# Patient Record
Sex: MALE | Race: WHITE | NOT HISPANIC OR LATINO | Employment: OTHER | URBAN - METROPOLITAN AREA
[De-identification: names, ages, dates, MRNs, and addresses within clinical notes are randomized per-mention and may not be internally consistent; named-entity substitution may affect disease eponyms.]

---

## 2017-01-01 ENCOUNTER — HOSPITAL ENCOUNTER (EMERGENCY)
Facility: HOSPITAL | Age: 77
Discharge: HOME | End: 2017-01-01
Attending: EMERGENCY MEDICINE
Payer: MEDICARE

## 2017-01-01 VITALS
SYSTOLIC BLOOD PRESSURE: 137 MMHG | WEIGHT: 145 LBS | HEART RATE: 72 BPM | OXYGEN SATURATION: 98 % | TEMPERATURE: 98 F | RESPIRATION RATE: 20 BRPM | DIASTOLIC BLOOD PRESSURE: 63 MMHG | HEIGHT: 66 IN | BODY MASS INDEX: 23.3 KG/M2

## 2017-01-01 DIAGNOSIS — L89.152 SACRAL DECUBITUS ULCER, STAGE II: Primary | ICD-10-CM

## 2017-01-01 LAB
ALBUMIN SERPL BCP-MCNC: 2 G/DL
ALP SERPL-CCNC: 120 U/L
ALT SERPL W/O P-5'-P-CCNC: 44 U/L
ANION GAP SERPL CALC-SCNC: 9 MMOL/L
AST SERPL-CCNC: 33 U/L
BACTERIA #/AREA URNS HPF: ABNORMAL /HPF
BASOPHILS # BLD AUTO: 0.03 K/UL
BASOPHILS NFR BLD: 0.4 %
BILIRUB SERPL-MCNC: 0.2 MG/DL
BILIRUB UR QL STRIP: NEGATIVE
BUN SERPL-MCNC: 10 MG/DL
CALCIUM SERPL-MCNC: 7.8 MG/DL
CHLORIDE SERPL-SCNC: 101 MMOL/L
CLARITY UR: CLEAR
CO2 SERPL-SCNC: 25 MMOL/L
COLOR UR: YELLOW
CREAT SERPL-MCNC: 0.7 MG/DL
DIFFERENTIAL METHOD: ABNORMAL
EOSINOPHIL # BLD AUTO: 0.1 K/UL
EOSINOPHIL NFR BLD: 1 %
ERYTHROCYTE [DISTWIDTH] IN BLOOD BY AUTOMATED COUNT: 17.7 %
EST. GFR  (AFRICAN AMERICAN): >60 ML/MIN/1.73 M^2
EST. GFR  (NON AFRICAN AMERICAN): >60 ML/MIN/1.73 M^2
GLUCOSE SERPL-MCNC: 146 MG/DL
GLUCOSE UR QL STRIP: NEGATIVE
HCT VFR BLD AUTO: 28.8 %
HGB BLD-MCNC: 9 G/DL
HGB UR QL STRIP: ABNORMAL
INR PPP: 1.6
KETONES UR QL STRIP: NEGATIVE
LACTATE SERPL-SCNC: 2.2 MMOL/L
LEUKOCYTE ESTERASE UR QL STRIP: ABNORMAL
LYMPHOCYTES # BLD AUTO: 1.3 K/UL
LYMPHOCYTES NFR BLD: 16.1 %
MCH RBC QN AUTO: 28.1 PG
MCHC RBC AUTO-ENTMCNC: 31.3 %
MCV RBC AUTO: 90 FL
MICROSCOPIC COMMENT: ABNORMAL
MONOCYTES # BLD AUTO: 1 K/UL
MONOCYTES NFR BLD: 12.5 %
NEUTROPHILS # BLD AUTO: 5.5 K/UL
NEUTROPHILS NFR BLD: 69.6 %
NITRITE UR QL STRIP: NEGATIVE
PH UR STRIP: 6 [PH] (ref 5–8)
PLATELET # BLD AUTO: 325 K/UL
PMV BLD AUTO: 8.3 FL
POTASSIUM SERPL-SCNC: 4 MMOL/L
PROT SERPL-MCNC: 6.2 G/DL
PROT UR QL STRIP: NEGATIVE
PROTHROMBIN TIME: 17 SEC
RBC # BLD AUTO: 3.2 M/UL
RBC #/AREA URNS HPF: 2 /HPF (ref 0–4)
SODIUM SERPL-SCNC: 135 MMOL/L
SP GR UR STRIP: <=1.005 (ref 1–1.03)
TROPONIN I SERPL DL<=0.01 NG/ML-MCNC: 0.03 NG/ML
URN SPEC COLLECT METH UR: ABNORMAL
UROBILINOGEN UR STRIP-ACNC: NEGATIVE EU/DL
WBC # BLD AUTO: 7.95 K/UL
WBC #/AREA URNS HPF: 2 /HPF (ref 0–5)
YEAST URNS QL MICRO: ABNORMAL

## 2017-01-01 PROCEDURE — 87077 CULTURE AEROBIC IDENTIFY: CPT

## 2017-01-01 PROCEDURE — 85610 PROTHROMBIN TIME: CPT

## 2017-01-01 PROCEDURE — 87086 URINE CULTURE/COLONY COUNT: CPT

## 2017-01-01 PROCEDURE — 96365 THER/PROPH/DIAG IV INF INIT: CPT

## 2017-01-01 PROCEDURE — 93005 ELECTROCARDIOGRAM TRACING: CPT

## 2017-01-01 PROCEDURE — 80053 COMPREHEN METABOLIC PANEL: CPT

## 2017-01-01 PROCEDURE — 81000 URINALYSIS NONAUTO W/SCOPE: CPT

## 2017-01-01 PROCEDURE — 93010 ELECTROCARDIOGRAM REPORT: CPT | Mod: ,,, | Performed by: INTERNAL MEDICINE

## 2017-01-01 PROCEDURE — 99284 EMERGENCY DEPT VISIT MOD MDM: CPT | Mod: 25

## 2017-01-01 PROCEDURE — 87040 BLOOD CULTURE FOR BACTERIA: CPT | Mod: 59

## 2017-01-01 PROCEDURE — 25000003 PHARM REV CODE 250: Performed by: EMERGENCY MEDICINE

## 2017-01-01 PROCEDURE — 87088 URINE BACTERIA CULTURE: CPT

## 2017-01-01 PROCEDURE — 84484 ASSAY OF TROPONIN QUANT: CPT

## 2017-01-01 PROCEDURE — P9612 CATHETERIZE FOR URINE SPEC: HCPCS

## 2017-01-01 PROCEDURE — 63600175 PHARM REV CODE 636 W HCPCS: Performed by: EMERGENCY MEDICINE

## 2017-01-01 PROCEDURE — 83605 ASSAY OF LACTIC ACID: CPT

## 2017-01-01 PROCEDURE — 96375 TX/PRO/DX INJ NEW DRUG ADDON: CPT

## 2017-01-01 PROCEDURE — 87186 SC STD MICRODIL/AGAR DIL: CPT

## 2017-01-01 PROCEDURE — 96361 HYDRATE IV INFUSION ADD-ON: CPT

## 2017-01-01 PROCEDURE — 85025 COMPLETE CBC W/AUTO DIFF WBC: CPT

## 2017-01-01 RX ORDER — OXYCODONE AND ACETAMINOPHEN 5; 325 MG/1; MG/1
2 TABLET ORAL EVERY 6 HOURS PRN
Qty: 30 TABLET | Refills: 0 | Status: SHIPPED | OUTPATIENT
Start: 2017-01-01 | End: 2017-01-09

## 2017-01-01 RX ORDER — HYDROMORPHONE HYDROCHLORIDE 1 MG/ML
0.5 INJECTION, SOLUTION INTRAMUSCULAR; INTRAVENOUS; SUBCUTANEOUS
Status: COMPLETED | OUTPATIENT
Start: 2017-01-01 | End: 2017-01-01

## 2017-01-01 RX ORDER — OXYCODONE AND ACETAMINOPHEN 5; 325 MG/1; MG/1
2 TABLET ORAL
Status: COMPLETED | OUTPATIENT
Start: 2017-01-01 | End: 2017-01-01

## 2017-01-01 RX ADMIN — PIPERACILLIN AND TAZOBACTAM 4.5 G: 4; .5 INJECTION, POWDER, LYOPHILIZED, FOR SOLUTION INTRAVENOUS; PARENTERAL at 02:01

## 2017-01-01 RX ADMIN — SODIUM CHLORIDE 1974 ML: 0.9 INJECTION, SOLUTION INTRAVENOUS at 02:01

## 2017-01-01 RX ADMIN — OXYCODONE HYDROCHLORIDE AND ACETAMINOPHEN 2 TABLET: 5; 325 TABLET ORAL at 04:01

## 2017-01-01 RX ADMIN — HYDROMORPHONE HYDROCHLORIDE 0.5 MG: 1 INJECTION, SOLUTION INTRAMUSCULAR; INTRAVENOUS; SUBCUTANEOUS at 02:01

## 2017-01-01 NOTE — ED PROVIDER NOTES
Encounter Date: 1/1/2017       History     Chief Complaint   Patient presents with    Skin Ulcer     Patient states he has a bed sore to left side of buttocks and has pain.    Cough     coughing for 3-4 days     Review of patient's allergies indicates:  No Known Allergies  HPI Comments: 76-year-old male with a history of hypertension heart failure chronic kidney disease peripheral vascular disease status post right AKA he has a suprapubic catheter and a chronic sacral decub ulcer comes in with worsening buttock pain that is been chronic for a long time.  He's been going to wound care, however pain has been getting worse.  He denies nausea vomiting or abdominal pain.  He denies changes to his appetite, but has had a cough for the last few days.  He's been having increasing drainage from his back.     Patient is a 76 y.o. male presenting with the following complaint: back pain. The history is provided by the patient.   Back Pain    This is a chronic problem. The current episode started today. The problem occurs constantly. The problem has been unchanged. The pain is associated with no known injury. The pain is present in the gluteal region. The quality of the pain is described as aching. The pain is at a severity of 7/10. The symptoms are aggravated by certain positions. Pertinent negatives include no fever, no numbness, no weight loss, no abdominal swelling, no bowel incontinence and no perianal numbness.     Past Medical History   Diagnosis Date    Alcohol abuse     CHF (congestive heart failure)     CKD (chronic kidney disease) stage 3, GFR 30-59 ml/min     Coronary artery disease     Heart failure, diastolic     High cholesterol     Hypertension     LBP (low back pain)     Prediabetes     PVD (peripheral vascular disease)     Stroke      2006, no deficits     No past medical history pertinent negatives.  Past Surgical History   Procedure Laterality Date    Aortic bifemoral bypass  2006    Left  common endarterectomy  2006    Bilateral carotid stenois  2006    Carotid stents      Joint replacement       knee     Family History   Problem Relation Age of Onset    Heart disease Mother      Social History   Substance Use Topics    Smoking status: Former Smoker     Packs/day: 2.00     Years: 45.00     Quit date: 7/16/2006    Smokeless tobacco: None    Alcohol use No      Comment: NO ALCOHOL FOR 18 MONTHS, PAST ETOH ABUSE     Review of Systems   Constitutional: Positive for chills. Negative for fever and weight loss.   HENT: Negative.    Gastrointestinal: Negative for bowel incontinence.   Musculoskeletal: Positive for back pain.   Neurological: Negative for numbness.   All other systems reviewed and are negative.      Physical Exam   Initial Vitals   BP Pulse Resp Temp SpO2   01/01/17 1246 01/01/17 1246 01/01/17 1246 01/01/17 1246 01/01/17 1246   107/54 83 18 98.2 °F (36.8 °C) 96 %     Physical Exam    Nursing note and vitals reviewed.  Constitutional:   He is chronically ill-appearing alert and oriented in no acute distress   HENT:   Head: Normocephalic and atraumatic.   Eyes: EOM are normal. Pupils are equal, round, and reactive to light.   Neck: Neck supple.   Cardiovascular: Normal rate.   His blood pressure is 107/67   Pulmonary/Chest: Breath sounds normal.   Abdominal: Soft.   He has a suprapubic catheter   Musculoskeletal:   He has a right AKA without any evidence of infection his left leg is normal appearing    His buttock he has a large sacral decubitus ulcer, I do not see that it is down to the bone; it is draining purulent fluid   Neurological: He is alert and oriented to person, place, and time.   Psychiatric: He has a normal mood and affect. Thought content normal.         ED Course   Procedures  Labs Reviewed   CBC W/ AUTO DIFFERENTIAL - Abnormal; Notable for the following:        Result Value    RBC 3.20 (*)     Hemoglobin 9.0 (*)     Hematocrit 28.8 (*)     MCHC 31.3 (*)     RDW 17.7 (*)      MPV 8.3 (*)     Lymph% 16.1 (*)     All other components within normal limits   COMPREHENSIVE METABOLIC PANEL - Abnormal; Notable for the following:     Sodium 135 (*)     Glucose 146 (*)     Calcium 7.8 (*)     Albumin 2.0 (*)     All other components within normal limits   PROTIME-INR - Abnormal; Notable for the following:     Prothrombin Time 17.0 (*)     INR 1.6 (*)     All other components within normal limits   TROPONIN I - Abnormal; Notable for the following:     Troponin I 0.028 (*)     All other components within normal limits   URINALYSIS - Abnormal; Notable for the following:     Specific Gravity, UA <=1.005 (*)     Occult Blood UA 1+ (*)     Leukocytes, UA Trace (*)     All other components within normal limits   URINALYSIS MICROSCOPIC - Abnormal; Notable for the following:     Yeast, UA Moderate (*)     All other components within normal limits   CULTURE, BLOOD    Narrative:     Aerobic and anaerobic   CULTURE, BLOOD    Narrative:     Aerobic and anaerobic   CULTURE, URINE   LACTIC ACID, PLASMA   LACTIC ACID, PLASMA             Medical Decision Making:   History:   Old Medical Records: I decided to obtain old medical records.  Old Records Summarized: records from previous admission(s).       <> Summary of Records: Patient has grown out pseudomonas previously, that has been sensitive to Zosyn, cefepime, and amikacin.    He is also grown out VRE CRE and MRSA  Initial Assessment:   76-year-old male chronically ill-appearing with worsening pain and drainage from his sacral decub  Differential Diagnosis:   Sepsis, osteomyelitis, bacteremia, pneumonia, electrolyte derangement, urinary tract infection  ED Management:  Patient's labs look good.  On further conversation, patient tells me he has not had his dressings changed for the last week because of the holiday.  This explains the increasing drainage and purulence on his bandage.  His labs look unchanged from previous he has a follow-up appointment with  wound therapy.  He hasn't been taking his hydrocodone, because he is nearly out.  Have rewritten a prescription for pain medications and recommended that he follow-up with wound care.  We have redressed his wounds here.  Patient and his wife expressed understanding and are happy with the plan.    His last blood pressure is 144/88  Patient has grown out VRE in the past                   ED Course     Clinical Impression:   The encounter diagnosis was Sacral decubitus ulcer, stage II.          Renate Valdez MD  01/01/17 1600

## 2017-01-01 NOTE — DISCHARGE INSTRUCTIONS
Treating Pressure Ulcers: Cleaning and Dressing  Its important that pressure ulcers be kept clean, moist, and covered. This helps reduce the risk of infection and speeds up the healing process. To promote healing, clean pressure ulcers at each dressing change. Take care to choose the most appropriate type of cleanser and dressing.    Caution  · Do not use heat lamps or drying agents, such as alcohol. They dry out wounds and can kill fragile new tissue.  · Do not use antiseptic agents, such as povidone-iodine and hydrogen peroxide, because they are toxic to new cells.  · Be aware that if dressings become dry, they may fuse with new cells, causing loss of new tissue when dressings are removed. Remove or change dressings before they dry out.  · Silver-based dressings are very effective for killing bacteria, and they are safe for newly developing tissues.   Wound irrigation  An irrigating catheter or syringe and saline may be used to flush the ulcer free of debris. Wound cleansers may also be used to loosen up and clean out debris. The amount of pressure used during irrigation should be enough to clean the wound without damaging it. Follow your facilitys guidelines regarding irrigation.  Adding moisture  Maintaining a clean, moist wound bed is essential for promoting healing. Certain dressings help keep ulcers moist. Be sure to fill spaces loosely with dressings to prevent fluid and bacteria from building up. Hydrogels can also help retain moisture.  Types of dressings  Many kinds of dressings are available. Be sure to follow the s instructions for the specific dressing used. If a wound doesnt respond to one type of dressing, consider changing the treatment plan.  · Moist gauze helps keep the wound moist and absorbs excess fluid. Gauze should be damp--not wet--with saline. Too-wet gauze can weaken surrounding tissue.  · Transparent films are thin and flexible and help protect wounds from water and  bacteria.   · Hydrocolloids absorb exudate, forming a nonadhesive gel. This helps maintain a moist wound environment. Hydrocolloids also protect the wound from water and bacteria.  · Hydrogels are water-based gels and dressing sheets that keep wounds moist. They are also soothing and can help ease pain.  · Alginates are highly absorptive dressings made from seaweed. When combined with wound exudate the dressing may form a gel that helps maintain a moist wound bed.  · Foams absorb exudate and keep the wound moist. They are used to cover or fill wounds.  · Collagens absorb exudate and help maintain a moist wound environment. They may also promote new tissue growth.  · Antimicrobials help prevent and treat infection. These dressings come in many forms.  Negative pressure wound therapy  Negative pressure wound therapy (NPWT)--also called vacuum-assisted closure--removes exudate, helps reduce bacterial growth, and promotes blood flow and granulation formation. First, a foam dressing is placed in the wound and the wound is covered with an occlusive dressing. Then tubing is attached to a pump, which creates subatmospheric pressure in the wound. Keep in mind that patients may require analgesia as dressing changes can be painful.  © 0735-1025 [employer] [address] [place] [address], [place] [address]. All rights reserved. This information is not intended as a substitute for professional medical care. Always follow your healthcare professional's instructions.

## 2017-01-01 NOTE — ED AVS SNAPSHOT
[facility]  [address]  [place] [address]               Asad Perez   2017  1:08 PM   ED    Description:  Male : 1940   Department:  [facility]           Your Care was Coordinated By:     Provider Role From To    Renate Valdez MD Attending Provider 17 1321 --      Reason for Visit     Skin Ulcer     Cough           Diagnoses this Visit        Comments    Sacral decubitus ulcer, stage II    -  Primary       ED Disposition     None           To Do List           Follow-up Information     Follow up with Michael Irizarry MD.    Specialties:  General Surgery, Surgery    Contact information:    [address]  [address]  [place] [address]  [phone]         These Medications        Disp Refills Start End    oxycodone-acetaminophen (PERCOCET) 5-325 mg per tablet 30 tablet 0 2017     Take 2 tablets by mouth every 6 (six) hours as needed for Pain. - Oral    Pharmacy: [facility] [address] [place] [address] Ph #: [phone]         [facility]     [facility] -  Assistance  Registered nurses in the [facility] provide clinical advisement, health education, appointment booking, and other advisory services.  Call for this free service at [phone].             Medications           Message regarding Medications     Verify the changes and/or additions to your medication regime listed below are the same as discussed with your clinician today.  If any of these changes or additions are incorrect, please notify your healthcare provider.        START taking these NEW medications        Refills    oxycodone-acetaminophen (PERCOCET) 5-325 mg per tablet 0    Sig: Take 2 tablets by mouth every 6 (six) hours as needed for Pain.    Class: Print    Route: Oral      These medications were administered today        Dose Freq    sodium chloride 0.9% bolus 1,974  mL 30 mL/kg × 65.8 kg ED 1 Time    Sig: Inject 1,974 mLs into the vein ED 1 Time.    Class: Normal    Route: Intravenous    hydromorphone (PF) injection 0.5 mg 0.5 mg ED 1 Time    Sig: Inject 0.5 mLs (0.5 mg total) into the vein ED 1 Time.    Class: Normal    Route: Intravenous    piperacillin-tazobactam 4.5 g in dextrose 5 % 100 mL IVPB (ready to mix system) 4.5 g ED 1 Time    Sig: Inject 100 mLs (4.5 g total) into the vein ED 1 Time.    Class: Normal    Route: Intravenous    neomycin-bacitracnZn-polymyxnB packet 1 each 1 packet Daily    Starting on: 1/2/2017    Sig: Apply 1 each topically once daily.    Class: Normal    Route: Topical (Top)    oxycodone-acetaminophen 5-325 mg per tablet 2 tablet 2 tablet ED 1 Time    Sig: Take 2 tablets by mouth ED 1 Time.    Class: Normal    Route: Oral           Verify that the below list of medications is an accurate representation of the medications you are currently taking.  If none reported, the list may be blank. If incorrect, please contact your healthcare provider. Carry this list with you in case of emergency.           Current Medications     albuterol-ipratropium 2.5mg-0.5mg/3mL (DUO-NEB) 0.5 mg-3 mg(2.5 mg base)/3 mL nebulizer solution Take 3 mLs by nebulization every 6 (six) hours while awake.    amiodarone (PACERONE) 200 MG Tab Take 1 tablet (200 mg total) by mouth once daily.    amlodipine (NORVASC) 10 MG tablet Take 1 tablet (10 mg total) by mouth once daily.    arginine-glutamine-calcium HMB (GENIE) 7-7-1.5 gram PwPk Take 1 packet by mouth 2 (two) times daily.    ascorbic acid, vitamin C, (VITAMIN C) 500 MG tablet Take 500 mg by mouth 2 (two) times daily.    aspirin (ECOTRIN) 81 MG EC tablet Take 1 tablet (81 mg total) by mouth once daily.    atorvastatin (LIPITOR) 40 MG tablet Take 1 tablet (40 mg total) by mouth once daily.    carvedilol (COREG) 25 MG tablet Take 1 tablet (25 mg total) by mouth 2 (two) times daily.    docusate sodium (COLACE) 250 MG capsule  "Take 250 mg by mouth 2 (two) times daily as needed for Constipation.    furosemide (LASIX) 20 MG tablet Take 2 tablets (40 mg total) by mouth once daily.    hydrocodone-acetaminophen 10-325mg (NORCO)  mg Tab Take 1 tablet by mouth every 6 (six) hours as needed for Pain.    ibuprofen (ADVIL,MOTRIN) 400 MG tablet Take 400 mg by mouth every 6 (six) hours as needed for Other.    lactulose (CHRONULAC) 10 gram/15 mL solution Take 10 g by mouth 2 (two) times daily.    linaclotide 290 mcg Cap Take by mouth once daily.    metoclopramide HCl (REGLAN) 5 MG tablet Take 5 mg by mouth 3 (three) times daily before meals.    multivitamin (THERAGRAN) per tablet Take 1 tablet by mouth once daily.    neomycin-bacitracnZn-polymyxnB packet 1 each Starting on Jan 02, 2017. Apply 1 each topically once daily.    oxycodone-acetaminophen (PERCOCET) 5-325 mg per tablet Take 2 tablets by mouth every 6 (six) hours as needed for Pain.    oxycodone-acetaminophen 5-325 mg per tablet 2 tablet Take 2 tablets by mouth ED 1 Time.    potassium, sodium phosphates (PHOS-NAK) 280-160-250 mg PwPk Take 1 packet by mouth 2 (two) times daily.    ramelteon (ROZEREM) 8 mg tablet Take 8 mg by mouth every evening.    rivaroxaban (XARELTO) 10 mg Tab Take 10 mg by mouth 2 (two) times daily.    rivaroxaban (XARELTO) 10 mg Tab Take 1 tablet (10 mg total) by mouth 2 (two) times daily.    tamsulosin (FLOMAX) 0.4 mg Cp24 Take 0.4 mg by mouth every evening.    tramadol (ULTRAM) 50 mg tablet Take 50 mg by mouth every 8 (eight) hours as needed for Pain.    zinc sulfate (ZINCATE) 220 (50) mg capsule Take 220 mg by mouth once daily.            Clinical Reference Information           Your Vitals Were     BP Pulse Temp Resp Height Weight    145/65 72 98.6 °F (37 °C) (Oral) 16 5' 6" (1.676 m) 65.8 kg (145 lb)    SpO2 BMI             98% 23.4 kg/m2         Allergies as of 1/1/2017     No Known Allergies      Immunizations Administered on Date of Encounter - 1/1/2017     " None      ED Micro, Lab, POCT     Start Ordered       Status Ordering Provider    01/01/17 1600 01/01/17 1405  Lactic acid, plasma  Every 4 hours     Start Status   01/01/17 1600 Acknowledged   01/01/17 2000 Scheduled       Acknowledged     01/01/17 1405 01/01/17 1405  Urinalysis Microscopic  Once      Final result     01/01/17 1339 01/01/17 1405  Blood culture x two cultures. Draw prior to antibiotics.  Every 15 min     Comments:  Aerobic and anaerobic   Start Status   01/01/17 1339 In process   01/01/17 1354 In process       Acknowledged     01/01/17 1339 01/01/17 1405  CBC auto differential  STAT      Final result     01/01/17 1339 01/01/17 1405  Comprehensive metabolic panel  STAT      Final result     01/01/17 1339 01/01/17 1405  Lactic acid, plasma  STAT      Final result     01/01/17 1339 01/01/17 1405  Protime-INR  STAT      Final result     01/01/17 1339 01/01/17 1405  Troponin I  STAT      Final result     01/01/17 1339 01/01/17 1405  Urinalysis - Cath  STAT      Final result     01/01/17 1339 01/01/17 1405  Urine culture - Cath (Indwelling)  STAT      In process       ED Imaging Orders     Start Ordered       Status Ordering Provider    01/01/17 1339 01/01/17 1405  X-Ray Chest AP Portable  1 time imaging      Final result         Discharge Instructions         Treating Pressure Ulcers: Cleaning and Dressing  Its important that pressure ulcers be kept clean, moist, and covered. This helps reduce the risk of infection and speeds up the healing process. To promote healing, clean pressure ulcers at each dressing change. Take care to choose the most appropriate type of cleanser and dressing.    Caution  · Do not use heat lamps or drying agents, such as alcohol. They dry out wounds and can kill fragile new tissue.  · Do not use antiseptic agents, such as povidone-iodine and hydrogen peroxide, because they are toxic to new cells.  · Be aware that if dressings become dry, they may fuse with new cells, causing  loss of new tissue when dressings are removed. Remove or change dressings before they dry out.  · Silver-based dressings are very effective for killing bacteria, and they are safe for newly developing tissues.   Wound irrigation  An irrigating catheter or syringe and saline may be used to flush the ulcer free of debris. Wound cleansers may also be used to loosen up and clean out debris. The amount of pressure used during irrigation should be enough to clean the wound without damaging it. Follow your facilitys guidelines regarding irrigation.  Adding moisture  Maintaining a clean, moist wound bed is essential for promoting healing. Certain dressings help keep ulcers moist. Be sure to fill spaces loosely with dressings to prevent fluid and bacteria from building up. Hydrogels can also help retain moisture.  Types of dressings  Many kinds of dressings are available. Be sure to follow the s instructions for the specific dressing used. If a wound doesnt respond to one type of dressing, consider changing the treatment plan.  · Moist gauze helps keep the wound moist and absorbs excess fluid. Gauze should be damp--not wet--with saline. Too-wet gauze can weaken surrounding tissue.  · Transparent films are thin and flexible and help protect wounds from water and bacteria.   · Hydrocolloids absorb exudate, forming a nonadhesive gel. This helps maintain a moist wound environment. Hydrocolloids also protect the wound from water and bacteria.  · Hydrogels are water-based gels and dressing sheets that keep wounds moist. They are also soothing and can help ease pain.  · Alginates are highly absorptive dressings made from seaweed. When combined with wound exudate the dressing may form a gel that helps maintain a moist wound bed.  · Foams absorb exudate and keep the wound moist. They are used to cover or fill wounds.  · Collagens absorb exudate and help maintain a moist wound environment. They may also promote new  tissue growth.  · Antimicrobials help prevent and treat infection. These dressings come in many forms.  Negative pressure wound therapy  Negative pressure wound therapy (NPWT)--also called vacuum-assisted closure--removes exudate, helps reduce bacterial growth, and promotes blood flow and granulation formation. First, a foam dressing is placed in the wound and the wound is covered with an occlusive dressing. Then tubing is attached to a pump, which creates subatmospheric pressure in the wound. Keep in mind that patients may require analgesia as dressing changes can be painful.  © 4378-7968 [employer] [address] [place] [address]. All rights reserved. This information is not intended as a substitute for professional medical care. Always follow your healthcare professional's instructions.          Your Scheduled Appointments     Jan 04, 2017  1:00 PM CST   Established Patient Visit with Michael Irizarry MD   [facility] ([place] [facility]) [address] [place] [address] [phone]              Smoking Cessation     If you would like to quit smoking:   You may be eligible for free services if you are a [place] resident and started smoking cigarettes before September 1, 1988.  Call the Smoking Cessation Trust ([facility]) toll free at (215) 320-1429 or (303) 353-4455.   Call 0-884-QUIT-NOW if you do not meet the above criteria.             [facility] complies with applicable Federal civil rights laws and does not discriminate on the basis of race, color, national origin, age, disability, or sex.        Language Assistance Services     ATTENTION: Language assistance services are available, free of charge. Please call [phone].      ATENCIÓN: Si habla angelo, tiene a shirley disposición servicios gratuitos de asistencia lingüística. Llame al [phone].     CHÚ Ý: N?u b?n nói Ti?ng Vi?t, có các d?ch v? h? tr? ngôn ng? mi?n  phí dành cho b?ramy. G?i s? [phone].

## 2017-01-01 NOTE — ED NOTES
Pt pressure ulcer dressing changed and redressed with ABD pad and oil emulsion dressing per MD order

## 2017-01-01 NOTE — ED NOTES
Pt presents to ED with c/o worsening sacral bed sore and cough x past few weeks. Pt states he has been bed bound for past few months due to right BKA back in October. Pt and wife at bedside and states pt has been going to wound care 5 times a week for bed sore. On arrival, saturated diaper and dressing with pus and purulent drainage noted. Diaper changed. Pt also has gonzalez catheter in place with PEG tube on arrival, as well. Pt AAO. Pt placed on 2 L O2 via nasal cannula. Call light within reach, side rails up x 2.

## 2017-01-04 ENCOUNTER — HOSPITAL ENCOUNTER (OUTPATIENT)
Dept: WOUND CARE | Facility: HOSPITAL | Age: 77
Discharge: HOME | End: 2017-01-04
Attending: SURGERY
Payer: MEDICARE

## 2017-01-04 VITALS
BODY MASS INDEX: 23.3 KG/M2 | SYSTOLIC BLOOD PRESSURE: 97 MMHG | HEIGHT: 66 IN | TEMPERATURE: 98 F | HEART RATE: 58 BPM | WEIGHT: 145 LBS | DIASTOLIC BLOOD PRESSURE: 47 MMHG

## 2017-01-04 DIAGNOSIS — R53.1 WEAK: Primary | ICD-10-CM

## 2017-01-04 DIAGNOSIS — T87.89 NON-HEALING WOUND OF AMPUTATION STUMP: ICD-10-CM

## 2017-01-04 PROCEDURE — 99214 OFFICE O/P EST MOD 30 MIN: CPT

## 2017-01-04 NOTE — PROGRESS NOTES
Much of the documentation for this visit was completed in the [facility] system. Please see the attached documentation for further details about this patient's care.     Michael Irizarry MD

## 2017-01-04 NOTE — PROVIDER PROGRESS NOTES - EMERGENCY DEPT.
Encounter Date: 1/1/2017    ED Physician Progress Notes        Physician Note:   01/03/2017 5:53 PM. Patient was not sent home with ABX. I discussed with Dr. Pastrana and will await sensitivity before starting ABX. ARELY      01/04/2017 4:19 PM. Patient reports feeling better. rRsults were discussed with Dr. Pastrana whom reccommended patient to follow up with PCP or urologist this week as culture is showing atypical bacteria growth. He verbalized understanding. ARELY

## 2017-01-04 NOTE — PATIENT INSTRUCTIONS
Use cushion under buttocks when in automobile and in wheelchair to keep pressure off wound buttocks. Call [facility] to get the Wound Vac asap. Obtain camryn lift sling and put in w/c before putting patient in w/c when returning to Wound Center next time so we can put patient on bed to see wounds.

## 2017-01-04 NOTE — PROGRESS NOTES
IN ORDER TO FIND TODAY'S PROGRESS NOTE IN WOUND EXPERT, LOOK IN THE MEDIA TAB. Patient with gonzalez urinary catheter draining clear yellow urine to gravity.

## 2017-01-06 LAB
BACTERIA BLD CULT: NORMAL
BACTERIA BLD CULT: NORMAL
BACTERIA UR CULT: NORMAL

## 2017-01-09 ENCOUNTER — HOSPITAL ENCOUNTER (EMERGENCY)
Facility: HOSPITAL | Age: 77
Discharge: HOME | End: 2017-01-09
Attending: EMERGENCY MEDICINE
Payer: MEDICARE

## 2017-01-09 VITALS
SYSTOLIC BLOOD PRESSURE: 121 MMHG | WEIGHT: 150 LBS | OXYGEN SATURATION: 97 % | DIASTOLIC BLOOD PRESSURE: 76 MMHG | HEART RATE: 63 BPM | RESPIRATION RATE: 13 BRPM | TEMPERATURE: 98 F | HEIGHT: 66 IN | BODY MASS INDEX: 24.11 KG/M2

## 2017-01-09 DIAGNOSIS — L89.502 DECUBITUS ULCER OF ANKLE, STAGE 2, UNSPECIFIED LATERALITY: ICD-10-CM

## 2017-01-09 DIAGNOSIS — M79.18 CHRONIC BUTTOCK PAIN: ICD-10-CM

## 2017-01-09 DIAGNOSIS — R05.9 COUGH IN ADULT: ICD-10-CM

## 2017-01-09 DIAGNOSIS — M79.18 BUTTOCK PAIN: ICD-10-CM

## 2017-01-09 DIAGNOSIS — E86.0 DEHYDRATION, MILD: Primary | ICD-10-CM

## 2017-01-09 DIAGNOSIS — G89.29 CHRONIC BUTTOCK PAIN: ICD-10-CM

## 2017-01-09 LAB
ALBUMIN SERPL BCP-MCNC: 1.8 G/DL
ALP SERPL-CCNC: 97 U/L
ALT SERPL W/O P-5'-P-CCNC: 27 U/L
ANION GAP SERPL CALC-SCNC: 9 MMOL/L
AST SERPL-CCNC: 22 U/L
BACTERIA #/AREA URNS HPF: ABNORMAL /HPF
BASOPHILS # BLD AUTO: 0.01 K/UL
BASOPHILS NFR BLD: 0.1 %
BILIRUB SERPL-MCNC: 0.6 MG/DL
BILIRUB UR QL STRIP: ABNORMAL
BUN SERPL-MCNC: 9 MG/DL
CALCIUM SERPL-MCNC: 7.9 MG/DL
CHLORIDE SERPL-SCNC: 101 MMOL/L
CLARITY UR: ABNORMAL
CO2 SERPL-SCNC: 27 MMOL/L
COLOR UR: YELLOW
CREAT SERPL-MCNC: 0.7 MG/DL
DIFFERENTIAL METHOD: ABNORMAL
EOSINOPHIL # BLD AUTO: 0.1 K/UL
EOSINOPHIL NFR BLD: 1.1 %
ERYTHROCYTE [DISTWIDTH] IN BLOOD BY AUTOMATED COUNT: 17.3 %
EST. GFR  (AFRICAN AMERICAN): >60 ML/MIN/1.73 M^2
EST. GFR  (NON AFRICAN AMERICAN): >60 ML/MIN/1.73 M^2
GLUCOSE SERPL-MCNC: 129 MG/DL
GLUCOSE UR QL STRIP: NEGATIVE
HCT VFR BLD AUTO: 27 %
HGB BLD-MCNC: 8.5 G/DL
HGB UR QL STRIP: ABNORMAL
HYALINE CASTS #/AREA URNS LPF: 1 /LPF
KETONES UR QL STRIP: NEGATIVE
LACTATE SERPL-SCNC: 1.8 MMOL/L
LEUKOCYTE ESTERASE UR QL STRIP: ABNORMAL
LYMPHOCYTES # BLD AUTO: 1.5 K/UL
LYMPHOCYTES NFR BLD: 20.5 %
MCH RBC QN AUTO: 28.3 PG
MCHC RBC AUTO-ENTMCNC: 31.5 %
MCV RBC AUTO: 90 FL
MICROSCOPIC COMMENT: ABNORMAL
MONOCYTES # BLD AUTO: 0.9 K/UL
MONOCYTES NFR BLD: 11.9 %
NEUTROPHILS # BLD AUTO: 4.9 K/UL
NEUTROPHILS NFR BLD: 65.9 %
NITRITE UR QL STRIP: NEGATIVE
PH UR STRIP: 6 [PH] (ref 5–8)
PLATELET # BLD AUTO: 358 K/UL
PMV BLD AUTO: 8.4 FL
POTASSIUM SERPL-SCNC: 3.8 MMOL/L
PROT SERPL-MCNC: 6.4 G/DL
PROT UR QL STRIP: ABNORMAL
RBC # BLD AUTO: 3 M/UL
RBC #/AREA URNS HPF: 0 /HPF (ref 0–4)
SODIUM SERPL-SCNC: 137 MMOL/L
SP GR UR STRIP: 1.02 (ref 1–1.03)
TROPONIN I SERPL DL<=0.01 NG/ML-MCNC: 0.02 NG/ML
URN SPEC COLLECT METH UR: ABNORMAL
UROBILINOGEN UR STRIP-ACNC: ABNORMAL EU/DL
WBC # BLD AUTO: 7.41 K/UL
WBC #/AREA URNS HPF: 12 /HPF (ref 0–5)
YEAST URNS QL MICRO: ABNORMAL

## 2017-01-09 PROCEDURE — 83605 ASSAY OF LACTIC ACID: CPT

## 2017-01-09 PROCEDURE — 81000 URINALYSIS NONAUTO W/SCOPE: CPT

## 2017-01-09 PROCEDURE — 93005 ELECTROCARDIOGRAM TRACING: CPT

## 2017-01-09 PROCEDURE — 80053 COMPREHEN METABOLIC PANEL: CPT

## 2017-01-09 PROCEDURE — 87040 BLOOD CULTURE FOR BACTERIA: CPT | Mod: 59

## 2017-01-09 PROCEDURE — 87186 SC STD MICRODIL/AGAR DIL: CPT

## 2017-01-09 PROCEDURE — 87088 URINE BACTERIA CULTURE: CPT

## 2017-01-09 PROCEDURE — 84484 ASSAY OF TROPONIN QUANT: CPT

## 2017-01-09 PROCEDURE — 99284 EMERGENCY DEPT VISIT MOD MDM: CPT | Mod: 25

## 2017-01-09 PROCEDURE — 87077 CULTURE AEROBIC IDENTIFY: CPT

## 2017-01-09 PROCEDURE — 87086 URINE CULTURE/COLONY COUNT: CPT

## 2017-01-09 PROCEDURE — 96360 HYDRATION IV INFUSION INIT: CPT

## 2017-01-09 PROCEDURE — 85025 COMPLETE CBC W/AUTO DIFF WBC: CPT

## 2017-01-09 PROCEDURE — 25000003 PHARM REV CODE 250: Performed by: EMERGENCY MEDICINE

## 2017-01-09 RX ORDER — OXYCODONE AND ACETAMINOPHEN 5; 325 MG/1; MG/1
1 TABLET ORAL EVERY 4 HOURS PRN
Qty: 15 TABLET | Refills: 0 | Status: ON HOLD | OUTPATIENT
Start: 2017-01-09 | End: 2019-01-01 | Stop reason: HOSPADM

## 2017-01-09 RX ORDER — HYDROCODONE BITARTRATE AND ACETAMINOPHEN 10; 325 MG/1; MG/1
1 TABLET ORAL
Status: COMPLETED | OUTPATIENT
Start: 2017-01-09 | End: 2017-01-09

## 2017-01-09 RX ADMIN — HYDROCODONE BITARTRATE AND ACETAMINOPHEN 1 TABLET: 10; 325 TABLET ORAL at 01:01

## 2017-01-09 RX ADMIN — SODIUM CHLORIDE 500 ML: 0.9 INJECTION, SOLUTION INTRAVENOUS at 01:01

## 2017-01-09 NOTE — ED NOTES
"Pt presents to ED with c/o cough and SOB since this morning. Pt wife at bedside and states pt has been having non productive cough. On arrival, pt in no acute distress. Pt has no other complaints other that pain to back and states he "hurts all over". Pt has BKA to right leg and is bed bound at home.   "

## 2017-01-09 NOTE — ED NOTES
Stage IV pressure ulcer to sacrum noted on arrival. 10 cm x 9 cm. Depth 4 cm.  Wound redressed with wet to dry dressing.

## 2017-01-09 NOTE — DISCHARGE INSTRUCTIONS
You need to make sure you rotate from side to side to prevent worsening of you decubitus ulcer.  The pain you're having is chronic in its from your ulcer.  Home health should be coming to your health to change the bandages.  Call your regular physician to schedule a follow-up appointment.

## 2017-01-09 NOTE — ED PROVIDER NOTES
"Encounter Date: 1/9/2017       History     Chief Complaint   Patient presents with    Hypotension     states BP was low today at home. complains of pain "everywhere"     Review of patient's allergies indicates:  No Known Allergies  HPI Comments: Patient is a 76-year-old male with a past medical history of hypertension and chronic kidney disease congestive heart failure peripheral arterial disease with a right AKA who is bedbound and has a large sacral decubitus ulcer on his buttock who presents to emergency room for evaluation of diffuse fatigue and hypotension.  Patient states he "hurts all over".  Most of his pain is located in his back.  He was seen in the emergency department here a few days ago for back pain and was given a refill for his Norco and his dressings were changed.  Prior to that visit he had not had his dressing changed over week.  She has an indwelling Mendoza catheter and states it is been changed recently.  The patient denies any current abdominal pain chest pain shortness of breath vomiting diarrhea.      Past Medical History   Diagnosis Date    Alcohol abuse     CHF (congestive heart failure)     CKD (chronic kidney disease) stage 3, GFR 30-59 ml/min     Coronary artery disease     Heart failure, diastolic     High cholesterol     Hypertension     LBP (low back pain)     Prediabetes     PVD (peripheral vascular disease)     Stroke      2006, no deficits     No past medical history pertinent negatives.  Past Surgical History   Procedure Laterality Date    Aortic bifemoral bypass  2006    Left common endarterectomy  2006    Bilateral carotid stenois  2006    Carotid stents      Joint replacement       knee    Rt aka  NOV 2017     Family History   Problem Relation Age of Onset    Heart disease Mother      Social History   Substance Use Topics    Smoking status: Former Smoker     Packs/day: 2.00     Years: 45.00     Quit date: 7/16/2006    Smokeless tobacco: None    Alcohol use " "No      Comment: NO ALCOHOL FOR 18 MONTHS, PAST ETOH ABUSE     Review of Systems   Constitutional: Positive for chills and fatigue. Negative for fever.   HENT: Negative for congestion.    Respiratory: Negative for cough, shortness of breath and wheezing.    Cardiovascular: Negative for chest pain and palpitations.   Gastrointestinal: Negative for abdominal pain, constipation, diarrhea, nausea and vomiting.   Genitourinary: Negative for flank pain and hematuria.        Ndwelling Mendoza catheter   Musculoskeletal: Positive for arthralgias, back pain and myalgias.   Skin: Positive for wound.   Neurological: Negative for weakness, numbness and headaches.   Psychiatric/Behavioral: Negative for agitation and confusion.       Physical Exam   Initial Vitals   BP Pulse Resp Temp SpO2   01/09/17 1218 01/09/17 1218 01/09/17 1218 01/09/17 1218 01/09/17 1218   84/46 68 16 98.6 °F (37 °C) 95 %     Physical Exam    Vitals reviewed.  Constitutional: He appears well-developed and well-nourished. No distress.   HENT:   Head: Normocephalic and atraumatic.   Mouth/Throat: Oropharynx is clear and moist.   Eyes: Conjunctivae and EOM are normal. Pupils are equal, round, and reactive to light.   Neck: Normal range of motion. Neck supple. No JVD present.   Cardiovascular: Normal rate and regular rhythm. Exam reveals no gallop and no friction rub.    Murmur heard.  Pulmonary/Chest: Breath sounds normal. He has no wheezes. He has no rhonchi. He has no rales.   Abdominal: Soft. There is no tenderness. There is no rebound and no guarding.   Musculoskeletal: Normal range of motion. He exhibits no edema.   Right above-the-knee" with a bandage in place but no signs of drainage or erythema   Neurological: He is alert and oriented to person, place, and time. He has normal strength. No cranial nerve deficit or sensory deficit.   Skin: Skin is warm and dry.   Sacral decubitus ulcer that approximately 10x9 cm with a pink healthy base but no significant " discharge.  Wet to dry dressing is in place.  There is tracking in the right lumbo sacral region of 4 cm.  It is not down to the bone.    Psychiatric: He has a normal mood and affect.         ED Course   Procedures  Labs Reviewed   CULTURE, BLOOD   CULTURE, BLOOD   CBC W/ AUTO DIFFERENTIAL   COMPREHENSIVE METABOLIC PANEL   LACTIC ACID, PLASMA   URINALYSIS   TROPONIN I     EKG Readings: (Independently Interpreted)   Other EKG Interpretations: Rate 61, normal sinus rhythm axis intervals ST segments and axis.          Medical Decision Making:   The patient  appears to have chronic pain secondary to his buttock wound.  It does not appear to be cellulitic.  He does not appear to have a urinary tract infection at this time but likely chronic bacturia.  The patient doesn't appear to need admission at this time.  He does not appear to be septic or toxic.  He needs to follow-up with his primary care physician for home health.  I feel bad that he is in chronic pain but he may need to go into a nursing home for better wound care.  I will give him a refill of his pain pills.                   ED Course     Clinical Impression:   The primary encounter diagnosis was Dehydration, mild. Diagnoses of Cough in adult, Buttock pain, and Decubitus ulcer of ankle, stage 2, unspecified laterality were also pertinent to this visit.          Emerson Mckay MD  01/09/17 8706

## 2017-01-09 NOTE — ED AVS SNAPSHOT
[facility]  [address]  [place] [address]               Asad Perez   2017 12:41 PM   ED    Description:  Male : 1940   Department:  [facility]           Your Care was Coordinated By:     Provider Role From To    Emerson Mckay MD Attending Provider 17 1246 --      Reason for Visit     Hypotension           Diagnoses this Visit        Comments    Dehydration, mild    -  Primary     Cough in adult         Buttock pain         Decubitus ulcer of ankle, stage 2, unspecified laterality         Chronic buttock pain           ED Disposition     ED Disposition Condition Comment    Discharge             To Do List           Follow-up Information     Follow up with [facility].    Specialty:  Emergency Medicine    Why:  If symptoms worsen    Contact information:    [address]  [place] [id number] [phone]        Follow up with Tomas Torres MD In 1 day.    Specialty:  Family Medicine    Why:  Call tomorrow to schedule follow-up appointment with your primary care doctor    Contact information:    [address] [place] [address]  [place] [address]  [phone]        [facility]     [facility] -  Assistance  Registered nurses in the [facility] provide clinical advisement, health education, appointment booking, and other advisory services.  Call for this free service at [phone].             Medications           Message regarding Medications     Verify the changes and/or additions to your medication regime listed below are the same as discussed with your clinician today.  If any of these changes or additions are incorrect, please notify your healthcare provider.        These medications were administered today        Dose Freq    sodium chloride 0.9% bolus 500 mL 500 mL ED 1 Time    Sig: Inject 500 mLs into the vein ED 1 Time.    Class: Normal    Route:  Intravenous    hydrocodone-acetaminophen 10-325mg per tablet 1 tablet 1 tablet ED 1 Time    Sig: Take 1 tablet by mouth ED 1 Time.    Class: Normal    Route: Oral           Verify that the below list of medications is an accurate representation of the medications you are currently taking.  If none reported, the list may be blank. If incorrect, please contact your healthcare provider. Carry this list with you in case of emergency.           Current Medications     albuterol-ipratropium 2.5mg-0.5mg/3mL (DUO-NEB) 0.5 mg-3 mg(2.5 mg base)/3 mL nebulizer solution Take 3 mLs by nebulization every 6 (six) hours while awake.    amiodarone (PACERONE) 200 MG Tab Take 1 tablet (200 mg total) by mouth once daily.    amlodipine (NORVASC) 10 MG tablet Take 1 tablet (10 mg total) by mouth once daily.    arginine-glutamine-calcium HMB (GENIE) 7-7-1.5 gram PwPk Take 1 packet by mouth 2 (two) times daily.    ascorbic acid, vitamin C, (VITAMIN C) 500 MG tablet Take 500 mg by mouth 2 (two) times daily.    aspirin (ECOTRIN) 81 MG EC tablet Take 1 tablet (81 mg total) by mouth once daily.    atorvastatin (LIPITOR) 40 MG tablet Take 1 tablet (40 mg total) by mouth once daily.    carvedilol (COREG) 25 MG tablet Take 1 tablet (25 mg total) by mouth 2 (two) times daily.    docusate sodium (COLACE) 250 MG capsule Take 250 mg by mouth 2 (two) times daily as needed for Constipation.    furosemide (LASIX) 20 MG tablet Take 2 tablets (40 mg total) by mouth once daily.    hydrocodone-acetaminophen 10-325mg (NORCO)  mg Tab Take 1 tablet by mouth every 6 (six) hours as needed for Pain.    ibuprofen (ADVIL,MOTRIN) 400 MG tablet Take 400 mg by mouth every 6 (six) hours as needed for Other.    lactulose (CHRONULAC) 10 gram/15 mL solution Take 10 g by mouth 2 (two) times daily.    linaclotide 290 mcg Cap Take by mouth once daily.    metoclopramide HCl (REGLAN) 5 MG tablet Take 5 mg by mouth 3 (three) times daily before meals.    multivitamin  "(THERAGRAN) per tablet Take 1 tablet by mouth once daily.    oxycodone-acetaminophen (PERCOCET) 5-325 mg per tablet Take 2 tablets by mouth every 6 (six) hours as needed for Pain.    potassium, sodium phosphates (PHOS-NAK) 280-160-250 mg PwPk Take 1 packet by mouth 2 (two) times daily.    ramelteon (ROZEREM) 8 mg tablet Take 8 mg by mouth every evening.    rivaroxaban (XARELTO) 10 mg Tab Take 10 mg by mouth 2 (two) times daily.    rivaroxaban (XARELTO) 10 mg Tab Take 1 tablet (10 mg total) by mouth 2 (two) times daily.    tamsulosin (FLOMAX) 0.4 mg Cp24 Take 0.4 mg by mouth every evening.    tramadol (ULTRAM) 50 mg tablet Take 50 mg by mouth every 8 (eight) hours as needed for Pain.    zinc sulfate (ZINCATE) 220 (50) mg capsule Take 220 mg by mouth once daily.            Clinical Reference Information           Your Vitals Were     BP Pulse Temp Resp Height Weight    121/76 63 97.7 °F (36.5 °C) (Oral) 13 5' 6" (1.676 m) 68 kg (150 lb)    SpO2 BMI             97% 24.21 kg/m2         Allergies as of 1/9/2017     No Known Allergies      Immunizations Administered on Date of Encounter - 1/9/2017     None      ED Micro, Lab, POCT     Start Ordered       Status Ordering Provider    01/09/17 1701 01/09/17 1700  Urine culture  Add-on      Completed     01/09/17 1655 01/09/17 1654    STAT,   Status:  Canceled      Canceled     01/09/17 1307 01/09/17 1306  Blood Culture #1 **CANNOT BE ORDERED STAT**  Once      In process     01/09/17 1307 01/09/17 1306  Blood Culture #2 **CANNOT BE ORDERED STAT**  Once      In process     01/09/17 1307 01/09/17 1306  Urinalysis  STAT      Final result     01/09/17 1307 01/09/17 1306  Troponin I  STAT      Final result     01/09/17 1306 01/09/17 1306  CBC auto differential  STAT      Final result     01/09/17 1306 01/09/17 1306  Comprehensive metabolic panel  STAT      Final result     01/09/17 1306 01/09/17 1306  Lactic acid, plasma  STAT      Final result     01/09/17 1306 01/09/17 1306  " Urinalysis Microscopic  Once      Final result       ED Imaging Orders     Start Ordered       Status Ordering Provider    01/09/17 1509 01/09/17 1508  X-Ray Chest 1 View  1 time imaging      Final result         Discharge Instructions       You need to make sure you rotate from side to side to prevent worsening of you decubitus ulcer.  The pain you're having is chronic in its from your ulcer.  Home health should be coming to your health to change the bandages.  Call your regular physician to schedule a follow-up appointment.    Discharge References/Attachments     DECUBITUS ULCER (ENGLISH)      Your Scheduled Appointments     Jan 11, 2017 10:30 AM CST   Established Patient Visit with Michael Irizarry MD   [facility] ([facility])    [address]  [facility] [address] [phone]              Smoking Cessation     If you would like to quit smoking:   You may be eligible for free services if you are a [place] resident and started smoking cigarettes before September 1, 1988.  Call the Smoking Cessation [employer] ([facility]) toll free at [phone] or [phone].   Call 2-799-QUIT-NOW if you do not meet the above criteria.             [facility] complies with applicable Federal civil rights laws and does not discriminate on the basis of race, color, national origin, age, disability, or sex.        Language Assistance Services     ATTENTION: Language assistance services are available, free of charge. Please call [phone].      ATENCIÓN: Si habla español, tiene a shirley disposición servicios gratuitos de asistencia lingüística. Llame al [phone].     CHÚ Ý: N?u b?n nói Ti?ng Vi?t, có các d?ch v? h? tr? ngôn ng? mi?n phí dành cho b?n. G?i s? [phone].

## 2017-01-14 LAB
BACTERIA BLD CULT: NORMAL
BACTERIA BLD CULT: NORMAL
BACTERIA UR CULT: NORMAL

## 2017-01-17 ENCOUNTER — HOSPITAL ENCOUNTER (EMERGENCY)
Facility: HOSPITAL | Age: 77
Discharge: HOME | End: 2017-01-18
Attending: EMERGENCY MEDICINE
Payer: MEDICARE

## 2017-01-17 DIAGNOSIS — T83.511D URINARY TRACT INFECTION ASSOCIATED WITH INDWELLING URETHRAL CATHETER, SUBSEQUENT ENCOUNTER: Primary | ICD-10-CM

## 2017-01-17 DIAGNOSIS — N39.0 URINARY TRACT INFECTION ASSOCIATED WITH INDWELLING URETHRAL CATHETER, SUBSEQUENT ENCOUNTER: Primary | ICD-10-CM

## 2017-01-17 LAB
BASOPHILS # BLD AUTO: 0.01 K/UL
BASOPHILS NFR BLD: 0.1 %
DIFFERENTIAL METHOD: ABNORMAL
EOSINOPHIL # BLD AUTO: 0 K/UL
EOSINOPHIL NFR BLD: 0.4 %
ERYTHROCYTE [DISTWIDTH] IN BLOOD BY AUTOMATED COUNT: 17.3 %
HCT VFR BLD AUTO: 29.5 %
HGB BLD-MCNC: 8.9 G/DL
LYMPHOCYTES # BLD AUTO: 1.8 K/UL
LYMPHOCYTES NFR BLD: 17.9 %
MCH RBC QN AUTO: 27.2 PG
MCHC RBC AUTO-ENTMCNC: 30.2 %
MCV RBC AUTO: 90 FL
MONOCYTES # BLD AUTO: 1.3 K/UL
MONOCYTES NFR BLD: 13 %
NEUTROPHILS # BLD AUTO: 7 K/UL
NEUTROPHILS NFR BLD: 68.6 %
PLATELET # BLD AUTO: 499 K/UL
PMV BLD AUTO: 8.6 FL
RBC # BLD AUTO: 3.27 M/UL
WBC # BLD AUTO: 10.23 K/UL

## 2017-01-17 PROCEDURE — 85025 COMPLETE CBC W/AUTO DIFF WBC: CPT

## 2017-01-17 PROCEDURE — 80053 COMPREHEN METABOLIC PANEL: CPT

## 2017-01-17 PROCEDURE — 96365 THER/PROPH/DIAG IV INF INIT: CPT

## 2017-01-17 PROCEDURE — 99284 EMERGENCY DEPT VISIT MOD MDM: CPT | Mod: 25

## 2017-01-17 NOTE — ED AVS SNAPSHOT
[facility]  [address]  [place] [address]               Asad Perez   2017  8:44 PM   ED    Description:  Male : 1940   Department:  [facility]           Your Care was Coordinated By:     Provider Role From To    Mario Alberto Dewey MD Attending Provider 17 1983 --      Reason for Visit     Abnormal Lab           Diagnoses this Visit        Comments    Urinary tract infection associated with indwelling urethral catheter, subsequent encounter    -  Primary       ED Disposition     ED Disposition Condition Comment    Discharge             To Do List           Follow-up Information     Follow up with Tomas Torres MD In 2 days.    Specialty:  Family Medicine    Contact information:    [address] [facility] [address]  [place] [address]  [phone]         These Medications        Disp Refills Start End    amoxicillin-clavulanate 875-125mg (AUGMENTIN) 875-125 mg per tablet 20 tablet 0 2017    Take 1 tablet by mouth 2 (two) times daily. - Oral    Pharmacy: [facility] [address] [place] [address] #: [phone]         [facility]     [facility] -  Assistance  Registered nurses in the [facility] provide clinical advisement, health education, appointment booking, and other advisory services.  Call for this free service at [phone].             Medications           Message regarding Medications     Verify the changes and/or additions to your medication regime listed below are the same as discussed with your clinician today.  If any of these changes or additions are incorrect, please notify your healthcare provider.        START taking these NEW medications        Refills    amoxicillin-clavulanate 875-125mg (AUGMENTIN) 875-125 mg per tablet 0    Sig: Take 1 tablet by mouth 2 (two) times daily.    Class: Print    Route: Oral      These  medications were administered today        Dose Freq    piperacillin-tazobactam 4.5 g in dextrose 5 % 100 mL IVPB (ready to mix system) 4.5 g ED 1 Time    Sig: Inject 100 mLs (4.5 g total) into the vein ED 1 Time.    Class: Normal    Route: Intravenous           Verify that the below list of medications is an accurate representation of the medications you are currently taking.  If none reported, the list may be blank. If incorrect, please contact your healthcare provider. Carry this list with you in case of emergency.           Current Medications     albuterol-ipratropium 2.5mg-0.5mg/3mL (DUO-NEB) 0.5 mg-3 mg(2.5 mg base)/3 mL nebulizer solution Take 3 mLs by nebulization every 6 (six) hours while awake.    amiodarone (PACERONE) 200 MG Tab Take 1 tablet (200 mg total) by mouth once daily.    amlodipine (NORVASC) 10 MG tablet Take 1 tablet (10 mg total) by mouth once daily.    amoxicillin-clavulanate 875-125mg (AUGMENTIN) 875-125 mg per tablet Take 1 tablet by mouth 2 (two) times daily.    arginine-glutamine-calcium HMB (GENIE) 7-7-1.5 gram PwPk Take 1 packet by mouth 2 (two) times daily.    ascorbic acid, vitamin C, (VITAMIN C) 500 MG tablet Take 500 mg by mouth 2 (two) times daily.    aspirin (ECOTRIN) 81 MG EC tablet Take 1 tablet (81 mg total) by mouth once daily.    atorvastatin (LIPITOR) 40 MG tablet Take 1 tablet (40 mg total) by mouth once daily.    carvedilol (COREG) 25 MG tablet Take 1 tablet (25 mg total) by mouth 2 (two) times daily.    docusate sodium (COLACE) 250 MG capsule Take 250 mg by mouth 2 (two) times daily as needed for Constipation.    furosemide (LASIX) 20 MG tablet Take 2 tablets (40 mg total) by mouth once daily.    hydrocodone-acetaminophen 10-325mg (NORCO)  mg Tab Take 1 tablet by mouth every 6 (six) hours as needed for Pain.    ibuprofen (ADVIL,MOTRIN) 400 MG tablet Take 400 mg by mouth every 6 (six) hours as needed for Other.    lactulose (CHRONULAC) 10 gram/15 mL solution Take 10  "g by mouth 2 (two) times daily.    linaclotide 290 mcg Cap Take by mouth once daily.    metoclopramide HCl (REGLAN) 5 MG tablet Take 5 mg by mouth 3 (three) times daily before meals.    multivitamin (THERAGRAN) per tablet Take 1 tablet by mouth once daily.    oxycodone-acetaminophen (PERCOCET) 5-325 mg per tablet Take 1 tablet by mouth every 4 (four) hours as needed for Pain.    potassium, sodium phosphates (PHOS-NAK) 280-160-250 mg PwPk Take 1 packet by mouth 2 (two) times daily.    ramelteon (ROZEREM) 8 mg tablet Take 8 mg by mouth every evening.    rivaroxaban (XARELTO) 10 mg Tab Take 10 mg by mouth 2 (two) times daily.    rivaroxaban (XARELTO) 10 mg Tab Take 1 tablet (10 mg total) by mouth 2 (two) times daily.    tamsulosin (FLOMAX) 0.4 mg Cp24 Take 0.4 mg by mouth every evening.    tramadol (ULTRAM) 50 mg tablet Take 50 mg by mouth every 8 (eight) hours as needed for Pain.    zinc sulfate (ZINCATE) 220 (50) mg capsule Take 220 mg by mouth once daily.            Clinical Reference Information           Your Vitals Were     BP Pulse Temp Resp Height Weight    153/54 74 97.4 °F (36.3 °C) (Oral) 16 5' 6" (1.676 m) 65.8 kg (145 lb)    SpO2 BMI             95% 23.4 kg/m2         Allergies as of 1/18/2017     No Known Allergies      Immunizations Administered on Date of Encounter - 1/18/2017     None      ED Micro, Lab, POCT     Start Ordered       Status Ordering Provider    01/17/17 2308 01/17/17 2307  Comprehensive metabolic panel  STAT      Final result     01/17/17 2308 01/17/17 2307  CBC auto differential  STAT      Final result     01/17/17 2308 01/17/17 2307  Urinalysis  STAT      In process     01/17/17 2307 01/17/17 2307  Urinalysis Microscopic  Once      In process       ED Imaging Orders     None        Discharge Instructions         Follow up with your primary care physician for further evaluation in 2 days.  Take your medications as prescribed.  Refer to the additional material provided for further " information including when to return to the emergency department.    Bladder Infection, Male (Adult)    You have a bladder infection.  Urine is normally free of bacteria. But bacteria can get into the urinary tract from the skin around the rectum or it may travel in the blood from elsewhere in the body.  This is called a urinary tract infection (UTI). An infection can occur anywhere in the urinary tract. It could be in a kidney (pyelonrphritis)or in the bladder (cystitis) and urethra (urethritis). The urethra is the tube that drains the urine from the bladder through the tip of the penis.  The most common place for a UTI is in the bladder. This is called a bladder infection. Most bladder infections are easily treated. They are not serious unless the infection spreads up to the kidney.  The terms bladder infection, UTI, and cystitis are often used to describe the same thing, but they arent always the same. Cystitis is an inflammation of the bladder. The most common cause of cystitis is an infection.   Keep in mind:  · Infections in the urine are called UTIs.  · Cystitis is usually caused by a UTI.  · Not all UTIs and cases of cystitis are bladder infections.  · Bladder infections are the most common type of cystitis.  Symptoms of a bladder infection  The infection causes inflammation in the urethra and bladder. This inflammation causes many of the symptoms. The most common symptoms of a bladder infection are:  · Pain or burning when urinating  · Having to go more often than usual  · Feeling like you need to go right away  · Only a small amount comes out  · Blood in urine  · Discomfort in your belly (abdomen), usually in the lower abdomen, above the pubic bone  · Cloudy, strong, or bad smelling urine  · Unable to urinate (retention)  · Urinary incontinence  · Fever  · Loss of appetite  Older adults may also feel confused.  Causes of a bladder infection  Bladder infections are not contagious. You can't get one from  someone else, from a toilet seat, or from sharing a bath.  The most common cause of bladder infections is bacteria from the bowels. The bacteria get onto the skin around the opening of the urethra. From there they can get into the urine and travel up to the bladder. This causes inflammation and an infection. This usually happens because of:  · An enlarged prostate  · Poor cleaning of the genitals  · Procedures that put a tube in your bladder, like a Mendoza catheter  · Bowel incontinence  · Older age  · Not emptying your bladder (The urine stays there, giving the bacteria a chance to grow.)  · Dehydration (This allows urine to stay in the bladder longer.)  · Constipation (This can cause the bowels to push on the bladder or urethra and keep the bladder from emptying.)  Treatment  Bladder infections are treated with antibiotics. They usually clear up quickly without complications. Treatment helps prevent a more serious kidney infection.  Medicines  Medicines can help in the treatment of a bladder infection:  · You have been prescribed antibiotics. Take this medicine until you have finished it, even if you feel better. Taking all of the medicine will make sure the infection has cleared.  You can use acetaminophen or ibuprofen for pain, fever, or discomfort, unless another medicine was prescribed. You can also alternate them, or use both together. They work differently and are a different class of medicines, so taking them together is not an overdose. If you have chronic liver or kidney disease, talk with your healthcare provider before using these medicines. Also talk with your provider if youve had a stomach ulcer or GI bleeding or are taking blood thinner medicines.  · You may have been given phenazopyridine to ease burning when you urinate. It will cause your urine to be bright orange. It can stain clothing.  Home care  General care  · Drink plenty of fluids, unless your healthcare provider told you not to. Fluids  will prevent dehydration and flush out your bladder.  · Use good personal hygiene. Wipe from front to back after using the toilet, and clean your penis regularly. If you arent circumcised, retract the foreskin when cleaning.  · Urinate more frequently, and dont try to hold it in for long periods of time, if possible.  · Wear loose-fitting clothes and cotton underwear. Avoid tight-fitting pants. This helps keep you clean and dry.  · Change your diet to prevent constipation. This means eating more fresh foods and more fiber, and less junk and fatty foods.  · Avoid sex until your symptoms are gone.  · Avoid caffeine, alcohol, and spicy foods. These can irritate the bladder.  Follow-up care  Follow up with your healthcare provider, or as advised if all symptoms have not cleared up within 5 days. It is important to keep your follow-up appointment. You can talk with your provider to see if you need more tests of the urinary tract. This is especially important if you have infections that keep coming back.  If a culture was done, you will be told if your treatment needs to be changed. If directed, you can call to find out the results.  If X-rays were taken, you will be told if the results will affect your treatment.  Call 911  Call 911 if any of these occur:  · Trouble breathing  · Difficulty waking up  · Feeling confused  · Fainting or loss of consciousness  · Rapid heart rate  When to seek medical advice  Call your healthcare provider right away if any of these occur:  · Fever of 100.4ºF (38ºC) or higher, or as directed by your healthcare provider  · Your symptoms dont get better after 2 days of treatment  · Back or abdominal pain that gets worse  · Repeated vomiting, or you arent able to keep medicine down  · Weakness or dizziness  © 2291-3732 [employer]. [address] [place] [address], [place] [address]. All rights reserved. This information is not intended as a substitute for professional medical care.  Always follow your healthcare professional's instructions.          Smoking Cessation     If you would like to quit smoking:   You may be eligible for free services if you are a [place] resident and started smoking cigarettes before September 1, 1988.  Call the Smoking Cessation Trust (SCT) toll free at (142) 369-8124 or (400) 318-0811.   Call 1-800-QUIT-NOW if you do not meet the above criteria.             [facility] complies with applicable Federal civil rights laws and does not discriminate on the basis of race, color, national origin, age, disability, or sex.        Language Assistance Services     ATTENTION: Language assistance services are available, free of charge. Please call [phone].      ATENCIÓN: Si habla alainaañol, tiene a shirley disposición servicios gratuitos de asistencia lingüística. Llame al [phone].     CHÚ Ý: N?u b?n nói Ti?ng Vi?t, có các d?ch v? h? tr? ngôn ng? mi?n phí dành cho b?n. G?i s? [phone].

## 2017-01-18 VITALS
BODY MASS INDEX: 23.3 KG/M2 | TEMPERATURE: 98 F | HEIGHT: 66 IN | RESPIRATION RATE: 17 BRPM | DIASTOLIC BLOOD PRESSURE: 49 MMHG | HEART RATE: 77 BPM | WEIGHT: 145 LBS | OXYGEN SATURATION: 100 % | SYSTOLIC BLOOD PRESSURE: 131 MMHG

## 2017-01-18 LAB
ALBUMIN SERPL BCP-MCNC: 2 G/DL
ALP SERPL-CCNC: 129 U/L
ALT SERPL W/O P-5'-P-CCNC: 56 U/L
ANION GAP SERPL CALC-SCNC: 11 MMOL/L
AST SERPL-CCNC: 54 U/L
BACTERIA #/AREA URNS HPF: ABNORMAL /HPF
BILIRUB SERPL-MCNC: 0.5 MG/DL
BILIRUB UR QL STRIP: NEGATIVE
BUN SERPL-MCNC: 11 MG/DL
CALCIUM SERPL-MCNC: 8.4 MG/DL
CHLORIDE SERPL-SCNC: 96 MMOL/L
CLARITY UR: ABNORMAL
CO2 SERPL-SCNC: 24 MMOL/L
COLOR UR: YELLOW
CREAT SERPL-MCNC: 0.7 MG/DL
EST. GFR  (AFRICAN AMERICAN): >60 ML/MIN/1.73 M^2
EST. GFR  (NON AFRICAN AMERICAN): >60 ML/MIN/1.73 M^2
GLUCOSE SERPL-MCNC: 131 MG/DL
GLUCOSE UR QL STRIP: NEGATIVE
HGB UR QL STRIP: ABNORMAL
HYALINE CASTS #/AREA URNS LPF: 1 /LPF
KETONES UR QL STRIP: NEGATIVE
LEUKOCYTE ESTERASE UR QL STRIP: ABNORMAL
MICROSCOPIC COMMENT: ABNORMAL
NITRITE UR QL STRIP: NEGATIVE
PH UR STRIP: 6 [PH] (ref 5–8)
POTASSIUM SERPL-SCNC: 4.1 MMOL/L
PROT SERPL-MCNC: 7.2 G/DL
PROT UR QL STRIP: ABNORMAL
RBC #/AREA URNS HPF: 20 /HPF (ref 0–4)
SODIUM SERPL-SCNC: 131 MMOL/L
SP GR UR STRIP: 1.01 (ref 1–1.03)
URN SPEC COLLECT METH UR: ABNORMAL
UROBILINOGEN UR STRIP-ACNC: ABNORMAL EU/DL
WBC #/AREA URNS HPF: 16 /HPF (ref 0–5)
YEAST URNS QL MICRO: ABNORMAL

## 2017-01-18 PROCEDURE — 25000003 PHARM REV CODE 250: Performed by: EMERGENCY MEDICINE

## 2017-01-18 PROCEDURE — 63600175 PHARM REV CODE 636 W HCPCS: Performed by: EMERGENCY MEDICINE

## 2017-01-18 PROCEDURE — 81000 URINALYSIS NONAUTO W/SCOPE: CPT

## 2017-01-18 RX ORDER — FLUCONAZOLE 200 MG/1
200 TABLET ORAL DAILY
Qty: 7 TABLET | Refills: 0 | Status: SHIPPED | OUTPATIENT
Start: 2017-01-18 | End: 2017-01-25

## 2017-01-18 RX ORDER — AMOXICILLIN AND CLAVULANATE POTASSIUM 875; 125 MG/1; MG/1
1 TABLET, FILM COATED ORAL 2 TIMES DAILY
Qty: 20 TABLET | Refills: 0 | Status: SHIPPED | OUTPATIENT
Start: 2017-01-18 | End: 2017-01-28

## 2017-01-18 RX ADMIN — PIPERACILLIN SODIUM AND TAZOBACTAM SODIUM 4.5 G: 4; .5 INJECTION, POWDER, FOR SOLUTION INTRAVENOUS at 12:01

## 2017-01-18 NOTE — ED PROVIDER NOTES
I called to check on the pt based upon urine culture.  Wife reports he has been sleeping a lot, but denies fever and vomiting.  Per Dr. Andres, she thinks it is in the pt's best interest to return to the ED for IV abx and reevaluation.  Pt's wife verbalized understanding and she will bring the pt to this ED tonight.       Madelaine Masters, CHARISSA  01/17/17 4519

## 2017-01-18 NOTE — DISCHARGE INSTRUCTIONS
Follow up with your primary care physician for further evaluation in 2 days.  Take your medications as prescribed.  Refer to the additional material provided for further information including when to return to the emergency department.    Bladder Infection, Male (Adult)    You have a bladder infection.  Urine is normally free of bacteria. But bacteria can get into the urinary tract from the skin around the rectum or it may travel in the blood from elsewhere in the body.  This is called a urinary tract infection (UTI). An infection can occur anywhere in the urinary tract. It could be in a kidney (pyelonrphritis)or in the bladder (cystitis) and urethra (urethritis). The urethra is the tube that drains the urine from the bladder through the tip of the penis.  The most common place for a UTI is in the bladder. This is called a bladder infection. Most bladder infections are easily treated. They are not serious unless the infection spreads up to the kidney.  The terms bladder infection, UTI, and cystitis are often used to describe the same thing, but they arent always the same. Cystitis is an inflammation of the bladder. The most common cause of cystitis is an infection.   Keep in mind:  · Infections in the urine are called UTIs.  · Cystitis is usually caused by a UTI.  · Not all UTIs and cases of cystitis are bladder infections.  · Bladder infections are the most common type of cystitis.  Symptoms of a bladder infection  The infection causes inflammation in the urethra and bladder. This inflammation causes many of the symptoms. The most common symptoms of a bladder infection are:  · Pain or burning when urinating  · Having to go more often than usual  · Feeling like you need to go right away  · Only a small amount comes out  · Blood in urine  · Discomfort in your belly (abdomen), usually in the lower abdomen, above the pubic bone  · Cloudy, strong, or bad smelling urine  · Unable to urinate (retention)  · Urinary  incontinence  · Fever  · Loss of appetite  Older adults may also feel confused.  Causes of a bladder infection  Bladder infections are not contagious. You can't get one from someone else, from a toilet seat, or from sharing a bath.  The most common cause of bladder infections is bacteria from the bowels. The bacteria get onto the skin around the opening of the urethra. From there they can get into the urine and travel up to the bladder. This causes inflammation and an infection. This usually happens because of:  · An enlarged prostate  · Poor cleaning of the genitals  · Procedures that put a tube in your bladder, like a Mendoza catheter  · Bowel incontinence  · Older age  · Not emptying your bladder (The urine stays there, giving the bacteria a chance to grow.)  · Dehydration (This allows urine to stay in the bladder longer.)  · Constipation (This can cause the bowels to push on the bladder or urethra and keep the bladder from emptying.)  Treatment  Bladder infections are treated with antibiotics. They usually clear up quickly without complications. Treatment helps prevent a more serious kidney infection.  Medicines  Medicines can help in the treatment of a bladder infection:  · You have been prescribed antibiotics. Take this medicine until you have finished it, even if you feel better. Taking all of the medicine will make sure the infection has cleared.  You can use acetaminophen or ibuprofen for pain, fever, or discomfort, unless another medicine was prescribed. You can also alternate them, or use both together. They work differently and are a different class of medicines, so taking them together is not an overdose. If you have chronic liver or kidney disease, talk with your healthcare provider before using these medicines. Also talk with your provider if youve had a stomach ulcer or GI bleeding or are taking blood thinner medicines.  · You may have been given phenazopyridine to ease burning when you urinate. It  will cause your urine to be bright orange. It can stain clothing.  Home care  General care  · Drink plenty of fluids, unless your healthcare provider told you not to. Fluids will prevent dehydration and flush out your bladder.  · Use good personal hygiene. Wipe from front to back after using the toilet, and clean your penis regularly. If you arent circumcised, retract the foreskin when cleaning.  · Urinate more frequently, and dont try to hold it in for long periods of time, if possible.  · Wear loose-fitting clothes and cotton underwear. Avoid tight-fitting pants. This helps keep you clean and dry.  · Change your diet to prevent constipation. This means eating more fresh foods and more fiber, and less junk and fatty foods.  · Avoid sex until your symptoms are gone.  · Avoid caffeine, alcohol, and spicy foods. These can irritate the bladder.  Follow-up care  Follow up with your healthcare provider, or as advised if all symptoms have not cleared up within 5 days. It is important to keep your follow-up appointment. You can talk with your provider to see if you need more tests of the urinary tract. This is especially important if you have infections that keep coming back.  If a culture was done, you will be told if your treatment needs to be changed. If directed, you can call to find out the results.  If X-rays were taken, you will be told if the results will affect your treatment.  Call 911  Call 911 if any of these occur:  · Trouble breathing  · Difficulty waking up  · Feeling confused  · Fainting or loss of consciousness  · Rapid heart rate  When to seek medical advice  Call your healthcare provider right away if any of these occur:  · Fever of 100.4ºF (38ºC) or higher, or as directed by your healthcare provider  · Your symptoms dont get better after 2 days of treatment  · Back or abdominal pain that gets worse  · Repeated vomiting, or you arent able to keep medicine down  · Weakness or dizziness  © 8592-9165  [employer]. [address] [place] [address], [place] [address]. All rights reserved. This information is not intended as a substitute for professional medical care. Always follow your healthcare professional's instructions.

## 2017-01-18 NOTE — ED NOTES
PT presents to ED states that he was at home and someone from the hospital called him. Pt's family states that they were told to come to the ED because the pt is anemic. Pt denies any symptoms. Pt is bed bound. Pt denies any change in stool color, dizziness, or vomiting.

## 2017-01-18 NOTE — ED NOTES
Linens changed. Pt has drainage cream colored drainage from sacral ulcer. Pt dressed in own clothing. Awaiting d/c.

## 2017-01-18 NOTE — ED PROVIDER NOTES
Encounter Date: 1/17/2017       History     Chief Complaint   Patient presents with    Abnormal Lab     pt was told to come to ED for positive urine cultures for IV antibiotics. pt c/o increased fatigue and weakness. pt pale. no pain. pt has indwelling catheter     Review of patient's allergies indicates:  No Known Allergies  HPI Comments: CHIEF COMPLAINT: Abnormal labs    HISTORY OF PRESENT ILLNESS: This is a 78 yo male who is brought to the ED tonight due to abnormal labs. THe wife reports that she was called by the pts PCP for abnormal blood levels and that he might need a transfusion. Then later she was called b the ED for abnormal cultures in his urine that may need IV antibiotics for and she should come bring him back to the ED. The wife reports that he has been weak but he is always weak and he is actually at his baseline. No nausea, vomiting, diarrhea. NO chest pain. NO shortness of breath. No cough or sputum productions.     REVIEW OF SYSTEMS:  Constitutional: No fever, no chills.  Eyes: No discharge. No pain.  HENT: No nasal drainage. No ear ache. No sore throat.  Cardiovascular: No chest pain, no palpitations.  Respiratory: No cough, no shortness of breath.  Gastrointestinal: No abdominal pain, no vomiting. No diarrhea.  Genitourinary: No hematuria, dysuria, urgency.  Musculoskeletal: No back pain.  Skin: No rashes, no lesions.  Neurological: No headache, no focal weakness.      The history is provided by the patient.     Past Medical History   Diagnosis Date    Alcohol abuse     CHF (congestive heart failure)     CKD (chronic kidney disease) stage 3, GFR 30-59 ml/min     Coronary artery disease     Heart failure, diastolic     High cholesterol     Hypertension     LBP (low back pain)     Prediabetes     PVD (peripheral vascular disease)     Stroke      2006, no deficits     No past medical history pertinent negatives.  Past Surgical History   Procedure Laterality Date    Aortic bifemoral  bypass  2006    Left common endarterectomy  2006    Bilateral carotid stenois  2006    Carotid stents      Joint replacement       knee    Rt aka  NOV 2017     Family History   Problem Relation Age of Onset    Heart disease Mother      Social History   Substance Use Topics    Smoking status: Former Smoker     Packs/day: 2.00     Years: 45.00     Quit date: 7/16/2006    Smokeless tobacco: None    Alcohol use No      Comment: NO ALCOHOL FOR 18 MONTHS, PAST ETOH ABUSE     Review of Systems   All other systems reviewed and are negative.      Physical Exam   Initial Vitals   BP Pulse Resp Temp SpO2   01/17/17 1915 01/17/17 1915 01/17/17 1915 01/17/17 1915 01/17/17 1915   135/60 76 18 98.4 °F (36.9 °C) 95 %     Physical Exam    Nursing note and vitals reviewed.  Constitutional: He is not diaphoretic. No distress.   Bed bound elderly gentleman who appears in no distress. Answering questions appropriately.    HENT:   Head: Normocephalic and atraumatic.   Nose: Nose normal.   Mouth/Throat: Oropharynx is clear and moist.   Eyes: Conjunctivae and EOM are normal. Pupils are equal, round, and reactive to light. No scleral icterus.   Neck: Normal range of motion. Neck supple. No JVD present.   Cardiovascular: Normal rate and regular rhythm. Exam reveals no gallop and no friction rub.    Murmur heard.  Pulmonary/Chest: Breath sounds normal. No stridor. No respiratory distress. He has no wheezes. He exhibits no tenderness.   Abdominal: Soft. Bowel sounds are normal. He exhibits no distension and no mass. There is no tenderness. There is no rebound and no guarding.   Genitourinary:   Genitourinary Comments: Catheter in place.    Musculoskeletal: Normal range of motion. He exhibits no edema or tenderness.   Muscle wasting. RT AKA.   Lymphadenopathy:     He has no cervical adenopathy.   Neurological: He is alert and oriented to person, place, and time. No cranial nerve deficit.   Skin: Skin is warm and dry. No rash noted. No  pallor.   Sacral decub ulcer, no increased drainage.          ED Course   Procedures  Labs Reviewed   COMPREHENSIVE METABOLIC PANEL - Abnormal; Notable for the following:        Result Value    Sodium 131 (*)     Glucose 131 (*)     Calcium 8.4 (*)     Albumin 2.0 (*)     AST 54 (*)     ALT 56 (*)     All other components within normal limits   CBC W/ AUTO DIFFERENTIAL - Abnormal; Notable for the following:     RBC 3.27 (*)     Hemoglobin 8.9 (*)     Hematocrit 29.5 (*)     MCHC 30.2 (*)     RDW 17.3 (*)     Platelets 499 (*)     MPV 8.6 (*)     Mono # 1.3 (*)     Lymph% 17.9 (*)     All other components within normal limits   URINALYSIS   URINALYSIS MICROSCOPIC             Medical Decision Making:   Differential Diagnosis:   Electrolyte abnormality, depression, anxiety, CVA, spinal cord abnormality, UTI and infectious causes.  ED Management:  The pt is brought to the ED tonight for reevaluation and the possible need for IV abx for his UTI. His urine culture shows sensitivity to Augmentin. He will be started on Augmentin at home.   Other:   I have discussed this case with another health care provider.       <> Summary of the Discussion: I discussed his presentation with the [facility] on-call team and they recommended that we place the patient on Augmentin any follow-up with his primary care physician.                    ED Course    the patient returns to the emergency Department today secondary to receiving a call regarding his urine cultures.  Further evaluation with the wife reveals that he has been at his baseline at home.  He has not had fever.  He has not had any real change in his mental state.  He does not have a leukocytosis at this time.  He does show that he has a Acinetobacter in his urine however his likely colonized.  I discussed his presentation with the [facility] on-call team and they recommended that we place the patient on Augmentin any follow-up with his primary care physician.  Given the finding of yeast I  will also put him on Diflucan.  I spoke with the family member regarding these recommendations and they understand and they agree.  At this time the patient is appropriate for discharge home.    After taking into careful account the historical factors and physical exam findings of the patient's presentation today, in conjunction with the empirical and objective data obtained on ED workup, no acute emergent medical condition has been identified. The patient appears to be low risk for an emergent medical condition and I feel it is safe and appropriate at this time for the patient to be discharged to follow-up as detailed in their discharge instructions for reevaluation and possible continued outpatient workup and management. I have discussed the specifics of the workup with the patient and the patient has verbalized understanding of the details of the workup, the diagnosis, the treatment plan, and the need for outpatient follow-up. In addition, I have advised the patient that they can return to the ED and/or activate EMS at any time with worsening of their symptoms, change of their symptoms, or with any other medical complaint. The patient remained comfortable and stable during their visit in the ED.  Discharge and follow-up instructions discussed with the patient who expressed understanding and willingness to comply with my recommendations.     This medical record was prepared using voice dictation software. There may be phonetic errors.  Clinical Impression:   The encounter diagnosis was Urinary tract infection associated with indwelling urethral catheter, subsequent encounter.    Disposition:   Disposition: Discharged  Condition: Stable       Mario Alberto Dewey MD  02/08/17 0332       Mario Alberto Dewey MD  02/08/17 0332

## 2017-04-07 ENCOUNTER — HOSPITAL ENCOUNTER (INPATIENT)
Facility: HOSPITAL | Age: 77
LOS: 6 days | Discharge: NURSING, REHAB OR LONG-TERM CARE | DRG: 579 | End: 2017-04-13
Attending: EMERGENCY MEDICINE | Admitting: SURGERY
Payer: MEDICARE

## 2017-04-07 DIAGNOSIS — M86.9 OSTEOMYELITIS HIP: ICD-10-CM

## 2017-04-07 DIAGNOSIS — R50.9 FEVER, UNSPECIFIED FEVER CAUSE: ICD-10-CM

## 2017-04-07 DIAGNOSIS — R53.81 PHYSICAL DECONDITIONING: ICD-10-CM

## 2017-04-07 DIAGNOSIS — A49.02 MRSA (METHICILLIN RESISTANT STAPHYLOCOCCUS AUREUS) INFECTION: ICD-10-CM

## 2017-04-07 DIAGNOSIS — R13.10 DYSPHAGIA, UNSPECIFIED TYPE: ICD-10-CM

## 2017-04-07 DIAGNOSIS — A49.1 VRE (VANCOMYCIN-RESISTANT ENTEROCOCCI) INFECTION: ICD-10-CM

## 2017-04-07 DIAGNOSIS — Q39.4 ESOPHAGEAL WEB: ICD-10-CM

## 2017-04-07 DIAGNOSIS — Z96.659 INFECTED PROSTHETIC KNEE JOINT, SUBSEQUENT ENCOUNTER: ICD-10-CM

## 2017-04-07 DIAGNOSIS — L97.914: ICD-10-CM

## 2017-04-07 DIAGNOSIS — Z16.21 VRE (VANCOMYCIN-RESISTANT ENTEROCOCCI) INFECTION: ICD-10-CM

## 2017-04-07 DIAGNOSIS — Z86.79 HISTORY OF ATRIAL FIBRILLATION: Chronic | ICD-10-CM

## 2017-04-07 DIAGNOSIS — I73.9 PVD (PERIPHERAL VASCULAR DISEASE): Chronic | ICD-10-CM

## 2017-04-07 DIAGNOSIS — D64.9 NORMOCYTIC ANEMIA: ICD-10-CM

## 2017-04-07 DIAGNOSIS — T83.511D URINARY TRACT INFECTION ASSOCIATED WITH INDWELLING URETHRAL CATHETER, SUBSEQUENT ENCOUNTER: ICD-10-CM

## 2017-04-07 DIAGNOSIS — R41.82 ALTERED MENTAL STATUS, UNSPECIFIED ALTERED MENTAL STATUS TYPE: ICD-10-CM

## 2017-04-07 DIAGNOSIS — N39.0 URINARY TRACT INFECTION ASSOCIATED WITH INDWELLING URETHRAL CATHETER, SUBSEQUENT ENCOUNTER: ICD-10-CM

## 2017-04-07 DIAGNOSIS — F10.10 ALCOHOL ABUSE: ICD-10-CM

## 2017-04-07 DIAGNOSIS — N18.5 CHRONIC KIDNEY DISEASE, STAGE V: ICD-10-CM

## 2017-04-07 DIAGNOSIS — I65.22 STENOSIS OF LEFT INTERNAL CAROTID ARTERY: ICD-10-CM

## 2017-04-07 DIAGNOSIS — R41.0 CONFUSION: ICD-10-CM

## 2017-04-07 DIAGNOSIS — T87.89 NON-HEALING WOUND OF AMPUTATION STUMP: ICD-10-CM

## 2017-04-07 DIAGNOSIS — R53.1 WEAK: ICD-10-CM

## 2017-04-07 DIAGNOSIS — D64.9 SYMPTOMATIC ANEMIA: ICD-10-CM

## 2017-04-07 DIAGNOSIS — R93.89 ABNORMAL FINDING ON IMAGING: ICD-10-CM

## 2017-04-07 DIAGNOSIS — I25.10 CORONARY ARTERY DISEASE INVOLVING NATIVE CORONARY ARTERY OF NATIVE HEART WITHOUT ANGINA PECTORIS: ICD-10-CM

## 2017-04-07 DIAGNOSIS — T81.30XA DEHISCENCE OF PERINEAL WOUND: ICD-10-CM

## 2017-04-07 DIAGNOSIS — I50.32 CHRONIC DIASTOLIC CONGESTIVE HEART FAILURE: ICD-10-CM

## 2017-04-07 DIAGNOSIS — I10 ESSENTIAL HYPERTENSION: Chronic | ICD-10-CM

## 2017-04-07 DIAGNOSIS — J18.9 PNEUMONIA OF LOWER LOBE DUE TO INFECTIOUS ORGANISM, UNSPECIFIED LATERALITY: ICD-10-CM

## 2017-04-07 DIAGNOSIS — J43.2 CENTRILOBULAR EMPHYSEMA: Chronic | ICD-10-CM

## 2017-04-07 DIAGNOSIS — N18.3 CKD (CHRONIC KIDNEY DISEASE), STAGE 3 (MODERATE): ICD-10-CM

## 2017-04-07 DIAGNOSIS — E87.1 HYPONATREMIA: ICD-10-CM

## 2017-04-07 DIAGNOSIS — K21.9 GASTROESOPHAGEAL REFLUX DISEASE, ESOPHAGITIS PRESENCE NOT SPECIFIED: ICD-10-CM

## 2017-04-07 DIAGNOSIS — D64.9 ANEMIA, UNSPECIFIED TYPE: ICD-10-CM

## 2017-04-07 DIAGNOSIS — R06.00 DYSPNEA, UNSPECIFIED TYPE: ICD-10-CM

## 2017-04-07 DIAGNOSIS — R74.8 ELEVATED LIVER ENZYMES: ICD-10-CM

## 2017-04-07 DIAGNOSIS — Z86.73 HISTORY OF STROKE: Chronic | ICD-10-CM

## 2017-04-07 DIAGNOSIS — R74.01 TRANSAMINITIS: ICD-10-CM

## 2017-04-07 DIAGNOSIS — L89.154 DECUBITUS ULCER OF SACRAL REGION, STAGE 4: ICD-10-CM

## 2017-04-07 DIAGNOSIS — I70.229 CRITICAL LOWER LIMB ISCHEMIA: ICD-10-CM

## 2017-04-07 DIAGNOSIS — L89.302 DECUBITUS ULCER OF BUTTOCK, STAGE 2, UNSPECIFIED LATERALITY: Primary | ICD-10-CM

## 2017-04-07 DIAGNOSIS — D63.8 ANEMIA OF CHRONIC DISEASE: Chronic | ICD-10-CM

## 2017-04-07 DIAGNOSIS — L89.219: ICD-10-CM

## 2017-04-07 DIAGNOSIS — I82.629: ICD-10-CM

## 2017-04-07 DIAGNOSIS — T84.59XD INFECTED PROSTHETIC KNEE JOINT, SUBSEQUENT ENCOUNTER: ICD-10-CM

## 2017-04-07 DIAGNOSIS — I50.33 ACUTE ON CHRONIC DIASTOLIC HEART FAILURE: ICD-10-CM

## 2017-04-07 DIAGNOSIS — R93.5 ABNORMAL CT OF THE ABDOMEN: ICD-10-CM

## 2017-04-07 LAB
ALBUMIN SERPL BCP-MCNC: 2.4 G/DL
ALP SERPL-CCNC: 120 U/L
ALT SERPL W/O P-5'-P-CCNC: 37 U/L
ANION GAP SERPL CALC-SCNC: 5 MMOL/L
AST SERPL-CCNC: 22 U/L
BASOPHILS # BLD AUTO: 0.04 K/UL
BASOPHILS NFR BLD: 0.5 %
BILIRUB SERPL-MCNC: 0.3 MG/DL
BUN SERPL-MCNC: 21 MG/DL
CALCIUM SERPL-MCNC: 9.2 MG/DL
CHLORIDE SERPL-SCNC: 101 MMOL/L
CO2 SERPL-SCNC: 30 MMOL/L
CREAT SERPL-MCNC: 0.9 MG/DL
CRP SERPL-MCNC: 20.6 MG/L
DIFFERENTIAL METHOD: ABNORMAL
EOSINOPHIL # BLD AUTO: 0.2 K/UL
EOSINOPHIL NFR BLD: 1.9 %
ERYTHROCYTE [DISTWIDTH] IN BLOOD BY AUTOMATED COUNT: 15.1 %
ERYTHROCYTE [SEDIMENTATION RATE] IN BLOOD BY WESTERGREN METHOD: 93 MM/HR
EST. GFR  (AFRICAN AMERICAN): >60 ML/MIN/1.73 M^2
EST. GFR  (NON AFRICAN AMERICAN): >60 ML/MIN/1.73 M^2
GLUCOSE SERPL-MCNC: 125 MG/DL
HCT VFR BLD AUTO: 34.2 %
HGB BLD-MCNC: 10.6 G/DL
LACTATE SERPL-SCNC: 1 MMOL/L
LYMPHOCYTES # BLD AUTO: 1.9 K/UL
LYMPHOCYTES NFR BLD: 20.9 %
MCH RBC QN AUTO: 27.7 PG
MCHC RBC AUTO-ENTMCNC: 31 %
MCV RBC AUTO: 90 FL
MONOCYTES # BLD AUTO: 0.8 K/UL
MONOCYTES NFR BLD: 8.7 %
NEUTROPHILS # BLD AUTO: 6 K/UL
NEUTROPHILS NFR BLD: 67.2 %
PLATELET # BLD AUTO: 373 K/UL
PMV BLD AUTO: 8.4 FL
POCT GLUCOSE: 150 MG/DL (ref 70–110)
POTASSIUM SERPL-SCNC: 4.3 MMOL/L
PROT SERPL-MCNC: 7.9 G/DL
RBC # BLD AUTO: 3.82 M/UL
SODIUM SERPL-SCNC: 136 MMOL/L
WBC # BLD AUTO: 8.86 K/UL

## 2017-04-07 PROCEDURE — 11000001 HC ACUTE MED/SURG PRIVATE ROOM

## 2017-04-07 PROCEDURE — 25000003 PHARM REV CODE 250: Performed by: EMERGENCY MEDICINE

## 2017-04-07 PROCEDURE — 99284 EMERGENCY DEPT VISIT MOD MDM: CPT | Mod: 25

## 2017-04-07 PROCEDURE — 93010 ELECTROCARDIOGRAM REPORT: CPT | Mod: ,,, | Performed by: INTERNAL MEDICINE

## 2017-04-07 PROCEDURE — 96365 THER/PROPH/DIAG IV INF INIT: CPT

## 2017-04-07 PROCEDURE — 63600175 PHARM REV CODE 636 W HCPCS: Performed by: EMERGENCY MEDICINE

## 2017-04-07 PROCEDURE — 87040 BLOOD CULTURE FOR BACTERIA: CPT | Mod: 59

## 2017-04-07 PROCEDURE — 25000003 PHARM REV CODE 250: Performed by: STUDENT IN AN ORGANIZED HEALTH CARE EDUCATION/TRAINING PROGRAM

## 2017-04-07 PROCEDURE — 80053 COMPREHEN METABOLIC PANEL: CPT

## 2017-04-07 PROCEDURE — 85025 COMPLETE CBC W/AUTO DIFF WBC: CPT

## 2017-04-07 PROCEDURE — 96361 HYDRATE IV INFUSION ADD-ON: CPT

## 2017-04-07 PROCEDURE — 85652 RBC SED RATE AUTOMATED: CPT

## 2017-04-07 PROCEDURE — 86140 C-REACTIVE PROTEIN: CPT

## 2017-04-07 PROCEDURE — 25000003 PHARM REV CODE 250: Performed by: SURGERY

## 2017-04-07 PROCEDURE — 93005 ELECTROCARDIOGRAM TRACING: CPT

## 2017-04-07 PROCEDURE — 82962 GLUCOSE BLOOD TEST: CPT

## 2017-04-07 PROCEDURE — 83605 ASSAY OF LACTIC ACID: CPT

## 2017-04-07 RX ORDER — FUROSEMIDE 40 MG/1
40 TABLET ORAL DAILY
Status: DISCONTINUED | OUTPATIENT
Start: 2017-04-08 | End: 2017-04-13 | Stop reason: HOSPADM

## 2017-04-07 RX ORDER — SODIUM CHLORIDE 0.9 % (FLUSH) 0.9 %
3 SYRINGE (ML) INJECTION EVERY 8 HOURS
Status: DISCONTINUED | OUTPATIENT
Start: 2017-04-07 | End: 2017-04-13 | Stop reason: HOSPADM

## 2017-04-07 RX ORDER — SODIUM CHLORIDE 9 MG/ML
INJECTION, SOLUTION INTRAVENOUS CONTINUOUS
Status: DISCONTINUED | OUTPATIENT
Start: 2017-04-07 | End: 2017-04-07

## 2017-04-07 RX ORDER — ATORVASTATIN CALCIUM 40 MG/1
40 TABLET, FILM COATED ORAL DAILY
Status: DISCONTINUED | OUTPATIENT
Start: 2017-04-08 | End: 2017-04-13 | Stop reason: HOSPADM

## 2017-04-07 RX ORDER — ZINC SULFATE 50(220)MG
220 CAPSULE ORAL DAILY
Status: DISCONTINUED | OUTPATIENT
Start: 2017-04-08 | End: 2017-04-13 | Stop reason: HOSPADM

## 2017-04-07 RX ORDER — IBUPROFEN 200 MG
16 TABLET ORAL
Status: DISCONTINUED | OUTPATIENT
Start: 2017-04-07 | End: 2017-04-13 | Stop reason: HOSPADM

## 2017-04-07 RX ORDER — METOCLOPRAMIDE 5 MG/1
5 TABLET ORAL
Status: DISCONTINUED | OUTPATIENT
Start: 2017-04-08 | End: 2017-04-13 | Stop reason: HOSPADM

## 2017-04-07 RX ORDER — OXYCODONE AND ACETAMINOPHEN 5; 325 MG/1; MG/1
1 TABLET ORAL EVERY 4 HOURS PRN
Status: DISCONTINUED | OUTPATIENT
Start: 2017-04-07 | End: 2017-04-13 | Stop reason: HOSPADM

## 2017-04-07 RX ORDER — DOCUSATE SODIUM 50 MG/5ML
250 LIQUID ORAL 2 TIMES DAILY PRN
Status: DISCONTINUED | OUTPATIENT
Start: 2017-04-07 | End: 2017-04-13 | Stop reason: HOSPADM

## 2017-04-07 RX ORDER — TAMSULOSIN HYDROCHLORIDE 0.4 MG/1
0.4 CAPSULE ORAL NIGHTLY
Status: DISCONTINUED | OUTPATIENT
Start: 2017-04-07 | End: 2017-04-13 | Stop reason: HOSPADM

## 2017-04-07 RX ORDER — IPRATROPIUM BROMIDE AND ALBUTEROL SULFATE 2.5; .5 MG/3ML; MG/3ML
3 SOLUTION RESPIRATORY (INHALATION) EVERY 6 HOURS PRN
Status: DISCONTINUED | OUTPATIENT
Start: 2017-04-07 | End: 2017-04-13 | Stop reason: HOSPADM

## 2017-04-07 RX ORDER — ASPIRIN 81 MG/1
81 TABLET ORAL DAILY
Status: DISCONTINUED | OUTPATIENT
Start: 2017-04-08 | End: 2017-04-13 | Stop reason: HOSPADM

## 2017-04-07 RX ORDER — IBUPROFEN 400 MG/1
400 TABLET ORAL EVERY 6 HOURS PRN
Status: DISCONTINUED | OUTPATIENT
Start: 2017-04-07 | End: 2017-04-13 | Stop reason: HOSPADM

## 2017-04-07 RX ORDER — AMIODARONE HYDROCHLORIDE 200 MG/1
200 TABLET ORAL DAILY
Status: DISCONTINUED | OUTPATIENT
Start: 2017-04-08 | End: 2017-04-13 | Stop reason: HOSPADM

## 2017-04-07 RX ORDER — AMLODIPINE BESYLATE 5 MG/1
10 TABLET ORAL DAILY
Status: DISCONTINUED | OUTPATIENT
Start: 2017-04-08 | End: 2017-04-13 | Stop reason: HOSPADM

## 2017-04-07 RX ORDER — INSULIN ASPART 100 [IU]/ML
0-5 INJECTION, SOLUTION INTRAVENOUS; SUBCUTANEOUS
Status: DISCONTINUED | OUTPATIENT
Start: 2017-04-07 | End: 2017-04-13 | Stop reason: HOSPADM

## 2017-04-07 RX ORDER — ONDANSETRON 2 MG/ML
4 INJECTION INTRAMUSCULAR; INTRAVENOUS EVERY 12 HOURS PRN
Status: DISCONTINUED | OUTPATIENT
Start: 2017-04-07 | End: 2017-04-13 | Stop reason: HOSPADM

## 2017-04-07 RX ORDER — GLUCAGON 1 MG
1 KIT INJECTION
Status: DISCONTINUED | OUTPATIENT
Start: 2017-04-07 | End: 2017-04-13 | Stop reason: HOSPADM

## 2017-04-07 RX ORDER — RAMELTEON 8 MG/1
8 TABLET ORAL NIGHTLY
Status: DISCONTINUED | OUTPATIENT
Start: 2017-04-07 | End: 2017-04-13 | Stop reason: HOSPADM

## 2017-04-07 RX ORDER — SODIUM,POTASSIUM PHOSPHATES 280-250MG
1 POWDER IN PACKET (EA) ORAL 2 TIMES DAILY
Status: DISCONTINUED | OUTPATIENT
Start: 2017-04-07 | End: 2017-04-13 | Stop reason: HOSPADM

## 2017-04-07 RX ORDER — ASCORBIC ACID 500 MG
500 TABLET ORAL 2 TIMES DAILY
Status: DISCONTINUED | OUTPATIENT
Start: 2017-04-07 | End: 2017-04-13 | Stop reason: HOSPADM

## 2017-04-07 RX ORDER — CARVEDILOL 25 MG/1
25 TABLET ORAL 2 TIMES DAILY
Status: DISCONTINUED | OUTPATIENT
Start: 2017-04-07 | End: 2017-04-13 | Stop reason: HOSPADM

## 2017-04-07 RX ORDER — SODIUM CHLORIDE 9 MG/ML
INJECTION, SOLUTION INTRAVENOUS CONTINUOUS
Status: DISCONTINUED | OUTPATIENT
Start: 2017-04-07 | End: 2017-04-08

## 2017-04-07 RX ORDER — IBUPROFEN 200 MG
24 TABLET ORAL
Status: DISCONTINUED | OUTPATIENT
Start: 2017-04-07 | End: 2017-04-13 | Stop reason: HOSPADM

## 2017-04-07 RX ADMIN — SODIUM CHLORIDE: 0.9 INJECTION, SOLUTION INTRAVENOUS at 11:04

## 2017-04-07 RX ADMIN — CEFTRIAXONE 1 G: 1 INJECTION, SOLUTION INTRAVENOUS at 04:04

## 2017-04-07 RX ADMIN — TAMSULOSIN HYDROCHLORIDE 0.4 MG: 0.4 CAPSULE ORAL at 11:04

## 2017-04-07 RX ADMIN — SODIUM CHLORIDE, PRESERVATIVE FREE 3 ML: 5 INJECTION INTRAVENOUS at 11:04

## 2017-04-07 RX ADMIN — OXYCODONE HYDROCHLORIDE AND ACETAMINOPHEN 500 MG: 500 TABLET ORAL at 11:04

## 2017-04-07 RX ADMIN — SODIUM CHLORIDE: 0.9 INJECTION, SOLUTION INTRAVENOUS at 06:04

## 2017-04-07 RX ADMIN — POTASSIUM & SODIUM PHOSPHATES POWDER PACK 280-160-250 MG 1 PACKET: 280-160-250 PACK at 11:04

## 2017-04-07 RX ADMIN — RAMELTEON 8 MG: 8 TABLET, FILM COATED ORAL at 11:04

## 2017-04-07 NOTE — CONSULTS
U Med Consult - Resident Note    Primary Attending Physician: Juan C  Primary Team: General Surgery  Consultant Attending: Gloria  Consultant Resident: Hai Carmona     Reason for Consult:     Medical management     Subjective:      History of Present Illness:  Asad Perez is a 77 y.o.  male who  has a past medical history of Alcohol abuse; CHF (congestive heart failure); CKD (chronic kidney disease) stage 3, GFR 30-59 ml/min; Coronary artery disease; Heart failure, diastolic; High cholesterol; Hypertension; LBP (low back pain); Prediabetes; PVD (peripheral vascular disease); and Stroke.. The patient presented to the [facility] on 4/7/2017 with a primary complaint of Hip Pain (sent by Dr Torres for worsening of decubitus ulcers to right hip and sacrum, concerned about infection)      History taken via chart review, patient is poor historian and wife unable to be contacted. The patient presented to the ED with complaints of hip pain and presentation after evaluation by PCP for possible worsening of sacral decub. Patient states that his bed sores are not getting better. Per chart review wound care comes once a week. Patient states that his wife changes his diapers when he has a bowel movement and helps shift him once a day. Denies chest pain, shortness of breath, changes in bowel movement.  Patient has been seen in the ED for UTI 1/17, dehydration 1/9, decub 1/1.      Past Medical History:  Past Medical History:   Diagnosis Date    Alcohol abuse     CHF (congestive heart failure)     CKD (chronic kidney disease) stage 3, GFR 30-59 ml/min     Coronary artery disease     Heart failure, diastolic     High cholesterol     Hypertension     LBP (low back pain)     Prediabetes     PVD (peripheral vascular disease)     Stroke     2006, no deficits       Past Surgical History:  Past Surgical History:   Procedure Laterality Date    aortic bifemoral bypass  2006    bilateral carotid  denisse  2006    carotid stents      JOINT REPLACEMENT      knee    left common endarterectomy  2006    RT AKA  NOV 2017       Allergies:  Review of patient's allergies indicates:  No Known Allergies    Medications:   In-Hospital Scheduled Medications:   cefTRIAXone (ROCEPHIN) IVPB  1 g Intravenous ED 1 Time      In-Hospital PRN Medications:     In-Hospital IV Infusion Medications:      Home Medications:  Prior to Admission medications    Medication Sig Start Date End Date Taking? Authorizing Provider   albuterol-ipratropium 2.5mg-0.5mg/3mL (DUO-NEB) 0.5 mg-3 mg(2.5 mg base)/3 mL nebulizer solution Take 3 mLs by nebulization every 6 (six) hours while awake. 9/29/16 9/29/17  Nnamdi Venegas MD   amiodarone (PACERONE) 200 MG Tab Take 1 tablet (200 mg total) by mouth once daily. 9/29/16   Nnamdi Venegas MD   amlodipine (NORVASC) 10 MG tablet Take 1 tablet (10 mg total) by mouth once daily. 9/29/16 9/29/17  Nnamdi Venegas MD   arginine-glutamine-calcium HMB (GENIE) 7-7-1.5 gram PwPk Take 1 packet by mouth 2 (two) times daily.    Historical Provider, MD   ascorbic acid, vitamin C, (VITAMIN C) 500 MG tablet Take 500 mg by mouth 2 (two) times daily.    Historical Provider, MD   aspirin (ECOTRIN) 81 MG EC tablet Take 1 tablet (81 mg total) by mouth once daily. 12/5/16 12/5/17  Mo Benoit MD   atorvastatin (LIPITOR) 40 MG tablet Take 1 tablet (40 mg total) by mouth once daily. 9/29/16 9/29/17  Nnamdi Venegas MD   carvedilol (COREG) 25 MG tablet Take 1 tablet (25 mg total) by mouth 2 (two) times daily. 9/29/16 9/29/17  Nnamdi Venegas MD   docusate sodium (COLACE) 250 MG capsule Take 250 mg by mouth 2 (two) times daily as needed for Constipation.    Historical Provider, MD   furosemide (LASIX) 20 MG tablet Take 2 tablets (40 mg total) by mouth once daily. 12/2/16 12/2/17  Misael Bearden MD   hydrocodone-acetaminophen 10-325mg (NORCO)  mg Tab Take 1 tablet by mouth  every 6 (six) hours as needed for Pain.    Historical Provider, MD   ibuprofen (ADVIL,MOTRIN) 400 MG tablet Take 400 mg by mouth every 6 (six) hours as needed for Other.    Historical Provider, MD   lactulose (CHRONULAC) 10 gram/15 mL solution Take 10 g by mouth 2 (two) times daily.    Historical Provider, MD   linaclotide 290 mcg Cap Take by mouth once daily.    Historical Provider, MD   metoclopramide HCl (REGLAN) 5 MG tablet Take 5 mg by mouth 3 (three) times daily before meals.    Historical Provider, MD   multivitamin (THERAGRAN) per tablet Take 1 tablet by mouth once daily.    Historical Provider, MD   oxycodone-acetaminophen (PERCOCET) 5-325 mg per tablet Take 1 tablet by mouth every 4 (four) hours as needed for Pain. 1/9/17   Emerson Mckay MD   potassium, sodium phosphates (PHOS-NAK) 280-160-250 mg PwPk Take 1 packet by mouth 2 (two) times daily.    Historical Provider, MD   ramelteon (ROZEREM) 8 mg tablet Take 8 mg by mouth every evening.    Historical Provider, MD   rivaroxaban (XARELTO) 10 mg Tab Take 10 mg by mouth 2 (two) times daily.    Historical MD Monica   rivaroxaban (XARELTO) 10 mg Tab Take 1 tablet (10 mg total) by mouth 2 (two) times daily. 12/5/16 12/5/17  Mo Benoit MD   tamsulosin (FLOMAX) 0.4 mg Cp24 Take 0.4 mg by mouth every evening.    Historical Provider, MD   tramadol (ULTRAM) 50 mg tablet Take 50 mg by mouth every 8 (eight) hours as needed for Pain.    Historical Provider, MD   zinc sulfate (ZINCATE) 220 (50) mg capsule Take 220 mg by mouth once daily.     Historical Provider, MD       Family History:  Family History   Problem Relation Age of Onset    Heart disease Mother        Social History:  Social History   Substance Use Topics    Smoking status: Former Smoker     Packs/day: 2.00     Years: 45.00     Quit date: 7/16/2006    Smokeless tobacco: None    Alcohol use No      Comment: NO ALCOHOL FOR 18 MONTHS, PAST ETOH ABUSE       Review of Systems:  Pertinent  "items are noted in HPI. All other systems are reviewed and are negative.    Health Maintenance:   Immunizations:   Currently on File with [facility]:   Most Recent Immunizations   Administered Date(s) Administered    PPD Test 2016     Primary Care Physician: Brian  Immunizations:   TDap is up to date, .  Influenza is up to date, .  Pneumovax is up to date, .  Cancer Screening:  Colonoscopy: is up to date. 2012: Dr. Ramos- found internal hemorrhoids, incidental angioectasia in the cecum otherwise normal     Objective:   Last 24 Hour Vital Signs:  BP  Min: 114/58  Max: 153/53  Temp  Av °F (36.7 °C)  Min: 98 °F (36.7 °C)  Max: 98 °F (36.7 °C)  Pulse  Av.5  Min: 54  Max: 57  Resp  Av  Min: 18  Max: 18  SpO2  Av %  Min: 96 %  Max: 98 %  Height  Av' 8" (172.7 cm)  Min: 5' 8" (172.7 cm)  Max: 5' 8" (172.7 cm)  Weight  Av kg (150 lb)  Min: 68 kg (150 lb)  Max: 68 kg (150 lb)       Physical Examination:  BP (!) 153/53  Pulse (!) 57  Temp 98 °F (36.7 °C) (Rectal)   Resp 18  Ht 5' 8" (1.727 m)  Wt 68 kg (150 lb)  SpO2 96%  BMI 22.81 kg/m2    General Appearance:    Alert, cooperative, no distress, appears stated age   Head:    Normocephalic, without obvious abnormality, atraumatic   Eyes:    PERRL, conjunctiva/corneas clear, EOM's intact grossly       Ears:    Normal external ear canals, both ears   Nose:   Nares normal, septum midline, mucosa normal, no drainage      Throat:   Lips, mucosa, and tongue normal; full dentures in place    Neck:   Supple, symmetrical, trachea midline, no adenopathy;          Back:     Symmetric, no curvature   Lungs:     Clear to auscultation bilaterally, respirations unlabored   Chest wall:    No tenderness or deformity   Heart:    Regular rate and rhythm, S1 and S2 normal, no murmur, rub      Abdomen:     Soft, non-tender, bowel sounds active all four quadrants,     PEG tube in place    Genitalia:    Mendoza in place with light yellow urine in " tube    Extremities:   R AKA, chronic skin changes on LLE   Pulses:   Decreased peripheral pulses in LLE   Skin:   Left lateral hip: deep approx 6cm in length ulcer with purulent discharge; healing sacral decub ulcer with 8x5cm centralized wound    Neurologic:   Alert, oriented; moving extremities; unable to answer historical questions          Laboratory Results:  Most Recent Data:  CBC: Lab Results   Component Value Date    WBC 8.86 04/07/2017    HGB 10.6 (L) 04/07/2017    HCT 34.2 (L) 04/07/2017     (H) 04/07/2017    MCV 90 04/07/2017    RDW 15.1 (H) 04/07/2017     BMP: Lab Results   Component Value Date     04/07/2017    K 4.3 04/07/2017     04/07/2017    CO2 30 (H) 04/07/2017    BUN 21 04/07/2017     (H) 04/07/2017    CALCIUM 9.2 04/07/2017    MG 2.0 12/01/2016    PHOS 3.3 11/28/2016     LFTs: Lab Results   Component Value Date    PROT 7.9 04/07/2017    ALBUMIN 2.4 (L) 04/07/2017    BILITOT 0.3 04/07/2017    AST 22 04/07/2017    ALKPHOS 120 04/07/2017    ALT 37 04/07/2017     Coags:   Lab Results   Component Value Date    INR 1.6 (H) 01/01/2017     FLP: Lab Results   Component Value Date    CHOL 139 08/03/2016    HDL 29 (L) 08/03/2016    LDLCALC 93.4 08/03/2016    TRIG 83 08/03/2016    CHOLHDL 20.9 08/03/2016     DM: Lab Results   Component Value Date    HGBA1C 7.9 (H) 11/27/2016    HGBA1C 6.7 (H) 07/13/2016    HGBA1C 7.0 (H) 02/28/2015    LDLCALC 93.4 08/03/2016    CREATININE 0.9 04/07/2017     Thyroid: Lab Results   Component Value Date    TSH 1.728 11/27/2016     Anemia: Lab Results   Component Value Date    IRON 20 (L) 11/27/2016    TIBC 145 (L) 11/27/2016    FERRITIN 2805 (H) 11/29/2016    THKTQMVB06 1152 (H) 11/27/2016    FOLATE 14.6 11/27/2016     Cardiac: Lab Results   Component Value Date    TROPONINI 0.019 01/09/2017     (H) 11/27/2016     Urinalysis: Lab Results   Component Value Date    LABURIN  01/09/2017     ACINETOBACTER BAUMANNII/HAEMOLYTICUS  >100,000 cfu/ml       COLORU Yellow 01/18/2017    SPECGRAV 1.015 01/18/2017    NITRITE Negative 01/18/2017    KETONESU Negative 01/18/2017    UROBILINOGEN 4.0-6.0 (A) 01/18/2017       Trended Lab Data:    Recent Labs  Lab 04/07/17  1416   WBC 8.86   HGB 10.6*   HCT 34.2*   *   MCV 90   RDW 15.1*      K 4.3      CO2 30*   BUN 21   *   PROT 7.9   ALBUMIN 2.4*   BILITOT 0.3   AST 22   ALKPHOS 120   ALT 37       Trended Cardiac Data:  No results for input(s): TROPONINI, CKTOTAL, CKMB, BNP in the last 168 hours.    Microbiology Data:  Blood culture 4/7: pending       Radiology:  X-ray Hip 2 View Right    Result Date: 4/7/2017  Hip 2 views right Findings: 2 views right.  There is no fracture, dislocation, or bony erosion.  There is moderate scattered vascular calcification.  There is a left femoral stent.     As above. Electronically signed by: CHRISTINE URBAN MD Date:     04/07/17 Time:    13:54            Assessment:     Asad Perez is a 77 y.o. male with:  Patient Active Problem List    Diagnosis Date Noted    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    Urinary tract infection associated with indwelling urethral catheter 12/01/2016    Centrilobular emphysema 11/29/2016    Symptomatic anemia 11/27/2016    Deep vein thrombosis (DVT) of brachial vein     Non-healing ulcer of lower leg 10/28/2016    Fever     Sacral decubitus ulcer 10/05/2016    Deep vein thrombosis (DVT) of upper extremity 10/05/2016    Dehiscence of perineal wound 10/05/2016    Hyponatremia 10/05/2016    VRE (vancomycin-resistant Enterococci) infection 09/20/2016    Elevated liver enzymes     Chronic diastolic congestive heart failure     Critical lower limb ischemia 09/10/2016    Stenosis of left internal carotid artery 09/08/2016    Abnormal finding on imaging 08/31/2016    Abnormal CT of the abdomen     Chronic kidney disease, stage V 08/12/2016    Anemia 08/12/2016    Anemia of chronic disease 08/08/2016     Altered mental status 08/07/2016    MRSA (methicillin resistant Staphylococcus aureus) infection 08/07/2016    Confusion     Weak     Pneumonia 07/29/2016    History of atrial fibrillation     Infected prosthetic knee joint 07/12/2016    Right knee pain 06/15/2016    GERD (gastroesophageal reflux disease) 02/28/2015    Esophageal web 02/28/2015    Acute on chronic diastolic heart failure 11/08/2014    Transaminitis 11/08/2014    Chronic obstructive pulmonary disease 11/07/2014    Dyspnea 11/06/2014    Normocytic anemia 11/06/2014    Dysphagia 11/03/2014    Osteoarthritis of left knee 07/14/2014    Essential hypertension 07/16/2013    CKD (chronic kidney disease) 07/16/2013    PVD (peripheral vascular disease) 07/16/2013    CAD (coronary artery disease) 07/16/2013    History of stroke 07/16/2013    Physical deconditioning 07/16/2013    Alcohol abuse 07/16/2013        Plan:     Chronic decubitus ulcers  - patient afebrile without leukocytosis  - no new acute active infection  - primary management per Dr. Irizarry     HTN  - patient with stable BP on admission  - continue amlodipine, cored, lasix  - will monitor     HFpEF  - last ECHO 11/16: diastolic dysfucntion, EF 65  - continue home coreg, lasix, asa    CAD  - denies chest pain, shortness of breath  - continue ASA, lipitor     PVD  - decreased peripheral pulses  - continue ASA, lipitor     H/o CVA  - continue ASA, lipitor     H/o Afib  - continue amiodarone, xarelto     DM II uncontrolled  - last a1c 7.9 11/16  - no home medication listed  - will cover with SSI     COPD  - no acute issues  - continue dounebs PRN     BPH  - gonzalez in place  - continue flomax     AOCD  - h/h on admission: 10.6/34.2  - will continue to monitor     Deconditioning   - patient with home health  - limited mobiltiy with several recent hospitalizations       Thank you for allowing us to participate in the care of this patient. Please contact me at [phone] if you have  any questions regarding this consult.    Kiya Valles  \[facility]\"" Internal Medicine HO-1      \[facility]\"" Medicine Hospitalist Pager Numbers:   \[facility]\"" Hospitalist Medicine Team A (Chay/Shawanda): [phone]  \[facility]\"" Hospitalist Medicine Team B (Gloria/Alex):  [phone]

## 2017-04-07 NOTE — H&P
Chief Complaint   Patient presents with    Hip Pain       sent by Dr Torres for worsening of decubitus ulcers to right hip and sacrum, concerned about infection      Review of patient's allergies indicates:  No Known Allergies  The history is provided by the patient and the spouse (Dr. Rapp, the patient's PCP).    issue presents emergency department with worsening decubitus ulcer to the right hip and the sacrum. The patient has home health nursing a few times a week and home health wound care that comes out once a week.  They feel that his decubiti are worsening and not improving. Dr. Torres states the patient has been on Augmentin for the past week.       Past Medical History:   Diagnosis Date    Alcohol abuse      CHF (congestive heart failure)      CKD (chronic kidney disease) stage 3, GFR 30-59 ml/min      Coronary artery disease      Heart failure, diastolic      High cholesterol      Hypertension      LBP (low back pain)      Prediabetes      PVD (peripheral vascular disease)      Stroke       2006, no deficits            Past Surgical History:   Procedure Laterality Date    aortic bifemoral bypass   2006    bilateral carotid stenois   2006    carotid stents        JOINT REPLACEMENT         knee    left common endarterectomy   2006    RT AKA   NOV 2017            Family History   Problem Relation Age of Onset    Heart disease Mother               Social History   Substance Use Topics    Smoking status: Former Smoker       Packs/day: 2.00       Years: 45.00       Quit date: 7/16/2006    Smokeless tobacco: None    Alcohol use No         Comment: NO ALCOHOL FOR 18 MONTHS, PAST ETOH ABUSE      Review of Systems   Constitutional: Positive for activity change. Negative for chills and fever.   HENT: Negative for sore throat.   Respiratory: Negative for shortness of breath.   Cardiovascular: Negative for chest pain.   Gastrointestinal: Negative for nausea.   Genitourinary: Negative for dysuria.    Musculoskeletal: Negative for back pain.   Skin: Negative for rash.   Neurological: Negative for weakness.   Hematological: Does not bruise/bleed easily.                 Physical Exam          Initial Vitals   BP Pulse Resp Temp SpO2   04/07/17 1240 04/07/17 1240 04/07/17 1240 04/07/17 1253 04/07/17 1240   114/58 54 18 98 °F (36.7 °C) 98 %      Physical Exam  Nursing note and vitals reviewed.  Constitutional: He appears well-developed and well-nourished.   HENT:   Head: Normocephalic and atraumatic.   Eyes: EOM are normal. Pupils are equal, round, and reactive to light.   Neck: Normal range of motion. Neck supple.   Cardiovascular: Normal rate, regular rhythm, normal heart sounds and intact distal pulses.   Pulmonary/Chest: Breath sounds normal.   Abdominal: Soft. Bowel sounds are normal. He exhibits no distension. There is no tenderness. There is no rebound and no guarding.   Musculoskeletal: Normal range of motion. He exhibits no edema.   Neurological: He is alert and oriented to person, place, and time.   Skin: Skin is warm and dry.   Quarter sized deep she bit us to the right lateral hip with purulent drainage and no erythema. There is a larger 6 cm decubitus to the midline sacrum that is not to the bone, has minimal drainage and no erythema.   Psychiatric: He has a normal mood and affect. His behavior is normal. Judgment and thought content normal.         ED Course   Procedures        Labs Reviewed   CBC W/ AUTO DIFFERENTIAL - Abnormal; Notable for the following:    Result Value      RBC 3.82 (*)       Hemoglobin 10.6 (*)       Hematocrit 34.2 (*)       MCHC 31.0 (*)       RDW 15.1 (*)       Platelets 373 (*)       MPV 8.4 (*)       All other components within normal limits   CULTURE, BLOOD     Narrative:      Blood Culture #1   CULTURE, BLOOD     Narrative:      Blood Culture #2   COMPREHENSIVE METABOLIC PANEL   SEDIMENTATION RATE, MANUAL   C-REACTIVE PROTEIN       Imp: infected right hip wound, s/p right  above knee amputation , infected sacral decubitus ulcer, htn , CAd    Plan: admit, medical clearence , excision and debridement in am

## 2017-04-07 NOTE — ED PROVIDER NOTES
Encounter Date: 4/7/2017       History     Chief Complaint   Patient presents with    Hip Pain     sent by Dr Torres for worsening of decubitus ulcers to right hip and sacrum, concerned about infection     Review of patient's allergies indicates:  No Known Allergies  The history is provided by the patient and the spouse (Dr. Rapp, the patient's PCP).    issue presents emergency department with worsening decubitus ulcer to the right hip and the sacrum.  The patient has home health nursing a few times a week and home health wound care that comes out once a week.  They feel that his decubiti are worsening and not improving.  Dr. Torres states the patient has been on Augmentin for the past week.  Past Medical History:   Diagnosis Date    Alcohol abuse     CHF (congestive heart failure)     CKD (chronic kidney disease) stage 3, GFR 30-59 ml/min     Coronary artery disease     Heart failure, diastolic     High cholesterol     Hypertension     LBP (low back pain)     Prediabetes     PVD (peripheral vascular disease)     Stroke     2006, no deficits     Past Surgical History:   Procedure Laterality Date    aortic bifemoral bypass  2006    bilateral carotid stenois  2006    carotid stents      JOINT REPLACEMENT      knee    left common endarterectomy  2006    RT AKA  NOV 2017     Family History   Problem Relation Age of Onset    Heart disease Mother      Social History   Substance Use Topics    Smoking status: Former Smoker     Packs/day: 2.00     Years: 45.00     Quit date: 7/16/2006    Smokeless tobacco: None    Alcohol use No      Comment: NO ALCOHOL FOR 18 MONTHS, PAST ETOH ABUSE     Review of Systems   Constitutional: Positive for activity change. Negative for chills and fever.   HENT: Negative for sore throat.    Respiratory: Negative for shortness of breath.    Cardiovascular: Negative for chest pain.   Gastrointestinal: Negative for nausea.   Genitourinary: Negative for dysuria.    Musculoskeletal: Negative for back pain.   Skin: Negative for rash.   Neurological: Negative for weakness.   Hematological: Does not bruise/bleed easily.       Physical Exam   Initial Vitals   BP Pulse Resp Temp SpO2   04/07/17 1240 04/07/17 1240 04/07/17 1240 04/07/17 1253 04/07/17 1240   114/58 54 18 98 °F (36.7 °C) 98 %     Physical Exam    Nursing note and vitals reviewed.  Constitutional: He appears well-developed and well-nourished.   HENT:   Head: Normocephalic and atraumatic.   Eyes: EOM are normal. Pupils are equal, round, and reactive to light.   Neck: Normal range of motion. Neck supple.   Cardiovascular: Normal rate, regular rhythm, normal heart sounds and intact distal pulses.   Pulmonary/Chest: Breath sounds normal.   Abdominal: Soft. Bowel sounds are normal. He exhibits no distension. There is no tenderness. There is no rebound and no guarding.   Musculoskeletal: Normal range of motion. He exhibits no edema.   Neurological: He is alert and oriented to person, place, and time.   Skin: Skin is warm and dry.   Quarter sized deep she bit us to the right lateral hip with purulent drainage and no erythema.  There is a larger 6 cm decubitus to the midline sacrum that is not to the bone, has minimal drainage and no erythema.   Psychiatric: He has a normal mood and affect. His behavior is normal. Judgment and thought content normal.         ED Course   Procedures  Labs Reviewed   CBC W/ AUTO DIFFERENTIAL - Abnormal; Notable for the following:        Result Value    RBC 3.82 (*)     Hemoglobin 10.6 (*)     Hematocrit 34.2 (*)     MCHC 31.0 (*)     RDW 15.1 (*)     Platelets 373 (*)     MPV 8.4 (*)     All other components within normal limits   CULTURE, BLOOD    Narrative:     Blood Culture #1   CULTURE, BLOOD    Narrative:     Blood Culture #2   COMPREHENSIVE METABOLIC PANEL   SEDIMENTATION RATE, MANUAL   C-REACTIVE PROTEIN             Medical Decision Making:   Clinical Tests:   Lab Tests: Ordered and  Reviewed  The following lab test(s) were unremarkable: CBC, CMP and Lactate  Radiological Study: Ordered and Reviewed  ED Management:  Patient was sent in because his decubiti are worsening.  The patient is weaker than prior and has been on Augmentin for the past week.    1600: The patient has decubiti that are worsening, his sedimentation rate and CRP are both elevated.  I will consult the [facility] hospitalist service since he has failed outpatient therapy. [facility] Hospitalist recommends consulting Dr. Irizarry, general surgery to evaluate the wound.     1615: Dr. Irizarry has come to the ED and evaluated the wounds. He feels that the patient should be admitted to the medicine service for medical management and he will take the patient to the OR in the AM.     1617: [facility] resident called and informed.    1630: Dr. Castro spoke with Dr. Irizarry and they agreed that Dr. Irizarry will admit and the [facility] residents will manage the medical issues.                   ED Course     Clinical Impression:   The primary encounter diagnosis was Decubitus ulcer of buttock, stage 2, unspecified laterality. Diagnoses of Osteomyelitis hip and Decubitus ulcer of right hip, unspecified ulcer stage were also pertinent to this visit.          Sue Murray MD  04/07/17 1619       Sue Murray MD  04/07/17 1643

## 2017-04-07 NOTE — IP AVS SNAPSHOT
[facility]  [address]  [place] [address]  Phone: [phone]           Patient Discharge Instructions   Our goal is to set you up for success. This packet includes information on your condition, medications, and your home care.  It will help you care for yourself to prevent having to return to the hospital.     Please ask your nurse if you have any questions.      There are many details to remember when preparing to leave the hospital. Here is what you will need to do:    1. Take your medicine. If you are prescribed medications, review your Medication List on the following pages. You may have new medications to  at the pharmacy and others that you'll need to stop taking. Review the instructions for how and when to take your medications. Talk with your doctor or nurses if you are unsure of what to do.     2. Go to your follow-up appointments. Specific follow-up information is listed in the following pages. Your may be contacted by a nurse or clinical provider about future appointments. Be sure we have all of the phone numbers to reach you. Please contact your provider's office if you are unable to make an appointment.     3. Watch for warning signs. Your doctor or nurse will give you detailed warning signs to watch for and when to call for assistance. These instructions may also include educational information about your condition. If you experience any of warning signs to your health, call your doctor.               ** Verify the list of medication(s) below is accurate and up to date. Carry this with you in case of emergency. If your medications have changed, please notify your healthcare provider.             Medication List      START taking these medications        Additional Info                      PIPERACILLIN-TAZOBACTAM 4.5G/100ML D5W IVPB (READY TO MIX)   Quantity:  100 mL   Refills:  PRN   Dose:  4.5 g   Indications:  Bone/Joint    Last time this was given:  4.5 g on  4/13/2017  6:30 AM   Instructions:  Inject 100 mLs (4.5 g total) into the vein every 8 (eight) hours.     Begin Date    AM    Noon    PM    Bedtime         CHANGE how you take these medications        Additional Info                      XARELTO 10 mg Tab   Refills:  0   Dose:  10 mg   What changed:  Another medication with the same name was removed. Continue taking this medication, and follow the directions you see here.   Generic drug:  rivaroxaban    Last time this was given:  10 mg on 4/13/2017  8:54 AM   Instructions:  Take 10 mg by mouth 2 (two) times daily.     Begin Date    AM    Noon    PM    Bedtime         CONTINUE taking these medications        Additional Info                      albuterol-ipratropium 2.5mg-0.5mg/3mL 0.5 mg-3 mg(2.5 mg base)/3 mL nebulizer solution   Commonly known as:  DUO-NEB   Quantity:  270 mL   Refills:  11   Dose:  3 mL    Instructions:  Take 3 mLs by nebulization every 6 (six) hours while awake.     Begin Date    AM    Noon    PM    Bedtime       amiodarone 200 MG Tab   Commonly known as:  PACERONE   Quantity:  9 tablet   Refills:  2   Dose:  200 mg    Last time this was given:  200 mg on 4/13/2017  8:55 AM   Instructions:  Take 1 tablet (200 mg total) by mouth once daily.     Begin Date    AM    Noon    PM    Bedtime       amlodipine 10 MG tablet   Commonly known as:  NORVASC   Quantity:  30 tablet   Refills:  11   Dose:  10 mg    Last time this was given:  10 mg on 4/13/2017  8:55 AM   Instructions:  Take 1 tablet (10 mg total) by mouth once daily.     Begin Date    AM    Noon    PM    Bedtime       aspirin 81 MG EC tablet   Commonly known as:  ECOTRIN   Quantity:  30 tablet   Refills:  5   Dose:  81 mg    Last time this was given:  81 mg on 4/13/2017  8:55 AM   Instructions:  Take 1 tablet (81 mg total) by mouth once daily.     Begin Date    AM    Noon    PM    Bedtime       atorvastatin 40 MG tablet   Commonly known as:  LIPITOR   Quantity:  90 tablet   Refills:  3   Dose:   40 mg    Last time this was given:  40 mg on 4/13/2017  8:55 AM   Instructions:  Take 1 tablet (40 mg total) by mouth once daily.     Begin Date    AM    Noon    PM    Bedtime       carvedilol 25 MG tablet   Commonly known as:  COREG   Quantity:  60 tablet   Refills:  11   Dose:  25 mg   Indications:  Hypertension    Last time this was given:  25 mg on 4/13/2017  8:55 AM   Instructions:  Take 1 tablet (25 mg total) by mouth 2 (two) times daily.     Begin Date    AM    Noon    PM    Bedtime       docusate sodium 250 MG capsule   Commonly known as:  COLACE   Refills:  0   Dose:  250 mg    Instructions:  Take 250 mg by mouth 2 (two) times daily as needed for Constipation.     Begin Date    AM    Noon    PM    Bedtime       FLOMAX 0.4 mg Cp24   Refills:  0   Dose:  0.4 mg   Generic drug:  tamsulosin    Last time this was given:  0.4 mg on 4/12/2017  9:16 PM   Instructions:  Take 0.4 mg by mouth every evening.     Begin Date    AM    Noon    PM    Bedtime       furosemide 20 MG tablet   Commonly known as:  LASIX   Quantity:  30 tablet   Refills:  11   Dose:  40 mg    Last time this was given:  40 mg on 4/13/2017  8:55 AM   Instructions:  Take 2 tablets (40 mg total) by mouth once daily.     Begin Date    AM    Noon    PM    Bedtime       hydrocodone-acetaminophen 10-325mg  mg Tab   Commonly known as:  NORCO   Refills:  0   Dose:  1 tablet   Indications:  Pain    Instructions:  Take 1 tablet by mouth every 6 (six) hours as needed for Pain.     Begin Date    AM    Noon    PM    Bedtime       ibuprofen 400 MG tablet   Commonly known as:  ADVIL,MOTRIN   Refills:  0   Dose:  400 mg    Instructions:  Take 400 mg by mouth every 6 (six) hours as needed for Other.     Begin Date    AM    Noon    PM    Bedtime       GENIE 7-7-1.5 gram Pwpk   Refills:  0   Dose:  1 packet   Generic drug:  arginine-glutamine-calcium HMB    Instructions:  Take 1 packet by mouth 2 (two) times daily.     Begin Date    AM    Noon    PM    Bedtime        lactulose 10 gram/15 mL solution   Commonly known as:  CHRONULAC   Refills:  0   Dose:  10 g   Indications:  Take with 30cc MOM    Instructions:  Take 10 g by mouth 2 (two) times daily.     Begin Date    AM    Noon    PM    Bedtime       linaclotide 290 mcg Cap   Refills:  0    Instructions:  Take by mouth once daily.     Begin Date    AM    Noon    PM    Bedtime       metoclopramide HCl 5 MG tablet   Commonly known as:  REGLAN   Refills:  0   Dose:  5 mg    Last time this was given:  5 mg on 4/13/2017 10:20 AM   Instructions:  Take 5 mg by mouth 3 (three) times daily before meals.     Begin Date    AM    Noon    PM    Bedtime       multivitamin per tablet   Commonly known as:  THERAGRAN   Refills:  0   Dose:  1 tablet    Instructions:  Take 1 tablet by mouth once daily.     Begin Date    AM    Noon    PM    Bedtime       oxycodone-acetaminophen 5-325 mg per tablet   Commonly known as:  PERCOCET   Quantity:  15 tablet   Refills:  0   Dose:  1 tablet    Last time this was given:  1 tablet on 4/13/2017 10:20 AM   Instructions:  Take 1 tablet by mouth every 4 (four) hours as needed for Pain.     Begin Date    AM    Noon    PM    Bedtime       potassium, sodium phosphates 280-160-250 mg Pwpk   Commonly known as:  PHOS-NAK   Refills:  0   Dose:  1 packet    Last time this was given:  1 packet on 4/13/2017  8:54 AM   Instructions:  Take 1 packet by mouth 2 (two) times daily.     Begin Date    AM    Noon    PM    Bedtime       ramelteon 8 mg tablet   Commonly known as:  ROZEREM   Refills:  0   Dose:  8 mg    Last time this was given:  8 mg on 4/12/2017  9:16 PM   Instructions:  Take 8 mg by mouth every evening.     Begin Date    AM    Noon    PM    Bedtime       tramadol 50 mg tablet   Commonly known as:  ULTRAM   Refills:  0   Dose:  50 mg    Instructions:  Take 50 mg by mouth every 8 (eight) hours as needed for Pain.     Begin Date    AM    Noon    PM    Bedtime       VITAMIN C 500 MG tablet   Refills:  0   Dose:   500 mg   Generic drug:  ascorbic acid (vitamin C)    Last time this was given:  500 mg on 4/13/2017  8:55 AM   Instructions:  Take 500 mg by mouth 2 (two) times daily.     Begin Date    AM    Noon    PM    Bedtime       zinc sulfate 220 (50) mg capsule   Commonly known as:  ZINCATE   Refills:  0   Dose:  220 mg   Indications:  25 more days as of 12/27/2016    Last time this was given:  220 mg on 4/13/2017  8:55 AM   Instructions:  Take 220 mg by mouth once daily.     Begin Date    AM    Noon    PM    Bedtime            Where to Get Your Medications      You can get these medications from any pharmacy     Bring a paper prescription for each of these medications     PIPERACILLIN-TAZOBACTAM 4.5G/100ML D5W IVPB (READY TO MIX)                  Please bring to all follow up appointments:    1. A copy of your discharge instructions.  2. All medicines you are currently taking in their original bottles.  3. Identification and insurance card.    Please arrive 15 minutes ahead of scheduled appointment time.    Please call 24 hours in advance if you must reschedule your appointment and/or time.        Your Scheduled Appointments     May 18, 2017  8:30 AM CDT   Established Patient Visit with MD Ryan Diaz - Infectious Diseases ([facility] )    [address]  [place] [address] [phone]              Follow-up Information     Follow up with [facility] On 5/18/2018.    Specialty:  Infectious Diseases    Why:  at 8:30 AM    Contact information:    [address]  [facility] [address] [phone]          Follow up with Tomas Torres MD In 1 month.    Specialty:  Family Medicine    Contact information:    [address] [facility]  [place] [address] [phone]          Follow up with [facility].    Specialties:  Nursing Home Agency, SNF Agency    Why:  SNF    Contact information:    [address] [facility] [address] [phone]       "    Discharge Instructions     Future Orders    Call MD for:  severe uncontrolled pain     Call MD for:  temperature >100.4     Diet Diabetic 2200 Calories     Weight bearing restrictions (specify)     Comments:    Bedbound unless with therapy.        Primary Diagnosis     Your primary diagnosis was:  Pressure Ulcer Of Buttock      Admission Information     Date & Time Provider Department CSN    4/7/2017 12:28 PM Michael Irizarry MD [facility] [id number]      Care Providers     Provider Role Specialty Primary office phone    Michael Irizarry MD Attending Provider General Surgery [phone]    Carl Castro MD Consulting Physician  Internal Medicine [phone]    Michael Irizarry MD Surgeon  General Surgery [phone]      Your Vitals Were     BP Pulse Temp Resp Height Weight    126/61 (Patient Position: Lying, BP Method: Automatic) 63 97.6 °F (36.4 °C) (Oral) 16 5' 8" (1.727 m) 59 kg (130 lb)    SpO2 BMI             97% 19.77 kg/m2         Recent Lab Values        2/6/2012 4/28/2013 7/17/2013 7/9/2014 2/28/2015 7/13/2016 11/27/2016         6:20 AM  5:55 AM 12:50 AM  3:25 PM  2:46 PM  5:00 AM  2:46 PM     A1C 5.8 6.2 5.9 6.3 (H) 7.0 (H) 6.7 (H) 7.9 (H)     Comment for A1C at  5:00 AM on 7/13/2016:  According to ADA guidelines, hemoglobin A1C <7.0% represents  optimal control in non-pregnant diabetic patients.  Different  metrics may apply to specific populations.   Standards of Medical Care in Diabetes - 2016.  For the purpose of screening for the presence of diabetes:  <5.7%     Consistent with the absence of diabetes  5.7-6.4%  Consistent with increasing risk for diabetes   (prediabetes)  >or=6.5%  Consistent with diabetes  Currently no consensus exists for use of hemoglobin A1C  for diagnosis of diabetes for children.      Comment for A1C at  2:46 PM on 11/27/2016:  According to ADA guidelines, hemoglobin A1C <7.0% represents  optimal control in non-pregnant diabetic patients.  " Different  metrics may apply to specific populations.   Standards of Medical Care in Diabetes - 2016.  For the purpose of screening for the presence of diabetes:  <5.7%     Consistent with the absence of diabetes  5.7-6.4%  Consistent with increasing risk for diabetes   (prediabetes)  >or=6.5%  Consistent with diabetes  Currently no consensus exists for use of hemoglobin A1C  for diagnosis of diabetes for children.        Pending Labs     Order Current Status    Specimen to Pathology - Surgery In process    Blood culture Preliminary result    Blood culture Preliminary result      Allergies as of 4/13/2017     No Known Allergies      [facility]     [facility] Nurse Care Line - 24/7 Assistance  Unless otherwise directed by your provider, please contact [facility], our nurse care line that is available for 24/7 assistance.     Registered nurses in the [facility] provide clinical advisement, health education, appointment booking, and other advisory services.  Call for this free service at [phone].        Advance Directives     An advance directive is a document which, in the event you are no longer able to make decisions for yourself, tells your healthcare team what kind of treatment you do or do not want to receive, or who you would like to make those decisions for you.  If you do not currently have an advance directive, [facility] encourages you to create one.  For more information call:  [phone]), [phone] ([phone]),  or log on to [email or web address].        Smoking Cessation     If you would like to quit smoking:   You may be eligible for free services if you are a [place] resident and started smoking cigarettes before September 1, 1988.  Call the Smoking Cessation Trust (SCT) toll free at (234) 840-4267 or (470) 701-9544.   Call 9-143-QUIT-NOW if you do not meet the above criteria.   Contact us via email: [email or web address]   View our  website for more information: [email or web address]        Language Assistance Services     ATTENTION: Language assistance services are available, free of charge. Please call [phone].      ATENCIÓN: Si stefano stephens, tiene a shirley disposición servicios gratuitos de asistencia lingüística. Llame al [phone].     CHÚ Ý: N?u b?n nói Ti?ng Vi?t, có các d?ch v? h? tr? ngôn ng? mi?n phí dành cho b?n. G?i s? [phone].        Stroke Education              Heart Failure Education       Heart Failure: Being Active  You have a condition called heart failure. Being active doesnt mean that you have to wear yourself out. Even a little movement each day helps to strengthen your heart. If you cant get out to exercise, you can do simple stretching and strengthening exercises at home. These are good ways to keep you well-conditioned and prevent you and your heart from becoming excessively weak.    Ideas to get you started  · Add a little movement to things you do now. Walk to mail letters. Park your car at the far end of the parking lot and walk to the store. Walk up a flight of stairs instead of taking the elevator.  · Choose activities you enjoy. You might walk, swim, or ride an exercise bike. Things like gardening and washing the car count, too. Other possibilities include: washing dishes, walking the dog, walking around the mall, and doing aerobic activities with friends.  · Join a group exercise program at a [facility] or [facility], a senior center, or a community center. Or look into a hospital cardiac rehabilitation program. Ask your doctor if you qualify.  Tips to keep you going  · Get up and get dressed each day. Go to a coffee shop and read a newspaper or go somewhere that you'll be in the presence of other active people. Youll feel more like being active.  · Make a plan. Choose one or more activities that you enjoy and that you can easily do. Then plan to do at least one each day. You might write your plan on  a calendar.  · Go with a friend or a group if you like company. This can help you feel supported and stay motivated, too.  · Plan social events that you enjoy. This will keep you mentally engaged as well as physically motivated to do things you find pleasure in.  For your safety  · Talk with your healthcare provider before starting an exercise program.  · Exercise indoors when its too hot or too cold outside, or when the air quality is poor. Try walking at a shopping mall.  · Wear socks and sturdy shoes to maintain your balance and prevent falls.  · Start slowly. Do a few minutes several times a day at first. Increase your time and speed little by little.  · Stop and rest whenever you feel tired or get short of breath.  · Dont push yourself on days when you dont feel well.  Date Last Reviewed: 3/20/2016  © 2799-9234 [employer]. [address] [place] [address]. All rights reserved. This information is not intended as a substitute for professional medical care. Always follow your healthcare professional's instructions.              Heart Failure: Evaluating Your Heart  You have a condition called heart failure. To evaluate your condition, your doctor will examine you, ask questions, and do some tests. Along with looking for signs of heart failure, the doctor looks for any other health problems that may have led to heart failure. The results of your evaluation will help your doctor form a treatment plan.  Health history and physical exam  Your visit will start with a health history. Tell the doctor about any symptoms youve noticed and about all medicines you take. Then youll have a physical exam. This includes listening to your heartbeat and breathing. Youll also be checked for swelling (edema) in your legs and neck. When you have fluid buildup or fluid in the lungs, it may be called congestive heart failure.  Diagnosing heart failure     During an echocardiogram, sound waves bounce off  the heart. These are converted into a picture on the screen.   The following may be done to help your doctor form a diagnosis:  · X-rays show the size and shape of your heart. These pictures can also show fluid in your lungs.  · An electrocardiogram (ECG or EKG) shows the pattern of your heartbeat. Small pads (electrodes) are placed on your chest, arms, and legs. Wires connect the pads to the ECG machine, which records your hearts electrical signals. This can give the doctor information about heart function.  · An echocardiogram uses ultrasound waves to show the structure and movement of your heart muscle. This shows how well the heart pumps. It also shows the thickness of the heart walls, and if the heart is enlarged. It is one of the most useful, non-invasive tests as it provides information about the heart's general function. This helps your doctor make treatment decisions.  · Lab tests evaluate small amounts of blood or urine for signs of problems. A BNP lab test can help diagnose and evaluate heart failure. BNP stands for B-type natriuretic peptide. The ventricles secrete more BNP when heart failure worsens. Lab tests can also provide information about metabolic dysfunction or heart dysfunction.  Your treatment plan  Based on the results of your evaluation and tests, your doctor will develop a treatment plan. This plan is designed to relieve some of your heart failure symptoms and help make you more comfortable. Your treatment plan may include:  · Medicine to help your heart work better and improve your quality of life  · Changes in what you eat and drink to help prevent fluid from backing up in your body  · Daily monitoring of your weight and heart failure symptoms to see how well your treatment plan is working  · Exercise to help you stay healthy  · Help with quitting smoking  · Emotional and psychological support to help adjust to the changes  · Referrals to other specialists to make sure you are being  treated comprehensively  Date Last Reviewed: 3/21/2016  © 2961-1381 [employer]. [address] [place] [address], [place] [address]. All rights reserved. This information is not intended as a substitute for professional medical care. Always follow your healthcare professional's instructions.              Heart Failure: Making Changes to Your Diet  You have a condition called heart failure. When you have heart failure, excess fluid is more likely to build up in your body because your heart isn't working well. This makes the heart work harder to pump blood. Fluid buildup causes symptoms such as shortness of breath and swelling (edema). This is often referred to as congestive heart failure or CHF. Controlling the amount of salt (sodium) you eat may help stop fluid from building up. Your doctor may also tell you to reduce the amount of fluid you drink.  Reading food labels    Your healthcare provider will tell you how much sodium you can eat each day. Read food labels to keep track. Keep in mind that certain foods are high in salt. These include canned, frozen, and processed foods. Check the amount of sodium in each serving. Watch out for high-sodium ingredients. These include MSG (monosodium glutamate), baking soda, and sodium phosphate.   Eating less salt  Give yourself time to get used to eating less salt. It may take a little while. Here are some tips to help:  · Take the saltshaker off the table. Replace it with salt-free herb mixes and spices.  · Eat fresh or plain frozen vegetables. These have much less salt than canned vegetables.  · Choose low-sodium snacks like sodium-free pretzels, crackers, or air-popped popcorn.  · Dont add salt to your food when youre cooking. Instead, season your foods with pepper, lemon, garlic, or onion.  · When you eat out, ask that your food be cooked without added salt.  · Avoid eating fried foods as these often have a great deal of salt.  If youre told to limit fluids  You  may need to limit how much fluid you have to help prevent swelling. This includes anything that is liquid at room temperature, such as ice cream and soup. If your doctor tells you to limit fluid, try these tips:  · Measure drinks in a measuring cup before you drink them. This will help you meet daily goals.  · Chill drinks to make them more refreshing.  · Suck on frozen lemon wedges to quench thirst.  · Only drink when youre thirsty.  · Chew sugarless gum or suck on hard candy to keep your mouth moist.  · Weigh yourself daily to know if your body's fluid content is rising.  My sodium goal  Your healthcare provider may give you a sodium goal to meet each day. This includes sodium found in food as well as salt that you add. My goal is to eat no more than ___________ mg of sodium per day.     When to call your doctor  Call your doctor right away if you have any symptoms of worsening heart failure. These can include:  · Sudden weight gain  · Increased swelling of your legs or ankles  · Trouble breathing when youre resting or at night  · Increase in the number of pillows you have to sleep on  · Chest pain, pressure, discomfort, or pain in the jaw, neck, or back   Date Last Reviewed: 3/21/2016  © 2243-4088 [employer]. [address] [place] [address], [place] [address]. All rights reserved. This information is not intended as a substitute for professional medical care. Always follow your healthcare professional's instructions.              Heart Failure: Medicines to Help Your Heart    You have a condition called heart failure (also known as congestive heart failure, or CHF). Your doctor will likely prescribe medicines for heart failure and any underlying health problems you have. Most heart failure patients take one or more types of medicinen. Your healthcare provider will work to find the combination of medicines that works best for you.  Heart failure medicines  Here are the most common heart failure  medicines:  · ACE inhibitors lower blood pressure and decrease strain on the heart. This makes it easier for the heart to pump. Angiotensin receptor blockers have similar effects. These are prescribed for some patients instead of ACE inhibitors.  · Beta-blockers relieve stress on the heart. They also improve symptoms. They may also improve the heart's pumping action over time.  · Diuretics (also called water pills) help rid your body of excess water. This can help rid your body of swelling (edema). Having less fluid to pump means your heart doesnt have to work as hard. Some diuretics make your body lose a mineral called potassium. Your doctor will tell you if you need to take supplements or eat more foods high in potassium.  · Digoxin helps your heart pump with more strength. This helps your heart pump more blood with each beat. So, more oxygen-rich blood travels to the rest of the body.  · Aldosterone antagonists help alter hormones and decrease strain on the heart.  · Hydralazine and nitrates are two separate medicines used together to treat heart failure. They may come in one combination pill. They lower blood pressure and decrease how hard the heart has to pump.  Medicines for related conditions  Controlling other heart problems helps keep heart failure under control, too. Depending on other heart problems you have, medicines may be prescribed to:  · Lower blood pressure (antihypertensives).  · Lower cholesterol levels (statins).  · Prevent blood clots (anticoagulants or aspirin).  · Keep the heartbeat steady (antiarrhythmics).  Date Last Reviewed: 3/5/2016  © 7164-3271 [employer]. [address] [place] [address], [place] [address]. All rights reserved. This information is not intended as a substitute for professional medical care. Always follow your healthcare professional's instructions.              Heart Failure: Procedures That May Help    The heart is a muscle that pumps oxygen-rich blood to all  parts of the body. When you have heart failure, the heart is not able to pump as well as it should. Blood and fluid may back up into the lungs (congestive heart failure), and some parts of the body dont get enough oxygen-rich blood to work normally. These problems lead to the symptoms of heart failure.     Certain procedures may help the heart pump better in some cases of heart failure. Some procedures are done to treat health problems that may have caused the heart failure such as coronary artery disease or heart rhythm problems. For more serious heart failure, other options are available.  Treating artery and valve problems  If you have coronary artery disease or valve disease, procedures may be done to improve blood flow. This helps the heart pump better, which can improve heart failure symptoms. First, your doctor may do a cardiac catheterization to help detect clogged blood vessels or valve damage. During this procedure, a  thin tube (catheter) in inserted into a blood vessel and guided to the heart. There a dye is injected and a special type of X-ray (angiogram) is taken of the blood vessels. Procedures to open a blocked artery or fix damaged valves can also be done using catheterization.  · Angioplasty uses a balloon-tipped instrument at the end of the catheter. The balloon is inflated to widen the narrowed artery. In many cases, a stent is expanded to further support the narrowed artery. A stent is a metal mesh tube.  · Valve surgery repairs or replacement of faulty valves can also be done during catheterization so blood can flow properly through the chambers of the heart.  Bypass surgery is another option to help treat blocked arteries. It uses a healthy blood vessel from elsewhere in the body. The healthy blood vessel is attached above and below the blocked area so that blood can flow around the blocked artery.  Treating heart rhythm problems  A device may be placed in the chest to help a weak heart  maintain a healthy, heartbeat so the heart can pump more effectively:  · Pacemaker. A pacemaker is an implanted device that regulates your heartbeat electronically. It monitors your heart's rhythm and generates a painless electric impulse that helps the heart beat in a regular rhythm. A pacemaker is programmed to meet your specific heart rhythm needs.  · Biventricular pacing/cardiac resynchronization therapy. A type of pacemaker that paces both pumping chambers of the heart at the same time to coordinate contractions and to improve the heart's function. Some people with heart failure are candidates for this therapy.  · Implantable cardioverter defibrillator. A device similar to a pacemaker that senses when the heart is beating too fast and delivers an electrical shock to convert the fast rhythm to a normal rhythm. This can be a life saving device.  In severe cases  In more serious cases of heart failure when other treatments no longer work, other options may include:  · Ventricular assist devices (VADs). These are mechanical devices used to take over the pumping function for one or both of the heart's ventricles, or pumping chambers. A VAD may be necessary when heart failure progresses to the point that medicines and other treatments no longer help. In some cases, a VAD may be used as a bridge to transplant.  · Heart transplant. This is replacing the diseased heart with a healthy one from a donor. This is an option for a few people who are very sick. A heart transplant is very serious and not an option for all patients. Your doctor can tell you more.  Date Last Reviewed: 3/20/2016  © 9717-0985 [employer]. [address] [place] [address], [place] [address]. All rights reserved. This information is not intended as a substitute for professional medical care. Always follow your healthcare professional's instructions.              Heart Failure: Tracking Your Weight  You have a condition called heart failure. When  you have heart failure, a sudden weight gain or a steady rise in weight is a warning sign that your body is retaining too much water and salt. This could mean your heart failure is getting worse. If left untreated, it can cause problems for your lungs and result in shortness of breath. Weighing yourself each day is the best way to know if youre retaining water. If your weight goes up quickly, call your doctor. You will be given instructions on how to get rid of the excess water. You will likely need medicines and to avoid salt. This will help your heart work better.  Call your doctor if you gain more than 2 pounds in 1 day, more than 5 pounds in 1 week, or whatever weight gain you were told to report by your doctor. This is often a sign of worsening heart failure and needs to be evaluated and treated. Your doctor will tell you what to do next.   Tips for weighing yourself    · Weigh yourself at the same time each morning, wearing the same clothes. Weigh yourself after urinating and before eating.  · Use the same scale each day. Make sure the numbers are easy to read. Put the scale on a flat, hard surface -- not on a rug or carpet.  · Do not stop weighing yourself. If you forget one day, weigh again the next morning.  How to use your weight chart  · Keep your weight chart near the scale. Write your weight on the chart as soon as you get off the scale.  · Fill in the month and the start date on the chart. Then write down your weight each day. Your chart will look like this:    · If you miss a day, leave the space blank. Weigh yourself the next day and write your weight in the next space.  · Take your weight chart with you when you go to see your doctor.  Date Last Reviewed: 3/20/2016  © 1458-4611 [employer]. [address] [place] [address], [place] [address]. All rights reserved. This information is not intended as a substitute for professional medical care. Always follow your healthcare professional's  instructions.              Heart Failure: Warning Signs of a Flare-Up  You have a condition called heart failure. Once you have heart failure, flare-ups can happen. Below are signs that can mean your heart failure is getting worse. If you notice any of these warning signs, call your healthcare provider.  Swelling    · Your feet, ankles, or lower legs get puffier.  · You notice skin changes on your lower legs.  · Your shoes feel too tight.  · Your clothes are tighter in the waist.  · You have trouble getting rings on or off your fingers.  Shortness of breath  · You have to breathe harder even when youre doing your normal activities or when youre resting.  · You are short of breath walking up stairs or even short distances.  · You wake up at night short of breath or coughing.  · You need to use more pillows or sit up to sleep.  · You wake up tired or restless.  Other warning signs  · You feel weaker, dizzy, or more tired.  · You have chest pain or changes in your heartbeat.  · You have a cough that wont go away.  · You cant remember things or dont feel like eating.  Tracking your weight  Gaining weight is often the first warning sign that heart failure is getting worse. Gaining even a few pounds can be a sign that your body is retaining excess water and salt. Weighing yourself each day in the morning after you urinate and before you eat, is the best way to know if you're retaining water. Get a scale that is easy to read and make sure you wear the same clothes and use the same scale every time you weigh. Your healthcare provider will show you how to track your weight. Call your doctor if you gain more than 2 pounds in 1 day, 5 pounds in 1 week, or whatever weight gain you were told to report by your doctor. This is often a sign of worsening heart failure and needs to be evaluated and treated before it compromises your breathing. Your doctor will tell you what to do next.    Date Last Reviewed: 3/15/2016  ©  8084-7991 [employer]. [address] [place] [address], [place] [address]. All rights reserved. This information is not intended as a substitute for professional medical care. Always follow your healthcare professional's instructions.              Pneumonmia Discharge Instructions                Chronic Kindey Disease Education             [employer] Information           [facility] Sign-Up     Activating your [facility] account is as easy as 1-2-3!     1) Visit [email or web address], select Sign Up Now, enter this activation code and your date of birth, then select Next.  Activation code not generated  Current Patient Portal Status: Account disabled      2) Create a username and password to use when you visit [facility] in the future and select a security question in case you lose your password and select Next.    3) Enter your e-mail address and click Sign Up!    Additional Information  If you have questions, please e-mail [employer] [email or web address] or call [phone] to talk to our [facility] staff. Remember, [employer] is NOT to be used for urgent needs. For medical emergencies, dial 911.          [facility] complies with applicable Federal civil rights laws and does not discriminate on the basis of race, color, national origin, age, disability, or sex.

## 2017-04-07 NOTE — ED NOTES
Per pt suprapubic cath changed 2 days ago by spouse. Pt Dr. Terri gonzalez does not need to be changed.

## 2017-04-08 ENCOUNTER — SURGERY (OUTPATIENT)
Age: 77
End: 2017-04-08

## 2017-04-08 ENCOUNTER — ANESTHESIA (OUTPATIENT)
Dept: SURGERY | Facility: HOSPITAL | Age: 77
DRG: 579 | End: 2017-04-08
Payer: MEDICARE

## 2017-04-08 ENCOUNTER — ANESTHESIA EVENT (OUTPATIENT)
Dept: SURGERY | Facility: HOSPITAL | Age: 77
DRG: 579 | End: 2017-04-08
Payer: MEDICARE

## 2017-04-08 LAB
ALBUMIN SERPL BCP-MCNC: 2 G/DL
ALP SERPL-CCNC: 94 U/L
ALT SERPL W/O P-5'-P-CCNC: 28 U/L
ANION GAP SERPL CALC-SCNC: 5 MMOL/L
AST SERPL-CCNC: 20 U/L
BASOPHILS # BLD AUTO: 0.05 K/UL
BASOPHILS NFR BLD: 0.6 %
BILIRUB SERPL-MCNC: 0.2 MG/DL
BUN SERPL-MCNC: 17 MG/DL
CALCIUM SERPL-MCNC: 8.7 MG/DL
CHLORIDE SERPL-SCNC: 105 MMOL/L
CO2 SERPL-SCNC: 27 MMOL/L
CREAT SERPL-MCNC: 0.8 MG/DL
DIFFERENTIAL METHOD: ABNORMAL
EOSINOPHIL # BLD AUTO: 0.4 K/UL
EOSINOPHIL NFR BLD: 4.4 %
ERYTHROCYTE [DISTWIDTH] IN BLOOD BY AUTOMATED COUNT: 15.1 %
EST. GFR  (AFRICAN AMERICAN): >60 ML/MIN/1.73 M^2
EST. GFR  (NON AFRICAN AMERICAN): >60 ML/MIN/1.73 M^2
GLUCOSE SERPL-MCNC: 98 MG/DL
HCT VFR BLD AUTO: 30.4 %
HGB BLD-MCNC: 9.6 G/DL
LYMPHOCYTES # BLD AUTO: 2.7 K/UL
LYMPHOCYTES NFR BLD: 31.3 %
MCH RBC QN AUTO: 27.7 PG
MCHC RBC AUTO-ENTMCNC: 31.6 %
MCV RBC AUTO: 88 FL
MONOCYTES # BLD AUTO: 0.7 K/UL
MONOCYTES NFR BLD: 8.5 %
NEUTROPHILS # BLD AUTO: 4.6 K/UL
NEUTROPHILS NFR BLD: 54.3 %
PLATELET # BLD AUTO: 326 K/UL
PMV BLD AUTO: 7.9 FL
POCT GLUCOSE: 105 MG/DL (ref 70–110)
POCT GLUCOSE: 105 MG/DL (ref 70–110)
POCT GLUCOSE: 163 MG/DL (ref 70–110)
POTASSIUM SERPL-SCNC: 4.6 MMOL/L
PROT SERPL-MCNC: 6.5 G/DL
RBC # BLD AUTO: 3.47 M/UL
SODIUM SERPL-SCNC: 137 MMOL/L
WBC # BLD AUTO: 8.48 K/UL

## 2017-04-08 PROCEDURE — 63600175 PHARM REV CODE 636 W HCPCS: Performed by: ANESTHESIOLOGY

## 2017-04-08 PROCEDURE — 87186 SC STD MICRODIL/AGAR DIL: CPT

## 2017-04-08 PROCEDURE — 87205 SMEAR GRAM STAIN: CPT

## 2017-04-08 PROCEDURE — 0KBQ0ZZ EXCISION OF RIGHT UPPER LEG MUSCLE, OPEN APPROACH: ICD-10-PCS | Performed by: SURGERY

## 2017-04-08 PROCEDURE — 87075 CULTR BACTERIA EXCEPT BLOOD: CPT

## 2017-04-08 PROCEDURE — 36000706: Performed by: SURGERY

## 2017-04-08 PROCEDURE — 80053 COMPREHEN METABOLIC PANEL: CPT

## 2017-04-08 PROCEDURE — 36415 COLL VENOUS BLD VENIPUNCTURE: CPT

## 2017-04-08 PROCEDURE — 88304 TISSUE EXAM BY PATHOLOGIST: CPT | Mod: 26,,, | Performed by: PATHOLOGY

## 2017-04-08 PROCEDURE — 87040 BLOOD CULTURE FOR BACTERIA: CPT

## 2017-04-08 PROCEDURE — 11000001 HC ACUTE MED/SURG PRIVATE ROOM

## 2017-04-08 PROCEDURE — 71000033 HC RECOVERY, INTIAL HOUR: Performed by: SURGERY

## 2017-04-08 PROCEDURE — 63600175 PHARM REV CODE 636 W HCPCS: Performed by: EMERGENCY MEDICINE

## 2017-04-08 PROCEDURE — 87077 CULTURE AEROBIC IDENTIFY: CPT

## 2017-04-08 PROCEDURE — 25000003 PHARM REV CODE 250: Performed by: EMERGENCY MEDICINE

## 2017-04-08 PROCEDURE — 85025 COMPLETE CBC W/AUTO DIFF WBC: CPT

## 2017-04-08 PROCEDURE — 94761 N-INVAS EAR/PLS OXIMETRY MLT: CPT

## 2017-04-08 PROCEDURE — 37000009 HC ANESTHESIA EA ADD 15 MINS: Performed by: SURGERY

## 2017-04-08 PROCEDURE — 25000003 PHARM REV CODE 250: Performed by: ANESTHESIOLOGY

## 2017-04-08 PROCEDURE — 87076 CULTURE ANAEROBE IDENT EACH: CPT

## 2017-04-08 PROCEDURE — 25000003 PHARM REV CODE 250: Performed by: STUDENT IN AN ORGANIZED HEALTH CARE EDUCATION/TRAINING PROGRAM

## 2017-04-08 PROCEDURE — 87176 TISSUE HOMOGENIZATION CULTR: CPT

## 2017-04-08 PROCEDURE — 87070 CULTURE OTHR SPECIMN AEROBIC: CPT

## 2017-04-08 PROCEDURE — 88304 TISSUE EXAM BY PATHOLOGIST: CPT | Performed by: PATHOLOGY

## 2017-04-08 PROCEDURE — 25000003 PHARM REV CODE 250: Performed by: SURGERY

## 2017-04-08 PROCEDURE — 37000008 HC ANESTHESIA 1ST 15 MINUTES: Performed by: SURGERY

## 2017-04-08 PROCEDURE — 36000707: Performed by: SURGERY

## 2017-04-08 RX ORDER — BACITRACIN 50000 [IU]/1
INJECTION, POWDER, FOR SOLUTION INTRAMUSCULAR
Status: DISCONTINUED | OUTPATIENT
Start: 2017-04-08 | End: 2017-04-08 | Stop reason: HOSPADM

## 2017-04-08 RX ORDER — LIDOCAINE HYDROCHLORIDE 10 MG/ML
INJECTION, SOLUTION EPIDURAL; INFILTRATION; INTRACAUDAL; PERINEURAL
Status: DISCONTINUED | OUTPATIENT
Start: 2017-04-08 | End: 2017-04-08 | Stop reason: HOSPADM

## 2017-04-08 RX ORDER — SODIUM CHLORIDE, SODIUM LACTATE, POTASSIUM CHLORIDE, CALCIUM CHLORIDE 600; 310; 30; 20 MG/100ML; MG/100ML; MG/100ML; MG/100ML
INJECTION, SOLUTION INTRAVENOUS CONTINUOUS PRN
Status: DISCONTINUED | OUTPATIENT
Start: 2017-04-08 | End: 2017-04-08

## 2017-04-08 RX ORDER — PROPOFOL 10 MG/ML
INJECTION, EMULSION INTRAVENOUS CONTINUOUS PRN
Status: DISCONTINUED | OUTPATIENT
Start: 2017-04-08 | End: 2017-04-08

## 2017-04-08 RX ADMIN — POTASSIUM & SODIUM PHOSPHATES POWDER PACK 280-160-250 MG 1 PACKET: 280-160-250 PACK at 08:04

## 2017-04-08 RX ADMIN — SODIUM CHLORIDE: 0.9 INJECTION, SOLUTION INTRAVENOUS at 09:04

## 2017-04-08 RX ADMIN — Medication 1 TABLET: at 09:04

## 2017-04-08 RX ADMIN — SODIUM CHLORIDE, PRESERVATIVE FREE 3 ML: 5 INJECTION INTRAVENOUS at 03:04

## 2017-04-08 RX ADMIN — AMLODIPINE BESYLATE 10 MG: 5 TABLET ORAL at 09:04

## 2017-04-08 RX ADMIN — LIDOCAINE HYDROCHLORIDE 13 ML: 10 INJECTION, SOLUTION EPIDURAL; INFILTRATION; INTRACAUDAL; PERINEURAL at 11:04

## 2017-04-08 RX ADMIN — SODIUM CHLORIDE, PRESERVATIVE FREE 3 ML: 5 INJECTION INTRAVENOUS at 10:04

## 2017-04-08 RX ADMIN — OXYCODONE AND ACETAMINOPHEN 1 TABLET: 5; 325 TABLET ORAL at 04:04

## 2017-04-08 RX ADMIN — OXYCODONE HYDROCHLORIDE AND ACETAMINOPHEN 500 MG: 500 TABLET ORAL at 09:04

## 2017-04-08 RX ADMIN — CARVEDILOL 25 MG: 25 TABLET, FILM COATED ORAL at 09:04

## 2017-04-08 RX ADMIN — CEFTRIAXONE 1 G: 1 INJECTION, SOLUTION INTRAVENOUS at 04:04

## 2017-04-08 RX ADMIN — TAMSULOSIN HYDROCHLORIDE 0.4 MG: 0.4 CAPSULE ORAL at 08:04

## 2017-04-08 RX ADMIN — Medication 220 MG: at 09:04

## 2017-04-08 RX ADMIN — PROPOFOL 100 MCG/KG/MIN: 10 INJECTION, EMULSION INTRAVENOUS at 11:04

## 2017-04-08 RX ADMIN — FUROSEMIDE 40 MG: 40 TABLET ORAL at 09:04

## 2017-04-08 RX ADMIN — AMIODARONE HYDROCHLORIDE 200 MG: 200 TABLET ORAL at 09:04

## 2017-04-08 RX ADMIN — OXYCODONE HYDROCHLORIDE AND ACETAMINOPHEN 500 MG: 500 TABLET ORAL at 08:04

## 2017-04-08 RX ADMIN — DEXTROSE 2 G: 50 INJECTION, SOLUTION INTRAVENOUS at 11:04

## 2017-04-08 RX ADMIN — SODIUM CHLORIDE, SODIUM LACTATE, POTASSIUM CHLORIDE, AND CALCIUM CHLORIDE: .6; .31; .03; .02 INJECTION, SOLUTION INTRAVENOUS at 11:04

## 2017-04-08 RX ADMIN — OXYCODONE AND ACETAMINOPHEN 1 TABLET: 5; 325 TABLET ORAL at 12:04

## 2017-04-08 RX ADMIN — RAMELTEON 8 MG: 8 TABLET, FILM COATED ORAL at 08:04

## 2017-04-08 RX ADMIN — ATORVASTATIN CALCIUM 40 MG: 40 TABLET, FILM COATED ORAL at 09:04

## 2017-04-08 RX ADMIN — METOCLOPRAMIDE HYDROCHLORIDE 5 MG: 5 TABLET ORAL at 04:04

## 2017-04-08 RX ADMIN — BACITRACIN 50000 UNITS: 5000 INJECTION, POWDER, FOR SOLUTION INTRAMUSCULAR at 11:04

## 2017-04-08 RX ADMIN — POTASSIUM & SODIUM PHOSPHATES POWDER PACK 280-160-250 MG 1 PACKET: 280-160-250 PACK at 09:04

## 2017-04-08 RX ADMIN — RIVAROXABAN 10 MG: 10 TABLET, FILM COATED ORAL at 08:04

## 2017-04-08 NOTE — PLAN OF CARE
Pt fully awake and appropriated in pacu. PACU discharge criteria met. Pt's family updated by phone in waiting room. Report given to Dr. Escobedo and pt released per Dr. Escobedo. Pt will be transferred back to [facility] per bed with RN and transport assistance. Transport request placed in EPIC.

## 2017-04-08 NOTE — PLAN OF CARE
Problem: Patient Care Overview  Goal: Plan of Care Review  Outcome: Ongoing (interventions implemented as appropriate)  Patient in NAD. Patient denies pain this shift. Stage 3 pressure ulcer to right upper leg and stage 2 pressure ulcer to sacrum mepilex applied to both. Patient with R AKA. Bed alarm on. Call light within reach. Instructed patient to call for assistance when needed. Fall precautions maintained. Bed in locked low position side rails up x2. Patient condition stable. Will continue to monitor patient.

## 2017-04-08 NOTE — ANESTHESIA PREPROCEDURE EVALUATION
04/08/2017     Asad Perez is a 77 y.o., male    He is s/p knee replacement => severe infection => R above the knee amputation. Also developed sacral wound (debrided).     NB:  Also R scl dialysis cath still present in case of need    Past Medical History:   Diagnosis Date    Alcohol abuse     CHF (congestive heart failure)     CKD (chronic kidney disease) stage 3, GFR 30-59 ml/min     Coronary artery disease     Heart failure, diastolic     High cholesterol     Hypertension     LBP (low back pain)     Prediabetes     PVD (peripheral vascular disease)     Stroke     2006, no deficits       COPD      Sacral uncer      S/P post TKA infection & septisemia      Hx At fib      Recent ELAYNE requiring dialysis; ¿R scl cath still there?    Past Surgical History:   Procedure Laterality Date    aortic bifemoral bypass  2006    bilateral carotid stenois  2006    carotid stents      JOINT REPLACEMENT      knee    left common endarterectomy  2006    RT AKA  NOV 2017     Review of patient's allergies indicates:  No Known Allergies    ANES-RELATED MAR  2016-09-29  amlodipine albuterol-ipratropium linezolid q 12  amiodarone    metronidazole q 8  carvedilol    micafungin q 24  enoxaparin  furosemide  HCTZ  KCl  atorvastatin      [facility] Anesthesia Evaluation    I have reviewed the Patient Summary Reports.    I have reviewed the Nursing Notes.   I have reviewed the Medications.     Review of Systems  Anesthesia Hx:  No problems with previous Anesthesia Denies Hx of Anesthetic complications   Denies Personal Hx of Anesthesia complications.   Social:  History of smoking. Stopped 2006.  Stopped drinking in 2014   Hematology/Oncology:         -- Anemia:   Cardiovascular:   Exercise tolerance: poor Hypertension CAD  Dysrhythmias atrial fibrillation CHF (diastolic dysfunction) PVD   EKG  Normal sinus  rhythm  Normal ECG  When compared with ECG of 25-SEP-2016 11:53,  No significant change was found  10/11/2016 10:48:56 AM    TTE  CONCLUSIONS     1 - Mild left atrial enlargement.     2 - Concentric hypertrophy.     3 - Normal left ventricular systolic function (EF 60-65%).     4 - Left ventricular diastolic dysfunction.     5 - Normal right ventricular systolic function .     6 - Pulmonary hypertension. The estimated PA systolic pressure is 47 mmHg.     7 - Trivial aortic regurgitation.     8 - Trivial to mild mitral regurgitation.     9 - Mild tricuspid regurgitation.   11/28/2016 15:36    Right CEA,   left carotid stent,   left CFA endarerectomy with aortobifem bypass complicated by CVA   Pulmonary:   COPD, mild Shortness of breath Pulm HTN   Renal/:   Chronic Renal Disease, CRI    Hepatic/GI:   GERD    Musculoskeletal:   Arthritis  Right knee arthritis   Neurological:   CVA, residual symptoms Altered mental status   Endocrine:  Endocrine Normal    Psych:  Psychiatric Normal             Physical Exam  General:  Well nourished    Airway/Jaw/Neck:  Airway Findings: (No problem with intubation 2016-09) Mallampati: II Jaw/Neck Findings:  Neck ROM: Normal ROM     Eyes/Ears/Nose:  EYES/EARS/NOSE FINDINGS: Normal   Dental:  Dental Findings: (2016-09-12 dentures out) Upper Dentures, Lower Dentures, Edentulous   Chest/Lungs:  Chest/Lungs Findings: Clear to auscultation  Nasal cannula   Heart/Vascular:  Heart Findings: Normal Heart murmur: negative Vascular Findings:  Dialysis Access: Subclavian Catheter  2016-09-12 Strong regular pulse      Mental Status:  Mental Status Findings:  Somnolent  Opens eyes to name     Lab Results   Component Value Date    WBC 8.48 04/08/2017    HGB 9.6 (L) 04/08/2017    HCT 30.4 (L) 04/08/2017    MCV 88 04/08/2017     04/08/2017       Chemistry        Component Value Date/Time     04/07/2017 1416    K 4.3 04/07/2017 1416     04/07/2017 1416    CO2 30 (H) 04/07/2017 1416     BUN 21 04/07/2017 1416    CREATININE 0.9 04/07/2017 1416     (H) 04/07/2017 1416        Component Value Date/Time    CALCIUM 9.2 04/07/2017 1416    ALKPHOS 120 04/07/2017 1416    AST 22 04/07/2017 1416    ALT 37 04/07/2017 1416    BILITOT 0.3 04/07/2017 1416        Lab Results   Component Value Date    ALBUMIN 2.4 (L) 04/07/2017     Lab Results   Component Value Date    TSH 1.728 11/27/2016     Wt Readings from Last 3 Encounters:   04/07/17 59 kg (130 lb)   01/17/17 65.8 kg (145 lb)   01/09/17 68 kg (150 lb)     Temp Readings from Last 3 Encounters:   04/08/17 35.6 °C (96.1 °F) (Oral)   01/18/17 36.4 °C (97.6 °F) (Oral)   01/09/17 36.5 °C (97.7 °F) (Oral)     BP Readings from Last 3 Encounters:   04/08/17 (!) 155/68   01/18/17 (!) 131/49   01/09/17 121/76     Pulse Readings from Last 3 Encounters:   04/08/17 (!) 59   01/18/17 77   01/09/17 63         Anesthesia Plan  Type of Anesthesia, risks & benefits discussed:  Anesthesia Type:  general, MAC  Patient's Preference:   Intra-op Monitoring Plan:   Intra-op Monitoring Plan Comments:   Post Op Pain Control Plan:   Post Op Pain Control Plan Comments:   Induction:   IV  Beta Blocker:  Patient is on a Beta-Blocker and has received one dose within the past 24 hours (No further documentation required).       Informed Consent: Patient understands risks and agrees with Anesthesia plan.  Questions answered. Anesthesia consent signed with patient.  ASA Score: 4     Day of Surgery Review of History & Physical: I have interviewed and examined the patient. I have reviewed the patient's H&P dated:  There are no significant changes.  H&P update referred to the surgeon.     Anesthesia Plan Notes:  - if need to get telephone consent     ~ cell       [phone]     ~ home   [phone]          Ready For Surgery From Anesthesia Perspective.

## 2017-04-08 NOTE — PROGRESS NOTES
"[facility] Resident HO-1 Progress Note    Subjective:      Asad Perez is a 77 y.o.  male who is being followed by the [facility]   Consult Service service at [facility] for Medical management.     Patient resting in bed this AM. States he is doing well. No complaints at this time. No events overnight.      Objective:   Last 24 Hour Vital Signs:  BP  Min: 114/58  Max: 163/67  Temp  Av.8 °F (36.6 °C)  Min: 97.4 °F (36.3 °C)  Max: 98.1 °F (36.7 °C)  Pulse  Av.2  Min: 54  Max: 59  Resp  Av.5  Min: 16  Max: 18  SpO2  Av.9 %  Min: 95 %  Max: 100 %  Height  Av' 8" (172.7 cm)  Min: 5' 8" (172.7 cm)  Max: 5' 8" (172.7 cm)  Weight  Av.5 kg (140 lb)  Min: 59 kg (130 lb)  Max: 68 kg (150 lb)  I/O last 3 completed shifts:  In: 548.8 [P.O.:60; I.V.:488.8]  Out: 500 [Urine:500]    Physical Examination:  General Appearance:    Alert, cooperative, NAD, appears stated age   Head:    Normocephalic, without obvious abnormality, atraumatic, MMM   Eyes:    PERRL, conjunctiva/corneas clear, EOM's intact gross;u    Neck:   Supple, symmetrical, trachea midline, no adenopathy;       Lungs:     Clear to auscultation bilaterally, respirations unlabored   Chest Wall:    No tenderness or deformity    Heart:    Regular rate and rhythm, S1 and S2 normal, no murmur, rub      Abdomen:     Soft, non-tender, bowel sounds active all four quadrants,   Peg tube in place without erythema or discharge    Extremities:   R AKA, chronic skin changes on LLE   Pulses:  Decreased peripheral pulses in LLE   Skin:   Desub ulcers not visualized today    Neurologic:   Alert, moving all four extremities       Laboratory:  Laboratory Data Reviewed: yes  Pertinent Findings:    Recent Labs  Lab 17  1416   WBC 8.86   HGB 10.6*   HCT 34.2*   *   MCV 90   RDW 15.1*      K 4.3      CO2 30*   BUN 21   CREATININE 0.9   *   PROT 7.9   ALBUMIN 2.4*   BILITOT 0.3   AST 22   ALKPHOS 120   ALT 37 "         Microbiology Data Reviewed: yes  Pertinent Findings:  Blood culture 4/7: NGTD    Current Medications:     Infusions:   sodium chloride 0.9% 70 mL/hr at 04/07/17 2301        Scheduled:   amiodarone  200 mg Oral Daily    amlodipine  10 mg Oral Daily    ascorbic acid (vitamin C)  500 mg Oral BID    aspirin  81 mg Oral Daily    atorvastatin  40 mg Oral Daily    carvedilol  25 mg Oral BID    cefTRIAXone (ROCEPHIN) IVPB  1 g Intravenous Q24H    furosemide  40 mg Oral Daily    metoclopramide HCl  5 mg Oral TID AC    multivitamin  1 tablet Oral Daily    potassium, sodium phosphates  1 packet Oral BID    ramelteon  8 mg Oral QHS    rivaroxaban  10 mg Oral BID    sodium chloride 0.9%  3 mL Intravenous Q8H    tamsulosin  0.4 mg Oral QHS    zinc sulfate  220 mg Oral Daily        PRN:  albuterol-ipratropium 2.5mg-0.5mg/3mL, dextrose 50%, dextrose 50%, docusate, glucagon (human recombinant), glucose, glucose, ibuprofen, flu vacc [id number] 65yr up(PF), insulin aspart, ondansetron, oxycodone-acetaminophen, pneumoc 13-clementina conj-dip cr(PF)    Antibiotics and Day Number of Therapy:  none    Lines and Day Number of Therapy:  PIV    Assessment:     Asad Perez is a 77 y.o.male with  Patient Active Problem List    Diagnosis Date Noted    Decubitus ulcer of buttock, stage 2 04/07/2017    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    Urinary tract infection associated with indwelling urethral catheter 12/01/2016    Centrilobular emphysema 11/29/2016    Symptomatic anemia 11/27/2016    Deep vein thrombosis (DVT) of brachial vein     Non-healing ulcer of lower leg 10/28/2016    Fever     Sacral decubitus ulcer 10/05/2016    Deep vein thrombosis (DVT) of upper extremity 10/05/2016    Dehiscence of perineal wound 10/05/2016    Hyponatremia 10/05/2016    VRE (vancomycin-resistant Enterococci) infection 09/20/2016    Elevated liver enzymes     Chronic diastolic congestive heart failure      Critical lower limb ischemia 09/10/2016    Stenosis of left internal carotid artery 09/08/2016    Abnormal finding on imaging 08/31/2016    Abnormal CT of the abdomen     Chronic kidney disease, stage V 08/12/2016    Anemia 08/12/2016    Anemia of chronic disease 08/08/2016    Altered mental status 08/07/2016    MRSA (methicillin resistant Staphylococcus aureus) infection 08/07/2016    Confusion     Weak     Pneumonia 07/29/2016    History of atrial fibrillation     Infected prosthetic knee joint 07/12/2016    Right knee pain 06/15/2016    GERD (gastroesophageal reflux disease) 02/28/2015    Esophageal web 02/28/2015    Acute on chronic diastolic heart failure 11/08/2014    Transaminitis 11/08/2014    Chronic obstructive pulmonary disease 11/07/2014    Dyspnea 11/06/2014    Normocytic anemia 11/06/2014    Dysphagia 11/03/2014    Osteoarthritis of left knee 07/14/2014    Essential hypertension 07/16/2013    CKD (chronic kidney disease) 07/16/2013    PVD (peripheral vascular disease) 07/16/2013    CAD (coronary artery disease) 07/16/2013    History of stroke 07/16/2013    Physical deconditioning 07/16/2013    Alcohol abuse 07/16/2013        Plan:     Chronic decubitus ulcers  - patient afebrile without leukocytosis  - no new signs of  acute active infection  - primary management per Dr. Irizarry      HTN  - patient with stable BP on admission  - continue amlodipine, cored, lasix  - stable, will monitor      HFpEF  - last ECHO 11/16: diastolic dysfucntion, EF 65  - continue home coreg, lasix, asa  - no acute issues      CAD  - denies chest pain, shortness of breath  - continue ASA, lipitor      PVD  - decreased peripheral pulses  - continue ASA, lipitor      H/o CVA  - continue ASA, lipitor      H/o Afib  - continue amiodarone, xarelto      DM II uncontrolled  - last a1c 7.9 11/16  - no home medication listed  - will cover with [employer]SI      COPD  - no acute issues  - continue dounebs PRN       BPH  - gonzalez in place  - continue flomax      AOCD  - h/h on admission: 10.6/34.2  - will continue to monitor      Deconditioning   - patient with home health  - limited mobiltiy with several recent hospitalizations         Thank you for allowing us to participate in the care of this patient. Please contact me at [phone] if you have any questions regarding this consult.     Kiya Valles  [facility] Internal Medicine HO-1        [facility] Medicine Hospitalist Pager Numbers:   [facility] Hospitalist Medicine Team A (Chay/Shawanda): [phone]  [facility] Hospitalist Medicine Team B (Gloria/Alex): [phone]

## 2017-04-08 NOTE — PLAN OF CARE
Pt sent to ER by Dr. Torres for worsening of decubitus ulcers to right hip and sacrum, concerned about infection       04/08/17 1817   Discharge Assessment   Assessment Type Discharge Planning Assessment   Confirmed/corrected address and phone number on facesheet? Yes   Assessment information obtained from? Patient   Expected Length of Stay (days) 3   Communicated expected length of stay with patient/caregiver yes   Prior to hospitilization cognitive status: Alert/Oriented   Prior to hospitalization functional status: Independent;Assistive Equipment   Current cognitive status: Alert/Oriented   Current Functional Status: Assistive Equipment;Needs Assistance   Arrived From home health  (pt does not recall name of HH agency)   Lives With spouse;sibling(s)  (wife, 2 brothers)   Able to Return to Prior Arrangements yes   Is patient able to care for self after discharge? Unable to determine at this time (comments)   How many people do you have in your home that can help with your care after discharge? 2   Who are your caregiver(s) and their phone number(s)? Wife  Kassy Perez  [phone]   Patient's perception of discharge disposition home health   Readmission Within The Last 30 Days no previous admission in last 30 days   Patient currently being followed by outpatient case management? No   Patient currently receives home health services? Yes   Patient previously received home health services and would like to resume services if necessary? Yes   If yes, name of home health provider: pt does not recall name of  agency, they come to home 3x/wk   Does the patient currently use HME? Yes   Patient currently receives private duty nursing? No   Patient currently receives any other outside agency services? No   Equipment Currently Used at Home walker, rolling;wheelchair;bedside commode;shower chair   Do you have any problems affording any of your prescribed medications? No   Is the patient taking medications as prescribed?  yes   Do you have any financial concerns preventing you from receiving the healthcare you need? No   Does the patient have transportation to healthcare appointments? Yes   Transportation Available family or friend will provide   On Dialysis? No   Does the patient receive services at the [facility]? No   Are there any open cases? No   Discharge Plan A Home Health   Discharge Plan B Home with family   Patient/Family In Agreement With Plan yes     Lluvia Segura, RN Transitional Navigator  [phone]     04/08/17 1817   Discharge Assessment   Assessment Type Discharge Planning Assessment   Confirmed/corrected address and phone number on facesheet? Yes   Assessment information obtained from? Patient   Expected Length of Stay (days) 3   Communicated expected length of stay with patient/caregiver yes   Prior to hospitilization cognitive status: Alert/Oriented   Prior to hospitalization functional status: Independent;Assistive Equipment   Current cognitive status: Alert/Oriented   Current Functional Status: Assistive Equipment;Needs Assistance   Arrived From home health  (pt does not recall name of HH agency)   Lives With spouse;sibling(s)  (wife, 2 brothers)   Able to Return to Prior Arrangements yes   Is patient able to care for self after discharge? Unable to determine at this time (comments)   How many people do you have in your home that can help with your care after discharge? 2   Who are your caregiver(s) and their phone number(s)? Wife  Kassy Perez  [phone]   Patient's perception of discharge disposition home health   Readmission Within The Last 30 Days no previous admission in last 30 days   Patient currently being followed by outpatient case management? No   Patient currently receives home health services? Yes   Patient previously received home health services and would like to resume services if necessary? Yes   If yes, name of home health provider: pt does not recall name of HH agency, they come to  home 3x/wk   Does the patient currently use HME? Yes   Patient currently receives private duty nursing? No   Patient currently receives any other outside agency services? No   Equipment Currently Used at Home walker, rolling;wheelchair;bedside commode;shower chair   Do you have any problems affording any of your prescribed medications? No   Is the patient taking medications as prescribed? yes   Do you have any financial concerns preventing you from receiving the healthcare you need? No   Does the patient have transportation to healthcare appointments? Yes   Transportation Available family or friend will provide   On Dialysis? No   Does the patient receive services at the [facility]madin Clinic? No   Are there any open cases? No   Discharge Plan A Home Health   Discharge Plan B Home with family   Patient/Family In Agreement With Plan yes     Lluvia Segura RN Transitional Navigator  [phone]

## 2017-04-08 NOTE — PROGRESS NOTES
"BP (!) 163/67 (BP Location: Left arm, Patient Position: Lying, BP Method: Automatic)  Pulse (!) 59  Temp 97.7 °F (36.5 °C) (Oral)   Resp 16  Ht 5' 8" (1.727 m)  Wt 59 kg (130 lb)  SpO2 95%  BMI 19.77 kg/m2  I/O last 3 completed shifts:  In: 548.8 [P.O.:60; I.V.:488.8]  Out: 500 [Urine:500]     Npo for surgery this am., consent signed and area marked.  "

## 2017-04-08 NOTE — PLAN OF CARE
Problem: Patient Care Overview  Goal: Plan of Care Review  Outcome: Ongoing (interventions implemented as appropriate)  Pt on RA with sats of 98. Will continue to monitor.

## 2017-04-08 NOTE — ANESTHESIA POSTPROCEDURE EVALUATION
"Anesthesia Post Evaluation    Patient: Asad Perez    Procedure(s) Performed: Procedure(s) (LRB):  DEBRIDEMENT-WOUND (Right)    Final Anesthesia Type: MAC  Patient location during evaluation: PACU  Patient participation: Yes- Able to Participate  Level of consciousness: awake and alert  Post-procedure vital signs: reviewed and stable  Pain management: adequate  Airway patency: patent  PONV status at discharge: No PONV  Anesthetic complications: no      Cardiovascular status: hemodynamically stable  Respiratory status: unassisted  Hydration status: euvolemic  Follow-up not needed.        Visit Vitals    BP (!) 166/70 (BP Location: Left arm, Patient Position: Lying, BP Method: Automatic)    Pulse (!) 58    Temp 36.3 °C (97.3 °F) (Oral)    Resp 16    Ht 5' 8" (1.727 m)    Wt 59 kg (130 lb)    SpO2 96%    BMI 19.77 kg/m2       Pain/Mello Score: Pain Assessment Performed: Yes (4/8/2017  5:00 PM)  Presence of Pain: complains of pain/discomfort (4/8/2017  3:00 PM)  Pain Rating Prior to Med Admin: 8 (4/8/2017  4:23 PM)  Pain Rating Post Med Admin: 4 (4/8/2017  4:23 PM)  Mello Score: 10 (4/8/2017 12:23 PM)      "

## 2017-04-08 NOTE — OP NOTE
Operative Note       Surgery Date: 4/8/2017     Surgeon(s) and Role:     * Michael Irizarry MD - Primary    Pre-op Diagnosis:  Essential hypertension [I10]  Osteomyelitis hip [M86.9]  CKD (chronic kidney disease), stage 3 (moderate) [N18.3]  Decubitus ulcer of right hip, unspecified ulcer stage [L89.219]    Post-op Diagnosis: Post-Op Diagnosis Codes:     * Essential hypertension [I10]     * Osteomyelitis hip [M86.9]     * CKD (chronic kidney disease), stage 3 (moderate) [N18.3]     * Decubitus ulcer of right hip, unspecified ulcer stage [L89.219]    Procedure(s) (LRB):  DEBRIDEMENT-WOUND (Right)  Hip muscle and fascia  Anesthesia: Local MAC    Procedure in Detail/Findings:  dictated    Estimated Blood Loss: 30 cc           Specimens     Start     Ordered    04/08/17 1136  Specimen to Pathology - Surgery  Once     Infected tissue 04/08/17 1139        Implants: * No implants in log *           Disposition: PACU - hemodynamically stable.           Condition: Good    Attestation:  I performed the procedure.           Discharge Note    Admit Date: 4/7/2017    Attending Physician: Michael Irizarry MD     Discharge Physician: Michael Irizarry MD    Final Diagnosis: Post-Op Diagnosis Codes:     * Essential hypertension [I10]     * Osteomyelitis hip [M86.9]     * CKD (chronic kidney disease), stage 3 (moderate) [N18.3]     * Decubitus ulcer of right hip, unspecified ulcer stage [L89.219]    Disposition: Still a Patient    Patient Instructions:   Current Discharge Medication List      CONTINUE these medications which have NOT CHANGED    Details   albuterol-ipratropium 2.5mg-0.5mg/3mL (DUO-NEB) 0.5 mg-3 mg(2.5 mg base)/3 mL nebulizer solution Take 3 mLs by nebulization every 6 (six) hours while awake.  Qty: 270 mL, Refills: 11      amiodarone (PACERONE) 200 MG Tab Take 1 tablet (200 mg total) by mouth once daily.  Qty: 9 tablet, Refills: 2      amlodipine (NORVASC) 10 MG tablet Take 1 tablet (10 mg total) by mouth  once daily.  Qty: 30 tablet, Refills: 11      arginine-glutamine-calcium HMB (GENIE) 7-7-1.5 gram PwPk Take 1 packet by mouth 2 (two) times daily.      ascorbic acid, vitamin C, (VITAMIN C) 500 MG tablet Take 500 mg by mouth 2 (two) times daily.      aspirin (ECOTRIN) 81 MG EC tablet Take 1 tablet (81 mg total) by mouth once daily.  Qty: 30 tablet, Refills: 5      atorvastatin (LIPITOR) 40 MG tablet Take 1 tablet (40 mg total) by mouth once daily.  Qty: 90 tablet, Refills: 3      carvedilol (COREG) 25 MG tablet Take 1 tablet (25 mg total) by mouth 2 (two) times daily.  Qty: 60 tablet, Refills: 11      docusate sodium (COLACE) 250 MG capsule Take 250 mg by mouth 2 (two) times daily as needed for Constipation.      furosemide (LASIX) 20 MG tablet Take 2 tablets (40 mg total) by mouth once daily.  Qty: 30 tablet, Refills: 11      hydrocodone-acetaminophen 10-325mg (NORCO)  mg Tab Take 1 tablet by mouth every 6 (six) hours as needed for Pain.      ibuprofen (ADVIL,MOTRIN) 400 MG tablet Take 400 mg by mouth every 6 (six) hours as needed for Other.      lactulose (CHRONULAC) 10 gram/15 mL solution Take 10 g by mouth 2 (two) times daily.      linaclotide 290 mcg Cap Take by mouth once daily.      metoclopramide HCl (REGLAN) 5 MG tablet Take 5 mg by mouth 3 (three) times daily before meals.      multivitamin (THERAGRAN) per tablet Take 1 tablet by mouth once daily.      oxycodone-acetaminophen (PERCOCET) 5-325 mg per tablet Take 1 tablet by mouth every 4 (four) hours as needed for Pain.  Qty: 15 tablet, Refills: 0      potassium, sodium phosphates (PHOS-NAK) 280-160-250 mg PwPk Take 1 packet by mouth 2 (two) times daily.      ramelteon (ROZEREM) 8 mg tablet Take 8 mg by mouth every evening.      !! rivaroxaban (XARELTO) 10 mg Tab Take 10 mg by mouth 2 (two) times daily.      !! rivaroxaban (XARELTO) 10 mg Tab Take 1 tablet (10 mg total) by mouth 2 (two) times daily.  Qty: 60 tablet, Refills: 5      tamsulosin (FLOMAX)  0.4 mg Cp24 Take 0.4 mg by mouth every evening.      tramadol (ULTRAM) 50 mg tablet Take 50 mg by mouth every 8 (eight) hours as needed for Pain.      zinc sulfate (ZINCATE) 220 (50) mg capsule Take 220 mg by mouth once daily.        !! - Potential duplicate medications found. Please discuss with provider.          Discharge Procedure Orders (must include Diet, Follow-up, Activity)  No discharge procedures on file.     Discharge Date: No discharge date for patient encounter.

## 2017-04-08 NOTE — NURSING
Pt has been AAO x3 since return from PACU. Eating and drinking without any problems.Does c/o pain at debridement site. Wound showing small amt of blood showing throughdressing secondary to packing of wound.

## 2017-04-08 NOTE — TRANSFER OF CARE
"Anesthesia Transfer of Care Note    Patient: Asad Perez    Procedure(s) Performed: Procedure(s) (LRB):  DEBRIDEMENT-WOUND (Right)    Patient location: PACU    Anesthesia Type: MAC    Transport from OR: Transported from OR on room air with adequate spontaneous ventilation    Post pain: adequate analgesia    Post assessment: no apparent anesthetic complications    Post vital signs: stable    Level of consciousness: awake, alert and oriented    Nausea/Vomiting: no nausea/vomiting    Complications: none          Last vitals:   Visit Vitals    BP (!) 113/56    Pulse (!) 55    Temp 36.4 °C (97.6 °F) (Oral)    Resp (!) 23    Ht 5' 8" (1.727 m)    Wt 59 kg (130 lb)    SpO2 98%    BMI 19.77 kg/m2     "

## 2017-04-08 NOTE — OP NOTE
DATE OF PROCEDURE:  04/08/2017    PREOPERATIVE DIAGNOSIS:  Infected right hip wound.    POSTOPERATIVE DIAGNOSIS:  Infected right hip wound.    PROCEDURES:  Excision and debridement of right hip wound including muscles and   fascia up to the bone.    SURGEON:  Michael Irizarry M.D.    ANESTHESIA:  Xylocaine 1% with IV sedation.    PROCEDURE IN DETAIL:  After satisfactory IV sedation, the patient in semiprone   position.  Area of the right hip was prepped and draped in normal sterile manner   using Betadine scrub solution.  The area of the infected wound, right hip was   infiltrated using 1% Xylocaine solution.  Incision was made in the same area,   extending the incision both superiorly and inferiorly about 7-10 cm long, was   taken down to deep subcutaneous tissue.  Subcutaneous bleeders clamped and   bovied, further taken down.  The patient was found to have infected foul   smelling deep muscle and fascia.  Excision and debridement of infected tissue   was performed up to freshly bleeding tissue.  Some of the specimen was sent for   culture and pathology.  Hemostasis satisfactorily maintained.  Wound was   thoroughly irrigated with antibiotic solution.  It was cauterized using   electrocautery.  Hemostasis was satisfactorily maintained.  Wound was then   openly packed using iodoform packing.  A 4 x 4, ABD pad dressing was applied.    The instrument count, sponge count, and needle count was correct.  The patient   tolerated procedure well.  Estimated blood loss was 30 mL.  Specimen removed was   infected tissue.      DEDRA  dd: 04/08/2017 11:56:49 (CDT)  td: 04/08/2017 12:55:01 (CDT)  Doc ID   #[id number]  Job ID #[id number]    CC:

## 2017-04-09 LAB
ALBUMIN SERPL BCP-MCNC: 2 G/DL
ALP SERPL-CCNC: 86 U/L
ALT SERPL W/O P-5'-P-CCNC: 24 U/L
ANION GAP SERPL CALC-SCNC: 6 MMOL/L
AST SERPL-CCNC: 18 U/L
BASOPHILS # BLD AUTO: 0.05 K/UL
BASOPHILS NFR BLD: 0.5 %
BILIRUB SERPL-MCNC: 0.2 MG/DL
BUN SERPL-MCNC: 19 MG/DL
CALCIUM SERPL-MCNC: 8.6 MG/DL
CHLORIDE SERPL-SCNC: 103 MMOL/L
CO2 SERPL-SCNC: 25 MMOL/L
CREAT SERPL-MCNC: 0.9 MG/DL
DIFFERENTIAL METHOD: ABNORMAL
EOSINOPHIL # BLD AUTO: 0.4 K/UL
EOSINOPHIL NFR BLD: 4.1 %
ERYTHROCYTE [DISTWIDTH] IN BLOOD BY AUTOMATED COUNT: 15 %
EST. GFR  (AFRICAN AMERICAN): >60 ML/MIN/1.73 M^2
EST. GFR  (NON AFRICAN AMERICAN): >60 ML/MIN/1.73 M^2
GLUCOSE SERPL-MCNC: 101 MG/DL
GRAM STN SPEC: NORMAL
HCT VFR BLD AUTO: 28.3 %
HGB BLD-MCNC: 9 G/DL
LYMPHOCYTES # BLD AUTO: 2.3 K/UL
LYMPHOCYTES NFR BLD: 23.1 %
MCH RBC QN AUTO: 27.8 PG
MCHC RBC AUTO-ENTMCNC: 31.8 %
MCV RBC AUTO: 87 FL
MONOCYTES # BLD AUTO: 0.8 K/UL
MONOCYTES NFR BLD: 8.6 %
NEUTROPHILS # BLD AUTO: 6.2 K/UL
NEUTROPHILS NFR BLD: 62.8 %
PLATELET # BLD AUTO: 340 K/UL
PMV BLD AUTO: 8 FL
POCT GLUCOSE: 119 MG/DL (ref 70–110)
POCT GLUCOSE: 121 MG/DL (ref 70–110)
POCT GLUCOSE: 127 MG/DL (ref 70–110)
POCT GLUCOSE: 134 MG/DL (ref 70–110)
POTASSIUM SERPL-SCNC: 4.5 MMOL/L
PROT SERPL-MCNC: 6.5 G/DL
RBC # BLD AUTO: 3.24 M/UL
SODIUM SERPL-SCNC: 134 MMOL/L
WBC # BLD AUTO: 9.8 K/UL

## 2017-04-09 PROCEDURE — 25000003 PHARM REV CODE 250: Performed by: STUDENT IN AN ORGANIZED HEALTH CARE EDUCATION/TRAINING PROGRAM

## 2017-04-09 PROCEDURE — 80053 COMPREHEN METABOLIC PANEL: CPT

## 2017-04-09 PROCEDURE — 63600175 PHARM REV CODE 636 W HCPCS: Performed by: EMERGENCY MEDICINE

## 2017-04-09 PROCEDURE — 36415 COLL VENOUS BLD VENIPUNCTURE: CPT

## 2017-04-09 PROCEDURE — 11000001 HC ACUTE MED/SURG PRIVATE ROOM

## 2017-04-09 PROCEDURE — 25000003 PHARM REV CODE 250: Performed by: EMERGENCY MEDICINE

## 2017-04-09 PROCEDURE — 85025 COMPLETE CBC W/AUTO DIFF WBC: CPT

## 2017-04-09 PROCEDURE — 94761 N-INVAS EAR/PLS OXIMETRY MLT: CPT

## 2017-04-09 RX ADMIN — Medication 1 TABLET: at 09:04

## 2017-04-09 RX ADMIN — Medication 220 MG: at 09:04

## 2017-04-09 RX ADMIN — OXYCODONE HYDROCHLORIDE AND ACETAMINOPHEN 500 MG: 500 TABLET ORAL at 09:04

## 2017-04-09 RX ADMIN — TAMSULOSIN HYDROCHLORIDE 0.4 MG: 0.4 CAPSULE ORAL at 08:04

## 2017-04-09 RX ADMIN — OXYCODONE AND ACETAMINOPHEN 1 TABLET: 5; 325 TABLET ORAL at 09:04

## 2017-04-09 RX ADMIN — SODIUM CHLORIDE, PRESERVATIVE FREE 3 ML: 5 INJECTION INTRAVENOUS at 03:04

## 2017-04-09 RX ADMIN — OXYCODONE AND ACETAMINOPHEN 1 TABLET: 5; 325 TABLET ORAL at 05:04

## 2017-04-09 RX ADMIN — METOCLOPRAMIDE HYDROCHLORIDE 5 MG: 5 TABLET ORAL at 05:04

## 2017-04-09 RX ADMIN — SODIUM CHLORIDE, PRESERVATIVE FREE 3 ML: 5 INJECTION INTRAVENOUS at 05:04

## 2017-04-09 RX ADMIN — POTASSIUM & SODIUM PHOSPHATES POWDER PACK 280-160-250 MG 1 PACKET: 280-160-250 PACK at 08:04

## 2017-04-09 RX ADMIN — FUROSEMIDE 40 MG: 40 TABLET ORAL at 09:04

## 2017-04-09 RX ADMIN — CEFTRIAXONE 1 G: 1 INJECTION, SOLUTION INTRAVENOUS at 05:04

## 2017-04-09 RX ADMIN — POTASSIUM & SODIUM PHOSPHATES POWDER PACK 280-160-250 MG 1 PACKET: 280-160-250 PACK at 09:04

## 2017-04-09 RX ADMIN — RIVAROXABAN 10 MG: 10 TABLET, FILM COATED ORAL at 09:04

## 2017-04-09 RX ADMIN — CARVEDILOL 25 MG: 25 TABLET, FILM COATED ORAL at 09:04

## 2017-04-09 RX ADMIN — CARVEDILOL 25 MG: 25 TABLET, FILM COATED ORAL at 08:04

## 2017-04-09 RX ADMIN — AMLODIPINE BESYLATE 10 MG: 5 TABLET ORAL at 09:04

## 2017-04-09 RX ADMIN — METOCLOPRAMIDE HYDROCHLORIDE 5 MG: 5 TABLET ORAL at 11:04

## 2017-04-09 RX ADMIN — ASPIRIN 81 MG: 81 TABLET, COATED ORAL at 09:04

## 2017-04-09 RX ADMIN — RIVAROXABAN 10 MG: 10 TABLET, FILM COATED ORAL at 08:04

## 2017-04-09 RX ADMIN — ATORVASTATIN CALCIUM 40 MG: 40 TABLET, FILM COATED ORAL at 09:04

## 2017-04-09 RX ADMIN — OXYCODONE HYDROCHLORIDE AND ACETAMINOPHEN 500 MG: 500 TABLET ORAL at 08:04

## 2017-04-09 RX ADMIN — RAMELTEON 8 MG: 8 TABLET, FILM COATED ORAL at 08:04

## 2017-04-09 RX ADMIN — SODIUM CHLORIDE, PRESERVATIVE FREE 3 ML: 5 INJECTION INTRAVENOUS at 10:04

## 2017-04-09 RX ADMIN — METOCLOPRAMIDE HYDROCHLORIDE 5 MG: 5 TABLET ORAL at 06:04

## 2017-04-09 RX ADMIN — AMIODARONE HYDROCHLORIDE 200 MG: 200 TABLET ORAL at 09:04

## 2017-04-09 NOTE — PROGRESS NOTES
POD 1 after debridement.  Wound still packed.    Continue antibiotics.  Wound care nurse consult in am

## 2017-04-09 NOTE — PROGRESS NOTES
[facility] Internal Medicine Resident HO-I Consult Progress Note    Subjective:      Asad Perez is a 77 y.o.  male who is being followed by the [facility]   Consult Service service at [facility] for generalized medical management.    No complaints. Denies fevers and chills. S/p debridement with surgery yesterday. Denies pain.     Objective:   Last 24 Hour Vital Signs:  BP  Min: 113/56  Max: 172/73  Temp  Av.1 °F (36.2 °C)  Min: 96.1 °F (35.6 °C)  Max: 97.6 °F (36.4 °C)  Pulse  Av.3  Min: 55  Max: 59  Resp  Av.7  Min: 16  Max: 24  SpO2  Av.4 %  Min: 95 %  Max: 99 %  Weight  Av kg (130 lb)  Min: 59 kg (130 lb)  Max: 59 kg (130 lb)  I/O last 3 completed shifts:  In: 2318.8 [P.O.:480; I.V.:1788.8; IV Piggyback:50]  Out: 1850 [Urine:1850]    Physical Examination:  General: alert, cooperative, and no acute distress  Head: Normocephalic, without obvious abnormality, atraumatic  Eyes: conjunctivae/corneas clear; PERRL, EOM's intact  Nose: Nares normal; septum midline; mucosa normal; no drainage or sinus tenderness  Lung: clear to auscultation throughout; no increased work of breathing  Heart: regular rate and rhythm, no M/R/G, normal S1/S2.  Abdomen: soft, non-tender; bowel sounds normal; no masses, no organomegaly  Extremities: extremities normal, atraumatic, no cyanosis or edema  Pulses: 2+ and symmetric  Skin: Skin color, texture, turgor normal; decub ulcers not-visualized  Psych: normal affect; no SI/HI    Laboratory:  Laboratory Data Reviewed: yes  Recent Labs      17   1416  17   0928  17   0428   WBC  8.86  8.48  9.80   HGB  10.6*  9.6*  9.0*   HCT  34.2*  30.4*  28.3*   PLT  373*  326  340   MCV  90  88  87   RDW  15.1*  15.1*  15.0*   GRAN  67.2  6.0  54.3  4.6  62.8  6.2   LYMPH  20.9  1.9  31.3  2.7  23.1  2.3   MONO  8.7  0.8  8.5  0.7  8.6  0.8   EOS  0.2  0.4  0.4   BASO  0.04  0.05  0.05   EOSINOPHIL  1.9  4.4  4.1   BASOPHIL  0.5  0.6   0.5     Recent Labs      04/07/17   1416  04/08/17   0928  04/09/17   0428   NA  136  137  134*   K  4.3  4.6  4.5   CL  101  105  103   CO2  30*  27  25   BUN  21  17  19   CREATININE  0.9  0.8  0.9   GLU  125*  98  101     Recent Labs      04/07/17   1738  04/08/17   0454  04/08/17   1603  04/08/17   2216  04/09/17   0436   POCTGLUCOSE  150*  105  163*  105  121*     Recent Labs      04/07/17   1416  04/08/17   0928  04/09/17   0428   PROT  7.9  6.5  6.5   ALBUMIN  2.4*  2.0*  2.0*   BILITOT  0.3  0.2  0.2   AST  22  20  18   ALT  37  28  24   ALKPHOS  120  94  86       Microbiology Data Reviewed: yes  Pertinent Findings:  Blood Cultures 4/7: NGTD  Blood Cultures 4/8: Pending    Current Medications:     Infusions:        Scheduled:   amiodarone  200 mg Oral Daily    amlodipine  10 mg Oral Daily    ascorbic acid (vitamin C)  500 mg Oral BID    aspirin  81 mg Oral Daily    atorvastatin  40 mg Oral Daily    carvedilol  25 mg Oral BID    cefTRIAXone (ROCEPHIN) IVPB  1 g Intravenous Q24H    furosemide  40 mg Oral Daily    metoclopramide HCl  5 mg Oral TID AC    multivitamin  1 tablet Oral Daily    potassium, sodium phosphates  1 packet Oral BID    ramelteon  8 mg Oral QHS    rivaroxaban  10 mg Oral BID    sodium chloride 0.9%  3 mL Intravenous Q8H    tamsulosin  0.4 mg Oral QHS    zinc sulfate  220 mg Oral Daily        PRN:  albuterol-ipratropium 2.5mg-0.5mg/3mL, dextrose 50%, dextrose 50%, docusate, glucagon (human recombinant), glucose, glucose, ibuprofen, flu vacc [id number] 65yr up(PF), insulin aspart, ondansetron, oxycodone-acetaminophen, pneumoc 13-clementina conj-dip cr(PF)    Assessment:     Asad Perez is a 77 y.o.male with  Patient Active Problem List    Diagnosis Date Noted    Decubitus ulcer of buttock, stage 2 04/07/2017    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    Urinary tract infection associated with indwelling urethral catheter 12/01/2016    Centrilobular emphysema  11/29/2016    Symptomatic anemia 11/27/2016    Deep vein thrombosis (DVT) of brachial vein     Non-healing ulcer of lower leg 10/28/2016    Fever     Sacral decubitus ulcer 10/05/2016    Deep vein thrombosis (DVT) of upper extremity 10/05/2016    Dehiscence of perineal wound 10/05/2016    Hyponatremia 10/05/2016    VRE (vancomycin-resistant Enterococci) infection 09/20/2016    Elevated liver enzymes     Chronic diastolic congestive heart failure     Critical lower limb ischemia 09/10/2016    Stenosis of left internal carotid artery 09/08/2016    Abnormal finding on imaging 08/31/2016    Abnormal CT of the abdomen     Chronic kidney disease, stage V 08/12/2016    Anemia 08/12/2016    Anemia of chronic disease 08/08/2016    Altered mental status 08/07/2016    MRSA (methicillin resistant Staphylococcus aureus) infection 08/07/2016    Confusion     Weak     Pneumonia 07/29/2016    History of atrial fibrillation     Infected prosthetic knee joint 07/12/2016    Right knee pain 06/15/2016    GERD (gastroesophageal reflux disease) 02/28/2015    Esophageal web 02/28/2015    Acute on chronic diastolic heart failure 11/08/2014    Transaminitis 11/08/2014    Chronic obstructive pulmonary disease 11/07/2014    Dyspnea 11/06/2014    Normocytic anemia 11/06/2014    Dysphagia 11/03/2014    Osteoarthritis of left knee 07/14/2014    Essential hypertension 07/16/2013    CKD (chronic kidney disease) 07/16/2013    PVD (peripheral vascular disease) 07/16/2013    CAD (coronary artery disease) 07/16/2013    History of stroke 07/16/2013    Physical deconditioning 07/16/2013    Alcohol abuse 07/16/2013        Plan:     Chronic Decubitus Ulcers  - patient afebrile without leukocytosis  - no new signs of acute active infection  - primary management per Dr. Irizarry      Essential Hypertension  - patient with stable BP on admission  - continue home amlodipine, coreg, lasix  - will titrate medications as  needed      DM II Uncontrolled  - last a1c 7.9 11/16  no home medication listed   - will cover with SSI      AoCD  - H/H on admission 10.6/34.2 with no signs of active bleed  - will continue to monitor      Chronic Deconditioning  - patient with home health  - limited mobiltiy with several recent hospitalizations  - likely cause of slow progression of decubitus wounds  - will need conversation with family about ability to care for patient at home adequately given functional status limitations    COPD - stable  - no acute issues  - continue dounebs PRN for wheezing     BPH - stable  - gonzalez in place  - continue home flomax     HFpEF - stable  - last ECHO 11/16: diastolic dysfucntion, EF 65%  - continue home coreg, lasix, ASA  - no acute issues      CAD - stable  - denied chest pain, shortness of breath at admit  - continue ASA, lipitor      PVD - stable  - decreased peripheral pulses  - continue home ASA, lipitor      History of CVA - stable  - continue home ASA, lipitor      Atrial Fibrillation - stable  - continue home amiodarone, xarelto         Thank you for allowing us to participate in the care of this patient. Please contact me at [phone] if you have any questions regarding this consult.     Vidhi Butterfield MD  [facility] Internal Medicine HO-I        [facility] Medicine Hospitalist Pager Numbers:   [facility] Hospitalist Medicine Team A (Chay/Shawanda): [phone]  [facility] Hospitalist Medicine Team B (Gloria/Alex): [phone]

## 2017-04-09 NOTE — PLAN OF CARE
Problem: Patient Care Overview  Goal: Plan of Care Review  Outcome: Ongoing (interventions implemented as appropriate)  Pt on RA sats 96%.

## 2017-04-10 LAB
ALBUMIN SERPL BCP-MCNC: 2 G/DL
ALP SERPL-CCNC: 94 U/L
ALT SERPL W/O P-5'-P-CCNC: 18 U/L
ANION GAP SERPL CALC-SCNC: 5 MMOL/L
AST SERPL-CCNC: 17 U/L
BACTERIA BLD CULT: NORMAL
BASOPHILS # BLD AUTO: 0.03 K/UL
BASOPHILS NFR BLD: 0.3 %
BILIRUB SERPL-MCNC: 0.3 MG/DL
BUN SERPL-MCNC: 21 MG/DL
CALCIUM SERPL-MCNC: 8.4 MG/DL
CHLORIDE SERPL-SCNC: 100 MMOL/L
CO2 SERPL-SCNC: 27 MMOL/L
CREAT SERPL-MCNC: 0.9 MG/DL
DIFFERENTIAL METHOD: ABNORMAL
EOSINOPHIL # BLD AUTO: 0.3 K/UL
EOSINOPHIL NFR BLD: 2.7 %
ERYTHROCYTE [DISTWIDTH] IN BLOOD BY AUTOMATED COUNT: 15 %
EST. GFR  (AFRICAN AMERICAN): >60 ML/MIN/1.73 M^2
EST. GFR  (NON AFRICAN AMERICAN): >60 ML/MIN/1.73 M^2
GLUCOSE SERPL-MCNC: 99 MG/DL
HCT VFR BLD AUTO: 27.6 %
HGB BLD-MCNC: 8.8 G/DL
LYMPHOCYTES # BLD AUTO: 2.6 K/UL
LYMPHOCYTES NFR BLD: 25.5 %
MCH RBC QN AUTO: 27.7 PG
MCHC RBC AUTO-ENTMCNC: 31.9 %
MCV RBC AUTO: 87 FL
MONOCYTES # BLD AUTO: 0.9 K/UL
MONOCYTES NFR BLD: 8.9 %
NEUTROPHILS # BLD AUTO: 6.4 K/UL
NEUTROPHILS NFR BLD: 61.7 %
PLATELET # BLD AUTO: 328 K/UL
PMV BLD AUTO: 8.2 FL
POCT GLUCOSE: 115 MG/DL (ref 70–110)
POCT GLUCOSE: 128 MG/DL (ref 70–110)
POCT GLUCOSE: 128 MG/DL (ref 70–110)
POCT GLUCOSE: 161 MG/DL (ref 70–110)
POCT GLUCOSE: 163 MG/DL (ref 70–110)
POTASSIUM SERPL-SCNC: 4.1 MMOL/L
PROT SERPL-MCNC: 6.2 G/DL
RBC # BLD AUTO: 3.18 M/UL
SODIUM SERPL-SCNC: 132 MMOL/L
WBC # BLD AUTO: 10.29 K/UL

## 2017-04-10 PROCEDURE — 25000003 PHARM REV CODE 250: Performed by: SURGERY

## 2017-04-10 PROCEDURE — 25000003 PHARM REV CODE 250: Performed by: EMERGENCY MEDICINE

## 2017-04-10 PROCEDURE — 94761 N-INVAS EAR/PLS OXIMETRY MLT: CPT

## 2017-04-10 PROCEDURE — 63600175 PHARM REV CODE 636 W HCPCS: Performed by: STUDENT IN AN ORGANIZED HEALTH CARE EDUCATION/TRAINING PROGRAM

## 2017-04-10 PROCEDURE — 63600175 PHARM REV CODE 636 W HCPCS: Performed by: SURGERY

## 2017-04-10 PROCEDURE — 85025 COMPLETE CBC W/AUTO DIFF WBC: CPT

## 2017-04-10 PROCEDURE — 80053 COMPREHEN METABOLIC PANEL: CPT

## 2017-04-10 PROCEDURE — 11000001 HC ACUTE MED/SURG PRIVATE ROOM

## 2017-04-10 PROCEDURE — 36415 COLL VENOUS BLD VENIPUNCTURE: CPT

## 2017-04-10 PROCEDURE — 63600175 PHARM REV CODE 636 W HCPCS: Performed by: EMERGENCY MEDICINE

## 2017-04-10 PROCEDURE — 25000003 PHARM REV CODE 250: Performed by: STUDENT IN AN ORGANIZED HEALTH CARE EDUCATION/TRAINING PROGRAM

## 2017-04-10 RX ADMIN — METOCLOPRAMIDE HYDROCHLORIDE 5 MG: 5 TABLET ORAL at 05:04

## 2017-04-10 RX ADMIN — OXYCODONE AND ACETAMINOPHEN 1 TABLET: 5; 325 TABLET ORAL at 09:04

## 2017-04-10 RX ADMIN — RAMELTEON 8 MG: 8 TABLET, FILM COATED ORAL at 09:04

## 2017-04-10 RX ADMIN — CARVEDILOL 25 MG: 25 TABLET, FILM COATED ORAL at 09:04

## 2017-04-10 RX ADMIN — OXYCODONE AND ACETAMINOPHEN 1 TABLET: 5; 325 TABLET ORAL at 03:04

## 2017-04-10 RX ADMIN — CEFTRIAXONE 1 G: 1 INJECTION, SOLUTION INTRAVENOUS at 04:04

## 2017-04-10 RX ADMIN — POTASSIUM & SODIUM PHOSPHATES POWDER PACK 280-160-250 MG 1 PACKET: 280-160-250 PACK at 09:04

## 2017-04-10 RX ADMIN — RIVAROXABAN 10 MG: 10 TABLET, FILM COATED ORAL at 09:04

## 2017-04-10 RX ADMIN — AMIODARONE HYDROCHLORIDE 200 MG: 200 TABLET ORAL at 09:04

## 2017-04-10 RX ADMIN — Medication 220 MG: at 09:04

## 2017-04-10 RX ADMIN — PIPERACILLIN SODIUM AND TAZOBACTAM SODIUM 4.5 G: 4; .5 INJECTION, POWDER, FOR SOLUTION INTRAVENOUS at 09:04

## 2017-04-10 RX ADMIN — TAMSULOSIN HYDROCHLORIDE 0.4 MG: 0.4 CAPSULE ORAL at 09:04

## 2017-04-10 RX ADMIN — SODIUM CHLORIDE, PRESERVATIVE FREE 3 ML: 5 INJECTION INTRAVENOUS at 03:04

## 2017-04-10 RX ADMIN — OXYCODONE HYDROCHLORIDE AND ACETAMINOPHEN 500 MG: 500 TABLET ORAL at 09:04

## 2017-04-10 RX ADMIN — VANCOMYCIN HYDROCHLORIDE 1000 MG: 1 INJECTION, POWDER, LYOPHILIZED, FOR SOLUTION INTRAVENOUS at 06:04

## 2017-04-10 RX ADMIN — ATORVASTATIN CALCIUM 40 MG: 40 TABLET, FILM COATED ORAL at 09:04

## 2017-04-10 RX ADMIN — METOCLOPRAMIDE HYDROCHLORIDE 5 MG: 5 TABLET ORAL at 10:04

## 2017-04-10 RX ADMIN — SODIUM CHLORIDE, PRESERVATIVE FREE 3 ML: 5 INJECTION INTRAVENOUS at 09:04

## 2017-04-10 RX ADMIN — OXYCODONE AND ACETAMINOPHEN 1 TABLET: 5; 325 TABLET ORAL at 06:04

## 2017-04-10 RX ADMIN — Medication 1 TABLET: at 09:04

## 2017-04-10 RX ADMIN — SODIUM CHLORIDE, PRESERVATIVE FREE 3 ML: 5 INJECTION INTRAVENOUS at 06:04

## 2017-04-10 RX ADMIN — FUROSEMIDE 40 MG: 40 TABLET ORAL at 09:04

## 2017-04-10 RX ADMIN — ASPIRIN 81 MG: 81 TABLET, COATED ORAL at 09:04

## 2017-04-10 NOTE — PLAN OF CARE
Problem: Patient Care Overview  Goal: Plan of Care Review  Outcome: Ongoing (interventions implemented as appropriate)  Patient in NAD. Patient complains of moderate pain to right hip relieved with PRN pain medications. Dressing to right hip c/d/i. AAOx3. Bed alarm on. Fall precautions maintained. Bed in locked low position side rails up x2. Call light within reach. Instructed patient to call for assistance. Patient condition stable. Will continue to monitor patient.

## 2017-04-10 NOTE — PROGRESS NOTES
.Pharmacy New Medication Education    Patient accepted medication education.    Pharmacy educated patient on the following medications, using the teach-back method.   Amiodarone  Duoneb  Norvasc  Asa  Lipitor  Coreg  Rocephin  Colace  Lasix  Ibuprofen  Influenza  Novolog  Reglan  Mvi  Zofran  Percocet  Ramelteon  Xarelto  Flomax  Zinc    Learners of pharmacy medication education included:  patient    Patient +/- learner response:  verbalize understanding

## 2017-04-10 NOTE — PROGRESS NOTES
U Internal Medicine Resident HO-I Consult Progress Note    Subjective:      Asad Perez is a 77 y.o.  male who is being followed by the [facility]   Consult Service service at [facility] for generalized medical management.    Patient doing well this morning. No chest pain, shortness of breath. States he was in pain, but improved with medication.      Objective:   Last 24 Hour Vital Signs:  BP  Min: 100/60  Max: 127/62  Temp  Av °F (36.7 °C)  Min: 97.2 °F (36.2 °C)  Max: 98.5 °F (36.9 °C)  Pulse  Av.2  Min: 50  Max: 67  Resp  Av  Min: 14  Max: 18  SpO2  Av.2 %  Min: 92 %  Max: 94 %  Weight  Av kg (130 lb)  Min: 59 kg (130 lb)  Max: 59 kg (130 lb)  I/O last 3 completed shifts:  In: 460 [P.O.:360; I.V.:50; IV Piggyback:50]  Out:  [Urine:; Stool:1]    Physical Examination:  General: alert, cooperative, and no acute distress  Head: Normocephalic, without obvious abnormality, atraumatic  Eyes: conjunctivae/corneas clear; PERRL, EOM's intact grossly   Nose: Nares normal; septum midline; mucosa normal;   Lung: clear to auscultation throughout; no increased work of breathing  Heart: regular rate and rhythm, no M/R/G, normal S1/S2.  Abdomen: soft, non-tender; bowel sounds normal;peg tube in place   Extremities: extremities normal, atraumatic, no cyanosis or edema  Skin: Skin color, texture, turgor normal; decub ulcers not-visualized  Psych: normal affect; no SI/HI    Laboratory:  Laboratory Data Reviewed: yes  Recent Labs      17   1416  17   0928  17   0428  04/10/17   0542   WBC  8.86  8.48  9.80  10.29   HGB  10.6*  9.6*  9.0*  8.8*   HCT  34.2*  30.4*  28.3*  27.6*   PLT  373*  326  340  328   MCV  90  88  87  87   RDW  15.1*  15.1*  15.0*  15.0*   GRAN  67.2  6.0  54.3  4.6  62.8  6.2  61.7  6.4   LYMPH  20.9  1.9  31.3  2.7  23.1  2.3  25.5  2.6   MONO  8.7  0.8  8.5  0.7  8.6  0.8  8.9  0.9   EOS  0.2  0.4  0.4  0.3   BASO  0.04   0.05  0.05  0.03   EOSINOPHIL  1.9  4.4  4.1  2.7   BASOPHIL  0.5  0.6  0.5  0.3     Recent Labs      04/07/17   1416  04/08/17 0928  04/09/17 0428  04/10/17   0542   NA  136  137  134*  132*   K  4.3  4.6  4.5  4.1   CL  101  105  103  100   CO2  30*  27  25  27   BUN  21  17  19  21   CREATININE  0.9  0.8  0.9  0.9   GLU  125*  98  101  99     Recent Labs      04/09/17   0436  04/09/17   1314  04/09/17   1532  04/09/17   2008  04/10/17   0422   POCTGLUCOSE  121*  119*  134*  127*  115*     Recent Labs      04/07/17 1416 04/08/17 0928  04/09/17 0428  04/10/17   0542   PROT  7.9  6.5  6.5  6.2   ALBUMIN  2.4*  2.0*  2.0*  2.0*   BILITOT  0.3  0.2  0.2  0.3   AST  22  20  18  17   ALT  37  28  24  18   ALKPHOS  120  94  86  94       Microbiology Data Reviewed: yes  Pertinent Findings:  Blood Cultures 4/7: NGTD  Blood Cultures 4/8: Pending    Current Medications:     Infusions:        Scheduled:   amiodarone  200 mg Oral Daily    amlodipine  10 mg Oral Daily    ascorbic acid (vitamin C)  500 mg Oral BID    aspirin  81 mg Oral Daily    atorvastatin  40 mg Oral Daily    carvedilol  25 mg Oral BID    cefTRIAXone (ROCEPHIN) IVPB  1 g Intravenous Q24H    furosemide  40 mg Oral Daily    metoclopramide HCl  5 mg Oral TID AC    multivitamin  1 tablet Oral Daily    potassium, sodium phosphates  1 packet Oral BID    ramelteon  8 mg Oral QHS    rivaroxaban  10 mg Oral BID    sodium chloride 0.9%  3 mL Intravenous Q8H    tamsulosin  0.4 mg Oral QHS    zinc sulfate  220 mg Oral Daily        PRN:  albuterol-ipratropium 2.5mg-0.5mg/3mL, dextrose 50%, dextrose 50%, docusate, glucagon (human recombinant), glucose, glucose, ibuprofen, flu vacc [id number] 65yr up(PF), insulin aspart, ondansetron, oxycodone-acetaminophen, pneumoc 13-clementina conj-dip cr(PF)    Assessment:     Asad Perez is a 77 y.o.male with  Patient Active Problem List    Diagnosis Date Noted    Decubitus ulcer of buttock, stage 2  04/07/2017    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    Urinary tract infection associated with indwelling urethral catheter 12/01/2016    Centrilobular emphysema 11/29/2016    Symptomatic anemia 11/27/2016    Deep vein thrombosis (DVT) of brachial vein     Non-healing ulcer of lower leg 10/28/2016    Fever     Sacral decubitus ulcer 10/05/2016    Deep vein thrombosis (DVT) of upper extremity 10/05/2016    Dehiscence of perineal wound 10/05/2016    Hyponatremia 10/05/2016    VRE (vancomycin-resistant Enterococci) infection 09/20/2016    Elevated liver enzymes     Chronic diastolic congestive heart failure     Critical lower limb ischemia 09/10/2016    Stenosis of left internal carotid artery 09/08/2016    Abnormal finding on imaging 08/31/2016    Abnormal CT of the abdomen     Chronic kidney disease, stage V 08/12/2016    Anemia 08/12/2016    Anemia of chronic disease 08/08/2016    Altered mental status 08/07/2016    MRSA (methicillin resistant Staphylococcus aureus) infection 08/07/2016    Confusion     Weak     Pneumonia 07/29/2016    History of atrial fibrillation     Infected prosthetic knee joint 07/12/2016    Right knee pain 06/15/2016    GERD (gastroesophageal reflux disease) 02/28/2015    Esophageal web 02/28/2015    Acute on chronic diastolic heart failure 11/08/2014    Transaminitis 11/08/2014    Chronic obstructive pulmonary disease 11/07/2014    Dyspnea 11/06/2014    Normocytic anemia 11/06/2014    Dysphagia 11/03/2014    Osteoarthritis of left knee 07/14/2014    Essential hypertension 07/16/2013    CKD (chronic kidney disease) 07/16/2013    PVD (peripheral vascular disease) 07/16/2013    CAD (coronary artery disease) 07/16/2013    History of stroke 07/16/2013    Physical deconditioning 07/16/2013    Alcohol abuse 07/16/2013        Plan:     Chronic Decubitus Ulcers  - patient afebrile without leukocytosis  - no new signs of acute active infection  -  primary management per Dr. Irizarry      Essential Hypertension  - patient with stable BP on admission  - continue home amlodipine, coreg, lasix  - will titrate medications as needed   - stable, will monitor      DM II Uncontrolled  - last a1c 7.9 11/16  no home medication listed   - will cover with SSI      AoCD  - H/H on admission 10.6/34.2 with no signs of active bleed  - will continue to monitor      Chronic Deconditioning  - patient with home health  - limited mobiltiy with several recent hospitalizations  - likely cause of slow progression of decubitus wounds  - will need conversation with family about ability to care for patient at home adequately given functional status limitations    Hyponatremia  - patient with h/o hyponatremia, not needing treatment  - will monitor     COPD - stable  - no acute issues  - continue dounebs PRN for wheezing     BPH - stable  - gonzalez in place  - continue home flomax     HFpEF - stable  - last ECHO 11/16: diastolic dysfucntion, EF 65%  - continue home coreg, lasix, ASA  - no acute issues      CAD - stable  - denied chest pain, shortness of breath at admit  - continue ASA, lipitor      PVD - stable  - decreased peripheral pulses  - continue home ASA, lipitor      History of CVA - stable  - continue home ASA, lipitor      Atrial Fibrillation - stable  - continue home amiodarone, xarelto         Thank you for allowing us to participate in the care of this patient. Please contact me at [phone] if you have any questions regarding this consult.     Kiya Valles MD  [facility] Internal Medicine HO-I        [facility] Medicine Hospitalist Pager Numbers:   [facility] Hospitalist Medicine Team A (Chay/Shawanda): [phone]  [facility] Hospitalist Medicine Team B (Gloria/Alex): [phone]

## 2017-04-11 PROBLEM — M86.10 ACUTE OSTEOMYELITIS: Status: ACTIVE | Noted: 2017-04-11

## 2017-04-11 LAB
ALBUMIN SERPL BCP-MCNC: 2 G/DL
ALP SERPL-CCNC: 87 U/L
ALT SERPL W/O P-5'-P-CCNC: 16 U/L
ANION GAP SERPL CALC-SCNC: 6 MMOL/L
AST SERPL-CCNC: 17 U/L
BACTERIA SPEC AEROBE CULT: NORMAL
BASOPHILS # BLD AUTO: 0.03 K/UL
BASOPHILS NFR BLD: 0.3 %
BILIRUB SERPL-MCNC: 0.2 MG/DL
BUN SERPL-MCNC: 24 MG/DL
CALCIUM SERPL-MCNC: 8.6 MG/DL
CHLORIDE SERPL-SCNC: 101 MMOL/L
CO2 SERPL-SCNC: 27 MMOL/L
CREAT SERPL-MCNC: 0.9 MG/DL
DIFFERENTIAL METHOD: ABNORMAL
EOSINOPHIL # BLD AUTO: 0.3 K/UL
EOSINOPHIL NFR BLD: 3.2 %
ERYTHROCYTE [DISTWIDTH] IN BLOOD BY AUTOMATED COUNT: 15.2 %
EST. GFR  (AFRICAN AMERICAN): >60 ML/MIN/1.73 M^2
EST. GFR  (NON AFRICAN AMERICAN): >60 ML/MIN/1.73 M^2
GLUCOSE SERPL-MCNC: 101 MG/DL
HCT VFR BLD AUTO: 27.4 %
HGB BLD-MCNC: 8.8 G/DL
LYMPHOCYTES # BLD AUTO: 2.4 K/UL
LYMPHOCYTES NFR BLD: 27.4 %
MCH RBC QN AUTO: 27.8 PG
MCHC RBC AUTO-ENTMCNC: 32.1 %
MCV RBC AUTO: 87 FL
MONOCYTES # BLD AUTO: 1 K/UL
MONOCYTES NFR BLD: 10.9 %
NEUTROPHILS # BLD AUTO: 5 K/UL
NEUTROPHILS NFR BLD: 58.2 %
PLATELET # BLD AUTO: 317 K/UL
PMV BLD AUTO: 8.2 FL
POCT GLUCOSE: 117 MG/DL (ref 70–110)
POCT GLUCOSE: 118 MG/DL (ref 70–110)
POCT GLUCOSE: 120 MG/DL (ref 70–110)
POTASSIUM SERPL-SCNC: 3.9 MMOL/L
PROT SERPL-MCNC: 6.3 G/DL
RBC # BLD AUTO: 3.16 M/UL
SODIUM SERPL-SCNC: 134 MMOL/L
WBC # BLD AUTO: 8.77 K/UL

## 2017-04-11 PROCEDURE — 63600175 PHARM REV CODE 636 W HCPCS: Performed by: INTERNAL MEDICINE

## 2017-04-11 PROCEDURE — 25000003 PHARM REV CODE 250: Performed by: SURGERY

## 2017-04-11 PROCEDURE — 63600175 PHARM REV CODE 636 W HCPCS: Performed by: STUDENT IN AN ORGANIZED HEALTH CARE EDUCATION/TRAINING PROGRAM

## 2017-04-11 PROCEDURE — 25000003 PHARM REV CODE 250: Performed by: EMERGENCY MEDICINE

## 2017-04-11 PROCEDURE — 25000003 PHARM REV CODE 250: Performed by: STUDENT IN AN ORGANIZED HEALTH CARE EDUCATION/TRAINING PROGRAM

## 2017-04-11 PROCEDURE — 27000221 HC OXYGEN, UP TO 24 HOURS

## 2017-04-11 PROCEDURE — 11000001 HC ACUTE MED/SURG PRIVATE ROOM

## 2017-04-11 PROCEDURE — 85025 COMPLETE CBC W/AUTO DIFF WBC: CPT

## 2017-04-11 PROCEDURE — 36415 COLL VENOUS BLD VENIPUNCTURE: CPT

## 2017-04-11 PROCEDURE — 25000003 PHARM REV CODE 250: Performed by: INTERNAL MEDICINE

## 2017-04-11 PROCEDURE — 94761 N-INVAS EAR/PLS OXIMETRY MLT: CPT

## 2017-04-11 PROCEDURE — 80053 COMPREHEN METABOLIC PANEL: CPT

## 2017-04-11 RX ORDER — BENZONATATE 100 MG/1
200 CAPSULE ORAL 2 TIMES DAILY PRN
Status: DISCONTINUED | OUTPATIENT
Start: 2017-04-11 | End: 2017-04-13 | Stop reason: HOSPADM

## 2017-04-11 RX ADMIN — POTASSIUM & SODIUM PHOSPHATES POWDER PACK 280-160-250 MG 1 PACKET: 280-160-250 PACK at 10:04

## 2017-04-11 RX ADMIN — POTASSIUM & SODIUM PHOSPHATES POWDER PACK 280-160-250 MG 1 PACKET: 280-160-250 PACK at 09:04

## 2017-04-11 RX ADMIN — SODIUM CHLORIDE, PRESERVATIVE FREE 3 ML: 5 INJECTION INTRAVENOUS at 10:04

## 2017-04-11 RX ADMIN — AMIODARONE HYDROCHLORIDE 200 MG: 200 TABLET ORAL at 09:04

## 2017-04-11 RX ADMIN — OXYCODONE HYDROCHLORIDE AND ACETAMINOPHEN 500 MG: 500 TABLET ORAL at 10:04

## 2017-04-11 RX ADMIN — CARVEDILOL 25 MG: 25 TABLET, FILM COATED ORAL at 09:04

## 2017-04-11 RX ADMIN — PIPERACILLIN SODIUM AND TAZOBACTAM SODIUM 4.5 G: 4; .5 INJECTION, POWDER, FOR SOLUTION INTRAVENOUS at 04:04

## 2017-04-11 RX ADMIN — SODIUM CHLORIDE, PRESERVATIVE FREE 3 ML: 5 INJECTION INTRAVENOUS at 02:04

## 2017-04-11 RX ADMIN — METOCLOPRAMIDE HYDROCHLORIDE 5 MG: 5 TABLET ORAL at 04:04

## 2017-04-11 RX ADMIN — OXYCODONE AND ACETAMINOPHEN 1 TABLET: 5; 325 TABLET ORAL at 05:04

## 2017-04-11 RX ADMIN — Medication 1 TABLET: at 09:04

## 2017-04-11 RX ADMIN — TAMSULOSIN HYDROCHLORIDE 0.4 MG: 0.4 CAPSULE ORAL at 10:04

## 2017-04-11 RX ADMIN — RIVAROXABAN 10 MG: 10 TABLET, FILM COATED ORAL at 09:04

## 2017-04-11 RX ADMIN — FUROSEMIDE 40 MG: 40 TABLET ORAL at 09:04

## 2017-04-11 RX ADMIN — METOCLOPRAMIDE HYDROCHLORIDE 5 MG: 5 TABLET ORAL at 05:04

## 2017-04-11 RX ADMIN — AMLODIPINE BESYLATE 10 MG: 5 TABLET ORAL at 09:04

## 2017-04-11 RX ADMIN — ASPIRIN 81 MG: 81 TABLET, COATED ORAL at 09:04

## 2017-04-11 RX ADMIN — BENZONATATE 200 MG: 100 CAPSULE ORAL at 08:04

## 2017-04-11 RX ADMIN — PIPERACILLIN SODIUM AND TAZOBACTAM SODIUM 4.5 G: 4; .5 INJECTION, POWDER, FOR SOLUTION INTRAVENOUS at 05:04

## 2017-04-11 RX ADMIN — RIVAROXABAN 10 MG: 10 TABLET, FILM COATED ORAL at 10:04

## 2017-04-11 RX ADMIN — METOCLOPRAMIDE HYDROCHLORIDE 5 MG: 5 TABLET ORAL at 11:04

## 2017-04-11 RX ADMIN — OXYCODONE HYDROCHLORIDE AND ACETAMINOPHEN 500 MG: 500 TABLET ORAL at 09:04

## 2017-04-11 RX ADMIN — SODIUM CHLORIDE, PRESERVATIVE FREE 3 ML: 5 INJECTION INTRAVENOUS at 05:04

## 2017-04-11 RX ADMIN — OXYCODONE AND ACETAMINOPHEN 1 TABLET: 5; 325 TABLET ORAL at 02:04

## 2017-04-11 RX ADMIN — Medication 220 MG: at 09:04

## 2017-04-11 RX ADMIN — RAMELTEON 8 MG: 8 TABLET, FILM COATED ORAL at 10:04

## 2017-04-11 RX ADMIN — ATORVASTATIN CALCIUM 40 MG: 40 TABLET, FILM COATED ORAL at 09:04

## 2017-04-11 RX ADMIN — OXYCODONE AND ACETAMINOPHEN 1 TABLET: 5; 325 TABLET ORAL at 08:04

## 2017-04-11 NOTE — PLAN OF CARE
Problem: Fall Risk (Adult)  Goal: Identify Related Risk Factors and Signs and Symptoms  Related risk factors and signs and symptoms are identified upon initiation of Human Response Clinical Practice Guideline (CPG)   Outcome: Ongoing (interventions implemented as appropriate)                            Goal: Absence of Falls  Patient will demonstrate the desired outcomes by discharge/transition of care.   Outcome: Ongoing (interventions implemented as appropriate)                              Problem: Patient Care Overview  Goal: Plan of Care Review  Outcome: Ongoing (interventions implemented as appropriate)                              Problem: Pressure Ulcer Risk (Roly Scale) (Adult,Obstetrics,Pediatric)  Goal: Identify Related Risk Factors and Signs and Symptoms  Related risk factors and signs and symptoms are identified upon initiation of Human Response Clinical Practice Guideline (CPG)   Outcome: Ongoing (interventions implemented as appropriate)                            Goal: Skin Integrity  Patient will demonstrate the desired outcomes by discharge/transition of care.   Outcome: Ongoing (interventions implemented as appropriate)                              Problem: Infection, Risk/Actual (Adult)  Goal: Identify Related Risk Factors and Signs and Symptoms  Related risk factors and signs and symptoms are identified upon initiation of Human Response Clinical Practice Guideline (CPG)   Outcome: Ongoing (interventions implemented as appropriate)                            Goal: Infection Prevention/Resolution  Patient will demonstrate the desired outcomes by discharge/transition of care.   Outcome: Ongoing (interventions implemented as appropriate)

## 2017-04-11 NOTE — PROGRESS NOTES
consulted to assist with discharge planning, need skilled nursing facility (SNF) placement.   Transition navigator, Darlene Mtz spoke with wife regarding preference for SNF placement.  Per wife preference  faxed referral to [facility], [facility], and [facility] via [facility] for possible SNF placement.

## 2017-04-11 NOTE — CONSULTS
[facility]  Infectious Disease  Consult Note    Patient Name: Asad Perez  MRN: [id number]  Admission Date: 4/7/2017  Hospital Length of Stay: 4 days  Attending Physician: Michael Irizarry MD  Primary Care Provider: Tomas Torres MD     Isolation Status: No active isolations    Patient information was obtained from patient, spouse/SO, past medical records and ER records.      Inpatient consult to Infectious Diseases  Consult performed by: XAVI IRVING  Consult ordered by: RIO GONZALEZ  Reason for consult: acute r hip osteomyelitis        Assessment/Plan:     Acute osteomyelitis  78 yo CM with PMH etoh abuse, CHF, h/o cva, CKD3, CAD and PVD who was admitted with worsening R hip and sacral decubiti.  Since admission he was taken to the OR on 4/8 for debridement of R hip wound, and necrotic foul smelling material was noted to extend to bone.  Xray without evidence of osteomyelitis making this most likely to be an acute osteomyelitis given extension of infection to bone noted in OR.  Pt was on augmentin for one week prior to admission.  He denies any recent systemic symptoms complaining only of hip and back pain.  Operative cx +pan-s citrobacter.  4/7 ESR=93 and CRP=20.6.    1. Recommend 6 weeks IV zosyn from 4/8.  Please have ESR, CRP, CBC, and CMP checked weekly and fax results to [facility].  Pt should follow up with [facility] in 5-6 weeks before abx are stopped.      Thank you for your consult. I will sign off. Please contact us if you have any additional questions.    Xavi Irving MD  Infectious Disease  [facility]    Subjective:     Principal Problem: <principal problem not specified>    HPI: 78 yo CM with PMH etoh abuse, CHF, h/o cva, CKD3, CAD and PVD who was admitted with worsening R hip and sacral decubiti.  Since admission he was taken to the OR on 4/8 for debridement of R hip wound, and necrotic foul smelling material was noted to  extend to bone.  Xray without evidence of osteomyelitis.  Pt was on augmentin for one week prior to admission.  He denies any recent systemic symptoms complaining only of hip and back pain.  Operative cx +pan-s citrobacter.    Past Medical History:   Diagnosis Date    Alcohol abuse     CHF (congestive heart failure)     CKD (chronic kidney disease) stage 3, GFR 30-59 ml/min     Coronary artery disease     Heart failure, diastolic     High cholesterol     Hypertension     LBP (low back pain)     Prediabetes     PVD (peripheral vascular disease)     Stroke     2006, no deficits       Past Surgical History:   Procedure Laterality Date    aortic bifemoral bypass  2006    bilateral carotid stenois  2006    carotid stents      JOINT REPLACEMENT      knee    left common endarterectomy  2006    RT AKA  NOV 2017       Review of patient's allergies indicates:  No Known Allergies    Medications:  Prescriptions Prior to Admission   Medication Sig    albuterol-ipratropium 2.5mg-0.5mg/3mL (DUO-NEB) 0.5 mg-3 mg(2.5 mg base)/3 mL nebulizer solution Take 3 mLs by nebulization every 6 (six) hours while awake.    amiodarone (PACERONE) 200 MG Tab Take 1 tablet (200 mg total) by mouth once daily.    amlodipine (NORVASC) 10 MG tablet Take 1 tablet (10 mg total) by mouth once daily.    arginine-glutamine-calcium HMB (GENIE) 7-7-1.5 gram PwPk Take 1 packet by mouth 2 (two) times daily.    ascorbic acid, vitamin C, (VITAMIN C) 500 MG tablet Take 500 mg by mouth 2 (two) times daily.    aspirin (ECOTRIN) 81 MG EC tablet Take 1 tablet (81 mg total) by mouth once daily.    atorvastatin (LIPITOR) 40 MG tablet Take 1 tablet (40 mg total) by mouth once daily.    carvedilol (COREG) 25 MG tablet Take 1 tablet (25 mg total) by mouth 2 (two) times daily.    docusate sodium (COLACE) 250 MG capsule Take 250 mg by mouth 2 (two) times daily as needed for Constipation.    furosemide (LASIX) 20 MG tablet Take 2 tablets (40 mg  total) by mouth once daily.    hydrocodone-acetaminophen 10-325mg (NORCO)  mg Tab Take 1 tablet by mouth every 6 (six) hours as needed for Pain.    ibuprofen (ADVIL,MOTRIN) 400 MG tablet Take 400 mg by mouth every 6 (six) hours as needed for Other.    lactulose (CHRONULAC) 10 gram/15 mL solution Take 10 g by mouth 2 (two) times daily.    linaclotide 290 mcg Cap Take by mouth once daily.    metoclopramide HCl (REGLAN) 5 MG tablet Take 5 mg by mouth 3 (three) times daily before meals.    multivitamin (THERAGRAN) per tablet Take 1 tablet by mouth once daily.    oxycodone-acetaminophen (PERCOCET) 5-325 mg per tablet Take 1 tablet by mouth every 4 (four) hours as needed for Pain.    potassium, sodium phosphates (PHOS-NAK) 280-160-250 mg PwPk Take 1 packet by mouth 2 (two) times daily.    ramelteon (ROZEREM) 8 mg tablet Take 8 mg by mouth every evening.    rivaroxaban (XARELTO) 10 mg Tab Take 10 mg by mouth 2 (two) times daily.    rivaroxaban (XARELTO) 10 mg Tab Take 1 tablet (10 mg total) by mouth 2 (two) times daily.    tamsulosin (FLOMAX) 0.4 mg Cp24 Take 0.4 mg by mouth every evening.    tramadol (ULTRAM) 50 mg tablet Take 50 mg by mouth every 8 (eight) hours as needed for Pain.    zinc sulfate (ZINCATE) 220 (50) mg capsule Take 220 mg by mouth once daily.      Antibiotics     Start     Stop Route Frequency Ordered    04/11/17 1300  ampicillin-sulbactam 3 g in sodium chloride 0.9 % 100 mL IVPB (ready to mix system)      -- IV Every 6 hours (non-standard times) 04/11/17 1112        Antifungals     None        Antivirals     None           Immunization History   Administered Date(s) Administered    PPD Test 07/12/2016, 09/21/2016       Family History     Problem Relation (Age of Onset)    Heart disease Mother        Social History     Social History    Marital status:      Spouse name: N/A    Number of children: N/A    Years of education: N/A     Social History Main Topics    Smoking  status: Former Smoker     Packs/day: 2.00     Years: 45.00     Quit date: 7/16/2006    Smokeless tobacco: None    Alcohol use No      Comment: NO ALCOHOL FOR 18 MONTHS, PAST ETOH ABUSE    Drug use: No    Sexual activity: Not Asked     Other Topics Concern    None     Social History Narrative     Review of Systems   Constitutional: Negative for chills, fatigue and fever.   HENT: Negative for congestion, sinus pressure and sore throat.    Eyes: Negative for visual disturbance.   Respiratory: Negative for cough and shortness of breath.    Cardiovascular: Negative for chest pain and leg swelling.   Gastrointestinal: Negative for abdominal distention, abdominal pain, constipation, diarrhea, nausea and vomiting.   Genitourinary: Negative for dysuria.   Musculoskeletal: Positive for arthralgias and back pain.   Skin: Positive for wound.   Neurological: Negative for light-headedness and headaches.     Objective:     Vital Signs (Most Recent):  Temp: 97.4 °F (36.3 °C) (04/11/17 1108)  Pulse: (!) 58 (04/11/17 1108)  Resp: 16 (04/11/17 1108)  BP: (!) 117/58 (04/11/17 1108)  SpO2: 98 % (04/11/17 1243) Vital Signs (24h Range):  Temp:  [97.4 °F (36.3 °C)-98 °F (36.7 °C)] 97.4 °F (36.3 °C)  Pulse:  [58-64] 58  Resp:  [15-17] 16  SpO2:  [91 %-98 %] 98 %  BP: (107-124)/(52-58) 117/58     Weight: 59 kg (130 lb)  Body mass index is 19.77 kg/(m^2).    Estimated Creatinine Clearance: 59.2 mL/min (based on Cr of 0.9).    Physical Exam   Constitutional: He appears well-developed. No distress.   HENT:   Head: Normocephalic and atraumatic.   Mouth/Throat: Oropharynx is clear and moist.   Eyes: EOM are normal. Pupils are equal, round, and reactive to light.   Neck: Normal range of motion. Neck supple.   Cardiovascular: Normal rate, regular rhythm, normal heart sounds and intact distal pulses.    Pulmonary/Chest: Effort normal and breath sounds normal.   Abdominal: Soft. Bowel sounds are normal. He exhibits no distension. There is no  tenderness.   Musculoskeletal: He exhibits tenderness (R hip ).   Lymphadenopathy:     He has no cervical adenopathy.   Neurological: He is alert.   Skin: Skin is warm and dry. He is not diaphoretic.   R hip and sacral wounds bandaged     Psychiatric: He has a normal mood and affect.   Nursing note and vitals reviewed.      Significant Labs: All pertinent labs within the past 24 hours have been reviewed.    Significant Imaging: I have reviewed all pertinent imaging results/findings within the past 24 hours.

## 2017-04-11 NOTE — SUBJECTIVE & OBJECTIVE
Past Medical History:   Diagnosis Date    Alcohol abuse     CHF (congestive heart failure)     CKD (chronic kidney disease) stage 3, GFR 30-59 ml/min     Coronary artery disease     Heart failure, diastolic     High cholesterol     Hypertension     LBP (low back pain)     Prediabetes     PVD (peripheral vascular disease)     Stroke     2006, no deficits       Past Surgical History:   Procedure Laterality Date    aortic bifemoral bypass  2006    bilateral carotid stenois  2006    carotid stents      JOINT REPLACEMENT      knee    left common endarterectomy  2006    RT AKA  NOV 2017       Review of patient's allergies indicates:  No Known Allergies    Medications:  Prescriptions Prior to Admission   Medication Sig    albuterol-ipratropium 2.5mg-0.5mg/3mL (DUO-NEB) 0.5 mg-3 mg(2.5 mg base)/3 mL nebulizer solution Take 3 mLs by nebulization every 6 (six) hours while awake.    amiodarone (PACERONE) 200 MG Tab Take 1 tablet (200 mg total) by mouth once daily.    amlodipine (NORVASC) 10 MG tablet Take 1 tablet (10 mg total) by mouth once daily.    arginine-glutamine-calcium HMB (GENIE) 7-7-1.5 gram PwPk Take 1 packet by mouth 2 (two) times daily.    ascorbic acid, vitamin C, (VITAMIN C) 500 MG tablet Take 500 mg by mouth 2 (two) times daily.    aspirin (ECOTRIN) 81 MG EC tablet Take 1 tablet (81 mg total) by mouth once daily.    atorvastatin (LIPITOR) 40 MG tablet Take 1 tablet (40 mg total) by mouth once daily.    carvedilol (COREG) 25 MG tablet Take 1 tablet (25 mg total) by mouth 2 (two) times daily.    docusate sodium (COLACE) 250 MG capsule Take 250 mg by mouth 2 (two) times daily as needed for Constipation.    furosemide (LASIX) 20 MG tablet Take 2 tablets (40 mg total) by mouth once daily.    hydrocodone-acetaminophen 10-325mg (NORCO)  mg Tab Take 1 tablet by mouth every 6 (six) hours as needed for Pain.    ibuprofen (ADVIL,MOTRIN) 400 MG tablet Take 400 mg by mouth every 6 (six)  hours as needed for Other.    lactulose (CHRONULAC) 10 gram/15 mL solution Take 10 g by mouth 2 (two) times daily.    linaclotide 290 mcg Cap Take by mouth once daily.    metoclopramide HCl (REGLAN) 5 MG tablet Take 5 mg by mouth 3 (three) times daily before meals.    multivitamin (THERAGRAN) per tablet Take 1 tablet by mouth once daily.    oxycodone-acetaminophen (PERCOCET) 5-325 mg per tablet Take 1 tablet by mouth every 4 (four) hours as needed for Pain.    potassium, sodium phosphates (PHOS-NAK) 280-160-250 mg PwPk Take 1 packet by mouth 2 (two) times daily.    ramelteon (ROZEREM) 8 mg tablet Take 8 mg by mouth every evening.    rivaroxaban (XARELTO) 10 mg Tab Take 10 mg by mouth 2 (two) times daily.    rivaroxaban (XARELTO) 10 mg Tab Take 1 tablet (10 mg total) by mouth 2 (two) times daily.    tamsulosin (FLOMAX) 0.4 mg Cp24 Take 0.4 mg by mouth every evening.    tramadol (ULTRAM) 50 mg tablet Take 50 mg by mouth every 8 (eight) hours as needed for Pain.    zinc sulfate (ZINCATE) 220 (50) mg capsule Take 220 mg by mouth once daily.      Antibiotics     Start     Stop Route Frequency Ordered    04/11/17 1300  ampicillin-sulbactam 3 g in sodium chloride 0.9 % 100 mL IVPB (ready to mix system)      -- IV Every 6 hours (non-standard times) 04/11/17 1112        Antifungals     None        Antivirals     None           Immunization History   Administered Date(s) Administered    PPD Test 07/12/2016, 09/21/2016       Family History     Problem Relation (Age of Onset)    Heart disease Mother        Social History     Social History    Marital status:      Spouse name: N/A    Number of children: N/A    Years of education: N/A     Social History Main Topics    Smoking status: Former Smoker     Packs/day: 2.00     Years: 45.00     Quit date: 7/16/2006    Smokeless tobacco: None    Alcohol use No      Comment: NO ALCOHOL FOR 18 MONTHS, PAST ETOH ABUSE    Drug use: No    Sexual activity: Not Asked      Other Topics Concern    None     Social History Narrative     Review of Systems   Constitutional: Negative for chills, fatigue and fever.   HENT: Negative for congestion, sinus pressure and sore throat.    Eyes: Negative for visual disturbance.   Respiratory: Negative for cough and shortness of breath.    Cardiovascular: Negative for chest pain and leg swelling.   Gastrointestinal: Negative for abdominal distention, abdominal pain, constipation, diarrhea, nausea and vomiting.   Genitourinary: Negative for dysuria.   Musculoskeletal: Positive for arthralgias and back pain.   Skin: Positive for wound.   Neurological: Negative for light-headedness and headaches.     Objective:     Vital Signs (Most Recent):  Temp: 97.4 °F (36.3 °C) (04/11/17 1108)  Pulse: (!) 58 (04/11/17 1108)  Resp: 16 (04/11/17 1108)  BP: (!) 117/58 (04/11/17 1108)  SpO2: 98 % (04/11/17 1243) Vital Signs (24h Range):  Temp:  [97.4 °F (36.3 °C)-98 °F (36.7 °C)] 97.4 °F (36.3 °C)  Pulse:  [58-64] 58  Resp:  [15-17] 16  SpO2:  [91 %-98 %] 98 %  BP: (107-124)/(52-58) 117/58     Weight: 59 kg (130 lb)  Body mass index is 19.77 kg/(m^2).    Estimated Creatinine Clearance: 59.2 mL/min (based on Cr of 0.9).    Physical Exam   Constitutional: He appears well-developed. No distress.   HENT:   Head: Normocephalic and atraumatic.   Mouth/Throat: Oropharynx is clear and moist.   Eyes: EOM are normal. Pupils are equal, round, and reactive to light.   Neck: Normal range of motion. Neck supple.   Cardiovascular: Normal rate, regular rhythm, normal heart sounds and intact distal pulses.    Pulmonary/Chest: Effort normal and breath sounds normal.   Abdominal: Soft. Bowel sounds are normal. He exhibits no distension. There is no tenderness.   Musculoskeletal: He exhibits tenderness (R hip ).   Lymphadenopathy:     He has no cervical adenopathy.   Neurological: He is alert.   Skin: Skin is warm and dry. He is not diaphoretic.   R hip and sacral wounds bandaged      Psychiatric: He has a normal mood and affect.   Nursing note and vitals reviewed.      Significant Labs: All pertinent labs within the past 24 hours have been reviewed.    Significant Imaging: I have reviewed all pertinent imaging results/findings within the past 24 hours.

## 2017-04-11 NOTE — ASSESSMENT & PLAN NOTE
78 yo CM with PMH etoh abuse, CHF, h/o cva, CKD3, CAD and PVD who was admitted with worsening R hip and sacral decubiti.  Since admission he was taken to the OR on 4/8 for debridement of R hip wound, and necrotic foul smelling material was noted to extend to bone.  Xray without evidence of osteomyelitis making this most likely to be an acute osteomyelitis given extension of infection to bone noted in OR.  Pt was on augmentin for one week prior to admission.  He denies any recent systemic symptoms complaining only of hip and back pain.  Operative cx +pan-s citrobacter.  4/7 ESR=93 and CRP=20.6.    1. Recommend 6 weeks IV unasyn from 4/8.  Please have ESR, CRP, CBC, and CMP checked weekly and fax results to [facility].  Pt should follow up with [facility] in 5-6 weeks before abx are stopped.

## 2017-04-11 NOTE — PLAN OF CARE
Problem: Patient Care Overview  Goal: Plan of Care Review  Outcome: Ongoing (interventions implemented as appropriate)  Plan of care discussed with patient.  Verbalized understanding.  Patient remained AAOx4 throughout shift.  C/O intermittent right hip pain relieved with PRN norco and rest.  Dressing changed to right hip incision as well as stage II pressure ulcer to sacrum. Pt. Tolerated diet well.  No need for sliding scale insulin during shift.  Emndoza site positive for new bloody drainage.  Cleaned with normal saline.  Pt is resting in bed with spouse at bedside. IV vanc infusing.  Will continue to monitor.

## 2017-04-11 NOTE — PROGRESS NOTES
[facility] Resident [facility]-I Consult Progress Note    Subjective:      Asad Perez is a 77 y.o.  male who is being followed by the [facility]   Consult Service service at [facility] for generalized medical management.    Patient doing well this morning. Requesting peg tube being taken out, discussed with patient, redirectable.      Objective:   Last 24 Hour Vital Signs:  BP  Min: 100/52  Max: 137/63  Temp  Av.7 °F (36.5 °C)  Min: 97.4 °F (36.3 °C)  Max: 98 °F (36.7 °C)  Pulse  Av.8  Min: 62  Max: 66  Resp  Av.5  Min: 15  Max: 17  SpO2  Av.2 %  Min: 91 %  Max: 98 %  I/O last 3 completed shifts:  In: 800 [P.O.:300; IV Piggyback:500]  Out: 3651 [Urine:3650; Stool:1]    Physical Examination:  General: alert, cooperative, NAD  Head: Normocephalic, without obvious abnormality, atraumatic  Eyes: conjunctivae/corneas clear; PERRL, EOM's intact grossly   Nose: Nares normal; septum midline; mucosa normal;   Lung: clear to auscultation throughout; no increased work of breathing  Heart: regular rate and rhythm, no M/R/G, normal S1/S2.  Abdomen: soft, non-tender; bowel sounds normal;peg tube in place   Extremities: extremities normal, atraumatic, no cyanosis or edema  Skin: ; decub ulcers not-visualized      Laboratory:  Laboratory Data Reviewed: yes  Recent Labs      17   0928  17   0428  04/10/17   0542  17   0528   WBC  8.48  9.80  10.29  8.77   HGB  9.6*  9.0*  8.8*  8.8*   HCT  30.4*  28.3*  27.6*  27.4*   PLT  326  340  328  317   MCV  88  87  87  87   RDW  15.1*  15.0*  15.0*  15.2*   GRAN  54.3  4.6  62.8  6.2  61.7  6.4  58.2  5.0   LYMPH  31.3  2.7  23.1  2.3  25.5  2.6  27.4  2.4   MONO  8.5  0.7  8.6  0.8  8.9  0.9  10.9  1.0   EOS  0.4  0.4  0.3  0.3   BASO  0.05  0.05  0.03  0.03   EOSINOPHIL  4.4  4.1  2.7  3.2   BASOPHIL  0.6  0.5  0.3  0.3     Recent Labs      17   0928  17   0428  04/10/17   0542  17   0528    NA  137  134*  132*  134*   K  4.6  4.5  4.1  3.9   CL  105  103  100  101   CO2  27  25  27  27   BUN  17  19  21  24*   CREATININE  0.8  0.9  0.9  0.9   GLU  98  101  99  101     Recent Labs      04/10/17   1120  04/10/17   1606  04/10/17   2024  04/10/17   2116  04/11/17   0531   POCTGLUCOSE  163*  161*  128*  128*  117*     Recent Labs      04/08/17   0928  04/09/17   0428  04/10/17   0542  04/11/17   0528   PROT  6.5  6.5  6.2  6.3   ALBUMIN  2.0*  2.0*  2.0*  2.0*   BILITOT  0.2  0.2  0.3  0.2   AST  20  18  17  17   ALT  28  24  18  16   ALKPHOS  94  86  94  87       Microbiology Data Reviewed: yes  Pertinent Findings:  Blood Cultures 4/7: NGTD  Blood Cultures 4/8: Pending    Current Medications:     Infusions:        Scheduled:   amiodarone  200 mg Oral Daily    amlodipine  10 mg Oral Daily    ascorbic acid (vitamin C)  500 mg Oral BID    aspirin  81 mg Oral Daily    atorvastatin  40 mg Oral Daily    carvedilol  25 mg Oral BID    furosemide  40 mg Oral Daily    metoclopramide HCl  5 mg Oral TID AC    multivitamin  1 tablet Oral Daily    piperacillin-tazobactam 4.5 g in dextrose 5 % 100 mL IVPB (ready to mix system)  4.5 g Intravenous Q8H    potassium, sodium phosphates  1 packet Oral BID    ramelteon  8 mg Oral QHS    rivaroxaban  10 mg Oral BID    sodium chloride 0.9%  3 mL Intravenous Q8H    tamsulosin  0.4 mg Oral QHS    vancomycin (VANCOCIN) IVPB  1,000 mg Intravenous Q24H    zinc sulfate  220 mg Oral Daily        PRN:  albuterol-ipratropium 2.5mg-0.5mg/3mL, dextrose 50%, dextrose 50%, docusate, glucagon (human recombinant), glucose, glucose, ibuprofen, flu vacc [id number] 65yr up(PF), insulin aspart, ondansetron, oxycodone-acetaminophen, pneumoc 13-clementina conj-dip cr(PF)    Assessment:     Asad Perez is a 77 y.o.male with  Patient Active Problem List    Diagnosis Date Noted    Decubitus ulcer of buttock, stage 2 04/07/2017    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     Urinary tract infection associated with indwelling urethral catheter 12/01/2016    Centrilobular emphysema 11/29/2016    Symptomatic anemia 11/27/2016    Deep vein thrombosis (DVT) of brachial vein     Non-healing ulcer of lower leg 10/28/2016    Fever     Sacral decubitus ulcer 10/05/2016    Deep vein thrombosis (DVT) of upper extremity 10/05/2016    Dehiscence of perineal wound 10/05/2016    Hyponatremia 10/05/2016    VRE (vancomycin-resistant Enterococci) infection 09/20/2016    Elevated liver enzymes     Chronic diastolic congestive heart failure     Critical lower limb ischemia 09/10/2016    Stenosis of left internal carotid artery 09/08/2016    Abnormal finding on imaging 08/31/2016    Abnormal CT of the abdomen     Chronic kidney disease, stage V 08/12/2016    Anemia 08/12/2016    Anemia of chronic disease 08/08/2016    Altered mental status 08/07/2016    MRSA (methicillin resistant Staphylococcus aureus) infection 08/07/2016    Confusion     Weak     Pneumonia 07/29/2016    History of atrial fibrillation     Infected prosthetic knee joint 07/12/2016    Right knee pain 06/15/2016    GERD (gastroesophageal reflux disease) 02/28/2015    Esophageal web 02/28/2015    Acute on chronic diastolic heart failure 11/08/2014    Transaminitis 11/08/2014    Chronic obstructive pulmonary disease 11/07/2014    Dyspnea 11/06/2014    Normocytic anemia 11/06/2014    Dysphagia 11/03/2014    Osteoarthritis of left knee 07/14/2014    Essential hypertension 07/16/2013    CKD (chronic kidney disease) 07/16/2013    PVD (peripheral vascular disease) 07/16/2013    CAD (coronary artery disease) 07/16/2013    History of stroke 07/16/2013    Physical deconditioning 07/16/2013    Alcohol abuse 07/16/2013        Plan:     Chronic Decubitus Ulcers  - patient afebrile without leukocytosis  - no new signs of acute active infection  - primary management per Dr. Irizarry      Essential Hypertension  -  patient with stable BP on admission  - continue home amlodipine, coreg, lasix  - will titrate medications as needed   - stable, will monitor      DM II Uncontrolled  - last a1c 7.9 11/16  no home medication listed   - will cover with SSI      AoCD  - H/H on admission 10.6/34.2 with no signs of active bleed  - will continue to monitor      Chronic Deconditioning  - patient with home health  - limited mobiltiy with several recent hospitalizations  - likely cause of slow progression of decubitus wounds  - will need conversation with family about ability to care for patient at home adequately given functional status limitations    Hyponatremia  - patient with h/o hyponatremia, not needing treatment  - improved 4/11  - will monitor     COPD - stable  - no acute issues  - continue dounebs PRN for wheezing     BPH - stable  - gonzalez in place  - continue home flomax     HFpEF - stable  - last ECHO 11/16: diastolic dysfucntion, EF 65%  - continue home coreg, lasix, ASA  - no acute issues      CAD - stable  - denied chest pain, shortness of breath at admit  - continue ASA, lipitor      PVD - stable  - decreased peripheral pulses  - continue home ASA, lipitor      History of CVA - stable  - continue home ASA, lipitor      Atrial Fibrillation - stable  - continue home amiodarone, xarelto         Thank you for allowing us to participate in the care of this patient. Please contact me at [phone] if you have any questions regarding this consult.     Kiya Valles MD  \[facility]\"" Internal Medicine HO-I        \[facility]\"" Medicine Hospitalist Pager Numbers:   \[facility]\"" Hospitalist Medicine Team A (Chay/Shawanda): [phone]  \[facility]\"" Hospitalist Medicine Team B (Gloria/Alex): [phone]

## 2017-04-11 NOTE — CONSULTS
POD # 2 for I&D of Right Posterior Lateral Thigh. Pt has history of Stage IV to buttocks/sacrum that has had a wound vac. Right AKA healed. No breakdown to left foot or leg.     Right Posterior Lateral Thigh: 9 x 2 x 5 (cm) with undermining of 4.5 (cm) from 7 - 9 o'clock. Moderate amount of dried serosanguinous drainage. Irrigated and repacked with Iodoform gauze. Covered with gauze/ABD/medipore tape.     Healing Stage IV Sacrum: 6 x 5 (cm) yellow/pink with palpable bone; 2.5 x 1 (cm) yellow. Cleaned and applied Mepilex border.

## 2017-04-11 NOTE — PROGRESS NOTES
"Surgery follow up  BP (!) 117/58 (Patient Position: Lying, BP Method: Automatic)  Pulse (!) 58  Temp 97.4 °F (36.3 °C) (Oral)   Resp 16  Ht 5' 8" (1.727 m)  Wt 59 kg (130 lb)  SpO2 98%  BMI 19.77 kg/m2  I/O last 3 completed shifts:  In: 800 [P.O.:300; IV Piggyback:500]  Out: 3651 [Urine:3650; Stool:1]     Wound better on [place] antibiotics   Continue present treatment.  "

## 2017-04-11 NOTE — PLAN OF CARE
Problem: Patient Care Overview  Goal: Plan of Care Review  Sats 93% RA; tolerating well; will continue to monitor.

## 2017-04-11 NOTE — PROGRESS NOTES
"suregery follow up  BP (!) 120/56 (Patient Position: Lying, BP Method: Automatic)  Pulse 63  Temp 97.7 °F (36.5 °C) (Oral)   Resp 17  Ht 5' 8" (1.727 m)  Wt 59 kg (130 lb)  SpO2 (!) 93%  BMI 19.77 kg/m2  I/O last 3 completed shifts:  In: 660 [P.O.:260; I.V.:50; IV Piggyback:350]  Out: 2801 [Urine:2800; Stool:1]     Wound better continue present treatment, hopefully retun to nursing home soon.  "

## 2017-04-12 LAB
ALBUMIN SERPL BCP-MCNC: 1.9 G/DL
ALP SERPL-CCNC: 83 U/L
ALT SERPL W/O P-5'-P-CCNC: 16 U/L
ANION GAP SERPL CALC-SCNC: 5 MMOL/L
AST SERPL-CCNC: 17 U/L
BACTERIA BLD CULT: NORMAL
BASOPHILS # BLD AUTO: 0.05 K/UL
BASOPHILS NFR BLD: 0.6 %
BILIRUB SERPL-MCNC: 0.3 MG/DL
BUN SERPL-MCNC: 25 MG/DL
CALCIUM SERPL-MCNC: 8.5 MG/DL
CHLORIDE SERPL-SCNC: 101 MMOL/L
CO2 SERPL-SCNC: 29 MMOL/L
CREAT SERPL-MCNC: 0.9 MG/DL
DIFFERENTIAL METHOD: ABNORMAL
EOSINOPHIL # BLD AUTO: 0.3 K/UL
EOSINOPHIL NFR BLD: 3.6 %
ERYTHROCYTE [DISTWIDTH] IN BLOOD BY AUTOMATED COUNT: 15.6 %
EST. GFR  (AFRICAN AMERICAN): >60 ML/MIN/1.73 M^2
EST. GFR  (NON AFRICAN AMERICAN): >60 ML/MIN/1.73 M^2
GLUCOSE SERPL-MCNC: 104 MG/DL
HCT VFR BLD AUTO: 26.3 %
HGB BLD-MCNC: 8.4 G/DL
LYMPHOCYTES # BLD AUTO: 2.3 K/UL
LYMPHOCYTES NFR BLD: 26.6 %
MCH RBC QN AUTO: 27.7 PG
MCHC RBC AUTO-ENTMCNC: 31.9 %
MCV RBC AUTO: 87 FL
MONOCYTES # BLD AUTO: 1 K/UL
MONOCYTES NFR BLD: 11.5 %
NEUTROPHILS # BLD AUTO: 4.8 K/UL
NEUTROPHILS NFR BLD: 56.5 %
PLATELET # BLD AUTO: 322 K/UL
PMV BLD AUTO: 8.4 FL
POCT GLUCOSE: 176 MG/DL (ref 70–110)
POCT GLUCOSE: 181 MG/DL (ref 70–110)
POCT GLUCOSE: 204 MG/DL (ref 70–110)
POCT GLUCOSE: 94 MG/DL (ref 70–110)
POTASSIUM SERPL-SCNC: 3.4 MMOL/L
PROT SERPL-MCNC: 6.1 G/DL
RBC # BLD AUTO: 3.03 M/UL
SODIUM SERPL-SCNC: 135 MMOL/L
WBC # BLD AUTO: 8.54 K/UL

## 2017-04-12 PROCEDURE — G8981 BODY POS CURRENT STATUS: HCPCS | Mod: CM

## 2017-04-12 PROCEDURE — 63600175 PHARM REV CODE 636 W HCPCS: Performed by: STUDENT IN AN ORGANIZED HEALTH CARE EDUCATION/TRAINING PROGRAM

## 2017-04-12 PROCEDURE — 11000001 HC ACUTE MED/SURG PRIVATE ROOM

## 2017-04-12 PROCEDURE — 36415 COLL VENOUS BLD VENIPUNCTURE: CPT

## 2017-04-12 PROCEDURE — 25000003 PHARM REV CODE 250: Performed by: EMERGENCY MEDICINE

## 2017-04-12 PROCEDURE — 80053 COMPREHEN METABOLIC PANEL: CPT

## 2017-04-12 PROCEDURE — 25000003 PHARM REV CODE 250: Performed by: INTERNAL MEDICINE

## 2017-04-12 PROCEDURE — 25000003 PHARM REV CODE 250: Performed by: STUDENT IN AN ORGANIZED HEALTH CARE EDUCATION/TRAINING PROGRAM

## 2017-04-12 PROCEDURE — 97161 PT EVAL LOW COMPLEX 20 MIN: CPT

## 2017-04-12 PROCEDURE — 97165 OT EVAL LOW COMPLEX 30 MIN: CPT

## 2017-04-12 PROCEDURE — 25000003 PHARM REV CODE 250: Performed by: SURGERY

## 2017-04-12 PROCEDURE — 63600175 PHARM REV CODE 636 W HCPCS: Performed by: INTERNAL MEDICINE

## 2017-04-12 PROCEDURE — 02HV33Z INSERTION OF INFUSION DEVICE INTO SUPERIOR VENA CAVA, PERCUTANEOUS APPROACH: ICD-10-PCS | Performed by: SURGERY

## 2017-04-12 PROCEDURE — 94761 N-INVAS EAR/PLS OXIMETRY MLT: CPT

## 2017-04-12 PROCEDURE — 85025 COMPLETE CBC W/AUTO DIFF WBC: CPT

## 2017-04-12 PROCEDURE — 25000003 PHARM REV CODE 250

## 2017-04-12 PROCEDURE — G8982 BODY POS GOAL STATUS: HCPCS | Mod: CL

## 2017-04-12 RX ORDER — POTASSIUM CHLORIDE 20 MEQ/1
20 TABLET, EXTENDED RELEASE ORAL
Status: COMPLETED | OUTPATIENT
Start: 2017-04-12 | End: 2017-04-12

## 2017-04-12 RX ORDER — SODIUM CHLORIDE 0.9 % (FLUSH) 0.9 %
10 SYRINGE (ML) INJECTION EVERY 6 HOURS
Status: DISCONTINUED | OUTPATIENT
Start: 2017-04-12 | End: 2017-04-13 | Stop reason: HOSPADM

## 2017-04-12 RX ORDER — SODIUM CHLORIDE 0.9 % (FLUSH) 0.9 %
10 SYRINGE (ML) INJECTION
Status: DISCONTINUED | OUTPATIENT
Start: 2017-04-12 | End: 2017-04-13 | Stop reason: HOSPADM

## 2017-04-12 RX ADMIN — PIPERACILLIN SODIUM AND TAZOBACTAM SODIUM 4.5 G: 4; .5 INJECTION, POWDER, FOR SOLUTION INTRAVENOUS at 02:04

## 2017-04-12 RX ADMIN — FUROSEMIDE 40 MG: 40 TABLET ORAL at 10:04

## 2017-04-12 RX ADMIN — RIVAROXABAN 10 MG: 10 TABLET, FILM COATED ORAL at 09:04

## 2017-04-12 RX ADMIN — ATORVASTATIN CALCIUM 40 MG: 40 TABLET, FILM COATED ORAL at 09:04

## 2017-04-12 RX ADMIN — ASPIRIN 81 MG: 81 TABLET, COATED ORAL at 09:04

## 2017-04-12 RX ADMIN — SODIUM CHLORIDE, PRESERVATIVE FREE 10 ML: 5 INJECTION INTRAVENOUS at 06:04

## 2017-04-12 RX ADMIN — CARVEDILOL 25 MG: 25 TABLET, FILM COATED ORAL at 10:04

## 2017-04-12 RX ADMIN — SODIUM CHLORIDE, PRESERVATIVE FREE 10 ML: 5 INJECTION INTRAVENOUS at 12:04

## 2017-04-12 RX ADMIN — INSULIN ASPART 2 UNITS: 100 INJECTION, SOLUTION INTRAVENOUS; SUBCUTANEOUS at 02:04

## 2017-04-12 RX ADMIN — PIPERACILLIN SODIUM AND TAZOBACTAM SODIUM 4.5 G: 4; .5 INJECTION, POWDER, FOR SOLUTION INTRAVENOUS at 11:04

## 2017-04-12 RX ADMIN — OXYCODONE HYDROCHLORIDE AND ACETAMINOPHEN 500 MG: 500 TABLET ORAL at 09:04

## 2017-04-12 RX ADMIN — AMIODARONE HYDROCHLORIDE 200 MG: 200 TABLET ORAL at 09:04

## 2017-04-12 RX ADMIN — Medication 1 TABLET: at 09:04

## 2017-04-12 RX ADMIN — METOCLOPRAMIDE HYDROCHLORIDE 5 MG: 5 TABLET ORAL at 06:04

## 2017-04-12 RX ADMIN — METOCLOPRAMIDE HYDROCHLORIDE 5 MG: 5 TABLET ORAL at 04:04

## 2017-04-12 RX ADMIN — Medication 220 MG: at 09:04

## 2017-04-12 RX ADMIN — RAMELTEON 8 MG: 8 TABLET, FILM COATED ORAL at 09:04

## 2017-04-12 RX ADMIN — OXYCODONE AND ACETAMINOPHEN 1 TABLET: 5; 325 TABLET ORAL at 05:04

## 2017-04-12 RX ADMIN — POTASSIUM CHLORIDE 20 MEQ: 20 TABLET, EXTENDED RELEASE ORAL at 11:04

## 2017-04-12 RX ADMIN — PIPERACILLIN SODIUM AND TAZOBACTAM SODIUM 4.5 G: 4; .5 INJECTION, POWDER, FOR SOLUTION INTRAVENOUS at 12:04

## 2017-04-12 RX ADMIN — PIPERACILLIN SODIUM AND TAZOBACTAM SODIUM 4.5 G: 4; .5 INJECTION, POWDER, FOR SOLUTION INTRAVENOUS at 06:04

## 2017-04-12 RX ADMIN — POTASSIUM & SODIUM PHOSPHATES POWDER PACK 280-160-250 MG 1 PACKET: 280-160-250 PACK at 09:04

## 2017-04-12 RX ADMIN — TAMSULOSIN HYDROCHLORIDE 0.4 MG: 0.4 CAPSULE ORAL at 09:04

## 2017-04-12 RX ADMIN — POTASSIUM CHLORIDE 20 MEQ: 20 TABLET, EXTENDED RELEASE ORAL at 02:04

## 2017-04-12 RX ADMIN — AMLODIPINE BESYLATE 10 MG: 5 TABLET ORAL at 10:04

## 2017-04-12 RX ADMIN — METOCLOPRAMIDE HYDROCHLORIDE 5 MG: 5 TABLET ORAL at 10:04

## 2017-04-12 RX ADMIN — OXYCODONE AND ACETAMINOPHEN 1 TABLET: 5; 325 TABLET ORAL at 09:04

## 2017-04-12 RX ADMIN — SODIUM CHLORIDE, PRESERVATIVE FREE 3 ML: 5 INJECTION INTRAVENOUS at 06:04

## 2017-04-12 RX ADMIN — CARVEDILOL 25 MG: 25 TABLET, FILM COATED ORAL at 09:04

## 2017-04-12 RX ADMIN — SODIUM CHLORIDE, PRESERVATIVE FREE 3 ML: 5 INJECTION INTRAVENOUS at 09:04

## 2017-04-12 RX ADMIN — SODIUM CHLORIDE, PRESERVATIVE FREE 10 ML: 5 INJECTION INTRAVENOUS at 11:04

## 2017-04-12 RX ADMIN — SODIUM CHLORIDE, PRESERVATIVE FREE 3 ML: 5 INJECTION INTRAVENOUS at 02:04

## 2017-04-12 NOTE — PLAN OF CARE
Problem: Patient Care Overview  Goal: Plan of Care Review  Outcome: Ongoing (interventions implemented as appropriate)  Patient on RA with sats as documented.  Will continue to monitor.

## 2017-04-12 NOTE — PROGRESS NOTES
Pt complains of intermittent pain to right hip, pain med given. Pt gonzalez changed per MD order. Pt dressing changed per wound [place] nurse.

## 2017-04-12 NOTE — PROGRESS NOTES
Sacral dressing already changed following large BM per DAYSI Casillas. Dressing changed to Right Posterior Lateral Leg I&D site POD # 4. Noted some yellow slough to wound bed. Moderate amount of sanguinous drainage. Repacked with mesalt x 2. MD Irizarry would like to apply wound vac. Will apply tomorrow.

## 2017-04-12 NOTE — PROGRESS NOTES
[facility] Resident HO-III Consult Progress Note    Subjective:      No issues this AM. No complaints.  Denies sob, abd pain, n/v/d/c. Denies leg swelling or pain. Denies fevers/chills.     Objective:   Last 24 Hour Vital Signs:  BP  Min: 97/53  Max: 117/58  Temp  Av.8 °F (36.6 °C)  Min: 97.4 °F (36.3 °C)  Max: 98 °F (36.7 °C)  Pulse  Av.4  Min: 58  Max: 68  Resp  Av  Min: 15  Max: 17  SpO2  Av.6 %  Min: 91 %  Max: 98 %  I/O last 3 completed shifts:  In: 800 [P.O.:300; IV Piggyback:500]  Out: 2350 [Urine:2350]    Physical Examination:  General: alert, cooperative, NAD  Head: Normocephalic, without obvious abnormality, atraumatic  Eyes: conjunctivae/corneas clear, EOM's intact grossly   Nose: Nares normal; septum midline; mucosa normal;   Lung: clear to auscultation throughout; no increased work of breathing  Heart: regular rate and rhythm, no M/R/G, normal S1/S2.  Abdomen: soft, non-tender; bowel sounds normal; peg tube in place   Extremities: extremities normal, atraumatic, no cyanosis or edema, dec periph pulses  Skin: decub ulcers not-visualized    Laboratory:  Laboratory Data Reviewed: yes  Recent Labs      04/10/17   0542  17   WBC  10.29  8.77   HGB  8.8*  8.8*   HCT  27.6*  27.4*   PLT  328  317   MCV  87  87   RDW  15.0*  15.2*   GRAN  61.7  6.4  58.2  5.0   LYMPH  25.5  2.6  27.4  2.4   MONO  8.9  0.9  10.9  1.0   EOS  0.3  0.3   BASO  0.03  0.03   EOSINOPHIL  2.7  3.2   BASOPHIL  0.3  0.3     Recent Labs      04/10/17   0542  17   0528   NA  132*  134*   K  4.1  3.9   CL  100  101   CO2  27  27   BUN  21  24*   CREATININE  0.9  0.9   GLU  99  101     Recent Labs      04/10/17   2024  04/10/17   2116  17   0531  17   1601  17   POCTGLUCOSE  128*  128*  117*  120*  118*     Recent Labs      04/10/17   0542  17   0528   PROT  6.2  6.3   ALBUMIN  2.0*  2.0*   BILITOT  0.3  0.2   AST  17  17   ALT  18  16   ALKPHOS  94  87        Microbiology Results (last 7 days)     Procedure Component Value Units Date/Time    Blood culture #1 [[id number]] Collected:  04/07/17 1338    Order Status:  Completed Specimen:  Blood from Peripheral, Antecubital, Right Updated:  04/11/17 2212     Blood Culture, Routine No Growth to date     Blood Culture, Routine No Growth to date     Blood Culture, Routine No Growth to date     Blood Culture, Routine No Growth to date     Blood Culture, Routine No Growth to date    Narrative:       Blood Culture #1    Culture, Anaerobe [[id number]] Collected:  04/08/17 1139    Order Status:  Completed Specimen:  Wound from Hip, Right Updated:  04/11/17 1403     Anaerobic Culture --     BACTEROIDES THETAIOTAOMICRON  Many      Aerobic culture [[id number]]  (Susceptibility) Collected:  04/08/17 1139    Order Status:  Completed Specimen:  Wound from Hip, Right Updated:  04/11/17 1034     Aerobic Bacterial Culture --     CITROBACTER FREUNDII COMPLEX  Many      Blood culture [[id number]] Collected:  04/08/17 2332    Order Status:  Completed Specimen:  Blood Updated:  04/11/17 0622     Blood Culture, Routine No Growth to date     Blood Culture, Routine No Growth to date     Blood Culture, Routine No Growth to date    Blood culture [[id number]] Collected:  04/08/17 2332    Order Status:  Completed Specimen:  Blood Updated:  04/11/17 0622     Blood Culture, Routine No Growth to date     Blood Culture, Routine No Growth to date     Blood Culture, Routine No Growth to date    Blood culture #2 [[id number]] Collected:  04/07/17 1410    Order Status:  Completed Specimen:  Blood from Peripheral, Antecubital, Left Updated:  04/10/17 0940     Blood Culture, Routine Gram stain aer bottle: Gram positive cocci in clusters resembling Staph      Blood Culture, Routine Results called to and read back by:Jessie Golden RN 04/08/2017  20:37     Blood Culture, Routine --     COAGULASE-NEGATIVE STAPHYLOCOCCUS SPECIES  Organism is a probable contaminant       Narrative:       Blood Culture #2    Gram stain [[id number]] Collected:  04/08/17 1130    Order Status:  Completed Specimen:  Wound from Hip, Right Updated:  04/09/17 5040     Gram Stain Result Rare WBC's      Many Gram positive cocci      Few Gram negative rods        Current Medications:     Infusions:        Scheduled:   amiodarone  200 mg Oral Daily    amlodipine  10 mg Oral Daily    ascorbic acid (vitamin C)  500 mg Oral BID    aspirin  81 mg Oral Daily    atorvastatin  40 mg Oral Daily    carvedilol  25 mg Oral BID    furosemide  40 mg Oral Daily    metoclopramide HCl  5 mg Oral TID AC    multivitamin  1 tablet Oral Daily    piperacillin-tazobactam 4.5 g in dextrose 5 % 100 mL IVPB (ready to mix system)  4.5 g Intravenous Q8H    potassium, sodium phosphates  1 packet Oral BID    ramelteon  8 mg Oral QHS    rivaroxaban  10 mg Oral BID    sodium chloride 0.9%  3 mL Intravenous Q8H    tamsulosin  0.4 mg Oral QHS    zinc sulfate  220 mg Oral Daily        PRN:  albuterol-ipratropium 2.5mg-0.5mg/3mL, benzonatate, dextrose 50%, dextrose 50%, docusate, glucagon (human recombinant), glucose, glucose, ibuprofen, flu vacc [id number] 65yr up(PF), insulin aspart, ondansetron, oxycodone-acetaminophen, pneumoc 13-clementina conj-dip cr(PF)    Assessment:     Asad Perez is a 77 y.o.male with  Patient Active Problem List    Diagnosis Date Noted    Acute osteomyelitis 04/11/2017    Decubitus ulcer of buttock, stage 2 04/07/2017    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    Urinary tract infection associated with indwelling urethral catheter 12/01/2016    Centrilobular emphysema 11/29/2016    Symptomatic anemia 11/27/2016    Deep vein thrombosis (DVT) of brachial vein     Non-healing ulcer of lower leg 10/28/2016    Fever     Sacral decubitus ulcer 10/05/2016    Deep vein thrombosis (DVT) of upper extremity 10/05/2016    Dehiscence of perineal wound 10/05/2016    Hyponatremia 10/05/2016     VRE (vancomycin-resistant Enterococci) infection 09/20/2016    Elevated liver enzymes     Chronic diastolic congestive heart failure     Critical lower limb ischemia 09/10/2016    Stenosis of left internal carotid artery 09/08/2016    Abnormal finding on imaging 08/31/2016    Abnormal CT of the abdomen     Chronic kidney disease, stage V 08/12/2016    Anemia 08/12/2016    Anemia of chronic disease 08/08/2016    Altered mental status 08/07/2016    MRSA (methicillin resistant Staphylococcus aureus) infection 08/07/2016    Confusion     Weak     Pneumonia 07/29/2016    History of atrial fibrillation     Infected prosthetic knee joint 07/12/2016    Right knee pain 06/15/2016    GERD (gastroesophageal reflux disease) 02/28/2015    Esophageal web 02/28/2015    Acute on chronic diastolic heart failure 11/08/2014    Transaminitis 11/08/2014    Chronic obstructive pulmonary disease 11/07/2014    Dyspnea 11/06/2014    Normocytic anemia 11/06/2014    Dysphagia 11/03/2014    Osteoarthritis of left knee 07/14/2014    Essential hypertension 07/16/2013    CKD (chronic kidney disease) 07/16/2013    PVD (peripheral vascular disease) 07/16/2013    CAD (coronary artery disease) 07/16/2013    History of stroke 07/16/2013    Physical deconditioning 07/16/2013    Alcohol abuse 07/16/2013     Plan:     Chronic Decubitus Ulcers now w/ass'd Osteomyelitis s/p I&D  - patient afebrile without leukocytosis on admission  - surgical management per Dr. Irizarry   - culture growing pan-sensitive Citrobacter Freundii  - ID consulted & recommending 8 weeks q6h Zosyn therapy  will need SNF  - PICC consult placed today     Essential Hypertension  - patient with stable BP on admission  continue home amlodipine, coreg, lasix  - stable, will monitor      DM II Uncontrolled  - last a1c 7.9 11/16  no home medication listed   - will cover with SSI      AoCD  - H/H on admission 10.6/34.2 with no signs of active bleed  - will  continue to monitor      Chronic Deconditioning - pending SNF referral, PT/OT eval  - patient with home health  limited mobiltiy with several recent hospitalizations likely cause of slow progression of decubitus wounds  - will need conversation with family about ability to care for patient at home adequately given functional status limitations  - PT/OT consulted for eval    Hyponatremia  - patient with h/o hyponatremia, not needing treatment  - improved 4/11  - will monitor     COPD - stable  - no acute issues  - continue dounebs PRN for wheezing     BPH - stable  - gonzalez in place  - continue home flomax     HFpEF - stable  - last ECHO 11/16: diastolic dysfucntion, EF 65%  - continue home coreg, lasix, ASA  - no acute issues      CAD - stable  - denied chest pain, shortness of breath at admit  - continue ASA, lipitor      PVD - stable  - decreased peripheral pulses  - continue home ASA, lipitor      History of CVA - stable  - continue home ASA, lipitor      Atrial Fibrillation - stable  - continue home amiodarone, xarelto      Thank you for allowing us to participate in the care of this patient. Please contact me at [phone] if you have any questions regarding this consult.     Nnamdi Carmona  [facility] Internal Medicine HO-III     [facility] Medicine Hospitalist Pager Numbers:   [facility] Hospitalist Medicine Team A (Chay/Shawanda): [phone]  [facility] Hospitalist Medicine Team B (Gloria/Alex): [phone]

## 2017-04-12 NOTE — PLAN OF CARE
Problem: Occupational Therapy Goal  Goal: Occupational Therapy Goal  Outcome: Outcome(s) achieved Date Met:  04/12/17  OT eval performed, report to follow     Pt appears to be at baseline.  No skilled OT services identified at this time.     May benefit from Radhika LIft at home.     Discharge OT services

## 2017-04-12 NOTE — PROGRESS NOTES
Dressing changed to R lateral posterior thigh wound. Wound bed red/moist/clean. No malodor. Small amount of serosanguineous drainage. Will probably transition patient to QOD dressing changes after tomorrow's dressing change. Healing Stage IV to Sacrum unchanged. Stage I/DTI to posterior right thigh inferior to surgical wound. See that wound in that area has been documented since 10/2016. Restarted LDA and placed mepilex. Air mattress ordered. Patient turned to left side using pillow prop following wound care.

## 2017-04-12 NOTE — PT/OT/SLP EVAL
"Occupational Therapy  Evaluation/Discharge    Asad Perez   MRN: [id number]   Admitting Diagnosis: <principal problem not specified>    OT Date of Treatment: 04/12/17   OT Start Time: 1002  OT Stop Time: 1019  OT Total Time (min): 17 min    Billable Minutes:  Evaluation 17     Diagnosis: <principal problem not specified>   S/p debridement of R hip wound    Past Medical History:   Diagnosis Date    Alcohol abuse     CHF (congestive heart failure)     CKD (chronic kidney disease) stage 3, GFR 30-59 ml/min     Coronary artery disease     Heart failure, diastolic     High cholesterol     Hypertension     LBP (low back pain)     Prediabetes     PVD (peripheral vascular disease)     Stroke     2006, no deficits      Past Surgical History:   Procedure Laterality Date    aortic bifemoral bypass  2006    bilateral carotid stenois  2006    carotid stents      JOINT REPLACEMENT      knee    left common endarterectomy  2006    RT AKA  NOV 2017       General Precautions: Standard, fall  Orthopedic Precautions:    Braces:            Patient History:Prior level of function:   Living Environment  Living Environment Comment: Pt reports he lives at home w/his wife, his MONAE comes over daily and puts him WC. He stays up "for about an hour" then MONAE puts him back in bed. Pt has hospital bed, WC that he uses, has BSC, sh chair, however wife gives pt bed bath and pt uses diaper for dependent toileting. Pt wears hospital gown in bed, dep to agustin shoe/sock. Pt reports his wife shaves him, washes his face and cleans his dentures for him.. Pt feeds himself. Pt requires assistance for bed mobility. Reports once he is up in WC, he can push himself around.               Dominant hand: right    Subjective:  Communicated with nurse prior to session.  Pt has bedrest order. Bed level eval performed. Note left for MD    "they need to take this tube out of my stomach"  Chief Complaint: Rt hip pain  Patient/Family stated goals: pt " states he wants to return home    Pain Ratin/10  Location - Side: Right  Location - Orientation: generalized  Location: hip  Pain Addressed: Pre-medicate for activity, Reposition, Distraction  Pain Rating Post-Intervention: 7/10    Objective:       Cognitive Exam:  Oriented to: Person, Place, Time and Situation  Follows Commands/attention: Follows one-step commands  Communication: clear/fluent  Memory:  Impaired STM  Safety awareness/insight to disability: impaired  Coping skills/emotional control: Appropriate to situation    Visual/perceptual:  Appears grossly intact    Physical Exam:  Postural examination/scapula alignment: NT  Skin integrity: Bruising of extremities and Wound rt hip  Edema: None noted     Sensation:   Grossly intact BUE    Upper Extremity Range of Motion:  Right Upper Extremity: WFL except limited at shoulder  Left Upper Extremity: WFL except limited at shoulder    Upper Extremity Strength:  Right Upper Extremity: Deficits: , 3 to 3+/5 except, grossly 2+/5 shoulder  Left Upper Extremity: Deficits: , 3 to 3+/5 except, grossly 2+/5 shoulder   Strength: WFL    Fine motor coordination:   Grossly intact    Gross motor coordination: impaired    Functional Mobility:  Bed Mobility:  Rolling/Turning to Left: Minimum assistance  Rolling/Turning Right: Total assistance  Scooting/Bridging: Total Assistance    Transfers: NT 2/2 bedrest orders       Functional Ambulation: NT    Activities of Daily Living:       Therapeutic Activities and Exercises:  Pt educated on BUE AROM ex    AM-PAC 6 CLICK ADL  How much help from another person does this patient currently need?  1 = Unable, Total/Dependent Assistance  2 = A lot, Maximum/Moderate Assistance  3 = A little, Minimum/Contact Guard/Supervision  4 = None, Modified [place]/Independent    Putting on and taking off regular lower body clothing? : 1  Bathing (including washing, rinsing, drying)?: 2  Toileting, which includes using toilet, bedpan, or  "urinal? : 1  Putting on and taking off regular upper body clothing?: 2  Taking care of personal grooming such as brushing teeth?: 3  Eating meals?: 3  Total Score: 12    AM-PAC Raw Score CMS "G-Code Modifier Level of Impairment Assistance   6 % Total / Unable   7 - 9 CM 80 - 100% Maximal Assist   10 - 14 CL 60 - 80% Moderate Assist   15 - 19 CK 40 - 60% Moderate Assist   20 - 22 CJ 20 - 40% Minimal Assist   23 CI 1-20% SBA / CGA   24 CH 0% Independent/ Mod I       Patient left HOB elevated with all lines intact, call button in reach and nurse notified    Assessment:  Asad Perez is a 77 y.o. male with a medical diagnosis of <principal problem not specified> and presents with decreased strength, AROM, skin integrity, endurance, indep all ADLs and mobility at baseline.  Pt appears to be at baseline level of function.  No skilled OT services identified at this time.    Rehab identified problem list/impairments: Rehab identified problem list/impairments: weakness, impaired functional mobilty, decreased safety awareness, impaired coordination, impaired endurance, gait instability, decreased coordination, pain, impaired balance, decreased upper extremity function, impaired skin, impaired self care skills, decreased lower extremity function, decreased ROM    Rehab potential is poor.    Activity tolerance: Poor    Discharge recommendations: Discharge Facility/Level Of Care Needs: home (with wife; if pt needs more care then [facility])     Barriers to discharge: Barriers to Discharge: Decreased caregiver support    Equipment recommendations: lift device     GOALS:   Occupational Therapy Goals     Not on file      Multidisciplinary Problems (Resolved)        Problem: Occupational Therapy Goal    Goal Priority Disciplines Outcome Interventions   Occupational Therapy Goal   (Resolved)     OT, PT/OT Outcome(s) achieved              PLAN:  Discharge OT   Plan of Care reviewed with: patient         Yury TOMLINSON Gume, " OT  04/12/2017

## 2017-04-12 NOTE — PROGRESS NOTES
[facility] admit coordinator, Amado present at bedside today and assessed patient for possible admission.     [facility] declined referral via [facility], cannot meet need.    [facility] declined referral, no available bed.    [facility] has referral under review.

## 2017-04-12 NOTE — PLAN OF CARE
Problem: Patient Care Overview  Goal: Plan of Care Review  Outcome: Ongoing (interventions implemented as appropriate)  Patient resting in bed, disoriented to time. Medications administered as ordered. Complaints of some pain to right hip, relieved with PRN medication. Patient reported relief from cough after PRN tessalon lloyd. Asleep for majority of shift. Patient turned throughout the night and placed on air mattress. Mendoza catheter in place draining clear yellow urine. Encouraged to call with needs or concerns. Will continue to monitor.

## 2017-04-12 NOTE — PT/OT/SLP EVAL
Physical Therapy  Evaluation    Asad Perez   MRN: [id number]   Admitting Diagnosis: <principal problem not specified>    PT Received On: 17  PT Start Time: 1006     PT Stop Time: 1023    PT Total Time (min): 17 min       Billable Minutes:  Evaluation 17    Diagnosis: <principal problem not specified>      Past Medical History:   Diagnosis Date    Alcohol abuse     CHF (congestive heart failure)     CKD (chronic kidney disease) stage 3, GFR 30-59 ml/min     Coronary artery disease     Heart failure, diastolic     High cholesterol     Hypertension     LBP (low back pain)     Prediabetes     PVD (peripheral vascular disease)     Stroke     2006, no deficits      Past Surgical History:   Procedure Laterality Date    aortic bifemoral bypass  2006    bilateral carotid stenois  2006    carotid stents      JOINT REPLACEMENT      knee    left common endarterectomy      RT AKA  2017         General Precautions: Standard, fall  Orthopedic Precautions: N/A   Braces: N/A       Do you have any cultural, spiritual, Sikh conflicts, given your current situation?: none    Patient History:  Lives With: spouse  Living Arrangements: house  Home Accessibility:  (no concerns)  Transportation Available: family or friend will provide  Living Environment Comment: pt reports MONAE gets him up to W/C for about and hour /day and that he can propel his W/C in the house using B UE's.  Pt is dependent for all ADL's except feeding  Equipment Currently Used at Home: wheelchair, hospital bed  DME owned (not currently used): rolling walker, bedside commode and shower chair    Previous Level of Function:  Ambulation Skills: unable to perform  Transfer Skills: needs assist  ADL Skills: needs assist    Subjective:  Communicated with nsg prior to session.  Pt agreeable to eval  Chief Complaint: pain  Patient goals: to go home with my wife    Pain Ratin/10            Pain Addressed: Pre-medicate for activity,  Reposition, Distraction, Cessation of Activity       Objective:         Cognitive Exam:  Oriented to: Person, Place, Time and Situation    Follows Commands/attention: Follows one-step commands  Communication: clear/fluent  Safety awareness/insight to disability: impaired    Physical Exam:  Postural examination/scapula alignment: Rounded shoulder and Head forward    Skin integrity: R hip and sacral wound with dressings intact  Edema: None noted     Sensation:   N/T      Lower Extremity Range of Motion:  Right Lower Extremity: WFL  Left Lower Extremity: hip/knee/ankle contractures  Lower Extremity Strength:  Right Lower Extremity: WFL  Left Lower Extremity: grossly 3-/5     Fine motor coordination:  impaired    Gross motor coordination: impaired    Functional Mobility:  Bed Mobility:  Rolling/Turning to Left: Minimum assistance (VCfor use of BR)  Rolling/Turning Right: Minimum assistance (VC for use of BR)  Scooting/Bridging: Total Assistance, With assist of 2 (in supine to HOB)  Supine to Sit:  (activity did not occur 2/2 bed rest orders)    Transfers:  Sit <> Stand Assistance:  (activity did not occur 2/2 Bedrest orders)    Gait:   Gait Distance:  (activity did not occur, pt non-ambulatory)    Stairs:  N/T    Balance:   Static Sit: N/T  Dynamic Sit: N/T  Static Stand: N/T  Dynamic stand: N/T    Therapeutic Activities and Exercises:  Limited eval only 2/2 bedrest orders    AM-PAC 6 CLICK MOBILITY  How much help from another person does this patient currently need?   1 = Unable, Total/Dependent Assistance  2 = A lot, Maximum/Moderate Assistance  3 = A little, Minimum/Contact Guard/Supervision  4 = None, Modified [place]/Independent    Turning over in bed (including adjusting bedclothes, sheets and blankets)?: 2  Sitting down on and standing up from a chair with arms (e.g., wheelchair, bedside commode, etc.): 1  Moving from lying on back to sitting on the side of the bed?: 2  Moving to and from a bed to a chair  (including a wheelchair)?: 2  Need to walk in hospital room?: 1  Climbing 3-5 steps with a railing?: 1  Total Score: 9     AM-PAC Raw Score CMS G-Code Modifier Level of Impairment Assistance   6 % Total / Unable   7 - 9 CM 80 - 100% Maximal Assist   10 - 14 CL 60 - 80% Moderate Assist   15 - 19 CK 40 - 60% Moderate Assist   20 - 22 CJ 20 - 40% Minimal Assist   23 CI 1-20% SBA / CGA   24 CH 0% Independent/ Mod I     Patient left HOB elevated with call button in reach, bed alarm on and nsg notified.    Assessment:   Asad Perez is a 77 y.o. male with a medical diagnosis of <principal problem not specified> and presents with decreased functional independence 2/2 R AKA, deconditioning, and gen weakness.  Patient has been non-ambulatory since 8/16 and is now W/C bound at baseline.  Pt cold benefit from PT services to address deficits below in order to maximize function prior to D/C .    Rehab identified problem list/impairments: Rehab identified problem list/impairments: weakness, impaired endurance, impaired self care skills, impaired functional mobilty, decreased coordination, impaired balance, decreased upper extremity function, decreased lower extremity function, decreased safety awareness, pain, impaired skin, impaired fine motor, impaired coordination, decreased ROM, impaired joint extensibility, impaired muscle length    Rehab potential is poor.    Activity tolerance: Poor    Discharge recommendations: Discharge Facility/Level Of Care Needs: home with home health (if 24hr care unavailable the NH placemetn(care home))     Barriers to discharge: Barriers to Discharge: None    Equipment recommendations: Equipment Needed After Discharge: lift device     GOALS:   Physical Therapy Goals        Problem: Physical Therapy Goal    Goal Priority Disciplines Outcome Goal Variances Interventions   Physical Therapy Goal     PT/OT, PT Ongoing (interventions implemented as appropriate)     Description:  Goals to be  met by: 2017     Patient will increase functional independence with mobility by performin. Supine to sit to be assessed  2. Rolling to Left and Right with Contact Guard Assistance and Minimal Assistance.  3. Bed to chair transfer to be assessed  4. Wheelchair propulsion to be assessed  5. EOB sit to be assessed              PLAN:    Patient to be seen 3 x/week for ongoing mobility assessment and  to address the above listed problems via therapeutic activities, therapeutic exercises, neuromuscular re-education, wheelchair management/training  Plan of Care expires: 17  Plan of Care reviewed with: patient    Functional Assessment Tool Used: AM-PAC  Score: 9  Functional Limitation: Changing and maintaining body position  Changing and Maintaining Body Position Current Status (): CM  Changing and Maintaining Body Position Goal Status (): MARIANNA Contreras, PT  2017

## 2017-04-12 NOTE — PLAN OF CARE
Problem: Physical Therapy Goal  Goal: Physical Therapy Goal  Goals to be met by: 2017     Patient will increase functional independence with mobility by performin. Supine to sit to be assessed  2. Rolling to Left and Right with Contact Guard Assistance and Minimal Assistance.  3. Bed to chair transfer to be assessed  4. Wheelchair propulsion to be assessed  5. EOB sit to be assessed  Outcome: Ongoing (interventions implemented as appropriate)  Limited functional PT evaluation performed 2/2 bedrest orders.  Pt may benefit from PT services 3x/wk in order to maximize function prior to D/C.  24hr care, HHPT and camryn lift recommended.  If 24hr care unavailable, then senior care NH placement is appropriate.

## 2017-04-13 VITALS
HEART RATE: 63 BPM | HEIGHT: 68 IN | SYSTOLIC BLOOD PRESSURE: 126 MMHG | DIASTOLIC BLOOD PRESSURE: 61 MMHG | RESPIRATION RATE: 16 BRPM | TEMPERATURE: 98 F | BODY MASS INDEX: 19.7 KG/M2 | WEIGHT: 130 LBS | OXYGEN SATURATION: 94 %

## 2017-04-13 LAB
ALBUMIN SERPL BCP-MCNC: 1.9 G/DL
ALP SERPL-CCNC: 86 U/L
ALT SERPL W/O P-5'-P-CCNC: 17 U/L
ANION GAP SERPL CALC-SCNC: 6 MMOL/L
AST SERPL-CCNC: 16 U/L
BACTERIA SPEC ANAEROBE CULT: NORMAL
BACTERIA SPEC ANAEROBE CULT: NORMAL
BASOPHILS # BLD AUTO: 0.03 K/UL
BASOPHILS NFR BLD: 0.3 %
BILIRUB SERPL-MCNC: 0.3 MG/DL
BUN SERPL-MCNC: 22 MG/DL
CALCIUM SERPL-MCNC: 8.3 MG/DL
CHLORIDE SERPL-SCNC: 101 MMOL/L
CO2 SERPL-SCNC: 29 MMOL/L
CREAT SERPL-MCNC: 1.1 MG/DL
DIFFERENTIAL METHOD: ABNORMAL
EOSINOPHIL # BLD AUTO: 0.4 K/UL
EOSINOPHIL NFR BLD: 4.6 %
ERYTHROCYTE [DISTWIDTH] IN BLOOD BY AUTOMATED COUNT: 15.6 %
EST. GFR  (AFRICAN AMERICAN): >60 ML/MIN/1.73 M^2
EST. GFR  (NON AFRICAN AMERICAN): >60 ML/MIN/1.73 M^2
GLUCOSE SERPL-MCNC: 126 MG/DL
HCT VFR BLD AUTO: 24.1 %
HGB BLD-MCNC: 7.9 G/DL
LYMPHOCYTES # BLD AUTO: 1.9 K/UL
LYMPHOCYTES NFR BLD: 20.2 %
MCH RBC QN AUTO: 28.5 PG
MCHC RBC AUTO-ENTMCNC: 32.8 %
MCV RBC AUTO: 87 FL
MONOCYTES # BLD AUTO: 1 K/UL
MONOCYTES NFR BLD: 10.3 %
NEUTROPHILS # BLD AUTO: 6.1 K/UL
NEUTROPHILS NFR BLD: 64 %
PLATELET # BLD AUTO: 289 K/UL
PMV BLD AUTO: 8.1 FL
POCT GLUCOSE: 147 MG/DL (ref 70–110)
POCT GLUCOSE: 153 MG/DL (ref 70–110)
POTASSIUM SERPL-SCNC: 4 MMOL/L
PROT SERPL-MCNC: 6.1 G/DL
RBC # BLD AUTO: 2.77 M/UL
SODIUM SERPL-SCNC: 136 MMOL/L
WBC # BLD AUTO: 9.55 K/UL

## 2017-04-13 PROCEDURE — 97530 THERAPEUTIC ACTIVITIES: CPT

## 2017-04-13 PROCEDURE — 94761 N-INVAS EAR/PLS OXIMETRY MLT: CPT

## 2017-04-13 PROCEDURE — 63600175 PHARM REV CODE 636 W HCPCS: Performed by: INTERNAL MEDICINE

## 2017-04-13 PROCEDURE — 25000003 PHARM REV CODE 250: Performed by: STUDENT IN AN ORGANIZED HEALTH CARE EDUCATION/TRAINING PROGRAM

## 2017-04-13 PROCEDURE — 25000003 PHARM REV CODE 250: Performed by: SURGERY

## 2017-04-13 PROCEDURE — 63600175 PHARM REV CODE 636 W HCPCS: Performed by: STUDENT IN AN ORGANIZED HEALTH CARE EDUCATION/TRAINING PROGRAM

## 2017-04-13 PROCEDURE — 86580 TB INTRADERMAL TEST: CPT | Performed by: STUDENT IN AN ORGANIZED HEALTH CARE EDUCATION/TRAINING PROGRAM

## 2017-04-13 PROCEDURE — 25000003 PHARM REV CODE 250: Performed by: EMERGENCY MEDICINE

## 2017-04-13 PROCEDURE — 97110 THERAPEUTIC EXERCISES: CPT

## 2017-04-13 PROCEDURE — 85025 COMPLETE CBC W/AUTO DIFF WBC: CPT

## 2017-04-13 PROCEDURE — 25000003 PHARM REV CODE 250: Performed by: INTERNAL MEDICINE

## 2017-04-13 PROCEDURE — 80053 COMPREHEN METABOLIC PANEL: CPT

## 2017-04-13 RX ADMIN — POTASSIUM & SODIUM PHOSPHATES POWDER PACK 280-160-250 MG 1 PACKET: 280-160-250 PACK at 08:04

## 2017-04-13 RX ADMIN — OXYCODONE AND ACETAMINOPHEN 1 TABLET: 5; 325 TABLET ORAL at 01:04

## 2017-04-13 RX ADMIN — CARVEDILOL 25 MG: 25 TABLET, FILM COATED ORAL at 08:04

## 2017-04-13 RX ADMIN — Medication 1 TABLET: at 08:04

## 2017-04-13 RX ADMIN — PIPERACILLIN SODIUM AND TAZOBACTAM SODIUM 4.5 G: 4; .5 INJECTION, POWDER, FOR SOLUTION INTRAVENOUS at 04:04

## 2017-04-13 RX ADMIN — ATORVASTATIN CALCIUM 40 MG: 40 TABLET, FILM COATED ORAL at 08:04

## 2017-04-13 RX ADMIN — METOCLOPRAMIDE HYDROCHLORIDE 5 MG: 5 TABLET ORAL at 10:04

## 2017-04-13 RX ADMIN — AMIODARONE HYDROCHLORIDE 200 MG: 200 TABLET ORAL at 08:04

## 2017-04-13 RX ADMIN — ASPIRIN 81 MG: 81 TABLET, COATED ORAL at 08:04

## 2017-04-13 RX ADMIN — METOCLOPRAMIDE HYDROCHLORIDE 5 MG: 5 TABLET ORAL at 04:04

## 2017-04-13 RX ADMIN — SODIUM CHLORIDE, PRESERVATIVE FREE 10 ML: 5 INJECTION INTRAVENOUS at 04:04

## 2017-04-13 RX ADMIN — Medication 220 MG: at 08:04

## 2017-04-13 RX ADMIN — OXYCODONE HYDROCHLORIDE AND ACETAMINOPHEN 500 MG: 500 TABLET ORAL at 08:04

## 2017-04-13 RX ADMIN — FUROSEMIDE 40 MG: 40 TABLET ORAL at 08:04

## 2017-04-13 RX ADMIN — PIPERACILLIN SODIUM AND TAZOBACTAM SODIUM 4.5 G: 4; .5 INJECTION, POWDER, FOR SOLUTION INTRAVENOUS at 06:04

## 2017-04-13 RX ADMIN — OXYCODONE AND ACETAMINOPHEN 1 TABLET: 5; 325 TABLET ORAL at 10:04

## 2017-04-13 RX ADMIN — RIVAROXABAN 10 MG: 10 TABLET, FILM COATED ORAL at 08:04

## 2017-04-13 RX ADMIN — TUBERCULIN PURIFIED PROTEIN DERIVATIVE 5 UNITS: 5 INJECTION INTRADERMAL at 05:04

## 2017-04-13 RX ADMIN — METOCLOPRAMIDE HYDROCHLORIDE 5 MG: 5 TABLET ORAL at 06:04

## 2017-04-13 RX ADMIN — SODIUM CHLORIDE, PRESERVATIVE FREE 10 ML: 5 INJECTION INTRAVENOUS at 05:04

## 2017-04-13 RX ADMIN — AMLODIPINE BESYLATE 10 MG: 5 TABLET ORAL at 08:04

## 2017-04-13 RX ADMIN — SODIUM CHLORIDE, PRESERVATIVE FREE 3 ML: 5 INJECTION INTRAVENOUS at 05:04

## 2017-04-13 RX ADMIN — SODIUM CHLORIDE, PRESERVATIVE FREE 3 ML: 5 INJECTION INTRAVENOUS at 04:04

## 2017-04-13 RX ADMIN — SODIUM CHLORIDE, PRESERVATIVE FREE 10 ML: 5 INJECTION INTRAVENOUS at 12:04

## 2017-04-13 NOTE — PROGRESS NOTES
"Surgery follow up  BP (!) 118/58 (Patient Position: Lying, BP Method: Automatic)  Pulse 64  Temp 97.5 °F (36.4 °C) (Oral)   Resp 18  Ht 5' 8" (1.727 m)  Wt 59 kg (130 lb)  SpO2 (!) 94%  BMI 19.77 kg/m2  I/O last 3 completed shifts:  In: 1694 [P.O.:1494; IV Piggyback:200]  Out: 1000 [Urine:1000]     Wound with slough at the base , wound redressed earlier, will apply wound vac to the hip wound.  "

## 2017-04-13 NOTE — PROGRESS NOTES
made phone contact with [facility] , Amado and inquired regarding status of admission.  Amado reported the wound vac might not arrive today and inquired if they could do a wet to dry dressing until vac arrives.   said she will ask team.     Transition navigatorMagali asked team informing them wound vac might not arrive to [facility] today.  Team B resident said it is okay for patient to discharge to [facility] and will document it for [facility].       faxed Dr Little note to [facility] regarding wound care until vac arrives.  Then made phone contact with Amado at [facility] informed her wound care note attached in [facility].  Amado gave permission for patient to admit today and nurse to call report to [facility] at [phone] prior to discharge.       informed bedside nurseSana number to call report is located on front os ambulance packet and [facility] at 6:00[facility].    Transition navigatorMagali made phone contact with wife and informed her patient is ready for discharge to [facility]. Wife agreed with plan.

## 2017-04-13 NOTE — PLAN OF CARE
Problem: Patient Care Overview  Goal: Plan of Care Review  Patient on RA, no respiratory distress noted. Will continue to monitor.   Outcome: Ongoing (interventions implemented as appropriate)  Patient resting in bed, disoriented only to time. Medication administered as ordered. Patient complained of some pain to right AKA, relieved by PRN medication. Monitoring PICC line site for bleeding. Mendoza in place draining clear yellow urine. Patient had a medium sized BM overnight. Dressing to right hip with small amount of drainage. Encouraged to call with needs or concerns. Will continue to monitor.

## 2017-04-13 NOTE — PLAN OF CARE
[facility]    FACILITY TRANSFER ORDERS      Patient Name: Asad Perez  YOB: 1940    PCP: Tomas Torres MD   PCP Address: [address] [facility] / [place] [address]  PCP Phone Number: [phone]  PCP Fax: [phone]    Encounter Date: 04/13/2017    Admit to: [facility]    Vital Signs:  Routine    Diagnoses:   Active Hospital Problems    Diagnosis  POA    *Decubitus ulcer of buttock, stage 2 [L89.302]  Yes    Acute osteomyelitis [M86.10]  Yes      Resolved Hospital Problems    Diagnosis Date Resolved POA   No resolved problems to display.     Allergies:Review of patient's allergies indicates:  No Known Allergies    Diet: diabetic diet: 2200 calorie and low fat, low cholesterol diet    Activities: Bed rest with bathroom privileges and Up with assistance    Nursing: daily weights from bed     Labs: CBC, CMP, ESR and CRP Once a week for 6 weeks and results faxed to [facility] department.    CONSULTS:    Physical Therapy to evaluate and treat. , Occupational Therapy to evaluate and treat. and  to evaluate for community resources/long-range planning.    MISCELLANEOUS CARE:  Mendoza Care: Empty Mendoza bag every shift. Change Mendoza every month., Routine Skin for Bedridden Patients: Apply moisture barrier cream to all skin folds and wet areas in perineal area daily and after baths and all bowel movements. and Diabetes Care:   SN to perform and educate Diabetic management with blood glucose monitoring:, Fingerstick blood sugar AC and HS and Report CBG < 60 or > 350 to physician.    WOUND CARE ORDERS  Yes: Pressure Ulcer(s) Stage IV :   Location: Sacrum  Apply Miconzazole:  2% cream and Barrier ointment                       Frequency:  Twice Daily                             If incontinent of stool or urine, apply thin layer Barrier cream                   twice daily and PRN to wound         Pressure relief measure:  for pressure  redistribution          and Wound Vac:   Location:  R posterior lateral Leg s/p I&D           Dressing changes every 3rd day. Repack with mesalt PRN.        ET Consult    Medications: Review discharge medications with patient and family and provide education.      Current Discharge Medication List      START taking these medications    Details   PIPERACILLIN SODIUM/TAZOBACTAM (PIPERACILLIN-TAZOBACTAM 4.5G/100ML D5W IVPB, READY TO MIX,) Inject 100 mLs (4.5 g total) into the vein every 8 (eight) hours.  Qty: 100 mL, Refills: PRN         CONTINUE these medications which have NOT CHANGED    Details   albuterol-ipratropium 2.5mg-0.5mg/3mL (DUO-NEB) 0.5 mg-3 mg(2.5 mg base)/3 mL nebulizer solution Take 3 mLs by nebulization every 6 (six) hours while awake.  Qty: 270 mL, Refills: 11      amiodarone (PACERONE) 200 MG Tab Take 1 tablet (200 mg total) by mouth once daily.  Qty: 9 tablet, Refills: 2      amlodipine (NORVASC) 10 MG tablet Take 1 tablet (10 mg total) by mouth once daily.  Qty: 30 tablet, Refills: 11      arginine-glutamine-calcium HMB (GENIE) 7-7-1.5 gram PwPk Take 1 packet by mouth 2 (two) times daily.      ascorbic acid, vitamin C, (VITAMIN C) 500 MG tablet Take 500 mg by mouth 2 (two) times daily.      aspirin (ECOTRIN) 81 MG EC tablet Take 1 tablet (81 mg total) by mouth once daily.  Qty: 30 tablet, Refills: 5      atorvastatin (LIPITOR) 40 MG tablet Take 1 tablet (40 mg total) by mouth once daily.  Qty: 90 tablet, Refills: 3      carvedilol (COREG) 25 MG tablet Take 1 tablet (25 mg total) by mouth 2 (two) times daily.  Qty: 60 tablet, Refills: 11      docusate sodium (COLACE) 250 MG capsule Take 250 mg by mouth 2 (two) times daily as needed for Constipation.      furosemide (LASIX) 20 MG tablet Take 2 tablets (40 mg total) by mouth once daily.  Qty: 30 tablet, Refills: 11         ibuprofen (ADVIL,MOTRIN) 400 MG tablet Take 400 mg by mouth every 6 (six) hours as needed for Other.      lactulose (CHRONULAC)  10 gram/15 mL solution Take 10 g by mouth 2 (two) times daily.      linaclotide 290 mcg Cap Take by mouth once daily.      metoclopramide HCl (REGLAN) 5 MG tablet Take 5 mg by mouth 3 (three) times daily before meals.      multivitamin (THERAGRAN) per tablet Take 1 tablet by mouth once daily.      oxycodone-acetaminophen (PERCOCET) 5-325 mg per tablet Take 1 tablet by mouth every 4 (four) hours as needed for Pain.  Qty: 15 tablet, Refills: 0      potassium, sodium phosphates (PHOS-NAK) 280-160-250 mg PwPk Take 1 packet by mouth 2 (two) times daily.      ramelteon (ROZEREM) 8 mg tablet Take 8 mg by mouth every evening.      rivaroxaban (XARELTO) 10 mg Tab Take 10 mg by mouth 2 (two) times daily.      tamsulosin (FLOMAX) 0.4 mg Cp24 Take 0.4 mg by mouth every evening.         zinc sulfate (ZINCATE) 220 (50) mg capsule Take 220 mg by mouth once daily.           _________________________________  Nnamdi Carmona MD  04/13/2017

## 2017-04-13 NOTE — PLAN OF CARE
Ok for wound care to apply wet-to-dry dressing to wound until wound vac arrives to [facility] tomorrow or Saturday.     Charisse Little, DO  [facility] Internal [facility]/Pediatrics, [facility] Internal Medicine Hospitalist Team B

## 2017-04-13 NOTE — PT/OT/SLP PROGRESS
Physical Therapy  Treatment/Discharge summary    Asad Perez   MRN: [id number]   Admitting Diagnosis: Decubitus ulcer of buttock, stage 2    PT Received On: 17  PT Start Time: 1431     PT Stop Time: 1455    PT Total Time (min): 24 min       Billable Minutes:  Therapeutic Activity 15 and Therapeutic Exercise 9    Treatment Type: Treatment  PT/PTA: PT     PTA Visit Number: 0       General Precautions: Standard, fall  Orthopedic Precautions: N/A   Braces: N/A    Do you have any cultural, spiritual, Sabianist conflicts, given your current situation?: none    Subjective:  Communicated with nsg prior to session.  Pt agreeable to treatment    Pain Ratin/10        Location:  (R hip)  Pain Addressed: Reposition, Distraction, Cessation of Activity, Nurse notified  Pain Rating Post-Intervention: 5/10    Objective:   Patient found with: peripheral IV    Functional Mobility:  Bed Mobility:   Scooting/Bridging: Total Assistance  Supine to Sit: Total Assistance  Sit to Supine: Total Assistance    Transfers:  Sit <> Stand Assistance:  (activity did not occur)    Gait:   Gait Distance: activity did not occur    Stairs:  N/T    Balance:   Static Sit: requires Total A, pushing backward  Dynamic Sit: POOR: N/A  Static Stand: N/A  Dynamic stand: N/A     Therapeutic Activities and Exercises:  Bed mobility as above, Dangle protocol:  Pt sat at EOB with Total A and max VC for weight shift forward over SAMEER and reaching with R UE for footboard of bed approx 8m.  Pt received L HCS [id number] and performed L AP's and TKE's x 10 reps in supine.  Pt instructed to perform there ex in bed throughout the day.  Needs reinforcement.    AM-PAC 6 CLICK MOBILITY  How much help from another person does this patient currently need?   1 = Unable, Total/Dependent Assistance  2 = A lot, Maximum/Moderate Assistance  3 = A little, Minimum/Contact Guard/Supervision  4 = None, Modified [place]/Independent    Turning over in bed (including  adjusting bedclothes, sheets and blankets)?: 2  Sitting down on and standing up from a chair with arms (e.g., wheelchair, bedside commode, etc.): 1  Moving from lying on back to sitting on the side of the bed?: 2  Moving to and from a bed to a chair (including a wheelchair)?: 2  Need to walk in hospital room?: 1  Climbing 3-5 steps with a railing?: 1  Total Score: 9    AM-PAC Raw Score CMS G-Code Modifier Level of Impairment Assistance   6 % Total / Unable   7 - 9 CM 80 - 100% Maximal Assist   10 - 14 CL 60 - 80% Moderate Assist   15 - 19 CK 40 - 60% Moderate Assist   20 - 22 CJ 20 - 40% Minimal Assist   23 CI 1-20% SBA / CGA   24 CH 0% Independent/ Mod I     Patient left HOB elevated with all lines intact, call button in reach and nsg notified.    Assessment:  Asad Perez is a 77 y.o. male with a medical diagnosis of Decubitus ulcer of buttock, stage 2 and presents with decreased functional independence 2/2 prolonged hospital stays, deconditioning, pain, and weakness.  Pt is at functional baseline and is a poor rehab candidate.    Rehab identified problem list/impairments: Rehab identified problem list/impairments: weakness, impaired endurance, impaired functional mobilty, impaired self care skills, impaired balance, decreased lower extremity function, decreased upper extremity function, decreased safety awareness, decreased coordination, pain, impaired coordination, decreased ROM, impaired skin, impaired joint extensibility, impaired muscle length    Rehab potential is poor.    Activity tolerance: Poor    Discharge recommendations: Discharge Facility/Level Of Care Needs:  (Pt being D/C'd to [facility] today)     Barriers to discharge: Barriers to Discharge: None    Equipment recommendations: Equipment Needed After Discharge: lift device     GOALS:   Physical Therapy Goals        Problem: Physical Therapy Goal    Goal Priority Disciplines Outcome Goal Variances Interventions   Physical Therapy Goal      PT/OT, PT Unable to achieve outcome(s) by discharge     Description:  Goals to be met by: 2017     Patient will increase functional independence with mobility by performin. Supine to sit to be assessed  2. Rolling to Left and Right with Contact Guard Assistance and Minimal Assistance.  3. Bed to chair transfer to be assessed  4. Wheelchair propulsion to be assessed  5. EOB sit to be assessed              PLAN:  D/C PT with t/f of care to [facility]  Patient to be seen  (N/A)  to address the above listed problems via  (N/A)  Plan of Care expires:  (N/A)  Plan of Care reviewed with: patient         Megan Contreras, PT  2017

## 2017-04-13 NOTE — PROGRESS NOTES
faxed transfer facility orders and after visit summary to [facility] admit coordinator, Dora to review for admission today.   Then made phone contact with Dora and informed her orders have been attached in [facility].

## 2017-04-13 NOTE — PROGRESS NOTES
[facility] authorization [id number] for 4:00pm

## 2017-04-13 NOTE — PROGRESS NOTES
faxed Percocet prescription via [employer] to [facility] and gave original prescription to nurseSana to placed in ambulance packet.

## 2017-04-13 NOTE — PROGRESS NOTES
Per [facility] admit coordinator, Amado patient has been accepted for admission when medically stable for discharge.  Wife is scheduled to sign admit paper work today at 2:00pm.

## 2017-04-13 NOTE — PROGRESS NOTES
Changed I&D POD # 5 dressing. Pt to d/c today. Did not apply wound vac because of pending transfer to [facility]. PT had medium liquid BM prior to my assessment. Provided kyrie-care and gonzalez care with soap/water. Irrigated Right Lateral Posterior Upper Thigh wound with saline and repacked with Aquacel AG ribbon x 2 and covered with gauze/ABD/medipore cover. Cleaned healing Stage IV to sacrum - noted new bleeding, but otherwise wound bed unchanged (pink/moist close to bone without exposure). Applied gauze/mepilex border. Applied new foam to Right posterior leg DTI. PT remains on air mattress at this time. Turned to right side using pillow props. Left heel floated from pillow. Right AKA intact without any injury.

## 2017-04-13 NOTE — PROGRESS NOTES
[facility] Resident HO-III Consult Progress Note    Subjective:      Complains of bleeding from PICC line site overnight, states it has stopped since early this AM. Denies fevers/chills, sob, cp, palpitations, abd pin.     Objective:   Last 24 Hour Vital Signs:  BP  Min: 116/55  Max: 128/62  Temp  Av.5 °F (36.4 °C)  Min: 97.1 °F (36.2 °C)  Max: 98 °F (36.7 °C)  Pulse  Av.4  Min: 64  Max: 68  Resp  Av  Min: 16  Max: 18  SpO2  Av.2 %  Min: 94 %  Max: 96 %  I/O last 3 completed shifts:  In:  [P.O.:1794; IV Piggyback:200]  Out: 1450 [Urine:1450]    Physical Examination:  General: alert, cooperative, NAD  Head: Normocephalic, without obvious abnormality, atraumatic  Eyes: conjunctivae/corneas clear, EOM's intact grossly   Nose: Nares normal; septum midline; mucosa normal;   Lung: clear to auscultation throughout; no increased work of breathing  Heart: regular rate and rhythm, no M/R/G, normal S1/S2.  Abdomen: soft, non-tender; bowel sounds normal; peg tube in place   Extremities: extremities normal, atraumatic, no cyanosis or edema, dec periph pulses, stable small hematoma to RUE at picc line site, no active bleeding  Skin: decub ulcers not-visualized    Laboratory:  Laboratory Data Reviewed: yes  Recent Labs      17   0528  17   0437  17   0505   WBC  8.77  8.54  9.55   HGB  8.8*  8.4*  7.9*   HCT  27.4*  26.3*  24.1*   PLT  317  322  289   MCV  87  87  87   RDW  15.2*  15.6*  15.6*   GRAN  58.2  5.0  56.5  4.8  64.0  6.1   LYMPH  27.4  2.4  26.6  2.3  20.2  1.9   MONO  10.9  1.0  11.5  1.0  10.3  1.0   EOS  0.3  0.3  0.4   BASO  0.03  0.05  0.03   EOSINOPHIL  3.2  3.6  4.6   BASOPHIL  0.3  0.6  0.3     Recent Labs      17   0528  17   0437  17   0505   NA  134*  135*  136   K  3.9  3.4*  4.0   CL  101  101  101   CO2  27  29  29   BUN  24*  25*  22   CREATININE  0.9  0.9  1.1   GLU  101  104  126*     Recent Labs      17   0525   04/12/17   1131  04/12/17   1607  04/12/17   1956  04/13/17   0422   POCTGLUCOSE  94  204*  176*  181*  153*     Recent Labs      04/11/17   0528  04/12/17   0437  04/13/17   0505   PROT  6.3  6.1  6.1   ALBUMIN  2.0*  1.9*  1.9*   BILITOT  0.2  0.3  0.3   AST  17  17  16   ALT  16 16  17   ALKPHOS  87  83  86       Microbiology Results (last 7 days)     Procedure Component Value Units Date/Time    Blood culture [[id number]] Collected:  04/08/17 2332    Order Status:  Completed Specimen:  Blood Updated:  04/13/17 0622     Blood Culture, Routine No Growth to date     Blood Culture, Routine No Growth to date     Blood Culture, Routine No Growth to date     Blood Culture, Routine No Growth to date     Blood Culture, Routine No Growth to date    Blood culture [[id number]] Collected:  04/08/17 2332    Order Status:  Completed Specimen:  Blood Updated:  04/13/17 0622     Blood Culture, Routine No Growth to date     Blood Culture, Routine No Growth to date     Blood Culture, Routine No Growth to date     Blood Culture, Routine No Growth to date     Blood Culture, Routine No Growth to date    Blood culture #1 [[id number]] Collected:  04/07/17 1338    Order Status:  Completed Specimen:  Blood from Peripheral, Antecubital, Right Updated:  04/12/17 2212     Blood Culture, Routine No growth after 5 days.    Narrative:       Blood Culture #1    Culture, Anaerobe [[id number]] Collected:  04/08/17 1139    Order Status:  Completed Specimen:  Wound from Hip, Right Updated:  04/11/17 1403     Anaerobic Culture --     BACTEROIDES THETAIOTAOMICRON  Many      Aerobic culture [[id number]]  (Susceptibility) Collected:  04/08/17 1139    Order Status:  Completed Specimen:  Wound from Hip, Right Updated:  04/11/17 1034     Aerobic Bacterial Culture --     CITROBACTER FREUNDII COMPLEX  Many      Blood culture #2 [[id number]] Collected:  04/07/17 1410    Order Status:  Completed Specimen:  Blood from Peripheral, Antecubital, Left Updated:  04/10/17  0940     Blood Culture, Routine Gram stain aer bottle: Gram positive cocci in clusters resembling Staph      Blood Culture, Routine Results called to and read back by:Jessie Golden RN 04/08/2017  20:37     Blood Culture, Routine --     COAGULASE-NEGATIVE STAPHYLOCOCCUS SPECIES  Organism is a probable contaminant      Narrative:       Blood Culture #2    Gram stain [[id number]] Collected:  04/08/17 1139    Order Status:  Completed Specimen:  Wound from Hip, Right Updated:  04/09/17 0513     Gram Stain Result Rare WBC's      Many Gram positive cocci      Few Gram negative rods        Current Medications:     Infusions:        Scheduled:   amiodarone  200 mg Oral Daily    amlodipine  10 mg Oral Daily    ascorbic acid (vitamin C)  500 mg Oral BID    aspirin  81 mg Oral Daily    atorvastatin  40 mg Oral Daily    carvedilol  25 mg Oral BID    furosemide  40 mg Oral Daily    metoclopramide HCl  5 mg Oral TID AC    multivitamin  1 tablet Oral Daily    piperacillin-tazobactam 4.5 g in dextrose 5 % 100 mL IVPB (ready to mix system)  4.5 g Intravenous Q8H    potassium, sodium phosphates  1 packet Oral BID    ramelteon  8 mg Oral QHS    rivaroxaban  10 mg Oral BID    sodium chloride 0.9%  10 mL Intravenous Q6H    sodium chloride 0.9%  3 mL Intravenous Q8H    tamsulosin  0.4 mg Oral QHS    zinc sulfate  220 mg Oral Daily        PRN:  albuterol-ipratropium 2.5mg-0.5mg/3mL, benzonatate, dextrose 50%, dextrose 50%, docusate, glucagon (human recombinant), glucose, glucose, ibuprofen, flu vacc [id number] 65yr up(PF), insulin aspart, ondansetron, oxycodone-acetaminophen, pneumoc 13-clementina conj-dip cr(PF), Flushing PICC Protocol **AND** sodium chloride 0.9% **AND** sodium chloride 0.9%    Assessment:     Asad Perez is a 77 y.o.male with  Patient Active Problem List    Diagnosis Date Noted    Acute osteomyelitis 04/11/2017    Decubitus ulcer of buttock, stage 2 04/07/2017    Non-healing wound of amputation stump  12/01/2016    Urinary tract infection associated with indwelling urethral catheter 12/01/2016    Centrilobular emphysema 11/29/2016    Symptomatic anemia 11/27/2016    Deep vein thrombosis (DVT) of brachial vein     Non-healing ulcer of lower leg 10/28/2016    Fever     Sacral decubitus ulcer 10/05/2016    Deep vein thrombosis (DVT) of upper extremity 10/05/2016    Dehiscence of perineal wound 10/05/2016    Hyponatremia 10/05/2016    VRE (vancomycin-resistant Enterococci) infection 09/20/2016    Elevated liver enzymes     Chronic diastolic congestive heart failure     Critical lower limb ischemia 09/10/2016    Stenosis of left internal carotid artery 09/08/2016    Abnormal finding on imaging 08/31/2016    Abnormal CT of the abdomen     Chronic kidney disease, stage V 08/12/2016    Anemia 08/12/2016    Anemia of chronic disease 08/08/2016    Altered mental status 08/07/2016    MRSA (methicillin resistant Staphylococcus aureus) infection 08/07/2016    Confusion     Weak     Pneumonia 07/29/2016    History of atrial fibrillation     Infected prosthetic knee joint 07/12/2016    Right knee pain 06/15/2016    GERD (gastroesophageal reflux disease) 02/28/2015    Esophageal web 02/28/2015    Acute on chronic diastolic heart failure 11/08/2014    Transaminitis 11/08/2014    Chronic obstructive pulmonary disease 11/07/2014    Dyspnea 11/06/2014    Normocytic anemia 11/06/2014    Dysphagia 11/03/2014    Osteoarthritis of left knee 07/14/2014    Essential hypertension 07/16/2013    CKD (chronic kidney disease) 07/16/2013    PVD (peripheral vascular disease) 07/16/2013    CAD (coronary artery disease) 07/16/2013    History of stroke 07/16/2013    Physical deconditioning 07/16/2013    Alcohol abuse 07/16/2013     Plan:     Chronic Decubitus Ulcers now w/ass'd Osteomyelitis s/p I&D  - patient afebrile without leukocytosis on admission  - surgical management per Dr. Irizarry   - culture  growing pan-sensitive Citrobacter Freundii  - ID consulted & recommending 8 weeks q6h Zosyn therapy  will need SNF  - PICC placed 4/12    Essential Hypertension  - patient with stable BP on admission  continue home amlodipine, coreg, lasix  - stable, will monitor      DM II Uncontrolled  - last a1c 7.9 11/16  no home medication listed   - will cover with SSI      AoCD  - H/H on admission 10.6/34.2 with no signs of active bleed  - will continue to monitor      Chronic Deconditioning - pending SNF referral, PT/OT eval  - patient with home health  limited mobiltiy with several recent hospitalizations likely cause of slow progression of decubitus wounds  - will need conversation with family about ability to care for patient at home adequately given functional status limitations  - PT/OT consulted     Hyponatremia  - patient with h/o hyponatremia, not needing treatment  - improved 4/11  - will monitor     COPD - stable  - no acute issues  - continue dounebs PRN for wheezing     BPH - stable  - gonzalez in place  - continue home flomax     HFpEF - stable  - last ECHO 11/16: diastolic dysfucntion, EF 65%  - continue home coreg, lasix, ASA  - no acute issues      CAD - stable  - denied chest pain, shortness of breath at admit  - continue ASA, lipitor      PVD - stable  - decreased peripheral pulses  - continue home ASA, lipitor      History of CVA - stable  - continue home ASA, lipitor      Atrial Fibrillation - stable  - continue home amiodarone, xarelto      Thank you for allowing us to participate in the care of this patient. Please contact me at [phone] if you have any questions regarding this consult.     Nnamdi Carmona  [facility] Internal Medicine HO-III     [facility] Medicine Hospitalist Pager Numbers:   [facility] Hospitalist Medicine Team A (Chay/Shawanda): [phone]  [facility] Hospitalist Medicine Team B (Gloria/Alex): [phone]

## 2017-04-13 NOTE — PROGRESS NOTES
made phone contact with [facility] admit coordinator, Dora to check status of admission.  Dora reported they are having to do a cost analysis on medications before patient can admit.  Dora said she will follow up with  after analysis has been completed.     made phone contact with Jenny at 4:18pm and delayed transportation to 5:30pm.

## 2017-04-13 NOTE — PROGRESS NOTES
TN spoke with patient's wife ,Kassy. Wife states she is on her way to meet with [facility] admissions cordinator to sign paperwork     Future Appointments  Date Time Provider Department Center   5/18/2017 8:30 AM [facility] [address]

## 2017-04-13 NOTE — NURSING
Report phoned to DAYSI Garsia at [facility]. Awaiting ambulance. RN to call wife when pt leaves here and she will meet him at the nursing home to help with transition.

## 2017-04-13 NOTE — PLAN OF CARE
Problem: Physical Therapy Goal  Goal: Physical Therapy Goal  Goals to be met by: 2017     Patient will increase functional independence with mobility by performin. Supine to sit to be assessed  2. Rolling to Left and Right with Contact Guard Assistance and Minimal Assistance.  3. Bed to chair transfer to be assessed  4. Wheelchair propulsion to be assessed  5. EOB sit to be assessed   Outcome: Unable to achieve outcome(s) by discharge  Pt OK for t/f of care to [facility].   PT to sign off.

## 2017-04-13 NOTE — PROGRESS NOTES
Patient bleeding at picc line insertion site. Dressing changed and pressure dressing applied. Will continue to monitor.

## 2017-04-14 ENCOUNTER — PATIENT OUTREACH (OUTPATIENT)
Dept: ADMINISTRATIVE | Facility: CLINIC | Age: 77
End: 2017-04-14
Payer: MEDICARE

## 2017-04-14 LAB
BACTERIA BLD CULT: NORMAL
BACTERIA BLD CULT: NORMAL

## 2017-04-17 NOTE — PROGRESS NOTES
04/14/2017-Spoke with SAIB Nix and patient was discharge from [facility] to [facility] without prescription for Percocet.  Dinah was not able to get in touch with discharge doctor but was able to get in touch with Dr. Michael Irizarry, who was nice enough to faxed over prescription to [facility] but forgot to put a strength on the prescription.  Dinah had to call Dr. Irizarry back and asked him to please add a strength to the prescription and fax to [facility].  When I spoke to Dinah, she advised me it had been about 2 hours since she spoke with Dr. Irizarry.  I told her I would tried and give Dr. Irizarry a call and ask him to fax over the prescription with the strength please.  Left message on Dr. Irizarry voice mail.

## 2017-04-18 NOTE — DISCHARGE SUMMARY
DATE OF ADMISSION:  04/07/2017    DATE OF DISCHARGE:  04/13/2017    HISTORY OF PRESENT ILLNESS:  This 77-year-old male was transferred from nursing   home with infected right hip wound with foul smelling discharge.  The patient   was known diabetic, status post right above-knee amputation with nonhealing   sacral decubitus ulcer and diabetic and multiple medical problems, admitted and   was taken to the Operating Room and underwent extensive excision and   debridement, right hip wound, was treated with IV antibiotics.  The patient is   seen in consultation by Medicine for control of diabetes.  The patient continued   on local wound care with continued IV antibiotics.  The patient was feeling   much better, was started on PT/OT, local wound care.  The patient was discharged   on IV antibiotics, local wound care in a nursing home.  The patient is to   follow with me in the Wound Center in 2 weeks and to call if he had any   problems.  The patient was given prescriptions for pain pill and the all   medicine he was taking at home.      DEDRA  dd: 04/17/2017 12:43:35 (CDT)  td: 04/17/2017 16:47:43 (CDT)  Doc ID   #[id number]  Job ID #[id number]    CC:

## 2017-04-19 NOTE — PHYSICIAN QUERY
PT Name: Asad Perez  MR #: [id number]     Physician Query Form - Documentation Clarification      CDS/: Susanne Odom               Contact information: [email or web address]      This form is a permanent document in the medical record.     Query Date: April 19, 2017    By submitting this query, we are merely seeking further clarification of documentation. Please utilize your independent clinical judgment when addressing the question(s) below.    The Medical record reflects the following:    Supporting Clinical Findings Location in Medical Record     DM II uncontrolled   - last a1c 7.9 11/16   - no home medication listed   - will cover with SSI     Glucose 125H    Consult 4/7          Lab 4/7                                                                                      Doctor, Please specify diagnosis or diagnoses associated with above clinical findings.    Provider Use Only        ( x ) DM II hyperglycemia  (  ) DM II hypoglycemia  (  ) Other (please specify) _______________________                                                                                                                       [  ] Clinically undetermined

## 2017-04-19 NOTE — PHYSICIAN QUERY
PT Name: Asad Perez  MR #: [id number]    Physician Query Form -Integumentary  Clarification     CDS/: Susanne Odom               Contact information: [email or web address]    This form is a permanent document in the medical record.     Query Date: April 19, 2017    By submitting this query, we are merely seeking further clarification of documentation. Please utilize your independent clinical judgment when addressing the question(s) below.    The Medical record contains the following:   Indicator  Supporting Clinical Findings Location in Medical Record    Redness     x Decubitus, Pressure Ulcer, etc. 76 yo CM with PMH etoh abuse, CHF, h/o cva, CKD3, CAD and PVD who was admitted with worsening R hip and     Decubitus ulcer of right hip, unspecified ulcer stage     sacral decubitus ulcer    Decubitus ulcer of buttock, stage 2 Consult 4/11        Op note 4/8    H&P    Care plan 4/13    Deep Tissue Injury     x Wound Care Consult # 2 for I&D of Right Posterior Lateral Thigh. Pt has history of Stage IV to buttocks/sacrum that has had a wound vac. Right AKA healed. No breakdown to left foot or leg.       Right Posterior Lateral Thigh: 9 x 2 x 5 (cm) with undermining of 4.5 (cm) from 7 - 9 o'clock. Moderate amount of dried serosanguinous drainage. Irrigated and repacked with Iodoform gauze. Covered with gauze/ABD/medipore tape.       Healing Stage IV Sacrum: 6 x 5 (cm) yellow/pink with palpable bone; 2.5 x 1 (cm) yellow. Cleaned and applied Mepilex border.  Consult 4/10    Medication      Treatment     x Other Stage 3 pressure ulcer to right upper leg and stage 2 pressure ulcer to sacrum mepilex applied to both. Patient with R AKA Care plan 4/8     National Pressure Ulcer Advisory Panel (2007) Pressure Ulcer Definitions & Stages:    Stage I:  Intact skin with non-blanchable redness of a localized area usually over a bony prominence.   Stage II:  Partial thickness loss of dermis presenting as a shallow open ulcer with a  red pink wound bed, without slough.   Stage III: Full thickness tissue loss. Subcutaneous fat may be visible but bone, tendon or muscle is not exposed. Slough may be present but does not obscure the depth of tissue loss. May include undermining and tunneling.   Stage IV: Full thickness tissue loss with exposed bone, tendon or muscle. Slough or eschar may be present on some parts of the wound bed. Often include undermining and tunneling.   Unstageable:   Full thickness tissue loss in which the base of the ulcer is covered by slough and/or eschar in the wound bed. Until enough slough and/or eschar is removed to expose the base of the wound, the true depth, and therefore stage, cannot be determined.   Deep Tissue Injury: Purple or maroon localized area of discolored intact skin or blood-filled blister due to damage of underlying soft tissue from  pressure and/or shear.     Provider, please specify the diagnosis or diagnoses associated with above clinical findings.      [  ] Decubitus (Pressure) Ulcer / Deep Tissue Injury (please specify site, laterality & stage)    Site Laterality Stage         [  ]Buttock [  ] Right            [  ]Left    [  ]Hip [  ] Right            [  ]Left    [  x]Sacrum        [  ]Other Site (please specify):          [  ] Other Integumentary Diagnosis (please specify): ___________________________________  [  ] Clinically Undetermined    Please document in your progress notes daily for the duration of treatment until resolved and include in your discharge summary.

## 2017-04-28 ENCOUNTER — HOSPITAL ENCOUNTER (OUTPATIENT)
Dept: WOUND CARE | Facility: HOSPITAL | Age: 77
Discharge: HOME | End: 2017-04-28
Attending: SURGERY
Payer: COMMERCIAL

## 2017-04-28 VITALS
SYSTOLIC BLOOD PRESSURE: 136 MMHG | TEMPERATURE: 99 F | HEIGHT: 68 IN | BODY MASS INDEX: 19.7 KG/M2 | DIASTOLIC BLOOD PRESSURE: 60 MMHG | WEIGHT: 130 LBS | HEART RATE: 64 BPM

## 2017-04-28 PROCEDURE — 11043 DBRDMT MUSC&/FSCA 1ST 20/<: CPT

## 2017-04-28 PROCEDURE — 99214 OFFICE O/P EST MOD 30 MIN: CPT | Mod: 25

## 2017-04-28 PROCEDURE — 27201912 HC WOUND CARE DEBRIDEMENT SUPPLIES

## 2017-04-28 PROCEDURE — 97605 NEG PRS WND THER DME<=50SQCM: CPT

## 2017-04-28 NOTE — PROGRESS NOTES
Much of the documentation for this visit was completed in the [facility] system. Please see the attached documentation for further details about this patient's care.     Micahel Irizarry MD

## 2017-04-28 NOTE — PROGRESS NOTES
Much of the documentation for this visit was completed in the [facility] system. Please see the attached documentation for further details about this patient's care.     Maria Aeljandra Perez RN

## 2017-05-04 ENCOUNTER — PATIENT OUTREACH (OUTPATIENT)
Dept: ADMINISTRATIVE | Facility: CLINIC | Age: 77
End: 2017-05-04
Payer: MEDICARE

## 2017-05-04 NOTE — PROGRESS NOTES
C3 nurse attempted to conduct POST ACUTE 2 call with [facility].  Spoke with STEVEN Bess at [facility].  States it would be best to conduct call with discharge CM.  Call transferred.  No answer.  Voicemail with callback number left.

## 2017-05-04 NOTE — PROGRESS NOTES
C3 nurse attempted to conduct POST ACUTE 2 call with [facility].  No answer.  No voicemail.

## 2017-05-05 ENCOUNTER — PATIENT OUTREACH (OUTPATIENT)
Dept: ADMINISTRATIVE | Facility: CLINIC | Age: 77
End: 2017-05-05
Payer: MEDICARE

## 2017-05-05 ENCOUNTER — NURSE TRIAGE (OUTPATIENT)
Dept: ADMINISTRATIVE | Facility: CLINIC | Age: 77
End: 2017-05-05

## 2017-05-05 RX ORDER — AMOXICILLIN AND CLAVULANATE POTASSIUM 875; 125 MG/1; MG/1
1 TABLET, FILM COATED ORAL 2 TIMES DAILY
COMMUNITY
End: 2018-03-16 | Stop reason: CLARIF

## 2017-05-05 NOTE — PATIENT INSTRUCTIONS
Amoxicillin; Clavulanic Acid tablets  What is this medicine?  AMOXICILLIN; CLAVULANIC ACID (a mox i ALFREDO in; MARQUITA italia daysi ic AS id) is a penicillin antibiotic. It is used to treat certain kinds of bacterial infections. It will not work for colds, flu, or other viral infections.  How should I use this medicine?  Take this medicine by mouth with a full glass of water. Follow the directions on the prescription label. Take at the start of a meal. Do not crush or chew. If the tablet has a score line, you may cut it in half at the score line for easier swallowing. Take your medicine at regular intervals. Do not take your medicine more often than directed. Take all of your medicine as directed even if you think you are better. Do not skip doses or stop your medicine early.  Talk to your pediatrician regarding the use of this medicine in children. Special care may be needed.  What side effects may I notice from receiving this medicine?  Side effects that you should report to your doctor or health care professional as soon as possible:  · allergic reactions like skin rash, itching or hives, swelling of the face, lips, or tongue  · breathing problems  · dark urine  · fever or chills, sore throat  · redness, blistering, peeling or loosening of the skin, including inside the mouth  · seizures  · trouble passing urine or change in the amount of urine  · unusual bleeding, bruising  · unusually weak or tired  · white patches or sores in the mouth or throat  Side effects that usually do not require medical attention (report to your doctor or health care professional if they continue or are bothersome):  · diarrhea  · dizziness  · headache  · nausea, vomiting  · stomach upset  · vaginal or anal irritation  What may interact with this medicine?  · allopurinol  · anticoagulants  · birth control pills  · methotrexate  · probenecid  What if I miss a dose?  If you miss a dose, take it as soon as you can. If it is almost time for your  next dose, take only that dose. Do not take double or extra doses.  Where should I keep my medicine?  Keep out of the reach of children.  Store at room temperature below 25 degrees C (77 degrees F). Keep container tightly closed. Throw away any unused medicine after the expiration date.  What should I tell my health care provider before I take this medicine?  They need to know if you have any of these conditions:  · bowel disease, like colitis  · kidney disease  · liver disease  · mononucleosis  · an unusual or allergic reaction to amoxicillin, penicillin, cephalosporin, other antibiotics, clavulanic acid, other medicines, foods, dyes, or preservatives  · pregnant or trying to get pregnant  · breast-feeding  What should I watch for while using this medicine?  Tell your doctor or health care professional if your symptoms do not improve.  Do not treat diarrhea with over the counter products. Contact your doctor if you have diarrhea that lasts more than 2 days or if it is severe and watery.  If you have diabetes, you may get a false-positive result for sugar in your urine. Check with your doctor or health care professional.  Birth control pills may not work properly while you are taking this medicine. Talk to your doctor about using an extra method of birth control.  Date Last Reviewed:   NOTE:This sheet is a summary. It may not cover all possible information. If you have questions about this medicine, talk to your doctor, pharmacist, or health care provider. Copyright© 2016 [employer]

## 2017-05-05 NOTE — TELEPHONE ENCOUNTER
Reason for Disposition   Bloody, black, or tarry bowel movements    Protocols used: ST RECTAL BLEEDING-A-OH    C3 nurse spoke with Asad Perez and wife for a [facility] Post Acute Discharge Follow Up call.  Wife reports pt is having black bowel movements since his discharge.  He has not had a bowel movement today, but had two black bm's yesterday.  Pt reports this began after his discharge from [facility] on 5/3/17, but then states he had this while in SNF also.  Wife reports he had several black bowel movements while at SNF and they were told it was due to medication, but could not name medication.  Advised pt to Go to ED Now (or to Office With PCP Approval).  Pt verbalized understanding.  Wife is upset because she feels these are not new symptoms, as he was having this in SNF.  C3 nurse attempted to connect pt/wife with PCP's office.  No answer.  Office closed at this time or unable to answer line.  C3 nurse contacted [facility] and spoke with Dinah, discharging nurse.  Informed her of pt and wife reporting black stools and that he was having this at SNF, but were told it was due to medication.  Per Dinah, pt/wife never voiced any concerns about black stools and it was not addressed by MD.  Informed wife that PCP's office was not able to be reached and that [facility] reported being unaware of black stools.  Advised her to go to ED now.  She verbalized understanding, but states she has to think about it.  States she will bring pt to ED today.

## 2017-05-05 NOTE — PROGRESS NOTES
C3 nurse attempted to conduct a POST ACUTE 2 call with [facility].  Spoke with Shayy, discharge planner/SUGAR.  States it would be best to speak with one of the staff nurses and they are not available at this time.  She requests a callback later.

## 2017-05-10 ENCOUNTER — HOSPITAL ENCOUNTER (OUTPATIENT)
Dept: WOUND CARE | Facility: HOSPITAL | Age: 77
Discharge: HOME | End: 2017-05-10
Attending: SURGERY
Payer: MEDICARE

## 2017-05-10 VITALS
TEMPERATURE: 97 F | WEIGHT: 130 LBS | BODY MASS INDEX: 19.7 KG/M2 | HEIGHT: 68 IN | SYSTOLIC BLOOD PRESSURE: 107 MMHG | HEART RATE: 62 BPM | DIASTOLIC BLOOD PRESSURE: 53 MMHG

## 2017-05-10 PROCEDURE — 99214 OFFICE O/P EST MOD 30 MIN: CPT

## 2017-05-10 NOTE — PROGRESS NOTES
Much of the documentation for this visit was completed in the Wound Expert system. Please see the attached documentation for further details about this patient's care.     Maria Alejandra Perez RN

## 2017-05-24 ENCOUNTER — HOSPITAL ENCOUNTER (OUTPATIENT)
Dept: WOUND CARE | Facility: HOSPITAL | Age: 77
Discharge: HOME | End: 2017-05-24
Attending: SURGERY
Payer: MEDICARE

## 2017-05-24 VITALS
BODY MASS INDEX: 19.7 KG/M2 | WEIGHT: 130 LBS | SYSTOLIC BLOOD PRESSURE: 126 MMHG | HEIGHT: 68 IN | DIASTOLIC BLOOD PRESSURE: 66 MMHG | HEART RATE: 57 BPM | TEMPERATURE: 98 F

## 2017-05-24 DIAGNOSIS — L89.302 DECUBITUS ULCER OF BUTTOCK, STAGE 2, UNSPECIFIED LATERALITY: Primary | ICD-10-CM

## 2017-05-24 PROCEDURE — 87070 CULTURE OTHR SPECIMN AEROBIC: CPT

## 2017-05-24 PROCEDURE — 97605 NEG PRS WND THER DME<=50SQCM: CPT

## 2017-05-24 PROCEDURE — 87186 SC STD MICRODIL/AGAR DIL: CPT

## 2017-05-24 PROCEDURE — 87075 CULTR BACTERIA EXCEPT BLOOD: CPT

## 2017-05-24 PROCEDURE — 87077 CULTURE AEROBIC IDENTIFY: CPT | Mod: 59

## 2017-05-29 LAB
BACTERIA SPEC AEROBE CULT: NORMAL
BACTERIA SPEC AEROBE CULT: NORMAL
BACTERIA SPEC ANAEROBE CULT: NORMAL

## 2017-06-01 ENCOUNTER — TELEPHONE (OUTPATIENT)
Dept: GASTROENTEROLOGY | Facility: CLINIC | Age: 77
End: 2017-06-01

## 2017-06-01 NOTE — TELEPHONE ENCOUNTER
----- Message from Gaildarya Davison sent at 5/31/2017  4:15 PM CDT -----  Contact: Carmelo Amezcua, [facility] [employer], [phone]  Called in requesting to schedule patient's PEG tube removal. Please advise.

## 2017-06-12 ENCOUNTER — OFFICE VISIT (OUTPATIENT)
Dept: GASTROENTEROLOGY | Facility: CLINIC | Age: 77
End: 2017-06-12
Payer: MEDICARE

## 2017-06-12 VITALS — DIASTOLIC BLOOD PRESSURE: 56 MMHG | SYSTOLIC BLOOD PRESSURE: 120 MMHG | HEART RATE: 54 BPM

## 2017-06-12 DIAGNOSIS — N18.5 CHRONIC KIDNEY DISEASE, STAGE V: ICD-10-CM

## 2017-06-12 DIAGNOSIS — T87.89 NON-HEALING WOUND OF AMPUTATION STUMP: ICD-10-CM

## 2017-06-12 DIAGNOSIS — K21.9 GASTROESOPHAGEAL REFLUX DISEASE, ESOPHAGITIS PRESENCE NOT SPECIFIED: Primary | ICD-10-CM

## 2017-06-12 PROCEDURE — 99214 OFFICE O/P EST MOD 30 MIN: CPT | Mod: S$GLB,,, | Performed by: INTERNAL MEDICINE

## 2017-06-12 PROCEDURE — 1126F AMNT PAIN NOTED NONE PRSNT: CPT | Mod: S$GLB,,, | Performed by: INTERNAL MEDICINE

## 2017-06-12 PROCEDURE — 1159F MED LIST DOCD IN RCRD: CPT | Mod: S$GLB,,, | Performed by: INTERNAL MEDICINE

## 2017-06-12 PROCEDURE — 99999 PR PBB SHADOW E&M-EST. PATIENT-LVL II: CPT | Mod: PBBFAC,,, | Performed by: INTERNAL MEDICINE

## 2017-06-12 RX ORDER — FUROSEMIDE 40 MG/1
TABLET ORAL
Status: ON HOLD | COMMUNITY
Start: 2017-06-05 | End: 2019-01-01 | Stop reason: HOSPADM

## 2017-06-12 RX ORDER — TRAZODONE HYDROCHLORIDE 100 MG/1
TABLET ORAL
COMMUNITY
Start: 2017-05-04 | End: 2018-03-16 | Stop reason: CLARIF

## 2017-06-12 RX ORDER — COLLAGENASE SANTYL 250 [ARB'U]/G
OINTMENT TOPICAL
COMMUNITY
Start: 2017-03-10 | End: 2017-07-20 | Stop reason: ALTCHOICE

## 2017-06-12 NOTE — PATIENT INSTRUCTIONS
Understanding PEG Tube Feeding    Healthcare providers use PEG tube feeding (also called percutaneous endoscopic gastrostomy) when you cant swallow food safely or there is a blockage in your esophagus or stomach. Healthcare providers also use the tube if you cant take enough food by mouth. The feeding tube lets food bypass the mouth and esophagus and go directly into your stomach or small intestine.  The path of food  When you take food by mouth, you chew your food into small pieces and swallow. The food moves down your esophagus into your stomach. From there, it goes into your small intestine and then into your large intestine. Solid waste (stool) is stored in your rectum and passed out through the anus.  How a PEG feeding tube is placed  Your healthcare provider places PEG tubes with the aid of a special instrument called an endoscope. This is a long, flexible, lighted tube that allows your healthcare provider to see inside your stomach. Your healthcare provider passes the endoscope through your mouth into your stomach. Your healthcare provider will also make a small surgical cut through your skin and into your stomach. He or she inserts the PEG tube through the opening while watching through the endoscope. A special balloon or cap holds the PEG tube in place inside your stomach. Your healthcare provider places a small dressing at the new opening.  Digestion works the same  Digestion works the same with a feeding tube as it does when you take food by mouth. So you get the same nutrition by tube feeding as you would get by mouth. If you have any questions or concerns about the feeding tube or its care, be certain to ask your provider. If you want a partner or significant other educated along with you about the feeding tube, let your provider or medical team know.   Date Last Reviewed: 7/1/2016 © 2000-2016 [employer]. [address] [place] [address], [place] [address]. All rights reserved. This  information is not intended as a substitute for professional medical care. Always follow your healthcare professional's instructions.

## 2017-06-12 NOTE — PROGRESS NOTES
Subjective:      Patient ID: Asad Perez is a 77 y.o. male.    Chief Complaint: GI Problem (peg tube removal)    HPI:   Asad Perez is a 76 y.o.  male presents for GI follow-up.  He and his [facility] representative have requested PEG tube removal.  According to the patient and his caregiver he is eating well and has not used the PEG tube for feeding for several months.  Tube was placed September 22, 2016 for nutritional support during a prolonged hospitalization.    Past medical history of ETOH abuse; Diastolic CHF with normal EF (11/2016), CKD stage 3, CAD, HLD, HTN, LBP, PAD, PVD.  Right AKA.     Review of patient's allergies indicates:  No Known Allergies  Past Medical History:   Diagnosis Date    Alcohol abuse     CHF (congestive heart failure)     CKD (chronic kidney disease) stage 3, GFR 30-59 ml/min     Coronary artery disease     Heart failure, diastolic     High cholesterol     Hypertension     LBP (low back pain)     Prediabetes     PVD (peripheral vascular disease)     Stroke     2006, no deficits     Past Surgical History:   Procedure Laterality Date    aortic bifemoral bypass  2006    bilateral carotid stenois  2006    carotid stents      JOINT REPLACEMENT      knee    left common endarterectomy  2006    RT AKA  NOV 2017     Family History   Problem Relation Age of Onset    Heart disease Mother      Social History     Social History    Marital status:      Spouse name: N/A    Number of children: N/A    Years of education: N/A     Occupational History    Not on file.     Social History Main Topics    Smoking status: Former Smoker     Packs/day: 2.00     Years: 45.00     Quit date: 7/16/2006    Smokeless tobacco: Not on file    Alcohol use No      Comment: NO ALCOHOL FOR 18 MONTHS, PAST ETOH ABUSE    Drug use: No    Sexual activity: Not on file     Other Topics Concern    Not on file     Social History Narrative    No narrative on file        Review of Systems:  Constitutional: Negative for appetite change.   HENT: Negative for trouble swallowing.   Eyes: Negative for photophobia.   Respiratory: Negative for cough and shortness of breath.   Cardiovascular: Negative for palpitations.   Gastrointestinal: See HPI for details.  Genitourinary: Negative for frequency and hematuria.   Skin: Negative for rash.   Neurological: Negative for weakness and headaches.   Hematological: Negative.   Psychiatric/Behavioral: Negative for suicidal ideas and behavioral problems.     Objective:     BP (!) 120/56   Pulse (!) 54     Physical Exam:  Eyes: Pupils are equal, round, and reactive to light.   Neck: Supple. No mass  Cardiovascular: Regular rhythm .   Pulmonary/Chest: Lungs clear .  Diminished breath sounds diffusely  Abdominal: Soft. No mass palpated. Nontender, no guarding. Positive bowel sounds .  PEG site healthy   Musculoskeletal: Status post right AKA.    Psychiatric: Alert and oriented    Assessment:     1. Gastroesophageal reflux disease, esophagitis presence not specified    2. Chronic kidney disease, stage V    3. Non-healing wound of amputation stump      Plan:     Asad was seen today for gi problem.    Diagnoses and all orders for this visit:    Gastroesophageal reflux disease, esophagitis presence not specified    Chronic kidney disease, stage V    Non-healing wound of amputation stump      Plan:  After establishing the patient's oral intake is good and that he is not currently requiring nutritional support, PEG tube was removed with gentle traction.  Patient tolerated this well.  The site was dressed.

## 2017-06-22 ENCOUNTER — HOSPITAL ENCOUNTER (OUTPATIENT)
Dept: WOUND CARE | Facility: HOSPITAL | Age: 77
Discharge: HOME | End: 2017-06-22
Attending: SURGERY
Payer: MEDICARE

## 2017-06-22 VITALS
DIASTOLIC BLOOD PRESSURE: 56 MMHG | HEIGHT: 68 IN | SYSTOLIC BLOOD PRESSURE: 119 MMHG | WEIGHT: 130 LBS | TEMPERATURE: 98 F | BODY MASS INDEX: 19.7 KG/M2 | HEART RATE: 57 BPM

## 2017-06-22 DIAGNOSIS — L89.154 DECUBITUS ULCER OF SACRAL REGION, STAGE 4: Primary | ICD-10-CM

## 2017-06-22 DIAGNOSIS — S71.001D COMPLICATED OPEN WOUND OF RIGHT HIP, SUBSEQUENT ENCOUNTER: ICD-10-CM

## 2017-06-22 PROCEDURE — 97605 NEG PRS WND THER DME<=50SQCM: CPT

## 2017-06-22 NOTE — PROGRESS NOTES
IN ORDER TO FIND TODAY'S PROGRESS NOTE IN WOUND EXPERT, LOOK IN THE MEDIA TAB. Patient does have a urinary catheter draining clear yellow urine by gravity.

## 2017-07-06 ENCOUNTER — HOSPITAL ENCOUNTER (OUTPATIENT)
Dept: WOUND CARE | Facility: HOSPITAL | Age: 77
Discharge: HOME | End: 2017-07-06
Attending: SURGERY
Payer: MEDICARE

## 2017-07-06 VITALS
HEART RATE: 55 BPM | TEMPERATURE: 98 F | SYSTOLIC BLOOD PRESSURE: 115 MMHG | WEIGHT: 130 LBS | BODY MASS INDEX: 19.7 KG/M2 | DIASTOLIC BLOOD PRESSURE: 55 MMHG | HEIGHT: 68 IN

## 2017-07-06 PROCEDURE — 97605 NEG PRS WND THER DME<=50SQCM: CPT

## 2017-07-16 PROCEDURE — 99284 EMERGENCY DEPT VISIT MOD MDM: CPT | Mod: 25

## 2017-07-16 PROCEDURE — 51702 INSERT TEMP BLADDER CATH: CPT

## 2017-07-17 ENCOUNTER — HOSPITAL ENCOUNTER (EMERGENCY)
Facility: HOSPITAL | Age: 77
Discharge: HOME | End: 2017-07-17
Attending: EMERGENCY MEDICINE
Payer: MEDICARE

## 2017-07-17 VITALS
HEIGHT: 67 IN | WEIGHT: 150 LBS | BODY MASS INDEX: 23.54 KG/M2 | TEMPERATURE: 98 F | HEART RATE: 63 BPM | SYSTOLIC BLOOD PRESSURE: 133 MMHG | DIASTOLIC BLOOD PRESSURE: 76 MMHG | RESPIRATION RATE: 18 BRPM | OXYGEN SATURATION: 97 %

## 2017-07-17 DIAGNOSIS — R10.9 ABDOMINAL PAIN: ICD-10-CM

## 2017-07-17 DIAGNOSIS — K56.41 FECAL IMPACTION: Primary | ICD-10-CM

## 2017-07-17 LAB
ALBUMIN SERPL BCP-MCNC: 3 G/DL
ALP SERPL-CCNC: 115 U/L
ALT SERPL W/O P-5'-P-CCNC: 21 U/L
ANION GAP SERPL CALC-SCNC: 8 MMOL/L
AST SERPL-CCNC: 19 U/L
BACTERIA #/AREA URNS HPF: ABNORMAL /HPF
BASOPHILS # BLD AUTO: 0.04 K/UL
BASOPHILS NFR BLD: 0.4 %
BILIRUB SERPL-MCNC: 0.3 MG/DL
BILIRUB UR QL STRIP: NEGATIVE
BUN SERPL-MCNC: 24 MG/DL
CALCIUM SERPL-MCNC: 9.2 MG/DL
CHLORIDE SERPL-SCNC: 100 MMOL/L
CLARITY UR: ABNORMAL
CO2 SERPL-SCNC: 26 MMOL/L
COLOR UR: YELLOW
CREAT SERPL-MCNC: 1.1 MG/DL
DIFFERENTIAL METHOD: ABNORMAL
EOSINOPHIL # BLD AUTO: 0.3 K/UL
EOSINOPHIL NFR BLD: 2.7 %
ERYTHROCYTE [DISTWIDTH] IN BLOOD BY AUTOMATED COUNT: 14.1 %
EST. GFR  (AFRICAN AMERICAN): >60 ML/MIN/1.73 M^2
EST. GFR  (NON AFRICAN AMERICAN): >60 ML/MIN/1.73 M^2
GLUCOSE SERPL-MCNC: 130 MG/DL
GLUCOSE UR QL STRIP: NEGATIVE
HCT VFR BLD AUTO: 36.4 %
HGB BLD-MCNC: 11.7 G/DL
HGB UR QL STRIP: ABNORMAL
KETONES UR QL STRIP: NEGATIVE
LACTATE SERPL-SCNC: 0.7 MMOL/L
LEUKOCYTE ESTERASE UR QL STRIP: ABNORMAL
LIPASE SERPL-CCNC: 13 U/L
LYMPHOCYTES # BLD AUTO: 2.9 K/UL
LYMPHOCYTES NFR BLD: 29.8 %
MCH RBC QN AUTO: 28.5 PG
MCHC RBC AUTO-ENTMCNC: 32.1 %
MCV RBC AUTO: 89 FL
MICROSCOPIC COMMENT: ABNORMAL
MONOCYTES # BLD AUTO: 1 K/UL
MONOCYTES NFR BLD: 10.7 %
NEUTROPHILS # BLD AUTO: 5.4 K/UL
NEUTROPHILS NFR BLD: 55.8 %
NITRITE UR QL STRIP: POSITIVE
PH UR STRIP: 6 [PH] (ref 5–8)
PLATELET # BLD AUTO: 247 K/UL
PMV BLD AUTO: 9 FL
POTASSIUM SERPL-SCNC: 4.3 MMOL/L
PROT SERPL-MCNC: 7.8 G/DL
PROT UR QL STRIP: NEGATIVE
RBC # BLD AUTO: 4.11 M/UL
RBC #/AREA URNS HPF: 8 /HPF (ref 0–4)
SODIUM SERPL-SCNC: 134 MMOL/L
SP GR UR STRIP: 1.01 (ref 1–1.03)
SQUAMOUS #/AREA URNS HPF: 5 /HPF
URN SPEC COLLECT METH UR: ABNORMAL
UROBILINOGEN UR STRIP-ACNC: 1 EU/DL
WBC # BLD AUTO: 9.63 K/UL
WBC #/AREA URNS HPF: >100 /HPF (ref 0–5)
WBC CLUMPS URNS QL MICRO: ABNORMAL

## 2017-07-17 PROCEDURE — 25500020 PHARM REV CODE 255: Performed by: EMERGENCY MEDICINE

## 2017-07-17 PROCEDURE — 85025 COMPLETE CBC W/AUTO DIFF WBC: CPT

## 2017-07-17 PROCEDURE — 83605 ASSAY OF LACTIC ACID: CPT

## 2017-07-17 PROCEDURE — 81000 URINALYSIS NONAUTO W/SCOPE: CPT

## 2017-07-17 PROCEDURE — 93005 ELECTROCARDIOGRAM TRACING: CPT

## 2017-07-17 PROCEDURE — 80053 COMPREHEN METABOLIC PANEL: CPT

## 2017-07-17 PROCEDURE — 83690 ASSAY OF LIPASE: CPT

## 2017-07-17 RX ADMIN — IOHEXOL 100 ML: 350 INJECTION, SOLUTION INTRAVENOUS at 02:07

## 2017-07-17 NOTE — ED NOTES
SPOUSE CAME OUT TO THE DESK AND REPORTS WILL CALL FOR ASSISTANCE TO MEET HER AT THE HOME AND WILL TAKE THE PATIENT HOME BY CAR INSTEAD OF SPD WHEELCHAIR OR TRYING TO CONTACT [employer]; PT AND SPOUSE GIVEN DISCHARGE INSTRUCTIONS AND RX WITH MED ED AND REVIEWED HOME CARE AND FOLLOW UP CARE

## 2017-07-17 NOTE — ED NOTES
Care of pt assumed at this time.  77YM with c/o lower abd pain and rectal pain.  Pt states that he feels like he is full of gas. Abd large and distended with + BS to all quadrants.  Lungs clear.  Res even and unlabored.  Wound vac noted to left lateral hip wound.  Mendoza cath with cloudy  Yellow urine noted  Upon arrival to room.

## 2017-07-17 NOTE — ED NOTES
1000 ml soap suds enema given with brown colored water returned.  No formed stool at this time. Placed on bed pan.

## 2017-07-17 NOTE — ED NOTES
2nd soap suds enema given and retained better than the first and tolerated well; pt repositioned and placed on bedpan; spouse at bedside

## 2017-07-17 NOTE — ED NOTES
Spouse at bedside and reports has no one at the home to help her get into the home by car and will request SPD with a wheelchair to assit the pt and spouse remains at bedside

## 2017-07-17 NOTE — ED NOTES
Incontinent care given and medpore dressing to sacrum; pt cleaned repositioned and diapered; pt has much relief and reports thinks he can go home and md made aware; spouse at bedside and reports is satisfied with results and less concerned

## 2017-07-17 NOTE — ED NOTES
Attempted manual disimpaction and moderate pasty stool felt in rectum and pt has occ rectal pain; some stool removed and pt requested nurse to stop and md aware and plan to repeat ss enema

## 2017-07-17 NOTE — ED NOTES
Patient expelled large amount brown stool on bedpan and feels much relief and no rectal pain; pt cleaned and repositioned and placed back on bedpan

## 2017-07-17 NOTE — ED NOTES
Patient cleared for discharge to home and will go home with the catheter since he arrived with the gonzalez in place and was changed on this er visit; pt will also go home with his wound vac that he came in with also; spouse at bedside

## 2017-07-17 NOTE — ED NOTES
md made aware that very little results thus far from the second ss enema and pt on bedpan and will monitor

## 2017-07-17 NOTE — ED NOTES
Pt was repositioned and placed back on bedpan as requested and having slight results from the enema

## 2017-07-17 NOTE — ED PROVIDER NOTES
It is medically necessary for pt to have EMS transport to his home     Chris Yao MD  07/17/17 0646       Chris Yao MD  08/18/17 2036       Chris Yao MD  08/18/17 2036       Chris Yao MD  08/26/17 0621       Chris Yao MD  03/31/18 1822

## 2017-07-20 ENCOUNTER — HOSPITAL ENCOUNTER (OUTPATIENT)
Dept: WOUND CARE | Facility: HOSPITAL | Age: 77
Discharge: HOME | End: 2017-07-20
Attending: SURGERY
Payer: MEDICARE

## 2017-07-20 VITALS — SYSTOLIC BLOOD PRESSURE: 119 MMHG | HEART RATE: 57 BPM | TEMPERATURE: 98 F | DIASTOLIC BLOOD PRESSURE: 56 MMHG

## 2017-07-20 DIAGNOSIS — L89.154 DECUBITUS ULCER OF SACRAL REGION, STAGE 4: Primary | ICD-10-CM

## 2017-07-20 DIAGNOSIS — S71.001D COMPLICATED OPEN WOUND OF RIGHT HIP, SUBSEQUENT ENCOUNTER: ICD-10-CM

## 2017-07-20 PROCEDURE — 97605 NEG PRS WND THER DME<=50SQCM: CPT

## 2017-07-20 NOTE — PROGRESS NOTES
Much of the documentation for this visit was completed in the Wound Expert system. Please see the attached documentation for further details about this patient's care.     Princess Hernandez RN

## 2017-08-10 ENCOUNTER — HOSPITAL ENCOUNTER (OUTPATIENT)
Dept: WOUND CARE | Facility: HOSPITAL | Age: 77
Discharge: HOME | End: 2017-08-10
Attending: SURGERY
Payer: MEDICARE

## 2017-08-10 VITALS
BODY MASS INDEX: 23.54 KG/M2 | HEART RATE: 56 BPM | SYSTOLIC BLOOD PRESSURE: 129 MMHG | HEIGHT: 67 IN | DIASTOLIC BLOOD PRESSURE: 63 MMHG | TEMPERATURE: 96 F | WEIGHT: 150 LBS

## 2017-08-10 DIAGNOSIS — L89.154 DECUBITUS ULCER OF SACRAL REGION, STAGE 4: Primary | ICD-10-CM

## 2017-08-10 PROCEDURE — 99213 OFFICE O/P EST LOW 20 MIN: CPT

## 2017-09-07 ENCOUNTER — HOSPITAL ENCOUNTER (OUTPATIENT)
Dept: WOUND CARE | Facility: HOSPITAL | Age: 77
Discharge: HOME | End: 2017-09-07
Attending: SURGERY
Payer: MEDICARE

## 2017-09-07 VITALS
HEART RATE: 60 BPM | WEIGHT: 150 LBS | BODY MASS INDEX: 23.54 KG/M2 | SYSTOLIC BLOOD PRESSURE: 126 MMHG | HEIGHT: 67 IN | TEMPERATURE: 98 F | DIASTOLIC BLOOD PRESSURE: 60 MMHG

## 2017-09-07 DIAGNOSIS — L89.154 DECUBITUS ULCER OF SACRAL REGION, STAGE 4: ICD-10-CM

## 2017-09-07 DIAGNOSIS — S71.001D COMPLICATED OPEN WOUND OF RIGHT HIP, SUBSEQUENT ENCOUNTER: Primary | ICD-10-CM

## 2017-09-07 PROCEDURE — 99212 OFFICE O/P EST SF 10 MIN: CPT

## 2017-09-07 NOTE — PROGRESS NOTES
Much of the documentation for this visit was completed in the Wound Expert system. Please see the attached documentation for further details about this patient's care.     Liaz Coulter LPN

## 2017-09-07 NOTE — PATIENT INSTRUCTIONS
Patient and caregiver instructed to keep pressure off buttocks/sacral area as much as possible, to keep protect newly healed tissue with a Mepilex Border for the next week, and to call [facility] PRN complications such as wound exacerbation and/or new skin breakdown.

## 2018-01-01 ENCOUNTER — HOSPITAL ENCOUNTER (EMERGENCY)
Facility: HOSPITAL | Age: 78
Discharge: HOME | End: 2018-07-07
Attending: EMERGENCY MEDICINE
Payer: MEDICARE

## 2018-01-01 ENCOUNTER — HOSPITAL ENCOUNTER (EMERGENCY)
Facility: HOSPITAL | Age: 78
Discharge: HOME | End: 2018-04-27
Attending: EMERGENCY MEDICINE
Payer: MEDICARE

## 2018-01-01 ENCOUNTER — HOSPITAL ENCOUNTER (EMERGENCY)
Facility: HOSPITAL | Age: 78
Discharge: HOME | End: 2018-08-27
Attending: EMERGENCY MEDICINE
Payer: MEDICARE

## 2018-01-01 ENCOUNTER — HOSPITAL ENCOUNTER (OUTPATIENT)
Dept: WOUND CARE | Facility: HOSPITAL | Age: 78
Discharge: HOME | End: 2018-08-23
Attending: SURGERY
Payer: MEDICARE

## 2018-01-01 ENCOUNTER — HOSPITAL ENCOUNTER (OUTPATIENT)
Dept: WOUND CARE | Facility: HOSPITAL | Age: 78
Discharge: HOME | End: 2018-10-25
Attending: SURGERY
Payer: MEDICARE

## 2018-01-01 ENCOUNTER — OFFICE VISIT (OUTPATIENT)
Dept: NEUROLOGY | Facility: HOSPITAL | Age: 78
End: 2018-01-01
Attending: INTERNAL MEDICINE
Payer: MEDICARE

## 2018-01-01 VITALS
RESPIRATION RATE: 16 BRPM | WEIGHT: 150 LBS | SYSTOLIC BLOOD PRESSURE: 161 MMHG | DIASTOLIC BLOOD PRESSURE: 73 MMHG | HEIGHT: 68 IN | TEMPERATURE: 98 F | BODY MASS INDEX: 22.73 KG/M2 | OXYGEN SATURATION: 94 % | HEART RATE: 56 BPM

## 2018-01-01 VITALS
SYSTOLIC BLOOD PRESSURE: 141 MMHG | HEART RATE: 53 BPM | TEMPERATURE: 98 F | WEIGHT: 150 LBS | BODY MASS INDEX: 22.73 KG/M2 | RESPIRATION RATE: 20 BRPM | HEIGHT: 68 IN | DIASTOLIC BLOOD PRESSURE: 59 MMHG

## 2018-01-01 VITALS
SYSTOLIC BLOOD PRESSURE: 148 MMHG | WEIGHT: 150 LBS | RESPIRATION RATE: 19 BRPM | OXYGEN SATURATION: 94 % | HEIGHT: 68 IN | BODY MASS INDEX: 22.73 KG/M2 | TEMPERATURE: 97 F | DIASTOLIC BLOOD PRESSURE: 57 MMHG | HEART RATE: 64 BPM

## 2018-01-01 VITALS
DIASTOLIC BLOOD PRESSURE: 47 MMHG | SYSTOLIC BLOOD PRESSURE: 132 MMHG | TEMPERATURE: 98 F | OXYGEN SATURATION: 100 % | HEART RATE: 58 BPM | RESPIRATION RATE: 19 BRPM | BODY MASS INDEX: 22.73 KG/M2 | HEIGHT: 68 IN | WEIGHT: 150 LBS

## 2018-01-01 VITALS
BODY MASS INDEX: 22.73 KG/M2 | TEMPERATURE: 98 F | DIASTOLIC BLOOD PRESSURE: 59 MMHG | HEART RATE: 52 BPM | SYSTOLIC BLOOD PRESSURE: 121 MMHG | HEIGHT: 68 IN | WEIGHT: 150 LBS

## 2018-01-01 VITALS
TEMPERATURE: 98 F | BODY MASS INDEX: 22.81 KG/M2 | HEART RATE: 48 BPM | HEIGHT: 68 IN | DIASTOLIC BLOOD PRESSURE: 43 MMHG | SYSTOLIC BLOOD PRESSURE: 104 MMHG

## 2018-01-01 DIAGNOSIS — I73.9 PAD (PERIPHERAL ARTERY DISEASE): Chronic | ICD-10-CM

## 2018-01-01 DIAGNOSIS — L89.154 DECUBITUS ULCER OF SACRAL REGION, STAGE 4: ICD-10-CM

## 2018-01-01 DIAGNOSIS — R10.9 ABDOMINAL PAIN, UNSPECIFIED ABDOMINAL LOCATION: Primary | ICD-10-CM

## 2018-01-01 DIAGNOSIS — J44.1 COPD EXACERBATION: Primary | ICD-10-CM

## 2018-01-01 DIAGNOSIS — M17.12 OSTEOARTHRITIS OF LEFT KNEE, UNSPECIFIED OSTEOARTHRITIS TYPE: ICD-10-CM

## 2018-01-01 DIAGNOSIS — R05.9 COUGH: Primary | ICD-10-CM

## 2018-01-01 DIAGNOSIS — R41.82 ALTERED MENTAL STATUS, UNSPECIFIED ALTERED MENTAL STATUS TYPE: ICD-10-CM

## 2018-01-01 DIAGNOSIS — G54.6 PHANTOM PAIN AFTER AMPUTATION OF LOWER EXTREMITY: Primary | ICD-10-CM

## 2018-01-01 DIAGNOSIS — R06.00 DYSPNEA: ICD-10-CM

## 2018-01-01 DIAGNOSIS — R06.02 SOB (SHORTNESS OF BREATH): Primary | ICD-10-CM

## 2018-01-01 DIAGNOSIS — R06.02 SOB (SHORTNESS OF BREATH): ICD-10-CM

## 2018-01-01 DIAGNOSIS — L89.302 PRESSURE INJURY OF BUTTOCK, STAGE 2, UNSPECIFIED LATERALITY: ICD-10-CM

## 2018-01-01 DIAGNOSIS — S71.001D COMPLICATED OPEN WOUND OF RIGHT HIP, SUBSEQUENT ENCOUNTER: Primary | ICD-10-CM

## 2018-01-01 DIAGNOSIS — R53.81 PHYSICAL DECONDITIONING: Primary | ICD-10-CM

## 2018-01-01 LAB
ALBUMIN SERPL BCP-MCNC: 3 G/DL
ALBUMIN SERPL BCP-MCNC: 3.2 G/DL
ALLENS TEST: ABNORMAL
ALP SERPL-CCNC: 111 U/L
ALP SERPL-CCNC: 112 U/L
ALT SERPL W/O P-5'-P-CCNC: 26 U/L
ALT SERPL W/O P-5'-P-CCNC: 34 U/L
ANION GAP SERPL CALC-SCNC: 7 MMOL/L
ANION GAP SERPL CALC-SCNC: 8 MMOL/L
AST SERPL-CCNC: 22 U/L
AST SERPL-CCNC: 33 U/L
BACTERIA #/AREA URNS HPF: ABNORMAL /HPF
BACTERIA BLD CULT: NORMAL
BACTERIA BLD CULT: NORMAL
BACTERIA UR CULT: NORMAL
BASOPHILS # BLD AUTO: 0.04 K/UL
BASOPHILS # BLD AUTO: 0.06 K/UL
BASOPHILS NFR BLD: 0.4 %
BASOPHILS NFR BLD: 0.7 %
BILIRUB SERPL-MCNC: 0.4 MG/DL
BILIRUB SERPL-MCNC: 0.6 MG/DL
BILIRUB UR QL STRIP: NEGATIVE
BNP SERPL-MCNC: 217 PG/ML
BNP SERPL-MCNC: 349 PG/ML
BUN SERPL-MCNC: 13 MG/DL
BUN SERPL-MCNC: 22 MG/DL
CALCIUM SERPL-MCNC: 8.8 MG/DL
CALCIUM SERPL-MCNC: 9.3 MG/DL
CHLORIDE SERPL-SCNC: 102 MMOL/L
CHLORIDE SERPL-SCNC: 104 MMOL/L
CLARITY UR: ABNORMAL
CO2 SERPL-SCNC: 28 MMOL/L
CO2 SERPL-SCNC: 29 MMOL/L
COLOR UR: YELLOW
CREAT SERPL-MCNC: 1 MG/DL
CREAT SERPL-MCNC: 1.3 MG/DL
DELSYS: ABNORMAL
DIFFERENTIAL METHOD: ABNORMAL
DIFFERENTIAL METHOD: ABNORMAL
EOSINOPHIL # BLD AUTO: 0.2 K/UL
EOSINOPHIL # BLD AUTO: 0.4 K/UL
EOSINOPHIL NFR BLD: 2.6 %
EOSINOPHIL NFR BLD: 4.6 %
ERYTHROCYTE [DISTWIDTH] IN BLOOD BY AUTOMATED COUNT: 13.5 %
ERYTHROCYTE [DISTWIDTH] IN BLOOD BY AUTOMATED COUNT: 14.3 %
EST. GFR  (AFRICAN AMERICAN): >60 ML/MIN/1.73 M^2
EST. GFR  (AFRICAN AMERICAN): >60 ML/MIN/1.73 M^2
EST. GFR  (NON AFRICAN AMERICAN): 53 ML/MIN/1.73 M^2
EST. GFR  (NON AFRICAN AMERICAN): >60 ML/MIN/1.73 M^2
FLUAV AG SPEC QL IA: NEGATIVE
FLUBV AG SPEC QL IA: NEGATIVE
GLUCOSE SERPL-MCNC: 121 MG/DL
GLUCOSE SERPL-MCNC: 168 MG/DL
GLUCOSE UR QL STRIP: NEGATIVE
HCO3 UR-SCNC: 25.4 MMOL/L (ref 24–28)
HCT VFR BLD AUTO: 36.1 %
HCT VFR BLD AUTO: 39.8 %
HGB BLD-MCNC: 11.5 G/DL
HGB BLD-MCNC: 12.8 G/DL
HGB UR QL STRIP: NEGATIVE
KETONES UR QL STRIP: NEGATIVE
LACTATE SERPL-SCNC: 0.9 MMOL/L
LEUKOCYTE ESTERASE UR QL STRIP: ABNORMAL
LYMPHOCYTES # BLD AUTO: 2.2 K/UL
LYMPHOCYTES # BLD AUTO: 2.9 K/UL
LYMPHOCYTES NFR BLD: 25.9 %
LYMPHOCYTES NFR BLD: 31.7 %
MCH RBC QN AUTO: 29.2 PG
MCH RBC QN AUTO: 29.4 PG
MCHC RBC AUTO-ENTMCNC: 31.9 G/DL
MCHC RBC AUTO-ENTMCNC: 32.2 G/DL
MCV RBC AUTO: 91 FL
MCV RBC AUTO: 92 FL
MICROSCOPIC COMMENT: ABNORMAL
MONOCYTES # BLD AUTO: 1 K/UL
MONOCYTES # BLD AUTO: 1 K/UL
MONOCYTES NFR BLD: 11 %
MONOCYTES NFR BLD: 12 %
NEUTROPHILS # BLD AUTO: 4.7 K/UL
NEUTROPHILS # BLD AUTO: 4.9 K/UL
NEUTROPHILS NFR BLD: 51.8 %
NEUTROPHILS NFR BLD: 57.3 %
NITRITE UR QL STRIP: NEGATIVE
PCO2 BLDA: 48.8 MMHG (ref 35–45)
PH SMN: 7.32 [PH] (ref 7.35–7.45)
PH UR STRIP: 7 [PH] (ref 5–8)
PLATELET # BLD AUTO: 245 K/UL
PLATELET # BLD AUTO: 267 K/UL
PMV BLD AUTO: 9.5 FL
PMV BLD AUTO: 9.5 FL
PO2 BLDA: 51 MMHG (ref 40–60)
POC BE: -1 MMOL/L
POC SATURATED O2: 83 % (ref 95–100)
POC TCO2: 27 MMOL/L (ref 24–29)
POTASSIUM SERPL-SCNC: 4.8 MMOL/L
POTASSIUM SERPL-SCNC: 4.9 MMOL/L
PROCALCITONIN SERPL IA-MCNC: 0.06 NG/ML
PROT SERPL-MCNC: 6.8 G/DL
PROT SERPL-MCNC: 7.6 G/DL
PROT UR QL STRIP: NEGATIVE
RBC # BLD AUTO: 3.91 M/UL
RBC # BLD AUTO: 4.39 M/UL
RBC #/AREA URNS HPF: 3 /HPF (ref 0–4)
SAMPLE: ABNORMAL
SITE: ABNORMAL
SODIUM SERPL-SCNC: 138 MMOL/L
SODIUM SERPL-SCNC: 140 MMOL/L
SP GR UR STRIP: 1.02 (ref 1–1.03)
SPECIMEN SOURCE: NORMAL
SQUAMOUS #/AREA URNS HPF: 4 /HPF
TROPONIN I SERPL DL<=0.01 NG/ML-MCNC: 0.01 NG/ML
TROPONIN I SERPL DL<=0.01 NG/ML-MCNC: 0.01 NG/ML
URN SPEC COLLECT METH UR: ABNORMAL
UROBILINOGEN UR STRIP-ACNC: ABNORMAL EU/DL
WBC # BLD AUTO: 8.57 K/UL
WBC # BLD AUTO: 9.12 K/UL
WBC #/AREA URNS HPF: 20 /HPF (ref 0–5)

## 2018-01-01 PROCEDURE — 99900035 HC TECH TIME PER 15 MIN (STAT)

## 2018-01-01 PROCEDURE — 99284 EMERGENCY DEPT VISIT MOD MDM: CPT | Mod: 25

## 2018-01-01 PROCEDURE — 93005 ELECTROCARDIOGRAM TRACING: CPT

## 2018-01-01 PROCEDURE — 84145 PROCALCITONIN (PCT): CPT

## 2018-01-01 PROCEDURE — 80053 COMPREHEN METABOLIC PANEL: CPT

## 2018-01-01 PROCEDURE — 94644 CONT INHLJ TX 1ST HOUR: CPT

## 2018-01-01 PROCEDURE — 84484 ASSAY OF TROPONIN QUANT: CPT

## 2018-01-01 PROCEDURE — 87088 URINE BACTERIA CULTURE: CPT

## 2018-01-01 PROCEDURE — 83605 ASSAY OF LACTIC ACID: CPT

## 2018-01-01 PROCEDURE — 85025 COMPLETE CBC W/AUTO DIFF WBC: CPT

## 2018-01-01 PROCEDURE — 87077 CULTURE AEROBIC IDENTIFY: CPT

## 2018-01-01 PROCEDURE — 82803 BLOOD GASES ANY COMBINATION: CPT

## 2018-01-01 PROCEDURE — 94640 AIRWAY INHALATION TREATMENT: CPT

## 2018-01-01 PROCEDURE — 83880 ASSAY OF NATRIURETIC PEPTIDE: CPT

## 2018-01-01 PROCEDURE — 25000242 PHARM REV CODE 250 ALT 637 W/ HCPCS: Performed by: EMERGENCY MEDICINE

## 2018-01-01 PROCEDURE — 81000 URINALYSIS NONAUTO W/SCOPE: CPT

## 2018-01-01 PROCEDURE — 99214 OFFICE O/P EST MOD 30 MIN: CPT | Performed by: INTERNAL MEDICINE

## 2018-01-01 PROCEDURE — 99282 EMERGENCY DEPT VISIT SF MDM: CPT

## 2018-01-01 PROCEDURE — 96375 TX/PRO/DX INJ NEW DRUG ADDON: CPT

## 2018-01-01 PROCEDURE — 87040 BLOOD CULTURE FOR BACTERIA: CPT | Mod: 59

## 2018-01-01 PROCEDURE — 99204 OFFICE O/P NEW MOD 45 MIN: CPT

## 2018-01-01 PROCEDURE — 63600175 PHARM REV CODE 636 W HCPCS: Performed by: EMERGENCY MEDICINE

## 2018-01-01 PROCEDURE — 96374 THER/PROPH/DIAG INJ IV PUSH: CPT

## 2018-01-01 PROCEDURE — 93010 ELECTROCARDIOGRAM REPORT: CPT | Mod: ,,, | Performed by: INTERNAL MEDICINE

## 2018-01-01 PROCEDURE — 87186 SC STD MICRODIL/AGAR DIL: CPT

## 2018-01-01 PROCEDURE — 99203 OFFICE O/P NEW LOW 30 MIN: CPT

## 2018-01-01 PROCEDURE — 87086 URINE CULTURE/COLONY COUNT: CPT

## 2018-01-01 PROCEDURE — 87400 INFLUENZA A/B EACH AG IA: CPT

## 2018-01-01 RX ORDER — METHYLPREDNISOLONE SOD SUCC 125 MG
125 VIAL (EA) INJECTION
Status: COMPLETED | OUTPATIENT
Start: 2018-01-01 | End: 2018-01-01

## 2018-01-01 RX ORDER — IPRATROPIUM BROMIDE 0.5 MG/2.5ML
500 SOLUTION RESPIRATORY (INHALATION)
Status: COMPLETED | OUTPATIENT
Start: 2018-01-01 | End: 2018-01-01

## 2018-01-01 RX ORDER — CLOTRIMAZOLE AND BETAMETHASONE DIPROPIONATE 10; .64 MG/G; MG/G
CREAM TOPICAL 2 TIMES DAILY
Qty: 455 G | Refills: 1 | Status: ON HOLD | OUTPATIENT
Start: 2018-01-01 | End: 2019-01-01 | Stop reason: HOSPADM

## 2018-01-01 RX ORDER — ATORVASTATIN CALCIUM 40 MG/1
40 TABLET, FILM COATED ORAL DAILY
Status: ON HOLD | COMMUNITY
End: 2019-01-01 | Stop reason: HOSPADM

## 2018-01-01 RX ORDER — PREDNISONE 50 MG/1
50 TABLET ORAL DAILY
Qty: 5 TABLET | Refills: 0 | Status: SHIPPED | OUTPATIENT
Start: 2018-01-01 | End: 2018-01-01

## 2018-01-01 RX ORDER — POLYETHYLENE GLYCOL 3350 17 G/17G
17 POWDER, FOR SOLUTION ORAL DAILY
Qty: 1700 G | Refills: 3 | Status: ON HOLD | OUTPATIENT
Start: 2018-01-01 | End: 2019-01-01 | Stop reason: HOSPADM

## 2018-01-01 RX ORDER — AMLODIPINE BESYLATE 10 MG/1
10 TABLET ORAL DAILY
Status: ON HOLD | COMMUNITY
End: 2019-01-01 | Stop reason: HOSPADM

## 2018-01-01 RX ORDER — IPRATROPIUM BROMIDE AND ALBUTEROL SULFATE 2.5; .5 MG/3ML; MG/3ML
3 SOLUTION RESPIRATORY (INHALATION)
Status: COMPLETED | OUTPATIENT
Start: 2018-01-01 | End: 2018-01-01

## 2018-01-01 RX ORDER — ALBUTEROL SULFATE 0.83 MG/ML
10 SOLUTION RESPIRATORY (INHALATION)
Status: COMPLETED | OUTPATIENT
Start: 2018-01-01 | End: 2018-01-01

## 2018-01-01 RX ORDER — AZITHROMYCIN 250 MG/1
250 TABLET, FILM COATED ORAL DAILY
Qty: 6 TABLET | Refills: 0 | Status: SHIPPED | OUTPATIENT
Start: 2018-01-01 | End: 2018-01-01

## 2018-01-01 RX ORDER — ASPIRIN 81 MG/1
81 TABLET ORAL DAILY
Status: ON HOLD | COMMUNITY
End: 2019-01-01 | Stop reason: HOSPADM

## 2018-01-01 RX ORDER — FUROSEMIDE 10 MG/ML
60 INJECTION INTRAMUSCULAR; INTRAVENOUS
Status: COMPLETED | OUTPATIENT
Start: 2018-01-01 | End: 2018-01-01

## 2018-01-01 RX ADMIN — IPRATROPIUM BROMIDE 500 MCG: 0.5 SOLUTION RESPIRATORY (INHALATION) at 11:07

## 2018-01-01 RX ADMIN — ALBUTEROL SULFATE 10 MG: 2.5 SOLUTION RESPIRATORY (INHALATION) at 11:07

## 2018-01-01 RX ADMIN — METHYLPREDNISOLONE SODIUM SUCCINATE 125 MG: 125 INJECTION, POWDER, FOR SOLUTION INTRAMUSCULAR; INTRAVENOUS at 09:04

## 2018-01-01 RX ADMIN — IPRATROPIUM BROMIDE AND ALBUTEROL SULFATE 3 ML: .5; 3 SOLUTION RESPIRATORY (INHALATION) at 09:04

## 2018-01-01 RX ADMIN — METHYLPREDNISOLONE SODIUM SUCCINATE 125 MG: 125 INJECTION, POWDER, FOR SOLUTION INTRAMUSCULAR; INTRAVENOUS at 11:07

## 2018-01-01 RX ADMIN — FUROSEMIDE 60 MG: 10 INJECTION, SOLUTION INTRAMUSCULAR; INTRAVENOUS at 10:04

## 2018-03-15 ENCOUNTER — HOSPITAL ENCOUNTER (OUTPATIENT)
Facility: HOSPITAL | Age: 78
Discharge: HOME | End: 2018-03-17
Attending: EMERGENCY MEDICINE | Admitting: INTERNAL MEDICINE
Payer: MEDICARE

## 2018-03-15 DIAGNOSIS — R10.84 GENERALIZED ABDOMINAL PAIN: ICD-10-CM

## 2018-03-15 DIAGNOSIS — R10.9 ABDOMINAL PAIN: ICD-10-CM

## 2018-03-15 DIAGNOSIS — K80.20 CALCULUS OF GALLBLADDER WITHOUT CHOLECYSTITIS WITHOUT OBSTRUCTION: ICD-10-CM

## 2018-03-15 DIAGNOSIS — J44.1 COPD EXACERBATION: ICD-10-CM

## 2018-03-15 DIAGNOSIS — R19.37: ICD-10-CM

## 2018-03-15 DIAGNOSIS — J96.21 ACUTE ON CHRONIC RESPIRATORY FAILURE WITH HYPOXIA: Primary | ICD-10-CM

## 2018-03-15 DIAGNOSIS — I73.9 PAD (PERIPHERAL ARTERY DISEASE): Chronic | ICD-10-CM

## 2018-03-15 DIAGNOSIS — R06.02 SHORTNESS OF BREATH: ICD-10-CM

## 2018-03-15 DIAGNOSIS — R10.9 ABDOMINAL PAIN, UNSPECIFIED ABDOMINAL LOCATION: ICD-10-CM

## 2018-03-15 DIAGNOSIS — K55.069 OCCLUSION OF SUPERIOR MESENTERIC ARTERY: ICD-10-CM

## 2018-03-15 LAB
ALBUMIN SERPL BCP-MCNC: 3.6 G/DL
ALP SERPL-CCNC: 125 U/L
ALT SERPL W/O P-5'-P-CCNC: 33 U/L
AMYLASE SERPL-CCNC: 28 U/L
ANION GAP SERPL CALC-SCNC: 10 MMOL/L
AST SERPL-CCNC: 29 U/L
BACTERIA #/AREA URNS HPF: ABNORMAL /HPF
BASOPHILS # BLD AUTO: 0.01 K/UL
BASOPHILS NFR BLD: 0.1 %
BILIRUB SERPL-MCNC: 0.7 MG/DL
BILIRUB UR QL STRIP: NEGATIVE
BUN SERPL-MCNC: 22 MG/DL
CALCIUM SERPL-MCNC: 9.5 MG/DL
CHLORIDE SERPL-SCNC: 105 MMOL/L
CLARITY UR: ABNORMAL
CO2 SERPL-SCNC: 26 MMOL/L
COLOR UR: YELLOW
CREAT SERPL-MCNC: 1.1 MG/DL
DIFFERENTIAL METHOD: ABNORMAL
EOSINOPHIL # BLD AUTO: 0.2 K/UL
EOSINOPHIL NFR BLD: 1.7 %
ERYTHROCYTE [DISTWIDTH] IN BLOOD BY AUTOMATED COUNT: 14 %
EST. GFR  (AFRICAN AMERICAN): >60 ML/MIN/1.73 M^2
EST. GFR  (NON AFRICAN AMERICAN): >60 ML/MIN/1.73 M^2
GLUCOSE SERPL-MCNC: 135 MG/DL
GLUCOSE UR QL STRIP: NEGATIVE
HCT VFR BLD AUTO: 44.8 %
HGB BLD-MCNC: 14.4 G/DL
HGB UR QL STRIP: ABNORMAL
KETONES UR QL STRIP: NEGATIVE
LEUKOCYTE ESTERASE UR QL STRIP: ABNORMAL
LIPASE SERPL-CCNC: 13 U/L
LYMPHOCYTES # BLD AUTO: 0.6 K/UL
LYMPHOCYTES NFR BLD: 5.7 %
MAGNESIUM SERPL-MCNC: 2.2 MG/DL
MCH RBC QN AUTO: 29.9 PG
MCHC RBC AUTO-ENTMCNC: 32.1 G/DL
MCV RBC AUTO: 93 FL
MICROSCOPIC COMMENT: ABNORMAL
MONOCYTES # BLD AUTO: 1 K/UL
MONOCYTES NFR BLD: 9.3 %
NEUTROPHILS # BLD AUTO: 8.5 K/UL
NEUTROPHILS NFR BLD: 82.9 %
NITRITE UR QL STRIP: POSITIVE
PH UR STRIP: 6 [PH] (ref 5–8)
PLATELET # BLD AUTO: 242 K/UL
PMV BLD AUTO: 9.7 FL
POTASSIUM SERPL-SCNC: 4.7 MMOL/L
PROT SERPL-MCNC: 8.2 G/DL
PROT UR QL STRIP: NEGATIVE
RBC # BLD AUTO: 4.82 M/UL
RBC #/AREA URNS HPF: 7 /HPF (ref 0–4)
SODIUM SERPL-SCNC: 141 MMOL/L
SP GR UR STRIP: 1.01 (ref 1–1.03)
SQUAMOUS #/AREA URNS HPF: 3 /HPF
TROPONIN I SERPL DL<=0.01 NG/ML-MCNC: 0.02 NG/ML
URN SPEC COLLECT METH UR: ABNORMAL
UROBILINOGEN UR STRIP-ACNC: 1 EU/DL
WBC # BLD AUTO: 10.2 K/UL
WBC #/AREA URNS HPF: 20 /HPF (ref 0–5)
WBC CLUMPS URNS QL MICRO: ABNORMAL

## 2018-03-15 PROCEDURE — 80053 COMPREHEN METABOLIC PANEL: CPT

## 2018-03-15 PROCEDURE — 87077 CULTURE AEROBIC IDENTIFY: CPT

## 2018-03-15 PROCEDURE — 25000242 PHARM REV CODE 250 ALT 637 W/ HCPCS: Performed by: EMERGENCY MEDICINE

## 2018-03-15 PROCEDURE — 96375 TX/PRO/DX INJ NEW DRUG ADDON: CPT

## 2018-03-15 PROCEDURE — 96374 THER/PROPH/DIAG INJ IV PUSH: CPT

## 2018-03-15 PROCEDURE — 85025 COMPLETE CBC W/AUTO DIFF WBC: CPT

## 2018-03-15 PROCEDURE — 81000 URINALYSIS NONAUTO W/SCOPE: CPT

## 2018-03-15 PROCEDURE — 87186 SC STD MICRODIL/AGAR DIL: CPT

## 2018-03-15 PROCEDURE — 87088 URINE BACTERIA CULTURE: CPT

## 2018-03-15 PROCEDURE — 94644 CONT INHLJ TX 1ST HOUR: CPT

## 2018-03-15 PROCEDURE — 87086 URINE CULTURE/COLONY COUNT: CPT

## 2018-03-15 PROCEDURE — 83690 ASSAY OF LIPASE: CPT

## 2018-03-15 PROCEDURE — 82150 ASSAY OF AMYLASE: CPT

## 2018-03-15 PROCEDURE — 93010 ELECTROCARDIOGRAM REPORT: CPT | Mod: ,,, | Performed by: INTERNAL MEDICINE

## 2018-03-15 PROCEDURE — 83735 ASSAY OF MAGNESIUM: CPT

## 2018-03-15 PROCEDURE — 84484 ASSAY OF TROPONIN QUANT: CPT

## 2018-03-15 PROCEDURE — 94640 AIRWAY INHALATION TREATMENT: CPT

## 2018-03-15 PROCEDURE — 99285 EMERGENCY DEPT VISIT HI MDM: CPT | Mod: 25

## 2018-03-15 PROCEDURE — 93005 ELECTROCARDIOGRAM TRACING: CPT

## 2018-03-15 RX ORDER — METHYLPREDNISOLONE SOD SUCC 125 MG
80 VIAL (EA) INJECTION
Status: COMPLETED | OUTPATIENT
Start: 2018-03-15 | End: 2018-03-16

## 2018-03-15 RX ORDER — ALBUTEROL SULFATE 0.83 MG/ML
15 SOLUTION RESPIRATORY (INHALATION)
Status: COMPLETED | OUTPATIENT
Start: 2018-03-15 | End: 2018-03-15

## 2018-03-15 RX ORDER — CEFTRIAXONE 1 G/1
1 INJECTION, POWDER, FOR SOLUTION INTRAMUSCULAR; INTRAVENOUS
Status: COMPLETED | OUTPATIENT
Start: 2018-03-15 | End: 2018-03-16

## 2018-03-15 RX ORDER — IPRATROPIUM BROMIDE AND ALBUTEROL SULFATE 2.5; .5 MG/3ML; MG/3ML
3 SOLUTION RESPIRATORY (INHALATION)
Status: COMPLETED | OUTPATIENT
Start: 2018-03-15 | End: 2018-03-15

## 2018-03-15 RX ADMIN — IPRATROPIUM BROMIDE AND ALBUTEROL SULFATE 3 ML: .5; 3 SOLUTION RESPIRATORY (INHALATION) at 09:03

## 2018-03-15 RX ADMIN — ALBUTEROL SULFATE 15 MG: 2.5 SOLUTION RESPIRATORY (INHALATION) at 11:03

## 2018-03-16 PROBLEM — J44.1 COPD EXACERBATION: Status: ACTIVE | Noted: 2018-03-16

## 2018-03-16 PROBLEM — K55.069 OCCLUSION OF SUPERIOR MESENTERIC ARTERY: Status: ACTIVE | Noted: 2018-03-16

## 2018-03-16 LAB
ANION GAP SERPL CALC-SCNC: 10 MMOL/L
BASOPHILS # BLD AUTO: 0 K/UL
BASOPHILS NFR BLD: 0 %
BUN SERPL-MCNC: 24 MG/DL
CALCIUM SERPL-MCNC: 9.4 MG/DL
CHLORIDE SERPL-SCNC: 106 MMOL/L
CO2 SERPL-SCNC: 25 MMOL/L
CREAT SERPL-MCNC: 1.1 MG/DL
DIFFERENTIAL METHOD: ABNORMAL
EOSINOPHIL # BLD AUTO: 0 K/UL
EOSINOPHIL NFR BLD: 0 %
ERYTHROCYTE [DISTWIDTH] IN BLOOD BY AUTOMATED COUNT: 14.1 %
EST. GFR  (AFRICAN AMERICAN): >60 ML/MIN/1.73 M^2
EST. GFR  (NON AFRICAN AMERICAN): >60 ML/MIN/1.73 M^2
ESTIMATED AVG GLUCOSE: 117 MG/DL
GLUCOSE SERPL-MCNC: 188 MG/DL
HBA1C MFR BLD HPLC: 5.7 %
HCT VFR BLD AUTO: 43.3 %
HGB BLD-MCNC: 14 G/DL
LACTATE SERPL-SCNC: 0.9 MMOL/L
LYMPHOCYTES # BLD AUTO: 0.7 K/UL
LYMPHOCYTES NFR BLD: 10 %
MAGNESIUM SERPL-MCNC: 2.1 MG/DL
MCH RBC QN AUTO: 30 PG
MCHC RBC AUTO-ENTMCNC: 32.3 G/DL
MCV RBC AUTO: 93 FL
MONOCYTES # BLD AUTO: 0.1 K/UL
MONOCYTES NFR BLD: 1 %
NEUTROPHILS # BLD AUTO: 6 K/UL
NEUTROPHILS NFR BLD: 88.7 %
PHOSPHATE SERPL-MCNC: 3.8 MG/DL
PLATELET # BLD AUTO: 223 K/UL
PMV BLD AUTO: 9.7 FL
POTASSIUM SERPL-SCNC: 4.6 MMOL/L
RBC # BLD AUTO: 4.67 M/UL
SODIUM SERPL-SCNC: 141 MMOL/L
WBC # BLD AUTO: 6.77 K/UL

## 2018-03-16 PROCEDURE — 80048 BASIC METABOLIC PNL TOTAL CA: CPT

## 2018-03-16 PROCEDURE — G8982 BODY POS GOAL STATUS: HCPCS | Mod: CM

## 2018-03-16 PROCEDURE — 94761 N-INVAS EAR/PLS OXIMETRY MLT: CPT

## 2018-03-16 PROCEDURE — 94640 AIRWAY INHALATION TREATMENT: CPT

## 2018-03-16 PROCEDURE — G8987 SELF CARE CURRENT STATUS: HCPCS | Mod: CL

## 2018-03-16 PROCEDURE — 99220 PR INITIAL OBSERVATION CARE,LEVL III: CPT | Mod: ,,, | Performed by: INTERNAL MEDICINE

## 2018-03-16 PROCEDURE — G8988 SELF CARE GOAL STATUS: HCPCS | Mod: CL

## 2018-03-16 PROCEDURE — 97165 OT EVAL LOW COMPLEX 30 MIN: CPT

## 2018-03-16 PROCEDURE — 99900035 HC TECH TIME PER 15 MIN (STAT)

## 2018-03-16 PROCEDURE — 63600175 PHARM REV CODE 636 W HCPCS: Performed by: EMERGENCY MEDICINE

## 2018-03-16 PROCEDURE — 25500020 PHARM REV CODE 255: Performed by: INTERNAL MEDICINE

## 2018-03-16 PROCEDURE — 84100 ASSAY OF PHOSPHORUS: CPT

## 2018-03-16 PROCEDURE — G0378 HOSPITAL OBSERVATION PER HR: HCPCS

## 2018-03-16 PROCEDURE — 36415 COLL VENOUS BLD VENIPUNCTURE: CPT

## 2018-03-16 PROCEDURE — 25000003 PHARM REV CODE 250: Performed by: INTERNAL MEDICINE

## 2018-03-16 PROCEDURE — G8989 SELF CARE D/C STATUS: HCPCS | Mod: CL

## 2018-03-16 PROCEDURE — 63600175 PHARM REV CODE 636 W HCPCS: Performed by: INTERNAL MEDICINE

## 2018-03-16 PROCEDURE — 83605 ASSAY OF LACTIC ACID: CPT

## 2018-03-16 PROCEDURE — G8981 BODY POS CURRENT STATUS: HCPCS | Mod: CM

## 2018-03-16 PROCEDURE — 27000221 HC OXYGEN, UP TO 24 HOURS

## 2018-03-16 PROCEDURE — 97161 PT EVAL LOW COMPLEX 20 MIN: CPT

## 2018-03-16 PROCEDURE — 83036 HEMOGLOBIN GLYCOSYLATED A1C: CPT

## 2018-03-16 PROCEDURE — 85025 COMPLETE CBC W/AUTO DIFF WBC: CPT

## 2018-03-16 PROCEDURE — 25000242 PHARM REV CODE 250 ALT 637 W/ HCPCS: Performed by: INTERNAL MEDICINE

## 2018-03-16 PROCEDURE — 83735 ASSAY OF MAGNESIUM: CPT

## 2018-03-16 RX ORDER — ASPIRIN 81 MG/1
81 TABLET ORAL DAILY
Status: DISCONTINUED | OUTPATIENT
Start: 2018-03-16 | End: 2018-03-17 | Stop reason: HOSPADM

## 2018-03-16 RX ORDER — TRAMADOL HYDROCHLORIDE 50 MG/1
50 TABLET ORAL EVERY 6 HOURS PRN
Status: DISCONTINUED | OUTPATIENT
Start: 2018-03-16 | End: 2018-03-17 | Stop reason: HOSPADM

## 2018-03-16 RX ORDER — IPRATROPIUM BROMIDE AND ALBUTEROL SULFATE 2.5; .5 MG/3ML; MG/3ML
3 SOLUTION RESPIRATORY (INHALATION)
Status: DISCONTINUED | OUTPATIENT
Start: 2018-03-16 | End: 2018-03-17 | Stop reason: HOSPADM

## 2018-03-16 RX ORDER — FLUTICASONE FUROATE AND VILANTEROL 200; 25 UG/1; UG/1
1 POWDER RESPIRATORY (INHALATION) DAILY
Status: DISCONTINUED | OUTPATIENT
Start: 2018-03-16 | End: 2018-03-17 | Stop reason: HOSPADM

## 2018-03-16 RX ORDER — FUROSEMIDE 40 MG/1
40 TABLET ORAL DAILY
Status: DISCONTINUED | OUTPATIENT
Start: 2018-03-16 | End: 2018-03-17 | Stop reason: HOSPADM

## 2018-03-16 RX ORDER — AMIODARONE HYDROCHLORIDE 200 MG/1
200 TABLET ORAL DAILY
Status: DISCONTINUED | OUTPATIENT
Start: 2018-03-16 | End: 2018-03-17 | Stop reason: HOSPADM

## 2018-03-16 RX ORDER — ONDANSETRON 2 MG/ML
4 INJECTION INTRAMUSCULAR; INTRAVENOUS
Status: COMPLETED | OUTPATIENT
Start: 2018-03-16 | End: 2018-03-16

## 2018-03-16 RX ORDER — IBUPROFEN 200 MG
24 TABLET ORAL
Status: DISCONTINUED | OUTPATIENT
Start: 2018-03-16 | End: 2018-03-17 | Stop reason: HOSPADM

## 2018-03-16 RX ORDER — AMLODIPINE BESYLATE 5 MG/1
10 TABLET ORAL DAILY
Status: DISCONTINUED | OUTPATIENT
Start: 2018-03-16 | End: 2018-03-17 | Stop reason: HOSPADM

## 2018-03-16 RX ORDER — PREDNISONE 20 MG/1
40 TABLET ORAL DAILY
Status: DISCONTINUED | OUTPATIENT
Start: 2018-03-16 | End: 2018-03-17 | Stop reason: HOSPADM

## 2018-03-16 RX ORDER — TRAMADOL HYDROCHLORIDE 50 MG/1
50 TABLET ORAL EVERY 6 HOURS PRN
Status: ON HOLD | COMMUNITY
End: 2019-01-01 | Stop reason: HOSPADM

## 2018-03-16 RX ORDER — PANTOPRAZOLE SODIUM 40 MG/1
40 TABLET, DELAYED RELEASE ORAL DAILY
Status: DISCONTINUED | OUTPATIENT
Start: 2018-03-16 | End: 2018-03-17 | Stop reason: HOSPADM

## 2018-03-16 RX ORDER — SODIUM CHLORIDE 0.9 % (FLUSH) 0.9 %
5 SYRINGE (ML) INJECTION
Status: DISCONTINUED | OUTPATIENT
Start: 2018-03-16 | End: 2018-03-17 | Stop reason: HOSPADM

## 2018-03-16 RX ORDER — GLUCAGON 1 MG
1 KIT INJECTION
Status: DISCONTINUED | OUTPATIENT
Start: 2018-03-16 | End: 2018-03-17 | Stop reason: HOSPADM

## 2018-03-16 RX ORDER — IBUPROFEN 200 MG
16 TABLET ORAL
Status: DISCONTINUED | OUTPATIENT
Start: 2018-03-16 | End: 2018-03-17 | Stop reason: HOSPADM

## 2018-03-16 RX ORDER — CARVEDILOL 25 MG/1
25 TABLET ORAL 2 TIMES DAILY
Status: DISCONTINUED | OUTPATIENT
Start: 2018-03-16 | End: 2018-03-17 | Stop reason: HOSPADM

## 2018-03-16 RX ORDER — ENOXAPARIN SODIUM 100 MG/ML
40 INJECTION SUBCUTANEOUS EVERY 24 HOURS
Status: DISCONTINUED | OUTPATIENT
Start: 2018-03-16 | End: 2018-03-17 | Stop reason: HOSPADM

## 2018-03-16 RX ORDER — ATORVASTATIN CALCIUM 40 MG/1
40 TABLET, FILM COATED ORAL DAILY
COMMUNITY
End: 2018-01-01

## 2018-03-16 RX ORDER — ATORVASTATIN CALCIUM 40 MG/1
40 TABLET, FILM COATED ORAL DAILY
Status: DISCONTINUED | OUTPATIENT
Start: 2018-03-16 | End: 2018-03-17 | Stop reason: HOSPADM

## 2018-03-16 RX ADMIN — FUROSEMIDE 40 MG: 40 TABLET ORAL at 09:03

## 2018-03-16 RX ADMIN — AMIODARONE HYDROCHLORIDE 200 MG: 200 TABLET ORAL at 09:03

## 2018-03-16 RX ADMIN — CARVEDILOL 25 MG: 25 TABLET, FILM COATED ORAL at 09:03

## 2018-03-16 RX ADMIN — ASPIRIN 81 MG: 81 TABLET, COATED ORAL at 09:03

## 2018-03-16 RX ADMIN — IPRATROPIUM BROMIDE AND ALBUTEROL SULFATE 3 ML: 2.5; .5 SOLUTION RESPIRATORY (INHALATION) at 07:03

## 2018-03-16 RX ADMIN — METHYLPREDNISOLONE SODIUM SUCCINATE 80 MG: 125 INJECTION, POWDER, FOR SOLUTION INTRAMUSCULAR; INTRAVENOUS at 12:03

## 2018-03-16 RX ADMIN — IPRATROPIUM BROMIDE AND ALBUTEROL SULFATE 3 ML: 2.5; .5 SOLUTION RESPIRATORY (INHALATION) at 08:03

## 2018-03-16 RX ADMIN — ONDANSETRON 4 MG: 2 INJECTION INTRAMUSCULAR; INTRAVENOUS at 02:03

## 2018-03-16 RX ADMIN — PANTOPRAZOLE SODIUM 40 MG: 40 TABLET, DELAYED RELEASE ORAL at 09:03

## 2018-03-16 RX ADMIN — ENOXAPARIN SODIUM 40 MG: 100 INJECTION SUBCUTANEOUS at 09:03

## 2018-03-16 RX ADMIN — FLUTICASONE FUROATE AND VILANTEROL TRIFENATATE 1 PUFF: 200; 25 POWDER RESPIRATORY (INHALATION) at 12:03

## 2018-03-16 RX ADMIN — ATORVASTATIN CALCIUM 40 MG: 40 TABLET, FILM COATED ORAL at 09:03

## 2018-03-16 RX ADMIN — PREDNISONE 40 MG: 20 TABLET ORAL at 09:03

## 2018-03-16 RX ADMIN — IPRATROPIUM BROMIDE AND ALBUTEROL SULFATE 3 ML: 2.5; .5 SOLUTION RESPIRATORY (INHALATION) at 03:03

## 2018-03-16 RX ADMIN — IPRATROPIUM BROMIDE AND ALBUTEROL SULFATE 3 ML: 2.5; .5 SOLUTION RESPIRATORY (INHALATION) at 12:03

## 2018-03-16 RX ADMIN — IOHEXOL 100 ML: 350 INJECTION, SOLUTION INTRAVENOUS at 02:03

## 2018-03-16 RX ADMIN — CEFTRIAXONE SODIUM 1 G: 1 INJECTION, POWDER, FOR SOLUTION INTRAMUSCULAR; INTRAVENOUS at 12:03

## 2018-03-16 RX ADMIN — AMLODIPINE BESYLATE 10 MG: 5 TABLET ORAL at 09:03

## 2018-03-16 NOTE — PLAN OF CARE
Problem: Patient Care Overview  Goal: Plan of Care Review  Outcome: Ongoing (interventions implemented as appropriate)  Plan of care reviewed with patient. Patient on continuous heart monitoring Sinus yue, HR in 50s. Fall precautions enforced, bed locked and low side rails x 3, call bell within reach will continue to monitor.

## 2018-03-16 NOTE — ASSESSMENT & PLAN NOTE
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

## 2018-03-16 NOTE — ASSESSMENT & PLAN NOTE
History of AF, on Amiodarone 200 mg daily- he has been on this for several years  No EKG in system dating back to 2013 with evidence of AF  CHADSVASC 7 points (in the absence of objective findings of AF will not initiate OAC)  HASBLED 4 points

## 2018-03-16 NOTE — PT/OT/SLP EVAL
Occupational Therapy   Evaluation and Discharge Note    Name: Asad Perez  MRN: [id number]  Admitting Diagnosis:  <principal problem not specified>      Recommendations:     Discharge Recommendations: home  Discharge Equipment Recommendations:  other (see comments) (No DME needs anticipated )  Barriers to discharge:  None    History:     Occupational Profile:  Living Environment: Lives with spouse in SS house with 1STE; tub/shower combo.  Previous level of function: Pt. Reports spouse assists with bathing, dressing, grooming, & toileting at bed level.  Pt. Does feed self.  Self-propels with W/C using BUE.    Equipment Owned:  wheelchair, hospital bed, lift device  Assistance upon Discharge: Spouse able to assist.     Past Medical History:   Diagnosis Date    Alcohol abuse     CHF (congestive heart failure)     CKD (chronic kidney disease) stage 3, GFR 30-59 ml/min     Coronary artery disease     Decubitus skin ulcer     Heart failure, diastolic     High cholesterol     Hypertension     Immobility     LBP (low back pain)     Prediabetes     PVD (peripheral vascular disease)     Stroke     2006, no deficits       Past Surgical History:   Procedure Laterality Date    aortic bifemoral bypass  2006    bilateral carotid stenois  2006    carotid stents      JOINT REPLACEMENT      knee    left common endarterectomy  2006    RT AKA  NOV 2017       Subjective     Chief Complaint: Pt. With no c/o pain this day.  Patient/Family stated goals: Return home.  Communicated with: Nursing prior to session.  Pain/Comfort:  · Pain Rating 1: 0/10  · Pain Rating Post-Intervention 1: 0/10    Patients cultural, spiritual, Spiritism conflicts given the current situation: Catholicism    Objective:     Patient found with: oxygen, telemetry    General Precautions: Standard, other (see comments), fall (standard )   Orthopedic Precautions:N/A (RLE AKA)   Braces: N/A     Occupational Performance:    Bed Mobility:     · Patient completed Rolling/Turning to Left with  stand by assistance  · Patient completed Scooting/Bridging with total assistance and 2 persons    Functional Mobility/Transfers:  · Functional Transfers:  Did not occur this day.  · Functional Mobility: Did not occur this day.    Activities of Daily Living:  · Did not occur this day.    Cognitive/Visual Perceptual:  Cognitive/Psychosocial Skills:  -       Oriented to: Person, Place, Time and Situation   -       Follows Commands/attention:Follows one-step commands and Follows two-step commands  -       Communication: clear/fluent  -       Safety awareness/insight to disability: intact   Visual/Perceptual:  -grossly intact    Physical Exam:  Skin integrity: Visible skin intact  Edema:  None noted  Sensation: -       Intact  Dominant hand: -       Right  Upper Extremity Range of Motion:  -       Right Upper Extremity: WFL  -       Left Upper Extremity: WFL  Upper Extremity Strength: -       Right Upper Extremity: gross 3+/5  -       Left Upper Extremity: gross 3+/5   Strength: -       Right Upper Extremity: WFL  -       Left Upper Extremity: WFL    Patient left HOB elevated with all lines intact, call button in reach and nursing notified    [facility] 6 Click:  [facility] Total Score: 10    Treatment & Education:  Initial OT eval complete.  Communicated with nursing prior to OT.  Found lying with HOB elevated with 1L O2 NC.  O2 sats at 88% at rest.  L-roll with SBA & use of bed rail.  O2 desat to 84% with bed mobility task.  Pt. desat 81% upon active overhead shoulder flexion.  Educated on deep breathing tech with mod v/c for follow through.  Increased O2 to 2L to allow for completion of eval with nursing notified & returned to 1L upon exiting.  Supine scooting HOB with 2 person assist via draw sheet method.  Left lying with HOB completely elevated, O2 at 1L, & all needs in reach.  Pt. Expressed that spouse assists with bathing/toileting/dressing at bed level & is able to  "feed self.  Has hospital bed, lift device, & W/C.  Found to be at PLOF with ADL tasks.  No anticipated post-acute DME/OT needs.  No acute care OT services needed at this time as Pt. Is at baseline.  To d/c OT services.    Education:    Assessment:     Asad Perez is a 78 y.o. male with a medical diagnosis of <principal problem not specified>. At this time, patient is functioning at their prior level of function and does not require further acute OT services.     Clinical Decision Makin.  OT Low:  "Pt evaluation falls under low complexity for evaluation coding due to performance deficits noted in 1-3 areas as stated above and 0 co-morbities affecting current functional status. Data obtained from problem focused assessments. No modifications or assistance was required for completion of evaluation. Only brief occupational profile and history review completed."     Plan:     During this hospitalization, patient does not require further acute OT services.  Please re-consult if situation changes.    · Plan of Care Reviewed with: patient    This Plan of care has been discussed with the patient who was involved in its development and understands and is in agreement with the identified goals and treatment plan    GOALS:    Occupational Therapy Goals     Not on file          Multidisciplinary Problems (Resolved)        Problem: Occupational Therapy Goal    Goal Priority Disciplines Outcome Interventions   Occupational Therapy Goal   (Resolved)     OT, PT/OT Outcome(s) achieved                    Time Tracking:     OT Date of Treatment: 18  OT Start Time:   OT Stop Time: 162  OT Total Time (min): 30 min    Billable Minutes:Evaluation 15  Total Time 30 (with PT)    Jannie Mckee, OT  3/16/2018    "

## 2018-03-16 NOTE — PLAN OF CARE
Problem: Patient Care Overview  Goal: Plan of Care Review  Outcome: Ongoing (interventions implemented as appropriate)  Patient on oxygen with documented flow of 2.5 lpm.  Will attempt to wean per O2 order protocol. Will continue to monitor.

## 2018-03-16 NOTE — ED NOTES
Patient cleaned and changed. Small amount of stool noted.  Patient stated he started to feel nauseas as he was rolling from side to side. Administered Zofran. Will reassess

## 2018-03-16 NOTE — ED PROVIDER NOTES
"Encounter Date: 3/15/2018    SCRIBE #1 NOTE: I,  Huntington, am scribing for, and in the presence of,  Dr. Gregg. I have scribed the entire note.       History     Chief Complaint   Patient presents with    Abdominal Pain     states "inside my stomach is turning". Reports cramping following eating. Reports vomiting X 2 episodes today.     Shortness of Breath     Reports feeling SOB X 6 months. States not supposed to be on home oxygen.      Time seen by provider: 9:33 PM     This is a 78 y.o. male with PMHx of CHF, CKD and HTN who presents with diffuse abdominal pain onset x 2 days. He describes pain as cramping and states that it occurs mainly after eating. He reports no hx of similar pain. He also c/o vomiting (x 3 episodes today) and chronic SOB, but denies chest pain and diarrhea. He reports last normal BM today. He denies hx of asthma and COPD but states that he does breathing treatments at home. He reports no home O2. Dr. Torres is PCP.       The history is provided by the patient.     Review of patient's allergies indicates:  No Known Allergies  Past Medical History:   Diagnosis Date    Alcohol abuse     CHF (congestive heart failure)     CKD (chronic kidney disease) stage 3, GFR 30-59 ml/min     Coronary artery disease     Decubitus skin ulcer     Heart failure, diastolic     High cholesterol     Hypertension     Immobility     LBP (low back pain)     Prediabetes     PVD (peripheral vascular disease)     Stroke     2006, no deficits     Past Surgical History:   Procedure Laterality Date    aortic bifemoral bypass  2006    bilateral carotid stenois  2006    carotid stents      JOINT REPLACEMENT      knee    left common endarterectomy  2006    RT AKA  NOV 2017     Family History   Problem Relation Age of Onset    Heart disease Mother      Social History   Substance Use Topics    Smoking status: Former Smoker     Packs/day: 2.00     Years: 45.00     Quit date: 7/16/2006    Smokeless " tobacco: Never Used    Alcohol use No      Comment: NO ALCOHOL FOR 18 MONTHS, PAST ETOH ABUSE     Review of Systems   Constitutional: Negative for fever.   HENT: Negative for sore throat.    Respiratory: Negative for shortness of breath.    Cardiovascular: Negative for chest pain.   Gastrointestinal: Positive for abdominal pain and vomiting. Negative for diarrhea and nausea.   Genitourinary: Negative for dysuria.   Musculoskeletal: Negative for back pain.   Skin: Negative for rash.   Neurological: Negative for weakness.   Hematological: Does not bruise/bleed easily.   All other systems reviewed and are negative.      Physical Exam     Initial Vitals [03/15/18 2048]   BP Pulse Resp Temp SpO2   127/61 63 18 98.2 °F (36.8 °C) (!) 90 %      MAP       83         Physical Exam    Nursing note and vitals reviewed.  Constitutional: He appears well-developed and well-nourished. He is not diaphoretic. No distress.   HENT:   Head: Normocephalic and atraumatic.   Mouth/Throat: Oropharynx is clear and moist.   Eyes: Conjunctivae and EOM are normal. Pupils are equal, round, and reactive to light.   Neck: Normal range of motion. Neck supple. No tracheal deviation present.   Cardiovascular: Normal rate, regular rhythm and normal heart sounds. Exam reveals no gallop and no friction rub.    No murmur heard.  Pulmonary/Chest: No respiratory distress. He has wheezes (bilaterally). He has no rhonchi. He has no rales.   Abdominal: Soft. Bowel sounds are normal. He exhibits no distension. There is tenderness in the right upper quadrant. There is no rebound and no guarding.   Musculoskeletal: He exhibits no edema or tenderness.   Right AKA. No LE edema.    Neurological: He is alert and oriented to person, place, and time. He has normal strength.   Skin: Skin is warm and dry. No erythema. No pallor.         ED Course   Critical Care  Date/Time: 3/16/2018 2:58 AM  Performed by: GRETTA SPRAGUE  Authorized by: GRETTA SPRAGUE    Direct patient critical care time: 12 minutes  Ordering / reviewing critical care time: 12 minutes  Documentation critical care time: 12 minutes  Consulting other physicians critical care time: 3 minutes  Total critical care time (exclusive of procedural time) : 39 minutes  Critical care was time spent personally by me on the following activities: examination of patient, ordering and performing treatments and interventions, ordering and review of radiographic studies, re-evaluation of patient's condition, ordering and review of laboratory studies, obtaining history from patient or surrogate and evaluation of patient's response to treatment.        Labs Reviewed   CBC W/ AUTO DIFFERENTIAL - Abnormal; Notable for the following:        Result Value    Gran # (ANC) 8.5 (*)     Lymph # 0.6 (*)     Gran% 82.9 (*)     Lymph% 5.7 (*)     All other components within normal limits   COMPREHENSIVE METABOLIC PANEL - Abnormal; Notable for the following:     Glucose 135 (*)     All other components within normal limits   URINALYSIS - Abnormal; Notable for the following:     Appearance, UA Cloudy (*)     Occult Blood UA Trace (*)     Nitrite, UA Positive (*)     Leukocytes, UA 2+ (*)     All other components within normal limits   URINALYSIS MICROSCOPIC - Abnormal; Notable for the following:     RBC, UA 7 (*)     WBC, UA 20 (*)     WBC Clumps, UA Occasional (*)     Bacteria, UA Many (*)     All other components within normal limits   CULTURE, URINE   CULTURE, RESPIRATORY   AMYLASE   LIPASE   MAGNESIUM   TROPONIN I   HEMOGLOBIN A1C   BASIC METABOLIC PANEL   MAGNESIUM   PHOSPHORUS   LACTIC ACID, PLASMA   CBC W/ AUTO DIFFERENTIAL        Imaging Results          US Abdomen Limited (Final result)  Result time 03/15/18 22:35:40    Final result by Yodit Naylor MD (03/15/18 22:35:40)                 Impression:      Cholelithiasis.  No ultrasonographic evidence of acute cholecystitis..      Electronically signed by: Yodit Naylor  MD  Date:    03/15/2018  Time:    22:35             Narrative:    EXAMINATION:  US ABDOMEN LIMITED    CLINICAL HISTORY:  abdominal pain;    TECHNIQUE:  Limited ultrasound of the right upper quadrant of the abdomen (including pancreas, liver, gallbladder, common bile duct, and right kidney) was performed.    COMPARISON:  CT abdomen and pelvis 07/2017.    FINDINGS:  The liver is normal in size measuring 14.5cm.  Hepatic parenchyma is homogeneous without evidence for masses.  No intra- or extrahepatic biliary ductal dilatation. The common bile duct measures 0.4 cm.  The gallbladder demonstrates small gallstones.  No evidence of gallbladder wall thickening or pericholecystic fluid.  Sonographic Tello's sign is negative. The visualized portions of the pancreas and IVC appear normal. The right kidney measures 10.8 cm. No ascites.                               X-Ray Abdomen AP 1 View (KUB) (Final result)  Result time 03/15/18 22:38:58    Final result by Yodit Naylor MD (03/15/18 22:38:58)                 Impression:      Nonspecific bowel gas pattern.  Possible constipation.      Electronically signed by: Yodit Naylor MD  Date:    03/15/2018  Time:    22:38             Narrative:    EXAMINATION:  XR ABDOMEN AP 1 VIEW    CLINICAL HISTORY:  Unspecified abdominal pain    TECHNIQUE:  Single AP View of the abdomen was performed.    COMPARISON:  CT July 2017.    FINDINGS:  There is mild gaseous distention of bowel loops.  No convincing evidence of obstruction.  No free air seen on this single AP supine view.  Multiple surgical clips are seen in the abdomen.  Significant retained stool is visualized within the distal colon and rectum.                               X-Ray Chest 1 View (Final result)  Result time 03/15/18 22:37:06    Final result by Yodit Naylor MD (03/15/18 22:37:06)                 Impression:      No acute cardiopulmonary process identified.      Electronically signed by: Yodit Naylor  MD  Date:    03/15/2018  Time:    22:37             Narrative:    EXAMINATION:  XR CHEST 1 VIEW    CLINICAL HISTORY:  shortness of breath;    TECHNIQUE:  Single frontal view of the chest was performed.    COMPARISON:  April 2017.    FINDINGS:  Cardiac silhouette is stable in size.  Mediastinal structures are midline.  Lungs are symmetrically expanded.  Mild chronic lung changes are seen.  No evidence of focal consolidative process, pneumothorax, or significant effusion.  Bones show DJD with chronic changes of the right shoulder.  No acute osseous abnormality identified.                                   Medical Decision Making:   Clinical Tests:   Lab Tests: Ordered  Radiological Study: Ordered and Reviewed  Medical Tests: Reviewed and Ordered  ED Management:  78-year-old male presenting with abdominal pain and shortness of breath.  He was given an hour-long breathing treatment.  Patient was still noted to be wheezing after this and will be admitted to the [facility] internal medicine team.            Scribe Attestation:   Scribe #1: I performed the above scribed service and the documentation accurately describes the services I performed. I attest to the accuracy of the note.               Clinical Impression:     1. Calculus of gallbladder without cholecystitis without obstruction    2. Shortness of breath    3. Abdominal pain    4. COPD exacerbation                               Lei Gregg MD  03/16/18 0259

## 2018-03-16 NOTE — PT/OT/SLP EVAL
Physical Therapy Evaluation    Patient Name:  Asad Perez   MRN:  [id number]    Recommendations:     Discharge Recommendations:  home   Discharge Equipment Recommendations:  (No DME needs anticipated)   Barriers to discharge: None    Assessment:     Asad Perez is a 78 y.o. male admitted with a medical diagnosis of The primary encounter diagnosis was Calculus of gallbladder without cholecystitis without obstruction. Diagnoses of Shortness of breath, Abdominal pain, COPD exacerbation, Abdominal rigidity, generalized, Generalized abdominal pain, and PAD (peripheral artery disease) were also pertinent to this visit.  .  He presents with the following impairments/functional limitations:  weakness, impaired endurance, impaired self care skills, impaired functional mobilty, impaired balance, decreased lower extremity function, decreased upper extremity function, impaired cardiopulmonary response to activity.  At this point, pt needs to recondition to be able to return to PLOF; will recommend HH PT to continue with conditioning and progression to w/c mobility; pt has all equipment needed at home.     Rehab Prognosis:  Fair+; patient would benefit from acute skilled PT services to address these deficits and reach maximum level of function.      Recent Surgery: * No surgery found *      Plan:     During this hospitalization, patient to be seen 5 x/week to address the above listed problems via therapeutic activities, therapeutic exercises, wheelchair management/training (w/c training if pt reaches that level prior to d/c)  · Plan of Care Expires:  04/16/18   Plan of Care Reviewed with: patient    Subjective     Communicated with nurse prior to session.  Patient found supine with HOB~30* upon PT entry to room, agreeable to evaluation.      Chief Complaint: none stated  Patient comments/goals: return home to PLOF  Pain/Comfort:  · Pain Rating 1: 0/10  · Pain Rating Post-Intervention 1: 0/10    Patients cultural,  spiritual, [employer]ism conflicts given the current situation: [employer]    Living Environment:  Patient lives with wife in [facility] with 1 EDUARDO; tub/shower combo  Prior to admission, patients level of function was assisted with ADLs, but able to feed self; lifted to w/c 2x/wk and self propels with BUEs.  Patient has the following equipment: wheelchair, hospital bed, lift device.   Upon discharge, patient will have assistance from wife.    Objective:     Patient found with: oxygen, telemetry     General Precautions: Standard, fall   Orthopedic Precautions:N/A (RLE AKA)   Braces: N/A     Exams:  · Cognitive Exam:  Patient is oriented to Person, Place, Time and Situation and follows 100%% of one and two step commands   · Gross Motor Coordination:  impaired LLE 2/2 decreased range/ms length  · Sensation:    · -       Intact  · Skin Integrity/Edema:      · -       Skin integrity: Visible skin intact  · -       Edema: None noted    · RLE ROM: WFL (residual limb)  · RLE Strength: WFL  · LLE ROM:  hip IR~-30*; knee ext~-30*; ankle df~-35-40*  LLE Strength: hip~3+ to 4- grossly; knee~4- to 4 grossly; ankle~3+ to 4- grossly    Functional Mobility:  · Bed Mobility:     · Rolling Left:  stand by assistance and with use of side rail  · Scooting: total assistance, of 2 persons and with use of drawsheet toward HOB  · Transfers: did not occur at this time     AM-PAC 6 CLICK MOBILITY  Total Score:9       Therapeutic Activities and Exercises:  Pt with O2 sats at 88% on 1L resting; with any active exertion but especially with AROM of BUEs, pt desatted to 81% and returned to 88% with pursed lip breathing x ~45 seconds to a minute; increased O2 to 2L to complete the eval, then returned to 1L with patient satting at 91% while pt sitting with HOB max elevated; educated in role of PT/POC, pursed lip breathing, intermittent bed exercise for UEs/LEs    Patient left HOB elevated with all lines intact, call button in reach and nursing  notified.    GOALS:    Physical Therapy Goals        Problem: Physical Therapy Goal    Goal Priority Disciplines Outcome Goal Variances Interventions   Physical Therapy Goal     PT/OT, PT Ongoing (interventions implemented as appropriate)     Description:  Goals to be met by: 2018     Patient will increase functional independence with mobility by performin. Tolerate AROM ex for 2-5 minutes with O2 sats remaining at 88% or above on RA with HOB max elevated.  2. Supine to sit with maxA  3. Sit at EOB with min/modA x 5 minutes with BUE support                      History:     Past Medical History:   Diagnosis Date    Alcohol abuse     CHF (congestive heart failure)     CKD (chronic kidney disease) stage 3, GFR 30-59 ml/min     Coronary artery disease     Decubitus skin ulcer     Heart failure, diastolic     High cholesterol     Hypertension     Immobility     LBP (low back pain)     Prediabetes     PVD (peripheral vascular disease)     Stroke     , no deficits       Past Surgical History:   Procedure Laterality Date    aortic bifemoral bypass  2006    bilateral carotid stenois  2006    carotid stents      JOINT REPLACEMENT      knee    left common endarterectomy      RT AKA  2017       Clinical Decision Making:     History  Co-morbidities and personal factors that may impact the plan of care Examination  Body Structures and Functions, activity limitations and participation restrictions that may impact the plan of care Clinical Presentation   Decision Making/ Complexity Score   Co-morbidities:   [] Time since onset of injury / illness / exacerbation  [x] Status of current condition  []Patient's cognitive status and safety concerns    [x] Multiple Medical Problems (see med hx)  Personal Factors:   [] Patient's age  [] Prior Level of function   [] Patient's home situation (environment and family support)  [] Patient's level of motivation  [] Expected progression of  patient      HISTORY:(criteria)    [] 82006 - no personal factors/history    [x] 83759 - has 1-2 personal factor/comorbidity     [] 01536 - has >3 personal factor/comorbidity     Body Regions:  [] Objective examination findings  [] Head     []  Neck  [] Trunk   [] Upper Extremity  [x] Lower Extremity    Body Systems:  [x] For communication ability, affect, cognition, language, and learning style: the assessment of the ability to make needs known, consciousness, orientation (person, place, and time), expected emotional /behavioral responses, and learning preferences (eg, learning barriers, education  needs)  [x] For the neuromuscular system: a general assessment of gross coordinated movement (eg, balance, gait, locomotion, transfers, and transitions) and motor function  (motor control and motor learning)  [x] For the musculoskeletal system: the assessment of gross symmetry, gross range of motion, gross strength, height, and weight  [] For the integumentary system: the assessment of pliability(texture), presence of scar formation, skin color, and skin integrity  [x] For cardiovascular/pulmonary system: the assessment of heart rate, respiratory rate, blood pressure, and edema     Activity limitations:    [] Patient's cognitive status and saf ety concerns          [x] Status of current condition      [] Weight bearing restriction  [] Cardiopulmunary Restriction    Participation Restrictions:   [] Goals and goal agreement with the patient     [] Rehab potential (prognosis) and probable outcome      Examination of Body System: (criteria)    [] 28494 - addressing 1-2 elements    [] 21781 - addressing a total of 3 or more elements     [x] 53535 -  Addressing a total of 4 or more elements         Clinical Presentation: (criteria)  Evolving - 04664     On examination of body system using standardized tests and measures patient presents with 4 or more elements from any of the following: body structures and functions, activity  limitations, and/or participation restrictions.  Leading to a clinical presentation that is considered stable and/or uncomplicated                              Clinical Decision Making  (Eval Complexity):  Low- 13622     Time Tracking:     PT Received On: 03/16/18  PT Start Time: 1552     PT Stop Time: 1622  PT Total Time (min): 30 min with OT    Billable Minutes: Evaluation 15 minutes      Sun Cedeño, PT  03/16/2018

## 2018-03-16 NOTE — PLAN OF CARE
Problem: Occupational Therapy Goal  Goal: Occupational Therapy Goal  Outcome: Outcome(s) achieved Date Met: 03/16/18  Initial OT eval complete.  Communicated with nursing prior to OT.  Found lying with HOB elevated with 1L O2 NC.  O2 sats at 88% at rest.  L-roll with SBA & use of bed rail.  O2 desat to 84% with bed mobility task.  Pt. desat 81% upon active overhead shoulder flexion.  Educated on deep breathing tech with mod v/c for follow through.  Increased O2 to 2L to allow for completion of eval with nursing notified & returned to 1L upon exiting.  Supine scooting HOB with 2 person assist via draw sheet method.  Left lying with HOB completely elevated, O2 at 1L, & all needs in reach.  Pt. Expressed that spouse assists with bathing/toileting/dressing at bed level & is able to feed self.  Has hospital bed, lift device, & W/C.  Found to be at PLOF with ADL tasks.  No anticipated post-acute DME/OT needs.  No acute care OT services needed at this time as Pt. Is at baseline.  To d/c OT services.

## 2018-03-16 NOTE — PLAN OF CARE
Patient unaccompanied but TN called wife to confirm information on plan:  Kassy Perez Spouse [phone]     Patient is bed and wheelchair bound, wife uses lift device.  Update - TN spoke with wife in room and gave number to  [facility] and senior resource guide for information on caregivers.    TN notified RUBEN Blue that patient would benefit from OPCM consult and wife would like to speak with her when possible.    [facility], [place] notified Lluvia of likely need for HH at [place] which is expected over the weekend.  Follow-up With  Details  Why  Contact Info   Maria Victoria Middleton MD    TN requested AM appt  [address]  [address]  [facility] [address] [phone]             03/16/18 1038   Discharge Assessment   Assessment Type Discharge Planning Assessment   Confirmed/corrected address and phone number on facesheet? Yes   Assessment information obtained from? Patient   Prior to hospitilization cognitive status: Alert/Oriented   Prior to hospitalization functional status: Assistive Equipment;Needs Assistance   Current cognitive status: Alert/Oriented   Current Functional Status: Assistive Equipment;Needs Assistance   Lives With spouse   Able to Return to Prior Arrangements yes   Is patient able to care for self after discharge? No   Who are your caregiver(s) and their phone number(s)? Kassy Perez Spouse [phone]    Patient's perception of discharge disposition home health   Patient currently being followed by outpatient case management? (TN notified Lluvia Blue of the need)   Patient currently receives any other outside agency services? No   Equipment Currently Used at Home wheelchair;hospital bed;lift device   Do you have any problems affording any of your prescribed medications? No   Is the patient taking medications as prescribed? yes   Does the patient have transportation home? Yes   Transportation Available family or friend will provide    Dialysis Name and Scheduled days n/a   Discharge Plan A Home with family;Home Health   Patient/Family In Agreement With Plan yes

## 2018-03-16 NOTE — NURSING
Patient arrived to unit, cardiac monitor on. On 2.5 L nasal cannula, no signs of distress. Patient oriented to room.

## 2018-03-16 NOTE — PLAN OF CARE
Problem: Physical Therapy Goal  Goal: Physical Therapy Goal  Goals to be met by: 2018     Patient will increase functional independence with mobility by performin. Tolerate AROM ex for 2-5 minutes with O2 sats remaining at 88% or above on RA with HOB max elevated.  2. Supine to sit with maxA  3. Sit at EOB with min/modA x 5 minutes with BUE support    Outcome: Ongoing (interventions implemented as appropriate)  Patient evaluated this PM; pt with O2 sats at 88% on 1L resting; with any active exertion but especially with AROM of BUEs, pt desatted to 81% and returned to 88% with pursed lip breathing x ~45 seconds to a minute; increased O2 to 2L to complete the eval, then returned to 1L with patient satting at 91% while pt sitting with HOB max elevated; at this point pt needs to recondition to be able to return to PLOF; will recommend  PT to continue with conditioning and progression to w/c mobility; pt has all equipment needed at home.

## 2018-03-16 NOTE — SUBJECTIVE & OBJECTIVE
Past Medical History:   Diagnosis Date    Alcohol abuse     CHF (congestive heart failure)     CKD (chronic kidney disease) stage 3, GFR 30-59 ml/min     Coronary artery disease     Decubitus skin ulcer     Heart failure, diastolic     High cholesterol     Hypertension     Immobility     LBP (low back pain)     Prediabetes     PVD (peripheral vascular disease)     Stroke     2006, no deficits       Past Surgical History:   Procedure Laterality Date    aortic bifemoral bypass  2006    bilateral carotid stenois  2006    carotid stents      JOINT REPLACEMENT      knee    left common endarterectomy  2006    RT AKA  NOV 2017       Review of patient's allergies indicates:  No Known Allergies    No current facility-administered medications on file prior to encounter.      Current Outpatient Prescriptions on File Prior to Encounter   Medication Sig    albuterol-ipratropium 2.5mg-0.5mg/3mL (DUO-NEB) 0.5 mg-3 mg(2.5 mg base)/3 mL nebulizer solution Take 3 mLs by nebulization every 6 (six) hours while awake. (Patient taking differently: Take 3 mLs by nebulization 4 (four) times daily. )    amiodarone (PACERONE) 200 MG Tab Take 1 tablet (200 mg total) by mouth once daily.    amlodipine (NORVASC) 10 MG tablet Take 1 tablet (10 mg total) by mouth once daily.    ascorbic acid, vitamin C, (VITAMIN C) 500 MG tablet Take 500 mg by mouth 2 (two) times daily.    aspirin (ECOTRIN) 81 MG EC tablet Take 1 tablet (81 mg total) by mouth once daily.    carvedilol (COREG) 25 MG tablet Take 1 tablet (25 mg total) by mouth 2 (two) times daily.    furosemide (LASIX) 40 MG tablet     multivitamin (THERAGRAN) per tablet Take 1 tablet by mouth once daily.    oxycodone-acetaminophen (PERCOCET) 5-325 mg per tablet Take 1 tablet by mouth every 4 (four) hours as needed for Pain.    [DISCONTINUED] amoxicillin-clavulanate 875-125mg (AUGMENTIN) 875-125 mg per tablet Take 1 tablet by mouth 2 (two) times daily. Stop date:  5/24/17    [DISCONTINUED] arginine-glutamine-calcium HMB (GENIE) 7-7-1.5 gram PwPk Take 1 packet by mouth 2 (two) times daily.    [DISCONTINUED] docusate sodium (COLACE) 250 MG capsule Take 250 mg by mouth 2 (two) times daily.     [DISCONTINUED] ibuprofen (ADVIL,MOTRIN) 400 MG tablet Take 400 mg by mouth every 6 (six) hours as needed for Other.    [DISCONTINUED] lactulose (CEPHULAC) 20 gram Pack Take 1 packet (20 g total) by mouth 2 (two) times daily as needed (constipation).    [DISCONTINUED] potassium, sodium phosphates (PHOS-NAK) 280-160-250 mg PwPk Take 1 packet by mouth 2 (two) times daily.    [DISCONTINUED] ramelteon (ROZEREM) 8 mg tablet Take 8 mg by mouth every evening.    [DISCONTINUED] rivaroxaban (XARELTO) 10 mg Tab Take 10 mg by mouth 2 (two) times daily.    [DISCONTINUED] tamsulosin (FLOMAX) 0.4 mg Cp24 Take 0.4 mg by mouth every evening.    [DISCONTINUED] trazodone (DESYREL) 100 MG tablet     [DISCONTINUED] zinc sulfate (ZINCATE) 220 (50) mg capsule Take 220 mg by mouth once daily.      Family History     Problem Relation (Age of Onset)    Heart disease Mother        Social History Main Topics    Smoking status: Former Smoker     Packs/day: 2.00     Years: 45.00     Quit date: 7/16/2006    Smokeless tobacco: Never Used    Alcohol use No      Comment: NO ALCOHOL FOR 18 MONTHS, PAST ETOH ABUSE    Drug use: No    Sexual activity: Not on file     Review of Systems   Constitution: Positive for weakness.   HENT: Negative.    Eyes: Negative.    Cardiovascular: Negative for chest pain, irregular heartbeat, leg swelling, near-syncope, orthopnea, palpitations, paroxysmal nocturnal dyspnea and syncope.   Respiratory: Positive for cough, shortness of breath and wheezing. Negative for sputum production.    Endocrine: Negative.    Hematologic/Lymphatic: Negative.    Skin: Positive for poor wound healing.   Musculoskeletal: Negative.    Gastrointestinal: Positive for abdominal pain.   Genitourinary:  Positive for bladder incontinence.   Psychiatric/Behavioral: Negative.    Allergic/Immunologic: Negative.      Objective:     Vital Signs (Most Recent):  Temp: 96.1 °F (35.6 °C) (03/16/18 0800)  Pulse: (!) 54 (03/16/18 0935)  Resp: 20 (03/16/18 0800)  BP: (!) 130/58 (03/16/18 0800)  SpO2: (!) 94 % (03/16/18 0748) Vital Signs (24h Range):  Temp:  [96.1 °F (35.6 °C)-98.9 °F (37.2 °C)] 96.1 °F (35.6 °C)  Pulse:  [54-74] 54  Resp:  [18-24] 20  SpO2:  [90 %-100 %] 94 %  BP: (119-130)/(54-61) 130/58     Weight: 68 kg (150 lb)  Body mass index is 22.81 kg/m².    SpO2: (!) 94 %  O2 Device (Oxygen Therapy): nasal cannula (no home o2)    No intake or output data in the 24 hours ending 03/16/18 1013    Lines/Drains/Airways     Drain                 Drain/Device  09/29/16 1444  midline sacral spine other (see comments) 532 days          Pressure Ulcer                 Pressure Ulcer 08/10/16 1330 Right knee suspected deep tissue injury 582 days         Pressure Ulcer 09/07/16 1700 Right lateral other (see comments) Stage  days         Pressure Ulcer 09/09/16 1700 Right posterior Unstageable 552 days         Pressure Ulcer 10/03/16 1530 Right posterior other (see comments) suspected deep tissue injury 528 days         Pressure Ulcer 11/28/16 1530 Left heel suspected deep tissue injury 472 days         Pressure Ulcer 11/28/16 1530 Left lateral foot Stage I 472 days         Pressure Ulcer 11/28/16 1530 Left other (see comments) Stage  days         Pressure Ulcer 11/29/16 1700 lower thoracic spine suspected deep tissue injury 471 days         Pressure Ulcer 04/07/17 2100 sacral spine Stage  days          Peripheral Intravenous Line                 Peripheral IV - Single Lumen 03/15/18 2203 Left Antecubital less than 1 day                Physical Exam   Constitutional: No distress.   HENT:   Head: Normocephalic and atraumatic.   Eyes: Right eye exhibits no discharge. Left eye exhibits no discharge.   Neck: No JVD  present.   Cardiovascular: Normal rate and regular rhythm.    Murmur heard.  Pulmonary/Chest: Effort normal. He has wheezes.   Abdominal: Soft. Bowel sounds are normal.   Musculoskeletal: He exhibits no edema.   Neurological: He is alert.   Skin: Skin is warm and dry. He is not diaphoretic.   Psychiatric: He has a normal mood and affect.       Significant Labs:   BMP:   Recent Labs  Lab 03/15/18  2202 03/16/18  0556   * 188*    141   K 4.7 4.6    106   CO2 26 25   BUN 22 24*   CREATININE 1.1 1.1   CALCIUM 9.5 9.4   MG 2.2 2.1   , CMP   Recent Labs  Lab 03/15/18  2202 03/16/18  0556    141   K 4.7 4.6    106   CO2 26 25   * 188*   BUN 22 24*   CREATININE 1.1 1.1   CALCIUM 9.5 9.4   PROT 8.2  --    ALBUMIN 3.6  --    BILITOT 0.7  --    ALKPHOS 125  --    AST 29  --    ALT 33  --    ANIONGAP 10 10   ESTGFRAFRICA >60 >60   EGFRNONAA >60 >60   , CBC   Recent Labs  Lab 03/15/18  2202 03/16/18  0556   WBC 10.20 6.77   HGB 14.4 14.0   HCT 44.8 43.3    223   , INR No results for input(s): INR, PROTIME in the last 48 hours., Lipid Panel No results for input(s): CHOL, HDL, LDLCALC, TRIG, CHOLHDL in the last 48 hours., Troponin   Recent Labs  Lab 03/15/18  2202   TROPONINI 0.020    and All pertinent lab results from the last 24 hours have been reviewed.    Significant Imaging: Echocardiogram:   2D echo with color flow doppler:   Results for orders placed or performed during the hospital encounter of 11/27/16   2D echo with color flow doppler   Result Value Ref Range    EF 65 55 - 65    Mitral Valve Regurgitation TRIVIAL TO MILD     Diastolic Dysfunction Yes (A)     Aortic Valve Regurgitation TRIVIAL     Est. PA Systolic Pressure 46.56 (A)     Tricuspid Valve Regurgitation MILD

## 2018-03-16 NOTE — ASSESSMENT & PLAN NOTE
Arterial ultrasound 9/1/2016:   R CFA PSV 32 cm/sec, SFA  with severe infrapopliteal disease  S/p aorto-bifem with an occluded R limb  Bilateral SFA    R AKA  No acute issues   Continue medical management with asa, statin

## 2018-03-16 NOTE — H&P
"[facility] History and Physical - Resident Note    Admitting Team: [facility]  Attending Physician: Dr. Castro  Resident: Dr. Ho  Interns: Dr. Mena & Dr. Mcdonnell    Date of Admit: 3/15/2018    Chief Complaint     SOB x 2 months; Abdominal Pain x 3 months    Subjective:      History of Present Illness:    Mr. Asad Perez is a 79 y/o  M with a PMH significant for HFpEF (EF 65% 2016), COPD, PVD s/p L femoral popiteal stent, R AKA, HTN, Paroxysmal Afib, and HLD who presents to the [facility] ED with a chief complaint of SOB and abdominal pain x the past 2-3 months. The patient was in his USOH (ambulates in wheelchair, spends >90% time in bed, uses diapers, feeds himself) until approximately 2 months ago when he began experiencing progressive SOB. The patient states his SOB has progressively worsened where he feels he's "wheezing all the time now". He uses duo-nebs several times daily without relief. He does not use home oxygen. He denies orthopnea. Additionally, the patient does not use any inhalers at home. He does endorse a chronic non-productive cough but denies fevers, chills, or recent sick contacts.     Additionally, the patient endorses sharp diffuse abdominal pain regularly 20-minutes after eating a meal. He states the pain lasts approximately 15-20 minutes after meals and than goes away. Upon the day of admission he had one episode of vomiting (denies blood). He denies any history of gastric/duodenal ulcers in the past.     Past Medical History:  Past Medical History:   Diagnosis Date    Alcohol abuse     CHF (congestive heart failure)     CKD (chronic kidney disease) stage 3, GFR 30-59 ml/min     Coronary artery disease     Decubitus skin ulcer     Heart failure, diastolic     High cholesterol     Hypertension     Immobility     LBP (low back pain)     Prediabetes     PVD (peripheral vascular disease)     Stroke     2006, no deficits       Past Surgical History:  Past " "Surgical History:   Procedure Laterality Date    aortic bifemoral bypass  2006    bilateral carotid stenois  2006    carotid stents      JOINT REPLACEMENT      knee    left common endarterectomy  2006    RT AKA  2017     Allergies:  Review of patient's allergies indicates:  No Known Allergies    Home Medications:  Carvedilol 25mg BID  ASA 81mg qD  Lasix 40mg qD  Duo-Nebs q6h PRN  Amlodipine 20mg qD  Amiodarone 200mg qD  Atorvastatin 40mg qD  Tramadol 50mg PRN    Family History:  Family History   Problem Relation Age of Onset    Heart disease Mother        Social History:  Social History   Substance Use Topics    Smoking status: Former Smoker     Packs/day: 2.00     Years: 45.00     Quit date: 2006    Smokeless tobacco: Never Used    Alcohol use No      Comment: NO ALCOHOL FOR 18 MONTHS, PAST ETOH ABUSE       Review of Systems:  Pertinent items are noted in HPI. All other systems are reviewed and are negative.    Health Maintaince :   Primary Care Physician: Dr. Torres  Immunizations:   TDap is up to date.  Influenza is up to date.  Pneumovax is up to date  Cancer Screening:  Colonoscopy: is up to date.      Objective:   Last 24 Hour Vital Signs:  BP  Min: 119/56  Max: 127/61  Temp  Av.6 °F (37 °C)  Min: 98.2 °F (36.8 °C)  Max: 98.9 °F (37.2 °C)  Pulse  Av.8  Min: 63  Max: 74  Resp  Av.2  Min: 18  Max: 24  SpO2  Av.5 %  Min: 90 %  Max: 100 %  Height  Av' 8" (172.7 cm)  Min: 5' 8" (172.7 cm)  Max: 5' 8" (172.7 cm)  Weight  Av kg (150 lb)  Min: 68 kg (150 lb)  Max: 68 kg (150 lb)  Body mass index is 22.81 kg/m².  No intake/output data recorded.    Physical Examination:  BP (!) 124/54   Pulse 63   Temp 98.9 °F (37.2 °C) (Oral)   Resp 18   Ht 5' 8" (1.727 m)   Wt 68 kg (150 lb)   SpO2 (!) 93%   BMI 22.81 kg/m²     General Appearance:    Alert, cooperative, no distress, appears older than stated age, conversant   Head:    Normocephalic, without obvious abnormality, " atraumatic   Eyes:    PERRL, conjunctiva/corneas clear, EOM's intact        Nose:   Nares normal, septum midline, mucosa normal, no drainage    or sinus tenderness   Throat:   Lips, mucosa, and tongue normal; teeth and gums normal   Back:     Symmetric, no curvature, ROM normal, no CVA tenderness   Lungs:     Diffuse expiratory wheezing appreciated bilaterally   Chest wall:    No tenderness or deformity   Heart:    Regular rate and rhythm, S1 and S2 normal, no murmur. Distant heart sounds.    Abdomen:    Distended but Soft, non-tender, bowel sounds active all four quadrants no masses, no organomegaly. Umbilical hernia present.    Extremities:    R AKA without no wounds. L LE with chronic venous stasis changes.   Skin:   Skin color, texture, turgor normal, no rashes or lesions   Lymph nodes:   Cervical, supraclavicular, and axillary nodes normal   Neurologic:   Normal strength, sensation and reflexes       throughout      Laboratory:  Most Recent Data:  CBC: Lab Results   Component Value Date    WBC 10.20 03/15/2018    HGB 14.4 03/15/2018    HCT 44.8 03/15/2018     03/15/2018    MCV 93 03/15/2018    RDW 14.0 03/15/2018     BMP: Lab Results   Component Value Date     03/15/2018    K 4.7 03/15/2018     03/15/2018    CO2 26 03/15/2018    BUN 22 03/15/2018    CREATININE 1.1 03/15/2018     (H) 03/15/2018    CALCIUM 9.5 03/15/2018    MG 2.2 03/15/2018    PHOS 3.3 11/28/2016     LFTs: Lab Results   Component Value Date    PROT 8.2 03/15/2018    ALBUMIN 3.6 03/15/2018    BILITOT 0.7 03/15/2018    AST 29 03/15/2018    ALKPHOS 125 03/15/2018    ALT 33 03/15/2018     Coags:   Lab Results   Component Value Date    INR 1.6 (H) 01/01/2017     FLP: Lab Results   Component Value Date    CHOL 139 08/03/2016    HDL 29 (L) 08/03/2016    LDLCALC 93.4 08/03/2016    TRIG 83 08/03/2016    CHOLHDL 20.9 08/03/2016     DM: Lab Results   Component Value Date    HGBA1C 7.9 (H) 11/27/2016    HGBA1C 6.7 (H) 07/13/2016     HGBA1C 7.0 (H) 02/28/2015    LDLCALC 93.4 08/03/2016    CREATININE 1.1 03/15/2018     Thyroid: Lab Results   Component Value Date    TSH 1.728 11/27/2016     Anemia: Lab Results   Component Value Date    IRON 20 (L) 11/27/2016    TIBC 145 (L) 11/27/2016    FERRITIN 2,805 (H) 11/29/2016    [id number] 1152 (H) 11/27/2016    FOLATE 14.6 11/27/2016     Cardiac: Lab Results   Component Value Date    TROPONINI 0.020 03/15/2018     (H) 11/27/2016     Urinalysis: Lab Results   Component Value Date    LABURIN  01/09/2017     ACINETOBACTER BAUMANNII/HAEMOLYTICUS  >100,000 cfu/ml      COLORU Yellow 03/15/2018    SPECGRAV 1.015 03/15/2018    NITRITE Positive (A) 03/15/2018    KETONESU Negative 03/15/2018    UROBILINOGEN 1.0 03/15/2018    WBCUA 20 (H) 03/15/2018       Trended Lab Data:    Recent Labs  Lab 03/15/18  2202   WBC 10.20   HGB 14.4   HCT 44.8      MCV 93   RDW 14.0      K 4.7      CO2 26   BUN 22   CREATININE 1.1   *   PROT 8.2   ALBUMIN 3.6   BILITOT 0.7   AST 29   ALKPHOS 125   ALT 33       Trended Cardiac Data:    Recent Labs  Lab 03/15/18  2202   TROPONINI 0.020       Microbiology Data:  Respiratory Culture - pending    Radiology:  Imaging Results          US Abdomen Limited (Final result)  Result time 03/15/18 22:35:40    Final result by Yodit Naylor MD (03/15/18 22:35:40)                 Impression:      Cholelithiasis.  No ultrasonographic evidence of acute cholecystitis..      Electronically signed by: Yodit Naylor MD  Date:    03/15/2018  Time:    22:35             Narrative:    EXAMINATION:  US ABDOMEN LIMITED    CLINICAL HISTORY:  abdominal pain;    TECHNIQUE:  Limited ultrasound of the right upper quadrant of the abdomen (including pancreas, liver, gallbladder, common bile duct, and right kidney) was performed.    COMPARISON:  CT abdomen and pelvis 07/2017.    FINDINGS:  The liver is normal in size measuring 14.5cm.  Hepatic parenchyma is homogeneous without evidence  for masses.  No intra- or extrahepatic biliary ductal dilatation. The common bile duct measures 0.4 cm.  The gallbladder demonstrates small gallstones.  No evidence of gallbladder wall thickening or pericholecystic fluid.  Sonographic Tello's sign is negative. The visualized portions of the pancreas and IVC appear normal. The right kidney measures 10.8 cm. No ascites.                               X-Ray Abdomen AP 1 View (KUB) (Final result)  Result time 03/15/18 22:38:58    Final result by Yodit Naylor MD (03/15/18 22:38:58)                 Impression:      Nonspecific bowel gas pattern.  Possible constipation.      Electronically signed by: Yodit Naylor MD  Date:    03/15/2018  Time:    22:38             Narrative:    EXAMINATION:  XR ABDOMEN AP 1 VIEW    CLINICAL HISTORY:  Unspecified abdominal pain    TECHNIQUE:  Single AP View of the abdomen was performed.    COMPARISON:  CT July 2017.    FINDINGS:  There is mild gaseous distention of bowel loops.  No convincing evidence of obstruction.  No free air seen on this single AP supine view.  Multiple surgical clips are seen in the abdomen.  Significant retained stool is visualized within the distal colon and rectum.                               X-Ray Chest 1 View (Final result)  Result time 03/15/18 22:37:06    Final result by Yodit Naylor MD (03/15/18 22:37:06)                 Impression:      No acute cardiopulmonary process identified.      Electronically signed by: Yodit Naylor MD  Date:    03/15/2018  Time:    22:37             Narrative:    EXAMINATION:  XR CHEST 1 VIEW    CLINICAL HISTORY:  shortness of breath;    TECHNIQUE:  Single frontal view of the chest was performed.    COMPARISON:  April 2017.    FINDINGS:  Cardiac silhouette is stable in size.  Mediastinal structures are midline.  Lungs are symmetrically expanded.  Mild chronic lung changes are seen.  No evidence of focal consolidative process, pneumothorax, or significant effusion.   Bones show DJD with chronic changes of the right shoulder.  No acute osseous abnormality identified.                              Assessment:     Asad Perez is a 78 y.o. male with:  Patient Active Problem List    Diagnosis Date Noted    COPD exacerbation 03/16/2018    Complicated open wound of right hip     Acute osteomyelitis 04/11/2017    Decubitus ulcer of buttock, stage 2 04/07/2017    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    Urinary tract infection associated with indwelling urethral catheter 12/01/2016    Centrilobular emphysema 11/29/2016    Symptomatic anemia 11/27/2016    Deep vein thrombosis (DVT) of brachial vein     Non-healing ulcer of lower leg 10/28/2016    Fever     Decubitus ulcer of sacral region, stage 4 10/05/2016    Deep vein thrombosis (DVT) of upper extremity 10/05/2016    Dehiscence of perineal wound 10/05/2016    Hyponatremia 10/05/2016    VRE (vancomycin-resistant Enterococci) infection 09/20/2016    Elevated liver enzymes     Chronic diastolic congestive heart failure     Critical lower limb ischemia 09/10/2016    Stenosis of left internal carotid artery 09/08/2016    Abnormal finding on imaging 08/31/2016    Abnormal CT of the abdomen     Chronic kidney disease, stage V 08/12/2016    Anemia 08/12/2016    Anemia of chronic disease 08/08/2016    Altered mental status 08/07/2016    MRSA (methicillin resistant Staphylococcus aureus) infection 08/07/2016    Confusion     Weak     Pneumonia 07/29/2016    History of atrial fibrillation     Infected prosthetic knee joint 07/12/2016    Right knee pain 06/15/2016    GERD (gastroesophageal reflux disease) 02/28/2015    Esophageal web 02/28/2015    Acute on chronic diastolic heart failure 11/08/2014    Transaminitis 11/08/2014    Chronic obstructive pulmonary disease 11/07/2014    Dyspnea 11/06/2014    Normocytic anemia 11/06/2014    Dysphagia 11/03/2014    Osteoarthritis of left knee  07/14/2014    Essential hypertension 07/16/2013    CKD (chronic kidney disease) 07/16/2013    PVD (peripheral vascular disease) 07/16/2013    CAD (coronary artery disease) 07/16/2013    History of stroke 07/16/2013    Physical deconditioning 07/16/2013    Alcohol abuse 07/16/2013        Plan:     COPD Exacerbation  -Per patient, increasing SOB/wheezing for the past 2-3 months. Not on home oxygen/maintenance inhalers. Does use Duo-Nebs several times daily.  -Significant hx of tobacco abuse in past (1.5 PPD for 50 years); quit in 2007.   -No recorded PFTs  -PE with diffuse expiratory wheezing; CXR negative for acute abnormalities.   -Upon admission, afebrile, on 3L NC with 91% Ox saturation, WBC 10.20.   -Received Duo-Nebs, Solumedrol in ED.   -Upon admission, ordered Duo-Nebs, Prednisone, Breo.     Abdominal Pain  -Per patient, experiencing sharp diffuse abdominal pain 20-30 minutes after eating.   -No hx of ulcers in the past; significant vasculopath.   -Etiology may be 2/2 to ulcers vs mesenteric ischemia.   -Started pantoprazole 40mg qD  -Abdominal US revealing cholelithiasis. KUB no obstruction.   -May benefit from ischemic vascular work up - will discuss with staff in AM.       Hx HFpEF  -Most recent ECHO 12/2016, EF 65%, PAH, diastolic dysfunction, trivial TR  -No signs of volume overload, No crackles, CXR clear  -Continuing home carvedilol, lasix, ASA, statin    Atherosclerotic disease  -no record of MI, history of CVA (no deficits), bilateral carotid stenosis and bilat common iliac occlusion and distal abdominal aorta occusion s/p angiography in 10/2016  -S/p left femoropopliteal stent (US during last admit revealing occlusion)  -Continuing ASA, Statin as above.      HTN  -Normotensive upon admission.  -Continuing home carvedilol, amlodipine.       Paroxysmal Afib  -currently in normal sinus rhythm, asymptomatic  -on coreg rate controlled, amiodarone for rhythm control  -previously on xarelto but  discontinued via PCP      Right AKA   -had previous wound infection/debridement.   -no wounds noted on examination  -no acute issues      HLD  -continuing home statin      DVT PPx: Lovenox  Diet: Cardiac  Dispo: Pending improvement in respiratory status.       Code Status:     Full    Horacio Caldwell  [facility] HO-II  hospit[facility] Medicine Service    hospitals Medicine Hospitalist Pager numbers:   hospitals Hospitalist Medicine Team A (Chay/Shawanda): [phone]  hospitals Hospitalist Medicine Team B (Gloria/Alex):  [phone]

## 2018-03-16 NOTE — DISCHARGE INSTRUCTIONS
If you are over 60 and live in [facility] and are in need of extra services dealing with your health issues or those of your loved one, please call the [facility] [place] on Aging at [phone].  They may be able to assist you with nutrition, socialization, transportation, regular exercise, medication management and more.

## 2018-03-16 NOTE — HPI
Asad Perez is a 78 year old male with HFpEF (EF 65% 2016), COPD, PVD s/p L femoral popiteal stent, R AKA, HTN, CKD, Paroxysmal Afib, mesenteric artery occlusion, and HLD who presented to the ED with a 2 mo history of progressive SOB and abdominal pain. He lives in [place] with his wife and is wheelchair bound. Patient spends >90% time in bed and is incontinent.  He reports wheezing constantly which doesn't improved with INH. Endorses chronic nonproductive cough.   Patient also reports sharp diffuse abdominal pain regularly 20-minutes after eating a meal. He states the pain lasts approximately 15-20 minutes after meals and than goes away. Upon the day of admission he had one episode of vomiting which has not recurred. He denies orthopnea, PND, chest pain, dizziness, syncope. UA + and he was given a dose of Rocephin. He is admitted to [facility] for COPD exacerbation. Lytes normal. HH normal. Negative TNI. US abd with cholelithiasis.

## 2018-03-16 NOTE — ED TRIAGE NOTES
Patient presents to the ED with complaints of generalized abdominal pain that starts about 15 min after eating. Patient states today he did have 2 normal BMs. States his stomach just feels like it has gas that wont come out. Patient is wheelchair bound. Right lower leg amputation with upper body weakness. Wife is at bedside. Patient is also incontinent. History of HBP and stroke in the past. Patient was put on oxygen in triage. States he does not use oxygen at home but receives breathing txs. Patient states he is currently SOB.     Review of patient's allergies indicates:  No Known Allergies     Patient has verified the spelling of their name and  on armband.   APPEARANCE: Patient is alert, calm, oriented x 4, and does not appear distressed.   SKIN: Skin is normal for race, warm, and dry. Normal skin turgor and mucous membranes moist. +discoloration noted to left lower leg, skin is dry  CARDIAC: Normal rate and rhythm, no murmur heard.   RESPIRATORY:Normal rate and effort. Breath sounds clear bilaterally throughout chest. Respirations are equal and unlabored.  +SOB with bilateral anterior wheezing  GASTRO: Bowel sounds normal, abdomen is soft, no tenderness, and no abdominal distention. +hypo active BS with tenderness   MUSCLE: Full ROM. No bony tenderness or soft tissue tenderness. No obvious deformity. +right lower leg amputation, generalized weakness. Unable to ambulate from wheelchair to bed without assistance  PERIPHERAL VASCULAR: peripheral pulses present. Normal cap refill. No edema. Warm to touch.

## 2018-03-16 NOTE — CONSULTS
[facility]  Cardiology  Consult Note    Patient Name: Asad Perez  MRN: [id number]  Admission Date: 3/15/2018  Hospital Length of Stay: 0 days  Code Status: Full Code   Attending Provider: Carl Castro MD   Consulting Provider: Stuart Pierre NP  Primary Care Physician: Tomas Torres MD  Principal Problem:<principal problem not specified>    Patient information was obtained from patient, past medical records and ER records.     Inpatient consult to [facility]  Consult performed by: STUART PIERRE.  Consult ordered by: SANDRA DWYER        Subjective:     Chief Complaint:  SOB, abdominal pain      HPI:   Asad Perez is a 78 year old male with HFpEF (EF 65% 2016), COPD, PVD s/p L femoral popiteal stent, R AKA, HTN, CKD, Paroxysmal Afib, mesenteric artery occlusion, and HLD who presented to the ED with a 2 mo history of progressive SOB and abdominal pain. He lives in [place] with his wife and is wheelchair bound. Patient spends >90% time in bed and is incontinent.  He reports wheezing constantly which doesn't improved with INH. Endorses chronic nonproductive cough.   Patient also reports sharp diffuse abdominal pain regularly 20-minutes after eating a meal. He states the pain lasts approximately 15-20 minutes after meals and than goes away. Upon the day of admission he had one episode of vomiting which has not recurred. He denies orthopnea, PND, chest pain, dizziness, syncope. UA + and he was given a dose of Rocephin. He is admitted to [facility] for COPD exacerbation. Lytes normal. HH normal. Negative TNI. US abd with cholelithiasis.      Past Medical History:   Diagnosis Date    Alcohol abuse     CHF (congestive heart failure)     CKD (chronic kidney disease) stage 3, GFR 30-59 ml/min     Coronary artery disease     Decubitus skin ulcer     Heart failure, diastolic     High cholesterol     Hypertension     Immobility     LBP (low back pain)      Prediabetes     PVD (peripheral vascular disease)     Stroke     2006, no deficits       Past Surgical History:   Procedure Laterality Date    aortic bifemoral bypass  2006    bilateral carotid stenois  2006    carotid stents      JOINT REPLACEMENT      knee    left common endarterectomy  2006    RT AKA  NOV 2017       Review of patient's allergies indicates:  No Known Allergies    No current facility-administered medications on file prior to encounter.      Current Outpatient Prescriptions on File Prior to Encounter   Medication Sig    albuterol-ipratropium 2.5mg-0.5mg/3mL (DUO-NEB) 0.5 mg-3 mg(2.5 mg base)/3 mL nebulizer solution Take 3 mLs by nebulization every 6 (six) hours while awake. (Patient taking differently: Take 3 mLs by nebulization 4 (four) times daily. )    amiodarone (PACERONE) 200 MG Tab Take 1 tablet (200 mg total) by mouth once daily.    amlodipine (NORVASC) 10 MG tablet Take 1 tablet (10 mg total) by mouth once daily.    ascorbic acid, vitamin C, (VITAMIN C) 500 MG tablet Take 500 mg by mouth 2 (two) times daily.    aspirin (ECOTRIN) 81 MG EC tablet Take 1 tablet (81 mg total) by mouth once daily.    carvedilol (COREG) 25 MG tablet Take 1 tablet (25 mg total) by mouth 2 (two) times daily.    furosemide (LASIX) 40 MG tablet     multivitamin (THERAGRAN) per tablet Take 1 tablet by mouth once daily.    oxycodone-acetaminophen (PERCOCET) 5-325 mg per tablet Take 1 tablet by mouth every 4 (four) hours as needed for Pain.    [DISCONTINUED] amoxicillin-clavulanate 875-125mg (AUGMENTIN) 875-125 mg per tablet Take 1 tablet by mouth 2 (two) times daily. Stop date: 5/24/17    [DISCONTINUED] arginine-glutamine-calcium HMB (GENIE) 7-7-1.5 gram PwPk Take 1 packet by mouth 2 (two) times daily.    [DISCONTINUED] docusate sodium (COLACE) 250 MG capsule Take 250 mg by mouth 2 (two) times daily.     [DISCONTINUED] ibuprofen (ADVIL,MOTRIN) 400 MG tablet Take 400 mg by mouth every 6 (six) hours  as needed for Other.    [DISCONTINUED] lactulose (CEPHULAC) 20 gram Pack Take 1 packet (20 g total) by mouth 2 (two) times daily as needed (constipation).    [DISCONTINUED] potassium, sodium phosphates (PHOS-NAK) 280-160-250 mg PwPk Take 1 packet by mouth 2 (two) times daily.    [DISCONTINUED] ramelteon (ROZEREM) 8 mg tablet Take 8 mg by mouth every evening.    [DISCONTINUED] rivaroxaban (XARELTO) 10 mg Tab Take 10 mg by mouth 2 (two) times daily.    [DISCONTINUED] tamsulosin (FLOMAX) 0.4 mg Cp24 Take 0.4 mg by mouth every evening.    [DISCONTINUED] trazodone (DESYREL) 100 MG tablet     [DISCONTINUED] zinc sulfate (ZINCATE) 220 (50) mg capsule Take 220 mg by mouth once daily.      Family History     Problem Relation (Age of Onset)    Heart disease Mother        Social History Main Topics    Smoking status: Former Smoker     Packs/day: 2.00     Years: 45.00     Quit date: 7/16/2006    Smokeless tobacco: Never Used    Alcohol use No      Comment: NO ALCOHOL FOR 18 MONTHS, PAST ETOH ABUSE    Drug use: No    Sexual activity: Not on file     Review of Systems   Constitution: Positive for weakness.   HENT: Negative.    Eyes: Negative.    Cardiovascular: Negative for chest pain, irregular heartbeat, leg swelling, near-syncope, orthopnea, palpitations, paroxysmal nocturnal dyspnea and syncope.   Respiratory: Positive for cough, shortness of breath and wheezing. Negative for sputum production.    Endocrine: Negative.    Hematologic/Lymphatic: Negative.    Skin: Positive for poor wound healing.   Musculoskeletal: Negative.    Gastrointestinal: Positive for abdominal pain.   Genitourinary: Positive for bladder incontinence.   Psychiatric/Behavioral: Negative.    Allergic/Immunologic: Negative.      Objective:     Vital Signs (Most Recent):  Temp: 96.1 °F (35.6 °C) (03/16/18 0800)  Pulse: (!) 54 (03/16/18 0935)  Resp: 20 (03/16/18 0800)  BP: (!) 130/58 (03/16/18 0800)  SpO2: (!) 94 % (03/16/18 0748) Vital Signs (24h  Range):  Temp:  [96.1 °F (35.6 °C)-98.9 °F (37.2 °C)] 96.1 °F (35.6 °C)  Pulse:  [54-74] 54  Resp:  [18-24] 20  SpO2:  [90 %-100 %] 94 %  BP: (119-130)/(54-61) 130/58     Weight: 68 kg (150 lb)  Body mass index is 22.81 kg/m².    SpO2: (!) 94 %  O2 Device (Oxygen Therapy): nasal cannula (no home o2)    No intake or output data in the 24 hours ending 03/16/18 1013    Lines/Drains/Airways     Drain                 Drain/Device  09/29/16 1444  midline sacral spine other (see comments) 532 days          Pressure Ulcer                 Pressure Ulcer 08/10/16 1330 Right knee suspected deep tissue injury 582 days         Pressure Ulcer 09/07/16 1700 Right lateral other (see comments) Stage  days         Pressure Ulcer 09/09/16 1700 Right posterior Unstageable 552 days         Pressure Ulcer 10/03/16 1530 Right posterior other (see comments) suspected deep tissue injury 528 days         Pressure Ulcer 11/28/16 1530 Left heel suspected deep tissue injury 472 days         Pressure Ulcer 11/28/16 1530 Left lateral foot Stage I 472 days         Pressure Ulcer 11/28/16 1530 Left other (see comments) Stage  days         Pressure Ulcer 11/29/16 1700 lower thoracic spine suspected deep tissue injury 471 days         Pressure Ulcer 04/07/17 2100 sacral spine Stage  days          Peripheral Intravenous Line                 Peripheral IV - Single Lumen 03/15/18 2203 Left Antecubital less than 1 day                Physical Exam   Constitutional: No distress.   HENT:   Head: Normocephalic and atraumatic.   Eyes: Right eye exhibits no discharge. Left eye exhibits no discharge.   Neck: No JVD present.   Cardiovascular: Normal rate and regular rhythm.    Murmur heard.  Pulmonary/Chest: Effort normal. He has wheezes.   Abdominal: Soft. Bowel sounds are normal.   Musculoskeletal: He exhibits no edema.   Neurological: He is alert.   Skin: Skin is warm and dry. He is not diaphoretic.   Psychiatric: He has a normal mood and  affect.       Significant Labs:   BMP:   Recent Labs  Lab 03/15/18  2202 03/16/18  0556   * 188*    141   K 4.7 4.6    106   CO2 26 25   BUN 22 24*   CREATININE 1.1 1.1   CALCIUM 9.5 9.4   MG 2.2 2.1   , CMP   Recent Labs  Lab 03/15/18  2202 03/16/18  0556    141   K 4.7 4.6    106   CO2 26 25   * 188*   BUN 22 24*   CREATININE 1.1 1.1   CALCIUM 9.5 9.4   PROT 8.2  --    ALBUMIN 3.6  --    BILITOT 0.7  --    ALKPHOS 125  --    AST 29  --    ALT 33  --    ANIONGAP 10 10   ESTGFRAFRICA >60 >60   EGFRNONAA >60 >60   , CBC   Recent Labs  Lab 03/15/18  2202 03/16/18  0556   WBC 10.20 6.77   HGB 14.4 14.0   HCT 44.8 43.3    223   , INR No results for input(s): INR, PROTIME in the last 48 hours., Lipid Panel No results for input(s): CHOL, HDL, LDLCALC, TRIG, CHOLHDL in the last 48 hours., Troponin   Recent Labs  Lab 03/15/18  2202   TROPONINI 0.020    and All pertinent lab results from the last 24 hours have been reviewed.    Significant Imaging: Echocardiogram:   2D echo with color flow doppler:   Results for orders placed or performed during the hospital encounter of 11/27/16   2D echo with color flow doppler   Result Value Ref Range    EF 65 55 - 65    Mitral Valve Regurgitation TRIVIAL TO MILD     Diastolic Dysfunction Yes (A)     Aortic Valve Regurgitation TRIVIAL     Est. PA Systolic Pressure 46.56 (A)     Tricuspid Valve Regurgitation MILD      Assessment and Plan:     Occlusion of superior mesenteric artery    CTA 10/2016 - superior mesenteric occlusion with distal reconstitution via collaterals   Repeat CTA pending  Dr Crooks to review imaging- recs to follow         Chronic diastolic congestive heart failure    LVEF 65% with DD  Euvolemic on exam  Continue Lasix 40 mg QD        History of atrial fibrillation    History of AF, on Amiodarone 200 mg daily- he has been on this for several years  No EKG in system dating back to 2013 with evidence of AF  CHADSVASC 7 points  (in the absence of objective findings of AF will not initiate OAC)  HASBLED 4 points         PAD (peripheral artery disease)    Arterial ultrasound 9/1/2016:   R CFA PSV 32 cm/sec, SFA  with severe infrapopliteal disease  S/p aorto-bifem with an occluded R limb  Bilateral SFA    R AKA  No acute issues   Continue medical management with asa, statin             VTE Risk Mitigation         Ordered     enoxaparin injection 40 mg  Daily     Route:  Subcutaneous        03/16/18 0355     IP VTE HIGH RISK PATIENT  Once      03/16/18 0250          Thank you for your consult. I will follow-up with patient. Please contact us if you have any additional questions.    Stuart Quezada, NICOLE  Cardiology   [facility]

## 2018-03-16 NOTE — H&P
[facility]    CC: Abdominal pain    HPI 78 y.o. male w/ HFpEF, COPD, PVD s/p L femoral popliteal stent, right AKA, HTN, paroxysmal A fib, and HLD presented w/ acute SOB and diffuse, non-radiating abdominal pain w/ no associated hematochezia or melena for 2 months. Patient was in his usual state of health (ambulates w/ wheelchair, uses diapers and feeds self) until approximately 2 months ago when he began experiencing diffuse, non-radiating abdominal pain after eating. Patient states that the pain began sporadically and he doesn't recall an inciting event. He states that since initial onset patient has been having pain for approx 20 minutes after eating every meal. He does not eat without having pain following meals. Pain typically lasts 15-20 minutes after meals and then resolves on its own. On the day of admission, patient had one episode of non-bloody/non-bilious vomiting. Patient states that since pain onset he has been taking 2 Aleve pills daily along w/ ASA 81mg but denies any other NSAID usage. He denies the use of any blood thinners currently but has been on anti-coagulation in the past for fem-pop stent. Denies any history of cancer or family history of gastric/colon Ca. He also states that he has been drinking 5-6 beers daily for approx 50 years.     Upon my examination patient denies any acute abdominal pain since admission. He states that his SOB is improving and that he has not had a BM since admission. Denies any blood per rectum or melena prior to admission.    Information gathered from patient and through chart review.     Past Medical History:   Diagnosis Date    Alcohol abuse     CHF (congestive heart failure)     CKD (chronic kidney disease) stage 3, GFR 30-59 ml/min     Coronary artery disease     Decubitus skin ulcer     Heart failure, diastolic     High cholesterol     Hypertension     Immobility     LBP (low back pain)     Prediabetes     PVD (peripheral vascular disease)   "   Stroke     2006, no deficits     Past Surgical History:   Procedure Laterality Date    aortic bifemoral bypass  2006    bilateral carotid stenois  2006    carotid stents      JOINT REPLACEMENT      knee    left common endarterectomy  2006    RT AKA  NOV 2017     Social History  Social History   Substance Use Topics    Smoking status: Former Smoker     Packs/day: 2.00     Years: 45.00     Quit date: 7/16/2006    Smokeless tobacco: Never Used    Alcohol use No      Comment: NO ALCOHOL FOR 18 MONTHS, PAST ETOH ABUSE     Family History   Problem Relation Age of Onset    Heart disease Mother        Review of Systems  General ROS: negative for chills, fever or weight loss  ENT ROS: negative for epistaxis, sore throat or rhinorrhea  Respiratory ROS: +cough and SOB  Cardiovascular ROS: no chest pain or dyspnea on exertion  Gastrointestinal ROS: +abdominal pain, no change in bowel habits, or black/ bloody stools  Genito-Urinary ROS: no dysuria, trouble voiding, or hematuria  Musculoskeletal ROS: negative muscular weakness  Neurological ROS: no syncope or seizures; no ataxia  Dermatological ROS: negative for pruritis, rash and jaundice    Physical Examination  BP (!) 119/57 (Patient Position: Lying)   Pulse (!) 59   Temp 96.7 °F (35.9 °C) (Oral)   Resp 20   Ht 5' 8" (1.727 m)   Wt 68 kg (150 lb)   SpO2 (!) 94%   BMI 22.81 kg/m²   General appearance: alert, cooperative, no distress  HENT: Normocephalic, atraumatic, neck symmetrical, no nasal discharge   Eyes: conjunctivae/corneas clear, PERRL  Lungs: coarse BS bilaterally, no dullness to percussion bilaterally, diffuse wheeze  Heart: regular rate and rhythm without rub; distant heart sounds  Abdomen: soft, non-tender, distended/protuberant abdomen; bowel sounds positive; +umbilical hernia but transverse midline abdominal scar  Extremities: R AKA w/out erythema, LLE w/ chronic venous stasis  Neurologic: Alert and oriented X 3  Psychiatric: no pressured " speech; normal affect; no evidence of impaired cognition     Labs:  Recent Labs      03/15/18   2202  03/16/18   0556   WBC  10.20  6.77   HGB  14.4  14.0   HCT  44.8  43.3   PLT  242  223   MCV  93  93   RDW  14.0  14.1     Recent Labs      03/15/18   2202  03/16/18   0556   NA  141  141   K  4.7  4.6   CL  105  106   CO2  26  25   BUN  22  24*   CREATININE  1.1  1.1     No results for input(s): POCTGLUCOSE in the last 72 hours.  Recent Labs      03/15/18   2202   PROT  8.2   ALBUMIN  3.6   BILITOT  0.7   AST  29   ALT  33   ALKPHOS  125     Imaging:  X-ray Chest 1 View  Result Date: 3/15/2018  No acute cardiopulmonary process identified.     X-ray Abdomen Ap 1 View (kub)  Result Date: 3/15/2018  Nonspecific bowel gas pattern.  Possible constipation.    Us Abdomen Limited  Result Date: 3/15/2018  Cholelithiasis.  No ultrasonographic evidence of acute cholecystitis.    Assessment: 78 y.o. male w/ HFpEF, COPD, PVD s/p L femoral popliteal stent, right AKA, HTN, paroxysmal A fib, and HLD presented w/ acute SOB and diffuse, non-radiating abdominal pain w/ no associated hematochezia or melena for 2 months. Patient noted to have been using NSAIDs for 2 months since symptom onset w/ no overt signs of bleeding including no hematochezia, hematemesis, or melena. Abdominal U/S and Abdominal X-ray are unrevealing. Patient had EGD in 9/2016 which showed normal stomach/duodenum/esophagus w/ PEG tube. Last colonoscopy was in 2012 which showed internal hemorrhoids and incidental angioectasia in the cecum which were non-bleeding. Patient currently hemodynamically stable w/ no signs of blood loss/infection/acidosis, pain complaints may be indicative of mesenteric ischemia in the setting of patient w/ h/o coagulopathy.     Plan:   -Recommend obtaining CTA of abdomen/pelvis  -Continue to monitor for acute signs of bleeding/infection    Case discussed w/ Dr. Pieter Sharp (GI Fellow)     Ryley McPeters, MD  [facility] resident,  PGY-II  [facility]

## 2018-03-17 VITALS
BODY MASS INDEX: 22.75 KG/M2 | OXYGEN SATURATION: 93 % | HEART RATE: 60 BPM | DIASTOLIC BLOOD PRESSURE: 57 MMHG | RESPIRATION RATE: 19 BRPM | TEMPERATURE: 97 F | WEIGHT: 150.13 LBS | HEIGHT: 68 IN | SYSTOLIC BLOOD PRESSURE: 130 MMHG

## 2018-03-17 LAB
ANION GAP SERPL CALC-SCNC: 9 MMOL/L
BASOPHILS # BLD AUTO: 0 K/UL
BASOPHILS NFR BLD: 0 %
BUN SERPL-MCNC: 34 MG/DL
CALCIUM SERPL-MCNC: 8.9 MG/DL
CHLORIDE SERPL-SCNC: 102 MMOL/L
CO2 SERPL-SCNC: 24 MMOL/L
CREAT SERPL-MCNC: 1.3 MG/DL
DIFFERENTIAL METHOD: ABNORMAL
EOSINOPHIL # BLD AUTO: 0 K/UL
EOSINOPHIL NFR BLD: 0 %
ERYTHROCYTE [DISTWIDTH] IN BLOOD BY AUTOMATED COUNT: 14.3 %
EST. GFR  (AFRICAN AMERICAN): >60 ML/MIN/1.73 M^2
EST. GFR  (NON AFRICAN AMERICAN): 53 ML/MIN/1.73 M^2
GLUCOSE SERPL-MCNC: 169 MG/DL
HCT VFR BLD AUTO: 38.9 %
HGB BLD-MCNC: 12.3 G/DL
LYMPHOCYTES # BLD AUTO: 1.2 K/UL
LYMPHOCYTES NFR BLD: 12.9 %
MAGNESIUM SERPL-MCNC: 2.4 MG/DL
MCH RBC QN AUTO: 29.6 PG
MCHC RBC AUTO-ENTMCNC: 31.6 G/DL
MCV RBC AUTO: 94 FL
MONOCYTES # BLD AUTO: 0.4 K/UL
MONOCYTES NFR BLD: 4.7 %
NEUTROPHILS # BLD AUTO: 7.4 K/UL
NEUTROPHILS NFR BLD: 82.4 %
PHOSPHATE SERPL-MCNC: 3.7 MG/DL
PLATELET # BLD AUTO: 238 K/UL
PMV BLD AUTO: 10.1 FL
POCT GLUCOSE: 174 MG/DL (ref 70–110)
POTASSIUM SERPL-SCNC: 4.9 MMOL/L
RBC # BLD AUTO: 4.15 M/UL
SODIUM SERPL-SCNC: 135 MMOL/L
WBC # BLD AUTO: 8.96 K/UL

## 2018-03-17 PROCEDURE — 85025 COMPLETE CBC W/AUTO DIFF WBC: CPT

## 2018-03-17 PROCEDURE — 84100 ASSAY OF PHOSPHORUS: CPT

## 2018-03-17 PROCEDURE — 94640 AIRWAY INHALATION TREATMENT: CPT

## 2018-03-17 PROCEDURE — 36415 COLL VENOUS BLD VENIPUNCTURE: CPT

## 2018-03-17 PROCEDURE — 25000242 PHARM REV CODE 250 ALT 637 W/ HCPCS: Performed by: INTERNAL MEDICINE

## 2018-03-17 PROCEDURE — 27000221 HC OXYGEN, UP TO 24 HOURS

## 2018-03-17 PROCEDURE — 25000003 PHARM REV CODE 250: Performed by: INTERNAL MEDICINE

## 2018-03-17 PROCEDURE — 83735 ASSAY OF MAGNESIUM: CPT

## 2018-03-17 PROCEDURE — 80048 BASIC METABOLIC PNL TOTAL CA: CPT

## 2018-03-17 PROCEDURE — 63600175 PHARM REV CODE 636 W HCPCS: Performed by: INTERNAL MEDICINE

## 2018-03-17 PROCEDURE — 94761 N-INVAS EAR/PLS OXIMETRY MLT: CPT

## 2018-03-17 PROCEDURE — G0378 HOSPITAL OBSERVATION PER HR: HCPCS

## 2018-03-17 RX ORDER — PREDNISONE 20 MG/1
40 TABLET ORAL DAILY
Qty: 4 TABLET | Refills: 0 | Status: SHIPPED | OUTPATIENT
Start: 2018-03-18 | End: 2018-03-20

## 2018-03-17 RX ORDER — LEVOFLOXACIN 750 MG/1
750 TABLET ORAL DAILY
Qty: 5 TABLET | Refills: 0 | Status: SHIPPED | OUTPATIENT
Start: 2018-03-17 | End: 2018-01-01

## 2018-03-17 RX ADMIN — PREDNISONE 40 MG: 20 TABLET ORAL at 10:03

## 2018-03-17 RX ADMIN — IPRATROPIUM BROMIDE AND ALBUTEROL SULFATE 3 ML: 2.5; .5 SOLUTION RESPIRATORY (INHALATION) at 12:03

## 2018-03-17 RX ADMIN — CARVEDILOL 25 MG: 25 TABLET, FILM COATED ORAL at 10:03

## 2018-03-17 RX ADMIN — FLUTICASONE FUROATE AND VILANTEROL TRIFENATATE 1 PUFF: 200; 25 POWDER RESPIRATORY (INHALATION) at 08:03

## 2018-03-17 RX ADMIN — FUROSEMIDE 40 MG: 40 TABLET ORAL at 10:03

## 2018-03-17 RX ADMIN — ENOXAPARIN SODIUM 40 MG: 100 INJECTION SUBCUTANEOUS at 10:03

## 2018-03-17 RX ADMIN — PANTOPRAZOLE SODIUM 40 MG: 40 TABLET, DELAYED RELEASE ORAL at 10:03

## 2018-03-17 RX ADMIN — ATORVASTATIN CALCIUM 40 MG: 40 TABLET, FILM COATED ORAL at 10:03

## 2018-03-17 RX ADMIN — IPRATROPIUM BROMIDE AND ALBUTEROL SULFATE 3 ML: 2.5; .5 SOLUTION RESPIRATORY (INHALATION) at 07:03

## 2018-03-17 RX ADMIN — AMIODARONE HYDROCHLORIDE 200 MG: 200 TABLET ORAL at 10:03

## 2018-03-17 RX ADMIN — ASPIRIN 81 MG: 81 TABLET, COATED ORAL at 10:03

## 2018-03-17 RX ADMIN — AMLODIPINE BESYLATE 10 MG: 5 TABLET ORAL at 10:03

## 2018-03-17 NOTE — PLAN OF CARE
Problem: Patient Care Overview  Goal: Plan of Care Review  Pt received on nasal cannula at 2 lpm. SPO2 97 %.  Pt in no apparent respiratory distress.  Will continue to monitor.

## 2018-03-17 NOTE — PLAN OF CARE
6:05pm, TN contacted by floor nurse, pt unable to sit up in WC, keeps sliding down in bed and unable to assist into chair. Pt's wife stated that her brother normally lifts pt into chair and into their car. TN informed pt's wife that this would be unsafe for staff to attempt. TN offered to call ambulance transportation but TN stated that due to it being a weekend, TN was unsure of what the co pay would be if any. Pt's wife did not want to call ambulance, she is calling her brother to assist getting pt into their car. TN informed floor nurse    TN sent O2 orders to [facility], [phone] ([phone] ,fax [phone], she will forward the orders to [facility] and they will deliver to pt bedside prior to discharge    12:38 pm,TN received call from team physician, pt requires home O2, TN awaiting orders and additional notes to be entered    Discharge orders noted, no HH     Future Appointments  Date Time Provider Department Center   3/22/2018 9:00 AM Maria Victoria Middleton MD [facility] ANDREE[facility]ini        03/17/18 1120   Final Note   Assessment Type Final Discharge Note   Discharge Disposition Home   What phone number can be called within the next 1-3 days to see how you are doing after discharge? [phone]   Hospital Follow Up  Appt(s) scheduled? No  (urgent care appt requested)   Right Care Referral Info   Post Acute Recommendation No Care       Pt's nurse will go over medications/signs and symptoms prior to discharge

## 2018-03-17 NOTE — PROGRESS NOTES
"[facility]    CC: Abdominal pain     HPI 78 y.o. male w/ HFpEF, COPD, PVD s/p L femoral popliteal stent, right AKA, HTN, paroxysmal A fib, and HLD presented w/ acute SOB and diffuse, non-radiating abdominal pain w/ no associated hematochezia or melena for 2 months.    CTA yesterday with findings of atherosclerosis but patent celiac, SMA and renal arteries.    Patient tolerated diet and reports improvement in abdominal pain.    Past Medical History:   Diagnosis Date    Alcohol abuse     CHF (congestive heart failure)     CKD (chronic kidney disease) stage 3, GFR 30-59 ml/min     Coronary artery disease     Decubitus skin ulcer     Heart failure, diastolic     High cholesterol     Hypertension     Immobility     LBP (low back pain)     Prediabetes     PVD (peripheral vascular disease)     Stroke     2006, no deficits     Review of Systems  General ROS: negative for chills, fever or weight loss  Cardiovascular ROS: no chest pain or dyspnea on exertion  Gastrointestinal ROS: no abdominal pain, change in bowel habits, or black/ bloody stools    Physical Examination  BP (!) 117/56 (BP Location: Left arm, Patient Position: Lying)   Pulse 60   Temp 97.9 °F (36.6 °C) (Oral)   Resp 16   Ht 5' 8" (1.727 m)   Wt 68 kg (150 lb)   SpO2 (!) 93%   BMI 22.81 kg/m²   General appearance: alert, cooperative, no distress  HENT: Normocephalic, atraumatic, neck symmetrical, no nasal discharge   Lungs: clear to auscultation bilaterally, no dullness to percussion bilaterally  Heart: regular rate and rhythm without rub; no displacement of the PMI   Abdomen: soft, non-tender; bowel sounds normoactive; no organomegaly  Extremities: extremities symmetric; no clubbing, cyanosis, or edema  Neurologic: Alert and oriented X 3, normal strength, normal coordination and gait    Labs:  H/H 12.3/38.9    Imaging:  CTA: Extensive atherosclerosis with aortoiliac graft and chronic CTA findings and occlusions as detailed above. "  No significant change from July 2017.  Small bowel containing umbilical hernia without evidence for obstruction.  Additional findings and chronic changes as detailed above.    Procedures:  EGD in 9/2016:normal stomach/duodenum/esophagus w/ PEG tube.   Colonoscopy 2012 internal hemorrhoids and incidental angioectasia in the cecum which were non-bleeding.    Assessment:   78 y.o. male w/ HFpEF, COPD, PVD s/p L femoral popliteal stent, right AKA, HTN, paroxysmal A fib, and HLD presented w/ acute SOB and diffuse, non-radiating abdominal pain w/ no associated hematochezia or melena for 2 months. +NSAID use. Abdominal U/S and Abdominal X-ray are unrevealing.  Abdominal pain likely related to IBS in the setting of normal endoscopy in past and unrevealing CTA.      Plan:   -Continue diet as tolerates  -Conservative management since abdominal pain has since resolved  -Follow up in GI clinic with Dr. Childers  -Will sign off for now, please call with questions    Jm Sabillon MD  [facility]  Pager [phone]

## 2018-03-17 NOTE — PLAN OF CARE
Home Oxygen Evaluation    Date Performed: 3/17/2018    1) Patient's Home O2 Sat on room air, while at rest: 82        If O2 sats on room air at rest are 88% or below, patient qualifies. No additional testing needed. Document N/A in steps 2 and 3. If 89% or above, complete steps 2.      2) Patient's O2 Sat on room air while exercising: NA        If O2 sats on room air while exercising remain 89% or above patient does not qualify, no further testing needed Document N/A in step 3. If O2 sats on room air while exercising are 88% or below, continue to step 3.      3) Patient's O2 Sat while exercising on O2: NA at NA LPM         (Must show improvement from #2 for patients to qualify)    If O2 sats improve on oxygen, patient qualifies for portable oxygen. If not, the patient does not qualify.

## 2018-03-17 NOTE — PLAN OF CARE
Plan of care reviewed with pt and family. Oxygen delivered. Safety measures maintained. Bed is in the lowest position and locked. Call bell is within reach. Instructed pt to call for assistance. Pt verbalized understanding. Will continue to monitor.

## 2018-03-17 NOTE — PROGRESS NOTES
[facility] Resident NATALY Progress Note    Subjective:      Patient resting comfortably in bed this am. Patient denies chest pain, SOB, weakness, abdominal pain, dysuria. Remains on 2 L NC     Objective:   Last 24 Hour Vital Signs:  BP  Min: 117/56  Max: 154/66  Temp  Av.8 °F (36.6 °C)  Min: 96.7 °F (35.9 °C)  Max: 98.3 °F (36.8 °C)  Pulse  Av.9  Min: 54  Max: 68  Resp  Av.3  Min: 16  Max: 22  SpO2  Av.4 %  Min: 93 %  Max: 99 %  I/O last 3 completed shifts:  In: 125 [P.O.:125]  Out: 440 [Urine:440]    Physical Examination:  Gen: awake, alert, NAD  Head: NC/AT, NC in place  Eyes: conjunctiva clear, EOMI, PERRL  Throat: MMM, OP clear  Lungs: End expiratory wheeze b/l, normal respiratory effort   Cardiac: RRR, no murmurs   Abdominal: soft, ND/NT, +BS  Extremities: R AKA without no wounds. L LE with chronic venous stasis changes.  Pulses: 2+ and symmetry   Skin: no rashes or lesions noted       Laboratory:  Most Recent Data:  CBC: Lab Results   Component Value Date    WBC 8.96 2018    HGB 12.3 (L) 2018    HCT 38.9 (L) 2018     2018    MCV 94 2018    RDW 14.3 2018     BMP: Lab Results   Component Value Date     (L) 2018    K 4.9 2018     2018    CO2 24 2018    BUN 34 (H) 2018    CREATININE 1.3 2018     (H) 2018    CALCIUM 8.9 2018    MG 2.4 2018    PHOS 3.7 2018     LFTs: Lab Results   Component Value Date    PROT 8.2 03/15/2018    ALBUMIN 3.6 03/15/2018    BILITOT 0.7 03/15/2018    AST 29 03/15/2018    ALKPHOS 125 03/15/2018    ALT 33 03/15/2018     Coags:   Lab Results   Component Value Date    INR 1.6 (H) 2017     FLP: Lab Results   Component Value Date    CHOL 139 2016    HDL 29 (L) 2016    LDLCALC 93.4 2016    TRIG 83 2016    CHOLHDL 20.9 2016     DM: Lab Results   Component Value Date    HGBA1C 5.7 (H) 2018    HGBA1C 7.9 (H)  11/27/2016    HGBA1C 6.7 (H) 07/13/2016    LDLCALC 93.4 08/03/2016    CREATININE 1.3 03/17/2018     Thyroid: Lab Results   Component Value Date    TSH 1.728 11/27/2016     Anemia: Lab Results   Component Value Date    IRON 20 (L) 11/27/2016    TIBC 145 (L) 11/27/2016    FERRITIN 2,805 (H) 11/29/2016    CALNMQBX70 1152 (H) 11/27/2016    FOLATE 14.6 11/27/2016     Cardiac: Lab Results   Component Value Date    TROPONINI 0.020 03/15/2018     (H) 11/27/2016     Urinalysis: Lab Results   Component Value Date    LABURIN  01/09/2017     ACINETOBACTER BAUMANNII/HAEMOLYTICUS  >100,000 cfu/ml      COLORU Yellow 03/15/2018    SPECGRAV 1.015 03/15/2018    NITRITE Positive (A) 03/15/2018    KETONESU Negative 03/15/2018    UROBILINOGEN 1.0 03/15/2018    WBCUA 20 (H) 03/15/2018       Trended Lab Data:    Recent Labs  Lab 03/15/18  2202 03/16/18  0556 03/17/18  0441   WBC 10.20 6.77 8.96   HGB 14.4 14.0 12.3*   HCT 44.8 43.3 38.9*    223 238   MCV 93 93 94   RDW 14.0 14.1 14.3    141 135*   K 4.7 4.6 4.9    106 102   CO2 26 25 24   BUN 22 24* 34*   CREATININE 1.1 1.1 1.3   * 188* 169*   PROT 8.2  --   --    ALBUMIN 3.6  --   --    BILITOT 0.7  --   --    AST 29  --   --    ALKPHOS 125  --   --    ALT 33  --   --        Trended Cardiac Data:    Recent Labs  Lab 03/15/18  2202   TROPONINI 0.020       Microbiology Data:  Ucx pending    Radiology:  Imaging Results          US Abdomen Limited (Final result)  Result time 03/15/18 22:35:40    Final result by Yodit Naylor MD (03/15/18 22:35:40)                 Impression:      Cholelithiasis.  No ultrasonographic evidence of acute cholecystitis..      Electronically signed by: Yodit Naylor MD  Date:    03/15/2018  Time:    22:35             Narrative:    EXAMINATION:  US ABDOMEN LIMITED    CLINICAL HISTORY:  abdominal pain;    TECHNIQUE:  Limited ultrasound of the right upper quadrant of the abdomen (including pancreas, liver, gallbladder, common  bile duct, and right kidney) was performed.    COMPARISON:  CT abdomen and pelvis 07/2017.    FINDINGS:  The liver is normal in size measuring 14.5cm.  Hepatic parenchyma is homogeneous without evidence for masses.  No intra- or extrahepatic biliary ductal dilatation. The common bile duct measures 0.4 cm.  The gallbladder demonstrates small gallstones.  No evidence of gallbladder wall thickening or pericholecystic fluid.  Sonographic Tello's sign is negative. The visualized portions of the pancreas and IVC appear normal. The right kidney measures 10.8 cm. No ascites.                               X-Ray Abdomen AP 1 View (KUB) (Final result)  Result time 03/15/18 22:38:58    Final result by Yodit Naylor MD (03/15/18 22:38:58)                 Impression:      Nonspecific bowel gas pattern.  Possible constipation.      Electronically signed by: Yodit Naylor MD  Date:    03/15/2018  Time:    22:38             Narrative:    EXAMINATION:  XR ABDOMEN AP 1 VIEW    CLINICAL HISTORY:  Unspecified abdominal pain    TECHNIQUE:  Single AP View of the abdomen was performed.    COMPARISON:  CT July 2017.    FINDINGS:  There is mild gaseous distention of bowel loops.  No convincing evidence of obstruction.  No free air seen on this single AP supine view.  Multiple surgical clips are seen in the abdomen.  Significant retained stool is visualized within the distal colon and rectum.                               X-Ray Chest 1 View (Final result)  Result time 03/15/18 22:37:06    Final result by Yodit Naylor MD (03/15/18 22:37:06)                 Impression:      No acute cardiopulmonary process identified.      Electronically signed by: Yodit Naylor MD  Date:    03/15/2018  Time:    22:37             Narrative:    EXAMINATION:  XR CHEST 1 VIEW    CLINICAL HISTORY:  shortness of breath;    TECHNIQUE:  Single frontal view of the chest was performed.    COMPARISON:  April 2017.    FINDINGS:  Cardiac silhouette is stable in  size.  Mediastinal structures are midline.  Lungs are symmetrically expanded.  Mild chronic lung changes are seen.  No evidence of focal consolidative process, pneumothorax, or significant effusion.  Bones show DJD with chronic changes of the right shoulder.  No acute osseous abnormality identified.                                Current Medications:     Infusions:       Scheduled:   albuterol-ipratropium 2.5mg-0.5mg/3mL  3 mL Nebulization Q6H WAKE    amiodarone  200 mg Oral Daily    amLODIPine  10 mg Oral Daily    aspirin  81 mg Oral Daily    atorvastatin  40 mg Oral Daily    carvedilol  25 mg Oral BID    enoxaparin  40 mg Subcutaneous Daily    fluticasone-vilanterol  1 puff Inhalation Daily    furosemide  40 mg Oral Daily    pantoprazole  40 mg Oral Daily    predniSONE  40 mg Oral Daily        PRN:  dextrose 50%, dextrose 50%, glucagon (human recombinant), glucose, glucose, pneumoc 13-clementina conj-dip cr(PF), sodium chloride 0.9%, traMADol    Antibiotics and Day Number of Therapy:  none    Lines and Day Number of Therapy:  PIV     Assessment:     Asad Perez is a 78 y.o.male with  Patient Active Problem List    Diagnosis Date Noted    COPD exacerbation 03/16/2018    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    Abdominal rigidity, generalized     Complicated open wound of right hip     Acute osteomyelitis 04/11/2017    Decubitus ulcer of buttock, stage 2 04/07/2017    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    Urinary tract infection associated with indwelling urethral catheter 12/01/2016    Centrilobular emphysema 11/29/2016    Symptomatic anemia 11/27/2016    Deep vein thrombosis (DVT) of brachial vein     Non-healing ulcer of lower leg 10/28/2016    Fever     Decubitus ulcer of sacral region, stage 4 10/05/2016    Deep vein thrombosis (DVT) of upper extremity 10/05/2016    Dehiscence of perineal wound 10/05/2016    Hyponatremia 10/05/2016    VRE  (vancomycin-resistant Enterococci) infection 09/20/2016    Elevated liver enzymes     Chronic diastolic congestive heart failure     Critical lower limb ischemia 09/10/2016    Stenosis of left internal carotid artery 09/08/2016    Abnormal finding on imaging 08/31/2016    Abnormal CT of the abdomen     Chronic kidney disease, stage V 08/12/2016    Anemia 08/12/2016    Anemia of chronic disease 08/08/2016    Altered mental status 08/07/2016    MRSA (methicillin resistant Staphylococcus aureus) infection 08/07/2016    Confusion     Weak     Pneumonia 07/29/2016    History of atrial fibrillation     Infected prosthetic knee joint 07/12/2016    Right knee pain 06/15/2016    GERD (gastroesophageal reflux disease) 02/28/2015    Esophageal web 02/28/2015    Acute on chronic diastolic heart failure 11/08/2014    Transaminitis 11/08/2014    Chronic obstructive pulmonary disease 11/07/2014    Dyspnea 11/06/2014    Normocytic anemia 11/06/2014    Dysphagia 11/03/2014    Osteoarthritis of left knee 07/14/2014    Essential hypertension 07/16/2013    CKD (chronic kidney disease) 07/16/2013    PAD (peripheral artery disease) 07/16/2013    CAD (coronary artery disease) 07/16/2013    History of stroke 07/16/2013    Physical deconditioning 07/16/2013    Alcohol abuse 07/16/2013        Plan:     COPD Exacerbation  -Per patient, increasing SOB/wheezing for the past 2-3 months. Not on home oxygen/maintenance inhalers. Does use Duo-Nebs several times daily.  -Significant hx of tobacco abuse in past (1.5 PPD for 50 years); quit in 2007.   -No recorded PFTs  -PE with diffuse expiratory wheezing; CXR negative for acute abnormalities.   -Upon admission, afebrile, on 3L NC with 91% Ox saturation, WBC 10.20.   -Received Duo-Nebs, Solumedrol in ED.   -Continue Duo-Nebs, Prednisone, Breo     Abdominal Pain  -Per patient, experiencing sharp diffuse abdominal pain 20-30 minutes after eating.   -No hx of ulcers in  the past; significant vasculopath.   -Etiology may be 2/2 to ulcers vs mesenteric ischemia.   -Started pantoprazole 40mg qD  -Abdominal US revealing cholelithiasis. KUB no obstruction.   -CT abdomen negative for acute process.       Hx HFpEF  -Most recent ECHO 12/2016, EF 65%, PAH, diastolic dysfunction, trivial TR  -No signs of volume overload, No crackles, CXR clear  -Continuing home carvedilol, lasix, ASA, statin     Atherosclerotic disease  -no record of MI, history of CVA (no deficits), bilateral carotid stenosis and bilat common iliac occlusion and distal abdominal aorta occusion s/p angiography in 10/2016  -S/p left femoropopliteal stent (US during last admit revealing occlusion)  -Continuing ASA, Statin as above.      HTN  -Normotensive upon admission.  -Continuing home carvedilol, amlodipine.       Paroxysmal Afib  -currently in normal sinus rhythm, asymptomatic  -on coreg rate controlled, amiodarone for rhythm control  -previously on xarelto but discontinued via PCP      Right AKA   -had previous wound infection/debridement.   -no wounds noted on examination  -no acute issues      HLD  -continuing home statin    Gonzales Smith  [facility] Internal Medicine/Pediatrics HO-1  U IM Service Team B    [facility] Medicine Hospitalist Pager numbers:   [facility] Hospitalist Medicine Team A (Chay/Shawanda): [phone]  [facility] Hospitalist Medicine Team B (Gloria/Alex):  [phone]

## 2018-03-17 NOTE — PLAN OF CARE
Plan of care reviewed with patient, understanding verbalized. Pt turned every two hours, barrier cream and mepilex applied to sacral area. Pt remains SR on tele, bed alarm on, call light in reach, fall precautions in place. Report given to oncoming nurse.

## 2018-03-18 NOTE — NURSING
Patient discharge instructions given and reviewed. Medication record reviewed with patient. Patient verbalized understanding. Education provided on new medication and diagnosis. PIV discontinued with tip intact, pt tolerated well. Awaiting transportation home.

## 2018-03-18 NOTE — DISCHARGE SUMMARY
[facility] Discharge Summary    Primary Team: [facility]  Attending Physician: Dr. Carl Castro  Resident: Dr. Horacio Caldwell  Intern: Dr. Sawyer Mcdonnell    Date of Admit: 3/15/2018  Date of Discharge: 3/18/2018    Discharge to: Home   Condition: Stable    Discharge Diagnoses     Patient Active Problem List   Diagnosis    Essential hypertension    CKD (chronic kidney disease)    PAD (peripheral artery disease)    CAD (coronary artery disease)    History of stroke    Physical deconditioning    Alcohol abuse    Osteoarthritis of left knee    Dysphagia    Dyspnea    Normocytic anemia    Chronic obstructive pulmonary disease    Acute on chronic diastolic heart failure    Transaminitis    GERD (gastroesophageal reflux disease)    Esophageal web    Right knee pain    Infected prosthetic knee joint    History of atrial fibrillation    Pneumonia    Confusion    Weak    Altered mental status    MRSA (methicillin resistant Staphylococcus aureus) infection    Anemia of chronic disease    Chronic kidney disease, stage V    Anemia    Abnormal CT of the abdomen    Abnormal finding on imaging    Stenosis of left internal carotid artery    Critical lower limb ischemia    Chronic diastolic congestive heart failure    Decubitus ulcer of sacral region, stage 4    Elevated liver enzymes    Deep vein thrombosis (DVT) of upper extremity    VRE (vancomycin-resistant Enterococci) infection    Dehiscence of perineal wound    Hyponatremia    Fever    Non-healing ulcer of lower leg    Deep vein thrombosis (DVT) of brachial vein    Symptomatic anemia    Centrilobular emphysema    Non-healing wound of amputation stump    Urinary tract infection associated with indwelling urethral catheter    Decubitus ulcer of buttock, stage 2    Acute osteomyelitis    Complicated open wound of right hip    COPD exacerbation    Occlusion of superior mesenteric artery    Abdominal rigidity, generalized     "Abdominal pain       Consultants and Procedures     Consultants:  Gastroenterology  Cardiology    Procedures:   none    Brief History of Present Illness      Asad Perez is a 78 y.o. male who  has a past medical history of Alcohol abuse; CHF (congestive heart failure); CKD (chronic kidney disease) stage 3, GFR 30-59 ml/min; Coronary artery disease; Decubitus skin ulcer; Heart failure, diastolic; High cholesterol; Hypertension; Immobility; LBP (low back pain); Prediabetes; PVD (peripheral vascular disease); and Stroke.  The patient presented to [facility] on [facility]/15/2018 with a primary complaint of Abdominal Pain (states "inside my stomach is turning". Reports cramping following eating. Reports vomiting X 2 episodes today. ) and Shortness of Breath (Reports feeling SOB X 6 months. States not supposed to be on home oxygen. )  .     The patient was in his USOH (ambulates in wheelchair, spends >90% time in bed, uses diapers, feeds himself) until approximately 2 months ago when he began experiencing progressive SOB. The patient states his SOB has progressively worsened where he feels he's "wheezing all the time now". He uses duo-nebs several times daily without relief. He does not use home oxygen. He denies orthopnea. Additionally, the patient does not use any inhalers at home. He does endorse a chronic non-productive cough but denies fevers, chills, or recent sick contacts.      Additionally, the patient endorses sharp diffuse abdominal pain regularly 20-minutes after eating a meal. He states the pain lasts approximately 15-20 minutes after meals and than goes away. Upon the day of admission he had one episode of vomiting (denies blood). He denies any history of gastric/duodenal ulcers in the past.    For the full HPI please refer to the History & Physical from this admission.    Hospital Course By Problem with Pertinent Findings     COPD Exacerbation  -Per patient, increasing SOB/wheezing for the " past 2-3 months. Not on home oxygen/maintenance inhalers. Does use Duo-Nebs several times daily.  -Significant hx of tobacco abuse in past (1.5 PPD for 50 years); quit in 2007.   -No recorded PFTs  -PE with diffuse expiratory wheezing; CXR negative for acute abnormalities.   -Upon admission, afebrile, on 3L NC with 91% Ox saturation, WBC 10.20.   -Received Duo-Nebs, Solumedrol in ED.   -Home on prednisone, levoquin     Abdominal Pain  -Per patient, experiencing sharp diffuse abdominal pain 20-30 minutes after eating.   -No hx of ulcers in the past; significant vasculopath.   -Etiology may be 2/2 to ulcers vs mesenteric ischemia.   -Started pantoprazole 40mg qD  -Abdominal US revealing cholelithiasis. KUB no obstruction.   -CT abdomen negative for acute process.       Hx HFpEF  -Most recent ECHO 12/2016, EF 65%, PAH, diastolic dysfunction, trivial TR  -No signs of volume overload, No crackles, CXR clear  -Continuing home carvedilol, lasix, ASA, statin     Atherosclerotic disease  -no record of MI, history of CVA (no deficits), bilateral carotid stenosis and bilat common iliac occlusion and distal abdominal aorta occusion s/p angiography in 10/2016  -S/p left femoropopliteal stent (US during last admit revealing occlusion)  -Continuing ASA, Statin as above.      HTN  -Normotensive upon admission.  -Continuing home carvedilol, amlodipine.       Paroxysmal Afib  -currently in normal sinus rhythm, asymptomatic  -on coreg rate controlled, amiodarone for rhythm control  -previously on xarelto but discontinued via PCP      Right AKA   -had previous wound infection/debridement.   -no wounds noted on examination  -no acute issues      HLD  -continuing home statin    Discharge Medications        Medication List      START taking these medications    levoFLOXacin 750 MG tablet  Commonly known as:  LEVAQUIN  Take 1 tablet (750 mg total) by mouth once daily.     predniSONE 20 MG tablet  Commonly known as:  DELTASONE  Take 2  tablets (40 mg total) by mouth once daily.        CHANGE how you take these medications    albuterol-ipratropium 2.5mg-0.5mg/3mL 0.5 mg-3 mg(2.5 mg base)/3 mL nebulizer solution  Commonly known as:  DUO-NEB  Take 3 mLs by nebulization every 6 (six) hours while awake.  What changed:  when to take this        CONTINUE taking these medications    amiodarone 200 MG Tab  Commonly known as:  PACERONE  Take 1 tablet (200 mg total) by mouth once daily.     amLODIPine 10 MG tablet  Commonly known as:  NORVASC  Take 1 tablet (10 mg total) by mouth once daily.     aspirin 81 MG EC tablet  Commonly known as:  ECOTRIN  Take 1 tablet (81 mg total) by mouth once daily.     atorvastatin 40 MG tablet  Commonly known as:  LIPITOR     carvedilol 25 MG tablet  Commonly known as:  COREG  Take 1 tablet (25 mg total) by mouth 2 (two) times daily.     furosemide 40 MG tablet  Commonly known as:  LASIX     multivitamin per tablet  Commonly known as:  THERAGRAN     oxyCODONE-acetaminophen 5-325 mg per tablet  Commonly known as:  PERCOCET  Take 1 tablet by mouth every 4 (four) hours as needed for Pain.     traMADol 50 mg tablet  Commonly known as:  ULTRAM     VITAMIN C 500 MG tablet  Generic drug:  ascorbic acid (vitamin C)           Where to Get Your Medications      You can get these medications from any pharmacy    Bring a paper prescription for each of these medications  · levoFLOXacin 750 MG tablet  · predniSONE 20 MG tablet         Discharge Information:   Diet:  Cardiac    Physical Activity:  As tolerated    Instructions:  1. Take all medications as prescribed  2. Keep all follow-up appointments  3. Return to the hospital or call your primary care physicians if any worsening symptoms such as SOB, Chest pain, abdominal pain, nausea/vomiting occur.    Follow-Up Appointments:  Follow-up Information     Maria Victoria Middleton MD On 3/22/2018.    Specialty:  Hospitalist  Why:  9:00 AM appt  Contact information:  [address]  [place] [address]  [phone]             Jose Carlos Crooks MD.    Specialties:  INTERVENTIONAL CARDIOLOGY, Cardiology  Contact information:  [address]  [address]  [place] [id number] [phone]             [facility].    Specialty:  DME Provider  Why:  DME, Oxygen Provider  Contact information:  [address] [place] [address]  [place] [address]  [phone]                     Gonzales Smith  [facility] Internal Medicine/Pediatrics, -I

## 2018-03-19 ENCOUNTER — TELEPHONE (OUTPATIENT)
Dept: GASTROENTEROLOGY | Facility: CLINIC | Age: 78
End: 2018-03-19

## 2018-03-19 PROBLEM — R10.9 ABDOMINAL PAIN: Status: ACTIVE | Noted: 2018-03-19

## 2018-03-19 LAB — BACTERIA UR CULT: NORMAL

## 2018-03-19 NOTE — TELEPHONE ENCOUNTER
----- Message from Gordo Breaux RN sent at 3/18/2018  4:50 PM CDT -----  Patient dc'd from hospital over the weekiend, needs hospital follow up with Dr. Childers, please yesika to set this appt.

## 2018-04-28 NOTE — ED PROVIDER NOTES
Encounter Date: 4/27/2018    SCRIBE #1 NOTE: I, Tiffany Valencia, am scribing for, and in the presence of, Dr. Dewey.     I, Dr. Mario Alberto Dewey MD, personally performed the services described in this documentation. All medical record entries made by the scribe were at my direction and in my presence.  I have reviewed the chart and agree that the record reflects my personal performance and is accurate and complete. Mario Alberto Dewey MD.    History     Chief Complaint   Patient presents with    Cough     coughing, body aches for three days     CHIEF COMPLAINT: Patient presents with: cough for 2-3 weeks     HISTORY OF PRESENT ILLNESS: Asad Perez who is a 78 y.o. presents to the emergency department today with complaint of nonproductive cough for 3 weeks.  Pt's family reports associated SOB, wheezing and weakness for approximately 3 weeks as well.  He denies any fever, CP, palpitation, n/v.  He tried cough medications without relief.  He also has been using nebulizer every 4 hours and is also on O2 at home.  Pt is on lasix, reports taking all his meds as prescribed.      ALLERGIES REVIEWED  MEDICATIONS REVIEWED  PMH/PSH/SOC/FH REVIEWED     The history is provided by the patient.    Nursing/Ancillary staff note reviewed.              Review of patient's allergies indicates:  No Known Allergies  Past Medical History:   Diagnosis Date    Alcohol abuse     CHF (congestive heart failure)     CKD (chronic kidney disease) stage 3, GFR 30-59 ml/min     Coronary artery disease     Decubitus skin ulcer     Heart failure, diastolic     High cholesterol     Hypertension     Immobility     LBP (low back pain)     Prediabetes     PVD (peripheral vascular disease)     Stroke     2006, no deficits     Past Surgical History:   Procedure Laterality Date    aortic bifemoral bypass  2006    bilateral carotid stenois  2006    carotid stents      JOINT REPLACEMENT      knee    left common endarterectomy  2006    RT AKA   NOV 2017     Family History   Problem Relation Age of Onset    Heart disease Mother      Social History   Substance Use Topics    Smoking status: Former Smoker     Packs/day: 2.00     Years: 45.00     Quit date: 7/16/2006    Smokeless tobacco: Never Used    Alcohol use No      Comment: NO ALCOHOL FOR 18 MONTHS, PAST ETOH ABUSE     Review of Systems   Constitutional: Negative for activity change, chills, diaphoresis and fever.   HENT: Negative for congestion, ear discharge, facial swelling, rhinorrhea, sinus pain, sore throat, trouble swallowing and voice change.    Eyes: Negative for pain, discharge and visual disturbance.   Respiratory: Positive for cough, shortness of breath and wheezing. Negative for chest tightness.    Cardiovascular: Negative for chest pain, palpitations and leg swelling.   Gastrointestinal: Negative for abdominal pain, constipation, diarrhea and vomiting.   Genitourinary: Negative for flank pain, frequency and urgency.   Musculoskeletal: Negative for arthralgias, joint swelling, neck pain and neck stiffness.   Skin: Negative for color change and pallor.   Neurological: Positive for weakness (generalized). Negative for dizziness, light-headedness and headaches.   All other systems reviewed and are negative.      Physical Exam     Initial Vitals [04/27/18 1828]   BP Pulse Resp Temp SpO2   (!) 140/65 62 (!) 22 97.2 °F (36.2 °C) (!) 91 %      MAP       90         Physical Exam    Nursing note and vitals reviewed.  Constitutional: He appears well-developed and well-nourished. He is not diaphoretic. No distress.   HENT:   Head: Normocephalic and atraumatic.   Right Ear: External ear normal.   Left Ear: External ear normal.   Nose: Nose normal.   Mouth/Throat: Oropharynx is clear and moist. No oropharyngeal exudate.   Eyes: Conjunctivae and EOM are normal. Pupils are equal, round, and reactive to light. Right eye exhibits no discharge. Left eye exhibits no discharge.   Neck: Normal range of  motion. Neck supple.   Cardiovascular: Normal rate, regular rhythm, normal heart sounds and intact distal pulses. Exam reveals no gallop and no friction rub.    No murmur heard.  Pulmonary/Chest: No stridor. No respiratory distress. He has wheezes (throughout all lung zones). He has no rales. He exhibits no tenderness.   Abdominal: Soft. Bowel sounds are normal. He exhibits no distension and no mass. There is no tenderness. There is no rebound and no guarding.   Musculoskeletal: Normal range of motion. He exhibits no edema (No edema to his left leg) or tenderness.   Right AKA.   Lymphadenopathy:     He has no cervical adenopathy.   Neurological: He is alert and oriented to person, place, and time. He has normal strength. No cranial nerve deficit.   Skin: Skin is warm and dry. Capillary refill takes less than 2 seconds. No rash noted. No erythema. No pallor.   Psychiatric: He has a normal mood and affect. Thought content normal.         ED Course   Procedures  Labs Reviewed   CBC W/ AUTO DIFFERENTIAL - Abnormal; Notable for the following:        Result Value    RBC 4.39 (*)     Hemoglobin 12.8 (*)     Hematocrit 39.8 (*)     All other components within normal limits   COMPREHENSIVE METABOLIC PANEL - Abnormal; Notable for the following:     Glucose 121 (*)     Albumin 3.0 (*)     Anion Gap 7 (*)     All other components within normal limits   URINALYSIS - Abnormal; Notable for the following:     Appearance, UA Hazy (*)     Urobilinogen, UA 2.0-3.0 (*)     Leukocytes, UA 1+ (*)     All other components within normal limits   B-TYPE NATRIURETIC PEPTIDE - Abnormal; Notable for the following:      (*)     All other components within normal limits   URINALYSIS MICROSCOPIC - Abnormal; Notable for the following:     WBC, UA 20 (*)     Bacteria, UA Few (*)     All other components within normal limits   CULTURE, BLOOD    Narrative:     Aerobic and anaerobic   CULTURE, BLOOD    Narrative:     Aerobic and anaerobic    CULTURE, URINE   LACTIC ACID, PLASMA   INFLUENZA A AND B ANTIGEN   TROPONIN I   PROCALCITONIN     EKG Readings: (Independently Interpreted)   Previous EKG Date: Mar 15, 2018.   Rate : 060 BPM   Sinus rhythm with 1st degree A-V block, Normal axis, No ST Elevations   Otherwise normal ECG       Imaging Results          X-Ray Chest AP Portable (Final result)  Result time 04/27/18 20:41:10    Final result by Narinder Bustamante MD (04/27/18 20:41:10)                 Impression:      No acute findings identified in the chest.    No significant change from prior study.      Electronically signed by: Narinder Bustamante MD  Date:    04/27/2018  Time:    20:41             Narrative:    EXAMINATION:  XR CHEST AP PORTABLE    CLINICAL HISTORY:  cough, sob;    TECHNIQUE:  Single frontal view of the chest was performed.    COMPARISON:  March 15, 2018.    FINDINGS:  Chronic changes in the lungs.    Heart and lungs  appear unchanged when allowing for differences in technique and positioning.                                   Medical Decision Making:   History:   Old Medical Records: I decided to obtain old medical records.  Initial Assessment:   78 y.o. male presents with cough associated with SOB, wheezing and generalized weakness for 3 weeks.  Will order workup to further evaluate.  Will give steroids and breathing treatment and reassess.  Differential Diagnosis:   Pulmonary infectious process, COPD, asthma, pulmonary embolus and congestive heart failure.    Independently Interpreted Test(s):   I have ordered and independently interpreted EKG Reading(s) - see prior notes  Clinical Tests:   Lab Tests: Ordered and Reviewed  Radiological Study: Ordered and Reviewed  Medical Tests: Ordered and Reviewed  ED Management:  Pt feels improved following treatment.     This is a 79 yo male who presents to the ED today with cough, shortness of breath. His workup today does not show signs pneumonia. No signs of fluid overload, the pt has been  maintaining his O2 sats on his usual amount of O2. Repeat lung exam remains improved. I will DC home with short course steroids, symptom control and he will follow up with his PCP. Warning signs for return discussed at length. After taking into careful account the historical factors and physical exam findings of the patient's presentation today, in conjunction with the empirical and objective data obtained on ED workup, no acute emergent medical condition requiring admission has been identified. The patient appears to be low risk for an emergent medical condition and I feel it is safe and appropriate at this time for the patient to be discharged to follow-up as detailed in their discharge instructions for reevaluation and possible continued outpatient workup and management. I have discussed the specifics of the workup with the patient and the patient has verbalized understanding of the details of the workup, the diagnosis, the treatment plan, and the need for outpatient follow-up.  Although the patient has no emergent etiology today this does not preclude the development of an emergent condition so in addition, I have advised the patient that they can return to the ED and/or activate EMS at any time with worsening of their symptoms, change of their symptoms, or with any other medical complaint.  The patient remained comfortable and stable during their visit in the ED.  Discharge and follow-up instructions discussed with the patient who expressed understanding and willingness to comply with my recommendations.     This medical record was prepared using voice dictation software. There may be phonetic errors.                          Clinical Impression:   The primary encounter diagnosis was Cough. A diagnosis of SOB (shortness of breath) was also pertinent to this visit.                           Mario Alberto Dewey MD  05/07/18 4513

## 2018-04-28 NOTE — DISCHARGE INSTRUCTIONS
Additional instructions  You have been seen here today for cough. Your chest xray does not show signs of pneumonia or fluid overload. Your labs do not show signs of infection. You do have a history of CHF and will need to follow up with your PCP for further evaluation and management of your cough. Follow up with your primary care physician in 1-2 days for recheck and continued management. Take all your medications as prescribed. Return to the emergency department if you have increasing pain, chest pain, difficulty breathing,  nonstop vomiting, or any other concerns. Get plenty of rest. Please refer to additional educational material for further instructions.

## 2018-04-28 NOTE — ED NOTES
Pt to ED with complaints of a cough. Pt states he has been having a wet-sounding cough for the past 2 weeks, as well as generalized body aches that he rates at a 6 out of 10. Pt was told to come to the ED for evaluation by his doctor after home health nurse assessed pt at his home for worry of pneumonia. Pt also has been having more wheezing lately, which is relieved by his inhaler and nebulizer, but not for long.     APPEARANCE: Alert, oriented and in no acute distress. Pt has a right AKA.   CARDIAC: Normal rate and rhythm. Heart sounds diminished. Pt denies chest pain.  PERIPHERAL VASCULAR: peripheral pulses present. Normal cap refill. No edema. Warm to touch. 2+ radial pulses  RESPIRATORY:Pt has expiratory wheezes throughout all lung lobes. Pt tachypneic with exertion. Pt states SOB at this time. Pt has a wet-sounding cough. States unable to produce phlegm.   MUSC: No bony tenderness or soft tissue tenderness. Pt has a right AKA. Pt states he has weakness to his left side due to a prior stroke. Pt's left arm is slightly contractured. Pt needs help undressing from his clothing. Pt does have 5+ hand strength bilaterally. Pt's sacrum seems to have atrophied. Pt has a pit to the left posterior thigh.   SKIN: Skin is warm and dry. Pt has a stage 2 to the sacral spine, measuring at 2 x 1 cm. The site is pink and dry. Skin around the room is excoriated and dry.   NEURO: 5/5 strength major flexors/extensors bilaterally. Sensory intact to light touch bilaterally. Edis coma scale: eyes open spontaneously-4, oriented & converses-5, obeys commands-6. No neurological abnormalities. Pt has a prior stroke causing his left side to feel weakness.  MENTAL STATUS: awake, alert and aware of environment.  EYE: No obvious discharge.   ENT: EARS: no obvious drainage. NOSE: no active bleeding.

## 2018-05-01 NOTE — PROVIDER PROGRESS NOTES - EMERGENCY DEPT.
Encounter Date: 4/27/2018    ED Physician Progress Notes         04/29/2018 7:55 PM treatment pending sensitivity. MP    04/30/2018 7:22 PM patient was not sent home with any antibiotics.  Did discuss the results with patient's wife and that we will initiate Macrobid at this time and that he should follow-up with his PCP for re-eval of the UA. This was called into his pharmacy. ARELY

## 2018-07-07 NOTE — ED PROVIDER NOTES
Encounter Date: 7/6/2018    SCRIBE #1 NOTE: I, Tiffany Valencia, am scribing for, and in the presence of, Dr. Herrera.       History     Chief Complaint   Patient presents with    Shortness of Breath     pt arrived per pov with spouse per whch; pt and spouse reports worsening shortness of breath and uses nasal o2 at home and takes neb treatments and no relief today; pt can speak in short sentences; no pain; skin dry; color pink; pt right leg amputee; pt is diapered; pt has audible wheezing     Asad Perez is a 78 y.o. male who  has a past medical history of Alcohol abuse; CHF (congestive heart failure); CKD (chronic kidney disease) stage 3, GFR 30-59 ml/min; Coronary artery disease; Decubitus skin ulcer; Heart failure, diastolic; High cholesterol; Hypertension; Immobility; LBP (low back pain); Prediabetes; PVD (peripheral vascular disease); Stroke; and Urine incontinence.    The patient presents to the ED due to worsening SOB that began today.  He reports he uses O2 and breathing treatments at home which usually relieve his symptoms. However, he reports using breathing treatment and O2 with no relief today.  He denies fever.  No other complaints at this time. No chest pain. Reports worsening dyspnea and wheezing for the past 2 months      The history is provided by the patient.     Review of patient's allergies indicates:  No Known Allergies  Past Medical History:   Diagnosis Date    Alcohol abuse     CHF (congestive heart failure)     CKD (chronic kidney disease) stage 3, GFR 30-59 ml/min     Coronary artery disease     Decubitus skin ulcer     Heart failure, diastolic     High cholesterol     Hypertension     Immobility     LBP (low back pain)     Prediabetes     PVD (peripheral vascular disease)     Stroke     2006, no deficits    Urine incontinence      Past Surgical History:   Procedure Laterality Date    aortic bifemoral bypass  2006    bilateral carotid stenois  2006    carotid stents       JOINT REPLACEMENT      knee    left common endarterectomy  2006    RT AKA  NOV 2017     Family History   Problem Relation Age of Onset    Heart disease Mother      Social History   Substance Use Topics    Smoking status: Former Smoker     Packs/day: 2.00     Years: 45.00     Quit date: 7/16/2006    Smokeless tobacco: Never Used    Alcohol use No      Comment: NO ALCOHOL FOR 18 MONTHS, PAST ETOH ABUSE     Review of Systems   Constitutional: Negative for chills, fatigue and fever.   HENT: Negative for congestion, sore throat and voice change.    Eyes: Negative for photophobia, pain and redness.   Respiratory: Positive for shortness of breath. Negative for cough and choking.    Cardiovascular: Negative for chest pain, palpitations and leg swelling.   Gastrointestinal: Negative for abdominal pain, diarrhea, nausea and vomiting.   Genitourinary: Negative for dysuria, frequency and urgency.   Musculoskeletal: Negative for back pain, neck pain and neck stiffness.   Skin: Negative for rash.   Neurological: Negative for seizures, speech difficulty, light-headedness, numbness and headaches.   Hematological: Does not bruise/bleed easily.       Physical Exam     Initial Vitals [07/06/18 2257]   BP Pulse Resp Temp SpO2   (!) 142/44 62 (!) 25 98.3 °F (36.8 °C) (!) 88 %      MAP       --         Physical Exam    Nursing note and vitals reviewed.  Constitutional: He appears well-developed and well-nourished. No distress.   HENT:   Head: Normocephalic and atraumatic.   Mouth/Throat: Oropharynx is clear and moist.   Eyes: Conjunctivae and EOM are normal. Pupils are equal, round, and reactive to light.   Neck: Normal range of motion. Neck supple. No tracheal deviation present.   Cardiovascular: Normal rate, regular rhythm, normal heart sounds and intact distal pulses.   Pulmonary/Chest: No respiratory distress. He has wheezes (diffuse).   Abdominal: Soft. Bowel sounds are normal. He exhibits no distension. There is no  tenderness. There is no rebound and no guarding.   Musculoskeletal: Normal range of motion. He exhibits no edema or tenderness.   Neurological: He is alert and oriented to person, place, and time. He has normal strength. No cranial nerve deficit or sensory deficit.   Skin: Skin is warm and dry. Capillary refill takes less than 2 seconds.         ED Course   Procedures  Labs Reviewed   CBC W/ AUTO DIFFERENTIAL - Abnormal; Notable for the following:        Result Value    RBC 3.91 (*)     Hemoglobin 11.5 (*)     Hematocrit 36.1 (*)     MCHC 31.9 (*)     All other components within normal limits   COMPREHENSIVE METABOLIC PANEL - Abnormal; Notable for the following:     Glucose 168 (*)     Albumin 3.2 (*)     eGFR if non  53 (*)     All other components within normal limits   B-TYPE NATRIURETIC PEPTIDE - Abnormal; Notable for the following:      (*)     All other components within normal limits   ISTAT PROCEDURE - Abnormal; Notable for the following:     POC PH 7.324 (*)     POC PCO2 48.8 (*)     POC SATURATED O2 83 (*)     All other components within normal limits   TROPONIN I     EKG Readings: (Independently Interpreted)   Rhythm: Normal Sinus Rhythm. Heart Rate: 60. Ectopy: No Ectopy. Conduction: Normal. ST Segments: Normal ST Segments. T Waves: Normal. Clinical Impression: Normal Sinus Rhythm       Imaging Results          X-Ray Chest AP Portable (Final result)  Result time 07/06/18 23:30:07    Final result by Yodit Naylor MD (07/06/18 23:30:07)                 Impression:      No acute cardiopulmonary process identified.      Electronically signed by: Yodit Naylor MD  Date:    07/06/2018  Time:    23:30             Narrative:    EXAMINATION:  XR CHEST AP PORTABLE    CLINICAL HISTORY:  Asthma;    TECHNIQUE:  Single frontal view of the chest was performed.    COMPARISON:  04/27/2018.    FINDINGS:  Cardiac silhouette is stable in size.  Lungs are symmetrically expanded.  Mild chronic lung  changes and coarse interstitial attenuation are seen.  No evidence of focal consolidative process, pneumothorax, or significant effusion.  No acute osseous abnormality identified.                                 Medical Decision Making:   History:   Old Medical Records: I decided to obtain old medical records.  Clinical Tests:   Lab Tests: Ordered and Reviewed  Radiological Study: Ordered and Reviewed  ED Management:  Pt feels significantly better after hour long neb and steroids. He is not tachypneic. He still has diffuse wheezes but air movement is adequate.  Pt was instructed to return to the ed if he has any worsening in his respiratory status.              Attending Attestation:           Physician Attestation for Scribe:  Physician Attestation Statement for Scribe #1: I, Garry Herrera, reviewed documentation, as scribed by Tiffany Valencia in my presence, and it is both accurate and complete.                    Clinical Impression:     1. COPD exacerbation    2. Dyspnea         Disposition:   Disposition: Discharged  Condition: Stable                        Garry Herrera MD  07/07/18 0055

## 2018-08-28 NOTE — ED PROVIDER NOTES
Encounter Date: 2018       History     Chief Complaint   Patient presents with    Leg Pain     Pain to right upper leg. Past BTK amputation present. REports taking tylenol and toradol with no relief. States this is a chronic problem.      77 yo male with multiple co morbidities including a right ATK amputation presents to the ED with complaints of right calf pain x 1 day. Patient states he began having pain in his right lower leg that has been amputated last night. He took some pain medication of his wife's that he does not know the name of and the pain resolved around 3 pm today. He currently is complaining of no pain and has no new complaints. He states he has never experienced this before.      The history is provided by the patient. No  was used.     Review of patient's allergies indicates:  No Known Allergies  Past Medical History:   Diagnosis Date    Alcohol abuse     CHF (congestive heart failure)     CKD (chronic kidney disease) stage 3, GFR 30-59 ml/min     Coronary artery disease     Decubitus skin ulcer     Heart failure, diastolic     High cholesterol     Hypertension     Immobility     LBP (low back pain)     Prediabetes     PVD (peripheral vascular disease)     Stroke     , no deficits    Urine incontinence      Past Surgical History:   Procedure Laterality Date    aortic bifemoral bypass  2006    bilateral carotid stenois  2006    carotid stents      JOINT REPLACEMENT      knee    left common endarterectomy      RT AKA  2017     Family History   Problem Relation Age of Onset    Heart disease Mother      Social History     Tobacco Use    Smoking status: Former Smoker     Packs/day: 2.00     Years: 45.00     Pack years: 90.00     Last attempt to quit: 2006     Years since quittin.1    Smokeless tobacco: Never Used   Substance Use Topics    Alcohol use: No     Comment: NO ALCOHOL FOR 18 MONTHS, PAST ETOH ABUSE    Drug use: No      Review of Systems   Musculoskeletal:        Amputated right lower leg pain   All other systems reviewed and are negative.      Physical Exam     Initial Vitals [08/27/18 1826]   BP Pulse Resp Temp SpO2   (!) 147/67 60 20 98.1 °F (36.7 °C) 96 %      MAP       --         Physical Exam    Nursing note and vitals reviewed.  Constitutional: He appears well-developed and well-nourished. No distress.   HENT:   Head: Normocephalic and atraumatic.   Nose: Nose normal.   Eyes: Conjunctivae are normal.   Neck: Normal range of motion.   Cardiovascular: Normal rate.   Pulmonary/Chest: Effort normal and breath sounds normal.   Musculoskeletal: Normal range of motion.        Right upper leg: Normal. He exhibits no tenderness, no edema and no deformity.        Legs:  Right with right ATK amputation, stump appears well healed and and is non TTP.   Neurological: He is alert and oriented to person, place, and time. GCS eye subscore is 4. GCS verbal subscore is 5. GCS motor subscore is 6.   Skin: Skin is warm and dry.   Psychiatric: He has a normal mood and affect. His speech is normal and behavior is normal. Judgment and thought content normal. Cognition and memory are normal.         ED Course   Procedures  Labs Reviewed - No data to display       Imaging Results    None          Medical Decision Making:   Initial Assessment:   79 yo male s/p right ATK amputation with right lower leg phantom pain x 1 day. Right upper leg is non TTP, stump appears well healed, no erythema,non TTP. NAD, VSS  Differential Diagnosis:   Phantom limb pain  ED Management:  Patient will be discharged home in stable condition with instructions to follow up with PCP as needed and take tylenol or ibuprofen as needed for additional pain.              Attending Attestation:     Physician Attestation Statement for NP/PA:   I have conducted a face to face encounter with this patient in addition to the NP/PA, due to NP/PA Request    Other NP/PA Attestation  Additions:      Medical Decision Making: Patient is 77 y/o male with h/o right AKA who presents c/o right lower leg pain.  States that he took one of his wife's pain pills and now is asymptomatic.  Denies any complaints.  VSS, NAD, nontoxic appearing.  Right AKA.  Patient stable for discharge                    Clinical Impression:   The encounter diagnosis was Phantom pain after amputation of lower extremity.                             Barbie Guzman PA-C  08/27/18 2126       Catina Nicholas MD  08/27/18 2129

## 2018-10-25 NOTE — PROGRESS NOTES
Subjective:       Patient ID: Asad Perez is a 78 y.o. male.    Chief Complaint: Non-healing Wound    Wound Check   He was originally treated more than 14 days ago. The maximum temperature noted was less than 100.4 F. The temperature was taken using an oral thermometer. There has been clear discharge from the wound. There is new redness present. There is new swelling present. There is no pain present.     Review of Systems    Objective:      Physical Exam    Assessment:       1. Physical deconditioning    2. PAD (peripheral artery disease)    3. Osteoarthritis of left knee, unspecified osteoarthritis type    4. Pressure injury of buttock, stage 2, unspecified laterality    5. Altered mental status, unspecified altered mental status type           Wound 10/25/18 0945 Abscess medial Sacral Spine #2 (Active)   10/25/18 0945    Pre-existing: Yes   Primary Wound Type: Abscess   Side:    Orientation: medial   Location: Sacral Spine   Wound/PI Number (optional): #2   Ankle-Brachial Index:    Pulses:    Removal Indication and Assessment:    Wound Outcome: Other   (Retired) Wound Type:    (Retired) Wound Length (cm):    (Retired) Wound Width (cm):    (Retired) Depth (cm):    Wound Description (Comments):    Removal Indications:    Wound WDL ex 10/25/2018  9:45 AM   Dressing Appearance Open to air;Clean;Moist drainage 10/25/2018  9:45 AM   Drainage Amount Scant 10/25/2018  9:45 AM   Drainage Characteristics/Odor Clear 10/25/2018  9:45 AM   Appearance Dressing in place, unable to visualize;Pink;White;Slough;Red 10/25/2018  9:45 AM   Tissue loss description Full thickness 10/25/2018  9:45 AM   Red (%), Wound Tissue Color 100 % 10/25/2018  9:45 AM   Periwound Area Ecchymotic;Warm;Swelling;Pink 10/25/2018  9:45 AM   Wound Edges Irregular 10/25/2018  9:45 AM   Wound Length (cm) 1.2 cm 10/25/2018  9:45 AM   Wound Width (cm) 0.6 cm 10/25/2018  9:45 AM   Wound Depth (cm) 0 cm 10/25/2018  9:45 AM   Wound Volume (cm^3) 0 cm^3  10/25/2018  9:45 AM   Care Cleansed with:;Soap and water 10/25/2018  9:45 AM   Dressing Removed 10/25/2018  9:45 AM   Periwound Care Absorptive dressing applied 10/25/2018  9:45 AM           Plan:    Clean wound with n/s, pat dry. Apply compound benzoin around the wound area, apply hydrofera blue, cut a small size to cover wound and cover with Mepore dressing. Change dressing every week. Instructions provided to patient and spouse, voices understanding.  Wound picture downloaded, taken today. Nurse wrote wrong date on the picture. The correct date taken is today 10/25/2018.

## 2018-10-25 NOTE — PROGRESS NOTES
Progress Note    Admit Date: 10/25/2018   LOS: 0 days     SUBJECTIVE:     Follow-up For:  Non healing sacral wound    Scheduled Meds:  Continuous Infusions:  PRN Meds:    Review of patient's allergies indicates:  No Known Allergies    Review of Systems  C/o lauri all over    OBJECTIVE:     Vital Signs (Most Recent)  Temp: 97.8 °F (36.6 °C) (10/25/18 0931)  Pulse: (!) 53 (10/25/18 0931)  Resp: 20 (10/25/18 0931)  BP: (!) 141/59 (10/25/18 0931)    Vital Signs Range (Last 24H):  Temp:  [97.8 °F (36.6 °C)]   Pulse:  [53]   Resp:  [20]   BP: (141)/(59)     I & O (Last 24H):No intake or output data in the 24 hours ending 10/25/18 1018  Physical Exam:  S/p right above knee amputation , stump healing well  Non healing sacral wound 2 x 1 cm    Laboratory:      Diagnostic Results:      ASSESSMENT/PLAN:     Non healing sacral wound    Plan:  Apply hydra ferra blue change every week..

## 2018-10-25 NOTE — PROGRESS NOTES
Subjective:       Patient ID: Asad Perez is a 78 y.o. male.    Chief Complaint: Non-healing Wound    Patuent known s/p right above knee amputation , and non healing sacral decubitus wound with drainage       Review of Systems   Constitutional: Negative.    HENT: Negative.    Eyes: Negative.    Respiratory: Negative.    Cardiovascular: Negative.    Gastrointestinal: Negative.    Genitourinary: Negative.    Musculoskeletal: Positive for arthralgias, back pain and gait problem.   Skin: Positive for color change, rash and wound.   Psychiatric/Behavioral: Negative.        Objective:      Physical Exam   Constitutional: He is oriented to person, place, and time. He appears well-developed and well-nourished.   HENT:   Head: Normocephalic.   Eyes: Conjunctivae and EOM are normal. Pupils are equal, round, and reactive to light.   Neck: Normal range of motion. Neck supple.   Cardiovascular: Normal rate, regular rhythm, normal heart sounds and intact distal pulses.   Pulmonary/Chest: Effort normal and breath sounds normal.   Abdominal: Soft. Bowel sounds are normal.   Musculoskeletal: Normal range of motion.   Neurological: He is alert and oriented to person, place, and time. He has normal reflexes.   Skin: Skin is warm and dry.            Assessment:       1. Physical deconditioning    2. PAD (peripheral artery disease)    3. Osteoarthritis of left knee, unspecified osteoarthritis type    4. Pressure injury of buttock, stage 2, unspecified laterality    5. Altered mental status, unspecified altered mental status type        Plan:       Apply hydro ferra blue locally once per week.

## 2018-12-12 NOTE — PROGRESS NOTES
[facility]    CC: constipation     HPI 78 y.o. male with history of COPD, CAD, chronic HFpEF, paroxysmal afib, HTN and HLD presents to clinic for evaluation of chronic, persistent, painful constipation not associated with rectal bleeding. Stool is hard and pebble like. He is immobile secondary to R AKA and sacral ulcer and therefore uses diapers. He is unable to sit on a commode. He uses a stool softener and Miralax as needed. His wife sometimes has to administer enemas and suppositories. His abdominal pain is resolved following defecation. He had a previous colonoscopy in 2012 that was normal.     Collateral information obtained from wife. Previous hospital records reviewed.     Past Medical history  COPD, CAD, chronic HFpEF, paroxysmal afib, HTN and HLD, decubitus ulcer, CVA, lower back pain, CKD III    Past Surgical Hisory  R AKA with revision   R knee arthroplasty  Carotid stents  Debridement of sacral wound    Social History  Previous tobacco use  Previous alcohol use, none currently  Denies IVDU    Family history  Denies family history of colon cancer    Review of Systems  General ROS: negative for chills, fever or weight loss  Psychological ROS: negative for hallucination, depression or suicidal ideation  Ophthalmic ROS: negative for blurry vision, photophobia or eye pain  ENT ROS: negative for epistaxis, sore throat or rhinorrhea  Respiratory ROS: no cough, positive for shortness of breath, or wheezing  Cardiovascular ROS: no chest pain, positive for dyspnea on exertion  Gastrointestinal ROS: positive for abdominal pain, positive for constipation, no black/ bloody stools  Genito-Urinary ROS: positive for incontinence, no dysuria, trouble voiding, or hematuria  Musculoskeletal ROS: positive for gait disturbance and muscular weakness  Neurological ROS: no syncope or seizures; no ataxia  Dermatological ROS: negative for pruritis, rash and jaundice    Physical Examination  BP (!) 104/43 (BP Location:  "Left arm, Patient Position: Sitting, BP Method: Large (Automatic))   Pulse (!) 48   Temp 97.8 °F (36.6 °C) (Oral)   Ht 5' 8" (1.727 m)   BMI 22.81 kg/m²   General appearance: alert, cooperative, no distress  HENT: Normocephalic, atraumatic, neck symmetrical, no nasal discharge   Eyes: conjunctivae/corneas clear, PERRL, EOM's intact  Lungs: audible wheezing present, increased work of breathing but no tachypnea   Heart: no displacement of the PMI, palpable peripheral radial pulse, bradycardic  Abdomen: soft, obese, scar where previous gastrostomy tube was located, non-tender; bowel sounds normoactive; no organomegaly  Extremities: R AKA; no clubbing, cyanosis, or edema  Integument: Skin with ecchymosis; no rashes; male pattern baldness  Neurologic: Alert and oriented X 3, in wheelchair, needs assistance with moving  Psychiatric: no pressured speech; normal affect; no evidence of impaired cognition     Labs:  H/H 11.5/36.1  Plt 245    Imaging:  CXR: no evidence of focal consolidative process or pneumothorax    Personally reviewed imaging    Assessment:   Mr. Perez is a 78 y.o. male with multiple medical co-morbidities who presents to clinic for evaluation of chronic constipation with associated abdominal pain. He is immobile and unable to sit on a commode to have bowel movements. He does have some improvement in his symptoms with Miralax, however he takes it as needed.     Plan:   - Recommend moving his knees to his chest when he feels the need to defecate to improve pelvic angle  - Recommend Miralax 17 g daily. If no improvement with Miralax, then can consider Linzess.      David Ramos MD   [address], [address]   [place] [address] [phone]     "

## 2019-01-01 ENCOUNTER — TELEPHONE (OUTPATIENT)
Dept: SLEEP MEDICINE | Facility: OTHER | Age: 79
End: 2019-01-01

## 2019-01-01 ENCOUNTER — HOSPITAL ENCOUNTER (INPATIENT)
Facility: HOSPITAL | Age: 79
LOS: 4 days | Discharge: HOME HEALTH | DRG: 871 | End: 2019-01-13
Attending: EMERGENCY MEDICINE | Admitting: INTERNAL MEDICINE
Payer: MEDICARE

## 2019-01-01 ENCOUNTER — HOSPITAL ENCOUNTER (INPATIENT)
Facility: HOSPITAL | Age: 79
LOS: 7 days | Discharge: HOSPICE | DRG: 871 | End: 2019-04-02
Attending: EMERGENCY MEDICINE | Admitting: INTERNAL MEDICINE
Payer: MEDICARE

## 2019-01-01 ENCOUNTER — HOSPITAL ENCOUNTER (INPATIENT)
Facility: HOSPITAL | Age: 79
LOS: 2 days | DRG: 951 | End: 2019-04-04
Attending: FAMILY MEDICINE | Admitting: FAMILY MEDICINE
Payer: OTHER MISCELLANEOUS

## 2019-01-01 ENCOUNTER — HOSPITAL ENCOUNTER (INPATIENT)
Facility: HOSPITAL | Age: 79
LOS: 16 days | Discharge: NURSING, REHAB OR LONG-TERM CARE | DRG: 189 | End: 2019-02-01
Attending: EMERGENCY MEDICINE | Admitting: INTERNAL MEDICINE
Payer: MEDICARE

## 2019-01-01 VITALS
BODY MASS INDEX: 23.39 KG/M2 | HEIGHT: 68 IN | RESPIRATION RATE: 22 BRPM | HEART RATE: 72 BPM | DIASTOLIC BLOOD PRESSURE: 55 MMHG | TEMPERATURE: 97 F | SYSTOLIC BLOOD PRESSURE: 127 MMHG | WEIGHT: 154.31 LBS | OXYGEN SATURATION: 94 %

## 2019-01-01 VITALS — OXYGEN SATURATION: 90 % | HEART RATE: 72 BPM

## 2019-01-01 VITALS
DIASTOLIC BLOOD PRESSURE: 56 MMHG | HEIGHT: 68 IN | SYSTOLIC BLOOD PRESSURE: 112 MMHG | WEIGHT: 159.63 LBS | HEART RATE: 121 BPM | OXYGEN SATURATION: 96 % | BODY MASS INDEX: 24.19 KG/M2 | TEMPERATURE: 98 F | RESPIRATION RATE: 26 BRPM

## 2019-01-01 VITALS
DIASTOLIC BLOOD PRESSURE: 52 MMHG | WEIGHT: 160.94 LBS | BODY MASS INDEX: 24.39 KG/M2 | TEMPERATURE: 99 F | OXYGEN SATURATION: 90 % | RESPIRATION RATE: 28 BRPM | HEART RATE: 129 BPM | SYSTOLIC BLOOD PRESSURE: 82 MMHG | HEIGHT: 68 IN

## 2019-01-01 DIAGNOSIS — R53.83 FATIGUE: ICD-10-CM

## 2019-01-01 DIAGNOSIS — J96.02 ACUTE RESPIRATORY FAILURE WITH HYPOXIA AND HYPERCAPNIA: ICD-10-CM

## 2019-01-01 DIAGNOSIS — J96.01 ACUTE RESPIRATORY FAILURE WITH HYPOXIA AND HYPERCAPNIA: ICD-10-CM

## 2019-01-01 DIAGNOSIS — N39.0 UTI DUE TO EXTENDED-SPECTRUM BETA LACTAMASE (ESBL) PRODUCING ESCHERICHIA COLI: ICD-10-CM

## 2019-01-01 DIAGNOSIS — J96.22 ACUTE ON CHRONIC RESPIRATORY FAILURE WITH HYPOXIA AND HYPERCAPNIA: ICD-10-CM

## 2019-01-01 DIAGNOSIS — J96.22 ACUTE ON CHRONIC RESPIRATORY FAILURE WITH HYPOXIA AND HYPERCAPNIA: Primary | ICD-10-CM

## 2019-01-01 DIAGNOSIS — R73.9 HYPERGLYCEMIA: ICD-10-CM

## 2019-01-01 DIAGNOSIS — J96.21 ACUTE ON CHRONIC RESPIRATORY FAILURE WITH HYPOXIA AND HYPERCAPNIA: Primary | ICD-10-CM

## 2019-01-01 DIAGNOSIS — R74.01 TRANSAMINITIS: ICD-10-CM

## 2019-01-01 DIAGNOSIS — N18.30 STAGE 3 CHRONIC KIDNEY DISEASE: Chronic | ICD-10-CM

## 2019-01-01 DIAGNOSIS — I50.32 CHRONIC DIASTOLIC CONGESTIVE HEART FAILURE: ICD-10-CM

## 2019-01-01 DIAGNOSIS — Z51.5 PALLIATIVE CARE ENCOUNTER: ICD-10-CM

## 2019-01-01 DIAGNOSIS — I50.32 CHRONIC DIASTOLIC CONGESTIVE HEART FAILURE: Primary | ICD-10-CM

## 2019-01-01 DIAGNOSIS — E87.5 HYPERKALEMIA: ICD-10-CM

## 2019-01-01 DIAGNOSIS — R06.00 DYSPNEA, UNSPECIFIED TYPE: ICD-10-CM

## 2019-01-01 DIAGNOSIS — N17.9 AKI (ACUTE KIDNEY INJURY): ICD-10-CM

## 2019-01-01 DIAGNOSIS — R06.00 DYSPNEA: ICD-10-CM

## 2019-01-01 DIAGNOSIS — Z16.12 UTI DUE TO EXTENDED-SPECTRUM BETA LACTAMASE (ESBL) PRODUCING ESCHERICHIA COLI: ICD-10-CM

## 2019-01-01 DIAGNOSIS — J96.00 RESPIRATORY FAILURE, ACUTE: ICD-10-CM

## 2019-01-01 DIAGNOSIS — R06.02 SHORTNESS OF BREATH: ICD-10-CM

## 2019-01-01 DIAGNOSIS — R33.9 URINARY RETENTION: ICD-10-CM

## 2019-01-01 DIAGNOSIS — I50.9 HEART FAILURE: ICD-10-CM

## 2019-01-01 DIAGNOSIS — R07.9 CHEST PAIN, UNSPECIFIED TYPE: ICD-10-CM

## 2019-01-01 DIAGNOSIS — Z71.89 GOALS OF CARE, COUNSELING/DISCUSSION: ICD-10-CM

## 2019-01-01 DIAGNOSIS — R78.81 MSSA BACTEREMIA: ICD-10-CM

## 2019-01-01 DIAGNOSIS — N50.819 TESTICULAR PAIN: ICD-10-CM

## 2019-01-01 DIAGNOSIS — Z86.79 HISTORY OF ATRIAL FIBRILLATION: ICD-10-CM

## 2019-01-01 DIAGNOSIS — J96.91 RESPIRATORY FAILURE WITH HYPOXIA AND HYPERCAPNIA: ICD-10-CM

## 2019-01-01 DIAGNOSIS — J44.1 COPD WITH ACUTE EXACERBATION: ICD-10-CM

## 2019-01-01 DIAGNOSIS — R00.0 TACHYCARDIA: ICD-10-CM

## 2019-01-01 DIAGNOSIS — G47.36 SLEEP RELATED HYPOVENTILATION IN CONDITIONS CLASSIFIED ELSEWHERE: ICD-10-CM

## 2019-01-01 DIAGNOSIS — Z71.89 COUNSELING REGARDING ADVANCED CARE PLANNING AND GOALS OF CARE: ICD-10-CM

## 2019-01-01 DIAGNOSIS — B95.61 MSSA BACTEREMIA: ICD-10-CM

## 2019-01-01 DIAGNOSIS — R10.9 ABDOMINAL PAIN, UNSPECIFIED ABDOMINAL LOCATION: ICD-10-CM

## 2019-01-01 DIAGNOSIS — N18.4 STAGE 4 CHRONIC KIDNEY DISEASE: Chronic | ICD-10-CM

## 2019-01-01 DIAGNOSIS — N17.9 ACUTE KIDNEY INJURY: ICD-10-CM

## 2019-01-01 DIAGNOSIS — R07.9 CHEST PAIN: ICD-10-CM

## 2019-01-01 DIAGNOSIS — R10.84 GENERALIZED ABDOMINAL PAIN: ICD-10-CM

## 2019-01-01 DIAGNOSIS — J96.21 ACUTE ON CHRONIC RESPIRATORY FAILURE WITH HYPOXIA AND HYPERCAPNIA: ICD-10-CM

## 2019-01-01 DIAGNOSIS — I25.10 CORONARY ARTERY DISEASE INVOLVING NATIVE CORONARY ARTERY OF NATIVE HEART WITHOUT ANGINA PECTORIS: ICD-10-CM

## 2019-01-01 DIAGNOSIS — Z51.5 COMFORT MEASURES ONLY STATUS: ICD-10-CM

## 2019-01-01 DIAGNOSIS — J44.1 COPD EXACERBATION: ICD-10-CM

## 2019-01-01 DIAGNOSIS — J44.1 COPD WITH ACUTE EXACERBATION: Primary | ICD-10-CM

## 2019-01-01 DIAGNOSIS — J96.92 RESPIRATORY FAILURE WITH HYPOXIA AND HYPERCAPNIA: ICD-10-CM

## 2019-01-01 DIAGNOSIS — B96.29 UTI DUE TO EXTENDED-SPECTRUM BETA LACTAMASE (ESBL) PRODUCING ESCHERICHIA COLI: ICD-10-CM

## 2019-01-01 DIAGNOSIS — L97.914: ICD-10-CM

## 2019-01-01 LAB
ALBUMIN SERPL BCP-MCNC: 1.5 G/DL
ALBUMIN SERPL BCP-MCNC: 1.5 G/DL
ALBUMIN SERPL BCP-MCNC: 1.6 G/DL
ALBUMIN SERPL BCP-MCNC: 1.8 G/DL
ALBUMIN SERPL BCP-MCNC: 1.9 G/DL
ALBUMIN SERPL BCP-MCNC: 2 G/DL
ALBUMIN SERPL BCP-MCNC: 2.1 G/DL
ALBUMIN SERPL BCP-MCNC: 2.1 G/DL (ref 3.5–5.2)
ALBUMIN SERPL BCP-MCNC: 2.2 G/DL
ALBUMIN SERPL BCP-MCNC: 2.2 G/DL (ref 3.5–5.2)
ALBUMIN SERPL BCP-MCNC: 2.4 G/DL
ALBUMIN SERPL BCP-MCNC: 2.5 G/DL (ref 3.5–5.2)
ALLENS TEST: ABNORMAL
ALP SERPL-CCNC: 102 U/L (ref 55–135)
ALP SERPL-CCNC: 104 U/L (ref 55–135)
ALP SERPL-CCNC: 107 U/L
ALP SERPL-CCNC: 111 U/L
ALP SERPL-CCNC: 113 U/L (ref 55–135)
ALP SERPL-CCNC: 115 U/L
ALP SERPL-CCNC: 116 U/L
ALP SERPL-CCNC: 117 U/L
ALP SERPL-CCNC: 117 U/L (ref 55–135)
ALP SERPL-CCNC: 120 U/L
ALP SERPL-CCNC: 121 U/L
ALP SERPL-CCNC: 121 U/L
ALP SERPL-CCNC: 123 U/L
ALP SERPL-CCNC: 124 U/L
ALP SERPL-CCNC: 133 U/L
ALP SERPL-CCNC: 134 U/L
ALP SERPL-CCNC: 147 U/L
ALP SERPL-CCNC: 155 U/L
ALP SERPL-CCNC: 161 U/L
ALP SERPL-CCNC: 73 U/L (ref 55–135)
ALP SERPL-CCNC: 75 U/L (ref 55–135)
ALP SERPL-CCNC: 75 U/L (ref 55–135)
ALP SERPL-CCNC: 81 U/L (ref 55–135)
ALP SERPL-CCNC: 86 U/L
ALP SERPL-CCNC: 89 U/L
ALP SERPL-CCNC: 91 U/L
ALP SERPL-CCNC: 93 U/L
ALP SERPL-CCNC: 99 U/L (ref 55–135)
ALT SERPL W/O P-5'-P-CCNC: 103 U/L
ALT SERPL W/O P-5'-P-CCNC: 131 U/L
ALT SERPL W/O P-5'-P-CCNC: 162 U/L
ALT SERPL W/O P-5'-P-CCNC: 185 U/L
ALT SERPL W/O P-5'-P-CCNC: 19 U/L (ref 10–44)
ALT SERPL W/O P-5'-P-CCNC: 191 U/L
ALT SERPL W/O P-5'-P-CCNC: 193 U/L
ALT SERPL W/O P-5'-P-CCNC: 203 U/L
ALT SERPL W/O P-5'-P-CCNC: 209 U/L
ALT SERPL W/O P-5'-P-CCNC: 217 U/L
ALT SERPL W/O P-5'-P-CCNC: 23 U/L (ref 10–44)
ALT SERPL W/O P-5'-P-CCNC: 237 U/L
ALT SERPL W/O P-5'-P-CCNC: 26 U/L (ref 10–44)
ALT SERPL W/O P-5'-P-CCNC: 27 U/L (ref 10–44)
ALT SERPL W/O P-5'-P-CCNC: 29 U/L (ref 10–44)
ALT SERPL W/O P-5'-P-CCNC: 302 U/L
ALT SERPL W/O P-5'-P-CCNC: 32 U/L (ref 10–44)
ALT SERPL W/O P-5'-P-CCNC: 32 U/L (ref 10–44)
ALT SERPL W/O P-5'-P-CCNC: 355 U/L
ALT SERPL W/O P-5'-P-CCNC: 404 U/L
ALT SERPL W/O P-5'-P-CCNC: 43 U/L
ALT SERPL W/O P-5'-P-CCNC: 43 U/L
ALT SERPL W/O P-5'-P-CCNC: 469 U/L
ALT SERPL W/O P-5'-P-CCNC: 47 U/L
ALT SERPL W/O P-5'-P-CCNC: 475 U/L
ALT SERPL W/O P-5'-P-CCNC: 57 U/L
ALT SERPL W/O P-5'-P-CCNC: 68 U/L
ALT SERPL W/O P-5'-P-CCNC: 70 U/L
AMORPH CRY URNS QL MICRO: ABNORMAL
ANION GAP SERPL CALC-SCNC: 10 MMOL/L
ANION GAP SERPL CALC-SCNC: 10 MMOL/L (ref 8–16)
ANION GAP SERPL CALC-SCNC: 10 MMOL/L (ref 8–16)
ANION GAP SERPL CALC-SCNC: 11 MMOL/L
ANION GAP SERPL CALC-SCNC: 11 MMOL/L
ANION GAP SERPL CALC-SCNC: 11 MMOL/L (ref 8–16)
ANION GAP SERPL CALC-SCNC: 12 MMOL/L
ANION GAP SERPL CALC-SCNC: 12 MMOL/L (ref 8–16)
ANION GAP SERPL CALC-SCNC: 13 MMOL/L (ref 8–16)
ANION GAP SERPL CALC-SCNC: 15 MMOL/L
ANION GAP SERPL CALC-SCNC: 15 MMOL/L (ref 8–16)
ANION GAP SERPL CALC-SCNC: 5 MMOL/L
ANION GAP SERPL CALC-SCNC: 6 MMOL/L
ANION GAP SERPL CALC-SCNC: 7 MMOL/L
ANION GAP SERPL CALC-SCNC: 8 MMOL/L
ANION GAP SERPL CALC-SCNC: 8 MMOL/L (ref 8–16)
ANION GAP SERPL CALC-SCNC: 9 MMOL/L
ANISOCYTOSIS BLD QL SMEAR: SLIGHT
AORTIC ROOT ANNULUS: 2.92 CM
AORTIC ROOT ANNULUS: 3.27 CM
AORTIC VALVE CUSP SEPERATION: 1.85 CM
APAP SERPL-MCNC: <3 UG/ML
APTT BLDCRRT: 33.2 SEC (ref 21–32)
APTT BLDCRRT: 38 SEC
AST SERPL-CCNC: 101 U/L
AST SERPL-CCNC: 12 U/L (ref 10–40)
AST SERPL-CCNC: 123 U/L
AST SERPL-CCNC: 14 U/L (ref 10–40)
AST SERPL-CCNC: 141 U/L
AST SERPL-CCNC: 141 U/L
AST SERPL-CCNC: 187 U/L
AST SERPL-CCNC: 19 U/L (ref 10–40)
AST SERPL-CCNC: 202 U/L
AST SERPL-CCNC: 21 U/L (ref 10–40)
AST SERPL-CCNC: 21 U/L (ref 10–40)
AST SERPL-CCNC: 22 U/L
AST SERPL-CCNC: 227 U/L
AST SERPL-CCNC: 23 U/L
AST SERPL-CCNC: 23 U/L
AST SERPL-CCNC: 23 U/L (ref 10–40)
AST SERPL-CCNC: 24 U/L
AST SERPL-CCNC: 25 U/L (ref 10–40)
AST SERPL-CCNC: 27 U/L (ref 10–40)
AST SERPL-CCNC: 28 U/L
AST SERPL-CCNC: 28 U/L
AST SERPL-CCNC: 29 U/L (ref 10–40)
AST SERPL-CCNC: 32 U/L
AST SERPL-CCNC: 32 U/L
AST SERPL-CCNC: 41 U/L
AST SERPL-CCNC: 42 U/L
AST SERPL-CCNC: 43 U/L
AST SERPL-CCNC: 44 U/L
AST SERPL-CCNC: 53 U/L
AST SERPL-CCNC: 65 U/L
AV INDEX (PROSTH): 0.61
AV INDEX (PROSTH): 0.71
AV MEAN GRADIENT: 2.25 MMHG
AV MEAN GRADIENT: 6.72 MMHG
AV PEAK GRADIENT: 11.16 MMHG
AV PEAK GRADIENT: 4.08 MMHG
AV VALVE AREA: 1.74 CM2
AV VALVE AREA: 2.17 CM2
AV VELOCITY RATIO: 0.65
AV VELOCITY RATIO: 0.69
BACTERIA #/AREA URNS HPF: ABNORMAL /HPF
BACTERIA #/AREA URNS HPF: ABNORMAL /HPF
BACTERIA BLD CULT: NORMAL
BACTERIA SPEC AEROBE CULT: NORMAL
BACTERIA SPEC AEROBE CULT: NORMAL
BACTERIA UR CULT: NO GROWTH
BACTERIA UR CULT: NORMAL
BASOPHILS # BLD AUTO: 0 K/UL
BASOPHILS # BLD AUTO: 0 K/UL (ref 0–0.2)
BASOPHILS # BLD AUTO: 0.01 K/UL
BASOPHILS # BLD AUTO: 0.01 K/UL (ref 0–0.2)
BASOPHILS # BLD AUTO: 0.01 K/UL (ref 0–0.2)
BASOPHILS # BLD AUTO: 0.02 K/UL
BASOPHILS # BLD AUTO: 0.02 K/UL (ref 0–0.2)
BASOPHILS # BLD AUTO: 0.03 K/UL
BASOPHILS # BLD AUTO: 0.03 K/UL (ref 0–0.2)
BASOPHILS # BLD AUTO: ABNORMAL K/UL (ref 0–0.2)
BASOPHILS NFR BLD: 0 %
BASOPHILS NFR BLD: 0 % (ref 0–1.9)
BASOPHILS NFR BLD: 0 % (ref 0–1.9)
BASOPHILS NFR BLD: 0.1 %
BASOPHILS NFR BLD: 0.1 % (ref 0–1.9)
BASOPHILS NFR BLD: 0.2 % (ref 0–1.9)
BASOPHILS NFR BLD: 0.2 % (ref 0–1.9)
BASOPHILS NFR BLD: 0.3 %
BASOPHILS NFR BLD: 0.3 % (ref 0–1.9)
BASOPHILS NFR BLD: 0.4 %
BASOPHILS NFR BLD: 0.4 % (ref 0–1.9)
BASOPHILS NFR BLD: 0.5 % (ref 0–1.9)
BILIRUB SERPL-MCNC: 0.3 MG/DL
BILIRUB SERPL-MCNC: 0.3 MG/DL
BILIRUB SERPL-MCNC: 0.3 MG/DL (ref 0.1–1)
BILIRUB SERPL-MCNC: 0.4 MG/DL
BILIRUB SERPL-MCNC: 0.4 MG/DL
BILIRUB SERPL-MCNC: 0.4 MG/DL (ref 0.1–1)
BILIRUB SERPL-MCNC: 0.5 MG/DL
BILIRUB SERPL-MCNC: 0.5 MG/DL
BILIRUB SERPL-MCNC: 0.5 MG/DL (ref 0.1–1)
BILIRUB SERPL-MCNC: 0.6 MG/DL
BILIRUB SERPL-MCNC: 0.7 MG/DL
BILIRUB SERPL-MCNC: 0.8 MG/DL
BILIRUB SERPL-MCNC: 0.9 MG/DL
BILIRUB SERPL-MCNC: 1 MG/DL
BILIRUB SERPL-MCNC: 1.1 MG/DL
BILIRUB UR QL STRIP: NEGATIVE
BNP SERPL-MCNC: 1122 PG/ML (ref 0–99)
BNP SERPL-MCNC: 180 PG/ML
BNP SERPL-MCNC: 314 PG/ML
BNP SERPL-MCNC: 433 PG/ML
BSA FOR ECHO PROCEDURE: 1.77 M2
BSA FOR ECHO PROCEDURE: 2.11 M2
BUN SERPL-MCNC: 31 MG/DL
BUN SERPL-MCNC: 33 MG/DL
BUN SERPL-MCNC: 36 MG/DL
BUN SERPL-MCNC: 41 MG/DL
BUN SERPL-MCNC: 42 MG/DL
BUN SERPL-MCNC: 42 MG/DL (ref 8–23)
BUN SERPL-MCNC: 43 MG/DL (ref 8–23)
BUN SERPL-MCNC: 45 MG/DL
BUN SERPL-MCNC: 49 MG/DL
BUN SERPL-MCNC: 50 MG/DL
BUN SERPL-MCNC: 50 MG/DL
BUN SERPL-MCNC: 51 MG/DL (ref 8–23)
BUN SERPL-MCNC: 52 MG/DL
BUN SERPL-MCNC: 53 MG/DL
BUN SERPL-MCNC: 53 MG/DL (ref 8–23)
BUN SERPL-MCNC: 55 MG/DL
BUN SERPL-MCNC: 56 MG/DL
BUN SERPL-MCNC: 58 MG/DL
BUN SERPL-MCNC: 59 MG/DL
BUN SERPL-MCNC: 59 MG/DL (ref 8–23)
BUN SERPL-MCNC: 63 MG/DL
BUN SERPL-MCNC: 66 MG/DL
BUN SERPL-MCNC: 67 MG/DL
BUN SERPL-MCNC: 67 MG/DL (ref 8–23)
BUN SERPL-MCNC: 70 MG/DL
BUN SERPL-MCNC: 70 MG/DL (ref 8–23)
BUN SERPL-MCNC: 71 MG/DL (ref 8–23)
BUN SERPL-MCNC: 74 MG/DL (ref 8–23)
BUN SERPL-MCNC: 77 MG/DL
BUN SERPL-MCNC: 79 MG/DL
BUN SERPL-MCNC: 80 MG/DL (ref 8–23)
BURR CELLS BLD QL SMEAR: ABNORMAL
CALCIUM SERPL-MCNC: 7.7 MG/DL
CALCIUM SERPL-MCNC: 7.7 MG/DL
CALCIUM SERPL-MCNC: 7.8 MG/DL
CALCIUM SERPL-MCNC: 7.8 MG/DL (ref 8.7–10.5)
CALCIUM SERPL-MCNC: 7.9 MG/DL
CALCIUM SERPL-MCNC: 8 MG/DL
CALCIUM SERPL-MCNC: 8 MG/DL
CALCIUM SERPL-MCNC: 8 MG/DL (ref 8.7–10.5)
CALCIUM SERPL-MCNC: 8.1 MG/DL (ref 8.7–10.5)
CALCIUM SERPL-MCNC: 8.2 MG/DL (ref 8.7–10.5)
CALCIUM SERPL-MCNC: 8.2 MG/DL (ref 8.7–10.5)
CALCIUM SERPL-MCNC: 8.3 MG/DL
CALCIUM SERPL-MCNC: 8.3 MG/DL (ref 8.7–10.5)
CALCIUM SERPL-MCNC: 8.4 MG/DL
CALCIUM SERPL-MCNC: 8.4 MG/DL
CALCIUM SERPL-MCNC: 8.4 MG/DL (ref 8.7–10.5)
CALCIUM SERPL-MCNC: 8.5 MG/DL
CALCIUM SERPL-MCNC: 8.5 MG/DL (ref 8.7–10.5)
CALCIUM SERPL-MCNC: 8.6 MG/DL
CALCIUM SERPL-MCNC: 8.7 MG/DL
CALCIUM SERPL-MCNC: 8.7 MG/DL
CALCIUM SERPL-MCNC: 8.8 MG/DL
CALCIUM SERPL-MCNC: 8.9 MG/DL
CALCIUM SERPL-MCNC: 9.4 MG/DL
CHLORIDE SERPL-SCNC: 100 MMOL/L
CHLORIDE SERPL-SCNC: 101 MMOL/L
CHLORIDE SERPL-SCNC: 101 MMOL/L (ref 95–110)
CHLORIDE SERPL-SCNC: 102 MMOL/L
CHLORIDE SERPL-SCNC: 102 MMOL/L
CHLORIDE SERPL-SCNC: 102 MMOL/L (ref 95–110)
CHLORIDE SERPL-SCNC: 103 MMOL/L
CHLORIDE SERPL-SCNC: 103 MMOL/L
CHLORIDE SERPL-SCNC: 103 MMOL/L (ref 95–110)
CHLORIDE SERPL-SCNC: 103 MMOL/L (ref 95–110)
CHLORIDE SERPL-SCNC: 104 MMOL/L (ref 95–110)
CHLORIDE SERPL-SCNC: 106 MMOL/L (ref 95–110)
CHLORIDE SERPL-SCNC: 88 MMOL/L
CHLORIDE SERPL-SCNC: 89 MMOL/L
CHLORIDE SERPL-SCNC: 90 MMOL/L
CHLORIDE SERPL-SCNC: 91 MMOL/L
CHLORIDE SERPL-SCNC: 91 MMOL/L
CHLORIDE SERPL-SCNC: 92 MMOL/L
CHLORIDE SERPL-SCNC: 93 MMOL/L
CHLORIDE SERPL-SCNC: 93 MMOL/L
CHLORIDE SERPL-SCNC: 95 MMOL/L
CHLORIDE SERPL-SCNC: 97 MMOL/L
CHLORIDE SERPL-SCNC: 97 MMOL/L (ref 95–110)
CHLORIDE SERPL-SCNC: 98 MMOL/L
CHLORIDE SERPL-SCNC: 98 MMOL/L
CHLORIDE SERPL-SCNC: 99 MMOL/L
CHLORIDE SERPL-SCNC: 99 MMOL/L
CHLORIDE SERPL-SCNC: 99 MMOL/L (ref 95–110)
CHLORIDE UR-SCNC: 75 MMOL/L (ref 25–200)
CK SERPL-CCNC: 25 U/L
CK SERPL-CCNC: 29 U/L (ref 20–200)
CLARITY UR: CLEAR
CO2 SERPL-SCNC: 18 MMOL/L (ref 23–29)
CO2 SERPL-SCNC: 19 MMOL/L (ref 23–29)
CO2 SERPL-SCNC: 22 MMOL/L
CO2 SERPL-SCNC: 22 MMOL/L (ref 23–29)
CO2 SERPL-SCNC: 24 MMOL/L (ref 23–29)
CO2 SERPL-SCNC: 25 MMOL/L
CO2 SERPL-SCNC: 25 MMOL/L (ref 23–29)
CO2 SERPL-SCNC: 26 MMOL/L
CO2 SERPL-SCNC: 26 MMOL/L (ref 23–29)
CO2 SERPL-SCNC: 27 MMOL/L
CO2 SERPL-SCNC: 28 MMOL/L
CO2 SERPL-SCNC: 28 MMOL/L (ref 23–29)
CO2 SERPL-SCNC: 29 MMOL/L
CO2 SERPL-SCNC: 29 MMOL/L
CO2 SERPL-SCNC: 30 MMOL/L
CO2 SERPL-SCNC: 31 MMOL/L
CO2 SERPL-SCNC: 32 MMOL/L
CO2 SERPL-SCNC: 32 MMOL/L
CO2 SERPL-SCNC: 33 MMOL/L
CO2 SERPL-SCNC: 33 MMOL/L
CO2 SERPL-SCNC: 35 MMOL/L
COLOR UR: YELLOW
CREAT SERPL-MCNC: 1.4 MG/DL
CREAT SERPL-MCNC: 1.5 MG/DL
CREAT SERPL-MCNC: 1.5 MG/DL
CREAT SERPL-MCNC: 1.6 MG/DL
CREAT SERPL-MCNC: 1.6 MG/DL
CREAT SERPL-MCNC: 1.7 MG/DL
CREAT SERPL-MCNC: 1.8 MG/DL
CREAT SERPL-MCNC: 1.8 MG/DL
CREAT SERPL-MCNC: 1.8 MG/DL (ref 0.5–1.4)
CREAT SERPL-MCNC: 1.9 MG/DL
CREAT SERPL-MCNC: 2 MG/DL
CREAT SERPL-MCNC: 2 MG/DL
CREAT SERPL-MCNC: 2 MG/DL (ref 0.5–1.4)
CREAT SERPL-MCNC: 2.2 MG/DL
CREAT SERPL-MCNC: 2.3 MG/DL
CREAT SERPL-MCNC: 2.3 MG/DL
CREAT SERPL-MCNC: 2.4 MG/DL
CREAT SERPL-MCNC: 2.4 MG/DL
CREAT SERPL-MCNC: 2.4 MG/DL (ref 0.5–1.4)
CREAT SERPL-MCNC: 2.5 MG/DL
CREAT SERPL-MCNC: 2.6 MG/DL
CREAT SERPL-MCNC: 2.6 MG/DL
CREAT SERPL-MCNC: 2.8 MG/DL (ref 0.5–1.4)
CREAT SERPL-MCNC: 3.2 MG/DL (ref 0.5–1.4)
CREAT SERPL-MCNC: 3.5 MG/DL (ref 0.5–1.4)
CREAT SERPL-MCNC: 3.8 MG/DL (ref 0.5–1.4)
CREAT SERPL-MCNC: 3.9 MG/DL (ref 0.5–1.4)
CREAT SERPL-MCNC: 3.9 MG/DL (ref 0.5–1.4)
CREAT SERPL-MCNC: 4 MG/DL (ref 0.5–1.4)
CREAT UR-MCNC: 16.1 MG/DL (ref 23–375)
CREAT UR-MCNC: 17.1 MG/DL (ref 23–375)
CREAT UR-MCNC: 33.4 MG/DL (ref 23–375)
CV ECHO LV RWT: 0.48 CM
CV ECHO LV RWT: 0.49 CM
DELSYS: ABNORMAL
DIFFERENTIAL METHOD: ABNORMAL
DOP CALC AO PEAK VEL: 1.01 M/S
DOP CALC AO PEAK VEL: 1.67 M/S
DOP CALC AO VTI: 18.25 CM
DOP CALC AO VTI: 27.76 CM
DOP CALC LVOT AREA: 2.83 CM2
DOP CALC LVOT AREA: 3.05 CM2
DOP CALC LVOT DIAMETER: 1.9 CM
DOP CALC LVOT DIAMETER: 1.97 CM
DOP CALC LVOT PEAK VEL: 0.69 M/S
DOP CALC LVOT PEAK VEL: 1.08 M/S
DOP CALC LVOT STROKE VOLUME: 31.74 CM3
DOP CALC LVOT STROKE VOLUME: 60.26 CM3
DOP CALCLVOT PEAK VEL VTI: 11.2 CM
DOP CALCLVOT PEAK VEL VTI: 19.78 CM
E WAVE DECELERATION TIME: 206.99 MSEC
E/A RATIO: 0.78
ECHO LV POSTERIOR WALL: 0.89 CM (ref 0.6–1.1)
ECHO LV POSTERIOR WALL: 0.95 CM (ref 0.6–1.1)
EOSINOPHIL # BLD AUTO: 0 K/UL
EOSINOPHIL # BLD AUTO: 0 K/UL (ref 0–0.5)
EOSINOPHIL # BLD AUTO: 0.1 K/UL
EOSINOPHIL # BLD AUTO: 0.1 K/UL (ref 0–0.5)
EOSINOPHIL # BLD AUTO: 0.2 K/UL
EOSINOPHIL # BLD AUTO: 0.3 K/UL
EOSINOPHIL # BLD AUTO: 0.4 K/UL
EOSINOPHIL # BLD AUTO: 0.4 K/UL
EOSINOPHIL # BLD AUTO: ABNORMAL K/UL (ref 0–0.5)
EOSINOPHIL NFR BLD: 0 %
EOSINOPHIL NFR BLD: 0 % (ref 0–8)
EOSINOPHIL NFR BLD: 0 % (ref 0–8)
EOSINOPHIL NFR BLD: 0.1 % (ref 0–8)
EOSINOPHIL NFR BLD: 0.3 % (ref 0–8)
EOSINOPHIL NFR BLD: 0.5 %
EOSINOPHIL NFR BLD: 0.7 %
EOSINOPHIL NFR BLD: 0.7 % (ref 0–8)
EOSINOPHIL NFR BLD: 0.7 % (ref 0–8)
EOSINOPHIL NFR BLD: 0.9 %
EOSINOPHIL NFR BLD: 1 % (ref 0–8)
EOSINOPHIL NFR BLD: 1.1 %
EOSINOPHIL NFR BLD: 1.2 %
EOSINOPHIL NFR BLD: 1.2 % (ref 0–8)
EOSINOPHIL NFR BLD: 1.6 %
EOSINOPHIL NFR BLD: 1.9 %
EOSINOPHIL NFR BLD: 2.9 %
EOSINOPHIL NFR BLD: 4.8 %
EOSINOPHIL NFR BLD: 5.1 %
EOSINOPHIL NFR BLD: 5.9 %
EOSINOPHIL URNS QL WRIGHT STN: NORMAL
EP: 5
ERYTHROCYTE [DISTWIDTH] IN BLOOD BY AUTOMATED COUNT: 13.8 %
ERYTHROCYTE [DISTWIDTH] IN BLOOD BY AUTOMATED COUNT: 14 %
ERYTHROCYTE [DISTWIDTH] IN BLOOD BY AUTOMATED COUNT: 14.2 %
ERYTHROCYTE [DISTWIDTH] IN BLOOD BY AUTOMATED COUNT: 14.2 %
ERYTHROCYTE [DISTWIDTH] IN BLOOD BY AUTOMATED COUNT: 14.3 %
ERYTHROCYTE [DISTWIDTH] IN BLOOD BY AUTOMATED COUNT: 14.4 %
ERYTHROCYTE [DISTWIDTH] IN BLOOD BY AUTOMATED COUNT: 14.4 %
ERYTHROCYTE [DISTWIDTH] IN BLOOD BY AUTOMATED COUNT: 14.5 %
ERYTHROCYTE [DISTWIDTH] IN BLOOD BY AUTOMATED COUNT: 14.6 %
ERYTHROCYTE [DISTWIDTH] IN BLOOD BY AUTOMATED COUNT: 14.7 %
ERYTHROCYTE [DISTWIDTH] IN BLOOD BY AUTOMATED COUNT: 15 %
ERYTHROCYTE [DISTWIDTH] IN BLOOD BY AUTOMATED COUNT: 15.1 %
ERYTHROCYTE [DISTWIDTH] IN BLOOD BY AUTOMATED COUNT: 15.2 %
ERYTHROCYTE [DISTWIDTH] IN BLOOD BY AUTOMATED COUNT: 15.5 %
ERYTHROCYTE [DISTWIDTH] IN BLOOD BY AUTOMATED COUNT: 17.1 % (ref 11.5–14.5)
ERYTHROCYTE [DISTWIDTH] IN BLOOD BY AUTOMATED COUNT: 17.2 % (ref 11.5–14.5)
ERYTHROCYTE [DISTWIDTH] IN BLOOD BY AUTOMATED COUNT: 17.4 % (ref 11.5–14.5)
ERYTHROCYTE [DISTWIDTH] IN BLOOD BY AUTOMATED COUNT: 17.5 % (ref 11.5–14.5)
ERYTHROCYTE [DISTWIDTH] IN BLOOD BY AUTOMATED COUNT: 17.6 % (ref 11.5–14.5)
ERYTHROCYTE [DISTWIDTH] IN BLOOD BY AUTOMATED COUNT: 17.6 % (ref 11.5–14.5)
ERYTHROCYTE [SEDIMENTATION RATE] IN BLOOD BY WESTERGREN METHOD: 10 MM/H
ERYTHROCYTE [SEDIMENTATION RATE] IN BLOOD BY WESTERGREN METHOD: 4 MM/H
EST. GFR  (AFRICAN AMERICAN): 16 ML/MIN/1.73 M^2
EST. GFR  (AFRICAN AMERICAN): 17 ML/MIN/1.73 M^2
EST. GFR  (AFRICAN AMERICAN): 18 ML/MIN/1.73 M^2
EST. GFR  (AFRICAN AMERICAN): 20 ML/MIN/1.73 M^2
EST. GFR  (AFRICAN AMERICAN): 24 ML/MIN/1.73 M^2
EST. GFR  (AFRICAN AMERICAN): 26 ML/MIN/1.73 M^2
EST. GFR  (AFRICAN AMERICAN): 27 ML/MIN/1.73 M^2
EST. GFR  (AFRICAN AMERICAN): 28 ML/MIN/1.73 M^2
EST. GFR  (AFRICAN AMERICAN): 29 ML/MIN/1.73 M^2
EST. GFR  (AFRICAN AMERICAN): 31 ML/MIN/1.73 M^2
EST. GFR  (AFRICAN AMERICAN): 31 ML/MIN/1.73 M^2
EST. GFR  (AFRICAN AMERICAN): 32 ML/MIN/1.73 M^2
EST. GFR  (AFRICAN AMERICAN): 36 ML/MIN/1.73 M^2
EST. GFR  (AFRICAN AMERICAN): 39 ML/MIN/1.73 M^2
EST. GFR  (AFRICAN AMERICAN): 41 ML/MIN/1.73 M^2
EST. GFR  (AFRICAN AMERICAN): 44 ML/MIN/1.73 M^2
EST. GFR  (AFRICAN AMERICAN): 47 ML/MIN/1.73 M^2
EST. GFR  (AFRICAN AMERICAN): 48 ML/MIN/1.73 M^2
EST. GFR  (AFRICAN AMERICAN): 51 ML/MIN/1.73 M^2
EST. GFR  (AFRICAN AMERICAN): 51 ML/MIN/1.73 M^2
EST. GFR  (AFRICAN AMERICAN): 56 ML/MIN/1.73 M^2
EST. GFR  (NON AFRICAN AMERICAN): 14 ML/MIN/1.73 M^2
EST. GFR  (NON AFRICAN AMERICAN): 16 ML/MIN/1.73 M^2
EST. GFR  (NON AFRICAN AMERICAN): 18 ML/MIN/1.73 M^2
EST. GFR  (NON AFRICAN AMERICAN): 21 ML/MIN/1.73 M^2
EST. GFR  (NON AFRICAN AMERICAN): 23 ML/MIN/1.73 M^2
EST. GFR  (NON AFRICAN AMERICAN): 23 ML/MIN/1.73 M^2
EST. GFR  (NON AFRICAN AMERICAN): 24 ML/MIN/1.73 M^2
EST. GFR  (NON AFRICAN AMERICAN): 25 ML/MIN/1.73 M^2
EST. GFR  (NON AFRICAN AMERICAN): 27 ML/MIN/1.73 M^2
EST. GFR  (NON AFRICAN AMERICAN): 27 ML/MIN/1.73 M^2
EST. GFR  (NON AFRICAN AMERICAN): 28 ML/MIN/1.73 M^2
EST. GFR  (NON AFRICAN AMERICAN): 31 ML/MIN/1.73 M^2
EST. GFR  (NON AFRICAN AMERICAN): 33 ML/MIN/1.73 M^2
EST. GFR  (NON AFRICAN AMERICAN): 36 ML/MIN/1.73 M^2
EST. GFR  (NON AFRICAN AMERICAN): 38 ML/MIN/1.73 M^2
EST. GFR  (NON AFRICAN AMERICAN): 41 ML/MIN/1.73 M^2
EST. GFR  (NON AFRICAN AMERICAN): 41 ML/MIN/1.73 M^2
EST. GFR  (NON AFRICAN AMERICAN): 44 ML/MIN/1.73 M^2
EST. GFR  (NON AFRICAN AMERICAN): 44 ML/MIN/1.73 M^2
EST. GFR  (NON AFRICAN AMERICAN): 48 ML/MIN/1.73 M^2
ESTIMATED AVG GLUCOSE: 143 MG/DL (ref 68–131)
ESTIMATED AVG GLUCOSE: 157 MG/DL
FIO2: 100
FIO2: 40
FIO2: 55
FIO2: 60
FLUAV AG SPEC QL IA: NEGATIVE
FLUAV AG SPEC QL IA: NEGATIVE
FLUBV AG SPEC QL IA: NEGATIVE
FLUBV AG SPEC QL IA: NEGATIVE
FRACTIONAL SHORTENING: 28 % (ref 28–44)
FRACTIONAL SHORTENING: 29 % (ref 28–44)
GLUCOSE SERPL-MCNC: 101 MG/DL (ref 70–110)
GLUCOSE SERPL-MCNC: 105 MG/DL
GLUCOSE SERPL-MCNC: 105 MG/DL (ref 70–110)
GLUCOSE SERPL-MCNC: 106 MG/DL
GLUCOSE SERPL-MCNC: 106 MG/DL (ref 70–110)
GLUCOSE SERPL-MCNC: 106 MG/DL (ref 70–110)
GLUCOSE SERPL-MCNC: 109 MG/DL (ref 70–110)
GLUCOSE SERPL-MCNC: 117 MG/DL
GLUCOSE SERPL-MCNC: 122 MG/DL
GLUCOSE SERPL-MCNC: 125 MG/DL
GLUCOSE SERPL-MCNC: 128 MG/DL
GLUCOSE SERPL-MCNC: 143 MG/DL (ref 70–110)
GLUCOSE SERPL-MCNC: 148 MG/DL
GLUCOSE SERPL-MCNC: 162 MG/DL (ref 70–110)
GLUCOSE SERPL-MCNC: 163 MG/DL
GLUCOSE SERPL-MCNC: 191 MG/DL
GLUCOSE SERPL-MCNC: 194 MG/DL
GLUCOSE SERPL-MCNC: 203 MG/DL (ref 70–110)
GLUCOSE SERPL-MCNC: 216 MG/DL
GLUCOSE SERPL-MCNC: 221 MG/DL (ref 70–110)
GLUCOSE SERPL-MCNC: 223 MG/DL
GLUCOSE SERPL-MCNC: 240 MG/DL
GLUCOSE SERPL-MCNC: 241 MG/DL
GLUCOSE SERPL-MCNC: 241 MG/DL
GLUCOSE SERPL-MCNC: 254 MG/DL
GLUCOSE SERPL-MCNC: 288 MG/DL
GLUCOSE SERPL-MCNC: 292 MG/DL
GLUCOSE SERPL-MCNC: 299 MG/DL
GLUCOSE SERPL-MCNC: 302 MG/DL
GLUCOSE SERPL-MCNC: 427 MG/DL
GLUCOSE SERPL-MCNC: 428 MG/DL
GLUCOSE SERPL-MCNC: 69 MG/DL (ref 70–110)
GLUCOSE SERPL-MCNC: 77 MG/DL
GLUCOSE SERPL-MCNC: 83 MG/DL
GLUCOSE SERPL-MCNC: 90 MG/DL
GLUCOSE SERPL-MCNC: 95 MG/DL
GLUCOSE UR QL STRIP: NEGATIVE
GRAM STN SPEC: NORMAL
HAV IGM SERPL QL IA: NEGATIVE
HBA1C MFR BLD HPLC: 6.6 % (ref 4–5.6)
HBA1C MFR BLD HPLC: 7.1 %
HBV CORE IGM SERPL QL IA: NEGATIVE
HBV SURFACE AG SERPL QL IA: NEGATIVE
HCO3 UR-SCNC: 17.2 MMOL/L (ref 24–28)
HCO3 UR-SCNC: 20.4 MMOL/L (ref 24–28)
HCO3 UR-SCNC: 24.6 MMOL/L (ref 24–28)
HCO3 UR-SCNC: 26.8 MMOL/L (ref 24–28)
HCO3 UR-SCNC: 27.4 MMOL/L (ref 24–28)
HCO3 UR-SCNC: 29 MMOL/L (ref 24–28)
HCO3 UR-SCNC: 29.6 MMOL/L (ref 24–28)
HCO3 UR-SCNC: 32.7 MMOL/L (ref 24–28)
HCO3 UR-SCNC: 33.8 MMOL/L (ref 24–28)
HCT VFR BLD AUTO: 28 %
HCT VFR BLD AUTO: 28.7 %
HCT VFR BLD AUTO: 29.6 %
HCT VFR BLD AUTO: 30 %
HCT VFR BLD AUTO: 30.6 % (ref 40–54)
HCT VFR BLD AUTO: 31 %
HCT VFR BLD AUTO: 31 % (ref 40–54)
HCT VFR BLD AUTO: 31.3 %
HCT VFR BLD AUTO: 31.7 %
HCT VFR BLD AUTO: 31.8 % (ref 40–54)
HCT VFR BLD AUTO: 31.9 %
HCT VFR BLD AUTO: 32.2 %
HCT VFR BLD AUTO: 32.3 %
HCT VFR BLD AUTO: 32.3 %
HCT VFR BLD AUTO: 32.4 % (ref 40–54)
HCT VFR BLD AUTO: 32.5 %
HCT VFR BLD AUTO: 32.9 % (ref 40–54)
HCT VFR BLD AUTO: 33.5 % (ref 40–54)
HCT VFR BLD AUTO: 33.6 %
HCT VFR BLD AUTO: 34.2 %
HCT VFR BLD AUTO: 34.9 %
HCT VFR BLD AUTO: 34.9 %
HCT VFR BLD AUTO: 36.1 %
HCT VFR BLD AUTO: 36.4 %
HCT VFR BLD AUTO: 36.6 %
HCT VFR BLD AUTO: 36.8 % (ref 40–54)
HCT VFR BLD AUTO: 36.9 % (ref 40–54)
HCT VFR BLD AUTO: 37.9 %
HCT VFR BLD AUTO: 37.9 %
HCT VFR BLD AUTO: 38 %
HCV AB SERPL QL IA: NEGATIVE
HGB BLD-MCNC: 10 G/DL (ref 14–18)
HGB BLD-MCNC: 10.1 G/DL
HGB BLD-MCNC: 10.4 G/DL
HGB BLD-MCNC: 10.4 G/DL (ref 14–18)
HGB BLD-MCNC: 10.5 G/DL
HGB BLD-MCNC: 10.5 G/DL (ref 14–18)
HGB BLD-MCNC: 10.5 G/DL (ref 14–18)
HGB BLD-MCNC: 10.6 G/DL
HGB BLD-MCNC: 10.7 G/DL
HGB BLD-MCNC: 10.7 G/DL (ref 14–18)
HGB BLD-MCNC: 10.8 G/DL
HGB BLD-MCNC: 10.8 G/DL
HGB BLD-MCNC: 11.2 G/DL
HGB BLD-MCNC: 11.3 G/DL
HGB BLD-MCNC: 11.4 G/DL
HGB BLD-MCNC: 11.5 G/DL
HGB BLD-MCNC: 11.5 G/DL
HGB BLD-MCNC: 11.5 G/DL (ref 14–18)
HGB BLD-MCNC: 11.6 G/DL (ref 14–18)
HGB BLD-MCNC: 11.7 G/DL
HGB BLD-MCNC: 12 G/DL
HGB BLD-MCNC: 12.1 G/DL
HGB BLD-MCNC: 12.2 G/DL
HGB BLD-MCNC: 9 G/DL
HGB BLD-MCNC: 9.2 G/DL
HGB BLD-MCNC: 9.4 G/DL
HGB BLD-MCNC: 9.8 G/DL (ref 14–18)
HGB BLD-MCNC: 9.9 G/DL
HGB UR QL STRIP: ABNORMAL
HGB UR QL STRIP: NEGATIVE
HGB UR QL STRIP: NEGATIVE
HYALINE CASTS #/AREA URNS LPF: 1 /LPF
HYALINE CASTS #/AREA URNS LPF: 2 /LPF
HYPOCHROMIA BLD QL SMEAR: ABNORMAL
INFLUENZA A, MOLECULAR: NEGATIVE
INFLUENZA B, MOLECULAR: NEGATIVE
INR PPP: 1.3 (ref 0.8–1.2)
INTERVENTRICULAR SEPTUM: 1.07 CM (ref 0.6–1.1)
INTERVENTRICULAR SEPTUM: 1.09 CM (ref 0.6–1.1)
IP: 10
IP: 10
IP: 14
IP: 18
KETONES UR QL STRIP: NEGATIVE
LA MAJOR: 4.22 CM
LA MINOR: 4.72 CM
LA WIDTH: 2.85 CM
LACTATE SERPL-SCNC: 0.9 MMOL/L
LACTATE SERPL-SCNC: 1.1 MMOL/L (ref 0.5–2.2)
LACTATE SERPL-SCNC: 1.1 MMOL/L (ref 0.5–2.2)
LACTATE SERPL-SCNC: 1.2 MMOL/L
LACTATE SERPL-SCNC: 1.6 MMOL/L (ref 0.5–2.2)
LACTATE SERPL-SCNC: 1.9 MMOL/L
LEFT ATRIUM SIZE: 2.73 CM
LEFT ATRIUM SIZE: 3.57 CM
LEFT ATRIUM VOLUME INDEX: 16.6 ML/M2
LEFT ATRIUM VOLUME: 29.47 CM3
LEFT INTERNAL DIMENSION IN SYSTOLE: 2.63 CM (ref 2.1–4)
LEFT INTERNAL DIMENSION IN SYSTOLE: 2.77 CM (ref 2.1–4)
LEFT VENTRICLE DIASTOLIC VOLUME INDEX: 31.82 ML/M2
LEFT VENTRICLE DIASTOLIC VOLUME INDEX: 32.34 ML/M2
LEFT VENTRICLE DIASTOLIC VOLUME: 56.56 ML
LEFT VENTRICLE DIASTOLIC VOLUME: 66.78 ML
LEFT VENTRICLE MASS INDEX: 60.5 G/M2
LEFT VENTRICLE MASS INDEX: 61.3 G/M2
LEFT VENTRICLE SYSTOLIC VOLUME INDEX: 14 ML/M2
LEFT VENTRICLE SYSTOLIC VOLUME INDEX: 14.2 ML/M2
LEFT VENTRICLE SYSTOLIC VOLUME: 25.25 ML
LEFT VENTRICLE SYSTOLIC VOLUME: 28.86 ML
LEFT VENTRICULAR INTERNAL DIMENSION IN DIASTOLE: 3.66 CM (ref 3.5–6)
LEFT VENTRICULAR INTERNAL DIMENSION IN DIASTOLE: 3.92 CM (ref 3.5–6)
LEFT VENTRICULAR MASS: 107.49 G
LEFT VENTRICULAR MASS: 126.63 G
LEUKOCYTE ESTERASE UR QL STRIP: ABNORMAL
LYMPHOCYTES # BLD AUTO: 0.5 K/UL
LYMPHOCYTES # BLD AUTO: 0.6 K/UL
LYMPHOCYTES # BLD AUTO: 0.7 K/UL
LYMPHOCYTES # BLD AUTO: 0.7 K/UL (ref 1–4.8)
LYMPHOCYTES # BLD AUTO: 0.8 K/UL
LYMPHOCYTES # BLD AUTO: 0.8 K/UL
LYMPHOCYTES # BLD AUTO: 0.9 K/UL
LYMPHOCYTES # BLD AUTO: 0.9 K/UL
LYMPHOCYTES # BLD AUTO: 1 K/UL
LYMPHOCYTES # BLD AUTO: 1.1 K/UL (ref 1–4.8)
LYMPHOCYTES # BLD AUTO: 1.2 K/UL
LYMPHOCYTES # BLD AUTO: 1.3 K/UL
LYMPHOCYTES # BLD AUTO: 1.3 K/UL
LYMPHOCYTES # BLD AUTO: 1.4 K/UL
LYMPHOCYTES # BLD AUTO: 1.5 K/UL (ref 1–4.8)
LYMPHOCYTES # BLD AUTO: 1.6 K/UL
LYMPHOCYTES # BLD AUTO: 1.7 K/UL
LYMPHOCYTES # BLD AUTO: 1.8 K/UL
LYMPHOCYTES # BLD AUTO: 1.8 K/UL (ref 1–4.8)
LYMPHOCYTES # BLD AUTO: 1.9 K/UL
LYMPHOCYTES # BLD AUTO: 2.1 K/UL
LYMPHOCYTES # BLD AUTO: 2.4 K/UL (ref 1–4.8)
LYMPHOCYTES # BLD AUTO: 2.5 K/UL (ref 1–4.8)
LYMPHOCYTES # BLD AUTO: 2.7 K/UL (ref 1–4.8)
LYMPHOCYTES # BLD AUTO: ABNORMAL K/UL (ref 1–4.8)
LYMPHOCYTES NFR BLD: 11.1 %
LYMPHOCYTES NFR BLD: 11.4 %
LYMPHOCYTES NFR BLD: 12.5 % (ref 18–48)
LYMPHOCYTES NFR BLD: 12.8 %
LYMPHOCYTES NFR BLD: 13.2 %
LYMPHOCYTES NFR BLD: 14 % (ref 18–48)
LYMPHOCYTES NFR BLD: 14.9 %
LYMPHOCYTES NFR BLD: 17.9 %
LYMPHOCYTES NFR BLD: 19.2 %
LYMPHOCYTES NFR BLD: 20 % (ref 18–48)
LYMPHOCYTES NFR BLD: 21 % (ref 18–48)
LYMPHOCYTES NFR BLD: 21.5 % (ref 18–48)
LYMPHOCYTES NFR BLD: 26.8 % (ref 18–48)
LYMPHOCYTES NFR BLD: 26.9 % (ref 18–48)
LYMPHOCYTES NFR BLD: 3 %
LYMPHOCYTES NFR BLD: 30 % (ref 18–48)
LYMPHOCYTES NFR BLD: 4.6 %
LYMPHOCYTES NFR BLD: 4.9 %
LYMPHOCYTES NFR BLD: 5 %
LYMPHOCYTES NFR BLD: 5.1 %
LYMPHOCYTES NFR BLD: 5.6 %
LYMPHOCYTES NFR BLD: 5.6 %
LYMPHOCYTES NFR BLD: 5.9 %
LYMPHOCYTES NFR BLD: 6.4 %
LYMPHOCYTES NFR BLD: 6.5 %
LYMPHOCYTES NFR BLD: 7.2 %
LYMPHOCYTES NFR BLD: 8.6 %
LYMPHOCYTES NFR BLD: 9.2 %
MAGNESIUM SERPL-MCNC: 2.3 MG/DL
MAGNESIUM SERPL-MCNC: 2.4 MG/DL
MAGNESIUM SERPL-MCNC: 2.6 MG/DL
MCH RBC QN AUTO: 28.1 PG
MCH RBC QN AUTO: 28.3 PG
MCH RBC QN AUTO: 28.5 PG
MCH RBC QN AUTO: 28.7 PG
MCH RBC QN AUTO: 28.8 PG
MCH RBC QN AUTO: 28.8 PG (ref 27–31)
MCH RBC QN AUTO: 28.9 PG
MCH RBC QN AUTO: 29 PG
MCH RBC QN AUTO: 29.1 PG
MCH RBC QN AUTO: 29.2 PG
MCH RBC QN AUTO: 29.2 PG
MCH RBC QN AUTO: 29.2 PG (ref 27–31)
MCH RBC QN AUTO: 29.3 PG (ref 27–31)
MCH RBC QN AUTO: 29.4 PG (ref 27–31)
MCH RBC QN AUTO: 29.5 PG
MCH RBC QN AUTO: 29.5 PG
MCH RBC QN AUTO: 29.6 PG
MCH RBC QN AUTO: 29.6 PG
MCH RBC QN AUTO: 29.6 PG (ref 27–31)
MCH RBC QN AUTO: 29.6 PG (ref 27–31)
MCH RBC QN AUTO: 29.7 PG (ref 27–31)
MCH RBC QN AUTO: 29.8 PG (ref 27–31)
MCH RBC QN AUTO: 29.9 PG
MCHC RBC AUTO-ENTMCNC: 31.3 G/DL (ref 32–36)
MCHC RBC AUTO-ENTMCNC: 31.4 G/DL (ref 32–36)
MCHC RBC AUTO-ENTMCNC: 31.6 G/DL
MCHC RBC AUTO-ENTMCNC: 31.7 G/DL
MCHC RBC AUTO-ENTMCNC: 31.8 G/DL
MCHC RBC AUTO-ENTMCNC: 31.8 G/DL
MCHC RBC AUTO-ENTMCNC: 31.9 G/DL
MCHC RBC AUTO-ENTMCNC: 31.9 G/DL (ref 32–36)
MCHC RBC AUTO-ENTMCNC: 31.9 G/DL (ref 32–36)
MCHC RBC AUTO-ENTMCNC: 32 G/DL
MCHC RBC AUTO-ENTMCNC: 32 G/DL (ref 32–36)
MCHC RBC AUTO-ENTMCNC: 32.1 G/DL
MCHC RBC AUTO-ENTMCNC: 32.2 G/DL
MCHC RBC AUTO-ENTMCNC: 32.3 G/DL (ref 32–36)
MCHC RBC AUTO-ENTMCNC: 32.4 G/DL
MCHC RBC AUTO-ENTMCNC: 32.4 G/DL (ref 32–36)
MCHC RBC AUTO-ENTMCNC: 32.6 G/DL
MCHC RBC AUTO-ENTMCNC: 32.7 G/DL (ref 32–36)
MCHC RBC AUTO-ENTMCNC: 33 G/DL
MCHC RBC AUTO-ENTMCNC: 33.1 G/DL
MCHC RBC AUTO-ENTMCNC: 33.1 G/DL
MCHC RBC AUTO-ENTMCNC: 33.3 G/DL
MCHC RBC AUTO-ENTMCNC: 33.4 G/DL
MCHC RBC AUTO-ENTMCNC: 33.5 G/DL
MCV RBC AUTO: 87 FL
MCV RBC AUTO: 88 FL
MCV RBC AUTO: 89 FL
MCV RBC AUTO: 90 FL (ref 82–98)
MCV RBC AUTO: 90 FL (ref 82–98)
MCV RBC AUTO: 91 FL
MCV RBC AUTO: 91 FL (ref 82–98)
MCV RBC AUTO: 91 FL (ref 82–98)
MCV RBC AUTO: 92 FL
MCV RBC AUTO: 92 FL (ref 82–98)
MCV RBC AUTO: 93 FL
MCV RBC AUTO: 93 FL
MCV RBC AUTO: 93 FL (ref 82–98)
MCV RBC AUTO: 94 FL
MCV RBC AUTO: 94 FL (ref 82–98)
MCV RBC AUTO: 95 FL (ref 82–98)
METAMYELOCYTES NFR BLD MANUAL: 1 %
MICROSCOPIC COMMENT: ABNORMAL
MIN VOL: 8
MIN VOL: 8
MODE: ABNORMAL
MONOCYTES # BLD AUTO: 0.1 K/UL (ref 0.3–1)
MONOCYTES # BLD AUTO: 0.2 K/UL
MONOCYTES # BLD AUTO: 0.3 K/UL
MONOCYTES # BLD AUTO: 0.3 K/UL
MONOCYTES # BLD AUTO: 0.4 K/UL
MONOCYTES # BLD AUTO: 0.6 K/UL
MONOCYTES # BLD AUTO: 0.6 K/UL
MONOCYTES # BLD AUTO: 0.7 K/UL
MONOCYTES # BLD AUTO: 0.7 K/UL
MONOCYTES # BLD AUTO: 0.8 K/UL
MONOCYTES # BLD AUTO: 0.8 K/UL (ref 0.3–1)
MONOCYTES # BLD AUTO: 0.9 K/UL
MONOCYTES # BLD AUTO: 0.9 K/UL (ref 0.3–1)
MONOCYTES # BLD AUTO: 0.9 K/UL (ref 0.3–1)
MONOCYTES # BLD AUTO: 1 K/UL (ref 0.3–1)
MONOCYTES # BLD AUTO: 1.1 K/UL
MONOCYTES # BLD AUTO: 1.2 K/UL (ref 0.3–1)
MONOCYTES # BLD AUTO: 1.3 K/UL
MONOCYTES # BLD AUTO: 1.4 K/UL
MONOCYTES # BLD AUTO: 1.4 K/UL (ref 0.3–1)
MONOCYTES # BLD AUTO: 1.5 K/UL
MONOCYTES # BLD AUTO: 1.8 K/UL
MONOCYTES # BLD AUTO: 1.9 K/UL
MONOCYTES # BLD AUTO: 1.9 K/UL
MONOCYTES # BLD AUTO: 2 K/UL
MONOCYTES # BLD AUTO: 2.2 K/UL
MONOCYTES # BLD AUTO: ABNORMAL K/UL (ref 0.3–1)
MONOCYTES NFR BLD: 0 %
MONOCYTES NFR BLD: 1.2 %
MONOCYTES NFR BLD: 1.9 %
MONOCYTES NFR BLD: 1.9 % (ref 4–15)
MONOCYTES NFR BLD: 10.1 % (ref 4–15)
MONOCYTES NFR BLD: 10.2 % (ref 4–15)
MONOCYTES NFR BLD: 10.4 % (ref 4–15)
MONOCYTES NFR BLD: 11.7 %
MONOCYTES NFR BLD: 12.3 % (ref 4–15)
MONOCYTES NFR BLD: 12.6 %
MONOCYTES NFR BLD: 13.5 % (ref 4–15)
MONOCYTES NFR BLD: 14 %
MONOCYTES NFR BLD: 14.4 % (ref 4–15)
MONOCYTES NFR BLD: 2.2 %
MONOCYTES NFR BLD: 2.5 %
MONOCYTES NFR BLD: 4.4 %
MONOCYTES NFR BLD: 4.9 %
MONOCYTES NFR BLD: 5 %
MONOCYTES NFR BLD: 6.9 %
MONOCYTES NFR BLD: 7 %
MONOCYTES NFR BLD: 7.8 %
MONOCYTES NFR BLD: 7.9 %
MONOCYTES NFR BLD: 7.9 %
MONOCYTES NFR BLD: 8.9 %
MONOCYTES NFR BLD: 9 %
MONOCYTES NFR BLD: 9 % (ref 4–15)
MONOCYTES NFR BLD: 9.1 %
MONOCYTES NFR BLD: 9.2 %
MV PEAK A VEL: 0.9 M/S
MV PEAK E VEL: 0.7 M/S
MYELOCYTES NFR BLD MANUAL: 1 %
MYELOCYTES NFR BLD MANUAL: 5 %
NEUTROPHILS # BLD AUTO: 10.5 K/UL
NEUTROPHILS # BLD AUTO: 10.7 K/UL
NEUTROPHILS # BLD AUTO: 11.2 K/UL
NEUTROPHILS # BLD AUTO: 11.6 K/UL
NEUTROPHILS # BLD AUTO: 12.7 K/UL
NEUTROPHILS # BLD AUTO: 13.7 K/UL
NEUTROPHILS # BLD AUTO: 13.8 K/UL
NEUTROPHILS # BLD AUTO: 13.9 K/UL
NEUTROPHILS # BLD AUTO: 14.3 K/UL
NEUTROPHILS # BLD AUTO: 14.5 K/UL
NEUTROPHILS # BLD AUTO: 14.8 K/UL
NEUTROPHILS # BLD AUTO: 15.1 K/UL
NEUTROPHILS # BLD AUTO: 16.9 K/UL
NEUTROPHILS # BLD AUTO: 17.3 K/UL
NEUTROPHILS # BLD AUTO: 17.8 K/UL
NEUTROPHILS # BLD AUTO: 3.5 K/UL (ref 1.8–7.7)
NEUTROPHILS # BLD AUTO: 3.8 K/UL (ref 1.8–7.7)
NEUTROPHILS # BLD AUTO: 4.4 K/UL (ref 1.8–7.7)
NEUTROPHILS # BLD AUTO: 4.5 K/UL (ref 1.8–7.7)
NEUTROPHILS # BLD AUTO: 4.8 K/UL
NEUTROPHILS # BLD AUTO: 4.9 K/UL
NEUTROPHILS # BLD AUTO: 5.4 K/UL
NEUTROPHILS # BLD AUTO: 5.4 K/UL (ref 1.8–7.7)
NEUTROPHILS # BLD AUTO: 6.5 K/UL (ref 1.8–7.7)
NEUTROPHILS # BLD AUTO: 7.6 K/UL (ref 1.8–7.7)
NEUTROPHILS # BLD AUTO: 8.5 K/UL
NEUTROPHILS # BLD AUTO: 8.6 K/UL
NEUTROPHILS # BLD AUTO: 9.4 K/UL
NEUTROPHILS NFR BLD: 52.1 % (ref 38–73)
NEUTROPHILS NFR BLD: 54 % (ref 38–73)
NEUTROPHILS NFR BLD: 54.6 % (ref 38–73)
NEUTROPHILS NFR BLD: 62 % (ref 38–73)
NEUTROPHILS NFR BLD: 64.5 % (ref 38–73)
NEUTROPHILS NFR BLD: 65.1 % (ref 38–73)
NEUTROPHILS NFR BLD: 66.1 %
NEUTROPHILS NFR BLD: 68.2 %
NEUTROPHILS NFR BLD: 68.8 %
NEUTROPHILS NFR BLD: 72.4 %
NEUTROPHILS NFR BLD: 74.2 %
NEUTROPHILS NFR BLD: 74.6 %
NEUTROPHILS NFR BLD: 77.2 % (ref 38–73)
NEUTROPHILS NFR BLD: 78.7 % (ref 38–73)
NEUTROPHILS NFR BLD: 80.5 %
NEUTROPHILS NFR BLD: 82.4 %
NEUTROPHILS NFR BLD: 82.5 %
NEUTROPHILS NFR BLD: 83.7 %
NEUTROPHILS NFR BLD: 83.9 %
NEUTROPHILS NFR BLD: 84 %
NEUTROPHILS NFR BLD: 84.9 %
NEUTROPHILS NFR BLD: 86.5 %
NEUTROPHILS NFR BLD: 86.7 %
NEUTROPHILS NFR BLD: 87.9 %
NEUTROPHILS NFR BLD: 87.9 %
NEUTROPHILS NFR BLD: 89.1 %
NEUTROPHILS NFR BLD: 90.1 %
NEUTROPHILS NFR BLD: 92.9 %
NEUTROPHILS NFR BLD: 93 %
NEUTROPHILS NFR BLD: 94.5 %
NEUTS BAND NFR BLD MANUAL: 1 %
NEUTS BAND NFR BLD MANUAL: 2 %
NITRITE UR QL STRIP: NEGATIVE
NITRITE UR QL STRIP: NEGATIVE
NITRITE UR QL STRIP: POSITIVE
NRBC BLD-RTO: 1 /100 WBC
OB PNL STL: NEGATIVE
OVALOCYTES BLD QL SMEAR: ABNORMAL
PCO2 BLDA: 36.1 MMHG (ref 35–45)
PCO2 BLDA: 36.5 MMHG (ref 35–45)
PCO2 BLDA: 45.3 MMHG (ref 35–45)
PCO2 BLDA: 46.7 MMHG (ref 35–45)
PCO2 BLDA: 47.7 MMHG (ref 35–45)
PCO2 BLDA: 49 MMHG (ref 35–45)
PCO2 BLDA: 53.7 MMHG (ref 35–45)
PCO2 BLDA: 56.1 MMHG (ref 35–45)
PCO2 BLDA: 78.8 MMHG (ref 35–45)
PH SMN: 7.23 [PH] (ref 7.35–7.45)
PH SMN: 7.26 [PH] (ref 7.35–7.45)
PH SMN: 7.29 [PH] (ref 7.35–7.45)
PH SMN: 7.29 [PH] (ref 7.35–7.45)
PH SMN: 7.31 [PH] (ref 7.35–7.45)
PH SMN: 7.35 [PH] (ref 7.35–7.45)
PH SMN: 7.39 [PH] (ref 7.35–7.45)
PH SMN: 7.47 [PH] (ref 7.35–7.45)
PH SMN: 7.48 [PH] (ref 7.35–7.45)
PH UR STRIP: 5 [PH] (ref 5–8)
PH UR STRIP: 5 [PH] (ref 5–8)
PH UR STRIP: 6 [PH] (ref 5–8)
PHOSPHATE SERPL-MCNC: 3.9 MG/DL
PHOSPHATE SERPL-MCNC: 4.4 MG/DL
PISA TR MAX VEL: 3.17 M/S
PISA TR MAX VEL: 3.33 M/S
PLATELET # BLD AUTO: 101 K/UL (ref 150–350)
PLATELET # BLD AUTO: 103 K/UL (ref 150–350)
PLATELET # BLD AUTO: 114 K/UL (ref 150–350)
PLATELET # BLD AUTO: 140 K/UL (ref 150–350)
PLATELET # BLD AUTO: 141 K/UL (ref 150–350)
PLATELET # BLD AUTO: 166 K/UL (ref 150–350)
PLATELET # BLD AUTO: 171 K/UL (ref 150–350)
PLATELET # BLD AUTO: 175 K/UL (ref 150–350)
PLATELET # BLD AUTO: 188 K/UL
PLATELET # BLD AUTO: 204 K/UL
PLATELET # BLD AUTO: 211 K/UL
PLATELET # BLD AUTO: 212 K/UL
PLATELET # BLD AUTO: 219 K/UL
PLATELET # BLD AUTO: 221 K/UL
PLATELET # BLD AUTO: 223 K/UL
PLATELET # BLD AUTO: 224 K/UL
PLATELET # BLD AUTO: 224 K/UL
PLATELET # BLD AUTO: 230 K/UL
PLATELET # BLD AUTO: 230 K/UL
PLATELET # BLD AUTO: 231 K/UL
PLATELET # BLD AUTO: 232 K/UL
PLATELET # BLD AUTO: 232 K/UL
PLATELET # BLD AUTO: 234 K/UL
PLATELET # BLD AUTO: 235 K/UL
PLATELET # BLD AUTO: 237 K/UL
PLATELET # BLD AUTO: 237 K/UL
PLATELET # BLD AUTO: 245 K/UL
PLATELET # BLD AUTO: 256 K/UL
PLATELET # BLD AUTO: 257 K/UL
PLATELET # BLD AUTO: 304 K/UL
PLATELET BLD QL SMEAR: ABNORMAL
PMV BLD AUTO: 10 FL
PMV BLD AUTO: 10 FL (ref 9.2–12.9)
PMV BLD AUTO: 10.1 FL
PMV BLD AUTO: 10.1 FL
PMV BLD AUTO: 10.2 FL
PMV BLD AUTO: 10.4 FL
PMV BLD AUTO: 9.2 FL (ref 9.2–12.9)
PMV BLD AUTO: 9.3 FL (ref 9.2–12.9)
PMV BLD AUTO: 9.3 FL (ref 9.2–12.9)
PMV BLD AUTO: 9.4 FL
PMV BLD AUTO: 9.5 FL
PMV BLD AUTO: 9.5 FL (ref 9.2–12.9)
PMV BLD AUTO: 9.6 FL
PMV BLD AUTO: 9.6 FL
PMV BLD AUTO: 9.7 FL (ref 9.2–12.9)
PMV BLD AUTO: 9.8 FL
PMV BLD AUTO: 9.8 FL (ref 9.2–12.9)
PMV BLD AUTO: 9.9 FL
PMV BLD AUTO: 9.9 FL (ref 9.2–12.9)
PO2 BLDA: 117 MMHG (ref 80–100)
PO2 BLDA: 292 MMHG (ref 80–100)
PO2 BLDA: 58 MMHG (ref 80–100)
PO2 BLDA: 62 MMHG (ref 80–100)
PO2 BLDA: 68 MMHG (ref 80–100)
PO2 BLDA: 68 MMHG (ref 80–100)
PO2 BLDA: 78 MMHG (ref 80–100)
PO2 BLDA: 80 MMHG (ref 80–100)
PO2 BLDA: 88 MMHG (ref 80–100)
POC BE: -2 MMOL/L
POC BE: -7 MMOL/L
POC BE: -9 MMOL/L
POC BE: 0 MMOL/L
POC BE: 10 MMOL/L
POC BE: 4 MMOL/L
POC BE: 5 MMOL/L
POC SATURATED O2: 100 % (ref 95–100)
POC SATURATED O2: 92 % (ref 95–100)
POC SATURATED O2: 93 % (ref 95–100)
POC SATURATED O2: 93 % (ref 95–100)
POC SATURATED O2: 94 % (ref 95–100)
POC SATURATED O2: 96 % (ref 95–100)
POC SATURATED O2: 98 % (ref 95–100)
POC TCO2: 18 MMOL/L (ref 23–27)
POC TCO2: 22 MMOL/L (ref 23–27)
POC TCO2: 26 MMOL/L (ref 23–27)
POC TCO2: 28 MMOL/L (ref 23–27)
POC TCO2: 29 MMOL/L (ref 23–27)
POC TCO2: 30 MMOL/L (ref 23–27)
POC TCO2: 31 MMOL/L (ref 23–27)
POC TCO2: 35 MMOL/L (ref 23–27)
POC TCO2: 35 MMOL/L (ref 23–27)
POCT GLUCOSE: 101 MG/DL (ref 70–110)
POCT GLUCOSE: 104 MG/DL (ref 70–110)
POCT GLUCOSE: 109 MG/DL (ref 70–110)
POCT GLUCOSE: 111 MG/DL (ref 70–110)
POCT GLUCOSE: 112 MG/DL (ref 70–110)
POCT GLUCOSE: 114 MG/DL (ref 70–110)
POCT GLUCOSE: 114 MG/DL (ref 70–110)
POCT GLUCOSE: 115 MG/DL (ref 70–110)
POCT GLUCOSE: 117 MG/DL (ref 70–110)
POCT GLUCOSE: 118 MG/DL (ref 70–110)
POCT GLUCOSE: 121 MG/DL (ref 70–110)
POCT GLUCOSE: 121 MG/DL (ref 70–110)
POCT GLUCOSE: 125 MG/DL (ref 70–110)
POCT GLUCOSE: 126 MG/DL (ref 70–110)
POCT GLUCOSE: 127 MG/DL (ref 70–110)
POCT GLUCOSE: 130 MG/DL (ref 70–110)
POCT GLUCOSE: 130 MG/DL (ref 70–110)
POCT GLUCOSE: 136 MG/DL (ref 70–110)
POCT GLUCOSE: 137 MG/DL (ref 70–110)
POCT GLUCOSE: 137 MG/DL (ref 70–110)
POCT GLUCOSE: 138 MG/DL (ref 70–110)
POCT GLUCOSE: 138 MG/DL (ref 70–110)
POCT GLUCOSE: 139 MG/DL (ref 70–110)
POCT GLUCOSE: 140 MG/DL (ref 70–110)
POCT GLUCOSE: 142 MG/DL (ref 70–110)
POCT GLUCOSE: 146 MG/DL (ref 70–110)
POCT GLUCOSE: 148 MG/DL (ref 70–110)
POCT GLUCOSE: 149 MG/DL (ref 70–110)
POCT GLUCOSE: 149 MG/DL (ref 70–110)
POCT GLUCOSE: 150 MG/DL (ref 70–110)
POCT GLUCOSE: 151 MG/DL (ref 70–110)
POCT GLUCOSE: 155 MG/DL (ref 70–110)
POCT GLUCOSE: 158 MG/DL (ref 70–110)
POCT GLUCOSE: 158 MG/DL (ref 70–110)
POCT GLUCOSE: 160 MG/DL (ref 70–110)
POCT GLUCOSE: 162 MG/DL (ref 70–110)
POCT GLUCOSE: 162 MG/DL (ref 70–110)
POCT GLUCOSE: 163 MG/DL (ref 70–110)
POCT GLUCOSE: 164 MG/DL (ref 70–110)
POCT GLUCOSE: 165 MG/DL (ref 70–110)
POCT GLUCOSE: 165 MG/DL (ref 70–110)
POCT GLUCOSE: 167 MG/DL (ref 70–110)
POCT GLUCOSE: 169 MG/DL (ref 70–110)
POCT GLUCOSE: 170 MG/DL (ref 70–110)
POCT GLUCOSE: 173 MG/DL (ref 70–110)
POCT GLUCOSE: 176 MG/DL (ref 70–110)
POCT GLUCOSE: 180 MG/DL (ref 70–110)
POCT GLUCOSE: 181 MG/DL (ref 70–110)
POCT GLUCOSE: 192 MG/DL (ref 70–110)
POCT GLUCOSE: 199 MG/DL (ref 70–110)
POCT GLUCOSE: 205 MG/DL (ref 70–110)
POCT GLUCOSE: 209 MG/DL (ref 70–110)
POCT GLUCOSE: 214 MG/DL (ref 70–110)
POCT GLUCOSE: 214 MG/DL (ref 70–110)
POCT GLUCOSE: 219 MG/DL (ref 70–110)
POCT GLUCOSE: 223 MG/DL (ref 70–110)
POCT GLUCOSE: 231 MG/DL (ref 70–110)
POCT GLUCOSE: 233 MG/DL (ref 70–110)
POCT GLUCOSE: 242 MG/DL (ref 70–110)
POCT GLUCOSE: 245 MG/DL (ref 70–110)
POCT GLUCOSE: 249 MG/DL (ref 70–110)
POCT GLUCOSE: 249 MG/DL (ref 70–110)
POCT GLUCOSE: 257 MG/DL (ref 70–110)
POCT GLUCOSE: 272 MG/DL (ref 70–110)
POCT GLUCOSE: 280 MG/DL (ref 70–110)
POCT GLUCOSE: 286 MG/DL (ref 70–110)
POCT GLUCOSE: 292 MG/DL (ref 70–110)
POCT GLUCOSE: 324 MG/DL (ref 70–110)
POCT GLUCOSE: 328 MG/DL (ref 70–110)
POCT GLUCOSE: 337 MG/DL (ref 70–110)
POCT GLUCOSE: 364 MG/DL (ref 70–110)
POCT GLUCOSE: 366 MG/DL (ref 70–110)
POCT GLUCOSE: 377 MG/DL (ref 70–110)
POCT GLUCOSE: 378 MG/DL (ref 70–110)
POCT GLUCOSE: 425 MG/DL (ref 70–110)
POCT GLUCOSE: 57 MG/DL (ref 70–110)
POCT GLUCOSE: 63 MG/DL (ref 70–110)
POCT GLUCOSE: 78 MG/DL (ref 70–110)
POCT GLUCOSE: 86 MG/DL (ref 70–110)
POCT GLUCOSE: 94 MG/DL (ref 70–110)
POCT GLUCOSE: 96 MG/DL (ref 70–110)
POCT GLUCOSE: 97 MG/DL (ref 70–110)
POIKILOCYTOSIS BLD QL SMEAR: SLIGHT
POTASSIUM SERPL-SCNC: 2.9 MMOL/L
POTASSIUM SERPL-SCNC: 3.1 MMOL/L
POTASSIUM SERPL-SCNC: 3.3 MMOL/L
POTASSIUM SERPL-SCNC: 3.6 MMOL/L
POTASSIUM SERPL-SCNC: 3.6 MMOL/L
POTASSIUM SERPL-SCNC: 3.7 MMOL/L
POTASSIUM SERPL-SCNC: 3.8 MMOL/L (ref 3.5–5.1)
POTASSIUM SERPL-SCNC: 4 MMOL/L
POTASSIUM SERPL-SCNC: 4.1 MMOL/L
POTASSIUM SERPL-SCNC: 4.1 MMOL/L
POTASSIUM SERPL-SCNC: 4.1 MMOL/L (ref 3.5–5.1)
POTASSIUM SERPL-SCNC: 4.2 MMOL/L
POTASSIUM SERPL-SCNC: 4.3 MMOL/L
POTASSIUM SERPL-SCNC: 4.3 MMOL/L
POTASSIUM SERPL-SCNC: 4.3 MMOL/L (ref 3.5–5.1)
POTASSIUM SERPL-SCNC: 4.4 MMOL/L
POTASSIUM SERPL-SCNC: 4.4 MMOL/L
POTASSIUM SERPL-SCNC: 4.5 MMOL/L
POTASSIUM SERPL-SCNC: 4.6 MMOL/L
POTASSIUM SERPL-SCNC: 4.6 MMOL/L (ref 3.5–5.1)
POTASSIUM SERPL-SCNC: 4.6 MMOL/L (ref 3.5–5.1)
POTASSIUM SERPL-SCNC: 4.9 MMOL/L
POTASSIUM SERPL-SCNC: 4.9 MMOL/L
POTASSIUM SERPL-SCNC: 4.9 MMOL/L (ref 3.5–5.1)
POTASSIUM SERPL-SCNC: 5 MMOL/L
POTASSIUM SERPL-SCNC: 5 MMOL/L (ref 3.5–5.1)
POTASSIUM SERPL-SCNC: 5.1 MMOL/L (ref 3.5–5.1)
POTASSIUM SERPL-SCNC: 5.3 MMOL/L
POTASSIUM SERPL-SCNC: 5.4 MMOL/L (ref 3.5–5.1)
POTASSIUM SERPL-SCNC: 5.6 MMOL/L
POTASSIUM SERPL-SCNC: 5.6 MMOL/L (ref 3.5–5.1)
POTASSIUM SERPL-SCNC: 6.1 MMOL/L
POTASSIUM SERPL-SCNC: 6.5 MMOL/L
POTASSIUM UR-SCNC: 44 MMOL/L (ref 15–95)
PROCALCITONIN SERPL IA-MCNC: 0.12 NG/ML
PROCALCITONIN SERPL IA-MCNC: 0.28 NG/ML
PROCALCITONIN SERPL IA-MCNC: 0.5 NG/ML
PROCALCITONIN SERPL IA-MCNC: 0.66 NG/ML
PROCALCITONIN SERPL IA-MCNC: 0.83 NG/ML
PROT SERPL-MCNC: 4.3 G/DL (ref 6–8.4)
PROT SERPL-MCNC: 4.4 G/DL (ref 6–8.4)
PROT SERPL-MCNC: 4.5 G/DL (ref 6–8.4)
PROT SERPL-MCNC: 4.5 G/DL (ref 6–8.4)
PROT SERPL-MCNC: 4.6 G/DL (ref 6–8.4)
PROT SERPL-MCNC: 4.7 G/DL
PROT SERPL-MCNC: 4.7 G/DL (ref 6–8.4)
PROT SERPL-MCNC: 4.8 G/DL
PROT SERPL-MCNC: 4.8 G/DL (ref 6–8.4)
PROT SERPL-MCNC: 4.9 G/DL
PROT SERPL-MCNC: 4.9 G/DL
PROT SERPL-MCNC: 4.9 G/DL (ref 6–8.4)
PROT SERPL-MCNC: 5 G/DL
PROT SERPL-MCNC: 5.1 G/DL
PROT SERPL-MCNC: 5.1 G/DL (ref 6–8.4)
PROT SERPL-MCNC: 5.3 G/DL
PROT SERPL-MCNC: 5.6 G/DL
PROT SERPL-MCNC: 5.7 G/DL
PROT SERPL-MCNC: 5.7 G/DL
PROT SERPL-MCNC: 5.8 G/DL
PROT SERPL-MCNC: 5.9 G/DL
PROT SERPL-MCNC: 6 G/DL
PROT SERPL-MCNC: 6.1 G/DL
PROT SERPL-MCNC: 6.5 G/DL
PROT UR QL STRIP: ABNORMAL
PROT UR QL STRIP: NEGATIVE
PROT UR QL STRIP: NEGATIVE
PROT UR-MCNC: <7 MG/DL (ref 0–15)
PROT/CREAT UR: ABNORMAL MG/G{CREAT} (ref 0–0.2)
PROTHROMBIN TIME: 13.8 SEC (ref 9–12.5)
PV PEAK VELOCITY: 0.81 CM/S
PV PEAK VELOCITY: 1.18 CM/S
RA MAJOR: 4.52 CM
RA PRESSURE: 3 MMHG
RA PRESSURE: 3 MMHG
RBC # BLD AUTO: 3.14 M/UL
RBC # BLD AUTO: 3.27 M/UL
RBC # BLD AUTO: 3.3 M/UL (ref 4.6–6.2)
RBC # BLD AUTO: 3.32 M/UL
RBC # BLD AUTO: 3.4 M/UL (ref 4.6–6.2)
RBC # BLD AUTO: 3.44 M/UL
RBC # BLD AUTO: 3.52 M/UL
RBC # BLD AUTO: 3.55 M/UL (ref 4.6–6.2)
RBC # BLD AUTO: 3.55 M/UL (ref 4.6–6.2)
RBC # BLD AUTO: 3.6 M/UL
RBC # BLD AUTO: 3.61 M/UL
RBC # BLD AUTO: 3.61 M/UL
RBC # BLD AUTO: 3.62 M/UL
RBC # BLD AUTO: 3.64 M/UL (ref 4.6–6.2)
RBC # BLD AUTO: 3.65 M/UL
RBC # BLD AUTO: 3.66 M/UL (ref 4.6–6.2)
RBC # BLD AUTO: 3.7 M/UL
RBC # BLD AUTO: 3.72 M/UL
RBC # BLD AUTO: 3.76 M/UL
RBC # BLD AUTO: 3.8 M/UL
RBC # BLD AUTO: 3.86 M/UL (ref 4.6–6.2)
RBC # BLD AUTO: 3.88 M/UL
RBC # BLD AUTO: 3.92 M/UL (ref 4.6–6.2)
RBC # BLD AUTO: 3.94 M/UL
RBC # BLD AUTO: 3.95 M/UL
RBC # BLD AUTO: 3.96 M/UL
RBC # BLD AUTO: 3.99 M/UL
RBC # BLD AUTO: 4.05 M/UL
RBC # BLD AUTO: 4.12 M/UL
RBC # BLD AUTO: 4.13 M/UL
RBC #/AREA URNS HPF: 3 /HPF (ref 0–4)
RIGHT VENTRICULAR END-DIASTOLIC DIMENSION: 2.57 CM
RIGHT VENTRICULAR END-DIASTOLIC DIMENSION: 2.68 CM
SAMPLE: ABNORMAL
SITE: ABNORMAL
SODIUM SERPL-SCNC: 127 MMOL/L
SODIUM SERPL-SCNC: 129 MMOL/L
SODIUM SERPL-SCNC: 130 MMOL/L
SODIUM SERPL-SCNC: 132 MMOL/L
SODIUM SERPL-SCNC: 132 MMOL/L
SODIUM SERPL-SCNC: 133 MMOL/L
SODIUM SERPL-SCNC: 133 MMOL/L (ref 136–145)
SODIUM SERPL-SCNC: 134 MMOL/L
SODIUM SERPL-SCNC: 134 MMOL/L (ref 136–145)
SODIUM SERPL-SCNC: 135 MMOL/L
SODIUM SERPL-SCNC: 135 MMOL/L (ref 136–145)
SODIUM SERPL-SCNC: 136 MMOL/L
SODIUM SERPL-SCNC: 136 MMOL/L
SODIUM SERPL-SCNC: 136 MMOL/L (ref 136–145)
SODIUM SERPL-SCNC: 137 MMOL/L
SODIUM SERPL-SCNC: 137 MMOL/L (ref 136–145)
SODIUM SERPL-SCNC: 138 MMOL/L
SODIUM SERPL-SCNC: 138 MMOL/L
SODIUM SERPL-SCNC: 138 MMOL/L (ref 136–145)
SODIUM UR-SCNC: 49 MMOL/L (ref 20–250)
SODIUM UR-SCNC: 94 MMOL/L (ref 20–250)
SP GR UR STRIP: 1.01 (ref 1–1.03)
SP GR UR STRIP: <=1.005 (ref 1–1.03)
SP GR UR STRIP: <=1.005 (ref 1–1.03)
SP02: 99
SPECIMEN SOURCE: NORMAL
SPONT RATE: 10
SPONT RATE: 19
TR MAX PG: 40.2 MMHG
TR MAX PG: 44.36 MMHG
TROPONIN I SERPL DL<=0.01 NG/ML-MCNC: 0.01 NG/ML
TROPONIN I SERPL DL<=0.01 NG/ML-MCNC: 0.03 NG/ML
TROPONIN I SERPL DL<=0.01 NG/ML-MCNC: 0.04 NG/ML
TROPONIN I SERPL DL<=0.01 NG/ML-MCNC: 0.04 NG/ML (ref 0–0.03)
TROPONIN I SERPL DL<=0.01 NG/ML-MCNC: 0.04 NG/ML (ref 0–0.03)
TROPONIN I SERPL DL<=0.01 NG/ML-MCNC: 0.05 NG/ML (ref 0–0.03)
TSH SERPL DL<=0.005 MIU/L-ACNC: 0.73 UIU/ML
TSH SERPL DL<=0.005 MIU/L-ACNC: 1.89 UIU/ML
TV REST PULMONARY ARTERY PRESSURE: 43 MMHG
TV REST PULMONARY ARTERY PRESSURE: 47 MMHG
URATE SERPL-MCNC: 10 MG/DL (ref 3.4–7)
URN SPEC COLLECT METH UR: ABNORMAL
UROBILINOGEN UR STRIP-ACNC: 1 EU/DL
UROBILINOGEN UR STRIP-ACNC: NEGATIVE EU/DL
UROBILINOGEN UR STRIP-ACNC: NEGATIVE EU/DL
UUN UR-MCNC: 154 MG/DL (ref 140–1050)
UUN UR-MCNC: 264 MG/DL (ref 140–1050)
VANCOMYCIN SERPL-MCNC: 31.5 UG/ML
VANCOMYCIN SERPL-MCNC: 33.7 UG/ML
VANCOMYCIN TROUGH SERPL-MCNC: 23.9 UG/ML (ref 10–22)
WBC # BLD AUTO: 10.35 K/UL
WBC # BLD AUTO: 10.81 K/UL
WBC # BLD AUTO: 11.69 K/UL (ref 3.9–12.7)
WBC # BLD AUTO: 11.95 K/UL
WBC # BLD AUTO: 12.16 K/UL
WBC # BLD AUTO: 12.65 K/UL
WBC # BLD AUTO: 13.65 K/UL
WBC # BLD AUTO: 13.84 K/UL
WBC # BLD AUTO: 14.8 K/UL
WBC # BLD AUTO: 15.04 K/UL
WBC # BLD AUTO: 15.08 K/UL
WBC # BLD AUTO: 16.04 K/UL
WBC # BLD AUTO: 16.43 K/UL
WBC # BLD AUTO: 16.56 K/UL
WBC # BLD AUTO: 16.64 K/UL
WBC # BLD AUTO: 17.22 K/UL
WBC # BLD AUTO: 17.85 K/UL
WBC # BLD AUTO: 18.39 K/UL
WBC # BLD AUTO: 20.67 K/UL
WBC # BLD AUTO: 21.82 K/UL
WBC # BLD AUTO: 4.85 K/UL (ref 3.9–12.7)
WBC # BLD AUTO: 6.58 K/UL (ref 3.9–12.7)
WBC # BLD AUTO: 6.65 K/UL (ref 3.9–12.7)
WBC # BLD AUTO: 6.92 K/UL
WBC # BLD AUTO: 7.01 K/UL (ref 3.9–12.7)
WBC # BLD AUTO: 7.14 K/UL
WBC # BLD AUTO: 7.44 K/UL
WBC # BLD AUTO: 8.07 K/UL (ref 3.9–12.7)
WBC # BLD AUTO: 8.65 K/UL (ref 3.9–12.7)
WBC # BLD AUTO: 9.97 K/UL (ref 3.9–12.7)
WBC #/AREA URNS HPF: 1 /HPF (ref 0–5)
WBC #/AREA URNS HPF: 10 /HPF (ref 0–5)
WBC #/AREA URNS HPF: 4 /HPF (ref 0–5)

## 2019-01-01 PROCEDURE — 63600175 PHARM REV CODE 636 W HCPCS: Performed by: STUDENT IN AN ORGANIZED HEALTH CARE EDUCATION/TRAINING PROGRAM

## 2019-01-01 PROCEDURE — 27000190 HC CPAP FULL FACE MASK W/VALVE

## 2019-01-01 PROCEDURE — 27000221 HC OXYGEN, UP TO 24 HOURS

## 2019-01-01 PROCEDURE — 11000001 HC ACUTE MED/SURG PRIVATE ROOM

## 2019-01-01 PROCEDURE — 27100171 HC OXYGEN HIGH FLOW UP TO 24 HOURS

## 2019-01-01 PROCEDURE — 94660 CPAP INITIATION&MGMT: CPT

## 2019-01-01 PROCEDURE — 93010 EKG 12-LEAD: ICD-10-PCS | Mod: ,,, | Performed by: INTERNAL MEDICINE

## 2019-01-01 PROCEDURE — 25000003 PHARM REV CODE 250: Performed by: STUDENT IN AN ORGANIZED HEALTH CARE EDUCATION/TRAINING PROGRAM

## 2019-01-01 PROCEDURE — 99900035 HC TECH TIME PER 15 MIN (STAT)

## 2019-01-01 PROCEDURE — 94640 AIRWAY INHALATION TREATMENT: CPT

## 2019-01-01 PROCEDURE — 25000242 PHARM REV CODE 250 ALT 637 W/ HCPCS: Performed by: STUDENT IN AN ORGANIZED HEALTH CARE EDUCATION/TRAINING PROGRAM

## 2019-01-01 PROCEDURE — 94761 N-INVAS EAR/PLS OXIMETRY MLT: CPT

## 2019-01-01 PROCEDURE — 87077 CULTURE AEROBIC IDENTIFY: CPT

## 2019-01-01 PROCEDURE — 94644 CONT INHLJ TX 1ST HOUR: CPT

## 2019-01-01 PROCEDURE — 36415 COLL VENOUS BLD VENIPUNCTURE: CPT

## 2019-01-01 PROCEDURE — 93010 EKG 12-LEAD: ICD-10-PCS | Mod: ,,, | Performed by: STUDENT IN AN ORGANIZED HEALTH CARE EDUCATION/TRAINING PROGRAM

## 2019-01-01 PROCEDURE — 97802 MEDICAL NUTRITION INDIV IN: CPT | Performed by: DIETITIAN, REGISTERED

## 2019-01-01 PROCEDURE — 80202 ASSAY OF VANCOMYCIN: CPT

## 2019-01-01 PROCEDURE — 93010 ELECTROCARDIOGRAM REPORT: CPT | Mod: ,,, | Performed by: STUDENT IN AN ORGANIZED HEALTH CARE EDUCATION/TRAINING PROGRAM

## 2019-01-01 PROCEDURE — 63600175 PHARM REV CODE 636 W HCPCS: Performed by: INTERNAL MEDICINE

## 2019-01-01 PROCEDURE — 27100092 HC HIGH FLOW DELIVERY CANNULA

## 2019-01-01 PROCEDURE — 25000242 PHARM REV CODE 250 ALT 637 W/ HCPCS: Performed by: EMERGENCY MEDICINE

## 2019-01-01 PROCEDURE — 92610 EVALUATE SWALLOWING FUNCTION: CPT

## 2019-01-01 PROCEDURE — 99233 PR SUBSEQUENT HOSPITAL CARE,LEVL III: ICD-10-PCS | Mod: ,,, | Performed by: NURSE PRACTITIONER

## 2019-01-01 PROCEDURE — 85025 COMPLETE CBC W/AUTO DIFF WBC: CPT

## 2019-01-01 PROCEDURE — 84145 PROCALCITONIN (PCT): CPT

## 2019-01-01 PROCEDURE — 83735 ASSAY OF MAGNESIUM: CPT

## 2019-01-01 PROCEDURE — 84300 ASSAY OF URINE SODIUM: CPT

## 2019-01-01 PROCEDURE — 97530 THERAPEUTIC ACTIVITIES: CPT

## 2019-01-01 PROCEDURE — 93005 ELECTROCARDIOGRAM TRACING: CPT

## 2019-01-01 PROCEDURE — 25000003 PHARM REV CODE 250: Performed by: INTERNAL MEDICINE

## 2019-01-01 PROCEDURE — 84133 ASSAY OF URINE POTASSIUM: CPT

## 2019-01-01 PROCEDURE — S5571 INSULIN DISPOS PEN 3 ML: HCPCS | Performed by: STUDENT IN AN ORGANIZED HEALTH CARE EDUCATION/TRAINING PROGRAM

## 2019-01-01 PROCEDURE — 80053 COMPREHEN METABOLIC PANEL: CPT

## 2019-01-01 PROCEDURE — 51702 INSERT TEMP BLADDER CATH: CPT

## 2019-01-01 PROCEDURE — 84484 ASSAY OF TROPONIN QUANT: CPT | Mod: 91

## 2019-01-01 PROCEDURE — 94667 MNPJ CHEST WALL 1ST: CPT

## 2019-01-01 PROCEDURE — 25000003 PHARM REV CODE 250: Performed by: EMERGENCY MEDICINE

## 2019-01-01 PROCEDURE — 36600 WITHDRAWAL OF ARTERIAL BLOOD: CPT

## 2019-01-01 PROCEDURE — 93010 ELECTROCARDIOGRAM REPORT: CPT | Mod: ,,, | Performed by: INTERNAL MEDICINE

## 2019-01-01 PROCEDURE — 97803 MED NUTRITION INDIV SUBSEQ: CPT | Performed by: DIETITIAN, REGISTERED

## 2019-01-01 PROCEDURE — 63600175 PHARM REV CODE 636 W HCPCS

## 2019-01-01 PROCEDURE — 85027 COMPLETE CBC AUTOMATED: CPT

## 2019-01-01 PROCEDURE — 87086 URINE CULTURE/COLONY COUNT: CPT

## 2019-01-01 PROCEDURE — 84484 ASSAY OF TROPONIN QUANT: CPT

## 2019-01-01 PROCEDURE — 94664 DEMO&/EVAL PT USE INHALER: CPT

## 2019-01-01 PROCEDURE — 51702 PR INSERTION OF TEMPORARY INDWELLING BLADDER CATHETER, SIMPLE: ICD-10-PCS | Mod: ,,, | Performed by: UROLOGY

## 2019-01-01 PROCEDURE — 25000003 PHARM REV CODE 250: Performed by: NURSE PRACTITIONER

## 2019-01-01 PROCEDURE — 80048 BASIC METABOLIC PNL TOTAL CA: CPT | Mod: 91

## 2019-01-01 PROCEDURE — 20000000 HC ICU ROOM

## 2019-01-01 PROCEDURE — 63600175 PHARM REV CODE 636 W HCPCS: Performed by: EMERGENCY MEDICINE

## 2019-01-01 PROCEDURE — 97165 OT EVAL LOW COMPLEX 30 MIN: CPT

## 2019-01-01 PROCEDURE — 85007 BL SMEAR W/DIFF WBC COUNT: CPT

## 2019-01-01 PROCEDURE — 96372 THER/PROPH/DIAG INJ SC/IM: CPT | Mod: 59

## 2019-01-01 PROCEDURE — 84100 ASSAY OF PHOSPHORUS: CPT

## 2019-01-01 PROCEDURE — 83605 ASSAY OF LACTIC ACID: CPT | Mod: 91

## 2019-01-01 PROCEDURE — 27000646 HC AEROBIKA DEVICE

## 2019-01-01 PROCEDURE — 84443 ASSAY THYROID STIM HORMONE: CPT

## 2019-01-01 PROCEDURE — 25000003 PHARM REV CODE 250: Performed by: UROLOGY

## 2019-01-01 PROCEDURE — 90670 PCV13 VACCINE IM: CPT | Performed by: INTERNAL MEDICINE

## 2019-01-01 PROCEDURE — 93010 ELECTROCARDIOGRAM REPORT: CPT | Mod: 76,,, | Performed by: STUDENT IN AN ORGANIZED HEALTH CARE EDUCATION/TRAINING PROGRAM

## 2019-01-01 PROCEDURE — 80053 COMPREHEN METABOLIC PANEL: CPT | Mod: 91

## 2019-01-01 PROCEDURE — 82550 ASSAY OF CK (CPK): CPT

## 2019-01-01 PROCEDURE — 87205 SMEAR GRAM STAIN: CPT

## 2019-01-01 PROCEDURE — 25500020 PHARM REV CODE 255: Performed by: EMERGENCY MEDICINE

## 2019-01-01 PROCEDURE — 82436 ASSAY OF URINE CHLORIDE: CPT

## 2019-01-01 PROCEDURE — 63600175 PHARM REV CODE 636 W HCPCS: Performed by: NURSE PRACTITIONER

## 2019-01-01 PROCEDURE — 12000002 HC ACUTE/MED SURGE SEMI-PRIVATE ROOM

## 2019-01-01 PROCEDURE — 96365 THER/PROPH/DIAG IV INF INIT: CPT

## 2019-01-01 PROCEDURE — 99223 PR INITIAL HOSPITAL CARE,LEVL III: ICD-10-PCS | Mod: ,,, | Performed by: NURSE PRACTITIONER

## 2019-01-01 PROCEDURE — 81000 URINALYSIS NONAUTO W/SCOPE: CPT

## 2019-01-01 PROCEDURE — 87400 INFLUENZA A/B EACH AG IA: CPT | Mod: 59

## 2019-01-01 PROCEDURE — 84550 ASSAY OF BLOOD/URIC ACID: CPT

## 2019-01-01 PROCEDURE — 83036 HEMOGLOBIN GLYCOSYLATED A1C: CPT

## 2019-01-01 PROCEDURE — 85025 COMPLETE CBC W/AUTO DIFF WBC: CPT | Mod: 91

## 2019-01-01 PROCEDURE — 92526 ORAL FUNCTION THERAPY: CPT

## 2019-01-01 PROCEDURE — 96375 TX/PRO/DX INJ NEW DRUG ADDON: CPT

## 2019-01-01 PROCEDURE — G0009 ADMIN PNEUMOCOCCAL VACCINE: HCPCS | Performed by: INTERNAL MEDICINE

## 2019-01-01 PROCEDURE — 87040 BLOOD CULTURE FOR BACTERIA: CPT

## 2019-01-01 PROCEDURE — 82803 BLOOD GASES ANY COMBINATION: CPT

## 2019-01-01 PROCEDURE — 87502 INFLUENZA DNA AMP PROBE: CPT

## 2019-01-01 PROCEDURE — 80074 ACUTE HEPATITIS PANEL: CPT

## 2019-01-01 PROCEDURE — 97161 PT EVAL LOW COMPLEX 20 MIN: CPT

## 2019-01-01 PROCEDURE — 84540 ASSAY OF URINE/UREA-N: CPT

## 2019-01-01 PROCEDURE — 99223 1ST HOSP IP/OBS HIGH 75: CPT | Mod: 25,,, | Performed by: UROLOGY

## 2019-01-01 PROCEDURE — 83605 ASSAY OF LACTIC ACID: CPT

## 2019-01-01 PROCEDURE — 83880 ASSAY OF NATRIURETIC PEPTIDE: CPT

## 2019-01-01 PROCEDURE — 87040 BLOOD CULTURE FOR BACTERIA: CPT | Mod: 59

## 2019-01-01 PROCEDURE — 96361 HYDRATE IV INFUSION ADD-ON: CPT

## 2019-01-01 PROCEDURE — 85347 COAGULATION TIME ACTIVATED: CPT

## 2019-01-01 PROCEDURE — 80048 BASIC METABOLIC PNL TOTAL CA: CPT

## 2019-01-01 PROCEDURE — 87070 CULTURE OTHR SPECIMN AEROBIC: CPT

## 2019-01-01 PROCEDURE — 82570 ASSAY OF URINE CREATININE: CPT

## 2019-01-01 PROCEDURE — 80329 ANALGESICS NON-OPIOID 1 OR 2: CPT

## 2019-01-01 PROCEDURE — 99223 PR INITIAL HOSPITAL CARE,LEVL III: ICD-10-PCS | Mod: 25,,, | Performed by: UROLOGY

## 2019-01-01 PROCEDURE — 82272 OCCULT BLD FECES 1-3 TESTS: CPT

## 2019-01-01 PROCEDURE — 99285 EMERGENCY DEPT VISIT HI MDM: CPT | Mod: 25

## 2019-01-01 PROCEDURE — 99900037 HC PT THERAPY SCREENING (STAT)

## 2019-01-01 PROCEDURE — 51702 INSERT TEMP BLADDER CATH: CPT | Mod: ,,, | Performed by: UROLOGY

## 2019-01-01 PROCEDURE — 99291 CRITICAL CARE FIRST HOUR: CPT | Mod: 25

## 2019-01-01 PROCEDURE — 99233 SBSQ HOSP IP/OBS HIGH 50: CPT | Mod: ,,, | Performed by: NURSE PRACTITIONER

## 2019-01-01 PROCEDURE — 86580 TB INTRADERMAL TEST: CPT | Performed by: STUDENT IN AN ORGANIZED HEALTH CARE EDUCATION/TRAINING PROGRAM

## 2019-01-01 PROCEDURE — 87186 SC STD MICRODIL/AGAR DIL: CPT

## 2019-01-01 PROCEDURE — 85610 PROTHROMBIN TIME: CPT

## 2019-01-01 PROCEDURE — 97167 OT EVAL HIGH COMPLEX 60 MIN: CPT

## 2019-01-01 PROCEDURE — 99223 1ST HOSP IP/OBS HIGH 75: CPT | Mod: ,,, | Performed by: NURSE PRACTITIONER

## 2019-01-01 PROCEDURE — 85730 THROMBOPLASTIN TIME PARTIAL: CPT

## 2019-01-01 PROCEDURE — 93010 EKG 12-LEAD: ICD-10-PCS | Mod: 76,,, | Performed by: STUDENT IN AN ORGANIZED HEALTH CARE EDUCATION/TRAINING PROGRAM

## 2019-01-01 PROCEDURE — 87088 URINE BACTERIA CULTURE: CPT

## 2019-01-01 PROCEDURE — 97110 THERAPEUTIC EXERCISES: CPT

## 2019-01-01 PROCEDURE — 94760 N-INVAS EAR/PLS OXIMETRY 1: CPT

## 2019-01-01 PROCEDURE — 90471 IMMUNIZATION ADMIN: CPT | Performed by: INTERNAL MEDICINE

## 2019-01-01 PROCEDURE — 87186 SC STD MICRODIL/AGAR DIL: CPT | Mod: 59

## 2019-01-01 RX ORDER — FUROSEMIDE 10 MG/ML
40 INJECTION INTRAMUSCULAR; INTRAVENOUS ONCE
Status: COMPLETED | OUTPATIENT
Start: 2019-01-01 | End: 2019-01-01

## 2019-01-01 RX ORDER — IBUPROFEN 200 MG
24 TABLET ORAL
Status: DISCONTINUED | OUTPATIENT
Start: 2019-01-01 | End: 2019-01-01 | Stop reason: HOSPADM

## 2019-01-01 RX ORDER — GLUCAGON 1 MG
1 KIT INJECTION
Status: CANCELLED | OUTPATIENT
Start: 2019-01-01

## 2019-01-01 RX ORDER — CEFEPIME HYDROCHLORIDE 2 G/50ML
2 INJECTION, SOLUTION INTRAVENOUS
Status: DISCONTINUED | OUTPATIENT
Start: 2019-01-01 | End: 2019-01-01

## 2019-01-01 RX ORDER — FUROSEMIDE 10 MG/ML
40 INJECTION INTRAMUSCULAR; INTRAVENOUS 2 TIMES DAILY
Status: DISCONTINUED | OUTPATIENT
Start: 2019-01-01 | End: 2019-01-01

## 2019-01-01 RX ORDER — FUROSEMIDE 10 MG/ML
80 INJECTION INTRAMUSCULAR; INTRAVENOUS ONCE
Status: COMPLETED | OUTPATIENT
Start: 2019-01-01 | End: 2019-01-01

## 2019-01-01 RX ORDER — DOCUSATE SODIUM 100 MG/1
100 CAPSULE, LIQUID FILLED ORAL DAILY
Status: DISCONTINUED | OUTPATIENT
Start: 2019-01-01 | End: 2019-01-01 | Stop reason: HOSPADM

## 2019-01-01 RX ORDER — FUROSEMIDE 10 MG/ML
INJECTION INTRAMUSCULAR; INTRAVENOUS
Status: COMPLETED
Start: 2019-01-01 | End: 2019-01-01

## 2019-01-01 RX ORDER — DICYCLOMINE HYDROCHLORIDE 10 MG/1
10 CAPSULE ORAL 4 TIMES DAILY
Status: DISCONTINUED | OUTPATIENT
Start: 2019-01-01 | End: 2019-01-01 | Stop reason: HOSPADM

## 2019-01-01 RX ORDER — ASCORBIC ACID 500 MG
500 TABLET ORAL 2 TIMES DAILY
Status: DISCONTINUED | OUTPATIENT
Start: 2019-01-01 | End: 2019-01-01 | Stop reason: HOSPADM

## 2019-01-01 RX ORDER — SENNOSIDES 8.6 MG/1
1 TABLET ORAL DAILY PRN
Status: ON HOLD
Start: 2019-01-01 | End: 2019-01-01 | Stop reason: HOSPADM

## 2019-01-01 RX ORDER — IPRATROPIUM BROMIDE AND ALBUTEROL SULFATE 2.5; .5 MG/3ML; MG/3ML
3 SOLUTION RESPIRATORY (INHALATION)
Status: DISCONTINUED | OUTPATIENT
Start: 2019-01-01 | End: 2019-01-01

## 2019-01-01 RX ORDER — FUROSEMIDE 10 MG/ML
160 INJECTION INTRAMUSCULAR; INTRAVENOUS 2 TIMES DAILY
Status: DISCONTINUED | OUTPATIENT
Start: 2019-01-01 | End: 2019-01-01

## 2019-01-01 RX ORDER — POTASSIUM CHLORIDE 20 MEQ/1
20 TABLET, EXTENDED RELEASE ORAL
Status: COMPLETED | OUTPATIENT
Start: 2019-01-01 | End: 2019-01-01

## 2019-01-01 RX ORDER — RAMELTEON 8 MG/1
8 TABLET ORAL ONCE
Status: COMPLETED | OUTPATIENT
Start: 2019-01-01 | End: 2019-01-01

## 2019-01-01 RX ORDER — HYDROCODONE BITARTRATE AND ACETAMINOPHEN 5; 325 MG/1; MG/1
1 TABLET ORAL EVERY 6 HOURS PRN
Status: DISCONTINUED | OUTPATIENT
Start: 2019-01-01 | End: 2019-01-01 | Stop reason: HOSPADM

## 2019-01-01 RX ORDER — METOLAZONE 2.5 MG/1
10 TABLET ORAL DAILY
Status: DISCONTINUED | OUTPATIENT
Start: 2019-01-01 | End: 2019-01-01

## 2019-01-01 RX ORDER — SODIUM CHLORIDE 0.9 % (FLUSH) 0.9 %
5 SYRINGE (ML) INJECTION
Status: DISCONTINUED | OUTPATIENT
Start: 2019-01-01 | End: 2019-01-01

## 2019-01-01 RX ORDER — ACETAMINOPHEN 650 MG/1
650 SUPPOSITORY RECTAL EVERY 6 HOURS PRN
Status: DISCONTINUED | OUTPATIENT
Start: 2019-01-01 | End: 2019-01-01 | Stop reason: HOSPADM

## 2019-01-01 RX ORDER — AMIODARONE HYDROCHLORIDE 200 MG/1
200 TABLET ORAL DAILY
Status: DISCONTINUED | OUTPATIENT
Start: 2019-01-01 | End: 2019-01-01 | Stop reason: HOSPADM

## 2019-01-01 RX ORDER — FUROSEMIDE 10 MG/ML
80 INJECTION INTRAMUSCULAR; INTRAVENOUS 2 TIMES DAILY
Status: DISCONTINUED | OUTPATIENT
Start: 2019-01-01 | End: 2019-01-01

## 2019-01-01 RX ORDER — ACETAMINOPHEN 500 MG
500 TABLET ORAL EVERY 6 HOURS PRN
Status: CANCELLED | OUTPATIENT
Start: 2019-01-01

## 2019-01-01 RX ORDER — FUROSEMIDE 40 MG/1
40 TABLET ORAL DAILY
Status: DISCONTINUED | OUTPATIENT
Start: 2019-01-01 | End: 2019-01-01

## 2019-01-01 RX ORDER — SENNOSIDES 8.6 MG/1
8.6 TABLET ORAL DAILY PRN
Status: DISCONTINUED | OUTPATIENT
Start: 2019-01-01 | End: 2019-01-01 | Stop reason: HOSPADM

## 2019-01-01 RX ORDER — HEPARIN SODIUM 5000 [USP'U]/ML
5000 INJECTION, SOLUTION INTRAVENOUS; SUBCUTANEOUS EVERY 8 HOURS
Status: DISCONTINUED | OUTPATIENT
Start: 2019-01-01 | End: 2019-01-01 | Stop reason: HOSPADM

## 2019-01-01 RX ORDER — METOLAZONE 2.5 MG/1
5 TABLET ORAL ONCE
Status: COMPLETED | OUTPATIENT
Start: 2019-01-01 | End: 2019-01-01

## 2019-01-01 RX ORDER — SCOLOPAMINE TRANSDERMAL SYSTEM 1 MG/1
1 PATCH, EXTENDED RELEASE TRANSDERMAL
Status: DISCONTINUED | OUTPATIENT
Start: 2019-01-01 | End: 2019-01-01 | Stop reason: HOSPADM

## 2019-01-01 RX ORDER — IPRATROPIUM BROMIDE AND ALBUTEROL SULFATE 2.5; .5 MG/3ML; MG/3ML
3 SOLUTION RESPIRATORY (INHALATION) EVERY 6 HOURS
Status: DISCONTINUED | OUTPATIENT
Start: 2019-01-01 | End: 2019-01-01 | Stop reason: HOSPADM

## 2019-01-01 RX ORDER — SODIUM CHLORIDE 9 MG/ML
INJECTION, SOLUTION INTRAVENOUS CONTINUOUS
Status: ACTIVE | OUTPATIENT
Start: 2019-01-01 | End: 2019-01-01

## 2019-01-01 RX ORDER — LEVOFLOXACIN 5 MG/ML
500 INJECTION, SOLUTION INTRAVENOUS
Status: DISCONTINUED | OUTPATIENT
Start: 2019-01-01 | End: 2019-01-01 | Stop reason: HOSPADM

## 2019-01-01 RX ORDER — GLUCAGON 1 MG
1 KIT INJECTION
Status: DISCONTINUED | OUTPATIENT
Start: 2019-01-01 | End: 2019-01-01

## 2019-01-01 RX ORDER — CARVEDILOL 25 MG/1
25 TABLET ORAL 2 TIMES DAILY
Status: DISCONTINUED | OUTPATIENT
Start: 2019-01-01 | End: 2019-01-01 | Stop reason: HOSPADM

## 2019-01-01 RX ORDER — DEXTROSE 50 % IN WATER (D50W) INTRAVENOUS SYRINGE
25 ONCE
Status: COMPLETED | OUTPATIENT
Start: 2019-01-01 | End: 2019-01-01

## 2019-01-01 RX ORDER — OSELTAMIVIR PHOSPHATE 75 MG/1
75 CAPSULE ORAL 2 TIMES DAILY
Status: DISCONTINUED | OUTPATIENT
Start: 2019-01-01 | End: 2019-01-01

## 2019-01-01 RX ORDER — IPRATROPIUM BROMIDE AND ALBUTEROL SULFATE 2.5; .5 MG/3ML; MG/3ML
3 SOLUTION RESPIRATORY (INHALATION)
Status: COMPLETED | OUTPATIENT
Start: 2019-01-01 | End: 2019-01-01

## 2019-01-01 RX ORDER — BENZONATATE 100 MG/1
100 CAPSULE ORAL 3 TIMES DAILY PRN
Status: DISCONTINUED | OUTPATIENT
Start: 2019-01-01 | End: 2019-01-01 | Stop reason: HOSPADM

## 2019-01-01 RX ORDER — SODIUM CHLORIDE 0.9 % (FLUSH) 0.9 %
3 SYRINGE (ML) INJECTION
Status: DISCONTINUED | OUTPATIENT
Start: 2019-01-01 | End: 2019-01-01 | Stop reason: HOSPADM

## 2019-01-01 RX ORDER — INSULIN ASPART 100 [IU]/ML
0-5 INJECTION, SOLUTION INTRAVENOUS; SUBCUTANEOUS
Status: CANCELLED | OUTPATIENT
Start: 2019-01-01

## 2019-01-01 RX ORDER — RAMELTEON 8 MG/1
8 TABLET ORAL NIGHTLY PRN
Status: DISCONTINUED | OUTPATIENT
Start: 2019-01-01 | End: 2019-01-01 | Stop reason: HOSPADM

## 2019-01-01 RX ORDER — FUROSEMIDE 40 MG/1
80 TABLET ORAL DAILY
Status: DISCONTINUED | OUTPATIENT
Start: 2019-01-01 | End: 2019-01-01

## 2019-01-01 RX ORDER — POTASSIUM CHLORIDE 7.45 MG/ML
10 INJECTION INTRAVENOUS
Status: COMPLETED | OUTPATIENT
Start: 2019-01-01 | End: 2019-01-01

## 2019-01-01 RX ORDER — METOLAZONE 5 MG/1
5 TABLET ORAL ONCE
Status: COMPLETED | OUTPATIENT
Start: 2019-01-01 | End: 2019-01-01

## 2019-01-01 RX ORDER — SODIUM CHLORIDE 9 MG/ML
INJECTION, SOLUTION INTRAVENOUS CONTINUOUS
Status: DISCONTINUED | OUTPATIENT
Start: 2019-01-01 | End: 2019-01-01

## 2019-01-01 RX ORDER — HEPARIN SODIUM 5000 [USP'U]/ML
5000 INJECTION, SOLUTION INTRAVENOUS; SUBCUTANEOUS EVERY 12 HOURS
Status: DISCONTINUED | OUTPATIENT
Start: 2019-01-01 | End: 2019-01-01 | Stop reason: HOSPADM

## 2019-01-01 RX ORDER — IBUPROFEN 200 MG
16 TABLET ORAL
Status: CANCELLED | OUTPATIENT
Start: 2019-01-01

## 2019-01-01 RX ORDER — OSELTAMIVIR PHOSPHATE 30 MG/1
30 CAPSULE ORAL DAILY
Status: COMPLETED | OUTPATIENT
Start: 2019-01-01 | End: 2019-01-01

## 2019-01-01 RX ORDER — POLYETHYLENE GLYCOL 3350 17 G/17G
17 POWDER, FOR SOLUTION ORAL DAILY
Status: DISCONTINUED | OUTPATIENT
Start: 2019-01-01 | End: 2019-01-01 | Stop reason: HOSPADM

## 2019-01-01 RX ORDER — DEXTROSE 50 % IN WATER (D50W) INTRAVENOUS SYRINGE
25
Status: DISCONTINUED | OUTPATIENT
Start: 2019-01-01 | End: 2019-01-01

## 2019-01-01 RX ORDER — ACETAMINOPHEN 500 MG
500 TABLET ORAL EVERY 6 HOURS PRN
Status: DISCONTINUED | OUTPATIENT
Start: 2019-01-01 | End: 2019-01-01 | Stop reason: HOSPADM

## 2019-01-01 RX ORDER — IBUPROFEN 200 MG
16 TABLET ORAL
Status: DISCONTINUED | OUTPATIENT
Start: 2019-01-01 | End: 2019-01-01 | Stop reason: HOSPADM

## 2019-01-01 RX ORDER — AMIODARONE HYDROCHLORIDE 200 MG/1
200 TABLET ORAL DAILY
Status: CANCELLED | OUTPATIENT
Start: 2019-01-01

## 2019-01-01 RX ORDER — PREDNISONE 20 MG/1
40 TABLET ORAL DAILY
Qty: 2 TABLET | Refills: 0 | Status: SHIPPED | OUTPATIENT
Start: 2019-01-01 | End: 2019-01-01 | Stop reason: HOSPADM

## 2019-01-01 RX ORDER — SODIUM CHLORIDE 0.9 % (FLUSH) 0.9 %
5 SYRINGE (ML) INJECTION
Status: CANCELLED | OUTPATIENT
Start: 2019-01-01

## 2019-01-01 RX ORDER — ACETAMINOPHEN 325 MG/1
650 TABLET ORAL ONCE AS NEEDED
Status: COMPLETED | OUTPATIENT
Start: 2019-01-01 | End: 2019-01-01

## 2019-01-01 RX ORDER — INSULIN ASPART 100 [IU]/ML
1-10 INJECTION, SOLUTION INTRAVENOUS; SUBCUTANEOUS
Status: DISCONTINUED | OUTPATIENT
Start: 2019-01-01 | End: 2019-01-01 | Stop reason: HOSPADM

## 2019-01-01 RX ORDER — ALBUTEROL SULFATE 2.5 MG/.5ML
10 SOLUTION RESPIRATORY (INHALATION) ONCE
Status: COMPLETED | OUTPATIENT
Start: 2019-01-01 | End: 2019-01-01

## 2019-01-01 RX ORDER — FUROSEMIDE 40 MG/1
80 TABLET ORAL 2 TIMES DAILY
Status: DISCONTINUED | OUTPATIENT
Start: 2019-01-01 | End: 2019-01-01

## 2019-01-01 RX ORDER — HEPARIN SODIUM 5000 [USP'U]/ML
5000 INJECTION, SOLUTION INTRAVENOUS; SUBCUTANEOUS EVERY 12 HOURS
Status: DISCONTINUED | OUTPATIENT
Start: 2019-01-01 | End: 2019-01-01

## 2019-01-01 RX ORDER — ATORVASTATIN CALCIUM 40 MG/1
40 TABLET, FILM COATED ORAL DAILY
Status: DISCONTINUED | OUTPATIENT
Start: 2019-01-01 | End: 2019-01-01

## 2019-01-01 RX ORDER — AMIODARONE HYDROCHLORIDE 200 MG/1
200 TABLET ORAL DAILY
Status: DISCONTINUED | OUTPATIENT
Start: 2019-01-01 | End: 2019-01-01

## 2019-01-01 RX ORDER — ASPIRIN 81 MG/1
81 TABLET ORAL DAILY
Status: DISCONTINUED | OUTPATIENT
Start: 2019-01-01 | End: 2019-01-01

## 2019-01-01 RX ORDER — LEVOFLOXACIN 5 MG/ML
750 INJECTION, SOLUTION INTRAVENOUS
Status: COMPLETED | OUTPATIENT
Start: 2019-01-01 | End: 2019-01-01

## 2019-01-01 RX ORDER — FUROSEMIDE 10 MG/ML
80 INJECTION INTRAMUSCULAR; INTRAVENOUS 2 TIMES DAILY
Qty: 100 ML | Refills: 0 | Status: ON HOLD
Start: 2019-01-01 | End: 2019-01-01 | Stop reason: HOSPADM

## 2019-01-01 RX ORDER — FUROSEMIDE 10 MG/ML
80 INJECTION INTRAMUSCULAR; INTRAVENOUS 2 TIMES DAILY
Status: DISCONTINUED | OUTPATIENT
Start: 2019-01-01 | End: 2019-01-01 | Stop reason: HOSPADM

## 2019-01-01 RX ORDER — SODIUM POLYSTYRENE SULFONATE 15 G/60ML
15 SUSPENSION ORAL; RECTAL ONCE
Status: DISCONTINUED | OUTPATIENT
Start: 2019-01-01 | End: 2019-01-01

## 2019-01-01 RX ORDER — ACETAMINOPHEN 325 MG/1
325 TABLET ORAL EVERY 6 HOURS PRN
Status: DISCONTINUED | OUTPATIENT
Start: 2019-01-01 | End: 2019-01-01

## 2019-01-01 RX ORDER — SIMETHICONE 80 MG
80 TABLET,CHEWABLE ORAL EVERY 6 HOURS PRN
Status: DISCONTINUED | OUTPATIENT
Start: 2019-01-01 | End: 2019-01-01 | Stop reason: HOSPADM

## 2019-01-01 RX ORDER — HEPARIN SODIUM 5000 [USP'U]/ML
5000 INJECTION, SOLUTION INTRAVENOUS; SUBCUTANEOUS EVERY 8 HOURS
Status: DISCONTINUED | OUTPATIENT
Start: 2019-01-01 | End: 2019-01-01

## 2019-01-01 RX ORDER — TAMSULOSIN HYDROCHLORIDE 0.4 MG/1
0.4 CAPSULE ORAL DAILY
Status: DISCONTINUED | OUTPATIENT
Start: 2019-01-01 | End: 2019-01-01 | Stop reason: HOSPADM

## 2019-01-01 RX ORDER — ALBUTEROL SULFATE 2.5 MG/.5ML
10 SOLUTION RESPIRATORY (INHALATION) ONCE
Status: DISCONTINUED | OUTPATIENT
Start: 2019-01-01 | End: 2019-01-01

## 2019-01-01 RX ORDER — METHYLPREDNISOLONE SOD SUCC 125 MG
125 VIAL (EA) INJECTION
Status: COMPLETED | OUTPATIENT
Start: 2019-01-01 | End: 2019-01-01

## 2019-01-01 RX ORDER — SODIUM CHLORIDE, SODIUM LACTATE, POTASSIUM CHLORIDE, CALCIUM CHLORIDE 600; 310; 30; 20 MG/100ML; MG/100ML; MG/100ML; MG/100ML
INJECTION, SOLUTION INTRAVENOUS CONTINUOUS
Status: DISCONTINUED | OUTPATIENT
Start: 2019-01-01 | End: 2019-01-01

## 2019-01-01 RX ORDER — SODIUM CHLORIDE 0.9 % (FLUSH) 0.9 %
5 SYRINGE (ML) INJECTION
Status: DISCONTINUED | OUTPATIENT
Start: 2019-01-01 | End: 2019-01-01 | Stop reason: HOSPADM

## 2019-01-01 RX ORDER — IBUPROFEN 200 MG
24 TABLET ORAL
Status: CANCELLED | OUTPATIENT
Start: 2019-01-01

## 2019-01-01 RX ORDER — QUETIAPINE FUMARATE 25 MG/1
25 TABLET, FILM COATED ORAL ONCE
Status: COMPLETED | OUTPATIENT
Start: 2019-01-01 | End: 2019-01-01

## 2019-01-01 RX ORDER — AMLODIPINE BESYLATE 5 MG/1
10 TABLET ORAL DAILY
Status: DISCONTINUED | OUTPATIENT
Start: 2019-01-01 | End: 2019-01-01 | Stop reason: HOSPADM

## 2019-01-01 RX ORDER — VANCOMYCIN HCL IN 5 % DEXTROSE 1G/250ML
1000 PLASTIC BAG, INJECTION (ML) INTRAVENOUS
Status: DISCONTINUED | OUTPATIENT
Start: 2019-01-01 | End: 2019-01-01

## 2019-01-01 RX ORDER — DEXTROSE 50 % IN WATER (D50W) INTRAVENOUS SYRINGE
25
Status: CANCELLED | OUTPATIENT
Start: 2019-01-01

## 2019-01-01 RX ORDER — MORPHINE SULFATE 2 MG/ML
2 INJECTION, SOLUTION INTRAMUSCULAR; INTRAVENOUS ONCE
Status: DISCONTINUED | OUTPATIENT
Start: 2019-01-01 | End: 2019-01-01 | Stop reason: HOSPADM

## 2019-01-01 RX ORDER — MORPHINE SULFATE 2 MG/ML
2 INJECTION, SOLUTION INTRAMUSCULAR; INTRAVENOUS ONCE
Status: COMPLETED | OUTPATIENT
Start: 2019-01-01 | End: 2019-01-01

## 2019-01-01 RX ORDER — GLUCAGON 1 MG
1 KIT INJECTION
Status: DISCONTINUED | OUTPATIENT
Start: 2019-01-01 | End: 2019-01-01 | Stop reason: HOSPADM

## 2019-01-01 RX ORDER — BISACODYL 10 MG
10 SUPPOSITORY, RECTAL RECTAL ONCE
Status: COMPLETED | OUTPATIENT
Start: 2019-01-01 | End: 2019-01-01

## 2019-01-01 RX ORDER — PREDNISONE 20 MG/1
40 TABLET ORAL DAILY
Status: DISCONTINUED | OUTPATIENT
Start: 2019-01-01 | End: 2019-01-01 | Stop reason: HOSPADM

## 2019-01-01 RX ORDER — SODIUM BICARBONATE 650 MG/1
650 TABLET ORAL 2 TIMES DAILY
Status: DISCONTINUED | OUTPATIENT
Start: 2019-01-01 | End: 2019-01-01

## 2019-01-01 RX ORDER — ONDANSETRON 2 MG/ML
4 INJECTION INTRAMUSCULAR; INTRAVENOUS EVERY 6 HOURS PRN
Status: DISCONTINUED | OUTPATIENT
Start: 2019-01-01 | End: 2019-01-01 | Stop reason: HOSPADM

## 2019-01-01 RX ORDER — HEPARIN SODIUM 5000 [USP'U]/ML
5000 INJECTION, SOLUTION INTRAVENOUS; SUBCUTANEOUS EVERY 12 HOURS
Status: CANCELLED | OUTPATIENT
Start: 2019-01-01

## 2019-01-01 RX ORDER — IBUPROFEN 200 MG
24 TABLET ORAL
Status: DISCONTINUED | OUTPATIENT
Start: 2019-01-01 | End: 2019-01-01

## 2019-01-01 RX ORDER — DEXTROSE 50 % IN WATER (D50W) INTRAVENOUS SYRINGE
12.5
Status: DISCONTINUED | OUTPATIENT
Start: 2019-01-01 | End: 2019-01-01

## 2019-01-01 RX ORDER — MEROPENEM AND SODIUM CHLORIDE 500 MG/50ML
500 INJECTION, SOLUTION INTRAVENOUS
Status: DISCONTINUED | OUTPATIENT
Start: 2019-01-01 | End: 2019-01-01

## 2019-01-01 RX ORDER — IPRATROPIUM BROMIDE AND ALBUTEROL SULFATE 2.5; .5 MG/3ML; MG/3ML
3 SOLUTION RESPIRATORY (INHALATION) EVERY 6 HOURS
Status: CANCELLED | OUTPATIENT
Start: 2019-01-01

## 2019-01-01 RX ORDER — METOLAZONE 5 MG/1
10 TABLET ORAL ONCE
Status: COMPLETED | OUTPATIENT
Start: 2019-01-01 | End: 2019-01-01

## 2019-01-01 RX ORDER — ONDANSETRON 4 MG/1
4 TABLET, ORALLY DISINTEGRATING ORAL ONCE
Status: COMPLETED | OUTPATIENT
Start: 2019-01-01 | End: 2019-01-01

## 2019-01-01 RX ORDER — LEVOFLOXACIN 250 MG/1
250 TABLET ORAL DAILY
Qty: 11 TABLET | Refills: 0 | Status: SHIPPED | OUTPATIENT
Start: 2019-01-01 | End: 2019-01-01

## 2019-01-01 RX ORDER — INSULIN ASPART 100 [IU]/ML
0-5 INJECTION, SOLUTION INTRAVENOUS; SUBCUTANEOUS
Status: DISCONTINUED | OUTPATIENT
Start: 2019-01-01 | End: 2019-01-01

## 2019-01-01 RX ORDER — VANCOMYCIN HCL IN 5 % DEXTROSE 1G/250ML
15 PLASTIC BAG, INJECTION (ML) INTRAVENOUS
Status: DISCONTINUED | OUTPATIENT
Start: 2019-01-01 | End: 2019-01-01

## 2019-01-01 RX ORDER — IPRATROPIUM BROMIDE AND ALBUTEROL SULFATE 2.5; .5 MG/3ML; MG/3ML
3 SOLUTION RESPIRATORY (INHALATION) EVERY 4 HOURS
Status: DISCONTINUED | OUTPATIENT
Start: 2019-01-01 | End: 2019-01-01 | Stop reason: HOSPADM

## 2019-01-01 RX ORDER — METOLAZONE 2.5 MG/1
10 TABLET ORAL ONCE
Status: COMPLETED | OUTPATIENT
Start: 2019-01-01 | End: 2019-01-01

## 2019-01-01 RX ORDER — PREDNISONE 20 MG/1
40 TABLET ORAL DAILY
Status: COMPLETED | OUTPATIENT
Start: 2019-01-01 | End: 2019-01-01

## 2019-01-01 RX ORDER — ALBUTEROL SULFATE 2.5 MG/.5ML
15 SOLUTION RESPIRATORY (INHALATION)
Status: COMPLETED | OUTPATIENT
Start: 2019-01-01 | End: 2019-01-01

## 2019-01-01 RX ORDER — TAMSULOSIN HYDROCHLORIDE 0.4 MG/1
0.4 CAPSULE ORAL DAILY
Qty: 30 CAPSULE | Refills: 11 | Status: ON HOLD
Start: 2019-01-01 | End: 2019-01-01 | Stop reason: HOSPADM

## 2019-01-01 RX ORDER — ATORVASTATIN CALCIUM 40 MG/1
40 TABLET, FILM COATED ORAL DAILY
Status: CANCELLED | OUTPATIENT
Start: 2019-01-01

## 2019-01-01 RX ORDER — GUAIFENESIN 100 MG/5ML
200 SOLUTION ORAL EVERY 4 HOURS PRN
Status: DISCONTINUED | OUTPATIENT
Start: 2019-01-01 | End: 2019-01-01 | Stop reason: HOSPADM

## 2019-01-01 RX ORDER — IPRATROPIUM BROMIDE AND ALBUTEROL SULFATE 2.5; .5 MG/3ML; MG/3ML
3 SOLUTION RESPIRATORY (INHALATION) EVERY 6 HOURS
Status: DISCONTINUED | OUTPATIENT
Start: 2019-01-01 | End: 2019-01-01

## 2019-01-01 RX ORDER — ATORVASTATIN CALCIUM 40 MG/1
40 TABLET, FILM COATED ORAL DAILY
Status: DISCONTINUED | OUTPATIENT
Start: 2019-01-01 | End: 2019-01-01 | Stop reason: HOSPADM

## 2019-01-01 RX ORDER — ASPIRIN 81 MG/1
81 TABLET ORAL DAILY
Status: DISCONTINUED | OUTPATIENT
Start: 2019-01-01 | End: 2019-01-01 | Stop reason: HOSPADM

## 2019-01-01 RX ORDER — ASPIRIN 81 MG/1
81 TABLET ORAL DAILY
Status: CANCELLED | OUTPATIENT
Start: 2019-01-01

## 2019-01-01 RX ORDER — ALBUTEROL SULFATE 2.5 MG/.5ML
10 SOLUTION RESPIRATORY (INHALATION)
Status: COMPLETED | OUTPATIENT
Start: 2019-01-01 | End: 2019-01-01

## 2019-01-01 RX ORDER — LACTULOSE 10 G/15ML
15 SOLUTION ORAL 2 TIMES DAILY
Status: COMPLETED | OUTPATIENT
Start: 2019-01-01 | End: 2019-01-01

## 2019-01-01 RX ORDER — IPRATROPIUM BROMIDE 0.5 MG/2.5ML
500 SOLUTION RESPIRATORY (INHALATION)
Status: COMPLETED | OUTPATIENT
Start: 2019-01-01 | End: 2019-01-01

## 2019-01-01 RX ORDER — ACETAMINOPHEN 325 MG/1
650 TABLET ORAL EVERY 6 HOURS PRN
Status: DISCONTINUED | OUTPATIENT
Start: 2019-01-01 | End: 2019-01-01 | Stop reason: HOSPADM

## 2019-01-01 RX ORDER — IBUPROFEN 200 MG
16 TABLET ORAL
Status: DISCONTINUED | OUTPATIENT
Start: 2019-01-01 | End: 2019-01-01

## 2019-01-01 RX ORDER — OSELTAMIVIR PHOSPHATE 30 MG/1
30 CAPSULE ORAL 2 TIMES DAILY
Status: DISCONTINUED | OUTPATIENT
Start: 2019-01-01 | End: 2019-01-01

## 2019-01-01 RX ORDER — SODIUM BICARBONATE 650 MG/1
650 TABLET ORAL 2 TIMES DAILY
Status: CANCELLED | OUTPATIENT
Start: 2019-01-01

## 2019-01-01 RX ORDER — FUROSEMIDE 10 MG/ML
120 INJECTION INTRAMUSCULAR; INTRAVENOUS ONCE
Status: COMPLETED | OUTPATIENT
Start: 2019-01-01 | End: 2019-01-01

## 2019-01-01 RX ORDER — FUROSEMIDE 10 MG/ML
80 INJECTION INTRAMUSCULAR; INTRAVENOUS DAILY
Status: DISCONTINUED | OUTPATIENT
Start: 2019-01-01 | End: 2019-01-01

## 2019-01-01 RX ORDER — DICYCLOMINE HYDROCHLORIDE 10 MG/1
10 CAPSULE ORAL 4 TIMES DAILY
Qty: 120 CAPSULE | Refills: 0
Start: 2019-01-01 | End: 2019-01-01

## 2019-01-01 RX ORDER — OSELTAMIVIR PHOSPHATE 30 MG/1
30 CAPSULE ORAL DAILY
Status: DISCONTINUED | OUTPATIENT
Start: 2019-01-01 | End: 2019-01-01

## 2019-01-01 RX ORDER — MAGNESIUM SULFATE HEPTAHYDRATE 40 MG/ML
2 INJECTION, SOLUTION INTRAVENOUS ONCE
Status: COMPLETED | OUTPATIENT
Start: 2019-01-01 | End: 2019-01-01

## 2019-01-01 RX ORDER — IPRATROPIUM BROMIDE AND ALBUTEROL SULFATE 2.5; .5 MG/3ML; MG/3ML
3 SOLUTION RESPIRATORY (INHALATION) ONCE
Status: COMPLETED | OUTPATIENT
Start: 2019-01-01 | End: 2019-01-01

## 2019-01-01 RX ORDER — FUROSEMIDE 10 MG/ML
80 INJECTION INTRAMUSCULAR; INTRAVENOUS 2 TIMES DAILY
Status: DISCONTINUED | OUTPATIENT
Start: 2019-01-01 | End: 2019-01-01 | Stop reason: SDUPTHER

## 2019-01-01 RX ORDER — SODIUM CHLORIDE 0.9 % (FLUSH) 0.9 %
3 SYRINGE (ML) INJECTION
Status: CANCELLED | OUTPATIENT
Start: 2019-01-01

## 2019-01-01 RX ORDER — ACETAMINOPHEN 500 MG
1000 TABLET ORAL ONCE
Status: COMPLETED | OUTPATIENT
Start: 2019-01-01 | End: 2019-01-01

## 2019-01-01 RX ORDER — DEXTROSE 50 % IN WATER (D50W) INTRAVENOUS SYRINGE
12.5
Status: CANCELLED | OUTPATIENT
Start: 2019-01-01

## 2019-01-01 RX ORDER — AMLODIPINE BESYLATE 5 MG/1
10 TABLET ORAL DAILY
Status: DISCONTINUED | OUTPATIENT
Start: 2019-01-01 | End: 2019-01-01

## 2019-01-01 RX ORDER — ACETAMINOPHEN 500 MG
500 TABLET ORAL EVERY 6 HOURS PRN
Status: DISCONTINUED | OUTPATIENT
Start: 2019-01-01 | End: 2019-01-01

## 2019-01-01 RX ADMIN — IPRATROPIUM BROMIDE AND ALBUTEROL SULFATE 3 ML: .5; 3 SOLUTION RESPIRATORY (INHALATION) at 12:03

## 2019-01-01 RX ADMIN — SENNOSIDES 8.6 MG: 8.6 TABLET, FILM COATED ORAL at 08:01

## 2019-01-01 RX ADMIN — INSULIN DETEMIR 10 UNITS: 100 INJECTION, SOLUTION SUBCUTANEOUS at 09:01

## 2019-01-01 RX ADMIN — HEPARIN SODIUM 5000 UNITS: 5000 INJECTION, SOLUTION INTRAVENOUS; SUBCUTANEOUS at 08:01

## 2019-01-01 RX ADMIN — INSULIN ASPART 6 UNITS: 100 INJECTION, SOLUTION INTRAVENOUS; SUBCUTANEOUS at 09:01

## 2019-01-01 RX ADMIN — CALCIUM GLUCONATE 1000 MG: 98 INJECTION, SOLUTION INTRAVENOUS at 02:01

## 2019-01-01 RX ADMIN — RAMELTEON 8 MG: 8 TABLET, FILM COATED ORAL at 08:01

## 2019-01-01 RX ADMIN — IPRATROPIUM BROMIDE AND ALBUTEROL SULFATE 3 ML: 2.5; .5 SOLUTION RESPIRATORY (INHALATION) at 04:01

## 2019-01-01 RX ADMIN — OXYCODONE HYDROCHLORIDE AND ACETAMINOPHEN 500 MG: 500 TABLET ORAL at 08:01

## 2019-01-01 RX ADMIN — IPRATROPIUM BROMIDE AND ALBUTEROL SULFATE 3 ML: 2.5; .5 SOLUTION RESPIRATORY (INHALATION) at 11:01

## 2019-01-01 RX ADMIN — INSULIN ASPART 10 UNITS: 100 INJECTION, SOLUTION INTRAVENOUS; SUBCUTANEOUS at 05:01

## 2019-01-01 RX ADMIN — ALBUTEROL SULFATE 15 MG: 2.5 SOLUTION RESPIRATORY (INHALATION) at 04:01

## 2019-01-01 RX ADMIN — POTASSIUM CHLORIDE 20 MEQ: 20 TABLET, EXTENDED RELEASE ORAL at 11:01

## 2019-01-01 RX ADMIN — PIPERACILLIN AND TAZOBACTAM 4.5 G: 4; .5 INJECTION, POWDER, LYOPHILIZED, FOR SOLUTION INTRAVENOUS; PARENTERAL at 08:01

## 2019-01-01 RX ADMIN — PIPERACILLIN SODIUM AND TAZOBACTAM SODIUM 4.5 G: 4; .5 INJECTION, POWDER, LYOPHILIZED, FOR SOLUTION INTRAVENOUS at 04:01

## 2019-01-01 RX ADMIN — BENZONATATE 100 MG: 100 CAPSULE ORAL at 08:01

## 2019-01-01 RX ADMIN — ATORVASTATIN CALCIUM 40 MG: 40 TABLET, FILM COATED ORAL at 08:01

## 2019-01-01 RX ADMIN — AZITHROMYCIN MONOHYDRATE 500 MG: 500 INJECTION, POWDER, LYOPHILIZED, FOR SOLUTION INTRAVENOUS at 01:01

## 2019-01-01 RX ADMIN — HYDROCODONE BITARTRATE AND ACETAMINOPHEN 1 TABLET: 5; 325 TABLET ORAL at 10:01

## 2019-01-01 RX ADMIN — IPRATROPIUM BROMIDE AND ALBUTEROL SULFATE 3 ML: .5; 3 SOLUTION RESPIRATORY (INHALATION) at 07:01

## 2019-01-01 RX ADMIN — HEPARIN SODIUM 5000 UNITS: 5000 INJECTION, SOLUTION INTRAVENOUS; SUBCUTANEOUS at 01:01

## 2019-01-01 RX ADMIN — ATORVASTATIN CALCIUM 40 MG: 40 TABLET, FILM COATED ORAL at 09:03

## 2019-01-01 RX ADMIN — ATORVASTATIN CALCIUM 40 MG: 40 TABLET, FILM COATED ORAL at 09:01

## 2019-01-01 RX ADMIN — INSULIN ASPART 8 UNITS: 100 INJECTION, SOLUTION INTRAVENOUS; SUBCUTANEOUS at 05:01

## 2019-01-01 RX ADMIN — ASPIRIN 81 MG: 81 TABLET, COATED ORAL at 10:03

## 2019-01-01 RX ADMIN — FUROSEMIDE 160 MG: 10 INJECTION, SOLUTION INTRAVENOUS at 10:03

## 2019-01-01 RX ADMIN — ACETAMINOPHEN 650 MG: 325 TABLET ORAL at 01:01

## 2019-01-01 RX ADMIN — POTASSIUM CHLORIDE 10 MEQ: 7.46 INJECTION, SOLUTION INTRAVENOUS at 03:01

## 2019-01-01 RX ADMIN — IPRATROPIUM BROMIDE AND ALBUTEROL SULFATE 3 ML: 2.5; .5 SOLUTION RESPIRATORY (INHALATION) at 08:02

## 2019-01-01 RX ADMIN — SODIUM CHLORIDE 1000 ML: 0.9 INJECTION, SOLUTION INTRAVENOUS at 05:01

## 2019-01-01 RX ADMIN — HYDROCODONE BITARTRATE AND ACETAMINOPHEN 1 TABLET: 5; 325 TABLET ORAL at 12:01

## 2019-01-01 RX ADMIN — HEPARIN SODIUM 5000 UNITS: 5000 INJECTION, SOLUTION INTRAVENOUS; SUBCUTANEOUS at 05:01

## 2019-01-01 RX ADMIN — ERTAPENEM SODIUM 0.5 G: 1 INJECTION, POWDER, LYOPHILIZED, FOR SOLUTION INTRAMUSCULAR; INTRAVENOUS at 03:03

## 2019-01-01 RX ADMIN — ASPIRIN 81 MG: 81 TABLET, COATED ORAL at 09:02

## 2019-01-01 RX ADMIN — IPRATROPIUM BROMIDE AND ALBUTEROL SULFATE 3 ML: 2.5; .5 SOLUTION RESPIRATORY (INHALATION) at 07:01

## 2019-01-01 RX ADMIN — PIPERACILLIN AND TAZOBACTAM 4.5 G: 4; .5 INJECTION, POWDER, LYOPHILIZED, FOR SOLUTION INTRAVENOUS; PARENTERAL at 01:01

## 2019-01-01 RX ADMIN — OXYCODONE HYDROCHLORIDE AND ACETAMINOPHEN 500 MG: 500 TABLET ORAL at 09:01

## 2019-01-01 RX ADMIN — DICYCLOMINE HYDROCHLORIDE 10 MG: 10 CAPSULE ORAL at 12:02

## 2019-01-01 RX ADMIN — SODIUM BICARBONATE 650 MG TABLET 650 MG: at 09:03

## 2019-01-01 RX ADMIN — HEPARIN SODIUM 5000 UNITS: 5000 INJECTION, SOLUTION INTRAVENOUS; SUBCUTANEOUS at 02:03

## 2019-01-01 RX ADMIN — POTASSIUM CHLORIDE 10 MEQ: 7.46 INJECTION, SOLUTION INTRAVENOUS at 12:01

## 2019-01-01 RX ADMIN — MEROPENEM 1 G: 1 INJECTION, POWDER, FOR SOLUTION INTRAVENOUS at 09:03

## 2019-01-01 RX ADMIN — POTASSIUM CHLORIDE 10 MEQ: 7.46 INJECTION, SOLUTION INTRAVENOUS at 05:01

## 2019-01-01 RX ADMIN — PIPERACILLIN SODIUM AND TAZOBACTAM SODIUM 4.5 G: 4; .5 INJECTION, POWDER, LYOPHILIZED, FOR SOLUTION INTRAVENOUS at 08:01

## 2019-01-01 RX ADMIN — IPRATROPIUM BROMIDE AND ALBUTEROL SULFATE 3 ML: 2.5; .5 SOLUTION RESPIRATORY (INHALATION) at 12:01

## 2019-01-01 RX ADMIN — HEPARIN SODIUM 5000 UNITS: 5000 INJECTION, SOLUTION INTRAVENOUS; SUBCUTANEOUS at 06:03

## 2019-01-01 RX ADMIN — DOCUSATE SODIUM 100 MG: 100 CAPSULE, LIQUID FILLED ORAL at 08:01

## 2019-01-01 RX ADMIN — POTASSIUM CHLORIDE 20 MEQ: 20 TABLET, EXTENDED RELEASE ORAL at 08:01

## 2019-01-01 RX ADMIN — ATORVASTATIN CALCIUM 40 MG: 40 TABLET, FILM COATED ORAL at 09:02

## 2019-01-01 RX ADMIN — IPRATROPIUM BROMIDE AND ALBUTEROL SULFATE 3 ML: .5; 3 SOLUTION RESPIRATORY (INHALATION) at 07:03

## 2019-01-01 RX ADMIN — IPRATROPIUM BROMIDE AND ALBUTEROL SULFATE 3 ML: .5; 3 SOLUTION RESPIRATORY (INHALATION) at 01:03

## 2019-01-01 RX ADMIN — DEXTROSE MONOHYDRATE 25 G: 25 INJECTION, SOLUTION INTRAVENOUS at 06:03

## 2019-01-01 RX ADMIN — CARVEDILOL 25 MG: 25 TABLET, FILM COATED ORAL at 08:01

## 2019-01-01 RX ADMIN — GUAIFENESIN 200 MG: 100 SOLUTION ORAL at 10:01

## 2019-01-01 RX ADMIN — INSULIN ASPART 2 UNITS: 100 INJECTION, SOLUTION INTRAVENOUS; SUBCUTANEOUS at 04:01

## 2019-01-01 RX ADMIN — FUROSEMIDE 80 MG: 10 INJECTION, SOLUTION INTRAMUSCULAR; INTRAVENOUS at 08:03

## 2019-01-01 RX ADMIN — INSULIN ASPART 4 UNITS: 100 INJECTION, SOLUTION INTRAVENOUS; SUBCUTANEOUS at 11:01

## 2019-01-01 RX ADMIN — HEPARIN SODIUM 5000 UNITS: 5000 INJECTION, SOLUTION INTRAVENOUS; SUBCUTANEOUS at 09:01

## 2019-01-01 RX ADMIN — FUROSEMIDE 80 MG: 10 INJECTION, SOLUTION INTRAMUSCULAR; INTRAVENOUS at 10:01

## 2019-01-01 RX ADMIN — SCOPALAMINE 1 PATCH: 1 PATCH, EXTENDED RELEASE TRANSDERMAL at 02:04

## 2019-01-01 RX ADMIN — MAGNESIUM SULFATE IN WATER 2 G: 40 INJECTION, SOLUTION INTRAVENOUS at 01:01

## 2019-01-01 RX ADMIN — FUROSEMIDE 40 MG: 10 INJECTION, SOLUTION INTRAMUSCULAR; INTRAVENOUS at 09:01

## 2019-01-01 RX ADMIN — ALLOPURINOL 50 MG: 100 TABLET ORAL at 09:03

## 2019-01-01 RX ADMIN — INSULIN ASPART 6 UNITS: 100 INJECTION, SOLUTION INTRAVENOUS; SUBCUTANEOUS at 08:01

## 2019-01-01 RX ADMIN — IPRATROPIUM BROMIDE AND ALBUTEROL SULFATE 3 ML: 2.5; .5 SOLUTION RESPIRATORY (INHALATION) at 07:02

## 2019-01-01 RX ADMIN — ERTAPENEM SODIUM 0.5 G: 1 INJECTION, POWDER, LYOPHILIZED, FOR SOLUTION INTRAMUSCULAR; INTRAVENOUS at 04:03

## 2019-01-01 RX ADMIN — PREDNISONE 40 MG: 20 TABLET ORAL at 08:01

## 2019-01-01 RX ADMIN — FUROSEMIDE 80 MG: 10 INJECTION, SOLUTION INTRAMUSCULAR; INTRAVENOUS at 09:01

## 2019-01-01 RX ADMIN — INSULIN ASPART 1 UNITS: 100 INJECTION, SOLUTION INTRAVENOUS; SUBCUTANEOUS at 10:01

## 2019-01-01 RX ADMIN — INSULIN ASPART 1 UNITS: 100 INJECTION, SOLUTION INTRAVENOUS; SUBCUTANEOUS at 11:03

## 2019-01-01 RX ADMIN — IPRATROPIUM BROMIDE AND ALBUTEROL SULFATE 3 ML: 2.5; .5 SOLUTION RESPIRATORY (INHALATION) at 08:01

## 2019-01-01 RX ADMIN — AMLODIPINE BESYLATE 10 MG: 5 TABLET ORAL at 08:01

## 2019-01-01 RX ADMIN — IPRATROPIUM BROMIDE AND ALBUTEROL SULFATE 3 ML: 2.5; .5 SOLUTION RESPIRATORY (INHALATION) at 03:01

## 2019-01-01 RX ADMIN — OSELTAMIVIR PHOSPHATE 30 MG: 30 CAPSULE ORAL at 09:01

## 2019-01-01 RX ADMIN — IPRATROPIUM BROMIDE 500 MCG: 0.5 SOLUTION RESPIRATORY (INHALATION) at 05:03

## 2019-01-01 RX ADMIN — IPRATROPIUM BROMIDE AND ALBUTEROL SULFATE 3 ML: .5; 3 SOLUTION RESPIRATORY (INHALATION) at 09:01

## 2019-01-01 RX ADMIN — Medication 500 MG: at 12:03

## 2019-01-01 RX ADMIN — RAMELTEON 8 MG: 8 TABLET, FILM COATED ORAL at 11:03

## 2019-01-01 RX ADMIN — ASPIRIN 81 MG: 81 TABLET, COATED ORAL at 09:01

## 2019-01-01 RX ADMIN — PIPERACILLIN SODIUM AND TAZOBACTAM SODIUM 4.5 G: 4; .5 INJECTION, POWDER, LYOPHILIZED, FOR SOLUTION INTRAVENOUS at 11:01

## 2019-01-01 RX ADMIN — PIPERACILLIN AND TAZOBACTAM 4.5 G: 4; .5 INJECTION, POWDER, LYOPHILIZED, FOR SOLUTION INTRAVENOUS; PARENTERAL at 02:01

## 2019-01-01 RX ADMIN — PIPERACILLIN AND TAZOBACTAM 4.5 G: 4; .5 INJECTION, POWDER, LYOPHILIZED, FOR SOLUTION INTRAVENOUS; PARENTERAL at 06:03

## 2019-01-01 RX ADMIN — ACETAMINOPHEN 500 MG: 500 TABLET, FILM COATED ORAL at 07:01

## 2019-01-01 RX ADMIN — PIPERACILLIN AND TAZOBACTAM 4.5 G: 4; .5 INJECTION, POWDER, LYOPHILIZED, FOR SOLUTION INTRAVENOUS; PARENTERAL at 07:01

## 2019-01-01 RX ADMIN — HEPARIN SODIUM 5000 UNITS: 5000 INJECTION, SOLUTION INTRAVENOUS; SUBCUTANEOUS at 09:02

## 2019-01-01 RX ADMIN — FUROSEMIDE 160 MG: 10 INJECTION, SOLUTION INTRAVENOUS at 06:04

## 2019-01-01 RX ADMIN — ATORVASTATIN CALCIUM 40 MG: 40 TABLET, FILM COATED ORAL at 08:04

## 2019-01-01 RX ADMIN — POTASSIUM CHLORIDE 20 MEQ: 20 TABLET, EXTENDED RELEASE ORAL at 02:01

## 2019-01-01 RX ADMIN — SIMETHICONE CHEW TAB 80 MG 80 MG: 80 TABLET ORAL at 09:01

## 2019-01-01 RX ADMIN — BISACODYL 10 MG: 10 SUPPOSITORY RECTAL at 08:01

## 2019-01-01 RX ADMIN — POLYETHYLENE GLYCOL 3350 17 G: 17 POWDER, FOR SOLUTION ORAL at 09:01

## 2019-01-01 RX ADMIN — FUROSEMIDE 80 MG: 10 INJECTION, SOLUTION INTRAVENOUS at 09:02

## 2019-01-01 RX ADMIN — TAMSULOSIN HYDROCHLORIDE 0.4 MG: 0.4 CAPSULE ORAL at 09:01

## 2019-01-01 RX ADMIN — ASPIRIN 81 MG: 81 TABLET, COATED ORAL at 08:01

## 2019-01-01 RX ADMIN — INSULIN HUMAN 10 UNITS: 100 INJECTION, SOLUTION PARENTERAL at 02:01

## 2019-01-01 RX ADMIN — ATORVASTATIN CALCIUM 40 MG: 40 TABLET, FILM COATED ORAL at 08:03

## 2019-01-01 RX ADMIN — ATORVASTATIN CALCIUM 40 MG: 40 TABLET, FILM COATED ORAL at 10:01

## 2019-01-01 RX ADMIN — AZITHROMYCIN MONOHYDRATE 500 MG: 500 INJECTION, POWDER, LYOPHILIZED, FOR SOLUTION INTRAVENOUS at 05:01

## 2019-01-01 RX ADMIN — IPRATROPIUM BROMIDE AND ALBUTEROL SULFATE 3 ML: .5; 3 SOLUTION RESPIRATORY (INHALATION) at 08:01

## 2019-01-01 RX ADMIN — ALLOPURINOL 50 MG: 100 TABLET ORAL at 08:04

## 2019-01-01 RX ADMIN — LACTULOSE 15 G: 20 SOLUTION ORAL at 10:01

## 2019-01-01 RX ADMIN — FUROSEMIDE 160 MG: 10 INJECTION, SOLUTION INTRAVENOUS at 06:03

## 2019-01-01 RX ADMIN — SODIUM BICARBONATE 650 MG TABLET 650 MG: at 10:03

## 2019-01-01 RX ADMIN — RAMELTEON 8 MG: 8 TABLET, FILM COATED ORAL at 01:01

## 2019-01-01 RX ADMIN — HEPARIN SODIUM 5000 UNITS: 5000 INJECTION, SOLUTION INTRAVENOUS; SUBCUTANEOUS at 11:01

## 2019-01-01 RX ADMIN — SIMETHICONE CHEW TAB 80 MG 80 MG: 80 TABLET ORAL at 10:01

## 2019-01-01 RX ADMIN — HYDROCODONE BITARTRATE AND ACETAMINOPHEN 1 TABLET: 5; 325 TABLET ORAL at 06:01

## 2019-01-01 RX ADMIN — FUROSEMIDE 40 MG: 40 TABLET ORAL at 08:01

## 2019-01-01 RX ADMIN — SENNOSIDES 8.6 MG: 8.6 TABLET, FILM COATED ORAL at 04:01

## 2019-01-01 RX ADMIN — AMIODARONE HYDROCHLORIDE 200 MG: 200 TABLET ORAL at 04:01

## 2019-01-01 RX ADMIN — HYDROCODONE BITARTRATE AND ACETAMINOPHEN 1 TABLET: 5; 325 TABLET ORAL at 09:01

## 2019-01-01 RX ADMIN — ACETAMINOPHEN 650 MG: 325 TABLET ORAL at 05:01

## 2019-01-01 RX ADMIN — FUROSEMIDE 160 MG: 10 INJECTION, SOLUTION INTRAVENOUS at 08:04

## 2019-01-01 RX ADMIN — POTASSIUM CHLORIDE 20 MEQ: 20 TABLET, EXTENDED RELEASE ORAL at 05:01

## 2019-01-01 RX ADMIN — INSULIN ASPART 5 UNITS: 100 INJECTION, SOLUTION INTRAVENOUS; SUBCUTANEOUS at 10:01

## 2019-01-01 RX ADMIN — PREDNISONE 40 MG: 20 TABLET ORAL at 09:01

## 2019-01-01 RX ADMIN — HYDROCODONE BITARTRATE AND ACETAMINOPHEN 1 TABLET: 5; 325 TABLET ORAL at 02:01

## 2019-01-01 RX ADMIN — HEPARIN SODIUM 5000 UNITS: 5000 INJECTION, SOLUTION INTRAVENOUS; SUBCUTANEOUS at 09:03

## 2019-01-01 RX ADMIN — ASPIRIN 81 MG: 81 TABLET, COATED ORAL at 08:04

## 2019-01-01 RX ADMIN — AMIODARONE HYDROCHLORIDE 200 MG: 200 TABLET ORAL at 09:01

## 2019-01-01 RX ADMIN — HYDROCODONE BITARTRATE AND ACETAMINOPHEN 1 TABLET: 5; 325 TABLET ORAL at 04:01

## 2019-01-01 RX ADMIN — AZITHROMYCIN MONOHYDRATE 500 MG: 500 INJECTION, POWDER, LYOPHILIZED, FOR SOLUTION INTRAVENOUS at 02:01

## 2019-01-01 RX ADMIN — DICYCLOMINE HYDROCHLORIDE 10 MG: 10 CAPSULE ORAL at 09:02

## 2019-01-01 RX ADMIN — ACETAMINOPHEN 650 MG: 325 TABLET ORAL at 09:01

## 2019-01-01 RX ADMIN — HEPARIN SODIUM 5000 UNITS: 5000 INJECTION, SOLUTION INTRAVENOUS; SUBCUTANEOUS at 06:01

## 2019-01-01 RX ADMIN — ACETAMINOPHEN 325 MG: 325 TABLET ORAL at 05:04

## 2019-01-01 RX ADMIN — HYDROCODONE BITARTRATE AND ACETAMINOPHEN 1 TABLET: 5; 325 TABLET ORAL at 08:01

## 2019-01-01 RX ADMIN — AMLODIPINE BESYLATE 10 MG: 5 TABLET ORAL at 09:01

## 2019-01-01 RX ADMIN — ALBUTEROL SULFATE 10 MG: 2.5 SOLUTION RESPIRATORY (INHALATION) at 04:01

## 2019-01-01 RX ADMIN — RAMELTEON 8 MG: 8 TABLET, FILM COATED ORAL at 09:01

## 2019-01-01 RX ADMIN — INSULIN ASPART 2 UNITS: 100 INJECTION, SOLUTION INTRAVENOUS; SUBCUTANEOUS at 06:01

## 2019-01-01 RX ADMIN — POTASSIUM CHLORIDE 10 MEQ: 7.46 INJECTION, SOLUTION INTRAVENOUS at 01:01

## 2019-01-01 RX ADMIN — AZITHROMYCIN MONOHYDRATE 500 MG: 500 INJECTION, POWDER, LYOPHILIZED, FOR SOLUTION INTRAVENOUS at 09:01

## 2019-01-01 RX ADMIN — ONDANSETRON 4 MG: 4 TABLET, ORALLY DISINTEGRATING ORAL at 10:01

## 2019-01-01 RX ADMIN — ACETAMINOPHEN 500 MG: 500 TABLET, FILM COATED ORAL at 12:01

## 2019-01-01 RX ADMIN — INSULIN ASPART 4 UNITS: 100 INJECTION, SOLUTION INTRAVENOUS; SUBCUTANEOUS at 01:01

## 2019-01-01 RX ADMIN — ATORVASTATIN CALCIUM 40 MG: 40 TABLET, FILM COATED ORAL at 10:03

## 2019-01-01 RX ADMIN — HEPARIN SODIUM 5000 UNITS: 5000 INJECTION, SOLUTION INTRAVENOUS; SUBCUTANEOUS at 06:04

## 2019-01-01 RX ADMIN — MEROPENEM 1 G: 1 INJECTION, POWDER, FOR SOLUTION INTRAVENOUS at 08:03

## 2019-01-01 RX ADMIN — FUROSEMIDE 80 MG: 10 INJECTION, SOLUTION INTRAVENOUS at 06:03

## 2019-01-01 RX ADMIN — INSULIN DETEMIR 10 UNITS: 100 INJECTION, SOLUTION SUBCUTANEOUS at 08:01

## 2019-01-01 RX ADMIN — HEPARIN SODIUM 5000 UNITS: 5000 INJECTION, SOLUTION INTRAVENOUS; SUBCUTANEOUS at 10:01

## 2019-01-01 RX ADMIN — AMIODARONE HYDROCHLORIDE 200 MG: 200 TABLET ORAL at 08:01

## 2019-01-01 RX ADMIN — OXYCODONE HYDROCHLORIDE AND ACETAMINOPHEN 500 MG: 500 TABLET ORAL at 11:01

## 2019-01-01 RX ADMIN — DICYCLOMINE HYDROCHLORIDE 10 MG: 10 CAPSULE ORAL at 05:01

## 2019-01-01 RX ADMIN — AMIODARONE HYDROCHLORIDE 200 MG: 200 TABLET ORAL at 09:02

## 2019-01-01 RX ADMIN — Medication 500 MG: at 11:03

## 2019-01-01 RX ADMIN — GUAIFENESIN 200 MG: 100 SOLUTION ORAL at 08:01

## 2019-01-01 RX ADMIN — POTASSIUM CHLORIDE 20 MEQ: 20 TABLET, EXTENDED RELEASE ORAL at 12:02

## 2019-01-01 RX ADMIN — BENZONATATE 100 MG: 100 CAPSULE ORAL at 03:01

## 2019-01-01 RX ADMIN — HEPARIN SODIUM 5000 UNITS: 5000 INJECTION, SOLUTION INTRAVENOUS; SUBCUTANEOUS at 02:01

## 2019-01-01 RX ADMIN — AMIODARONE HYDROCHLORIDE 200 MG: 200 TABLET ORAL at 10:03

## 2019-01-01 RX ADMIN — RAMELTEON 8 MG: 8 TABLET, FILM COATED ORAL at 10:01

## 2019-01-01 RX ADMIN — FUROSEMIDE 80 MG: 10 INJECTION, SOLUTION INTRAVENOUS at 05:01

## 2019-01-01 RX ADMIN — IPRATROPIUM BROMIDE AND ALBUTEROL SULFATE 3 ML: .5; 3 SOLUTION RESPIRATORY (INHALATION) at 01:04

## 2019-01-01 RX ADMIN — FUROSEMIDE 40 MG: 40 TABLET ORAL at 11:01

## 2019-01-01 RX ADMIN — AMLODIPINE BESYLATE 10 MG: 5 TABLET ORAL at 04:01

## 2019-01-01 RX ADMIN — IPRATROPIUM BROMIDE AND ALBUTEROL SULFATE 3 ML: 2.5; .5 SOLUTION RESPIRATORY (INHALATION) at 12:02

## 2019-01-01 RX ADMIN — AZITHROMYCIN MONOHYDRATE 500 MG: 500 INJECTION, POWDER, LYOPHILIZED, FOR SOLUTION INTRAVENOUS at 06:01

## 2019-01-01 RX ADMIN — TAMSULOSIN HYDROCHLORIDE 0.4 MG: 0.4 CAPSULE ORAL at 02:01

## 2019-01-01 RX ADMIN — PREDNISONE 40 MG: 20 TABLET ORAL at 01:01

## 2019-01-01 RX ADMIN — METOLAZONE 10 MG: 2.5 TABLET ORAL at 09:03

## 2019-01-01 RX ADMIN — HYDROCODONE BITARTRATE AND ACETAMINOPHEN 1 TABLET: 5; 325 TABLET ORAL at 12:02

## 2019-01-01 RX ADMIN — MORPHINE SULFATE 2 MG: 2 INJECTION, SOLUTION INTRAMUSCULAR; INTRAVENOUS at 05:01

## 2019-01-01 RX ADMIN — ALLOPURINOL 50 MG: 100 TABLET ORAL at 04:03

## 2019-01-01 RX ADMIN — INSULIN ASPART 6 UNITS: 100 INJECTION, SOLUTION INTRAVENOUS; SUBCUTANEOUS at 01:01

## 2019-01-01 RX ADMIN — QUETIAPINE 25 MG: 25 TABLET ORAL at 09:01

## 2019-01-01 RX ADMIN — INSULIN ASPART 3 UNITS: 100 INJECTION, SOLUTION INTRAVENOUS; SUBCUTANEOUS at 11:01

## 2019-01-01 RX ADMIN — ASPIRIN 81 MG: 81 TABLET, COATED ORAL at 10:01

## 2019-01-01 RX ADMIN — INSULIN ASPART 4 UNITS: 100 INJECTION, SOLUTION INTRAVENOUS; SUBCUTANEOUS at 08:01

## 2019-01-01 RX ADMIN — SODIUM BICARBONATE 650 MG TABLET 650 MG: at 08:04

## 2019-01-01 RX ADMIN — INSULIN ASPART 1 UNITS: 100 INJECTION, SOLUTION INTRAVENOUS; SUBCUTANEOUS at 12:03

## 2019-01-01 RX ADMIN — OXYCODONE HYDROCHLORIDE AND ACETAMINOPHEN 500 MG: 500 TABLET ORAL at 10:01

## 2019-01-01 RX ADMIN — DOCUSATE SODIUM 100 MG: 100 CAPSULE, LIQUID FILLED ORAL at 09:01

## 2019-01-01 RX ADMIN — ACETAMINOPHEN 650 MG: 325 TABLET ORAL at 03:01

## 2019-01-01 RX ADMIN — SODIUM BICARBONATE 650 MG TABLET 650 MG: at 08:03

## 2019-01-01 RX ADMIN — IPRATROPIUM BROMIDE AND ALBUTEROL SULFATE 3 ML: .5; 3 SOLUTION RESPIRATORY (INHALATION) at 12:01

## 2019-01-01 RX ADMIN — SODIUM POLYSTYRENE SULFONATE 15 G: 15 SUSPENSION ORAL; RECTAL at 09:01

## 2019-01-01 RX ADMIN — AMIODARONE HYDROCHLORIDE 200 MG: 200 TABLET ORAL at 08:03

## 2019-01-01 RX ADMIN — RAMELTEON 8 MG: 8 TABLET, FILM COATED ORAL at 08:02

## 2019-01-01 RX ADMIN — POTASSIUM CHLORIDE 20 MEQ: 20 TABLET, EXTENDED RELEASE ORAL at 09:01

## 2019-01-01 RX ADMIN — FUROSEMIDE 80 MG: 10 INJECTION, SOLUTION INTRAVENOUS at 09:01

## 2019-01-01 RX ADMIN — TAMSULOSIN HYDROCHLORIDE 0.4 MG: 0.4 CAPSULE ORAL at 08:01

## 2019-01-01 RX ADMIN — FUROSEMIDE 40 MG: 40 TABLET ORAL at 05:01

## 2019-01-01 RX ADMIN — RAMELTEON 8 MG: 8 TABLET, FILM COATED ORAL at 12:01

## 2019-01-01 RX ADMIN — FUROSEMIDE 80 MG: 10 INJECTION, SOLUTION INTRAVENOUS at 11:01

## 2019-01-01 RX ADMIN — DICYCLOMINE HYDROCHLORIDE 10 MG: 10 CAPSULE ORAL at 08:02

## 2019-01-01 RX ADMIN — TAMSULOSIN HYDROCHLORIDE 0.4 MG: 0.4 CAPSULE ORAL at 09:02

## 2019-01-01 RX ADMIN — LEVOFLOXACIN 500 MG: 500 INJECTION, SOLUTION INTRAVENOUS at 08:01

## 2019-01-01 RX ADMIN — PIPERACILLIN AND TAZOBACTAM 4.5 G: 4; .5 INJECTION, POWDER, LYOPHILIZED, FOR SOLUTION INTRAVENOUS; PARENTERAL at 05:01

## 2019-01-01 RX ADMIN — GUAIFENESIN 200 MG: 100 SOLUTION ORAL at 04:01

## 2019-01-01 RX ADMIN — METOLAZONE 5 MG: 5 TABLET ORAL at 09:03

## 2019-01-01 RX ADMIN — AZITHROMYCIN MONOHYDRATE 500 MG: 500 INJECTION, POWDER, LYOPHILIZED, FOR SOLUTION INTRAVENOUS at 07:01

## 2019-01-01 RX ADMIN — POTASSIUM CHLORIDE 20 MEQ: 20 TABLET, EXTENDED RELEASE ORAL at 04:01

## 2019-01-01 RX ADMIN — FUROSEMIDE 80 MG: 40 TABLET ORAL at 08:01

## 2019-01-01 RX ADMIN — FUROSEMIDE 80 MG: 10 INJECTION, SOLUTION INTRAVENOUS at 05:02

## 2019-01-01 RX ADMIN — ALBUTEROL SULFATE 10 MG: 2.5 SOLUTION RESPIRATORY (INHALATION) at 05:03

## 2019-01-01 RX ADMIN — SODIUM CHLORIDE: 0.9 INJECTION, SOLUTION INTRAVENOUS at 07:01

## 2019-01-01 RX ADMIN — ASPIRIN 81 MG: 81 TABLET, COATED ORAL at 09:03

## 2019-01-01 RX ADMIN — INSULIN ASPART 4 UNITS: 100 INJECTION, SOLUTION INTRAVENOUS; SUBCUTANEOUS at 12:01

## 2019-01-01 RX ADMIN — SODIUM CHLORIDE 500 ML: 0.9 INJECTION, SOLUTION INTRAVENOUS at 06:03

## 2019-01-01 RX ADMIN — SODIUM CHLORIDE: 0.9 INJECTION, SOLUTION INTRAVENOUS at 11:01

## 2019-01-01 RX ADMIN — PIPERACILLIN SODIUM AND TAZOBACTAM SODIUM 4.5 G: 4; .5 INJECTION, POWDER, LYOPHILIZED, FOR SOLUTION INTRAVENOUS at 03:01

## 2019-01-01 RX ADMIN — ALLOPURINOL 50 MG: 100 TABLET ORAL at 08:03

## 2019-01-01 RX ADMIN — MORPHINE SULFATE 2 MG/HR: 10 INJECTION INTRAVENOUS at 01:04

## 2019-01-01 RX ADMIN — DOCUSATE SODIUM 100 MG: 100 CAPSULE, LIQUID FILLED ORAL at 04:01

## 2019-01-01 RX ADMIN — PIPERACILLIN AND TAZOBACTAM 4.5 G: 4; .5 INJECTION, POWDER, LYOPHILIZED, FOR SOLUTION INTRAVENOUS; PARENTERAL at 09:01

## 2019-01-01 RX ADMIN — VANCOMYCIN HYDROCHLORIDE 1250 MG: 100 INJECTION, POWDER, LYOPHILIZED, FOR SOLUTION INTRAVENOUS at 05:01

## 2019-01-01 RX ADMIN — AMIODARONE HYDROCHLORIDE 200 MG: 200 TABLET ORAL at 09:03

## 2019-01-01 RX ADMIN — CARVEDILOL 25 MG: 25 TABLET, FILM COATED ORAL at 09:01

## 2019-01-01 RX ADMIN — HEPARIN SODIUM 5000 UNITS: 5000 INJECTION, SOLUTION INTRAVENOUS; SUBCUTANEOUS at 10:03

## 2019-01-01 RX ADMIN — BENZONATATE 100 MG: 100 CAPSULE ORAL at 02:01

## 2019-01-01 RX ADMIN — METOLAZONE 5 MG: 2.5 TABLET ORAL at 04:01

## 2019-01-01 RX ADMIN — IPRATROPIUM BROMIDE AND ALBUTEROL SULFATE 3 ML: 2.5; .5 SOLUTION RESPIRATORY (INHALATION) at 04:02

## 2019-01-01 RX ADMIN — GUAIFENESIN 200 MG: 100 SOLUTION ORAL at 02:01

## 2019-01-01 RX ADMIN — INSULIN DETEMIR 10 UNITS: 100 INJECTION, SOLUTION SUBCUTANEOUS at 10:01

## 2019-01-01 RX ADMIN — SENNOSIDES 8.6 MG: 8.6 TABLET, FILM COATED ORAL at 09:01

## 2019-01-01 RX ADMIN — DICYCLOMINE HYDROCHLORIDE 10 MG: 10 CAPSULE ORAL at 05:02

## 2019-01-01 RX ADMIN — AMLODIPINE BESYLATE 10 MG: 5 TABLET ORAL at 11:01

## 2019-01-01 RX ADMIN — ACETAMINOPHEN 500 MG: 500 TABLET, FILM COATED ORAL at 10:01

## 2019-01-01 RX ADMIN — PIPERACILLIN SODIUM AND TAZOBACTAM SODIUM 4.5 G: 4; .5 INJECTION, POWDER, LYOPHILIZED, FOR SOLUTION INTRAVENOUS at 10:01

## 2019-01-01 RX ADMIN — ATORVASTATIN CALCIUM 40 MG: 40 TABLET, FILM COATED ORAL at 11:01

## 2019-01-01 RX ADMIN — INSULIN ASPART 2 UNITS: 100 INJECTION, SOLUTION INTRAVENOUS; SUBCUTANEOUS at 05:01

## 2019-01-01 RX ADMIN — INSULIN ASPART 2 UNITS: 100 INJECTION, SOLUTION INTRAVENOUS; SUBCUTANEOUS at 12:01

## 2019-01-01 RX ADMIN — VANCOMYCIN HYDROCHLORIDE 2500 MG: 100 INJECTION, POWDER, LYOPHILIZED, FOR SOLUTION INTRAVENOUS at 03:01

## 2019-01-01 RX ADMIN — INSULIN ASPART 10 UNITS: 100 INJECTION, SOLUTION INTRAVENOUS; SUBCUTANEOUS at 06:01

## 2019-01-01 RX ADMIN — BENZOCAINE: 100 GEL TOPICAL at 11:01

## 2019-01-01 RX ADMIN — LEVOFLOXACIN 500 MG: 500 INJECTION, SOLUTION INTRAVENOUS at 07:01

## 2019-01-01 RX ADMIN — POLYETHYLENE GLYCOL 3350 17 G: 17 POWDER, FOR SOLUTION ORAL at 08:01

## 2019-01-01 RX ADMIN — HEPARIN SODIUM 5000 UNITS: 5000 INJECTION, SOLUTION INTRAVENOUS; SUBCUTANEOUS at 01:03

## 2019-01-01 RX ADMIN — OXYCODONE HYDROCHLORIDE AND ACETAMINOPHEN 500 MG: 500 TABLET ORAL at 08:02

## 2019-01-01 RX ADMIN — VANCOMYCIN HYDROCHLORIDE 1000 MG: 1 INJECTION, POWDER, FOR SOLUTION INTRAVENOUS at 07:03

## 2019-01-01 RX ADMIN — ACETAMINOPHEN 650 MG: 325 TABLET ORAL at 04:01

## 2019-01-01 RX ADMIN — ACETAMINOPHEN 1000 MG: 500 TABLET ORAL at 02:01

## 2019-01-01 RX ADMIN — INSULIN HUMAN 10 UNITS: 100 INJECTION, SOLUTION PARENTERAL at 06:03

## 2019-01-01 RX ADMIN — METHYLPREDNISOLONE SODIUM SUCCINATE 125 MG: 125 INJECTION, POWDER, FOR SOLUTION INTRAMUSCULAR; INTRAVENOUS at 07:01

## 2019-01-01 RX ADMIN — FUROSEMIDE 160 MG: 10 INJECTION, SOLUTION INTRAVENOUS at 08:03

## 2019-01-01 RX ADMIN — AZITHROMYCIN MONOHYDRATE 500 MG: 500 INJECTION, POWDER, LYOPHILIZED, FOR SOLUTION INTRAVENOUS at 03:01

## 2019-01-01 RX ADMIN — TUBERCULIN PURIFIED PROTEIN DERIVATIVE 5 UNITS: 5 INJECTION INTRADERMAL at 06:04

## 2019-01-01 RX ADMIN — IPRATROPIUM BROMIDE AND ALBUTEROL SULFATE 3 ML: .5; 3 SOLUTION RESPIRATORY (INHALATION) at 12:04

## 2019-01-01 RX ADMIN — AMIODARONE HYDROCHLORIDE 200 MG: 200 TABLET ORAL at 08:04

## 2019-01-01 RX ADMIN — PIPERACILLIN SODIUM AND TAZOBACTAM SODIUM 4.5 G: 4; .5 INJECTION, POWDER, LYOPHILIZED, FOR SOLUTION INTRAVENOUS at 12:01

## 2019-01-01 RX ADMIN — INSULIN HUMAN 15 UNITS: 100 INJECTION, SOLUTION PARENTERAL at 04:01

## 2019-01-01 RX ADMIN — ALLOPURINOL 50 MG: 100 TABLET ORAL at 10:03

## 2019-01-01 RX ADMIN — GUAIFENESIN 200 MG: 100 SOLUTION ORAL at 03:01

## 2019-01-01 RX ADMIN — INSULIN ASPART 4 UNITS: 100 INJECTION, SOLUTION INTRAVENOUS; SUBCUTANEOUS at 02:01

## 2019-01-01 RX ADMIN — PIPERACILLIN SODIUM AND TAZOBACTAM SODIUM 4.5 G: 4; .5 INJECTION, POWDER, LYOPHILIZED, FOR SOLUTION INTRAVENOUS at 09:01

## 2019-01-01 RX ADMIN — HYDROCODONE BITARTRATE AND ACETAMINOPHEN 1 TABLET: 5; 325 TABLET ORAL at 08:02

## 2019-01-01 RX ADMIN — ACETAMINOPHEN 650 MG: 325 TABLET ORAL at 12:01

## 2019-01-01 RX ADMIN — INSULIN ASPART 8 UNITS: 100 INJECTION, SOLUTION INTRAVENOUS; SUBCUTANEOUS at 08:01

## 2019-01-01 RX ADMIN — IPRATROPIUM BROMIDE AND ALBUTEROL SULFATE 3 ML: 2.5; .5 SOLUTION RESPIRATORY (INHALATION) at 02:01

## 2019-01-01 RX ADMIN — FUROSEMIDE 40 MG: 10 INJECTION, SOLUTION INTRAMUSCULAR; INTRAVENOUS at 11:03

## 2019-01-01 RX ADMIN — FUROSEMIDE 80 MG: 10 INJECTION, SOLUTION INTRAVENOUS at 08:01

## 2019-01-01 RX ADMIN — ASPIRIN 81 MG: 81 TABLET, COATED ORAL at 08:03

## 2019-01-01 RX ADMIN — FUROSEMIDE 80 MG: 40 TABLET ORAL at 05:01

## 2019-01-01 RX ADMIN — OXYCODONE HYDROCHLORIDE AND ACETAMINOPHEN 500 MG: 500 TABLET ORAL at 09:02

## 2019-01-01 RX ADMIN — ACETAMINOPHEN 650 MG: 325 TABLET ORAL at 08:01

## 2019-01-01 RX ADMIN — METHYLPREDNISOLONE SODIUM SUCCINATE 125 MG: 125 INJECTION, POWDER, FOR SOLUTION INTRAMUSCULAR; INTRAVENOUS at 05:01

## 2019-01-01 RX ADMIN — GUAIFENESIN 200 MG: 100 SOLUTION ORAL at 11:01

## 2019-01-01 RX ADMIN — LACTULOSE 15 G: 20 SOLUTION ORAL at 08:01

## 2019-01-01 RX ADMIN — AMLODIPINE BESYLATE 10 MG: 5 TABLET ORAL at 10:01

## 2019-01-01 RX ADMIN — PIPERACILLIN SODIUM AND TAZOBACTAM SODIUM 4.5 G: 4; .5 INJECTION, POWDER, LYOPHILIZED, FOR SOLUTION INTRAVENOUS at 07:01

## 2019-01-01 RX ADMIN — INSULIN ASPART 4 UNITS: 100 INJECTION, SOLUTION INTRAVENOUS; SUBCUTANEOUS at 09:01

## 2019-01-01 RX ADMIN — FUROSEMIDE 120 MG: 10 INJECTION, SOLUTION INTRAMUSCULAR; INTRAVENOUS at 09:03

## 2019-01-01 RX ADMIN — LORAZEPAM 2 MG: 2 INJECTION INTRAMUSCULAR; INTRAVENOUS at 02:04

## 2019-01-01 RX ADMIN — SODIUM CHLORIDE: 0.9 INJECTION, SOLUTION INTRAVENOUS at 09:01

## 2019-01-01 RX ADMIN — MEROPENEM AND SODIUM CHLORIDE 500 MG: 500 INJECTION, SOLUTION INTRAVENOUS at 08:03

## 2019-01-01 RX ADMIN — SODIUM BICARBONATE 650 MG TABLET 650 MG: at 09:04

## 2019-01-01 RX ADMIN — LIDOCAINE HYDROCHLORIDE: 20 SOLUTION ORAL; TOPICAL at 07:01

## 2019-01-01 RX ADMIN — IPRATROPIUM BROMIDE AND ALBUTEROL SULFATE 3 ML: .5; 3 SOLUTION RESPIRATORY (INHALATION) at 07:04

## 2019-01-01 RX ADMIN — CEFEPIME HYDROCHLORIDE 2 G: 2 INJECTION, SOLUTION INTRAVENOUS at 01:01

## 2019-01-01 RX ADMIN — IPRATROPIUM BROMIDE AND ALBUTEROL SULFATE 3 ML: 2.5; .5 SOLUTION RESPIRATORY (INHALATION) at 03:02

## 2019-01-01 RX ADMIN — MORPHINE SULFATE 4 MG/HR: 10 INJECTION INTRAVENOUS at 04:04

## 2019-01-01 RX ADMIN — CALCIUM GLUCONATE 500 MG: 98 INJECTION, SOLUTION INTRAVENOUS at 07:03

## 2019-01-01 RX ADMIN — HYDROCODONE BITARTRATE AND ACETAMINOPHEN 1 TABLET: 5; 325 TABLET ORAL at 01:01

## 2019-01-01 RX ADMIN — IOHEXOL 100 ML: 350 INJECTION, SOLUTION INTRAVENOUS at 06:01

## 2019-01-01 RX ADMIN — VANCOMYCIN HYDROCHLORIDE 2250 MG: 100 INJECTION, POWDER, LYOPHILIZED, FOR SOLUTION INTRAVENOUS at 06:03

## 2019-01-01 RX ADMIN — INSULIN DETEMIR 10 UNITS: 100 INJECTION, SOLUTION SUBCUTANEOUS at 09:02

## 2019-01-01 RX ADMIN — AZITHROMYCIN MONOHYDRATE 500 MG: 500 INJECTION, POWDER, LYOPHILIZED, FOR SOLUTION INTRAVENOUS at 08:01

## 2019-01-01 RX ADMIN — ACETAMINOPHEN 500 MG: 500 TABLET ORAL at 11:04

## 2019-01-01 RX ADMIN — ERTAPENEM SODIUM 0.5 G: 1 INJECTION, POWDER, LYOPHILIZED, FOR SOLUTION INTRAMUSCULAR; INTRAVENOUS at 06:04

## 2019-01-01 RX ADMIN — IPRATROPIUM BROMIDE AND ALBUTEROL SULFATE 3 ML: .5; 3 SOLUTION RESPIRATORY (INHALATION) at 08:03

## 2019-01-01 RX ADMIN — HEPARIN SODIUM 5000 UNITS: 5000 INJECTION, SOLUTION INTRAVENOUS; SUBCUTANEOUS at 03:01

## 2019-01-01 RX ADMIN — POTASSIUM CHLORIDE 20 MEQ: 20 TABLET, EXTENDED RELEASE ORAL at 02:02

## 2019-01-01 RX ADMIN — METHYLPREDNISOLONE SODIUM SUCCINATE 125 MG: 125 INJECTION, POWDER, FOR SOLUTION INTRAMUSCULAR; INTRAVENOUS at 06:03

## 2019-01-01 RX ADMIN — IPRATROPIUM BROMIDE AND ALBUTEROL SULFATE 3 ML: .5; 3 SOLUTION RESPIRATORY (INHALATION) at 08:04

## 2019-01-01 RX ADMIN — POTASSIUM CHLORIDE 10 MEQ: 7.46 INJECTION, SOLUTION INTRAVENOUS at 11:01

## 2019-01-01 RX ADMIN — CEFEPIME HYDROCHLORIDE 2 G: 2 INJECTION, SOLUTION INTRAVENOUS at 02:01

## 2019-01-01 RX ADMIN — IPRATROPIUM BROMIDE AND ALBUTEROL SULFATE 3 ML: .5; 3 SOLUTION RESPIRATORY (INHALATION) at 01:01

## 2019-01-01 RX ADMIN — POTASSIUM CHLORIDE 20 MEQ: 20 TABLET, EXTENDED RELEASE ORAL at 09:02

## 2019-01-01 RX ADMIN — AZITHROMYCIN MONOHYDRATE 500 MG: 500 INJECTION, POWDER, LYOPHILIZED, FOR SOLUTION INTRAVENOUS at 11:01

## 2019-01-01 RX ADMIN — DICYCLOMINE HYDROCHLORIDE 10 MG: 10 CAPSULE ORAL at 08:01

## 2019-01-01 RX ADMIN — INSULIN ASPART 10 UNITS: 100 INJECTION, SOLUTION INTRAVENOUS; SUBCUTANEOUS at 11:01

## 2019-01-01 RX ADMIN — ASPIRIN 81 MG: 81 TABLET, COATED ORAL at 11:01

## 2019-01-01 RX ADMIN — FUROSEMIDE 80 MG: 10 INJECTION, SOLUTION INTRAMUSCULAR; INTRAVENOUS at 04:01

## 2019-01-01 RX ADMIN — HEPARIN SODIUM 5000 UNITS: 5000 INJECTION, SOLUTION INTRAVENOUS; SUBCUTANEOUS at 07:04

## 2019-01-01 RX ADMIN — PNEUMOCOCCAL 13-VALENT CONJUGATE VACCINE 0.5 ML: 2.2; 2.2; 2.2; 2.2; 2.2; 4.4; 2.2; 2.2; 2.2; 2.2; 2.2; 2.2; 2.2 INJECTION, SUSPENSION INTRAMUSCULAR at 01:01

## 2019-01-01 RX ADMIN — ACETAMINOPHEN 650 MG: 325 TABLET ORAL at 07:01

## 2019-01-01 RX ADMIN — DEXTROSE MONOHYDRATE 12.5 G: 25 INJECTION, SOLUTION INTRAVENOUS at 08:01

## 2019-01-01 RX ADMIN — PIPERACILLIN SODIUM AND TAZOBACTAM SODIUM 4.5 G: 4; .5 INJECTION, POWDER, LYOPHILIZED, FOR SOLUTION INTRAVENOUS at 02:01

## 2019-01-01 RX ADMIN — SODIUM CHLORIDE, SODIUM LACTATE, POTASSIUM CHLORIDE, AND CALCIUM CHLORIDE 500 ML: .6; .31; .03; .02 INJECTION, SOLUTION INTRAVENOUS at 07:03

## 2019-01-01 RX ADMIN — ACETAMINOPHEN 650 MG: 325 TABLET ORAL at 02:01

## 2019-01-01 RX ADMIN — FUROSEMIDE 80 MG: 10 INJECTION, SOLUTION INTRAVENOUS at 01:01

## 2019-01-01 RX ADMIN — HEPARIN SODIUM 5000 UNITS: 5000 INJECTION, SOLUTION INTRAVENOUS; SUBCUTANEOUS at 09:04

## 2019-01-01 RX ADMIN — METOLAZONE 10 MG: 2.5 TABLET ORAL at 06:04

## 2019-01-01 RX ADMIN — LEVOFLOXACIN 750 MG: 750 INJECTION, SOLUTION INTRAVENOUS at 08:01

## 2019-01-01 RX ADMIN — HEPARIN SODIUM 5000 UNITS: 5000 INJECTION, SOLUTION INTRAVENOUS; SUBCUTANEOUS at 08:02

## 2019-01-01 RX ADMIN — AMIODARONE HYDROCHLORIDE 200 MG: 200 TABLET ORAL at 11:01

## 2019-01-01 RX ADMIN — INSULIN ASPART 2 UNITS: 100 INJECTION, SOLUTION INTRAVENOUS; SUBCUTANEOUS at 09:01

## 2019-01-01 RX ADMIN — METOLAZONE 10 MG: 5 TABLET ORAL at 08:03

## 2019-01-01 RX ADMIN — DICYCLOMINE HYDROCHLORIDE 10 MG: 10 CAPSULE ORAL at 12:01

## 2019-01-01 RX ADMIN — INSULIN ASPART 2 UNITS: 100 INJECTION, SOLUTION INTRAVENOUS; SUBCUTANEOUS at 08:01

## 2019-01-01 RX ADMIN — OSELTAMIVIR PHOSPHATE 30 MG: 30 CAPSULE ORAL at 08:01

## 2019-01-01 RX ADMIN — FUROSEMIDE 160 MG: 10 INJECTION, SOLUTION INTRAVENOUS at 05:03

## 2019-01-01 RX ADMIN — CALCIUM GLUCONATE 1000 MG: 98 INJECTION, SOLUTION INTRAVENOUS at 11:03

## 2019-01-01 RX ADMIN — FUROSEMIDE 40 MG: 10 INJECTION INTRAMUSCULAR; INTRAVENOUS at 09:01

## 2019-01-09 PROBLEM — J44.1 COPD WITH ACUTE EXACERBATION: Status: ACTIVE | Noted: 2019-01-01

## 2019-01-09 NOTE — ED TRIAGE NOTES
Pt c/o flu-like sx since last Friday/Saturday. Pt wears 2L O2 at all times and has chronic cough, but c/o increased SOB with dry cough. Wife states patient had fever of 99.0 at home, and has been giving patient Ibuprofen and Tramadol for pain as needed. Pt c/o buttocks pain where he has small open bedsore on sacrum. Wife states bedsore has been healing, and was much larger a few months ago. VSS. Pt has right BKA.

## 2019-01-09 NOTE — ED PROVIDER NOTES
"Encounter Date: 1/9/2019    SCRIBE #1 NOTE: I, Ron Kaur, am scribing for, and in the presence of,  Dr. Catina Nicholas. I have scribed the entire note.       History     Chief Complaint   Patient presents with    Fatigue     c/o fatigue, body aches, decreased appetite, and chills since Saturday. Wife reports temp of 99 over the weekend. Wife also states that pt has a bedsore     Time seen by provider: 5:28 PM    This is a 79 y/o male with PMHx of CHF, CAD, COPD, HLDwho presents with complaint of fatigue x 5 days. Patient's wife reports having associated body aches, chills, and fever (temp of 99 F over the weekend). Wife reports EMS was initiated for his symptoms 4 days ago and was given aspirin by EMS with some improvement. Patient endorses to chronic SOB. Wife reports patient has a bed sore and testicular pain and redness "for months" that is concerning.  He denies chest pain, cough, headache, neck pain/stiffness, abd pain, dysuria, or hematuria. Patient uses 2L home oxygen "all the time".      The history is provided by the spouse and the patient.     Review of patient's allergies indicates:  No Known Allergies  Past Medical History:   Diagnosis Date    Alcohol abuse     CHF (congestive heart failure)     CKD (chronic kidney disease) stage 3, GFR 30-59 ml/min     Coronary artery disease     Decubitus skin ulcer     Heart failure, diastolic     High cholesterol     Hypertension     Immobility     LBP (low back pain)     Prediabetes     PVD (peripheral vascular disease)     Stroke     2006, no deficits    Urine incontinence      Past Surgical History:   Procedure Laterality Date    AMPUTATION-ABOVE KNEE Right 9/12/2016    [facility]    aortic bifemoral bypass  2006    ARTHROPLASTY-KNEE Right 6/27/2016    [facility] at [facility]    ARTHROPLASTY-KNEE Left 7/14/2014    [facility]    bilateral carotid stenois  2006    carotid " stents      DEBRIDEMENT-SACRAL WOUND N/A 10/10/2016    [facility] by [facility]    DEBRIDEMENT-SACRAL WOUND Bilateral 2016    [facility] by [facility]andra Pichardo DO at [facility] OR    DEBRIDEMENT-SACRAL WOUND N/A 2016    [facility] at [facility] OR    DEBRIDEMENT-WOUND Right 2017    [facility] at [facility] OR    ESOPHAGOGASTRODUODENOSCOPY (EGD) N/A 2014    Performed by David Ramos MD at [facility] ENDO    ESOPHAGOGASTRODUODENOSCOPY (EGD) with PEG N/A 2016    Performed by Emerson Childers Jr., MD at [facility] ENDO    EXCHANGE-POLYETHYLENE RIGHT KNEE Right 2016    [facility] by [facility] at [facility] OR    IOVERA Right 6/15/2016    [facility] by [facility] at [facility] OR    IRRIGATION AND DEBRIDEMENT LOWER EXTREMITY Right 2016    Performed by [facility] at [facility]    IRRIGATION AND DEBRIDEMENT RIGHT KNEE Right 2016    [facility] OR    JOINT REPLACEMENT      knee    left common endarterectomy  2006    REVISION-AMPUTATION STUMP Right 2016    [facility] at [facility] OR    RT AKA  2017    SYNOVECTOMY-KNEE Left 2016    [facility] OR    WOUND EXPLORATION Right 2016    [facility] by [facility] at [facility] OR     Family History   Problem Relation Age of Onset    Heart disease Mother      Social History     Tobacco Use    Smoking status: Former Smoker     Packs/day: 2.00     Years: 45.00     Pack years: 90.00     Last attempt to quit: 2006     Years since quittin.4    Smokeless tobacco: Never Used   Substance Use Topics    Alcohol use: No     Comment: NO ALCOHOL FOR 18 MONTHS, PAST ETOH ABUSE    Drug use: No     Review of Systems   Constitutional: Positive for chills, fatigue and fever.        + generalized body aches   HENT: Negative for congestion, ear pain, rhinorrhea and sore throat.    Respiratory: Positive for  shortness of breath. Negative for cough and wheezing.    Cardiovascular: Negative for chest pain and palpitations.   Gastrointestinal: Negative for abdominal pain, diarrhea, nausea and vomiting.   Genitourinary: Positive for testicular pain. Negative for dysuria and hematuria.        + testicular redness   Musculoskeletal: Negative for back pain, myalgias, neck pain and neck stiffness.   Skin: Positive for wound (bed sore). Negative for rash.   Neurological: Negative for dizziness, weakness, light-headedness and headaches.   Psychiatric/Behavioral: Negative for confusion.   All other systems reviewed and are negative.      Physical Exam     Initial Vitals [01/09/19 1704]   BP Pulse Resp Temp SpO2   (!) 99/48 (!) 59 (!) 25 98 °F (36.7 °C) (!) 87 %      MAP       --         Physical Exam    Nursing note and vitals reviewed.  Constitutional: He appears well-developed and well-nourished. He is not diaphoretic. No distress.   HENT:   Head: Normocephalic and atraumatic.   Right Ear: External ear normal.   Left Ear: External ear normal.   Nose: Nose normal.   Mouth/Throat: Oropharynx is clear and moist.   Eyes: Conjunctivae and EOM are normal. Pupils are equal, round, and reactive to light.   Neck: Normal range of motion. Neck supple.   No meningismus.   Cardiovascular: Regular rhythm, normal heart sounds and intact distal pulses. Bradycardia present.  Exam reveals no gallop and no friction rub.    No murmur heard.  Pulmonary/Chest: No respiratory distress. He has wheezes (expiratory, bilateral bases).   Abdominal: Soft. He exhibits no distension and no mass. There is no tenderness.   Genitourinary: Penis normal. Circumcised.   Genitourinary Comments: Diffuse testicular tenderness.  No erythema or swelling.   Musculoskeletal: Normal range of motion. He exhibits no edema.   Right AKA.  Healing 1 cm ulcer to sacral area. No drainage   Neurological: He is alert and oriented to person, place, and time. GCS score is 15. GCS eye  subscore is 4. GCS verbal subscore is 5. GCS motor subscore is 6.   Skin: Skin is warm and dry. Capillary refill takes less than 2 seconds.   Psychiatric: He has a normal mood and affect.         ED Course   Procedures  Labs Reviewed   CBC W/ AUTO DIFFERENTIAL - Abnormal; Notable for the following components:       Result Value    WBC 20.67 (*)     RBC 4.05 (*)     Hemoglobin 12.1 (*)     Hematocrit 38.0 (*)     MCHC 31.8 (*)     Gran # (ANC) 17.3 (*)     Mono # 1.9 (*)     Gran% 83.9 (*)     Lymph% 6.4 (*)     All other components within normal limits   COMPREHENSIVE METABOLIC PANEL - Abnormal; Notable for the following components:    Glucose 163 (*)     BUN, Bld 49 (*)     Creatinine 2.6 (*)     Calcium 8.6 (*)     Albumin 2.4 (*)      (*)      (*)     eGFR if  27 (*)     eGFR if non  23 (*)     All other components within normal limits   ISTAT PROCEDURE - Abnormal; Notable for the following components:    POC PH 7.287 (*)     POC PCO2 56.1 (*)     POC PO2 78 (*)     POC SATURATED O2 93 (*)     POC TCO2 28 (*)     All other components within normal limits   CULTURE, BLOOD   CULTURE, BLOOD   INFLUENZA A AND B ANTIGEN   MAGNESIUM   LACTIC ACID, PLASMA   TSH   TROPONIN I   CK   B-TYPE NATRIURETIC PEPTIDE   URINALYSIS, REFLEX TO URINE CULTURE          Imaging Results          US Scrotum And Testicles (Final result)  Result time 01/09/19 19:14:43    Final result by Mary Da Silva MD (01/09/19 19:14:43)                 Impression:      No intratesticular masses.  Increased vascularity in the right testicle and large reactive right hydrocele.  No asymmetrical enlargement of the right testicle.  Overall findings are equivocal for right orchitis.    Bilateral hydroceles containing low level echoes right much greater than left.    Right epididymal head cyst.      Electronically signed by: Mary Da Silva  Date:    01/09/2019  Time:    19:14             Narrative:     EXAMINATION:  US SCROTUM AND TESTICLES    CLINICAL HISTORY:  Testicular pain, unspecified    TECHNIQUE:  Sonography of the scrotum and testes.    COMPARISON:  None.    FINDINGS:  Right Testicle:    *Size: 2.5 x 1.9 x 2.1 cm  *Appearance: Normal.  *Flow: Normal arterial and venous flow  *Epididymis: 0.8 x 1.2 cm.  Right epididymal head cyst measuring 3 x 2 x 3 mm.  *Hydrocele: Large hydrocele with low-level echoes.  *Varicocele: None.  .    Left Testicle:    *Size: 3.2 x 2.2 x 1.9 cm  *Appearance: Normal.  *Flow: Normal arterial and venous flow  *Epididymis: 0.6 x 1.3 cm  *Hydrocele: Small hydrocele with low-level echoes.  *Varicocele: None.  .    Other findings: The vascularity of the right testicle appears to be slightly greater than that of the left.  However, the right testicle is smaller than that of the left.  There is a large right-sided hydrocele, when compared to the left.                               X-Ray Chest AP Portable (Final result)  Result time 01/09/19 18:59:44    Final result by Mary Da Silva MD (01/09/19 18:59:44)                 Impression:      Left lung field scarring or atelectasis with mild asymmetric elevation of the left hemidiaphragm again seen.  No new focal areas of consolidation detected.      Electronically signed by: Mary Da Silva  Date:    01/09/2019  Time:    18:59             Narrative:    EXAMINATION:  XR CHEST AP PORTABLE    CLINICAL HISTORY:  fatigue;    TECHNIQUE:  Single frontal view of the chest was performed.    COMPARISON:  07/06/2018    FINDINGS:  Portable AP chest radiograph demonstrates heart size within normal limits.  Vascular calcification is seen in the aortic knob.  There is tortuosity of the descending thoracic aorta.  There is mild asymmetric elevation of the left hemidiaphragm.  There is some linear scarring or atelectasis seen in the left lateral mid lung field.  The right lung is grossly clear.  There is no pneumothorax or significant pleural effusion.   The bones are diffusely osteopenic.  Degenerative changes are seen involving the spine and the shoulders.                                 Medical Decision Making:   Initial Assessment:   This is a 79 y/o male who presents with complaint of fatigue x 5 days.   Plan to obtain CXR, U/S scrotum, UA, cardiac evaluation, EKG, labs, flu swab, and reassess.  Differential Diagnosis:   Electrolyte abnormality, hypoglycemia, CVA, spinal cord abnormality, infectious causes, Guillain [place], neuromuscular junction disease, muscle disease, endocrine abnormalities, sepsis, ELAYNE    pulmonary infectious process, COPD, asthma, pulmonary embolus and congestive heart failure.      Clinical Tests:   Lab Tests: Ordered and Reviewed  Radiological Study: Ordered and Reviewed  Medical Tests: Ordered and Reviewed  ED Management:  Treated for COPD exacerbation and orchitis.  Given IVFs for ELAYNE.        7:44 PM  Case discussed with [facility] for admission.                   ED Course as of Jan 09 2002 Wed Jan 09, 2019   1754 EKG with sinus rhythm with 1st degree AV block.  Rate of 60 bpm.  No STEMI.     [LD]   1926 Increased from 1.3 Creatinine: (!) 2.6 [LD]   1927 AST: (!) 141 [LD]   1927 ALT: (!) 203 [LD]   1944 [facility] to admit patient    [LD]   1946 No recent steroids per patient's wife. WBC: (!) 20.67 [LD]      ED Course User Index  [LD] Catina Nicholas MD     Clinical Impression:     1. COPD with acute exacerbation    2. Fatigue    3. Testicular pain    4. Acute kidney injury            Disposition:   Disposition: Admitted  Condition: Fair       I, Catina Nicholas,  personally performed the services described in this documentation. All medical record entries made by the scribe were at my direction and in my presence.  I have reviewed the chart and agree that the record reflects my personal performance and is accurate and complete. Catina Nicholas M.D. 8:01 PM01/09/2019                   Catina Nicholas MD  01/09/19 2002

## 2019-01-10 NOTE — PROGRESS NOTES
"[facility] Medicine Progress Note    Primary Team: [facility] [place] Hospitalist Team B  Attending Physician: Gloria  Resident: Jose Natarajan  Intern: Mello Butler    Subjective:      Patient feeling better this morning.  Reports his breathing, cough, and sacral pain are doing better.  He denies any fever, nausea, vomiting, diarrhea, or constipation.       Objective:     Last 24 Hour Vital Signs:  BP  Min: 99/48  Max: 137/60  Temp  Av.6 °F (36.4 °C)  Min: 96.9 °F (36.1 °C)  Max: 98 °F (36.7 °C)  Pulse  Av.7  Min: 58  Max: 63  Resp  Av  Min: 18  Max: 56  SpO2  Av.5 %  Min: 76 %  Max: 98 %  Height  Av' 8" (172.7 cm)  Min: 5' 8" (172.7 cm)  Max: 5' 8" (172.7 cm)  Weight  Av kg (152 lb 2.6 oz)  Min: 68 kg (150 lb)  Max: 70 kg (154 lb 5.2 oz)  I/O last 3 completed shifts:  In: 515.8 [P.O.:180; I.V.:335.8]  Out: -     Physical Examination:  Gen: AAOx3, NAD  HEENT: head normocephalic, atraumatic; PERRL; EOMI; trachea midline  Cardiac: soft heart sounds; regular rate and rhythm; no murmurs, rubs, or gallops  Resp: mild diffuse rhonchi; no wheezing; otherwise CTAB; normal work of breathing  GI: abdomen soft, non-tender, non-distended; normoactive bowel sounds, no hepatosplenomegaly  Extrem: Right AKA with well-healed scar without erythema or exudate, no clubbing  Skin: left lower leg with chronic venous stasis changes; left total knee arthroplasty scar well-healed; 1 cm stage 3 sacral decubitus ulcer present with some surrounding erythema but no exudate, mild tenderness to palpation, meplex dressing clean, dry, and intact; right posterolateral thigh with a 4 cm well-healed scar  : scrotum erythematous, non-tender; no testicular pain or swelling  Neuro: AAOx3, CN III-VII, IX-XII intact; strength 4/5 in all extremities; sensation intact to light touch in all extremities       Laboratory:  Laboratory Data Reviewed: yes  Pertinent Findings:  WBC 17.85 (down from 20)  BUN 49, Cr 2.4  , AST " 141    Microbiology Data Reviewed: yes  Pertinent Findings:  Blood cultures no growth x1 day    Other Results:  EKG (my interpretation): sinus rhythm with first degree AV block    Radiology Data Reviewed: yes  Pertinent Findings:  Ultrasound abdomen pending    Current Medications:     Infusions:   sodium chloride 0.9% 50 mL/hr at 01/09/19 2311        Scheduled:   amiodarone  200 mg Oral Daily    amLODIPine  10 mg Oral Daily    aspirin  81 mg Oral Daily    atorvastatin  40 mg Oral Daily    carvedilol  25 mg Oral BID    heparin (porcine)  5,000 Units Subcutaneous Q8H    levoFLOXacin  500 mg Intravenous Q24H    oseltamivir  30 mg Oral Daily    polyethylene glycol  17 g Oral Daily    predniSONE  40 mg Oral Daily        PRN:  pneumoc 13-clementina conj-dip cr(PF), sodium chloride 0.9%    Antibiotics and Day Number of Therapy:  Levofloxacin 1/9-present  Oseltamavir 1/9-1/13    Lines and Day Number of Therapy:  PIV    Assessment:     Asad Perez is a 78 y.o.male with  Patient Active Problem List    Diagnosis Date Noted    COPD with acute exacerbation 01/09/2019    Abdominal pain 03/19/2018    COPD exacerbation 03/16/2018    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    Abdominal rigidity, generalized     Complicated open wound of right hip     Acute osteomyelitis 04/11/2017    Decubitus ulcer of buttock, stage 2 04/07/2017    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    Urinary tract infection associated with indwelling urethral catheter 12/01/2016    Centrilobular emphysema 11/29/2016    Symptomatic anemia 11/27/2016    Deep vein thrombosis (DVT) of brachial vein     Non-healing ulcer of lower leg 10/28/2016    Fever     Decubitus ulcer of sacral region, stage 4 10/05/2016    Deep vein thrombosis (DVT) of upper extremity 10/05/2016    Dehiscence of perineal wound 10/05/2016    Hyponatremia 10/05/2016    VRE (vancomycin-resistant Enterococci) infection 09/20/2016    Elevated  liver enzymes     Chronic diastolic congestive heart failure     Critical lower limb ischemia 09/10/2016    Stenosis of left internal carotid artery 09/08/2016    Abnormal finding on imaging 08/31/2016    Abnormal CT of the abdomen     Chronic kidney disease, stage V 08/12/2016    Anemia 08/12/2016    Anemia of chronic disease 08/08/2016    Altered mental status 08/07/2016    MRSA (methicillin resistant Staphylococcus aureus) infection 08/07/2016    Confusion     Weak     Pneumonia 07/29/2016    History of atrial fibrillation     Infected prosthetic knee joint 07/12/2016    Right knee pain 06/15/2016    GERD (gastroesophageal reflux disease) 02/28/2015    Esophageal web 02/28/2015    Acute on chronic diastolic heart failure 11/08/2014    Transaminitis 11/08/2014    Chronic obstructive pulmonary disease 11/07/2014    Dyspnea 11/06/2014    Normocytic anemia 11/06/2014    Dysphagia 11/03/2014    Osteoarthritis of left knee 07/14/2014    Essential hypertension 07/16/2013    CKD (chronic kidney disease) 07/16/2013    PAD (peripheral artery disease) 07/16/2013    CAD (coronary artery disease) 07/16/2013    History of stroke 07/16/2013    Physical deconditioning 07/16/2013    Alcohol abuse 07/16/2013        Plan:     Sepsis 2/2 acute on chronic hypercapnic respiratory failure 2/2 COPD exacerbation possibly 2/2 flu  -5 days of weakness, decreased appetite, chills, nausea, SOB, and non-productive cough  -Afebrile, WBC 20.67, rapid flu negative, procalcitonin pending, ABG with pH 7.287, CO2 56, O2 78;  cxr with some scarring/atelectasis in the left lateral mid lung field (similar to previous films)  -Follow up blood cultures- no growth x1 day  -Levofloxacin, prednisone 40 mg daily, oseltamivir 30 daily (due to ELAYNE)  -Will attempt to do bipap tonight given ABG     ELAYNE on CKD 3  -Baseline creatinine 1.1-1.3  -Given 2L NS in the ED  -Will do maintenance fluids @ 50/hr x10 hours given history of  heart failure in the setting of decreased PO intake and ELAYNE  -Avoid nephrotoxic agents  -Renally dose medications     Stage 3 sacral decubitus ulcer (present on admission)  -Meplex dressing in place  -Wound care consulted     Testicular pain  -Scrotum diffusely erythematous but minimally tender  -Scrotal ultrasound- increased vascularity in the right testicle and large reactive right hydrocele- equivocal for orchitis  -Covering both COPD exacerbation and possible orchitis with levofloxacin     Transaminitis  -,   -Will get RUQ U/S     HFpEF  -TTE (11/28/16)- mild LA enlargement, concentric hypertrophy, normal LVSF, left ventricular diastolic dysfunction, pulmonary HTN (PA sys pressure ~47)  -Home medication: amiodarone 200, amlodipine 10, carvedilol 25  -Currently holding carvedilol in the setting of borderline bradycardia, continue other home meds     History of CVA  -CVA in 2007, no focal deficits on exam  -Continue ASA 81, atorvastatin 40  -Heparin for anticoagulation     Right knee AKA, peripheral vascular disease, CAD  -Continue ASA 81, atorvastatin 40     Constipation  -Polyethylene glycol     Urinary incontinence  -No acute issues     Alcohol abuse  -No alcohol since 2007     Healthcare maintenance  -UTD on flu, tetanus  -Needs pneumovax     Fluids: NS @50/hr x10 hours  Electrolytes: replace as needed  Nutrition: cardiac  PPx: heparin  Code status: full     Dispo: continue fluids, COPD treatment, monitor kidney function; likely discharge tomorrow    Coleman Butler MD  [facility] HO-1    [facility] [place] [facility] Hospitalist Pager numbers:   [facility] [place] [facility] Medicine Team A (Chay/Shawanda): [phone]  [facility] [place] Hospital[facility] Medicine Team B (Gloria/Alex):  [phone]

## 2019-01-10 NOTE — PLAN OF CARE
Problem: Adult Inpatient Plan of Care  Goal: Plan of Care Review  Outcome: Revised  Pt stable. NO distress noted. POC reviewed with pt. Pt verbalized understanding. Pt remains SB-SB with 1st degree AV Block on the monitor. NO true red alarms noted. Pt placed on droplet precautions. Pt on Bipap QHS sating 94%. IV fluids started. Mepliex placed on sacral area. Pt repositioned frequently.  Fall precautions maintained. Bed in lowest position, call light in reach and bed alarm on.

## 2019-01-10 NOTE — PLAN OF CARE
Pt lives at home with spouse and brother in law. Pt is wheelchair bound.  His spouse and brother in law help him at home.  He has hospital bed, camryn lift, oxygen and wheelchair at home.  No DME needs identified.  PCP Brian.  No other needs identified.  Pt has sacral decubitus and would benefit from HH RN.  Wound care nurse consult pending.     01/10/19 1136   Discharge Assessment   Assessment Type Discharge Planning Assessment   Confirmed/corrected address and phone number on facesheet? Yes   Assessment information obtained from? Patient;Caregiver   Communicated expected length of stay with patient/caregiver yes   Prior to hospitilization cognitive status: Alert/Oriented;No Deficits   Prior to hospitalization functional status: Wheelchair Bound;Completely Dependent   Current cognitive status: Alert/Oriented;No Deficits   Current Functional Status: Completely Dependent;Wheelchair Bound   Lives With spouse   Able to Return to Prior Arrangements yes   Is patient able to care for self after discharge? No   Who are your caregiver(s) and their phone number(s)? Kassy [phone]   Patient's perception of discharge disposition home health   Readmission Within the Last 30 Days no previous admission in last 30 days   Patient currently being followed by outpatient case management? No   Patient currently receives any other outside agency services? No   Equipment Currently Used at Home hospital bed;oxygen   Do you have any problems affording any of your prescribed medications? No   Is the patient taking medications as prescribed? yes   Does the patient have transportation home? No   Does the patient receive services at the [facility]? No   Discharge Plan A Home with family;Home Health   Discharge Plan B Home with family;Home Health   DME Needed Upon Discharge  none   Patient/Family in Agreement with Plan yes

## 2019-01-10 NOTE — H&P
[facility] Medicine H&P Note     Admitting Team: [facility] Hospitalist Team B  Attending Physician: Gloria  Resident: Jose Natarajan  Intern: Mello Butler    Date of Admit: 1/9/2019    Chief Complaint     Feels like the flu x 5 days    Subjective:      History of Present Illness:  Asad Perez is a 78 y.o.  male with a history of COPD, HFpEF, CVA, CKD 3, sacral decubitus ulcer, right knee AKA, peripheral vascular disease, CAD, and urinary incontinence who presented to [facility] on 1/9/2019 with a primary complaint of flu-like symptoms x5 days.  He has weakness, decreased appetite, chills, nausea, SOB, and mild non-productive cough.  He denies any fever, sick contacts, vomiting, chest pain, palpitation, abdominal pain, diarrhea, constipation, headache, or vision changes.  3 days ago, his wife called 911 for these symptoms, but his chills improved with a blanket, so they did not come to the hospital.      Patient has also had pain around his sacrum, which has had a slowly healing ulcer for the past 1 year.  Wife reports the ulcer is looking better than it has, but the sacral pain is what caused the patient to come to the hospital.  He is also complaining of some bilateral testicular pain.  He denies dysuria or hematuria.    At baseline, patient spends his time mostly in bed and occasionally wheelchair, uses diapers, and feeds himself.    Past Medical History:  COPD  HFpEF  CVA  CKD 3  Sacral decubitus ulcer  Right knee AKA  Peripheral vascular disease  CAD  Constipation  Urinary incontinence  Alcohol abuse    Past Surgical History:  Past Surgical History:   Procedure Laterality Date    AMPUTATION-ABOVE KNEE Right 9/12/2016    [facility]formed [facility]    aortic bifemoral bypass  2006    ARTHROPLASTY-KNEE Right 6/27/2016    [facility] at [facility] OR    ARTHROPLASTY-KNEE Left 7/14/2014    [facility] at [facility]    bilateral carotid stenois   2006    carotid stents      DEBRIDEMENT-SACRAL WOUND N/A 10/10/2016    Performed by [facility]arry [facility] at [facility] OR    DEBRIDEMENT-SACRAL WOUND Bilateral 9/29/2016    Performed by Maria Alejandra Pichardo DO at [facility] OR    DEBRIDEMENT-SACRAL WOUND N/A 9/16/2016    Performed by [facility]ardo DO at [facility] OR    DEBRIDEMENT-WOUND Right 4/8/2017    Performed by [facility]ry [facility] at [facility] OR    ESOPHAGOGASTRODUODENOSCOPY (EGD) N/A 11/4/2014    Performed by David Ramos MD at [facility] ENDO    ESOPHAGOGASTRODUODENOSCOPY (EGD) with PEG N/A 9/23/2016    Performed by Emerson Childers Jr., MD at [facility] ENDO    EXCHANGE-POLYETHYLENE RIGHT KNEE Right 7/12/2016    Performed by [facility] at [facility] OR    IOVERA Right 6/15/2016    [facility] at [facility] OR    IRRIGATION AND DEBRIDEMENT LOWER EXTREMITY Right 9/9/2016    Performed by [facility] at [facility] OR    IRRIGATION AND DEBRIDEMENT RIGHT KNEE Right 7/12/2016    Performed by [facility] at [facility] OR    JOINT REPLACEMENT      knee    left common endarterectomy  2006    REVISION-AMPUTATION STUMP Right 12/1/2016    Performed by [facility]ry [facility] at [facility] OR    RT AKA  NOV 2017    SYNOVECTOMY-KNEE Left 9/9/2016    Performed by [facility] at [facility] OR    WOUND EXPLORATION Right 9/9/2016    Performed by [facility]acho MD at [facility] OR       Allergies:  Review of patient's allergies indicates:  No Known Allergies    Home Medications:  Prior to Admission medications    Medication Sig Start Date End Date Taking? Authorizing Provider   albuterol-ipratropium 2.5mg-0.5mg/3mL (DUO-NEB) 0.5 mg-3 mg(2.5 mg base)/3 mL nebulizer solution Take 3 mLs by nebulization every 6 (six) hours while awake.  Patient taking differently: Take 3 mLs by nebulization 4 (four) times daily.  9/29/16 3/16/18  Nnamdi Venegas MD   amiodarone (PACERONE) 200 MG Tab Take 1 tablet (200 mg total) by mouth once daily. 9/29/16   Nnamdi Venegas MD   amLODIPine  (NORVASC) 10 MG tablet Take 10 mg by mouth once daily.    Historical Provider, MD   ascorbic acid, vitamin C, (VITAMIN C) 500 MG tablet Take 500 mg by mouth 2 (two) times daily.    Historical Provider, MD   aspirin (ECOTRIN) 81 MG EC tablet Take 81 mg by mouth once daily.    Historical Provider, MD   atorvastatin (LIPITOR) 40 MG tablet Take 40 mg by mouth once daily.    Historical Provider, MD   carvedilol (COREG) 25 MG tablet Take 1 tablet (25 mg total) by mouth 2 (two) times daily. 9/29/16 3/16/18  Nnamdi Venegas MD   clotrimazole-betamethasone 1-0.05% (LOTRISONE) cream Apply topically 2 (two) times daily. 18   Michael Irizarry MD   furosemide (LASIX) 40 MG tablet  17   Historical Provider, MD   multivitamin (THERAGRAN) per tablet Take 1 tablet by mouth once daily.    Historical Provider, MD   oxycodone-acetaminophen (PERCOCET) 5-325 mg per tablet Take 1 tablet by mouth every 4 (four) hours as needed for Pain. 17   Emerson Mckay MD   polyethylene glycol (GLYCOLAX) 17 gram/dose powder Take 17 g by mouth once daily. 18   David Ramos MD   traMADol (ULTRAM) 50 mg tablet Take 50 mg by mouth every 6 (six) hours as needed for Pain.    Historical Provider, MD       Family History:  Family History   Problem Relation Age of Onset    Heart disease Mother        Social History:  Tobacco- smoked 1-1.5 packs per day x40 years, quit after stroke in   Alcohol- history of heavy alcohol use; quit in  after stroke  Illicits- none  Work-     Review of Systems:  Pertinent items are noted in HPI. All other systems are reviewed and are negative.    Health Maintaince :   Primary Care Physician: Brian    Immunizations:   TDap UTD    Flu UTD   Pna not UTD    Cancer Screening:  Colonoscopy: unsure     Objective:   Last 24 Hour Vital Signs:  BP  Min: 99/48  Max: 122/60  Temp  Av °F (36.7 °C)  Min: 98 °F (36.7 °C)  Max: 98 °F (36.7 °C)  Pulse  Av.3  Min: 58  Max: 61  Resp  " Av.5  Min: 24  Max: 56  SpO2  Av.7 %  Min: 76 %  Max: 98 %  Height  Av' 8" (172.7 cm)  Min: 5' 8" (172.7 cm)  Max: 5' 8" (172.7 cm)  Weight  Av kg (150 lb)  Min: 68 kg (150 lb)  Max: 68 kg (150 lb)  Body mass index is 22.81 kg/m².  No intake/output data recorded.    Physical Examination:  Gen: AAOx3, NAD  HEENT: head normocephalic, atraumatic; PERRL; EOMI; trachea midline  Cardiac: soft heart sounds; regular rate and rhythm; no murmurs, rubs, or gallops  Resp: diffuse rhonchi; no wheezing; trace crackles in left lower lung field; normal work of breathing  GI: abdomen soft, non-tender, non-distended; normoactive bowel sounds, no hepatosplenomegaly  Extrem: Right AKA with well-healed scar without erythema or exudate, no clubbing  Skin: left lower leg with chronic venous stasis changes; left total knee arthroplasty scar well-healed; 1 cm stage 3 sacral decubitus ulcer present with some surrounding erythema but no exudate, mild tenderness to palpation, meplex dressing clean, dry, and intact; right posterolateral thigh with a 4 cm well-healed scar  : scrotum erythematous, non-tender; no testicular pain or swelling  Neuro: AAOx3, CN III-VII, IX-XII intact; strength 4/5 in all extremities; sensation intact to light touch in all extremities       Laboratory:  Most Recent Data:  CBC:   Lab Results   Component Value Date    WBC 20.67 (H) 2019    HGB 12.1 (L) 2019    HCT 38.0 (L) 2019     2019    MCV 94 2019    RDW 14.0 2019     WBC Differential: 84 % N, 0 % Bands, 6 % L, 9 % M, 0 % Eo, 0 % Baso, no additional cells seen  BMP:   Lab Results   Component Value Date     2019    K 4.3 2019    CL 99 2019    CO2 27 2019    BUN 49 (H) 2019    CREATININE 2.6 (H) 2019     (H) 2019    CALCIUM 8.6 (L) 2019    MG 2.3 2019    PHOS 3.7 2018     LFTs:   Lab Results   Component Value Date    PROT 6.1 " 01/09/2019    ALBUMIN 2.4 (L) 01/09/2019    BILITOT 0.5 01/09/2019     (H) 01/09/2019    ALKPHOS 121 01/09/2019     (H) 01/09/2019     Coags:   Lab Results   Component Value Date    INR 1.6 (H) 01/01/2017     FLP:   Lab Results   Component Value Date    CHOL 139 08/03/2016    HDL 29 (L) 08/03/2016    LDLCALC 93.4 08/03/2016    TRIG 83 08/03/2016    CHOLHDL 20.9 08/03/2016     DM:   Lab Results   Component Value Date    HGBA1C 5.7 (H) 03/16/2018    HGBA1C 7.9 (H) 11/27/2016    HGBA1C 6.7 (H) 07/13/2016    LDLCALC 93.4 08/03/2016    CREATININE 2.6 (H) 01/09/2019     Thyroid:   Lab Results   Component Value Date    TSH 1.894 01/09/2019     Anemia:   Lab Results   Component Value Date    IRON 20 (L) 11/27/2016    TIBC 145 (L) 11/27/2016    FERRITIN 2,805 (H) 11/29/2016    SCPJLBAB11 1152 (H) 11/27/2016    FOLATE 14.6 11/27/2016     Cardiac:   Lab Results   Component Value Date    TROPONINI 0.025 01/09/2019     (H) 07/06/2018     Urinalysis:   Lab Results   Component Value Date    LABURIN  04/27/2018     ENTEROCOCCUS FAECALIS  >100,000 cfu/ml  No other significant isolate      COLORU Yellow 04/27/2018    SPECGRAV 1.020 04/27/2018    NITRITE Negative 04/27/2018    KETONESU Negative 04/27/2018    UROBILINOGEN 2.0-3.0 (A) 04/27/2018    WBCUA 20 (H) 04/27/2018       Trended Lab Data:  Recent Labs   Lab 01/09/19  1741   WBC 20.67*   HGB 12.1*   HCT 38.0*      MCV 94   RDW 14.0      K 4.3   CL 99   CO2 27   BUN 49*   CREATININE 2.6*   *   PROT 6.1   ALBUMIN 2.4*   BILITOT 0.5   *   ALKPHOS 121   *       Trended Cardiac Data:  Recent Labs   Lab 01/09/19  1741   TROPONINI 0.025       Microbiology Data:  Blood cultures in process  Influenza negative    Other Results:  EKG (my interpretation): 1st degree AV block, normal sinus rhythm- similar to old EKG's    Radiology:  Imaging Results          US Scrotum And Testicles (Final result)  Result time 01/09/19 19:14:43    Final  result by Mary Da Silva MD (01/09/19 19:14:43)                 Impression:      No intratesticular masses.  Increased vascularity in the right testicle and large reactive right hydrocele.  No asymmetrical enlargement of the right testicle.  Overall findings are equivocal for right orchitis.    Bilateral hydroceles containing low level echoes right much greater than left.    Right epididymal head cyst.      Electronically signed by: Mary Da Silva  Date:    01/09/2019  Time:    19:14             Narrative:    EXAMINATION:  US SCROTUM AND TESTICLES    CLINICAL HISTORY:  Testicular pain, unspecified    TECHNIQUE:  Sonography of the scrotum and testes.    COMPARISON:  None.    FINDINGS:  Right Testicle:    *Size: 2.5 x 1.9 x 2.1 cm  *Appearance: Normal.  *Flow: Normal arterial and venous flow  *Epididymis: 0.8 x 1.2 cm.  Right epididymal head cyst measuring 3 x 2 x 3 mm.  *Hydrocele: Large hydrocele with low-level echoes.  *Varicocele: None.  .    Left Testicle:    *Size: 3.2 x 2.2 x 1.9 cm  *Appearance: Normal.  *Flow: Normal arterial and venous flow  *Epididymis: 0.6 x 1.3 cm  *Hydrocele: Small hydrocele with low-level echoes.  *Varicocele: None.  .    Other findings: The vascularity of the right testicle appears to be slightly greater than that of the left.  However, the right testicle is smaller than that of the left.  There is a large right-sided hydrocele, when compared to the left.                               X-Ray Chest AP Portable (Final result)  Result time 01/09/19 18:59:44    Final result by Mary Da Silva MD (01/09/19 18:59:44)                 Impression:      Left lung field scarring or atelectasis with mild asymmetric elevation of the left hemidiaphragm again seen.  No new focal areas of consolidation detected.      Electronically signed by: Mary Da Silva  Date:    01/09/2019  Time:    18:59             Narrative:    EXAMINATION:  XR CHEST AP PORTABLE    CLINICAL  HISTORY:  fatigue;    TECHNIQUE:  Single frontal view of the chest was performed.    COMPARISON:  07/06/2018    FINDINGS:  Portable AP chest radiograph demonstrates heart size within normal limits.  Vascular calcification is seen in the aortic knob.  There is tortuosity of the descending thoracic aorta.  There is mild asymmetric elevation of the left hemidiaphragm.  There is some linear scarring or atelectasis seen in the left lateral mid lung field.  The right lung is grossly clear.  There is no pneumothorax or significant pleural effusion.  The bones are diffusely osteopenic.  Degenerative changes are seen involving the spine and the shoulders.                                 Assessment:     Asad Perez is a 78 y.o. 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    Abdominal rigidity, generalized     Complicated open wound of right hip     Acute osteomyelitis 04/11/2017    Decubitus ulcer of buttock, stage 2 04/07/2017    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    Urinary tract infection associated with indwelling urethral catheter 12/01/2016    Centrilobular emphysema 11/29/2016    Symptomatic anemia 11/27/2016    Deep vein thrombosis (DVT) of brachial vein     Non-healing ulcer of lower leg 10/28/2016    Fever     Decubitus ulcer of sacral region, stage 4 10/05/2016    Deep vein thrombosis (DVT) of upper extremity 10/05/2016    Dehiscence of perineal wound 10/05/2016    Hyponatremia 10/05/2016    VRE (vancomycin-resistant Enterococci) infection 09/20/2016    Elevated liver enzymes     Chronic diastolic congestive heart failure     Critical lower limb ischemia 09/10/2016    Stenosis of left internal carotid artery 09/08/2016    Abnormal finding on imaging 08/31/2016    Abnormal CT of the abdomen      Chronic kidney disease, stage V 08/12/2016    Anemia 08/12/2016    Anemia of chronic disease 08/08/2016    Altered mental status 08/07/2016    MRSA (methicillin resistant Staphylococcus aureus) infection 08/07/2016    Confusion     Weak     Pneumonia 07/29/2016    History of atrial fibrillation     Infected prosthetic knee joint 07/12/2016    Right knee pain 06/15/2016    GERD (gastroesophageal reflux disease) 02/28/2015    Esophageal web 02/28/2015    Acute on chronic diastolic heart failure 11/08/2014    Transaminitis 11/08/2014    Chronic obstructive pulmonary disease 11/07/2014    Dyspnea 11/06/2014    Normocytic anemia 11/06/2014    Dysphagia 11/03/2014    Osteoarthritis of left knee 07/14/2014    Essential hypertension 07/16/2013    CKD (chronic kidney disease) 07/16/2013    PAD (peripheral artery disease) 07/16/2013    CAD (coronary artery disease) 07/16/2013    History of stroke 07/16/2013    Physical deconditioning 07/16/2013    Alcohol abuse 07/16/2013        Plan:     Acute on chronic hypercapnic respiratory failure 2/2 COPD exacerbation possibly 2/2 flu  -5 days of weakness, decreased appetite, chills, nausea, SOB, and non-productive cough  -Afebrile, WBC 20.67, rapid flu negative, procalcitonin pending, ABG with pH 7.287, CO2 56, O2 78;  cxr with some scarring/atelectasis in the left lateral mid lung field (similar to previous films)  -Follow up blood cultures- pending  -Levofloxacin, prednisone 40 mg daily, oseltamivir 30 daily (due to ELAYNE)  -Will attempt to do bipap tonight given ABG    ELAYNE on CKD 3  -Baseline creatinine 1.1-1.3  -Given 2L NS in the ED  -Will do maintenance fluids @ 50/hr x10 hours given history of heart failure in the setting of decreased PO intake and ELAYNE  -Avoid nephrotoxic agents  -Renally dose medications    Stage 3 sacral decubitus ulcer  -Meplex dressing in place  -Wound care consulted    Testicular pain  -Scrotum diffusely erythematous but minimally  tender  -Scrotal ultrasound- increased vascularity in the right testicle and large reactive right hydrocele- equivocal for orchitis  -Covering both COPD exacerbation and possible orchitis with levofloxacin    Transaminitis  -,   -Will get RUQ U/S    HFpEF  -TTE (11/28/16)- mild LA enlargement, concentric hypertrophy, normal LVSF, left ventricular diastolic dysfunction, pulmonary HTN (PA sys pressure ~47)  -Home medication: amiodarone 200, amlodipine 10, carvedilol 25  -Currently holding carvedilol in the setting of borderline bradycardia, continue other home meds    History of CVA  -CVA in 2007, no focal deficits on exam  -Continue ASA 81, atorvastatin 40  -Heparin for anticoagulation    Right knee AKA, peripheral vascular disease, CAD  -Continue ASA 81, atorvastatin 40    Constipation  -Polyethylene glycol    Urinary incontinence  -No acute issues    Alcohol abuse  -No alcohol since 2007    Healthcare maintenance  -UTD on flu, tetanus  -Needs pneumovax    Fluids: NS @50/hr x10 hours  Electrolytes: replace as needed  Nutrition: cardiac  PPx: heparin  Code status: full    Dispo: continue fluids, COPD treatment, monitor kidney function    Coleman Butler MD  [facility] Medicine HO-1    [facility] Medicine Hospitalist Pager numbers:   [facility] Hospitalist Medicine Team A (Chay/Shawanda): [phone]  [facility] Hospitalist Medicine Team B (Gloria/Alex):  [phone]

## 2019-01-10 NOTE — ED NOTES
Patient provided perineal care due to urinary incontinence. Condom catheter applied for urine specimen. Mepilex pad applied to buttocks over pressure ulcer. Pt turned on right side with pillow support. Admit team now at bedside. NS still infusing.

## 2019-01-11 NOTE — CONSULTS
[facility]/General Surgery  Consultation Note    SUBJECTIVE:     Reason for Consultation:   Concern for acute cholecystitis    History of Present Illness:  Patient is a 77 yo M w/ PMH of COPD, HFpEF, CVA, CKD stage 3, sacral decubitus ulcer followed by Dr. Irizarry, right knee AKA, PAD, CAD, and urinary incontinence who presented with flu-like symptoms. Complained of weakness, lowered appetite, nausea, chills, SOB, and mild cough. In process of working the patient up, he was found to have gallstones and mild elevation of his liver enzymes. Gen surg was called for evaluation.   Currently, patient denies any abdominal pain. Denies post-prandial pain. Has known history of gallstones. Denies any changes with bowel function. Currently passing flatus. Tolerating diet. Denies any nausea/vomiting.     Allergies:  Review of patient's allergies indicates:  No Known Allergies    Home Medications:  No current facility-administered medications on file prior to encounter.      Current Outpatient Medications on File Prior to Encounter   Medication Sig    albuterol-ipratropium 2.5mg-0.5mg/3mL (DUO-NEB) 0.5 mg-3 mg(2.5 mg base)/3 mL nebulizer solution Take 3 mLs by nebulization every 6 (six) hours while awake. (Patient taking differently: Take 3 mLs by nebulization 4 (four) times daily. )    amiodarone (PACERONE) 200 MG Tab Take 1 tablet (200 mg total) by mouth once daily.    amLODIPine (NORVASC) 10 MG tablet Take 10 mg by mouth once daily.    ascorbic acid, vitamin C, (VITAMIN C) 500 MG tablet Take 500 mg by mouth 2 (two) times daily.    aspirin (ECOTRIN) 81 MG EC tablet Take 81 mg by mouth once daily.    atorvastatin (LIPITOR) 40 MG tablet Take 40 mg by mouth once daily.    carvedilol (COREG) 25 MG tablet Take 1 tablet (25 mg total) by mouth 2 (two) times daily.    clotrimazole-betamethasone 1-0.05% (LOTRISONE) cream Apply topically 2 (two) times daily.    furosemide (LASIX) 40 MG tablet     multivitamin  (THERAGRAN) per tablet Take 1 tablet by mouth once daily.    oxycodone-acetaminophen (PERCOCET) 5-325 mg per tablet Take 1 tablet by mouth every 4 (four) hours as needed for Pain.    polyethylene glycol (GLYCOLAX) 17 gram/dose powder Take 17 g by mouth once daily.    traMADol (ULTRAM) 50 mg tablet Take 50 mg by mouth every 6 (six) hours as needed for Pain.       Past Medical History:   Diagnosis Date    Alcohol abuse     CHF (congestive heart failure)     CKD (chronic kidney disease) stage 3, GFR 30-59 ml/min     Coronary artery disease     Decubitus skin ulcer     Heart failure, diastolic     High cholesterol     Hypertension     Immobility     LBP (low back pain)     Prediabetes     PVD (peripheral vascular disease)     Stroke     2006, no deficits    Urine incontinence      Past Surgical History:   Procedure Laterality Date    AMPUTATION-ABOVE KNEE Right 9/12/2016    [facility]formed by [facility] at [facility]    aortic bifemoral bypass  2006    ARTHROPLASTY-KNEE Right 6/27/2016    [facility] at [facility]    ARTHROPLASTY-KNEE Left 7/14/2014    [facility] at [facility]    bilateral carotid stenois  2006    carotid stents      DEBRIDEMENT-SACRAL WOUND N/A 10/10/2016    Performed by Michael [facility]ry [facility] at [facility] OR    DEBRIDEMENT-SACRAL WOUND Bilateral 9/29/2016    Performed by [facility] Alejandra Pichardo DO at [facility] OR    DEBRIDEMENT-SACRAL WOUND N/A 9/16/2016    Performed by [facility] at [facility] OR    DEBRIDEMENT-WOUND Right 4/8/2017    [facility] by [facility]ry [facility] at [facility]    ESOPHAGOGASTRODUODENOSCOPY (EGD) N/A 11/4/2014    Performed by David Ramos MD at [facility] ENDO    ESOPHAGOGASTRODUODENOSCOPY (EGD) with PEG N/A 9/23/2016    Performed by Emerson Childers Jr., MD at [facility] ENDO    EXCHANGE-POLYETHYLENE RIGHT KNEE Right 7/12/2016    Performed by [facility] at [facility]    IOVERA Right 6/15/2016    Performed [facility]     IRRIGATION AND DEBRIDEMENT LOWER EXTREMITY Right 2016    [facility]    IRRIGATION AND DEBRIDEMENT RIGHT KNEE Right 2016    [facility] by [facility] at [facility] OR    JOINT REPLACEMENT      knee    left common endarterectomy  2006    REVISION-AMPUTATION STUMP Right 2016    [facility] at [facility] OR    RT AKA  2017    SYNOVECTOMY-KNEE Left 2016    [facility] at [facility] OR    WOUND EXPLORATION Right 2016    Per[facility] by [facility] at [facility] OR     Family History   Problem Relation Age of Onset    Heart disease Mother      Social History     Tobacco Use    Smoking status: Former Smoker     Packs/day: 2.00     Years: 45.00     Pack years: 90.00     Last attempt to quit: 2006     Years since quittin.4    Smokeless tobacco: Never Used   Substance Use Topics    Alcohol use: No     Comment: NO ALCOHOL FOR 18 MONTHS, PAST ETOH ABUSE    Drug use: No        Review of Systems:  12 pt ROS negative except for stated above    OBJECTIVE:     Vital Signs:  Temp: 97.9 °F (36.6 °C) (19 1207)  Pulse: 62 (19 1207)  Resp: 17 (19 1207)  BP: (!) 119/56 (19 1207)  SpO2: (!) 94 % (19 1207)    Physical Exam:  General: well developed, well nourished, no distress  HEENT: normocephalic, atraumatic, hearing grossly normal bilaterally, mucous membranes moist, EOM intact, no scleral icterus  Neck: supple, symmetrical, trachea midline, no JVD  Lungs:  normal respiratory effort  Cardiovascular: regular rate and rhythm.    Abdomen: soft, non-tender to palpation, no distention, no masses/hernias  Skin: Skin color, texture, turgor normal. No rashes or lesions  Psych/Behavioral:  Alert and oriented, appropriate affect.    Laboratory:  Labs Reviewed   CBC W/ AUTO DIFFERENTIAL - Abnormal; Notable for the following components:       Result Value    WBC 20.67 (*)     RBC 4.05 (*)     Hemoglobin 12.1 (*)      Hematocrit 38.0 (*)     MCHC 31.8 (*)     Gran # (ANC) 17.3 (*)     Mono # 1.9 (*)     Gran% 83.9 (*)     Lymph% 6.4 (*)     All other components within normal limits   COMPREHENSIVE METABOLIC PANEL - Abnormal; Notable for the following components:    Glucose 163 (*)     BUN, Bld 49 (*)     Creatinine 2.6 (*)     Calcium 8.6 (*)     Albumin 2.4 (*)      (*)      (*)     eGFR if  27 (*)     eGFR if non  23 (*)     All other components within normal limits   B-TYPE NATRIURETIC PEPTIDE - Abnormal; Notable for the following components:     (*)     All other components within normal limits   ISTAT PROCEDURE - Abnormal; Notable for the following components:    POC PH 7.287 (*)     POC PCO2 56.1 (*)     POC PO2 78 (*)     POC SATURATED O2 93 (*)     POC TCO2 28 (*)     All other components within normal limits   INFLUENZA A AND B ANTIGEN   MAGNESIUM   LACTIC ACID, PLASMA   TSH   TROPONIN I   CK   B-TYPE NATRIURETIC PEPTIDE   PROCALCITONIN   URINALYSIS, REFLEX TO URINE CULTURE       Diagnostic Results:  Imaging Results          US Scrotum And Testicles (Final result)  Result time 01/09/19 19:14:43    Final result by Mary Da Silva MD (01/09/19 19:14:43)                 Impression:      No intratesticular masses.  Increased vascularity in the right testicle and large reactive right hydrocele.  No asymmetrical enlargement of the right testicle.  Overall findings are equivocal for right orchitis.    Bilateral hydroceles containing low level echoes right much greater than left.    Right epididymal head cyst.      Electronically signed by: Mary Da Silva  Date:    01/09/2019  Time:    19:14             Narrative:    EXAMINATION:  US SCROTUM AND TESTICLES    CLINICAL HISTORY:  Testicular pain, unspecified    TECHNIQUE:  Sonography of the scrotum and testes.    COMPARISON:  None.    FINDINGS:  Right Testicle:    *Size: 2.5 x 1.9 x 2.1 cm  *Appearance: Normal.  *Flow:  Normal arterial and venous flow  *Epididymis: 0.8 x 1.2 cm.  Right epididymal head cyst measuring 3 x 2 x 3 mm.  *Hydrocele: Large hydrocele with low-level echoes.  *Varicocele: None.  .    Left Testicle:    *Size: 3.2 x 2.2 x 1.9 cm  *Appearance: Normal.  *Flow: Normal arterial and venous flow  *Epididymis: 0.6 x 1.3 cm  *Hydrocele: Small hydrocele with low-level echoes.  *Varicocele: None.  .    Other findings: The vascularity of the right testicle appears to be slightly greater than that of the left.  However, the right testicle is smaller than that of the left.  There is a large right-sided hydrocele, when compared to the left.                               X-Ray Chest AP Portable (Final result)  Result time 01/09/19 18:59:44    Final result by Mary Da Silva MD (01/09/19 18:59:44)                 Impression:      Left lung field scarring or atelectasis with mild asymmetric elevation of the left hemidiaphragm again seen.  No new focal areas of consolidation detected.      Electronically signed by: Mary Da Silva  Date:    01/09/2019  Time:    18:59             Narrative:    EXAMINATION:  XR CHEST AP PORTABLE    CLINICAL HISTORY:  fatigue;    TECHNIQUE:  Single frontal view of the chest was performed.    COMPARISON:  07/06/2018    FINDINGS:  Portable AP chest radiograph demonstrates heart size within normal limits.  Vascular calcification is seen in the aortic knob.  There is tortuosity of the descending thoracic aorta.  There is mild asymmetric elevation of the left hemidiaphragm.  There is some linear scarring or atelectasis seen in the left lateral mid lung field.  The right lung is grossly clear.  There is no pneumothorax or significant pleural effusion.  The bones are diffusely osteopenic.  Degenerative changes are seen involving the spine and the shoulders.                                ASSESSMENT:     77 yo M w/ COPD exacerbation 2/2 flu, ELAYNE, stage 3 sacral decub, and mildly elevated LFTs    PLAN:      LFTs are more indicative of hepatic in origin vs cholestatic  Tbili normal  Hep panel negative  Not concerned currently for acute cholecystitis  No surgical intervention at this time

## 2019-01-11 NOTE — PLAN OF CARE
Problem: Adult Inpatient Plan of Care  Goal: Plan of Care Review  Pt on documented O2. No apparent distress noted. Will continue to monitor.

## 2019-01-11 NOTE — PLAN OF CARE
Problem: Adult Inpatient Plan of Care  Goal: Plan of Care Review  Outcome: Revised  Pt stable. NO distress noted. POC reviewed with pt. Pt verbalized understanding. Pt remains SB-SB with 1st degree AV Block on the monitor. NO true red alarms noted. Pt placed on droplet precautions. Pt refused Bipap around 3am Pt sating 94% on 5LNC. Mepliex placed on sacral area. Pt repositioned frequently.  Fall precautions maintained. Bed in lowest position, call light in reach and bed alarm on.

## 2019-01-11 NOTE — PLAN OF CARE
Problem: Adult Inpatient Plan of Care  Goal: Plan of Care Review  Outcome: Ongoing (interventions implemented as appropriate)   01/10/19 4379   Plan of Care Review   Plan of Care Reviewed With patient;spouse   Patient awake, alert and oriented. VSS.Patient on 2 liters NC, tolerating will. Patient on tele, no ectopy on tele. Bed alarm on, call light in reach. Spouse at the bedside. No complaints of pain. HOB elevated. Mepelix on sacral area.

## 2019-01-11 NOTE — PROGRESS NOTES
[facility] Medicine Progress Note    Primary Team: \[facility]\"" Hospitalist Team B  Attending Physician: Gloria  Resident: Jose Natarajan  Intern: Mello Butler    Subjective:      Patient feeling much better overnight.  He has improvements in his breathing, cough, and sacral pain.  He is tolerating PO diet without any nausea or vomiting.  He wants to go home today.     Objective:     Last 24 Hour Vital Signs:  BP  Min: 112/56  Max: 122/57  Temp  Av °F (36.1 °C)  Min: 96.1 °F (35.6 °C)  Max: 98.2 °F (36.8 °C)  Pulse  Av.2  Min: 59  Max: 69  Resp  Av.9  Min: 18  Max: 22  SpO2  Av.7 %  Min: 93 %  Max: 97 %  I/O last 3 completed shifts:  In: 1345.8 [P.O.:1010; I.V.:335.8]  Out: 1121 [Urine:1121]    Physical Examination:  Gen: AAOx3, NAD  HEENT: head normocephalic, atraumatic; PERRL; EOMI; trachea midline  Cardiac: soft heart sounds; regular rate and rhythm; no murmurs, rubs, or gallops  Resp: minimal diffuse rhonchi; no wheezing; otherwise CTAB; normal work of breathing  GI: abdomen soft, non-tender, non-distended; normoactive bowel sounds, no hepatosplenomegaly  Extrem: Right AKA with well-healed scar without erythema or exudate, no clubbing  Skin: left lower leg with chronic venous stasis changes; left total knee arthroplasty scar well-healed; 1 cm stage 3 sacral decubitus ulcer present with some surrounding erythema but no exudate, mild tenderness to palpation, meplex dressing clean, dry, and intact; right posterolateral thigh with a 4 cm well-healed scar  : scrotum erythematous, non-tender; no testicular pain or swelling  Neuro: AAOx3, CN III-VII, IX-XII intact; strength 4/5 in all extremities; sensation intact to light touch in all extremities       Laboratory:  Laboratory Data Reviewed: yes  Pertinent Findings:  WBC 16 (up from 14.8)  BUN 49, Cr 2.4  ,     Microbiology Data Reviewed: yes  Pertinent Findings:  Blood cultures gram negative rods in 1/2 aerobic and anaerobic  bottles    Other Results:  EKG (my interpretation): sinus rhythm with first degree AV block    Radiology Data Reviewed: yes  Pertinent Findings:  Ultrasound abdomen pending    Current Medications:     Infusions:       Scheduled:   amiodarone  200 mg Oral Daily    amLODIPine  10 mg Oral Daily    aspirin  81 mg Oral Daily    atorvastatin  40 mg Oral Daily    carvedilol  25 mg Oral BID    heparin (porcine)  5,000 Units Subcutaneous Q8H    levoFLOXacin  500 mg Intravenous Q24H    oseltamivir  30 mg Oral Daily    polyethylene glycol  17 g Oral Daily    predniSONE  40 mg Oral Daily        PRN:  pneumoc 13-clementina conj-dip cr(PF), sodium chloride 0.9%    Antibiotics and Day Number of Therapy:  Levofloxacin 1/9-present  Oseltamavir 1/9-1/13    Lines and Day Number of Therapy:  PIV    Assessment:     Asad Perez is a 78 y.o.male with  Patient Active Problem List    Diagnosis Date Noted    COPD with acute exacerbation 01/09/2019    Abdominal pain 03/19/2018    COPD exacerbation 03/16/2018    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    Abdominal rigidity, generalized     Complicated open wound of right hip     Acute osteomyelitis 04/11/2017    Decubitus ulcer of buttock, stage 2 04/07/2017    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    Urinary tract infection associated with indwelling urethral catheter 12/01/2016    Centrilobular emphysema 11/29/2016    Symptomatic anemia 11/27/2016    Deep vein thrombosis (DVT) of brachial vein     Non-healing ulcer of lower leg 10/28/2016    Fever     Decubitus ulcer of sacral region, stage 4 10/05/2016    Deep vein thrombosis (DVT) of upper extremity 10/05/2016    Dehiscence of perineal wound 10/05/2016    Hyponatremia 10/05/2016    VRE (vancomycin-resistant Enterococci) infection 09/20/2016    Elevated liver enzymes     Chronic diastolic congestive heart failure     Critical lower limb ischemia 09/10/2016    Stenosis of left internal  carotid artery 09/08/2016    Abnormal finding on imaging 08/31/2016    Abnormal CT of the abdomen     Chronic kidney disease, stage V 08/12/2016    Anemia 08/12/2016    Anemia of chronic disease 08/08/2016    Altered mental status 08/07/2016    MRSA (methicillin resistant Staphylococcus aureus) infection 08/07/2016    Confusion     Weak     Pneumonia 07/29/2016    History of atrial fibrillation     Infected prosthetic knee joint 07/12/2016    Right knee pain 06/15/2016    GERD (gastroesophageal reflux disease) 02/28/2015    Esophageal web 02/28/2015    Acute on chronic diastolic heart failure 11/08/2014    Transaminitis 11/08/2014    Chronic obstructive pulmonary disease 11/07/2014    Dyspnea 11/06/2014    Normocytic anemia 11/06/2014    Dysphagia 11/03/2014    Osteoarthritis of left knee 07/14/2014    Essential hypertension 07/16/2013    CKD (chronic kidney disease) 07/16/2013    PAD (peripheral artery disease) 07/16/2013    CAD (coronary artery disease) 07/16/2013    History of stroke 07/16/2013    Physical deconditioning 07/16/2013    Alcohol abuse 07/16/2013        Plan:     Sepsis 2/2 acute on chronic hypercapnic respiratory failure 2/2 COPD exacerbation possibly 2/2 flu  -5 days of weakness, decreased appetite, chills, nausea, SOB, and non-productive cough  -Afebrile, WBC 20.67, rapid flu negative, procalcitonin 0.5, ABG with pH 7.287, CO2 56, O2 78;  cxr with some scarring/atelectasis in the left lateral mid lung field (similar to previous films)  -Blood cultures growing gram negative rods  -Levofloxacin, prednisone 40 mg daily, oseltamivir 30 daily (due to ELAYNE)  -Doing well on nightly BIPAP and 2L NC     ELAYNE on CKD 3  -Baseline creatinine 1.1-1.3  -Given 2L NS in the ED, NS infusion at 50/hr x10 hr  -Avoid nephrotoxic agents  -Renally dose medications  -Improving     Stage 3 sacral decubitus ulcer (present on admission)  -Meplex dressing in place  -Wound care consulted  -Will  need home health nurse for sacral decubitus ulcer     Testicular pain  -Scrotum diffusely erythematous but minimally tender  -Scrotal ultrasound- increased vascularity in the right testicle and large reactive right hydrocele- equivocal for orchitis  -Covering both COPD exacerbation and possible orchitis with levofloxacin     Transaminitis  -,   -RUQ U/S showing cholelithiasis without acute cholecystitis      HFpEF  -TTE (11/28/16)- mild LA enlargement, concentric hypertrophy, normal LVSF, left ventricular diastolic dysfunction, pulmonary HTN (PA sys pressure ~47)  -Home medication: amiodarone 200, amlodipine 10, carvedilol 25  -Currently holding carvedilol in the setting of borderline bradycardia, continue other home meds     History of CVA  -CVA in 2007, no focal deficits on exam  -Continue ASA 81, atorvastatin 40  -Heparin for anticoagulation     Right knee AKA, peripheral vascular disease, CAD  -Continue ASA 81, atorvastatin 40     Constipation  -Polyethylene glycol     Urinary incontinence  -No acute issues     Alcohol abuse  -No alcohol since 2007     Healthcare maintenance  -UTD on flu, tetanus  -Needs pneumovax     Fluids: NS @50/hr x10 hours  Electrolytes: replace as needed  Nutrition: cardiac  PPx: heparin  Code status: full     Dispo: continue fluids, COPD treatment, monitor kidney function; likely discharge today    Coleman Butler MD  [facility] HO-1    [facility] Medicine Hospitalist Pager numbers:   [facility] Hospitalist Medicine Team A (Chay/Shawanda): [phone]  [facility] Hospitalist Medicine Team B (Gloria/Alex):  [phone]

## 2019-01-11 NOTE — PROGRESS NOTES
.Pharmacy New Medication Education    Patient accepted medication education.    Pharmacy educated patient on name and purpose of medications and possible side effects, using the teach-back method.     Current Inpatient Medication Orders   amiodarone tablet 200 mg   amLODIPine tablet 10 mg   aspirin EC tablet 81 mg   atorvastatin tablet 40 mg   carvedilol tablet 25 mg   heparin (porcine) injection 5,000 Units   levoFLOXacin 500 mg/100 mL IVPB 500 mg   oseltamivir capsule 30 mg   pneumoc 13-clementina conj-dip cr(PF) (PREVNAR 13 (PF)) 0.5 mL   polyethylene glycol packet 17 g   predniSONE tablet 40 mg   sodium chloride 0.9% flush 3 mL          Learners of pharmacy medication education included:  Patient    Patient +/- learner response:  Verbalized Understanding, Teachback

## 2019-01-11 NOTE — CONSULTS
Today`s Date: 2019     Admit Date: 2019    Admitting Physician: Carl Castro MD    Patient`s Name: Asad Perez , 78 y.o. male    Reason for consultation  Wound    Patient Active Problem List:     Essential hypertension     CKD (chronic kidney disease)     PAD (peripheral artery disease)     CAD (coronary artery disease)     History of stroke     Physical deconditioning     Alcohol abuse     Osteoarthritis of left knee     Dysphagia     Dyspnea     Normocytic anemia     Chronic obstructive pulmonary disease     Acute on chronic diastolic heart failure     Transaminitis     GERD (gastroesophageal reflux disease)     Esophageal web     Right knee pain     Infected prosthetic knee joint     History of atrial fibrillation     Pneumonia     Confusion     Weak     Altered mental status     MRSA (methicillin resistant Staphylococcus aureus) infection     Anemia of chronic disease     Chronic kidney disease, stage V     Anemia     Abnormal CT of the abdomen     Abnormal finding on imaging     Stenosis of left internal carotid artery     Critical lower limb ischemia     Chronic diastolic congestive heart failure     Decubitus ulcer of sacral region, stage 4     Elevated liver enzymes     Deep vein thrombosis (DVT) of upper extremity     VRE (vancomycin-resistant Enterococci) infection     Dehiscence of perineal wound     Hyponatremia     Fever     Non-healing ulcer of lower leg     Deep vein thrombosis (DVT) of brachial vein     Symptomatic anemia     Centrilobular emphysema     Non-healing wound of amputation stump     Urinary tract infection associated with indwelling urethral catheter     Decubitus ulcer of buttock, stage 2     Acute osteomyelitis     Complicated open wound of right hip     COPD exacerbation     Occlusion of superior mesenteric artery     Abdominal rigidity, generalized     Abdominal pain     COPD with acute exacerbation      Past Medical History:  No date: Alcohol abuse  No date: CHF  (congestive heart failure)  No date: CKD (chronic kidney disease) stage 3, GFR 30-59 ml/min  No date: Coronary artery disease  No date: Decubitus skin ulcer  No date: Heart failure, diastolic  No date: High cholesterol  No date: Hypertension  No date: Immobility  No date: LBP (low back pain)  No date: Prediabetes  No date: PVD (peripheral vascular disease)  No date: Stroke      Comment:  2006, no deficits  No date: Urine incontinence    Past Surgical History:  9/12/2016: AMPUTATION-ABOVE KNEE; Right      Comment:  Performed by Cristino Camacho MD at [facility] OR  2006: aortic bifemoral bypass  6/27/2016: ARTHROPLASTY-KNEE; Right      Comment:  Performed by Cristino Camacho MD at [facility] OR  7/14/2014: ARTHROPLASTY-KNEE; Left      Comment:  Performed by Cristino Camacho MD at [facility] OR  2006: bilateral carotid stenois  No date: carotid stents  10/10/2016: DEBRIDEMENT-SACRAL WOUND; N/A      Comment:  Performed by Michael Irizarry MD at [facility] OR  9/29/2016: DEBRIDEMENT-SACRAL WOUND; Bilateral      Comment:  Performed by Maria Alejandra Pichardo DO at [facility] OR  9/16/2016: DEBRIDEMENT-SACRAL WOUND; N/A      Comment:  Performed by Maria Alejandra Pichardo DO at [facility] OR  4/8/2017: DEBRIDEMENT-WOUND; Right      Comment:  Performed by Michael Irizarry MD at [facility] OR  11/4/2014: ESOPHAGOGASTRODUODENOSCOPY (EGD); N/A      Comment:  Performed by David Ramos MD at [facility] ENDO  9/23/2016: ESOPHAGOGASTRODUODENOSCOPY (EGD) with PEG; N/A      Comment:  Performed by Emerson Childers Jr., MD at [facility] ENDO  7/12/2016: EXCHANGE-POLYETHYLENE RIGHT KNEE; Right      Comment:  Performed by Cristino Camacho MD at [facility] OR  6/15/2016: IOVERA; Right      Comment:  Performed by Cristino Camacho MD at [facility] OR  9/9/2016: IRRIGATION AND DEBRIDEMENT LOWER EXTREMITY; Right      Comment:  Performed by Cristino Camacho MD at [facility] OR  7/12/2016: IRRIGATION AND DEBRIDEMENT RIGHT KNEE; Right      Comment:  Performed by Cristino Camacho MD at [facility] OR  No date: JOINT REPLACEMENT      Comment:   knee  2006: left common endarterectomy  12/1/2016: REVISION-AMPUTATION STUMP; Right      Comment:  Performed by Michael Irizarry MD at [facility] OR  NOV 2017: RT AKA  9/9/2016: SYNOVECTOMY-KNEE; Left      Comment:  Performed by Cristino Camacho MD at [facility] OR  9/9/2016: WOUND EXPLORATION; Right      Comment:  Performed by Cristino Camacho MD at [facility] OR    Prior to Admission medications :  Medication albuterol-ipratropium 2.5mg-0.5mg/3mL (DUO-NEB) 0.5 mg-3 mg(2.5 mg base)/3 mL nebulizer solution, Sig Take 3 mLs by nebulization every 6 (six) hours while awake.Patient taking differently: Take 3 mLs by nebulization 4 (four) times daily. , Start Date 9/29/16, End Date 3/16/18, Taking? , Authorizing Provider Nnamdi Venegas MD    Medication amiodarone (PACERONE) 200 MG Tab, Sig Take 1 tablet (200 mg total) by mouth once daily., Start Date 9/29/16, End Date , Taking? , Authorizing Provider Nnamdi Venegas MD    Medication amLODIPine (NORVASC) 10 MG tablet, Sig Take 10 mg by mouth once daily., Start Date , End Date , Taking? , Authorizing Provider Historical Provider, MD    Medication ascorbic acid, vitamin C, (VITAMIN C) 500 MG tablet, Sig Take 500 mg by mouth 2 (two) times daily., Start Date , End Date , Taking? , Authorizing Provider Historical Provider, MD    Medication aspirin (ECOTRIN) 81 MG EC tablet, Sig Take 81 mg by mouth once daily., Start Date , End Date , Taking? , Authorizing Provider Historical Provider, MD    Medication atorvastatin (LIPITOR) 40 MG tablet, Sig Take 40 mg by mouth once daily., Start Date , End Date , Taking? , Authorizing Provider Historical Provider, MD    Medication carvedilol (COREG) 25 MG tablet, Sig Take 1 tablet (25 mg total) by mouth 2 (two) times daily., Start Date 9/29/16, End Date 3/16/18, Taking? , Authorizing Provider Nnamdi Venegas MD    Medication clotrimazole-betamethasone 1-0.05% (LOTRISONE) cream, Sig Apply topically 2 (two) times daily., Start Date 8/23/18, End Date ,  Taking? , Authorizing Provider Michael Irizarry MD    Medication furosemide (LASIX) 40 MG tablet, Sig , Start Date 6/5/17, End Date , Taking? , Authorizing Provider Historical MD Monica    Medication multivitamin (THERAGRAN) per tablet, Sig Take 1 tablet by mouth once daily., Start Date , End Date , Taking? , Authorizing Provider Thompson Anderson MD    Medication oxycodone-acetaminophen (PERCOCET) 5-325 mg per tablet, Sig Take 1 tablet by mouth every 4 (four) hours as needed for Pain., Start Date 1/9/17, End Date , Taking? , Authorizing Provider Emerson Mckay MD    Medication polyethylene glycol (GLYCOLAX) 17 gram/dose powder, Sig Take 17 g by mouth once daily., Start Date 12/12/18, End Date , Taking? , Authorizing Provider David Ramos MD    Medication traMADol (ULTRAM) 50 mg tablet, Sig Take 50 mg by mouth every 6 (six) hours as needed for Pain., Start Date , End Date , Taking? , Authorizing Provider Historical Provider, MD      No current facility-administered medications on file prior to encounter.   Current Outpatient Medications on File Prior to Encounter:  albuterol-ipratropium 2.5mg-0.5mg/3mL (DUO-NEB) 0.5 mg-3 mg(2.5 mg base)/3 mL nebulizer solution, Take 3 mLs by nebulization every 6 (six) hours while awake. (Patient taking differently: Take 3 mLs by nebulization 4 (four) times daily. ), Disp: 270 mL, Rfl: 11  amiodarone (PACERONE) 200 MG Tab, Take 1 tablet (200 mg total) by mouth once daily., Disp: 9 tablet, Rfl: 2  amLODIPine (NORVASC) 10 MG tablet, Take 10 mg by mouth once daily., Disp: , Rfl:   ascorbic acid, vitamin C, (VITAMIN C) 500 MG tablet, Take 500 mg by mouth 2 (two) times daily., Disp: , Rfl:   aspirin (ECOTRIN) 81 MG EC tablet, Take 81 mg by mouth once daily., Disp: , Rfl:   atorvastatin (LIPITOR) 40 MG tablet, Take 40 mg by mouth once daily., Disp: , Rfl:   carvedilol (COREG) 25 MG tablet, Take 1 tablet (25 mg total) by mouth 2 (two) times daily., Disp: 60 tablet, Rfl:  11  clotrimazole-betamethasone 1-0.05% (LOTRISONE) cream, Apply topically 2 (two) times daily., Disp: 455 g, Rfl: 1  furosemide (LASIX) 40 MG tablet, , Disp: , Rfl:   multivitamin (THERAGRAN) per tablet, Take 1 tablet by mouth once daily., Disp: , Rfl:   oxycodone-acetaminophen (PERCOCET) 5-325 mg per tablet, Take 1 tablet by mouth every 4 (four) hours as needed for Pain., Disp: 15 tablet, Rfl: 0  polyethylene glycol (GLYCOLAX) 17 gram/dose powder, Take 17 g by mouth once daily., Disp: 1700 g, Rfl: 3  traMADol (ULTRAM) 50 mg tablet, Take 50 mg by mouth every 6 (six) hours as needed for Pain., Disp: , Rfl:          Review of patient's allergies indicates:  No Known Allergies    Social History:   reports that he quit smoking about 12 years ago. He has a 90.00 pack-year smoking history. he has never used smokeless tobacco. He reports that he does not drink alcohol or use drugs.     Review of patient's family history indicates:  Problem: Heart disease      Relation: Mother          Age of Onset: (Not Specified)      PHYSICAL EXAMINATION  Temp:  [96.1 °F (35.6 °C)-97.9 °F (36.6 °C)] 97.9 °F (36.6 °C)  Pulse:  [59-69] 62  Resp:  [17-22] 17  SpO2:  [80 %-97 %] 94 %  BP: (112-119)/(56-58) 119/56    General Condition:   alert x 3     Head & Neck  Anemia: None  Jaundice: None  Neck vein: Not distended  Carotid Bruits: none  Lymph nodes: none palpable  Thyroid: normal    Chest: normal    Heart: normal    Rt. Breast: not examined  Lt. Breast: not examined  Axillary lymph nodes: none    Abdomen: Soft,  None tender with no palpable mass or organ  Hernia: none    Rectal: Defered    Extremities: s/p right aka  Stump healing well    Vascular: normal    Specific focus Examination  Stage 2 sacral decubitus ulcer    Imp: copd, uri, s/p right aka , non healing sacral decubitus ulcer    Plan: apply mepalex a/g every third day

## 2019-01-11 NOTE — PLAN OF CARE
Problem: Adult Inpatient Plan of Care  Goal: Plan of Care Review  Outcome: Ongoing (interventions implemented as appropriate)  Pt safety maintained. Bed in low and locked position. Pt remains on 02 5L per NC. cpap at night. Wound care to sacral wound preformed. Pt assisted in turning q2 hours. SR on tele. No acute distress noted. Will continue to monitor.

## 2019-01-12 NOTE — PLAN OF CARE
Problem: Adult Inpatient Plan of Care  Goal: Patient-Specific Goal (Individualization)  Pt safety measures maintained.  Pt remains on external oxygen via 5L per NC. BIPAP was placed on patient by RT, however patient removed BiPAP after having it on for a few hours . Pt assisted in turning q2 hours. SR on tele. No acute distress noted. Will continue to monitor.

## 2019-01-12 NOTE — PROGRESS NOTES
"Surgery follow up  BP (!) 117/57   Pulse 64   Temp 97.5 °F (36.4 °C)   Resp (!) 21   Ht 5' 8" (1.727 m)   Wt 70 kg (154 lb 5.2 oz)   SpO2 (!) 94%   BMI 23.46 kg/m²   I/O last 3 completed shifts:  In: 480 [P.O.:280; IV Piggyback:200]  Out: 1455 [Urine:1455]  I/O this shift:  In: 425 [P.O.:425]  Out: 500 [Urine:500]  Doing better wound ok , continue present treatment.  "

## 2019-01-12 NOTE — PLAN OF CARE
Patient lying in bed, A/O. No S/S of pain or discomfort noted at this time. NADN. Verbal, able to make needs known. Tele NSR. No accuchecks. Patient incontinent of B/B- kyrie care provided throughout shift. Patient repositioned Q2H and PRN. Plan of care and medications reviewed with patient- verbalizes understanding. Bed in lowest position, side rails up x 2, call light within reach. Will continue to monitor patient.

## 2019-01-12 NOTE — PROGRESS NOTES
[facility] Medicine Progress Note    Primary Team: [facility] Hospitalist Team B  Attending Physician: Gloria  Resident: Jose Natarajan  Intern: Mello Butler    Subjective:      Patient endorses sleeping well overnight. He is saturating well and comfortable on 2L NC. His breathing and coughing have improved. Pt had gum pain last night relieved with orajel and describes 'butt pain' this morning due to pressure ulcers.  He is tolerating PO diet without any nausea or vomiting.  Pt is on day 4 of IV levoquin for bacteriemia, will transition to PO tomorrow and discharge. Pt denies N/V/CP/SOB/abdominal pain.     Objective:     Last 24 Hour Vital Signs:  BP  Min: 115/57  Max: 132/59  Temp  Av.2 °F (36.2 °C)  Min: 96.5 °F (35.8 °C)  Max: 97.9 °F (36.6 °C)  Pulse  Av.4  Min: 56  Max: 70  Resp  Av.5  Min: 16  Max: 21  SpO2  Av.9 %  Min: 80 %  Max: 98 %  I/O last 3 completed shifts:  In: 480 [P.O.:280; IV Piggyback:200]  Out: 1455 [Urine:1455]    Physical Examination:  Gen: AAOx3, NAD  HEENT: head normocephalic, atraumatic; PERRL; EOMI; trachea midline  Cardiac: soft heart sounds; regular rate and rhythm; no murmurs, rubs, or gallops  Resp: minimal diffuse rhonchi; no wheezing; otherwise CTAB; decreased inspiratory effort  GI: abdomen soft, non-tender, non-distended; normoactive bowel sounds, no hepatosplenomegaly  Extrem: Right AKA with well-healed scar without erythema or exudate, no clubbing  Skin: left lower leg with chronic venous stasis changes; left total knee arthroplasty scar well-healed; 1 cm stage 3 sacral decubitus ulcer present with some surrounding erythema but no exudate, mild tenderness to palpation, meplex dressing clean, dry, and intact; right posterolateral thigh with a 4 cm well-healed scar  : scrotum erythematous, non-tender; no testicular pain or swelling  Neuro: AAOx3, CN III-VII, IX-XII intact; strength 4/5 in all extremities; sensation intact to light touch in all extremities        Laboratory:  Laboratory Data Reviewed: yes  Pertinent Findings:  WBC 16 (up from 14.8)  BUN 49, Cr 2.4  ,     Microbiology Data Reviewed: yes  Pertinent Findings:  Blood cultures gram negative rods in 1/2 aerobic and anaerobic bottles    Other Results:  EKG (my interpretation): sinus rhythm with first degree AV block    Radiology Data Reviewed: yes  Pertinent Findings:  Ultrasound abdomen pending    Current Medications:     Infusions:       Scheduled:   albuterol-ipratropium  3 mL Nebulization Q6H    amiodarone  200 mg Oral Daily    amLODIPine  10 mg Oral Daily    aspirin  81 mg Oral Daily    atorvastatin  40 mg Oral Daily    carvedilol  25 mg Oral BID    heparin (porcine)  5,000 Units Subcutaneous Q8H    levoFLOXacin  500 mg Intravenous Q24H    oseltamivir  30 mg Oral Daily    polyethylene glycol  17 g Oral Daily    predniSONE  40 mg Oral Daily        PRN:  acetaminophen, benzocaine, pneumoc 13-clementina conj-dip cr(PF), sodium chloride 0.9%    Antibiotics and Day Number of Therapy:  Levofloxacin 1/9-present  Oseltamavir 1/9-1/13    Lines and Day Number of Therapy:  PIV    Assessment:     Asad Perez is a 78 y.o.male with  Patient Active Problem List    Diagnosis Date Noted    COPD with acute exacerbation 01/09/2019    Abdominal pain 03/19/2018    COPD exacerbation 03/16/2018    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    Abdominal rigidity, generalized     Complicated open wound of right hip     Acute osteomyelitis 04/11/2017    Decubitus ulcer of buttock, stage 2 04/07/2017    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    Urinary tract infection associated with indwelling urethral catheter 12/01/2016    Centrilobular emphysema 11/29/2016    Symptomatic anemia 11/27/2016    Deep vein thrombosis (DVT) of brachial vein     Non-healing ulcer of lower leg 10/28/2016    Fever     Decubitus ulcer of sacral region, stage 4 10/05/2016    Deep vein thrombosis  (DVT) of upper extremity 10/05/2016    Dehiscence of perineal wound 10/05/2016    Hyponatremia 10/05/2016    VRE (vancomycin-resistant Enterococci) infection 09/20/2016    Elevated liver enzymes     Chronic diastolic congestive heart failure     Critical lower limb ischemia 09/10/2016    Stenosis of left internal carotid artery 09/08/2016    Abnormal finding on imaging 08/31/2016    Abnormal CT of the abdomen     Chronic kidney disease, stage V 08/12/2016    Anemia 08/12/2016    Anemia of chronic disease 08/08/2016    Altered mental status 08/07/2016    MRSA (methicillin resistant Staphylococcus aureus) infection 08/07/2016    Confusion     Weak     Pneumonia 07/29/2016    History of atrial fibrillation     Infected prosthetic knee joint 07/12/2016    Right knee pain 06/15/2016    GERD (gastroesophageal reflux disease) 02/28/2015    Esophageal web 02/28/2015    Acute on chronic diastolic heart failure 11/08/2014    Transaminitis 11/08/2014    Chronic obstructive pulmonary disease 11/07/2014    Dyspnea 11/06/2014    Normocytic anemia 11/06/2014    Dysphagia 11/03/2014    Osteoarthritis of left knee 07/14/2014    Essential hypertension 07/16/2013    CKD (chronic kidney disease) 07/16/2013    PAD (peripheral artery disease) 07/16/2013    CAD (coronary artery disease) 07/16/2013    History of stroke 07/16/2013    Physical deconditioning 07/16/2013    Alcohol abuse 07/16/2013        Plan:     Sepsis 2/2 acute on chronic hypercapnic respiratory failure 2/2 COPD exacerbation possibly 2/2 flu  -5 days of weakness, decreased appetite, chills, nausea, SOB, and non-productive cough  -Afebrile, WBC 20.67, rapid flu negative, procalcitonin 0.5, ABG with pH 7.287, CO2 56, O2 78;  cxr with some scarring/atelectasis in the left lateral mid lung field (similar to previous films)  -Blood cultures growing gram negative rods  -Levofloxacin, prednisone 40 mg daily, oseltamivir 30 daily (due to  ELAYNE)  -Doing well on nightly BIPAP and 2L NC  -will transition to PO levaquin tomorrow and discharge as repeat blood cultures NGTD     ELAYNE on CKD 3, improving  -Baseline creatinine 1.1-1.3, 2.0 today  -Given 2L NS in the ED, NS infusion at 50/hr x10 hr  -Avoid nephrotoxic agents  -Renally dose medications  -Improving     Stage 3 sacral decubitus ulcer (present on admission)  -Meplex dressing in place  -Wound care consulted, appreciate recs  -Will need home health nurse for sacral decubitus ulcer  - recs: apply mepalex a/g every third day     Testicular pain  -Scrotum diffusely erythematous but minimally tender  -Scrotal ultrasound- increased vascularity in the right testicle and large reactive right hydrocele- equivocal for orchitis  -Covering both COPD exacerbation and possible orchitis with levofloxacin     Transaminitis  -,   -RUQ U/S showing cholelithiasis without acute cholecystitis   - Neuroendocrine consulted, not concerned for acute cholecystitis and no surgical intervention at this time (LFTs more indicative of hepatic origin vs cholestatic)     HFpEF  -TTE (11/28/16)- mild LA enlargement, concentric hypertrophy, normal LVSF, left ventricular diastolic dysfunction, pulmonary HTN (PA sys pressure ~47)  -Home medication: amiodarone 200, amlodipine 10, carvedilol 25  -Currently holding carvedilol in the setting of borderline bradycardia, continue other home meds     History of CVA  -CVA in 2007, no focal deficits on exam  -Continue ASA 81, atorvastatin 40  -Heparin for anticoagulation     Right knee AKA, peripheral vascular disease, CAD  -Continue ASA 81, atorvastatin 40     Constipation  -Polyethylene glycol     Urinary incontinence  -No acute issues     Alcohol abuse  -No alcohol since 2007     Healthcare maintenance  -UTD on flu, tetanus  -Needs pneumovax     Fluids: NS @50/hr x10 hours  Electrolytes: replace as needed  Nutrition: cardiac  PPx: heparin  Code status: full     Dispo: continue  fluids, COPD treatment, monitor kidney function; discharge tomorrow following transition to PO Abx    Stacey Sarkar MD  [facility] Internal Medicine [id number]    [facility] Medicine Hospitalist Pager numbers:   [facility] Hospitalist Medicine Team A (Chay/Shawanda): [phone]  [facility] Hospitalist Medicine Team B (Gloria/Alex):  [phone]

## 2019-01-13 NOTE — PT/OT/SLP EVAL
Physical Therapy Evaluation and Discharge Note    Patient Name:  Asad Perez   MRN:  [id number]    Recommendations:     Discharge Recommendations:  (Home-- 24/7 supervision/assist)   Discharge Equipment Recommendations: none   Barriers to discharge: None as pt reporting his wife and brother-in-law assist as needed at home    Assessment:     Asad Perez is a 78 y.o. male admitted with a medical diagnosis of COPD with acute exacerbation. Pt is hospital bed/w/c bound at baseline and requires family to complete camryn lift transfers as he is unable to maintain balance sitting EOB. Pt is functioning at his baseline level with no additional IP PT needs. Recommending 24/7 supervision/assist available upon d/c. No additional DME needs.    Recent Surgery: * No surgery found *      Plan:     During this hospitalization, patient does not require further acute PT services.  Please re-consult if situation changes.      Subjective     Chief Complaint: none  Patient/Family Comments/goals: pt is pleasant and agreeable to repositioning. Pt stating he does not sit EOB 2/2 poor balance and only uses a camryn lift at home  Pain/Comfort:  · Pain Rating 1: (did not c/o or rate pain)    Patients cultural, spiritual, Mandaen conflicts given the current situation: no    Living Environment:  Pt lives with his wife (and brother-in-law stays often).  Prior to admission, patients level of function was dependent for ADLs, transfers with camryn lift to sit in w/c for ~30 min bouts, pt's wife propels w/c primarily, is able to feed himself, and wears a brief. Pt is unable to sit EOB and is either in hospital bed or w/c at all times- stating w/c has a comfortable cushion. Pt on 2 L/min O2 continuously. Equipment used at home: wheelchair, hospital bed, lift device, oxygen.  DME owned (not currently used): none.  Upon discharge, patient will have assistance from his wife and brother-in-law.    Objective:     Communicated with DAYSI Gaviria  prior to session.  Patient found L sidelying with wedge upon PT entry to room found with: bed alarm, peripheral IV , oxygen    General Precautions: Standard, fall   Orthopedic Precautions:(R AKA)   Braces: N/A     Exams:  · Cognitive Exam:  Patient is oriented to Person, Place, Time and Situation  · Skin Integrity/Edema:      · -       Skin integrity: sacral wound covered  · RLE ROM: AKA - limited hip abduction   · RLE Strength: not assessed  · LLE ROM: WFL except lacking ~10 deg from terminal knee ext  · LLE Strength: not formally assessed but able to flex at hip/knee and assist with bridge in supine    Functional Mobility:  · Bed Mobility:     · Rolling Left:  modified independence and bed rail  · Rolling Right: modified independence and bed rail   · Scooting: dependent x 2 with draw sheet     AM-PAC 6 CLICK MOBILITY  Total Score:9       Therapeutic Activities and Exercises:  Pt incontinent of bladder, completed rolling several times for hygiene, brief/pad change, and to don brief.  Draw sheet transfer to scoot up in bed then rolled to the right with wedge under L side to reposition.    AM-PAC 6 CLICK MOBILITY  Total Score:9     Patient left right sidelying with all lines intact, call button in reach, bed alarm on and RN notified.    GOALS:   Multidisciplinary Problems     Physical Therapy Goals     Not on file          Multidisciplinary Problems (Resolved)        Problem: Physical Therapy Goal    Goal Priority Disciplines Outcome Goal Variances Interventions   Physical Therapy Goal   (Resolved)     PT, PT/OT Outcome(s) achieved                     History:     Past Medical History:   Diagnosis Date    Alcohol abuse     CHF (congestive heart failure)     CKD (chronic kidney disease) stage 3, GFR 30-59 ml/min     Coronary artery disease     Decubitus skin ulcer     Heart failure, diastolic     High cholesterol     Hypertension     Immobility     LBP (low back pain)     Prediabetes     PVD (peripheral  vascular disease)     Stroke     2006, no deficits    Urine incontinence        Past Surgical History:   Procedure Laterality Date    AMPUTATION-ABOVE KNEE Right 9/12/2016    [facility] at [facility]    aortic bifemoral bypass  2006    ARTHROPLASTY-KNEE Right 6/27/2016    [facility] at [facility]    ARTHROPLASTY-KNEE Left 7/14/2014    [facility] at [facility]    bilateral carotid stenois  2006    carotid stents      DEBRIDEMENT-SACRAL WOUND N/A 10/10/2016    Performed by Michael Irizarry MD at [facility]    DEBRIDEMENT-SACRAL WOUND Bilateral 9/29/2016    Performed by Maria Alejandra Pichardo DO at [facility]    DEBRIDEMENT-SACRAL WOUND N/A 9/16/2016    [facility] at [facility]    DEBRIDEMENT-WOUND Right 4/8/2017    [facility] at [facility]    ESOPHAGOGASTRODUODENOSCOPY (EGD) N/A 11/4/2014    Performed by David Ramos MD at [facility]    ESOPHAGOGASTRODUODENOSCOPY (EGD) with PEG N/A 9/23/2016    Performed by Emerson Childers Jr., MD at [facility]    EXCHANGE-POLYETHYLENE RIGHT KNEE Right 7/12/2016    Performed by [facility] at [facility]    IOVERA Right 6/15/2016    [facility] at [facility]    IRRIGATION AND DEBRIDEMENT LOWER EXTREMITY Right 9/9/2016    [facility] at [facility]    IRRIGATION AND DEBRIDEMENT RIGHT KNEE Right 7/12/2016    [facility] at [facility]    JOINT REPLACEMENT      knee    left common endarterectomy  2006    REVISION-AMPUTATION STUMP Right 12/1/2016    Per[facility] at [facility]    RT AKA  NOV 2017    SYNOVECTOMY-KNEE Left 9/9/2016    [facility] at [facility]    WOUND EXPLORATION Right 9/9/2016    Performed by [facility] at [facility]       Clinical Decision Making:     History  Co-morbidities and personal factors that may impact the plan of care Examination  Body Structures and Functions, activity  limitations and participation restrictions that may impact the plan of care Clinical Presentation   Decision Making/ Complexity Score   Co-morbidities:   [x] Time since onset of injury / illness / exacerbation  [x] Status of current condition  []Patient's cognitive status and safety concerns    [x] Multiple Medical Problems (see med hx)  Personal Factors:   [x] Patient's age  [x] Prior Level of function   [] Patient's home situation (environment and family support)  [] Patient's level of motivation  [x] Expected progression of patient      HISTORY:(criteria)    [] 14482 - no personal factors/history    [] 00806 - has 1-2 personal factor/comorbidity     [x] 44096 - has >3 personal factor/comorbidity     Body Regions:  [] Objective examination findings  [] Head     []  Neck  [] Trunk   [] Upper Extremity  [x] Lower Extremity    Body Systems:  [] For communication ability, affect, cognition, language, and learning style: the assessment of the ability to make needs known, consciousness, orientation (person, place, and time), expected emotional /behavioral responses, and learning preferences (eg, learning barriers, education  needs)  [x] For the neuromuscular system: a general assessment of gross coordinated movement (eg, balance, gait, locomotion, transfers, and transitions) and motor function  (motor control and motor learning)  [x] For the musculoskeletal system: the assessment of gross symmetry, gross range of motion, gross strength, height, and weight  [] For the integumentary system: the assessment of pliability(texture), presence of scar formation, skin color, and skin integrity  [x] For cardiovascular/pulmonary system: the assessment of heart rate, respiratory rate, blood pressure, and edema     Activity limitations:    [] Patient's cognitive status and saf ety concerns          [] Status of current condition      [] Weight bearing restriction  [] Cardiopulmunary Restriction    Participation Restrictions:   []  Goals and goal agreement with the patient     [] Rehab potential (prognosis) and probable outcome      Examination of Body System: (criteria)    [] 08447 - addressing 1-2 elements    [] 68454 - addressing a total of 3 or more elements     [x] 77261 -  Addressing a total of 4 or more elements         Clinical Presentation: (criteria)  Stable - 01526     On examination of body system using standardized tests and measures patient presents with 3 or more elements from any of the following: body structures and functions, activity limitations, and/or participation restrictions.  Leading to a clinical presentation that is considered stable and/or uncomplicated                              Clinical Decision Making  (Eval Complexity):  Low- 76340     Time Tracking:     PT Received On: 01/13/19  PT Start Time: 1014     PT Stop Time: 1027  PT Total Time (min): 13 min with OT    Billable Minutes: Evaluation 13      Emma Fernandez, PT  01/13/2019

## 2019-01-13 NOTE — PLAN OF CARE
Problem: Occupational Therapy Goal  Goal: Occupational Therapy Goal  Outcome: Outcome(s) achieved Date Met: 01/13/19  OT initial eval and discharge 2/2 no OT needs identified.  Pt at baseline with ADLS and mobility.  Pt camryn lifted to WC by family and performs bed level toileting with brief at home, he feeds himself. DC OT

## 2019-01-13 NOTE — PLAN OF CARE
Problem: Adult Inpatient Plan of Care  Goal: Plan of Care Review  Outcome: Ongoing (interventions implemented as appropriate)  Patient on oxygen with documented flow.  Will attempt to wean per O2 order protocol.  treatment  Will continue to monitor.

## 2019-01-13 NOTE — PT/OT/SLP EVAL
Occupational Therapy   Evaluation and Discharge Note    Name: Asad Perez  MRN: [id number]  Admitting Diagnosis:  COPD with acute exacerbation      Recommendations:     Discharge Recommendations: (home with 24/7 sup and assist)  Discharge Equipment Recommendations:  none  Barriers to discharge:  None    Assessment:     Asad Perez is a 78 y.o. male with a medical diagnosis of COPD with acute exacerbation. At this time, patient is functioning at their prior level of function and does not require further acute OT services.     Plan:     During this hospitalization, patient does not require further acute OT services.  Please re-consult if situation changes.    · Plan of Care Reviewed with: patient    Subjective     Chief Complaint: R AKA pain and sacral pain  Patient/Family Comments/goals: none    Occupational Profile:  Living Environment: lives with wife and brother  Previous level of function: mostly bed bound; up in wc ~ 1/2 daily per pt report; feeds himself; toileting with brief from bed level with assist from wife; pt camryn lifted to wc.  Roles and Routines: sedentary  Equipment Used at home:  oxygen, wheelchair, hospital bed, lift device  Assistance upon Discharge: wife and brother    Pain/Comfort:  Pain Rating 1: (pain not rated)  Location - Side 1: Right  Location - Orientation 1: lower  Location 1: leg  Pain Addressed 1: Reposition, Distraction, Nurse notified  Pain Rating Post-Intervention 1: (no rating)  Pain Rating 2: (no rating)  Location - Side 2: Bilateral  Location - Orientation 2: generalized  Location 2: sacral spine  Pain Addressed 2: Reposition, Distraction, Nurse notified  Pain Rating Post-Intervention 2: (no rating)    Patients cultural, spiritual, Church conflicts given the current situation: no    Objective:     Communicated with: nurse prior to session.  Patient found left sidelying with bed alarm, peripheral IV upon OT entry to room.    General Precautions: Standard, fall,  droplet  Orthopedic Precautions:N/A   Braces: N/A     Occupational Performance:    Bed Mobility:    · Patient completed Rolling/Turning to Left with  modified independence and with side rail  · Patient completed Rolling/Turning to Right with modified independence and with side rail  · Patient completed Scooting/Bridging with dependent and 2 persons    Functional Mobility/Transfers:  · NA-camryn lifted at home    Activities of Daily Living:  · Feeding:  setup HOB elevated  · Grooming: supervision HOB elevated  · Toileting: total assistance brief; incontinent bladder; rolled with use of bed rails for removal and placement of brief     Cognitive/Visual Perceptual:  Cognitive/Psychosocial Skills:     -       Oriented to: Person, Place, Time and Situation   -       Follows Commands/attention:Follows one-step commands  -       Communication: clear/fluent  -       Memory: No Deficits noted  -       Safety awareness/insight to disability: impaired   -       Mood/Affect/Coping skills/emotional control: Appropriate to situation  Visual/Perceptual:      -Intact wfl    Physical Exam:  Balance: poor   examination/scapula alignment:[id number]}  Skin integrity: Wound sacrum  Dominant hand:    -       right  Upper Extremity Range of Motion:     -       Right Upper Extremity: WFL  -       Left Upper Extremity: WFL  Upper Extremity Strength:    -       Right Upper Extremity: WFL except shld 3+/5  -       Left Upper Extremity: WFL      AMPAC 6 Click ADL:  AMPAC Total Score: 14    Treatment & Education:  Role of OT and Discharge OT 2/2 no needs identified.   Education:    Patient left right sidelying with all lines intact, call button in reach, bed alarm on and nurse  notified    GOALS:   Multidisciplinary Problems     Occupational Therapy Goals     Not on file          Multidisciplinary Problems (Resolved)        Problem: Occupational Therapy Goal    Goal Priority Disciplines Outcome Interventions   Occupational Therapy Goal   (Resolved)      OT, PT/OT Outcome(s) achieved                    History:     Past Medical History:   Diagnosis Date    Alcohol abuse     CHF (congestive heart failure)     CKD (chronic kidney disease) stage 3, GFR 30-59 ml/min     Coronary artery disease     Decubitus skin ulcer     Heart failure, diastolic     High cholesterol     Hypertension     Immobility     LBP (low back pain)     Prediabetes     PVD (peripheral vascular disease)     Stroke     2006, no deficits    Urine incontinence          Past Surgical History:   Procedure Laterality Date    AMPUTATION-ABOVE KNEE Right 9/12/2016    [facility]    aortic bifemoral bypass  2006    ARTHROPLASTY-KNEE Right 6/27/2016    [facility]    ARTHROPLASTY-KNEE Left 7/14/2014    [facility]    bilateral carotid stenois  2006    carotid stents      DEBRIDEMENT-SACRAL WOUND N/A 10/10/2016    [facility] at [facility]    DEBRIDEMENT-SACRAL WOUND Bilateral 9/29/2016    [facility] at [facility]    DEBRIDEMENT-SACRAL WOUND N/A 9/16/2016    [facility]    DEBRIDEMENT-WOUND Right 4/8/2017    [facility] at [facility]    ESOPHAGOGASTRODUODENOSCOPY (EGD) N/A 11/4/2014    Performed by David Ramos [facility] at [facility]    ESOPHAGOGASTRODUODENOSCOPY (EGD) with PEG N/A 9/23/2016    Performed by Emerson Childers Jr., MD at [facility] ENDO    EXCHANGE-POLYETHYLENE RIGHT KNEE Right 7/12/2016    [facility]formed by [facility] at [facility]    IOVERA Right 6/15/2016    [facility]formed by [facility] at [facility]    IRRIGATION AND DEBRIDEMENT LOWER EXTREMITY Right 9/9/2016    [facility]formed [facility]    IRRIGATION AND DEBRIDEMENT RIGHT KNEE Right 7/12/2016    [facility]    JOINT REPLACEMENT      knee    left common endarterectomy  2006    REVISION-AMPUTATION STUMP  "Right 2016    [facility]    RT AKA  2017    SYNOVECTOMY-KNEE Left 2016    [facility]    WOUND EXPLORATION Right 2016    Performed by [facility] at [facility]       Clinical Decision Makin.  OT Low:  "Pt evaluation falls under low complexity for evaluation coding due to performance deficits noted in 1-3 areas as stated above and 0 co-morbities affecting current functional status. Data obtained from problem focused assessments. No modifications or assistance was required for completion of evaluation. Only brief occupational profile and history review completed."     Time Tracking:     OT Date of Treatment: 19  OT Start Time: 1016  OT Stop Time: 1027  OT Total Time (min): 11 min    Billable Minutes:Evaluation 11  Total Time 11    Charlene Varner, OT  2019    "

## 2019-01-13 NOTE — PLAN OF CARE
Problem: Adult Inpatient Plan of Care  Goal: Plan of Care Review  Outcome: Ongoing (interventions implemented as appropriate)  20 gauge IV to left A/C removed without difficulty- no redness or swelling noted to site at time of removal. Tele monitor removed, cleaned, and returned to tele bunker. D/C instructions and medications reviewed with patient and spouse- both verbalize understanding. NADN at time of D/C.

## 2019-01-13 NOTE — PLAN OF CARE
Problem: Physical Therapy Goal  Goal: Physical Therapy Goal  Outcome: Outcome(s) achieved Date Met: 01/13/19  PT evaluation completed, note to follow. Pt is hospital bed/w/c bound at baseline and requires family to complete camryn lift transfers as he is unable to maintain balance sitting EOB. Pt is functioning at his baseline level with no additional IP PT needs. Recommending 24/7 supervision/assist available upon d/c. No additional DME needs.

## 2019-01-13 NOTE — DISCHARGE INSTRUCTIONS
COPD Flare (English) View Edit Remove   COPD, Diagnosing (English) View Edit Remove   COPD, Treatment for (English) View Edit Remove   COPD, What is (English) View Edit Remove

## 2019-01-13 NOTE — DISCHARGE SUMMARY
[facility] Hospital Medicine Discharge Summary    Primary Team: [facility] Hospitalist Team B  Attending Physician: Bernice  Resident: Jose Natarajan  Intern: Mello Butler    Date of Admit: 1/9/2019  Date of Discharge: 1/13/2019    Discharge to: Home with home health  Condition: Stable    Discharge Diagnoses     Patient Active Problem List   Diagnosis    Essential hypertension    CKD (chronic kidney disease)    PAD (peripheral artery disease)    CAD (coronary artery disease)    History of stroke    Physical deconditioning    Alcohol abuse    Osteoarthritis of left knee    Dysphagia    Dyspnea    Normocytic anemia    Chronic obstructive pulmonary disease    Acute on chronic diastolic heart failure    Transaminitis    GERD (gastroesophageal reflux disease)    Esophageal web    Right knee pain    Infected prosthetic knee joint    History of atrial fibrillation    Pneumonia    Confusion    Weak    Altered mental status    MRSA (methicillin resistant Staphylococcus aureus) infection    Anemia of chronic disease    Chronic kidney disease, stage V    Anemia    Abnormal CT of the abdomen    Abnormal finding on imaging    Stenosis of left internal carotid artery    Critical lower limb ischemia    Chronic diastolic congestive heart failure    Decubitus ulcer of sacral region, stage 4    Elevated liver enzymes    Deep vein thrombosis (DVT) of upper extremity    VRE (vancomycin-resistant Enterococci) infection    Dehiscence of perineal wound    Hyponatremia    Fever    Non-healing ulcer of lower leg    Deep vein thrombosis (DVT) of brachial vein    Symptomatic anemia    Centrilobular emphysema    Non-healing wound of amputation stump    Urinary tract infection associated with indwelling urethral catheter    Decubitus ulcer of buttock, stage 2    Acute osteomyelitis    Complicated open wound of right hip    COPD exacerbation    Occlusion of superior mesenteric artery    Abdominal  rigidity, generalized    Abdominal pain    COPD with acute exacerbation       Consultants and Procedures     Consultants:  Wound care  General surgery    Procedures:   None    Imaging:  Chest xray  US scrotum and testicles  US abdomen    Brief History of Present Illness      Mr. Perez is a 78 year old male with a history of COPD, HFpEF, CVA, CKD 3, sacral decubitus ulcer, right knee AKA, peripheral vascular disease, CAD, and urinary incontinence who presented with increasing pain around his sacral decubitus ulcer.  In the ED, he also complained of flu-like symptoms and testicular pain.  His WBC count was elevated to 20.67, cxr showed some scarring/atelectasis (similar to previous films), and ABG had pH 7.287, CO2 56, and O2 78.  Ultrasound of the testicles was equivocal for orchitis.  His ALT and AST were also elevated.  Ultrasound of the abdomen revealed cholelithiasis without evidence of cholecystitis, and surgery recommended no intervention at this time.  Blood cultures were positive for gram negative rods, thought to originate from his sacral decubitus ulcer.  Patient's symptoms improved with wound care, levofloxacin, oseltamavir, and steroids; so patient was discharged to complete his course of antibiotics and steroids with home health for wound care.    For the full HPI please refer to the History & Physical from this admission.    Hospital Course By Problem with Pertinent Findings     Sepsis 2/2 acute on chronic hypercapnic respiratory failure 2/2 COPD exacerbation possibly 2/2 flu  -5 days of weakness, decreased appetite, chills, nausea, SOB, and non-productive cough  -Afebrile, WBC 20.67, rapid flu negative, procalcitonin 0.5, ABG with pH 7.287, CO2 56, O2 78;  cxr with some scarring/atelectasis in the left lateral mid lung field (similar to previous films)  -Blood cultures grew gram negative rods  -Levofloxacin, prednisone 40 mg daily, oseltamivir 30 daily (due to ELAYNE)  -Discharged with 14 days total  of levofloxacin, 5 days of prednisone and oseltamavir, and continuing his home 2L oxygen     ELAYNE on CKD 3, improving  -Baseline creatinine 1.1-1.3, 1.7 today  -Given 2L NS in the ED, NS infusion at 50/hr x10 hr  -Avoid nephrotoxic agents  -Renally dose medications  -Improved     Gram negative faye bacteremia 2/2 stage 3 sacral decubitus ulcer (present on admission)  -Meplex dressing in place  -Wound care consulted  -Will have home health nurse for sacral decubitus ulcer  - recs: apply mepalex a/g every third day     Testicular pain  -Scrotum diffusely erythematous but minimally tender  -Scrotal ultrasound- increased vascularity in the right testicle and large reactive right hydrocele- equivocal for orchitis  -Covering both COPD exacerbation and possible orchitis with levofloxacin     Transaminitis  -,   -RUQ U/S showing cholelithiasis without acute cholecystitis   - Neuroendocrine consulted, not concerned for acute cholecystitis and no surgical intervention at this time (LFTs more indicative of hepatic origin vs cholestatic)     HFpEF  -TTE (11/28/16)- mild LA enlargement, concentric hypertrophy, normal LVSF, left ventricular diastolic dysfunction, pulmonary HTN (PA sys pressure ~47)  -Home medication: amiodarone 200, amlodipine 10, carvedilol 25  -Resume home meds     History of CVA  -CVA in 2007, no focal deficits on exam  -Continue ASA 81, atorvastatin 40     Right knee AKA, peripheral vascular disease, CAD  -Continue ASA 81, atorvastatin 40     Constipation  -Polyethylene glycol     Urinary incontinence  -No acute issues     Alcohol abuse  -No alcohol since 2007     Healthcare maintenance  -UTD on flu, tetanus  -Received pneumovax    Discharge Medications      Asad Perez   Home Medication Instructions SATHISH:[id number]    Printed on:01/13/19 0709   Medication Information                      albuterol-ipratropium 2.5mg-0.5mg/3mL (DUO-NEB) 0.5 mg-3 mg(2.5 mg base)/3 mL nebulizer solution  Take  3 mLs by nebulization every 6 (six) hours while awake.             amiodarone (PACERONE) 200 MG Tab  Take 1 tablet (200 mg total) by mouth once daily.             amLODIPine (NORVASC) 10 MG tablet  Take 10 mg by mouth once daily.             ascorbic acid, vitamin C, (VITAMIN C) 500 MG tablet  Take 500 mg by mouth 2 (two) times daily.             aspirin (ECOTRIN) 81 MG EC tablet  Take 81 mg by mouth once daily.             atorvastatin (LIPITOR) 40 MG tablet  Take 40 mg by mouth once daily.             carvedilol (COREG) 25 MG tablet  Take 1 tablet (25 mg total) by mouth 2 (two) times daily.             clotrimazole-betamethasone 1-0.05% (LOTRISONE) cream  Apply topically 2 (two) times daily.             furosemide (LASIX) 40 MG tablet               levoFLOXacin (LEVAQUIN) 250 MG tablet  Take 1 tablet (250 mg total) by mouth once daily. for 11 days             multivitamin (THERAGRAN) per tablet  Take 1 tablet by mouth once daily.             oxycodone-acetaminophen (PERCOCET) 5-325 mg per tablet  Take 1 tablet by mouth every 4 (four) hours as needed for Pain.             polyethylene glycol (GLYCOLAX) 17 gram/dose powder  Take 17 g by mouth once daily.             predniSONE (DELTASONE) 20 MG tablet  Take 2 tablets (40 mg total) by mouth once daily. for 1 day             traMADol (ULTRAM) 50 mg tablet  Take 50 mg by mouth every 6 (six) hours as needed for Pain.                 Discharge Information:   Diet:  Cardiac    Physical Activity:  As tolerated             Instructions:  1. Take all medications as prescribed  2. Keep all follow-up appointments  3. Return to the hospital or call your primary care physicians if any worsening symptoms such as fever, chest pain, shortness of breath, return of symptoms, or any other concerns.    Follow-Up Appointments:  PCP    Coleman Butler MD  [facility], [facility]

## 2019-01-13 NOTE — PLAN OF CARE
Problem: Adult Inpatient Plan of Care  Goal: Patient-Specific Goal (Individualization)  Pt safety measures maintained.  Pt remains on external oxygen via 5L per NC. BIPAP was refused . Pt assisted in turning q2 hours. SR on tele. No acute distress noted. Will continue to monitor.

## 2019-01-13 NOTE — PROGRESS NOTES
[facility] Medicine Progress Note    Primary Team: [facility] Hospitalist Team B  Attending Physician: Gloria  Resident: Jose Natarajan  Intern: Mello Butler    Subjective:      Patient reports his breathing, dry cough, and sacral pain are doing better.  He denies any nausea or vomiting.  Patient wants to go home today before the [place] game.     Objective:     Last 24 Hour Vital Signs:  BP  Min: 117/57  Max: 130/80  Temp  Av.8 °F (36 °C)  Min: 96.1 °F (35.6 °C)  Max: 97.5 °F (36.4 °C)  Pulse  Av.3  Min: 54  Max: 75  Resp  Av.7  Min: 14  Max: 22  SpO2  Av.8 %  Min: 92 %  Max: 94 %  I/O last 3 completed shifts:  In: 1020 [P.O.:720; IV Piggyback:300]  Out: 1301 [Urine:1301]    Physical Examination:  Gen: AAOx3, NAD  HEENT: head normocephalic, atraumatic; PERRL; EOMI; trachea midline  Cardiac: soft heart sounds; regular rate and rhythm; no murmurs, rubs, or gallops  Resp: minimal diffuse rhonchi; no wheezing; otherwise CTAB; decreased inspiratory effort  GI: abdomen soft, non-tender, non-distended; normoactive bowel sounds, no hepatosplenomegaly  Extrem: Right AKA with well-healed scar without erythema or exudate, no clubbing  Skin: left lower leg with chronic venous stasis changes; left total knee arthroplasty scar well-healed; 1 cm stage 3 sacral decubitus ulcer present with some surrounding erythema but no exudate, mild tenderness to palpation, meplex dressing clean, dry, and intact; right posterolateral thigh with a 4 cm well-healed scar  : scrotum erythematous, non-tender; no testicular pain or swelling  Neuro: AAOx3, CN III-VII, IX-XII intact; strength 4/5 in all extremities; sensation intact to light touch in all extremities       Laboratory:  Laboratory Data Reviewed: yes  Pertinent Findings:  WBC 10 (down from 16)  BUN 63, Cr 1.7  ,     Microbiology Data Reviewed: yes  Pertinent Findings:  Blood cultures gram negative rods in 1/2 aerobic and anaerobic bottles  Repeat cultures  (1/11) no growth x1 day    Other Results:  EKG (my interpretation): sinus rhythm with first degree AV block    Radiology Data Reviewed: yes  Pertinent Findings:  Ultrasound abdomen pending    Current Medications:     Infusions:       Scheduled:   albuterol-ipratropium  3 mL Nebulization Q6H    amiodarone  200 mg Oral Daily    amLODIPine  10 mg Oral Daily    aspirin  81 mg Oral Daily    atorvastatin  40 mg Oral Daily    carvedilol  25 mg Oral BID    heparin (porcine)  5,000 Units Subcutaneous Q8H    levoFLOXacin  500 mg Intravenous Q24H    oseltamivir  30 mg Oral Daily    polyethylene glycol  17 g Oral Daily    predniSONE  40 mg Oral Daily        PRN:  acetaminophen, benzocaine, pneumoc 13-clementina conj-dip cr(PF), ramelteon, sodium chloride 0.9%    Antibiotics and Day Number of Therapy:  Levofloxacin 1/9-present  Oseltamavir 1/9-1/13    Lines and Day Number of Therapy:  PIV    Assessment:     Asad Perez is a 78 y.o.male with  Patient Active Problem List    Diagnosis Date Noted    COPD with acute exacerbation 01/09/2019    Abdominal pain 03/19/2018    COPD exacerbation 03/16/2018    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    Abdominal rigidity, generalized     Complicated open wound of right hip     Acute osteomyelitis 04/11/2017    Decubitus ulcer of buttock, stage 2 04/07/2017    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    Urinary tract infection associated with indwelling urethral catheter 12/01/2016    Centrilobular emphysema 11/29/2016    Symptomatic anemia 11/27/2016    Deep vein thrombosis (DVT) of brachial vein     Non-healing ulcer of lower leg 10/28/2016    Fever     Decubitus ulcer of sacral region, stage 4 10/05/2016    Deep vein thrombosis (DVT) of upper extremity 10/05/2016    Dehiscence of perineal wound 10/05/2016    Hyponatremia 10/05/2016    VRE (vancomycin-resistant Enterococci) infection 09/20/2016    Elevated liver enzymes     Chronic  diastolic congestive heart failure     Critical lower limb ischemia 09/10/2016    Stenosis of left internal carotid artery 09/08/2016    Abnormal finding on imaging 08/31/2016    Abnormal CT of the abdomen     Chronic kidney disease, stage V 08/12/2016    Anemia 08/12/2016    Anemia of chronic disease 08/08/2016    Altered mental status 08/07/2016    MRSA (methicillin resistant Staphylococcus aureus) infection 08/07/2016    Confusion     Weak     Pneumonia 07/29/2016    History of atrial fibrillation     Infected prosthetic knee joint 07/12/2016    Right knee pain 06/15/2016    GERD (gastroesophageal reflux disease) 02/28/2015    Esophageal web 02/28/2015    Acute on chronic diastolic heart failure 11/08/2014    Transaminitis 11/08/2014    Chronic obstructive pulmonary disease 11/07/2014    Dyspnea 11/06/2014    Normocytic anemia 11/06/2014    Dysphagia 11/03/2014    Osteoarthritis of left knee 07/14/2014    Essential hypertension 07/16/2013    CKD (chronic kidney disease) 07/16/2013    PAD (peripheral artery disease) 07/16/2013    CAD (coronary artery disease) 07/16/2013    History of stroke 07/16/2013    Physical deconditioning 07/16/2013    Alcohol abuse 07/16/2013        Plan:     Sepsis 2/2 acute on chronic hypercapnic respiratory failure 2/2 COPD exacerbation possibly 2/2 flu  -5 days of weakness, decreased appetite, chills, nausea, SOB, and non-productive cough  -Afebrile, WBC 20.67, rapid flu negative, procalcitonin 0.5, ABG with pH 7.287, CO2 56, O2 78;  cxr with some scarring/atelectasis in the left lateral mid lung field (similar to previous films)  -Blood cultures growing gram negative rods  -Levofloxacin, prednisone 40 mg daily, oseltamivir 30 daily (due to ELAYNE)  -Doing well on nightly BIPAP and 2L NC  -will transition to PO levaquin today and discharge as repeat blood cultures NGTD     ELAYNE on CKD 3, improving  -Baseline creatinine 1.1-1.3, 2.0 today  -Given 2L NS in the  ED, NS infusion at 50/hr x10 hr  -Avoid nephrotoxic agents  -Renally dose medications  -Improving     Stage 3 sacral decubitus ulcer (present on admission)  -Meplex dressing in place  -Wound care consulted, appreciate recs  -Will need home health nurse for sacral decubitus ulcer  - recs: apply mepalex a/g every third day     Testicular pain  -Scrotum diffusely erythematous but minimally tender  -Scrotal ultrasound- increased vascularity in the right testicle and large reactive right hydrocele- equivocal for orchitis  -Covering both COPD exacerbation and possible orchitis with levofloxacin     Transaminitis  -,   -RUQ U/S showing cholelithiasis without acute cholecystitis   - Neuroendocrine consulted, not concerned for acute cholecystitis and no surgical intervention at this time (LFTs more indicative of hepatic origin vs cholestatic)     HFpEF  -TTE (11/28/16)- mild LA enlargement, concentric hypertrophy, normal LVSF, left ventricular diastolic dysfunction, pulmonary HTN (PA sys pressure ~47)  -Home medication: amiodarone 200, amlodipine 10, carvedilol 25  -Currently holding carvedilol in the setting of borderline bradycardia, continue other home meds     History of CVA  -CVA in 2007, no focal deficits on exam  -Continue ASA 81, atorvastatin 40  -Heparin for anticoagulation     Right knee AKA, peripheral vascular disease, CAD  -Continue ASA 81, atorvastatin 40     Constipation  -Polyethylene glycol     Urinary incontinence  -No acute issues     Alcohol abuse  -No alcohol since 2007     Healthcare maintenance  -UTD on flu, tetanus  -Needs pneumovax     Fluids: NS @50/hr x10 hours  Electrolytes: replace as needed  Nutrition: cardiac  PPx: heparin  Code status: full     Dispo: continue fluids, COPD treatment, monitor kidney function; discharge today following transition to PO Abx    Coleman Butler MD  [facility] HO-1    [facility] Hospitalist Pager numbers:   [facility] Hospitalist Medicine Team A  (Chay/Shawanda): [phone]  [facility] Hospitalist Medicine Team B (Gloria/Alex):  [phone]

## 2019-01-13 NOTE — PLAN OF CARE
2:11 pm, Mia GORDON, called back and requested [place] fax to another number for [facility] [phone]    [place] faxed HH orders to [facility], [phone], fax [phone].     [facility] On Call, Mia, requested [place] fax HH orders to [facility], [place] sent via [employer]. HH will resume on Monday, 1/14/19    No DME ordered    Pt's nurse will go over medications/signs and symptoms prior to discharge       01/13/19 1335   Final Note   Assessment Type Final Discharge Note   Anticipated Discharge Disposition Home-Health   What phone number can be called within the next 1-3 days to see how you are doing after discharge? [phone]   Hospital Follow Up  Appt(s) scheduled? No  (Offices closed for weekend. Patient to schedule own follow up appointment.)   [facility] Referral Info   Post Acute Recommendation Home-care     Lluvia Segura, RN Transitional Navigator  [phone]

## 2019-01-13 NOTE — PROGRESS NOTES
[place] faxed HH orders to [facility], [phone], fax [phone], awaiting return call from On Call    [facility] On Call, Mia, requested [place] fax HH orders to [facility], [place] sent via [facility]

## 2019-01-16 PROBLEM — J96.92 RESPIRATORY FAILURE WITH HYPOXIA AND HYPERCAPNIA: Status: ACTIVE | Noted: 2019-01-01

## 2019-01-16 PROBLEM — J96.91 RESPIRATORY FAILURE WITH HYPOXIA AND HYPERCAPNIA: Status: ACTIVE | Noted: 2019-01-01

## 2019-01-16 NOTE — H&P
[facility] Medicine H&P Note     Admitting Team: [facility] Hospitalist Team A  Attending Physician: Chay   Resident: Rey  Intern: Barrett     Date of Admit: 1/16/2019    Chief Complaint     Diffuse aches and SOB since Sunday (3 days)    Subjective:      History of Present Illness:  Asad Perez is a 78 y.o.  male who  has a past medical history of Alcohol abuse, CHF (congestive heart failure), CKD (chronic kidney disease) stage 3, GFR 30-59 ml/min, Coronary artery disease, Decubitus skin ulcer, Heart failure, diastolic, High cholesterol, Hypertension, Immobility, LBP (low back pain), Prediabetes, PVD (peripheral vascular disease), Stroke, and Urine incontinence.. The patient presented to [facility] on 1/16/2019 with a primary complaint of Weakness (78 year old male presents to ed cc of generalized weakness/ abdominal pain x 3 days patient states recent discharge from hospital on sunday. )    Patient was recently discharged from this facility on 1/9/19 after being treated for acute on chronic hypercapnic respiratory failure 2/2 COPD exacerbation possibly 2/2 flu, as well as gram negative faye bacteremia 2/2 sacral decubitus ulcer.     History is limited due to patient's respiratory status (on BiPap) as well as wife being very poor historian. Video  was used. She states that patient never seemed to be back at baseline when he was discharged on Sunday. She notes that he was moving around a lot at night, moving side to side not able to get comfortable. She says his known sacral wound has decreased in size since his last admission. She states that he has been on his home 4L oxygen via [place] (increased from 2L since last hospital stay)  and that he has been compliant with ALL of his home medications prescribed at discharge and listed below. She states he has been in a state of constant SOB and aches throughout his body, particularly in his abdomen and back. She states she has been treating  "him for this with tramadol and ibuprofen regularly, without improvement. She states she was finally able to convince him to come to the hospital today when his SOB worsened and he wasn't sleeping well due to the pain. She states she is not able to control his pain at home, and that his face seemed enlarged just prior to bringing him to the hospital.  She denies fever, chills, nausea, vomiting, diarrhea, constipation, BRBPR, melena, cough on his behalf. He has been eating and drinking less since he has been home. According to the patient, he has had no appetite at all today.     Today he endorses "butt cheek" pain consistent with his norm and he mentioned testicular pain to the ED physician but denies it during our interview. He states he has not been able to get any sleep lately due to SOB and body aches. He intermittently endorses and then denies abdominal pain. He states his breathing is uncomfortable "all the time" and not just with exertion. He states his breathing never got better while he was in the hospital last time. He states the last time he was comfortable breathing was a few weeks ago prior to the last admission.     Past Medical History:  Past Medical History:   Diagnosis Date    Alcohol abuse     CHF (congestive heart failure)     CKD (chronic kidney disease) stage 3, GFR 30-59 ml/min     Coronary artery disease     Decubitus skin ulcer     Heart failure, diastolic     High cholesterol     Hypertension     Immobility     LBP (low back pain)     Prediabetes     PVD (peripheral vascular disease)     Stroke     2006, no deficits    Urine incontinence        Past Surgical History:  Past Surgical History:   Procedure Laterality Date    AMPUTATION-ABOVE KNEE Right 9/12/2016    [facility]    aortic bifemoral bypass  2006    ARTHROPLASTY-KNEE Right 6/27/2016    [facility] at [facility] OR    ARTHROPLASTY-KNEE Left 7/14/2014    Performed by Cristino [facility]acho MD " at [facility] OR    bilateral carotid stenois  2006    carotid stents      DEBRIDEMENT-SACRAL WOUND N/A 10/10/2016    Performed by Michael Irizarry MD at [facility] OR    DEBRIDEMENT-SACRAL WOUND Bilateral 9/29/2016    Performed by Maria Alejandra Pichardo DO at [facility] OR    DEBRIDEMENT-SACRAL WOUND N/A 9/16/2016    Performed by Maria [facility]ardo DO at [facility] OR    DEBRIDEMENT-WOUND Right 4/8/2017    Performed by [facility]ry MD at [facility] OR    ESOPHAGOGASTRODUODENOSCOPY (EGD) N/A 11/4/2014    Performed by David Ramos MD at [facility] ENDO    ESOPHAGOGASTRODUODENOSCOPY (EGD) with PEG N/A 9/23/2016    Performed by Emerson Childers Jr., MD at [facility] ENDO    EXCHANGE-POLYETHYLENE RIGHT KNEE Right 7/12/2016    Performed by [facility]acho [facility] at [facility] OR    IOVERA Right 6/15/2016    Performed by [facility]ino Camacho [facility] at [facility] OR    IRRIGATION AND DEBRIDEMENT LOWER EXTREMITY Right 9/9/2016    Performed by Cristino Camacho [facility] at [facility] OR    IRRIGATION AND DEBRIDEMENT RIGHT KNEE Right 7/12/2016    Performed by Cristino Camacho MD at [facility] OR    JOINT REPLACEMENT      knee    left common endarterectomy  2006    REVISION-AMPUTATION STUMP Right 12/1/2016    Performed by Michael [facility]ry [facility] at [facility] OR    RT AKA  NOV 2017    SYNOVECTOMY-KNEE Left 9/9/2016    Performed by [facility]acho [facility] at [facility] OR    WOUND EXPLORATION Right 9/9/2016    Performed by [facility]ino [facility]acho MD at [facility] OR       Allergies:  Review of patient's allergies indicates:  No Known Allergies    Home Medications:  Prior to Admission medications    Medication Sig Start Date End Date Taking? Authorizing Provider   albuterol-ipratropium 2.5mg-0.5mg/3mL (DUO-NEB) 0.5 mg-3 mg(2.5 mg base)/3 mL nebulizer solution Take 3 mLs by nebulization every 6 (six) hours while awake.  Patient taking differently: Take 3 mLs by nebulization 4 (four) times daily.  9/29/16 3/16/18  Nnamdi Venegas MD   amiodarone (PACERONE) 200 MG Tab Take 1 tablet (200 mg total) by mouth once daily. 9/29/16    Nnamdi Venegas MD   amLODIPine (NORVASC) 10 MG tablet Take 10 mg by mouth once daily.    Historical Provider, MD   ascorbic acid, vitamin C, (VITAMIN C) 500 MG tablet Take 500 mg by mouth 2 (two) times daily.    Historical Provider, MD   aspirin (ECOTRIN) 81 MG EC tablet Take 81 mg by mouth once daily.    Historical Provider, MD   atorvastatin (LIPITOR) 40 MG tablet Take 40 mg by mouth once daily.    Historical Provider, MD   carvedilol (COREG) 25 MG tablet Take 1 tablet (25 mg total) by mouth 2 (two) times daily. 9/29/16 3/16/18  Nnamdi Venegas MD   clotrimazole-betamethasone 1-0.05% (LOTRISONE) cream Apply topically 2 (two) times daily. 18   Michael Irizarry MD   furosemide (LASIX) 40 MG tablet  17   Historical Provider, MD   levoFLOXacin (LEVAQUIN) 250 MG tablet Take 1 tablet (250 mg total) by mouth once daily. for 11 days 19  Coleman Butler MD   multivitamin (THERAGRAN) per tablet Take 1 tablet by mouth once daily.    Historical Provider, MD   oxycodone-acetaminophen (PERCOCET) 5-325 mg per tablet Take 1 tablet by mouth every 4 (four) hours as needed for Pain. 17   Emerson Mckay MD   polyethylene glycol (GLYCOLAX) 17 gram/dose powder Take 17 g by mouth once daily. 18   David Ramos MD   predniSONE (DELTASONE) 20 MG tablet Take 2 tablets (40 mg total) by mouth once daily. for 1 day 1/14/19 1/15/19  Coleman Butler MD   traMADol (ULTRAM) 50 mg tablet Take 50 mg by mouth every 6 (six) hours as needed for Pain.    Historical Provider, MD       Family History:  Family History   Problem Relation Age of Onset    Heart disease Mother        Social History:  Social History     Tobacco Use    Smoking status: Former Smoker     Packs/day: 2.00     Years: 45.00     Pack years: 90.00     Last attempt to quit: 2006     Years since quittin.5    Smokeless tobacco: Never Used   Substance Use Topics    Alcohol use: No     Comment: NO ALCOHOL FOR 18 MONTHS,  PAST ETOH ABUSE    Drug use: No       Review of Systems:  Pertinent items are noted in HPI. All other systems are reviewed and are negative.    Health Maintenance :   Primary Care Physician: Brian    Immunizations:   TDap UTD    Flu UTD   Pna UTD    Cancer Screening:  Colonoscopy: unsure     Objective:   Last 24 Hour Vital Signs:  BP  Min: 101/55  Max: 122/58  Temp  Av.6 °F (36.4 °C)  Min: 97.6 °F (36.4 °C)  Max: 97.6 °F (36.4 °C)  Pulse  Av  Min: 44  Max: 52  Resp  Av.8  Min: 21  Max: 33  SpO2  Av.9 %  Min: 76 %  Max: 100 %  Weight  Av.9 kg (154 lb)  Min: 69.9 kg (154 lb)  Max: 69.9 kg (154 lb)  Body mass index is 23.42 kg/m².  No intake/output data recorded.    Physical Examination:  General: wearing Bipap, in some respiratory distress, answer barely audible in one word statements   HENT: NCAT, PERRL,   Cardio: RRR no MGR   Pulm: wheezing diffusely,using accessory muscles, on bipap   Abd: mildly diffusely tender to palpation, umbilical hernia present, soft but distended, perhaps due toobesity   Ext: right leg s/p aka   Skin: sacral decubitus wound with mepilex in place, no erythema on dressing removal, no drainage   Psych: anxious     Laboratory:  Most Recent Data:  CBC:   Lab Results   Component Value Date    WBC 13.84 (H) 2019    HGB 12.0 (L) 2019    HCT 37.9 (L) 2019     2019    MCV 92 2019    RDW 14.4 2019       BMP:   Lab Results   Component Value Date     (L) 2019    K 5.6 (H) 2019     2019    CO2 28 2019    BUN 53 (H) 2019    CREATININE 1.7 (H) 2019     (H) 2019    CALCIUM 8.4 (L) 2019    MG 2.3 2019    PHOS 3.7 2018     LFTs:   Lab Results   Component Value Date    PROT 5.3 (L) 2019    ALBUMIN 2.2 (L) 2019    BILITOT 0.5 2019     (H) 2019    ALKPHOS 161 (H) 2019     (H) 2019     Coags:   Lab Results   Component  Value Date    INR 1.6 (H) 01/01/2017     FLP:   Lab Results   Component Value Date    CHOL 139 08/03/2016    HDL 29 (L) 08/03/2016    LDLCALC 93.4 08/03/2016    TRIG 83 08/03/2016    CHOLHDL 20.9 08/03/2016     DM:   Lab Results   Component Value Date    HGBA1C 5.7 (H) 03/16/2018    HGBA1C 7.9 (H) 11/27/2016    HGBA1C 6.7 (H) 07/13/2016    LDLCALC 93.4 08/03/2016    CREATININE 1.7 (H) 01/16/2019     Thyroid:   Lab Results   Component Value Date    TSH 1.894 01/09/2019     Anemia:   Lab Results   Component Value Date    IRON 20 (L) 11/27/2016    TIBC 145 (L) 11/27/2016    FERRITIN 2,805 (H) 11/29/2016    LWHWAFWI77 1152 (H) 11/27/2016    FOLATE 14.6 11/27/2016     Cardiac:   Lab Results   Component Value Date    TROPONINI 0.014 01/16/2019     (H) 01/16/2019     Urinalysis:   Lab Results   Component Value Date    LABURIN  04/27/2018     ENTEROCOCCUS FAECALIS  >100,000 cfu/ml  No other significant isolate      COLORU Yellow 01/13/2019    SPECGRAV <=1.005 (A) 01/13/2019    NITRITE Negative 01/13/2019    KETONESU Negative 01/13/2019    UROBILINOGEN Negative 01/13/2019    WBCUA 10 (H) 01/13/2019       Trended Lab Data:  Recent Labs   Lab 01/11/19  0718 01/13/19  0839 01/16/19  1615   WBC 16.04* 10.81 13.84*   HGB 11.2* 11.5* 12.0*   HCT 34.9* 36.4* 37.9*    237 256   MCV 92 91 92   RDW 14.0 13.8 14.4   * 136 135*   K 4.5 4.9 5.6*    102 101   CO2 25 28 28   BUN 67* 63* 53*   CREATININE 2.0* 1.7* 1.7*   * 241* 292*   PROT 5.9* 5.8* 5.3*   ALBUMIN 2.0* 2.1* 2.2*   BILITOT 0.3 0.3 0.5   * 227* 202*   ALKPHOS 107 134 161*   * 404* 475*       Trended Cardiac Data:  Recent Labs   Lab 01/16/19  1615   TROPONINI 0.014   *           Radiology:  Imaging Results          CTA Chest Non-Coronary (PE Study) (Final result)  Result time 01/16/19 19:14:17    Final result by Yodit Naylor MD (01/16/19 19:14:17)                 Impression:      1. No evidence of PE.  2. Small  bilateral pleural effusions resulting in volume loss and compressive atelectasis within the lower lobes, left greater than right with additional atelectasis versus consolidation seen within the lingula of the left upper lobe.  3. Extensive atherosclerosis with postsurgical changes of aorto bi-iliac graft.  Extensive chronic vascular findings and occlusions as detailed above.  These findings do not appear significantly changed when compared to previous CTA abdomen and pelvis from March 2018.  4. Otherwise no acute intra-abdominal abnormalities identified.  5. Cholelithiasis.  6. Multiple additional findings as detailed above.      Electronically signed by: Yodit Naylor MD  Date:    01/16/2019  Time:    19:14             Narrative:    EXAMINATION:  CT ABDOMEN PELVIS WITH CONTRAST; CTA CHEST NON CORONARY    CLINICAL HISTORY:  abdominal pain;; respiratory distress;    TECHNIQUE:  CTA chest was performed following administration of 100 cc Omnipaque 350 IV contrast.  Additional CT images were obtained of the abdomen and pelvis.    COMPARISON:  CTA abdomen and pelvis from March 2018.    FINDINGS:  Heart is normal in size without pericardial effusion.  Esophagus is unremarkable along its course.  Good opacification is seen of the pulmonary arterial tree with no evidence PE.    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  Small bilateral pleural effusions are seen resulting in volume loss and compressive atelectasis within the bilateral lower lobes, left greater than right.  Additional atelectasis versus consolidation is seen posteriorly within the lingula of the left upper lobe.    No acute osseous abnormality identified.  Extensive multilevel degenerative changes are seen throughout the spine.    Aorta is non aneurysmal.  Moderate atherosclerosis is seen throughout the aorta and its branch vessels.  Celiac artery is patent.  SMA is occluded at its origin with reconstitution of flow  presumably via collaterals seen distally.  This is unchanged from previous exam. There    is aorto bi-iliac bypass graft.  There is chronic occlusion of the right iliac portion of the graft.  Left iliac portion of the graft is patent.  There is chronic occlusion of the distal native aorta and native common iliacs.  Reconstitution of flow is seen within the right common femoral artery seen.  Extensive atherosclerosis is seen within the iliacs bilaterally.  There is chronic occlusion of the left native internal and external iliac arteries.  Reconstitution of flow is seen involving the distal left external iliac with chronic occlusion seen at the level of left common femoral artery stent.    No significant focal hepatic abnormalities are seen.  Portal vein and splenic vein are patent.  Calcified stones are seen within the gallbladder.  Stomach, pancreas, spleen, and adrenal glands show no significant abnormalities.  Multiple small bilateral renal hypodensities, probable cysts are noted.  Ureters, urinary bladder, and prostate show no significant abnormalities.  No evidence of bowel obstruction.  Appendix is not definitely visualized.  No evidence of free air or free fluid.                               CT Abdomen Pelvis With Contrast (Final result)  Result time 01/16/19 19:14:17    Final result by Yodit Naylor MD (01/16/19 19:14:17)                 Impression:      1. No evidence of PE.  2. Small bilateral pleural effusions resulting in volume loss and compressive atelectasis within the lower lobes, left greater than right with additional atelectasis versus consolidation seen within the lingula of the left upper lobe.  3. Extensive atherosclerosis with postsurgical changes of aorto bi-iliac graft.  Extensive chronic vascular findings and occlusions as detailed above.  These findings do not appear significantly changed when compared to previous CTA abdomen and pelvis from March 2018.  4. Otherwise no acute  intra-abdominal abnormalities identified.  5. Cholelithiasis.  6. Multiple additional findings as detailed above.      Electronically signed by: Yodit Naylor MD  Date:    01/16/2019  Time:    19:14             Narrative:    EXAMINATION:  CT ABDOMEN PELVIS WITH CONTRAST; CTA CHEST NON CORONARY    CLINICAL HISTORY:  abdominal pain;; respiratory distress;    TECHNIQUE:  CTA chest was performed following administration of 100 cc Omnipaque 350 IV contrast.  Additional CT images were obtained of the abdomen and pelvis.    COMPARISON:  CTA abdomen and pelvis from March 2018.    FINDINGS:  Heart is normal in size without pericardial effusion.  Esophagus is unremarkable along its course.  Good opacification is seen of the pulmonary arterial tree with no evidence PE.    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  Small bilateral pleural effusions are seen resulting in volume loss and compressive atelectasis within the bilateral lower lobes, left greater than right.  Additional atelectasis versus consolidation is seen posteriorly within the lingula of the left upper lobe.    No acute osseous abnormality identified.  Extensive multilevel degenerative changes are seen throughout the spine.    Aorta is non aneurysmal.  Moderate atherosclerosis is seen throughout the aorta and its branch vessels.  Celiac artery is patent.  SMA is occluded at its origin with reconstitution of flow presumably via collaterals seen distally.  This is unchanged from previous exam. There    is aorto bi-iliac bypass graft.  There is chronic occlusion of the right iliac portion of the graft.  Left iliac portion of the graft is patent.  There is chronic occlusion of the distal native aorta and native common iliacs.  Reconstitution of flow is seen within the right common femoral artery seen.  Extensive atherosclerosis is seen within the iliacs bilaterally.  There is chronic occlusion of the left native internal and  external iliac arteries.  Reconstitution of flow is seen involving the distal left external iliac with chronic occlusion seen at the level of left common femoral artery stent.    No significant focal hepatic abnormalities are seen.  Portal vein and splenic vein are patent.  Calcified stones are seen within the gallbladder.  Stomach, pancreas, spleen, and adrenal glands show no significant abnormalities.  Multiple small bilateral renal hypodensities, probable cysts are noted.  Ureters, urinary bladder, and prostate show no significant abnormalities.  No evidence of bowel obstruction.  Appendix is not definitely visualized.  No evidence of free air or free fluid.                               X-Ray Chest AP Portable (Final result)  Result time 01/16/19 16:33:34    Final result by Veronica Hernandez MD (01/16/19 16:33:34)                 Impression:      Mild atelectasis at the left lung base or subsegmental airspace disease.      Electronically signed by: Veronica Hernandez MD  Date:    01/16/2019  Time:    16:33             Narrative:    EXAMINATION:  XR CHEST AP PORTABLE    CLINICAL HISTORY:  Shortness of breath    TECHNIQUE:  Single frontal view of the chest was performed.    COMPARISON:  01/09/2019    FINDINGS:  The cardiac silhouette is upper normal limit in size.  There is mild increased interstitial lung markings throughout both lung fields, nonspecific.  Mild atelectasis at the left lung base.  No large pleural effusion, no pneumothorax.  The osseous structures appear within normal limits for age.                                   Assessment:     Asad Perez is a 78 y.o. 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    Abdominal rigidity, generalized     Complicated open wound of right hip     Acute osteomyelitis  04/11/2017    Decubitus ulcer of buttock, stage 2 04/07/2017    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    Urinary tract infection associated with indwelling urethral catheter 12/01/2016    Centrilobular emphysema 11/29/2016    Symptomatic anemia 11/27/2016    Deep vein thrombosis (DVT) of brachial vein     Non-healing ulcer of lower leg 10/28/2016    Fever     Decubitus ulcer of sacral region, stage 4 10/05/2016    Deep vein thrombosis (DVT) of upper extremity 10/05/2016    Dehiscence of perineal wound 10/05/2016    Hyponatremia 10/05/2016    VRE (vancomycin-resistant Enterococci) infection 09/20/2016    Elevated liver enzymes     Chronic diastolic congestive heart failure     Critical lower limb ischemia 09/10/2016    Stenosis of left internal carotid artery 09/08/2016    Abnormal finding on imaging 08/31/2016    Abnormal CT of the abdomen     Chronic kidney disease, stage V 08/12/2016    Anemia 08/12/2016    Anemia of chronic disease 08/08/2016    Altered mental status 08/07/2016    MRSA (methicillin resistant Staphylococcus aureus) infection 08/07/2016    Confusion     Weak     Pneumonia 07/29/2016    History of atrial fibrillation     Infected prosthetic knee joint 07/12/2016    Right knee pain 06/15/2016    GERD (gastroesophageal reflux disease) 02/28/2015    Esophageal web 02/28/2015    Acute on chronic diastolic heart failure 11/08/2014    Transaminitis 11/08/2014    Chronic obstructive pulmonary disease 11/07/2014    Dyspnea 11/06/2014    Normocytic anemia 11/06/2014    Dysphagia 11/03/2014    Osteoarthritis of left knee 07/14/2014    Essential hypertension 07/16/2013    CKD (chronic kidney disease) 07/16/2013    PAD (peripheral artery disease) 07/16/2013    CAD (coronary artery disease) 07/16/2013    History of stroke 07/16/2013    Physical deconditioning 07/16/2013    Alcohol abuse 07/16/2013        Plan:     Acute on chronic hypercapnic respiratory  failure 2/2 likely COPD exacerbation  -Recently admitted for this; Blood cultures grew gram negative rods; Discharged on 1/13 with 14 days total of levofloxacin, 5 days of prednisone and oseltamavir, and continuing his home 2L oxygen  -patient was satting 70's on no supplemental oxygen upon arrival to ED   -now s/p IV steroids and one-hour nebulizer with much improvement and satting 100% on 100% non-rebreather   -Procal, lactate pending pending  - CTA Chest ordered to rule out PE in mostly bed-bound patient with SOB that is unable to meaningfully quantify temporal nature of onset: no evidence of PE   -zosyn started in ED   - ABG with respiratory acidosis 7.227, pCO2 78 and pO2 292   - CXR relatively unchanged from previous admission   - going to ICU   - nightly bipap   - duonebs q4h   - will reevaluate in am for IV vs PO steroids   - lasix 80 iv one time   - place gonzalez for strict ins and outs    - as of now plan to continue 14 day course of levaquin started on prior discharge given low clinical or radiographic suspicion for new or worsening pneumonia     ELAYNE on CKD 3B  -Baseline creatinine 1.1-1.3, 1.7 today  -Avoid nephrotoxic agents  -Renally dose medications    Recent gram negative faye bacteremia 2/2 stage 3 sacral decubitus ulcer , presumably cleared prior to last discharge  -Addressed during last admission earlier this month  -Wound care consult  -On discharge, will have home health nurse for sacral decubitus ulcer  -Recs: apply mepilex a/g every third day  -Today's blood cultures pending; cultures from 1/11 show NGTD      Transaminitis  -, , alk phos 161   -RUQ U/S on last admit earlier this month showing cholelithiasis without acute cholecystitis   - Neuroendocrine was consulted at this time, were not concerned for acute cholecystitis and no surgical intervention at this time (LFTs more indicative of hepatic origin vs cholestatic)  - patient intermittently complaining of abdominal pain at this  time; denied to other team members   - ED ordered CT abdomen/pelvis; pending      HFpEF  -TTE (11/28/16)- mild LA enlargement, concentric hypertrophy, normal LVSF, left ventricular diastolic dysfunction, pulmonary HTN (PA sys pressure ~47)  -Home medication: amiodarone 200, amlodipine 10, carvedilol 25  -, troponin WNL   -Resume home meds  - lasix iv once and gonzalez placement     History of CVA  -CVA in 2007, no focal deficits on exam  -Continue ASA 81, atorvastatin 40     Right knee AKA, peripheral vascular disease, CAD  -Continue ASA 81, atorvastatin 40     Constipation  -Polyethylene glycol     History of Alcohol abuse  -No alcohol since 2007     Healthcare maintenance  -UTD on flu, tetanus, pneumovax   -monitor and replenish/chelate electrolytes as clinically indicated       Diet: NPO  PPX: SQH  Dispo: admit to ICU       Code Status: Full per chart review, will assess         Michelle Barrett MD  [facility] Internal Medicine HO-I    [facility] Medicine Hospitalist Pager numbers:   [facility] Hospitalist Medicine Team A (Chay/Shawanda): [phone]  [facility] Hospitalist Medicine Team B (Gloria/Alex):  [phone]

## 2019-01-16 NOTE — PROVIDER PROGRESS NOTES - EMERGENCY DEPT.
Encounter Date: 1/16/2019    ED Physician Progress Notes       SCRIBE NOTE: I, Barbie Holliday, am scribing for, and in the presence of,  Dr. Chapman.  Physician Statement: I, Dr. Chapman, personally performed the services described in this documentation as scribed by Barbie Holliday in my presence, and it is both accurate and complete.     Physician Note:   Case accepted from Dr. Gregg at shift change.   78 year-old male presents to the ED for SOB. He was recently admitted from 1/9-1/13 for COPD exacerbation. Patient was discharged with Levaquin and Prednisone, which he reportedly took today. On arrival to the ED, initial O2 saturation was 76% on RA and switched to NC in triage. Patient arrived to the ED via POV with wife. Patient is on home O2. Wife reports that patient did not use O2 for about 20-30 minutes while in route. This likely contributed to patients concerning O2 saturation. Patient was also complaining of abdominal pain. Will order CT abdomen, monitor closely, and reassess.      4:17 PM   He is currently receiving hour long nebulizer treatment in the ED with O2 saturation 100%.      5:30 PM   Patient finished nebulizer treatment, with much improvement compared to initial assessment. He is on 100% non-rebreather. Oxygen saturation is 100%.     5:45 PM   Discussed with [facility] resident, who agreed to evaluate the patient in the ED.            I, Dr. Isac Chapman, personally performed the services described in this documentation. All medical record entries made by the scribe were at my direction and in my presence.  I have reviewed the chart and agree that the record reflects my personal performance and is accurate and complete

## 2019-01-16 NOTE — ED PROVIDER NOTES
Encounter Date: 1/16/2019    SCRIBE #1 NOTE: I, Oralia Bro, am scribing for, and in the presence of,  Dr. Gregg. I have scribed the entire note.   SCRIBE #2 NOTE: I, Baribe Holliday, am scribing for, and in the presence of,  Dr. Chapman. I have scribed the remaining portions of the note not scribed by Scribe #1. Other sections scribed: ED Management, Critical Care.     History     Chief Complaint   Patient presents with    Weakness     78 year old male presents to ed cc of generalized weakness/ abdominal pain x 3 days patient states recent discharge from hospital on sunday.      This is a 78 y.o. male with a PMHx of HTN, CAD, CKD, CHF, and CVA who presents to the Emergency Department with a cc of shortness of breath  x 3 days. The pt was discharged from the hospital 3 days ago with 14 days total of levofloxacin, 5 days of prednisone and oseltamavir, and continuing his home 2L oxygen. The SOB is worsening, constant, and without alleviating factors. Pt reports associated generalized body aches, facial swelling, abdominal pain, and bruising to his LLQ abdomen. Pt denies cough, fever, or chest pain.       The history is provided by the patient.     Review of patient's allergies indicates:  No Known Allergies  Past Medical History:   Diagnosis Date    Alcohol abuse     CHF (congestive heart failure)     CKD (chronic kidney disease) stage 3, GFR 30-59 ml/min     Coronary artery disease     Decubitus skin ulcer     Heart failure, diastolic     High cholesterol     Hypertension     Immobility     LBP (low back pain)     Prediabetes     PVD (peripheral vascular disease)     Stroke     2006, no deficits    Urine incontinence      Past Surgical History:   Procedure Laterality Date    AMPUTATION-ABOVE KNEE Right 9/12/2016    Performed by [facility] at [facility] OR    aortic bifemoral bypass  2006    ARTHROPLASTY-KNEE Right 6/27/2016    [facility]formed by [facility] at [facility] OR    ARTHROPLASTY-KNEE Left  2014    Performed by [facility] at [facility]    bilateral carotid stenois  2006    carotid stents      DEBRIDEMENT-SACRAL WOUND N/A 10/10/2016    Performed by Michael [facility]arry MD at [facility]    DEBRIDEMENT-SACRAL WOUND Bilateral 2016    Performed by Maria Alejandra Pichardo DO at [facility] [place]    DEBRIDEMENT-SACRAL WOUND N/A 2016    Performed by [facility]ardo DO at [facility]    DEBRIDEMENT-WOUND Right 2017    Performed by [facility]ry [facility] at [facility]    ESOPHAGOGASTRODUODENOSCOPY (EGD) N/A 2014    Performed by David Ramos MD at [facility] ENDO    ESOPHAGOGASTRODUODENOSCOPY (EGD) with PEG N/A 2016    Performed by Emerson Childers Jr., MD at [facility]    EXCHANGE-POLYETHYLENE RIGHT KNEE Right 2016    Performed by [facility] at [facility]    IOVERA Right 6/15/2016    Performed by [facility] at [facility]    IRRIGATION AND DEBRIDEMENT LOWER EXTREMITY Right 2016    Performed by [facility] at [facility]    IRRIGATION AND DEBRIDEMENT RIGHT KNEE Right 2016    Performed by [facility]acho [facility] at [facility]    JOINT REPLACEMENT      knee    left common endarterectomy  2006    REVISION-AMPUTATION STUMP Right 2016    Performed by [facility]ry MD at [facility] [place]    RT AKA  2017    SYNOVECTOMY-KNEE Left 2016    Performed by [facility] at [facility] [place]    WOUND EXPLORATION Right 2016    Performed by [facility]acho MD at [facility] [place]     Family History   Problem Relation Age of Onset    Heart disease Mother      Social History     Tobacco Use    Smoking status: Former Smoker     Packs/day: 2.00     Years: 45.00     Pack years: 90.00     Last attempt to quit: 2006     Years since quittin.5    Smokeless tobacco: Never Used   Substance Use Topics    Alcohol use: No     Comment: NO ALCOHOL FOR 18 MONTHS, PAST ETOH ABUSE    Drug use: No     Review of Systems   Constitutional: Negative for chills, fatigue and fever.   HENT: Positive for facial  swelling. Negative for trouble swallowing and voice change.    Eyes: Negative for photophobia, pain and redness.   Respiratory: Positive for shortness of breath. Negative for cough and choking.    Cardiovascular: Negative for chest pain, palpitations and leg swelling.   Gastrointestinal: Positive for abdominal pain. Negative for diarrhea, nausea and vomiting.        Positive for bruise to LLQ.   Genitourinary: Negative for dysuria, frequency and urgency.   Musculoskeletal: Positive for myalgias (generalized body aches). Negative for back pain, neck pain and neck stiffness.   Neurological: Negative for seizures, speech difficulty, light-headedness, numbness and headaches.   All other systems reviewed and are negative.      Physical Exam     Initial Vitals [01/16/19 1553]   BP Pulse Resp Temp SpO2   (!) 113/58 (!) 52 (!) 21 97.6 °F (36.4 °C) (!) 76 %      MAP       --         Physical Exam    Nursing note and vitals reviewed.  Constitutional: He appears well-developed and well-nourished. No distress.   HENT:   Head: Normocephalic and atraumatic.   Mouth/Throat: Oropharynx is clear and moist.   Eyes: Conjunctivae and EOM are normal. Pupils are equal, round, and reactive to light.   No pallor.   Neck: Normal range of motion. Neck supple.   Cardiovascular: Regular rhythm and normal heart sounds. Bradycardia present.    No murmur heard.  Pulmonary/Chest: No respiratory distress. He has decreased breath sounds.   Greatly diminished breath sounds bilaterally.   Abdominal: Soft. Bowel sounds are normal. He exhibits no distension. There is no tenderness.   Musculoskeletal: Normal range of motion. He exhibits no edema or tenderness.   Right AKA. Trace pitting edema of the LLE with hyperpigmentation.   Neurological: He is alert and oriented to person, place, and time. He has normal strength.   Skin: Skin is warm and dry.   Psychiatric: He has a normal mood and affect. His behavior is normal.         ED Course   Critical  Care  Date/Time: 1/16/2019 6:26 PM  Performed by: Isac Chapman MD  Authorized by: Isac Chapman MD   Direct patient critical care time: 10 minutes  Additional history critical care time: 5 minutes  Ordering / reviewing critical care time: 5 minutes  Consulting other physicians critical care time: 10 minutes  Consult with family critical care time: 5 minutes  Total critical care time (exclusive of procedural time) : 35 minutes        Labs Reviewed   CBC W/ AUTO DIFFERENTIAL - Abnormal; Notable for the following components:       Result Value    WBC 13.84 (*)     RBC 4.12 (*)     Hemoglobin 12.0 (*)     Hematocrit 37.9 (*)     MCHC 31.7 (*)     All other components within normal limits   COMPREHENSIVE METABOLIC PANEL - Abnormal; Notable for the following components:    Sodium 135 (*)     Potassium 5.6 (*)     Glucose 292 (*)     BUN, Bld 53 (*)     Creatinine 1.7 (*)     Calcium 8.4 (*)     Total Protein 5.3 (*)     Albumin 2.2 (*)     Alkaline Phosphatase 161 (*)      (*)      (*)     Anion Gap 6 (*)     eGFR if  44 (*)     eGFR if non  38 (*)     All other components within normal limits   CULTURE, BLOOD   CULTURE, BLOOD   TROPONIN I   B-TYPE NATRIURETIC PEPTIDE   LACTIC ACID, PLASMA     EKG Readings: (Independently Interpreted)   4:39 PM: Rate of 53. Sinus Bradycardia. 1st degree AV block. No ST elevations.       Imaging Results          X-Ray Chest AP Portable (Final result)  Result time 01/16/19 16:33:34    Final result by Veronica Hernandez MD (01/16/19 16:33:34)                 Impression:      Mild atelectasis at the left lung base or subsegmental airspace disease.      Electronically signed by: Veronica Hernandez MD  Date:    01/16/2019  Time:    16:33             Narrative:    EXAMINATION:  XR CHEST AP PORTABLE    CLINICAL HISTORY:  Shortness of breath    TECHNIQUE:  Single frontal view of the chest was  performed.    COMPARISON:  01/09/2019    FINDINGS:  The cardiac silhouette is upper normal limit in size.  There is mild increased interstitial lung markings throughout both lung fields, nonspecific.  Mild atelectasis at the left lung base.  No large pleural effusion, no pneumothorax.  The osseous structures appear within normal limits for age.                                 Medical Decision Making:   Clinical Tests:   Lab Tests: Ordered and Reviewed  Radiological Study: Ordered and Reviewed  Medical Tests: Ordered and Reviewed  ED Management:  Case accepted from Dr. Gregg at shift change.     78 year-old male presents to the ED for SOB. Patient is on home O2. Wife reports that patient did not use O2 for about 20-30 minutes while in route. Wife reports that she brought the patient to the ED for evaluation of facial and extremity swelling, but patient arrived to the ED hypoxic. Initial O2 saturation was 76% on RA and switched to NC in triage. Patient arrived to the ED via POV with wife. This likely contributed to patients concerning O2 saturation. Per medical record, He was recently admitted from 1/9-1/13 for COPD exacerbation. Patient was discharged with Levaquin and Prednisone, which he reportedly took today. Patient was also complaining of abdominal pain. Will order CT abdomen, monitor closely, and reassess.      4:17 PM   He is currently receiving hour long nebulizer treatment in the ED with O2 saturation 100%.       5:30 PM   Patient finished nebulizer treatment, with much improvement compared to initial assessment. He is on 100% non-rebreather. Oxygen saturation is 100%.      5:45 PM   Discussed with [facility] resident, who agreed to evaluate the patient in the ED.     18:30  Oxygen source switched to 4 liters N/C with no hypoxia though patient with increasing respiratory distress. BIPAP initiated with improvement.                 Attending Attestation:           Physician Attestation for  Scribe:  Physician Attestation Statement for Scribe #1: I, Dr. Chapman, reviewed documentation, as scribed by Barbie Holliday in my presence, and it is both accurate and complete.                 ED Course as of Jan 16 1639 Wed Jan 16, 2019   1552 Sort note: Asad Perez nontoxic/afebrile 78 y.o.  presented to the ED after being admitted for CHF exacerbation and d/c Sunday.  C/o SOB and facial swelling since that time.      Patient seen and medically screened by Physician assistant in Sort process due to ED crowding.  Appropriate tests and/or medications ordered.  Care transferred to an alternate provider when patient was placed in an Exam Room from the [facility] for physical exam, additional orders, and disposition. AHM    [AM]      ED Course User Index  [AM] Britt Wheat PA-C     Clinical Impression:     1. Acute respiratory failure with hypoxia and hypercapnia    2. Shortness of breath    3. COPD with acute exacerbation            6. Hyperkalemia               I, Dr. Lei Levine, personally performed the services described in this documentation. All medical record entries made by the scribe were at my direction and in my presence. I have reviewed the chart and agree that the record reflects my personal performance and is accurate and complete. Lei Levine MD.  5:12 PM 01/16/2019                     Isac Chapman MD  01/17/19 0147

## 2019-01-17 PROBLEM — E87.5 HYPERKALEMIA: Status: ACTIVE | Noted: 2019-01-01

## 2019-01-17 NOTE — PLAN OF CARE
Problem: Adult Inpatient Plan of Care  Goal: Plan of Care Review  Outcome: Ongoing (interventions implemented as appropriate)                    01/17/19 0334   Plan of Care Review   Plan of Care Reviewed With patient;spouse   Progress no change   Patient received from ED this shift. Spouse at bedside. Remains on bipap with adequate O2 sats. Breathing unlabored. No tachypnea at rest. Vitals stable. No complaints of pain. Safety maintained with no falls or injuries. Continuous monitoring maintained.

## 2019-01-17 NOTE — PROGRESS NOTES
"Vancomycin Dosing and Monitoring Pharmacy Protocol    Asad Perez is a 78 y.o. male    Height: 5' 8" (1.727 m)   Wt Readings from Last 3 Encounters:   01/17/19 96.2 kg (212 lb 1.3 oz)   01/10/19 70 kg (154 lb 5.2 oz)   10/25/18 68 kg (150 lb)       Temp Readings from Last 3 Encounters:   01/17/19 98.4 °F (36.9 °C)   01/13/19 96.8 °F (36 °C) (Axillary)   12/12/18 97.8 °F (36.6 °C) (Oral)      Lab Results   Component Value Date/Time    WBC 11.95 01/17/2019 12:16 AM    WBC 13.84 (H) 01/16/2019 04:15 PM    WBC 10.81 01/13/2019 08:39 AM      Lab Results   Component Value Date/Time    CREATININE 1.8 (H) 01/17/2019 05:09 AM    CREATININE 1.8 (H) 01/17/2019 03:19 AM    CREATININE 1.7 (H) 01/17/2019 12:16 AM      Lab Results   Component Value Date/Time    LACTATE 0.9 01/16/2019 05:45 PM    LACTATE 1.9 01/09/2019 05:41 PM    LACTATE 0.9 04/27/2018 08:24 PM       Serum creatinine: 1.8 mg/dL (H) 01/17/19 0509  Estimated creatinine clearance: 39.3 mL/min (A)    Antibiotics (From admission, onward)      Start     Stop Route Frequency Ordered    01/17/19 1500  vancomycin (VANCOCIN) 2,500 mg in dextrose 5 % 500 mL IVPB  (Vancomycin IVPB with levels panel)      -- IV Once 01/17/19 1310    01/17/19 1500  vancomycin (VANCOCIN) 1,250 mg in dextrose 5 % 250 mL IVPB      -- IV Every 24 hours (non-standard times) 01/17/19 1310    01/17/19 1400  azithromycin 500 mg in dextrose 5 % 250 mL IVPB (ready to mix system)      -- IV Every 24 hours (non-standard times) 01/17/19 1238    01/17/19 1400  ceFEPIme in dextrose 5% 2 gram/50 mL IVPB 2 g      -- IV Every 12 hours (non-standard times) 01/17/19 1238          Antifungals (From admission, onward)      None            Microbiology Results (last 7 days)       Procedure Component Value Units Date/Time    Blood Culture #2 **CANNOT BE ORDERED STAT** [[id number]] Collected:  01/16/19 1626    Order Status:  Completed Specimen:  Blood from Peripheral, Antecubital, Left Updated:  01/17/19 0515     " Blood Culture, Routine No Growth to date    Blood Culture #1 **CANNOT BE ORDERED STAT** [[id number]] Collected:  19 1615    Order Status:  Completed Specimen:  Blood from Peripheral, Hand, Right Updated:  19 0515     Blood Culture, Routine No Growth to date    Blood Culture #1 **CANNOT BE ORDERED STAT** [[id number]]     Order Status:  Canceled Specimen:  Blood     Blood Culture #2 **CANNOT BE ORDERED STAT** [[id number]]     Order Status:  Canceled Specimen:  Blood             Indication/Target trough:   Pneumonia (Target trough: 15-20mcg/ml)    Hemodialysis:   N/A    Dosing Weight:   Wt Readings from Last 1 Encounters:   19 96.2 kg (212 lb 1.3 oz)       Last Vancomycin dose: N/A   Date/Time given: N/A         Vancomycin level:  No results for input(s): VANCOMYCIN-TROUGH in the last 72 hours.  No results for input(s): VANCOMYCIN, RANDOM in the last 72 hours.    Per Protocol Initial/Adjustments Dosin. Initial/Adjustment Dose: Loading dose = 2500 mg x1, followed by Maintenance dose of 1250 mg q 24hr to be given at 19 date/time  2. Vancomycin Trough Level will be drawn on 1430 on 18 date/time     Pharmacy will continue to follow.    Please contact if you have any further questions. Thank you.    Christina Knight, PharmD  [phone]

## 2019-01-17 NOTE — ED NOTES
Patient turned onto L side at this time pillow placed as wedge; sacral wound noted; dressing ( mepilex) in place; dressing clean dry and intact.

## 2019-01-17 NOTE — PLAN OF CARE
"Pt d/c [place] [facility]13[facility]  Per progress notes:  Acute on chronic hypercapnic respiratory failure 2/2 likely COPD exacerbation  -Recently admitted for this; Blood cultures grew gram negative rods; Discharged on 1/13 with 14 days total of levofloxacin, 5 days of prednisone and oseltamavir, and continuing his home 2L oxygen  Upon admit:  - Cxr- left lung atelectasis  - CT chest with contrast, CT abdomen- small bilateral pleural effusion, compressive atelectasis, left lingula with consolidation vs atelectasis, cholelithiasis  -zosyn started in ED   - ABG with respiratory acidosis 7.227, pCO2 78 and pO2 292   - CXR relatively unchanged from previous admission    - as of now plan to continue 14 day course of levaquin started on prior discharge given low clinical or radiographic suspicion for new or worsening pneumonia       Tn met with pt and pt informed TN he lives with his wife and brother in law both who provide assistance as he is w/c bound.His brother in law provides transportation. Pt reports he is w/c bound and that his brother in law gets him in and out of w/c.    Pt has DME in place including home oxygen supplied by [facility]( [phone]) and has SN from [facility] ( pt has sacral wound). Tn confirmed with Yusra at [facility] nurse admitted pt on 1/14/19.    Pt informed Tn he returned to hospital because he had his oxygen on 4 liters but "somone" told him to turn it down to 2 liters and then he "had a hard time breathing." TN spoke to pt's wife to inquire who instructed him to increase and then decrease his oxygen but wife stated she "really not sure."    TN reviewed medical records and called [facility] and all records indicate only 2 liters of oxygen had ever been ordered for pt.   Pt/wife will be reeducated on importance of maintaining oxygen at rate ordered unless changed by MD only.    Tn referred pt to [facility] however Tn was informed by Oralia Almendarez RN in [facility] that they " can not schedule appts for Dr. Torres's patients. Tn call [facility] Zuleta office and appt scheduled for 1/24/19 at 10:45 and information placed on AVS.    Tn gave pt d/c brochure and card and encouraged to call for any needs/questions.    Tn to continue to follow.       01/17/19 1351   Discharge Assessment   Assessment Type Discharge Planning Assessment   Confirmed/corrected address and phone number on facesheet? Yes   Assessment information obtained from? Patient   Expected Length of Stay (days) 2   Communicated expected length of stay with patient/caregiver yes   Prior to hospitilization cognitive status: Alert/Oriented   Prior to hospitalization functional status: Needs Assistance;Partially Dependent   Current cognitive status: Alert/Oriented   Current Functional Status: Needs Assistance   Lives With spouse;other relative(s);other (see comments)  (spouse and brother in law)   Able to Return to Prior Arrangements yes   Is patient able to care for self after discharge? No   Who are your caregiver(s) and their phone number(s)? Kassy (spouse) [phone]   Patient's perception of discharge disposition home health   Readmission Within the Last 30 Days previous discharge plan unsuccessful   If yes, most recent facility name: [facility]   Patient currently being followed by outpatient case management? No   Patient currently receives any other outside agency services? No   Equipment Currently Used at Home oxygen;hospital bed;wheelchair;nebulizer   Do you have any problems affording any of your prescribed medications? No   Is the patient taking medications as prescribed? yes   Does the patient have transportation home? Yes   Transportation Anticipated family or friend will provide  (brother in law)   Does the patient receive services at the [facility]? No   Discharge Plan A Home with family;Home Health   Discharge Plan B Skilled Nursing Facility   DME Needed Upon Discharge  (tbd)   Patient/Family in Agreement with  "Plan yes   Readmission Questionnaire   At the time of your discharge, did someone talk to you about what your health problems were? Yes   At the time of discharge, did someone talk to you about what to watch out for regarding worsening of your health problem? Yes   At the time of discharge, did someone talk to you about what to do if you experienced worsening of your health problem? Yes   At the time of discharge, did someone talk to you about which medication to take when you left the hospital and which ones to stop taking? Yes   At the time of discharge, did someone talk to you about when and where to follow up with a doctor after you left the hospital? Yes   What do you believe caused you to be sick enough to be re-admitted? "had a hard time breathing"   How often do you need to have someone help you when you read instructions, pamphlets, or other written material from your doctor or pharmacy? Never   Do you have problems taking your medications as prescribed? No   Do you have any problems affording any of  your prescribed medications? No   Do you have problems obtaining/receiving your medications? No   Does the patient have transportation to healthcare appointments? Yes   Living Arrangements house   Does the patient have family/friends to help with healtcare needs after discharge? yes   Does your caregiver provide all the help you need? Yes   Are you currently feeling confused? No   Are you currently having problems thinking? No   Are you currently having memory problems? No   Have you felt down, depressed, or hopeless? 0   Have you felt little interest or pleasure in doing things? 0   In the last 7 days, my sleep quality was: good     "

## 2019-01-17 NOTE — PHYSICIAN QUERY
PT Name: Asad Perez  MR #: [id number]     Physician Query Form - Documentation Clarification      CDS/: Lurdes Hawkins RN CDI             Contact information: [email or web address]    This form is a permanent document in the medical record.     Query Date: January 17, 2019    By submitting this query, we are merely seeking further clarification of documentation. Please utilize your independent clinical judgment when addressing the question(s) below.    The Medical record reflects the following:    Supporting Clinical Findings Location in Medical Record   sacral decubitus ulcer  Patient has also had pain around his sacrum, which has had a slowly healing ulcer for the past 1 year.   Wife reports the ulcer is looking better than it has, but the sacral pain is what caused the patient to come to the hospital.  -Meplex dressing in place  -Wound care consulted    Active problem list  Decubitus ulcer of sacral region, stage 4 10/5/2016  Decubitus ulcer of buttock, stage 2 4/7/2017    stage 3 sacral decubitus ulcer present with some surrounding erythema but no exudate, mild tenderness to palpation, meplex dressing clean, dry, and intact  Stage 3 sacral decubitus ulcer (present on admission)   Hospital medicine H&P  1/9 842 pm                [facility] medicine H&P  1/9 842 pm      Internal medicine note   1/12 985 am     Reason for consultation - Wound care   Stage 2 sacral decubitus ulcer   - non healing sacral decubitus ulcer   --Plan: apply mepalex a/g every third day    General surgery note   1/11 230 pm                                                                            Doctor, Please specify diagnosis or diagnoses associated with above clinical findings.    Doctor, Please clarify conflicting documentation of pressure injury/injuries by documenting all locations,   including laterality, Stage and progression, if any and present on admit status.    Provider Use Only      [ X  ] Pressure injury             Location___Sacrum________________.            Laterality___Midline________________.            Previous  Stage____3_________  Stage upon admit____3__________.            Present on admit  (  X ) Yes    (   ) No    (   ) Other_____   (   ) Undetermined.          [   ] Pressure injury            Location___________________.            Laterality___________________.            Previous  Stage_____________ Stage upon admit______________.            Present on admit  (   ) Yes    (   ) No    (   ) Other_____   (   ) Undetermined.        [   ] Pressure injury            Location___________________.            Laterality___________________.            Previous  Stage_____________  Stage upon admit______________.            Present on admit  (   ) Yes    (   ) No    (   ) Other_____   (   ) Undetermined.      [   ] Other, please specify__________________________.                                                                                                             [  ] Clinically Undetermined

## 2019-01-17 NOTE — PLAN OF CARE
Transferred to [facility] with nurse Fatoumata and transport on monitor, and 02 2lnc, no belongings with him, no meds, wife with him also

## 2019-01-17 NOTE — PLAN OF CARE
Problem: Oral Intake Inadequate  Goal: Improved Oral Intake    Intervention: Promote and Optimize Oral Intake  Recommendation  1. Continue with Cardiac Mech Soft with Thicken liquids    2. Add Boost TID if po intake <50% of meals  3. Add MVI with minerals     Goals: Patient to consume >50% of meals  Nutrition Goal Status: new  Communication of RD Recs: reviewed with RN     Nutrition Discharge Planning: Home on Cardiac Dysphagia (Mechanical Soft); Nectar Thick

## 2019-01-17 NOTE — PHYSICIAN QUERY
"PT Name: Asad Perez  MR #: [id number]     Physician Query Form - Documentation Clarification      CDS/: Lurdes Hawkins RN CDI              Contact information: [email or web address]    This form is a permanent document in the medical record.     Query Date: January 17, 2019    By submitting this query, we are merely seeking further clarification of documentation. Please utilize your independent clinical judgment when addressing the question(s) below.    The Medical record reflects the following:    Supporting Clinical Findings Location in Medical Record   HFpEF  -TTE (11/28/16)- mild LA enlargement, concentric hypertrophy, normal LVSF, left ventricular diastolic dysfunction, pulmonary HTN (PA sys pressure ~47)  -Home medication: amiodarone 200, amlodipine 10, carvedilol 25  -Resume home meds   Discharge summary  1/13 227 pm                                                                            Doctor, Please specify diagnosis or diagnoses associated with above clinical findings.    Doctor, please further specify the acuity of "HFpEF."    Provider Use Only      [  ] Acute       [  ] Acute on Chronic      [ X ] Chronic      [  ] Other, please specify_____________.                                                                                                               [  ] Clinically Undetermined               "

## 2019-01-17 NOTE — CONSULTS
"  [facility]  Adult Nutrition  Consult Note    SUMMARY     Recommendations    Recommendation  1. Continue with Cardiac Mech Soft with Thicken liquids    2. Add Boost TID if po intake <50% of meals  3. Add MVI with minerals    Goals: Patient to consume >50% of meals  Nutrition Goal Status: new  Communication of RD Recs: reviewed with RN    Nutrition Discharge Planning: Home on Cardiac Dysphagia (Mechanical Soft); Nectar Thick     Reason for Assessment    Reason For Assessment: consult(Nutrition Assessement; diabetic diet if applicable)  Diagnosis: pulmonary disease(Acute respiratory failure with hypoxia and hypercapnia )    Relevant Medical History:   Past Medical History:   Diagnosis Date    Alcohol abuse     CHF (congestive heart failure)     CKD (chronic kidney disease) stage 3, GFR 30-59 ml/min     Coronary artery disease     Decubitus skin ulcer     Heart failure, diastolic     High cholesterol     Hypertension     Immobility     LBP (low back pain)     Prediabetes     PVD (peripheral vascular disease)     Stroke     2006, no deficits    Urine incontinence        General Information Comments: NFPE not warranted. Pt well nourished. Pt not at baseline for mental status. Wife not present. Pressure Ulcer Stage 3 Sacral area.        Nutrition Risk Screen    Nutrition Risk Screen: no indicators present    Nutrition/Diet History    Patient Reported Diet/Restrictions/Preferences: general  Typical Food/Fluid Intake: Pt reports he live in nursing home.   Food Preferences: no  Spiritual, Cultural Beliefs, Religion Practices, Values that Affect Care: no  Vitamin/Mineral/Herbal Supplements: VIT C  Food Allergies: NKFA  Factors Affecting Nutritional Intake: impaired cognitive status/motor control    Anthropometrics    Temp: 97.9 °F (36.6 °C)  Height Method: Stated  Height: 5' 8" (172.7 cm)  Height (inches): 68 in  Weight Method: Bed Scale  Weight: 96.2 kg (212 lb 1.3 oz)  Weight (lb): 212.08 " lb  Ideal Body Weight (IBW), Male: 154 lb  % Ideal Body Weight, Male (lb): 137.71 lb  BMI (Calculated): 32.3  BMI Grade: 30 - 34.9- obesity - grade I  Amputation %: 5.9  Total Amputation %: 5.9  Amputation Ideal Body Weight (IBW), Male (lb): 148.1 lb  Amputee BMI (kg/m2): 34.36 kg/m2       Lab/Procedures/Meds    Pertinent Labs Reviewed: reviewed  Recent Labs   Lab 01/16/19  1615  01/17/19  0509   *   < > 137   K 5.6*   < > 5.3*      < > 102   CO2 28   < > 25   BUN 53*   < > 58*   CREATININE 1.7*   < > 1.8*   MG 2.4  --   --     < > = values in this interval not displayed.     Recent Labs   Lab 01/17/19  0016   *   *   ALKPHOS 147*   BILITOT 0.7   PROT 5.8*   ALBUMIN 2.2*       Hemoglobin A1C   Date Value Ref Range Status   03/16/2018 5.7 (H) 4.0 - 5.6 % Final     Comment:     According to ADA guidelines, hemoglobin A1c <7.0% represents  optimal control in non-pregnant diabetic patients. Different  metrics may apply to specific patient populations.   Standards of Medical Care in Diabetes-2016.  For the purpose of screening for the presence of diabetes:  <5.7%     Consistent with the absence of diabetes  5.7-6.4%  Consistent with increasing risk for diabetes   (prediabetes)  >or=6.5%  Consistent with diabetes  Currently, no consensus exists for use of hemoglobin A1c  for diagnosis of diabetes for children.  This Hemoglobin A1c assay has significant interference with fetal   hemoglobin   (HbF). The results are invalid for patients with abnormal amounts of   HbF,   including those with known Hereditary Persistence   of Fetal Hemoglobin. Heterozygous hemoglobin variants (HbAS, HbAC,   HbAD, HbAE, HbA2) do not significantly interfere with this assay;   however, presence of multiple variants in a sample may impact the %   interference.           Pertinent Medications Reviewed: reviewed  Scheduled Meds:   albuterol-ipratropium  3 mL Nebulization Q4H    ascorbic acid (vitamin C)  500 mg Oral BID     aspirin  81 mg Oral Daily    atorvastatin  40 mg Oral Daily    azithromycin  500 mg Intravenous Q24H    ceFEPime (MAXIPIME) IVPB  2 g Intravenous Q12H    heparin (porcine)  5,000 Units Subcutaneous Q8H    predniSONE  40 mg Oral Daily    [START ON 1/18/2019] vancomycin (VANCOCIN) IVPB  1,250 mg Intravenous Q24H    vancomycin (VANCOCIN) IVPB  2,500 mg Intravenous Once     Continuous Infusions:  PRN Meds:.acetaminophen, dextrose 50%, HYDROcodone-acetaminophen, sodium chloride 0.9%      Estimated/Assessed Needs    Weight Used For Calorie Calculations: 96.2 kg (212 lb 1.3 oz)  Energy Calorie Requirements (kcal): 1660  Energy Need Method: [place]-St Jeor     Protein Requirements: 77(.8 gm Pro/kg)  Weight Used For Protein Calculations: 96.2 kg (212 lb 1.3 oz)     Estimated Fluid Requirement Method: RDA Method  RDA Method (mL): 1660  CHO Requirement: 50%      Nutrition Prescription Ordered    Current Diet Order: Cardiac [facility]; Dysphagia (Mechanical Soft); [place] Thick     Evaluation of Received Nutrient/Fluid Intake    I/O: reviewed  Comments: LBM 1/16     % Intake of Estimated Energy Needs: 0 - 25 %  % Meal Intake: 0 - 25 %    Nutrition Risk    Level of Risk/Frequency of Follow-up: (1xweek)     Assessment and Plan    Decubitus ulcer of buttock, stage 2    Nutrition Problem  Increased Nutrient Needs    Related to (etiology):   Wound healing    Signs and Symptoms (as evidenced by):   Decubitus ulcer of buttock    Interventions:  Collaboration with other providers    Nutrition Diagnosis Status:   New              Monitor and Evaluation    Food and Nutrient Intake: food and beverage intake  Food and Nutrient Adminstration: diet order  Anthropometric Measurements: weight, weight change, body mass index  Biochemical Data, Medical Tests and Procedures: glucose/endocrine profile  Nutrition-Focused Physical Findings: overall appearance     Malnutrition Assessment  See General Comment Section              Nutrition Follow-Up    RD Follow-up?: Yes

## 2019-01-17 NOTE — ED NOTES
Admit team at bedside at this time sandee used for translation for patient's spouse [phone] sandee number. Patient speaks english and understands english. Respiratory at bedside for 20:00 PM treatment

## 2019-01-17 NOTE — PROGRESS NOTES
"[facility] Hospital Medicine Progress Note    Primary Team: [facility] Hospitalist Team B  Attending Physician: Bernice  Resident: Jose Natarajan  Intern: Mello Butler    Subjective:      Patient doing well on bipap.  Reports no SOB, chest pain, nausea, vomiting, or palpitations.  Overnight, had issues with hyperkalemia that required insulin and albuterol (see Dr. Coronado' plan of care note).       Objective:     Last 24 Hour Vital Signs:  BP  Min: 101/51  Max: 134/63  Temp  Av °F (36.7 °C)  Min: 97.6 °F (36.4 °C)  Max: 98.5 °F (36.9 °C)  Pulse  Av.1  Min: 44  Max: 61  Resp  Av.4  Min: 18  Max: 44  SpO2  Av.4 %  Min: 76 %  Max: 100 %  Height  Av' 8" (172.7 cm)  Min: 5' 8" (172.7 cm)  Max: 5' 8" (172.7 cm)  Weight  Av kg (183 lb 0.7 oz)  Min: 69.9 kg (154 lb)  Max: 96.2 kg (212 lb 1.3 oz)  I/O last 3 completed shifts:  In: 100 [IV Piggyback:100]  Out: 1685 [Urine:1685]    Physical Examination:  Gen: AAOx3, NAD, wearing bipap  HEENT: head normocephalic, atraumatic  Cardiac: regular rate and rhythm; no murmurs, rubs, or gallops  Resp: on bipap; diffuse wheezes, normal work of breathing  GI: abdomen soft, non-tender, non-distended; umbilical hernia present; normoactive bowel sounds  Extrem: no clubbing or swelling noted; right leg AKA  Skin: no rashes or bruises noted; sacral decubitus ulcer with mepilex in place  Neuro: AAOx3, moving all extremities without difficulty       Laboratory:  Laboratory Data Reviewed: yes  Pertinent Findings:  WBC 11.95 (down from 13.84)  Potassium 5.6--> 6.1--> 6.5 --> 5.3  BUN 58  Creatinine 1.8   (was 404)   (was 227)  Alk phos 161 (was 134)    ABG pH 7.35, CO2 53.7, O2 68    Microbiology Data Reviewed: yes  Pertinent Findings:  Blood cultures pending  Previous cultures grew E. coli    Other Results:  EKG (my interpretation): normal sinus rhythm    Radiology Data Reviewed: yes  Pertinent Findings:  Cxr- left lung atelectasis  CT chest with " contrast, CT abdomen- small bilateral pleural effusion, compressive atelectasis, left lingula with consolidation vs atelectasis, cholelithiasis    Current Medications:     Infusions:       Scheduled:   albuterol-ipratropium  3 mL Nebulization Q4H    ascorbic acid (vitamin C)  500 mg Oral BID    aspirin  81 mg Oral Daily    atorvastatin  40 mg Oral Daily    heparin (porcine)  5,000 Units Subcutaneous Q8H    sodium polystyrene  15 g Oral Once        PRN:  dextrose 50%, HYDROcodone-acetaminophen, sodium chloride 0.9%    Antibiotics and Day Number of Therapy:  Levofloxacin  Zosyn x1 in the ED    Lines and Day Number of Therapy:  PIV    Assessment:     Asad Perez is a 78 y.o.male with  Patient Active Problem List    Diagnosis Date Noted    Hyperkalemia 01/17/2019    Respiratory failure with hypoxia and hypercapnia 01/16/2019    COPD with acute exacerbation 01/09/2019    Abdominal pain 03/19/2018    COPD exacerbation 03/16/2018    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    Abdominal rigidity, generalized     Complicated open wound of right hip     Acute osteomyelitis 04/11/2017    Decubitus ulcer of buttock, stage 2 04/07/2017    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    Urinary tract infection associated with indwelling urethral catheter 12/01/2016    Centrilobular emphysema 11/29/2016    Symptomatic anemia 11/27/2016    Deep vein thrombosis (DVT) of brachial vein     Non-healing ulcer of lower leg 10/28/2016    Fever     Decubitus ulcer of sacral region, stage 4 10/05/2016    Deep vein thrombosis (DVT) of upper extremity 10/05/2016    Dehiscence of perineal wound 10/05/2016    Hyponatremia 10/05/2016    VRE (vancomycin-resistant Enterococci) infection 09/20/2016    Elevated liver enzymes     Chronic diastolic congestive heart failure     Critical lower limb ischemia 09/10/2016    Stenosis of left internal carotid artery 09/08/2016    Abnormal finding on  imaging 08/31/2016    Abnormal CT of the abdomen     Chronic kidney disease, stage V 08/12/2016    Anemia 08/12/2016    Anemia of chronic disease 08/08/2016    Altered mental status 08/07/2016    MRSA (methicillin resistant Staphylococcus aureus) infection 08/07/2016    Confusion     Weak     Pneumonia 07/29/2016    History of atrial fibrillation     Infected prosthetic knee joint 07/12/2016    Right knee pain 06/15/2016    GERD (gastroesophageal reflux disease) 02/28/2015    Esophageal web 02/28/2015    Acute on chronic diastolic heart failure 11/08/2014    Transaminitis 11/08/2014    Chronic obstructive pulmonary disease 11/07/2014    Dyspnea 11/06/2014    Normocytic anemia 11/06/2014    Dysphagia 11/03/2014    Osteoarthritis of left knee 07/14/2014    Essential hypertension 07/16/2013    CKD (chronic kidney disease) 07/16/2013    PAD (peripheral artery disease) 07/16/2013    CAD (coronary artery disease) 07/16/2013    History of stroke 07/16/2013    Physical deconditioning 07/16/2013    Alcohol abuse 07/16/2013        Plan:     Acute on chronic hypercapnic respiratory failure 2/2 likely COPD exacerbation  -Recently admitted for this; Blood cultures grew gram negative rods; Discharged on 1/13 with 14 days total of levofloxacin, 5 days of prednisone and oseltamavir, and continuing his home 2L oxygen  -patient was satting 70's on no supplemental oxygen upon arrival to ED   -now s/p IV steroids and one-hour nebulizer with much improvement and satting 100% on 100% non-rebreather   -Procal, lactate pending pending  - Cxr- left lung atelectasis  - CT chest with contrast, CT abdomen- small bilateral pleural effusion, compressive atelectasis, left lingula with consolidation vs atelectasis, cholelithiasis  -zosyn started in ED   - ABG with respiratory acidosis 7.227, pCO2 78 and pO2 292   - CXR relatively unchanged from previous admission   - going to ICU   - nightly bipap   - duonebs q4h   -  will reevaluate in am for IV vs PO steroids   - lasix 80 iv one time   - place gonzalez for strict ins and outs    - as of now plan to continue 14 day course of levaquin started on prior discharge given low clinical or radiographic suspicion for new or worsening pneumonia      ELAYNE on CKD 3B  -Baseline creatinine 1.1-1.3, 1.7 today  -Avoid nephrotoxic agents  -Renally dose medications     Recent gram negative faye bacteremia 2/2 stage 3 sacral decubitus ulcer , presumably cleared prior to last discharge  -Addressed during last admission earlier this month  -Wound care consult  -On discharge, will have home health nurse for sacral decubitus ulcer  -Recs: apply mepilex a/g every third day  -Today's blood cultures pending; cultures from 1/11 show NGTD      Transaminitis  -, , alk phos 161   -RUQ U/S on last admit earlier this month showing cholelithiasis without acute cholecystitis   - Neuroendocrine was consulted at this time, were not concerned for acute cholecystitis and no surgical intervention at this time (LFTs more indicative of hepatic origin vs cholestatic)  - patient intermittently complaining of abdominal pain at this time; denied to other team members   - CT chest with contrast, CT abdomen- small bilateral pleural effusion, compressive atelectasis, left lingula with consolidation vs atelectasis, cholelithiasis      HFpEF  -TTE (11/28/16)- mild LA enlargement, concentric hypertrophy, normal LVSF, left ventricular diastolic dysfunction, pulmonary HTN (PA sys pressure ~47)  -Home medication: amiodarone 200, amlodipine 10, carvedilol 25  -, troponin WNL   -Resume home meds  - lasix iv once and gonzalez placement      History of CVA  -CVA in 2007, no focal deficits on exam  -Continue ASA 81, atorvastatin 40     Right knee AKA, peripheral vascular disease, CAD  -Continue ASA 81, atorvastatin 40     Constipation  -Polyethylene glycol     History of Alcohol abuse  -No alcohol since 2007     Healthcare  maintenance  -UTD on flu, tetanus, pneumovax   -monitor and replenish/chelate electrolytes as clinically indicated         Diet: NPO  PPX: SQH  Dispo: step down to day, pending culture results         Coleman Butler MD  [facility] Internal Medicine HO-1    [facility] Medicine Hospitalist Pager numbers:   [facility] Hospitalist Medicine Team A (Chay/Shawanda): [phone]  [facility] Hospitalist Medicine Team B (Gloria/Alex):  [phone]

## 2019-01-17 NOTE — PLAN OF CARE
Problem: Adult Inpatient Plan of Care  Goal: Plan of Care Review  Outcome: Ongoing (interventions implemented as appropriate)  Cont to monitor resp needs and wean as hernandez

## 2019-01-17 NOTE — PROGRESS NOTES
Called to ED5  Per MD Rey to place pt on NRB from Bipap. After 30 mins pt became labored and tachypneic. Pt was placed back on Bipap on previous settings; aerosol tx given in-line. Pt stable on Bipap with no respiratory distress noted.

## 2019-01-17 NOTE — PLAN OF CARE
Medical team here, informed of large bruise left lower abd, also told of pain when I touch his scrotum, hurts with slightest touch, movement, side and scrotum examined, states he had a hydrocele before, told did not palpate anything , elevated on pillow case

## 2019-01-17 NOTE — PLAN OF CARE
[facility] Team B Plan of Care:    Called by ICU RN at ~12:15am to report critical K value of 6.1, up from 5.6 in ED. Placed orders for stat EKG, calcium gluconate 1g and regular insulin 10U IV, pt serum glucose at that time was 280. EKG showed no obvious hyperkalemic changes, KY 0.202, QRS <100, no T-wave changes. Repeat BMP 1hr after IV insulin bolus with increased K 6.5, serum glucose 299. Order placed for additional stat EKG, albuterol 10mg nebs and regular insulin 1U IV. Repeat EKG again with no hyperkalemic changes. Repeat BMP after completion of albuterol nebs with K 5.3, serum glucose 216. Will notify and defer further K shift to primary team.    Galileo Coronado MD   [facility] HO-1

## 2019-01-17 NOTE — PT/OT/SLP PROGRESS
Occupational Therapy  Missed Eval    Patient Name:  Asad Perez   MRN:  [id number]    Patient not seen today secondary to receiving nsg care at 1546 & w/ transport at 1601. Will follow-up Fri.    TRISTEN Caraballo  1/17/2019

## 2019-01-17 NOTE — NURSING TRANSFER
Nursing Transfer Note      1/17/2019     Transfer From: ED    Transfer via bed    Transfer with NRB to O2, cardiac monitoring    Transported by Ed RN    Medicines sent: no    Chart send with patient: Yes    Notified: spouse    Patient reassessed at: 1/17 @ 140 (date, time)    Upon arrival to floor: cardiac monitor applied, patient oriented to room, call bell in reach and bed in lowest position

## 2019-01-17 NOTE — PHYSICIAN QUERY
"PT Name: Asad Perez  MR #: [id number]    Physician Query Form - Cause and Effect Relationship Clarification      CDS/: Lurdes Hawkins RN CDI             Contact information: [email or web address]    This form is a permanent document in the medical record.     Query Date: January 17, 2019    By submitting this query, we are merely seeking further clarification of documentation. Please utilize your independent clinical judgment when addressing the question(s) below.    The Medical record contains the following:  Supporting Clinical Findings   Location in record   Sepsis       Blood Culture, Routine   Collected: 01/10/19 1544  Value: ESCHERICHIA COLI           Internal medicine  1/11/2019  8:46 AM     Lab results         Provider, please clarify if there is any correlation between "Sepsis" and "Escherichia coli".           Are the conditions:      [ X ] Due to or associated with each other     [  ] Unrelated to each other     [  ] Other (Please Specify): _________________________   [  ] Clinically Undetermined                                                                                                                                                                                             "

## 2019-01-17 NOTE — PLAN OF CARE
On bipap at this time, 10/5, 40%, fi02, denies sob, wants the bipap off, told will need to keep on for now, off for short period to wipe face, redness bridge of nose and forehead from bipap, if continues will need to place mepelex at bridge of nose, and forehead, lungs course and diminished, , loose non prod cough, abd firm, obese, large bruise left lower quadrant, area soft, no pain with palpitation, no nausea, edema arms greater left, and rt hand, elevated on pillows, pt has pain, when scrotum , moved, or touched, did not palpated any firmness, pt does have hypospadius, elevated scrotum on folded pillow case, mepelex intact to sacral pressure ulcer

## 2019-01-17 NOTE — ED NOTES
Patient able to maintain sats 98% on non rebreather however becomes tachypnic RR noted to be 36- 38.

## 2019-01-17 NOTE — PROGRESS NOTES
"Vancomycin Dosing and Monitoring Pharmacy Protocol    Asad Perez is a 78 y.o. male    Height: 5' 8" (1.727 m)   Wt Readings from Last 3 Encounters:   01/17/19 96.2 kg (212 lb 1.3 oz)   01/10/19 70 kg (154 lb 5.2 oz)   10/25/18 68 kg (150 lb)       Temp Readings from Last 3 Encounters:   01/17/19 98.4 °F (36.9 °C)   01/13/19 96.8 °F (36 °C) (Axillary)   12/12/18 97.8 °F (36.6 °C) (Oral)      Lab Results   Component Value Date/Time    WBC 11.95 01/17/2019 12:16 AM    WBC 13.84 (H) 01/16/2019 04:15 PM    WBC 10.81 01/13/2019 08:39 AM      Lab Results   Component Value Date/Time    CREATININE 1.8 (H) 01/17/2019 05:09 AM    CREATININE 1.8 (H) 01/17/2019 03:19 AM    CREATININE 1.7 (H) 01/17/2019 12:16 AM      Lab Results   Component Value Date/Time    LACTATE 0.9 01/16/2019 05:45 PM    LACTATE 1.9 01/09/2019 05:41 PM    LACTATE 0.9 04/27/2018 08:24 PM       Serum creatinine: 1.8 mg/dL (H) 01/17/19 0509  Estimated creatinine clearance: 39.3 mL/min (A)    Antibiotics (From admission, onward)      Start     Stop Route Frequency Ordered    01/17/19 1500  vancomycin (VANCOCIN) 2,500 mg in dextrose 5 % 500 mL IVPB  (Vancomycin IVPB with levels panel)      -- IV Once 01/17/19 1310    01/17/19 1500  vancomycin (VANCOCIN) 1,250 mg in dextrose 5 % 250 mL IVPB      -- IV Every 24 hours (non-standard times) 01/17/19 1310    01/17/19 1400  azithromycin 500 mg in dextrose 5 % 250 mL IVPB (ready to mix system)      -- IV Every 24 hours (non-standard times) 01/17/19 1238    01/17/19 1400  ceFEPIme in dextrose 5% 2 gram/50 mL IVPB 2 g      -- IV Every 12 hours (non-standard times) 01/17/19 1238          Antifungals (From admission, onward)      None            Microbiology Results (last 7 days)       Procedure Component Value Units Date/Time    Blood Culture #2 **CANNOT BE ORDERED STAT** [[id number]] Collected:  01/16/19 1626    Order Status:  Completed Specimen:  Blood from Peripheral, Antecubital, Left Updated:  01/17/19 0515     " Blood Culture, Routine No Growth to date    Blood Culture #1 **CANNOT BE ORDERED STAT** [[id number]] Collected:  19 1615    Order Status:  Completed Specimen:  Blood from Peripheral, Hand, Right Updated:  19 0515     Blood Culture, Routine No Growth to date    Blood Culture #1 **CANNOT BE ORDERED STAT** [[id number]]     Order Status:  Canceled Specimen:  Blood     Blood Culture #2 **CANNOT BE ORDERED STAT** [[id number]]     Order Status:  Canceled Specimen:  Blood             Indication/Target trough:   Pneumonia (Target trough: 15-20mcg/ml)    Hemodialysis:   N/A    Dosing Weight:   Wt Readings from Last 1 Encounters:   19 96.2 kg (212 lb 1.3 oz)       Last Vancomycin dose: N/A   Date/Time given: N/A         Vancomycin level:  No results for input(s): VANCOMYCIN-TROUGH in the last 72 hours.  No results for input(s): VANCOMYCIN, RANDOM in the last 72 hours.    Per Protocol Initial/Adjustments Dosin. Initial/Adjustment Dose: Loading dose = 2500 mg x1, followed by Maintenance dose of 1250 mg q 24hr to be given at 19 date/time  2. Vancomycin Trough Level will be drawn on 1430 on 18 date/time     Pharmacy will continue to follow.    Please contact if you have any further questions. Thank you.    Christina Knight, PharmD  [phone]

## 2019-01-17 NOTE — ED TRIAGE NOTES
Patient states he was recently discharged from the hospital ( Sunday). Since Sunday he has slowly become more SOB. Patient states he is on 4 L NC at home but even with his home O2 he was becoming SOB. Patient also complains of abdominal pain; large bruise ntoed to L side abdomen. MD aware.

## 2019-01-18 NOTE — PLAN OF CARE
Problem: Physical Therapy Goal  Goal: Physical Therapy Goal  Outcome: Outcome(s) achieved Date Met: 01/18/19  Pt is at [facility], pt bedridden for 2 years, camryn lift utilized by assist of 2 for mobility.

## 2019-01-18 NOTE — PROGRESS NOTES
"[facility] Medicine Progress Note    Primary Team: [facility] Hospitalist Team B  Attending Physician: Bernice  Resident: Jose Natarajan  Intern: Mello Butler    Subjective:      Patient doing well on bipap.  Reports no SOB, chest pain, nausea, vomiting, or palpitations.  Overnight, had issues with hyperkalemia that required insulin and albuterol (see Dr. Coronado' plan of care note).      Afebrile.     Objective:     Last 24 Hour Vital Signs:  BP  Min: 112/49  Max: 149/63  Temp  Av.4 °F (36.3 °C)  Min: 96.2 °F (35.7 °C)  Max: 98.4 °F (36.9 °C)  Pulse  Av  Min: 57  Max: 67  Resp  Av  Min: 14  Max: 42  SpO2  Av.5 %  Min: 92 %  Max: 96 %  Height  Av' 8" (172.7 cm)  Min: 5' 8" (172.7 cm)  Max: 5' 8" (172.7 cm)  Weight  Av.8 kg (186 lb 15.2 oz)  Min: 73.4 kg (161 lb 13.1 oz)  Max: 96.2 kg (212 lb 1.3 oz)  I/O last 3 completed shifts:  In: 1760 [P.O.:810; IV Piggyback:950]  Out: 2540 [Urine:2540]    Physical Examination:  Gen: AAOx3, NAD, wearing bipap  HEENT: head normocephalic, atraumatic  Cardiac: regular rate and rhythm; no murmurs, rubs, or gallops  Resp: on bipap; diffuse wheezes, normal work of breathing  GI: abdomen soft, non-tender, non-distended; umbilical hernia present; normoactive bowel sounds  Extrem: no clubbing or swelling noted; right leg AKA  Skin: no rashes or bruises noted; sacral decubitus ulcer with mepilex in place  Neuro: AAOx3, moving all extremities without difficulty       Laboratory:  Laboratory Data Reviewed: yes  Pertinent Findings:  WBC 16.4  Hgb 11.4  Hct 34  Plt 237    Na 129  K 5.0  Cl 93  Bicarb 30  BUN 70  Cr 2.3  Glucose 428  Ca 7.8        Alk Phos 124  Tbili 0.4    Microbiology Data Reviewed: yes  Pertinent Findings:  Previous cultures grew E. coli  Blood Cx : NGTD    Other Results:  EKG (my interpretation): No new studies    Radiology Data Reviewed: yes  Pertinent Findings:  CXR :  Mild atelectasis at the left lung base or " subsegmental airspace disease.    CT Chest, A/P 1/16:  1. No evidence of PE.  2. Small bilateral pleural effusions resulting in volume loss and compressive atelectasis within the lower lobes, left greater than right with additional atelectasis versus consolidation seen within the lingula of the left upper lobe.  3. Extensive atherosclerosis with postsurgical changes of aorto bi-iliac graft.  Extensive chronic vascular findings and occlusions as detailed above.  These findings do not appear significantly changed when compared to previous CTA abdomen and pelvis from March 2018.  4. Otherwise no acute intra-abdominal abnormalities identified.  5. Cholelithiasis.  6. Multiple additional findings as detailed above.    Current Medications:     Infusions:       Scheduled:   albuterol-ipratropium  3 mL Nebulization Q4H    ascorbic acid (vitamin C)  500 mg Oral BID    aspirin  81 mg Oral Daily    atorvastatin  40 mg Oral Daily    azithromycin  500 mg Intravenous Q24H    ceFEPime (MAXIPIME) IVPB  2 g Intravenous Q12H    heparin (porcine)  5,000 Units Subcutaneous Q8H    predniSONE  40 mg Oral Daily    vancomycin (VANCOCIN) IVPB  1,250 mg Intravenous Q24H        PRN:  dextrose 50%, dextrose 50%, glucagon (human recombinant), glucose, glucose, HYDROcodone-acetaminophen, insulin aspart U-100, sodium chloride 0.9%    Antibiotics and Day Number of Therapy:  Zosyn x1  Levofloxacin    Azithromycin 1/17-present  Cefepime 1/17-present   Vanc 1/17-present    Lines and Day Number of Therapy:  PIV    Assessment:     Asad Perez is a 78 y.o.male with  Patient Active Problem List    Diagnosis Date Noted    Hyperkalemia 01/17/2019    Respiratory failure with hypoxia and hypercapnia 01/16/2019    COPD with acute exacerbation 01/09/2019    Abdominal pain 03/19/2018    COPD exacerbation 03/16/2018    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    Abdominal rigidity, generalized     Complicated open wound of right  hip     Acute osteomyelitis 04/11/2017    Decubitus ulcer of buttock, stage 2 04/07/2017    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    Urinary tract infection associated with indwelling urethral catheter 12/01/2016    Centrilobular emphysema 11/29/2016    Symptomatic anemia 11/27/2016    Deep vein thrombosis (DVT) of brachial vein     Non-healing ulcer of lower leg 10/28/2016    Fever     Decubitus ulcer of sacral region, stage 4 10/05/2016    Deep vein thrombosis (DVT) of upper extremity 10/05/2016    Dehiscence of perineal wound 10/05/2016    Hyponatremia 10/05/2016    VRE (vancomycin-resistant Enterococci) infection 09/20/2016    Elevated liver enzymes     Chronic diastolic congestive heart failure     Critical lower limb ischemia 09/10/2016    Stenosis of left internal carotid artery 09/08/2016    Abnormal finding on imaging 08/31/2016    Abnormal CT of the abdomen     Chronic kidney disease, stage V 08/12/2016    Anemia 08/12/2016    Anemia of chronic disease 08/08/2016    Altered mental status 08/07/2016    MRSA (methicillin resistant Staphylococcus aureus) infection 08/07/2016    Confusion     Weak     Pneumonia 07/29/2016    History of atrial fibrillation     Infected prosthetic knee joint 07/12/2016    Right knee pain 06/15/2016    GERD (gastroesophageal reflux disease) 02/28/2015    Esophageal web 02/28/2015    Acute on chronic diastolic heart failure 11/08/2014    Transaminitis 11/08/2014    Chronic obstructive pulmonary disease 11/07/2014    Dyspnea 11/06/2014    Normocytic anemia 11/06/2014    Dysphagia 11/03/2014    Osteoarthritis of left knee 07/14/2014    Essential hypertension 07/16/2013    CKD (chronic kidney disease) 07/16/2013    PAD (peripheral artery disease) 07/16/2013    CAD (coronary artery disease) 07/16/2013    History of stroke 07/16/2013    Physical deconditioning 07/16/2013    Alcohol abuse 07/16/2013        Plan:     #AoC  Hypercapnic Respiratory Failure Likely 2/2 COPD Exacerbation:  -Recently admitted for this; Blood cultures grew gram negative rods; Discharged on 1/13 with 14 days total of Levofloxacin, 5 days of Prednisone and Oseltamavir, continuing his home 2L oxygen  - SpO2 70's on no supplemental oxygen upon arrival to ED   - ABG with respiratory acidosis, 7.227, pCO2 78 and pO2 292   - IV steroids & 1hr nebulizer with improved sats, 100% on non-rebreather 100% FiO2  - Procal 0.28, Lactate 0.9  - CXR w/ left lung atelectasis, relatively unchanged from previous admission  - CT Chest & Abd w/ small bilateral pleural effusion, compressive atelectasis, left lingula with consolidation vs atelectasis, cholelithiasis  - Zosyn started in ED, Lasix 80 IV   - Admitted to ICU for continuous BiPAP  - Stepped down 1/17, continue nightly BiPAP  - Satting well on 2L NC, no respiratory distress since step-down  - Continuing 14 day course of Levaquin from last admission     #ELAYNE on CKD 3B:  -Cr 1.7 on admit; baseline Cr ~1.2  - Lasix 80mg IV in ED for concern for volume overload, good urinary response  - Avoid nephrotoxic agents, renally dose medications  - Cr 2.2, continuing to increase- appears pre-renal given BUN:Cr  - Started NS 125mL/hr      #Recent GNR Bacteremia 2/2 Stage 3 Sacral Decubitus Ulcer:  - Course of antibiotics during admission earlier this month  - Blood Cx 1/11 NGTD, repeat Blood Cx 1/16 NGTD  - Consult to Wound Care. Rec mepilex a/g every third day  - Home Health wound care on discharge     #Transaminitis:  - , , Alk Phos 161   - RUQ U/S earlier this month showing cholelithiasis without acute cholecystitis   - Neuroendocrine Surg was consulted at that time, no concern for acute cholecystitis and no indication for surgical intervention (LFTs more indicative of hepatic origin vs cholestatic)  - Intermittent complaints of abdominal pain, denied to other team members   - CT Abd 1/16 with cholelithiasis  - LFTs  stable     #HFpEF:  - TTE (11/28/16) w/ mild LA enlargement, concentric hypertrophy, normal LVSF, left ventricular diastolic dysfunction, pulmonary HTN (PA sys pressure ~47)  - Home Meds: Amiodarone 200mg daily, Carvedilol 25mg BID  - , Troponin WNL   - Lasix 80mg IV x1 in ED with good UOP  - Resume home meds     #CAD, PVD, RLE AKA:  - Home Meds: ASA 81mg daily, Atorvastatin 40mg daily  - Resume home meds    #History of CVA:  - CVA in 2007, no focal deficits on exam  - Home Meds: ASA 81mg daily, Atorvastatin 40mg daily  - Resume home meds     #Constipation:  - Polyethylene glycol     #History of Alcohol Abuse:  -No alcohol since 2007     #Healthcare Maintenance:  - UTD on flu, tetanus, pneumovax         Diet: Diabetic  PPX: SQH  Dispo: stepped down 1/17, continue supportive care while awaiting placement    Bruce Coronado MD  [facility] Internal Medicine HO-1    [facility] Medicine Hospitalist Pager numbers:   [facility] Hospitalist Medicine Team A (Chay/Shawanda): [phone]  [facility] Hospitalist Medicine Team B (Gloria/Alex):  [phone]

## 2019-01-18 NOTE — PLAN OF CARE
Problem: Adult Inpatient Plan of Care  Goal: Plan of Care Review  Outcome: Ongoing (interventions implemented as appropriate)  Patient stable VS over the night, afebrile.Blood sugar in the morning was 425,. MD Aware.  (aspart 10 units) subcutaneously given.Telemetry no ectopy. Free from fall. Will endorse patient to day shift Nurse.

## 2019-01-18 NOTE — PT/OT/SLP EVAL
Occupational Therapy   Evaluation and Discharge Note    Name: Asad Perez  MRN: [id number]  Admitting Diagnosis:  <principal problem not specified>      Recommendations:     Discharge Recommendations: (home w/ HH nsg/aide)  Discharge Equipment Recommendations:  none  Barriers to discharge:  None    Assessment:   Pt w/o change in fxnl status & has all necessary DME for home use. No further OT svcs indicated at this time & appropriate for d/c home w/ HH upon d/c.    Asad Perez is a 78 y.o. male with a medical diagnosis of <principal problem not specified>. At this time, patient is functioning at their prior level of function and does not require further acute OT services.     Plan:     During this hospitalization, patient does not require further acute OT services.  Please re-consult if situation changes.    · Plan of Care Reviewed with: patient, spouse    Subjective     Chief Complaint: SOB  Patient/Family Comments/goals: return home    Occupational Profile:  Living Environment: w/ spouse & MONAE in [place] w/ 0STE;   Previous level of function: MI rolling Fermin & scooting up to HOB via trapeze; Max A self feed & G/H in supine; dep w/ all other ADLs & fxnl mobility; camryn t/f's  Roles and Routines: up in WC x 2-3hrs QD  Equipment Used at home:  hospital bed, lift device, oxygen, wheelchair(low air loss mattress; gel WC cushion; trapeze)  Assistance upon Discharge: by fly    Pain/Comfort:  · Pain Rating 1: 6/10  · Location - Side 1: Right  · Location - Orientation 1: lower  · Location 1: leg  · Pain Addressed 1: Reposition, Distraction, Nurse notified  · Pain Rating Post-Intervention 1: 6/10  · Pain Rating 2: 8/10  · Location - Side 2: Left  · Location 2: gluteal  · Pain Addressed 2: Reposition, Distraction, Cessation of Activity  · Pain Rating Post-Intervention 2: 8/10    Patients cultural, spiritual, Mormon conflicts given the current situation:      Objective:     Communicated with: amy prior to session.   Patient found HOB elevated with bed alarm, telemetry, peripheral IV, oxygen upon OT entry to room.    General Precautions: Standard, fall, respiratory   Orthopedic Precautions:N/A   Braces: N/A     Occupational Performance:    Bed Mobility:    · Patient completed Rolling/Turning to Left with  modified independence  · Patient completed Rolling/Turning to Right with modified independence and with side rail    Functional Mobility/Transfers:  ·   · Functional Mobility: pulling up to HOB via trapeze w/ MI    Activities of Daily Living:  · Feeding:  maximal assistance w/ HOB elevated    Cognitive/Visual Perceptual:  AOx4    Physical Exam:  B UEs WFL at 4/5  Mod pitting edema at L UE 2/2 IV infiltrate  Min R hand edema    L LE w/ hip ER, knee ext & ankle plantar flex contractures    AMPAC 6 Click ADL:  AMPAC Total Score: 11    Treatment & Education:  Pt found in supine & agreeable to OT eval/tx this date. Pt lives w/ spouse & MONAE in [place] w/ 0STE. PLOF: MI w/ rolling Fermin; Max A w/ self feed & G/H in supine; dep for all other ADLs & fxnl t/f's via camryn. Pt provided w/ trapeze for bed 2/2 having one for use at home to A w/ bed mobility & UB TE. Pt provided w/ L Zflex boot & repositioned w/ pillow 2/2 signif hip ER. Pt w/ noted pitting edema at L UE 2/2 possible infiltration from IV. Nsg notified. Edu/tx re: HEP & skin breakdown/contracture prevention. Pt/spouse verbalized/demo'ed understanding.  Education:    Patient left HOB elevated with all lines intact, call button in reach, bed alarm on, nsg notified and spouse present    GOALS:   Multidisciplinary Problems     Occupational Therapy Goals     Not on file          Multidisciplinary Problems (Resolved)        Problem: Occupational Therapy Goal    Goal Priority Disciplines Outcome Interventions   Occupational Therapy Goal   (Resolved)     OT, PT/OT Outcome(s) achieved                    History:     Past Medical History:   Diagnosis Date    Alcohol abuse     CHF  (congestive heart failure)     CKD (chronic kidney disease) stage 3, GFR 30-59 ml/min     Coronary artery disease     Decubitus skin ulcer     Heart failure, diastolic     High cholesterol     Hypertension     Immobility     LBP (low back pain)     Prediabetes     PVD (peripheral vascular disease)     Stroke     2006, no deficits    Urine incontinence        Past Surgical History:   Procedure Laterality Date    AMPUTATION-ABOVE KNEE Right 9/12/2016    [facility]formed [facility] OR    aortic bifemoral bypass  2006    ARTHROPLASTY-KNEE Right 6/27/2016    [facility]    ARTHROPLASTY-KNEE Left 7/14/2014    [facility]    bilateral carotid stenois  2006    carotid stents      DEBRIDEMENT-SACRAL WOUND N/A 10/10/2016    [facility]ry [facility] at [facility]    DEBRIDEMENT-SACRAL WOUND Bilateral 9/29/2016    [facility] P[facility]ardo [facility] at [facility] OR    DEBRIDEMENT-SACRAL WOUND N/A 9/16/2016    [facility]    DEBRIDEMENT-WOUND Right 4/8/2017    [facility]    ESOPHAGOGASTRODUODENOSCOPY (EGD) N/A 11/4/2014    Performed by David Ramos MD at [facility] ENDO    ESOPHAGOGASTRODUODENOSCOPY (EGD) with PEG N/A 9/23/2016    Performed by Emerson Childers Jr., MD at [facility] ENDO    EXCHANGE-POLYETHYLENE RIGHT KNEE Right 7/12/2016    [facility]    IOVERA Right 6/15/2016    [facility]    IRRIGATION AND DEBRIDEMENT LOWER EXTREMITY Right 9/9/2016    [facility]    IRRIGATION AND DEBRIDEMENT RIGHT KNEE Right 7/12/2016    [facility]    JOINT REPLACEMENT      knee    left common endarterectomy  2006    REVISION-AMPUTATION STUMP Right 12/1/2016    [facility]    RT AKA  NOV 2017    SYNOVECTOMY-KNEE Left 9/9/2016     "[facility]    WOUND EXPLORATION Right 9/9/2016    [facility] at [facility] OR       Clinical Decision Making:     3.  OT High:  "Pt evaluation falls under high complexity for evaluation category due to 5+ performance deficits identified with comprehensive assessments and significant modifications/assistance required. An expanded review of history and occupational profile completed in addition to expanded review of physical, cognitive and psychosocial history. Several treatment options considered in care."     Time Tracking:     OT Date of Treatment: 01/18/19  OT Start Time: 1136  OT Stop Time: 1207  OT Total Time (min): 31 min    Billable Minutes:Evaluation 10  Therapeutic Activity 21  Total Time 31    TRISTEN Caraballo  1/18/2019    "

## 2019-01-18 NOTE — NURSING
VN cued into patients room. RN at bedside. Patient states he doesn't want VN rounds right now. Will respect wishes.

## 2019-01-18 NOTE — PLAN OF CARE
TN rounded on pt, pt working with PT at this time, no SNF recs at this time, pt denies any needs at this time, pt will possible discharge over the weekend, face sheet sent to [facility] via [facility] for possible discharge over the weekend.

## 2019-01-18 NOTE — PLAN OF CARE
Problem: Adult Inpatient Plan of Care  Goal: Plan of Care Review  Outcome: Ongoing (interventions implemented as appropriate)  Pt safety maintained. Bed in low and locked position. SR on tele. DM being managed with diet and medications. IV abx administered. As ordered. Pt assisted in turning q2. No acute distress noted at this time.

## 2019-01-18 NOTE — PLAN OF CARE
Problem: Occupational Therapy Goal  Goal: Occupational Therapy Goal  Outcome: Outcome(s) achieved Date Met: 01/18/19  Pt found in supine & agreeable to OT eval/tx this date. Pt lives w/ spouse & MONAE in [place] w/ 0STE. PLOF: MI w/ rolling Fermin; Max A w/ self feed & G/H in supine; dep for all other ADLs & fxnl t/f's via camryn. Pt provided w/ trapeze for bed 2/2 having one for use at home to A w/ bed mobility & UB TE. Pt provided w/ L Zflex boot & repositioned w/ pillow 2/2 signif hip ER. Pt w/ noted pitting edema at L UE 2/2 possible infiltration from IV. Nsg notified. Edu/tx re: HEP & skin breakdown/contracture prevention. Pt/spouse verbalized/demo'ed understanding.    Pt w/o change in fxnl status & has all necessary DME for home use. No further OT svcs indicated at this time & appropriate for d/c home w/ HH upon d/c.

## 2019-01-18 NOTE — PT/OT/SLP EVAL
Physical Therapy Evaluation and Discharge Note    Patient Name:  Asad Perez   MRN:  [id number]    Recommendations:     Discharge Recommendations:  (home with family)   Discharge Equipment Recommendations: none   Barriers to discharge: None    Assessment:     Asad Perez is a 78 y.o. male admitted with a medical diagnosis of The primary encounter diagnosis was Acute respiratory failure with hypoxia and hypercapnia. Diagnoses of Shortness of breath, COPD with acute exacerbation, COPD exacerbation, Respiratory failure with hypoxia and hypercapnia, Hyperkalemia, and Acute on chronic respiratory failure with hypoxia and hypercapnia were also pertinent to this visit.    At this time, patient is functioning at their prior level of function and does not require further acute PT services.     Recent Surgery: * No surgery found *      Plan:     During this hospitalization, patient does not require further acute PT services.  Please re-consult if situation changes.      Subjective     Chief Complaint: buttocks sore/LB pain  Patient/Family Comments/goals: go home  Pain/Comfort:  · Pain Rating 1: 7/10  · Location 1: (buttocks and LB)  · Pain Addressed 1: Distraction, Reposition, Nurse notified  · Pain Rating Post-Intervention 1: 6/10    Patients cultural, spiritual, Restoration conflicts given the current situation: no    Living Environment:  Pt lives with his wife in a 1 story home.    Prior to admission, patients level of function was bed bound for 2 years, wife and her brother transfer him with camryn lift.  Equipment used at home: hospital bed, lift device, oxygen, wheelchair, nebulizer.  DME owned (not currently used): none.  Upon discharge, patient will have assistance from family.    Objective:     Communicated with nurse prior to session.  Patient found supine upon PT entry to room found with: telemetry, peripheral IV     General Precautions: Standard,     Orthopedic Precautions:    Braces:       Exams:  · Pt  was oriented to place and situation.  Pt lacks 20 degrees of knee extension left and 10 degrees of DF left.  Strength NT secondary to lack of functional DF left to allow standing.    Functional Mobility:  · Bed Mobility:     · Rolling Left:  minimum assistance  · Rolling Right: minimum assistance   · Pt refused attempting sitting EOB, stating he can't hold himself up at EOB.    AM-PAC 6 CLICK MOBILITY  Total Score:8       AM-PAC 6 CLICK MOBILITY  Total Score:8     Patient left right sidelying with all lines intact, call button in reach, bed alarm on and wife present.    GOALS:   Multidisciplinary Problems     Physical Therapy Goals     Not on file          Multidisciplinary Problems (Resolved)        Problem: Physical Therapy Goal    Goal Priority Disciplines Outcome Goal Variances Interventions   Physical Therapy Goal   (Resolved)     PT, PT/OT Outcome(s) achieved                     History:     Past Medical History:   Diagnosis Date    Alcohol abuse     CHF (congestive heart failure)     CKD (chronic kidney disease) stage 3, GFR 30-59 ml/min     Coronary artery disease     Decubitus skin ulcer     Heart failure, diastolic     High cholesterol     Hypertension     Immobility     LBP (low back pain)     Prediabetes     PVD (peripheral vascular disease)     Stroke     2006, no deficits    Urine incontinence        Past Surgical History:   Procedure Laterality Date    AMPUTATION-ABOVE KNEE Right 9/12/2016    Performed by [facility] at [facility] OR    aortic bifemoral bypass  2006    ARTHROPLASTY-KNEE Right 6/27/2016    [facility]formed by [facility] at [facility]    ARTHROPLASTY-KNEE Left 7/14/2014    [facility]formed by [facility] at [facility]    bilateral carotid stenois  2006    carotid stents      DEBRIDEMENT-SACRAL WOUND N/A 10/10/2016    Performed by Michael Irizarry MD at [facility] OR    DEBRIDEMENT-SACRAL WOUND Bilateral 9/29/2016    Performed by Maria Alejandra Pichardo DO at [facility] OR     DEBRIDEMENT-SACRAL WOUND N/A 9/16/2016    Performed by [facility] at [facility]    DEBRIDEMENT-WOUND Right 4/8/2017    Performed by [facility]ry [facility] at [facility] [place]    ESOPHAGOGASTRODUODENOSCOPY (EGD) N/A 11/4/2014    Performed by David Ramos MD at [facility] ENDO    ESOPHAGOGASTRODUODENOSCOPY (EGD) with PEG N/A 9/23/2016    Performed by Emerson Childers Jr., MD at [facility] ENDO    EXCHANGE-POLYETHYLENE RIGHT KNEE Right 7/12/2016    Performed by [facility]acho [facility] at [facility] [place]    IOVERA Right 6/15/2016    Performed by [facility]acho MD at [facility] [place]    IRRIGATION AND DEBRIDEMENT LOWER EXTREMITY Right 9/9/2016    Performed by [facility]acho [facility] at [facility] [place]    IRRIGATION AND DEBRIDEMENT RIGHT KNEE Right 7/12/2016    Performed by Cristino Camacho MD at [facility]    JOINT REPLACEMENT      knee    left common endarterectomy  2006    REVISION-AMPUTATION STUMP Right 12/1/2016    Performed by Michael [facility]ry MD at [facility]    RT AKA  NOV 2017    SYNOVECTOMY-KNEE Left 9/9/2016    Performed by [facility]acho MD at [facility] [place]    WOUND EXPLORATION Right 9/9/2016    Performed by [facility]ino Camacho MD at [facility] [place]       Clinical Decision Making:     History  Co-morbidities and personal factors that may impact the plan of care Examination  Body Structures and Functions, activity limitations and participation restrictions that may impact the plan of care Clinical Presentation   Decision Making/ Complexity Score   Co-morbidities:   [] Time since onset of injury / illness / exacerbation  [x] Status of current condition  []Patient's cognitive status and safety concerns    [] Multiple Medical Problems (see med hx)  Personal Factors:   [] Patient's age  [x] Prior Level of function   [] Patient's home situation (environment and family support)  [] Patient's level of motivation  [] Expected progression of patient      HISTORY:(criteria)    [] 71793 - no personal factors/history    [x] 05630 - has 1-2 personal factor/comorbidity     [] 02288 - has >3  personal factor/comorbidity     Body Regions:  [] Objective examination findings  [] Head     []  Neck  [] Trunk   [] Upper Extremity  [x] Lower Extremity    Body Systems:  [] For communication ability, affect, cognition, language, and learning style: the assessment of the ability to make needs known, consciousness, orientation (person, place, and time), expected emotional /behavioral responses, and learning preferences (eg, learning barriers, education  needs)  [] For the neuromuscular system: a general assessment of gross coordinated movement (eg, balance, gait, locomotion, transfers, and transitions) and motor function  (motor control and motor learning)  [x] For the musculoskeletal system: the assessment of gross symmetry, gross range of motion, gross strength, height, and weight  [] For the integumentary system: the assessment of pliability(texture), presence of scar formation, skin color, and skin integrity  [] For cardiovascular/pulmonary system: the assessment of heart rate, respiratory rate, blood pressure, and edema     Activity limitations:    [] Patient's cognitive status and saf ety concerns          [] Status of current condition      [] Weight bearing restriction  [] Cardiopulmunary Restriction    Participation Restrictions:   [] Goals and goal agreement with the patient     [] Rehab potential (prognosis) and probable outcome      Examination of Body System: (criteria)    [] 97128 - addressing 1-2 elements    [x] 45103 - addressing a total of 3 or more elements     [] 08234 -  Addressing a total of 4 or more elements         Clinical Presentation: (criteria)  Evolving - 17873     On examination of body system using standardized tests and measures patient presents with 3 or more elements from any of the following: body structures and functions, activity limitations, and/or participation restrictions.  Leading to a clinical presentation that is considered evolving with changing  characteristics                              Clinical Decision Making  (Eval Complexity):  Moderate - 62155     Time Tracking:     PT Received On: 01/18/19  PT Start Time: 1048     PT Stop Time: 1106  PT Total Time (min): 18 min     Billable Minutes: Evaluation 18      Siena Figueroa, PT  01/18/2019

## 2019-01-18 NOTE — PLAN OF CARE
Problem: Adult Inpatient Plan of Care  Goal: Plan of Care Review  Pt now on nasal cannula, to have bipap at hs, keep sat's , improving lung fields, to inc diet, and activity

## 2019-01-18 NOTE — NURSING
Received patient upon rounds at [facility], patient seen in bed on semi-byrnes's position,  conscious , not coherent to time and date,  GCS 14, With subjective complaint of back pain 5/10,  Noted relief with norco.  With Saline lock on the  right hand gauge 20 and left Ac gauge 20 flushed patent, with clean  and dry dressing.  With Stage 3 sacral sore, cleansed with saline, Advised patient to call for any assistance. CAll bell within reach. Bed alarm ON. Telemetry Normal sinus rhythm 80s. Oxygen saturation 95% on 2 lpm via NC, BIPAP at HS 95%, Safety fall precaution maintained. Will continue to monitor patient.

## 2019-01-18 NOTE — PROGRESS NOTES
"Vancomycin Dosing and Monitoring Pharmacy Protocol    Asad Perez is a 78 y.o. male    Height: 5' 8" (1.727 m)   Wt Readings from Last 3 Encounters:   01/18/19 73.4 kg (161 lb 13.1 oz)   01/10/19 70 kg (154 lb 5.2 oz)   10/25/18 68 kg (150 lb)       Temp Readings from Last 3 Encounters:   01/18/19 97.3 °F (36.3 °C) (Oral)   01/13/19 96.8 °F (36 °C) (Axillary)   12/12/18 97.8 °F (36.6 °C) (Oral)      Lab Results   Component Value Date/Time    WBC 16.64 (H) 01/18/2019 06:52 AM    WBC 11.95 01/17/2019 12:16 AM    WBC 13.84 (H) 01/16/2019 04:15 PM      Lab Results   Component Value Date/Time    CREATININE 2.2 (H) 01/18/2019 06:52 AM    CREATININE 2.0 (H) 01/17/2019 03:18 PM    CREATININE 1.8 (H) 01/17/2019 05:09 AM      Lab Results   Component Value Date/Time    LACTATE 0.9 01/16/2019 05:45 PM    LACTATE 1.9 01/09/2019 05:41 PM    LACTATE 0.9 04/27/2018 08:24 PM       Serum creatinine: 2.2 mg/dL (H) 01/18/19 0652  Estimated creatinine clearance: 27.6 mL/min (A)    Antibiotics (From admission, onward)      Start     Stop Route Frequency Ordered    01/17/19 1400  azithromycin 500 mg in dextrose 5 % 250 mL IVPB (ready to mix system)      -- IV Every 24 hours (non-standard times) 01/17/19 1238    01/17/19 1400  ceFEPIme in dextrose 5% 2 gram/50 mL IVPB 2 g      -- IV Every 12 hours (non-standard times) 01/17/19 1238          Antifungals (From admission, onward)      None            Microbiology Results (last 7 days)       Procedure Component Value Units Date/Time    Blood Culture #1 **CANNOT BE ORDERED STAT** [[id number]] Collected:  01/16/19 1615    Order Status:  Completed Specimen:  Blood from Peripheral, Hand, Right Updated:  01/17/19 2312     Blood Culture, Routine No Growth to date     Blood Culture, Routine No Growth to date    Blood Culture #2 **CANNOT BE ORDERED STAT** [[id number]] Collected:  01/16/19 1626    Order Status:  Completed Specimen:  Blood from Peripheral, Antecubital, Left Updated:  01/17/19 2312 "     Blood Culture, Routine No Growth to date     Blood Culture, Routine No Growth to date    Blood Culture #1 **CANNOT BE ORDERED STAT** [[id number]]     Order Status:  Canceled Specimen:  Blood     Blood Culture #2 **CANNOT BE ORDERED STAT** [[id number]]     Order Status:  Canceled Specimen:  Blood             Indication/Target trough:   Pneumonia (Target trough: 15-20mcg/ml)    Hemodialysis:   N/A    Dosing Weight:   Wt Readings from Last 1 Encounters:   19 73.4 kg (161 lb 13.1 oz)       Last Vancomycin dose: 1250 mg   Date/Time given:  1703          Vancomycin level:  No results for input(s): VANCOMYCIN-TROUGH in the last 72 hours.  No results for input(s): VANCOMYCIN, RANDOM in the last 72 hours.    Per Protocol Initial/Adjustments Dosin. Initial/Adjustment Dose: HOLD next Vancomycin dose due to elevated level and/or worsening renal function  2. Vancomycin Random Level will be drawn on  1600date/time     Pharmacy will continue to follow.    Please contact if you have any further questions. Thank you.    Juan Kaur, PharmD  [phone]

## 2019-01-19 NOTE — CONSULTS
Consult received, pt currently being followed by Nutrition services. Chart reviewed since original nutrition note: Diet advanced and pt tolerating % of meals. See original recs/eval from 1/17. RD to f/u  w/ progress on prev scheduled date.     Thank you,    Alesha Benoit RD

## 2019-01-19 NOTE — PLAN OF CARE
"Resp tx administered by Ray. SpO2 up to 93%. States starting to feel better" resp 22/min and slightly labored.  "

## 2019-01-19 NOTE — PLAN OF CARE
Problem: Adult Inpatient Plan of Care  Goal: Plan of Care Review  Outcome: Ongoing (interventions implemented as appropriate)  Pt on continuous O2 sats 92%. No Distress noted.

## 2019-01-19 NOTE — PROGRESS NOTES
"[facility] Hospital Medicine Progress Note     Primary Team: [facility] Hospitalist Team B  Attending Physician: Bernice  Resident: Jose Natarajan  Intern: Mello Butler     Subjective:      Patient doing well on 2L NC. Pt is lying in bed doing arm exercises. Only complaint is R leg pain. Increasing leukocytosis, however afebrile - will continue IV Abx. Reports no SOB, chest pain, nausea, vomiting, or palpitations, chills/fevers.         Objective:      Last 24 Hour Vital Signs:  BP  Min: 112/49  Max: 149/63  Temp  Av.4 °F (36.3 °C)  Min: 96.2 °F (35.7 °C)  Max: 98.4 °F (36.9 °C)  Pulse  Av  Min: 57  Max: 67  Resp  Av  Min: 14  Max: 42  SpO2  Av.5 %  Min: 92 %  Max: 96 %  Height  Av' 8" (172.7 cm)  Min: 5' 8" (172.7 cm)  Max: 5' 8" (172.7 cm)  Weight  Av.8 kg (186 lb 15.2 oz)  Min: 73.4 kg (161 lb 13.1 oz)  Max: 96.2 kg (212 lb 1.3 oz)  I/O last 3 completed shifts:  In: 1760 [P.O.:810; IV Piggyback:950]  Out: 2540 [Urine:2540]     Physical Examination:  Gen: AAOx3, NAD, wearing NC   HEENT: head normocephalic, atraumatic  Cardiac: regular rate and rhythm; no murmurs, rubs, or gallops  Resp: on bipap; diffuse wheezes, normal work of breathing  GI: abdomen soft, non-tender, non-distended; umbilical hernia present; normoactive bowel sounds  Extrem: no clubbing or swelling noted; right leg AKA  Skin: no rashes or bruises noted; sacral decubitus ulcer with mepilex in place  Neuro: AAOx3, moving all extremities without difficulty        Laboratory:  Laboratory Data Reviewed: yes  Pertinent Findings:    WBC 17.22     Microbiology Data Reviewed: yes  Pertinent Findings:  Previous cultures grew E. coli  Blood Cx : NGTD     Other Results:  EKG (my interpretation): No new studies     Radiology Data Reviewed: yes  Pertinent Findings:  CXR :  Mild atelectasis at the left lung base or subsegmental airspace disease.     CT Chest, A/P :  1. No evidence of PE.  2. Small bilateral pleural effusions " resulting in volume loss and compressive atelectasis within the lower lobes, left greater than right with additional atelectasis versus consolidation seen within the lingula of the left upper lobe.  3. Extensive atherosclerosis with postsurgical changes of aorto bi-iliac graft.  Extensive chronic vascular findings and occlusions as detailed above.  These findings do not appear significantly changed when compared to previous CTA abdomen and pelvis from March 2018.  4. Otherwise no acute intra-abdominal abnormalities identified.  5. Cholelithiasis.  6. Multiple additional findings as detailed above.     Current Medications:     Infusions:     Scheduled:   albuterol-ipratropium  3 mL Nebulization Q4H    ascorbic acid (vitamin C)  500 mg Oral BID    aspirin  81 mg Oral Daily    atorvastatin  40 mg Oral Daily    azithromycin  500 mg Intravenous Q24H    ceFEPime (MAXIPIME) IVPB  2 g Intravenous Q12H    heparin (porcine)  5,000 Units Subcutaneous Q8H    predniSONE  40 mg Oral Daily    vancomycin (VANCOCIN) IVPB  1,250 mg Intravenous Q24H         PRN:  dextrose 50%, dextrose 50%, glucagon (human recombinant), glucose, glucose, HYDROcodone-acetaminophen, insulin aspart U-100, sodium chloride 0.9%     Antibiotics and Day Number of Therapy:  Zosyn x1  Levofloxacin    Azithromycin 1/17-present  Cefepime 1/17-present   Vanc 1/17-discontinued     Lines and Day Number of Therapy:  PIV     Assessment:      Asad Perez is a 78 y.o.male with       Patient Active Problem List     Diagnosis Date Noted    Hyperkalemia 01/17/2019    Respiratory failure with hypoxia and hypercapnia 01/16/2019    COPD with acute exacerbation 01/09/2019    Abdominal pain 03/19/2018    COPD exacerbation 03/16/2018    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    Abdominal rigidity, generalized      Complicated open wound of right hip      Acute osteomyelitis 04/11/2017    Decubitus ulcer of buttock, stage 2 04/07/2017     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    Urinary tract infection associated with indwelling urethral catheter 12/01/2016    Centrilobular emphysema 11/29/2016    Symptomatic anemia 11/27/2016    Deep vein thrombosis (DVT) of brachial vein      Non-healing ulcer of lower leg 10/28/2016    Fever      Decubitus ulcer of sacral region, stage 4 10/05/2016    Deep vein thrombosis (DVT) of upper extremity 10/05/2016    Dehiscence of perineal wound 10/05/2016    Hyponatremia 10/05/2016    VRE (vancomycin-resistant Enterococci) infection 09/20/2016    Elevated liver enzymes      Chronic diastolic congestive heart failure      Critical lower limb ischemia 09/10/2016    Stenosis of left internal carotid artery 09/08/2016    Abnormal finding on imaging 08/31/2016    Abnormal CT of the abdomen      Chronic kidney disease, stage V 08/12/2016    Anemia 08/12/2016    Anemia of chronic disease 08/08/2016    Altered mental status 08/07/2016    MRSA (methicillin resistant Staphylococcus aureus) infection 08/07/2016    Confusion      Weak      Pneumonia 07/29/2016    History of atrial fibrillation      Infected prosthetic knee joint 07/12/2016    Right knee pain 06/15/2016    GERD (gastroesophageal reflux disease) 02/28/2015    Esophageal web 02/28/2015    Acute on chronic diastolic heart failure 11/08/2014    Transaminitis 11/08/2014    Chronic obstructive pulmonary disease 11/07/2014    Dyspnea 11/06/2014    Normocytic anemia 11/06/2014    Dysphagia 11/03/2014    Osteoarthritis of left knee 07/14/2014    Essential hypertension 07/16/2013    CKD (chronic kidney disease) 07/16/2013    PAD (peripheral artery disease) 07/16/2013    CAD (coronary artery disease) 07/16/2013    History of stroke 07/16/2013    Physical deconditioning 07/16/2013    Alcohol abuse 07/16/2013         Plan:      #AoC Hypercapnic Respiratory Failure Likely 2/2 COPD Exacerbation:  -Recently admitted for  this; Blood cultures grew gram negative rods; Discharged on 1/13 with 14 days total of Levofloxacin, 5 days of Prednisone and Oseltamavir, continuing his home 2L oxygen  - SpO2 70's on no supplemental oxygen upon arrival to ED   - ABG with respiratory acidosis, 7.227, pCO2 78 and pO2 292   - IV steroids & 1hr nebulizer with improved sats, 100% on non-rebreather 100% FiO2  - Procal 0.28, Lactate 0.9  - CXR w/ left lung atelectasis, relatively unchanged from previous admission  - CT Chest & Abd w/ small bilateral pleural effusion, compressive atelectasis, left lingula with consolidation vs atelectasis, cholelithiasis  - Zosyn started in ED, Lasix 80 IV   - Admitted to ICU for continuous BiPAP  - Stepped down 1/17, continue nightly BiPAP  - Started on Vanc and Zosyn then de-escalated to azithro and cefipime, consider broadening Abx coverage  - leukocytosis worsening (17.22) today, no systemic signs of infection (afebrile, HR and RR normal)  - currently saturating well on 4L NC, will attempt to wean to home 2L as tolerated     #ELAYNE on CKD 3B:  -Cr 1.7 on admit; baseline Cr ~1.2  - Lasix 80mg IV in ED for concern for volume overload, good urinary response  - Avoid nephrotoxic agents, renally dose medications  - Cr increased to 2.3 today   - last echo 11/2016 with EF 65%, consider light IVF     #Recent GNR Bacteremia 2/2 Stage 3 Sacral Decubitus Ulcer:  - Course of antibiotics during admission earlier this month  - Blood Cx 1/11 NGTD, repeat Blood Cx 1/16 NGTD  - Consult to Wound Care. Rec mepilex a/g every third day  - Home Health wound care on discharge     #Transaminitis:  - , AST 202, Alk Phos 161   - RUQ U/S earlier this month showing cholelithiasis without acute cholecystitis   - Neuroendocrine Surg was consulted at that time, no concern for acute cholecystitis and no indication for surgical intervention (LFTs more indicative of hepatic origin vs cholestatic)  - Intermittent complaints of abdominal pain,  denied to other team members   - CT Abd 1/16 with cholelithiasis  - LFTs stable     #HFpEF:  - TTE (11/28/16) w/ mild LA enlargement, concentric hypertrophy, normal LVSF, left ventricular diastolic dysfunction, pulmonary HTN (PA sys pressure ~47)  - Home Meds: Amiodarone 200mg daily, Carvedilol 25mg BID  - , Troponin WNL   - Lasix 80mg IV x1 in ED with good UOP  - Resume home meds     #CAD, PVD, RLE AKA:  - Home Meds: ASA 81mg daily, Atorvastatin 40mg daily  - Resume home meds     #History of CVA:  - CVA in 2007, no focal deficits on exam  - Home Meds: ASA 81mg daily, Atorvastatin 40mg daily  - Resume home meds     #Constipation:  - Polyethylene glycol     #History of Alcohol Abuse:  -No alcohol since 2007     #Healthcare Maintenance:  - UTD on flu, tetanus, pneumovax         Diet: Diabetic  PPX: SQH  Dispo: stepped down 1/17, continuing IV abx for leukocytosis     Stacey Sarkar MD  [facility] Internal Medicine HO-1     [facility] Medicine Hospitalist Pager numbers:   [facility] Hospitalist Medicine Team A (Chay/Shawanda):          [phone]  [facility] Hospitalist Medicine Team B (Gloria/Alex):        [phone]

## 2019-01-19 NOTE — PROGRESS NOTES
The Sw met with the pt and his wife who was at bedside to discuss the pt's readmit in 30 days. The pt and his wife feel the pt was discharged too soon. She states the pt was discharged with his meds and she states he was taking all of them but it was not giving the pt any relief. The pt was experiencing worsening sob so she rushed the pt back to the hospital. The couple feel this admit could not have been avoided.

## 2019-01-19 NOTE — NURSING
VN note: VN and bedside nurse reviewed orders for patient. Patient no longer on vanc at this time. Dr. Sarkar stated ok to discontinue vanc trough, but to keep vanc random order. VN discontinued vanc trough order per MD instruction.

## 2019-01-19 NOTE — PLAN OF CARE
Problem: Adult Inpatient Plan of Care  Goal: Plan of Care Review  No true red alarm noted on tele, NSR. Safety maintained. Bed alarm on  And call bell in reach. Bi-pap in use. No distress noted.

## 2019-01-19 NOTE — PLAN OF CARE
01/19/19 1333   Medicare Message   Important Message from Medicare regarding Discharge Appeal Rights Given to patient/caregiver;Explained to patient/caregiver;Signed/date by patient/caregiver   Date IMM was signed 01/19/19   Time IMM was signed 1000

## 2019-01-19 NOTE — PLAN OF CARE
"C/o" stomach pain" may be something I ate for supper" BBS coarse per auscultation. /84, P 81, R 26/min and labored O2 3L/nc on. SpO2 85%. Ray called to administer Resp tx. Dr Johnson  Resident notified. "will go see pt". Will continue to monitor.  "

## 2019-01-19 NOTE — NURSING
VN note: VN cued into patient's room. Patient's nurse Maria Alejandra at bedside. She stated she was performing a bladder scan since gonzalez removed at 10:05 this am, and patient had not voided yet. She reports bladder scan results 40 cc-109 cc at most. VN notified Dr. Sarkar. She will speak with her team and place orders if needed. VN notified bedside nurse. VN will continue to follow.

## 2019-01-20 NOTE — NURSING
"VN note: VN cued into pt's room for introduction. VN informed pt and wife at bedside that VN would be working closely along side bedside nurse, PCT, and the rest of care team and making rounds throughout the shift. Pt verbalized understanding. Allowed time for questions. VN will continue to be available to patient and intervene prn.      Patient requesting something for "congestion." VN notified him prn medication ordered will notify nurse.      "

## 2019-01-20 NOTE — PLAN OF CARE
Problem: Adult Inpatient Plan of Care  Goal: Plan of Care Review  Outcome: Ongoing (interventions implemented as appropriate)  No distress noted. Will continue to monitor

## 2019-01-20 NOTE — PROGRESS NOTES
"[facility] Hospital Medicine Progress Note     Primary Team: [facility] Hospitalist Team B  Attending Physician: Bernice  Resident: Jose Natarajan  Intern: Mello Butler     Subjective:      Patient endorses felling well this morning, improved from yesterday. He remains stable on 2L NC. Continues to complain of R leg pain, tylenol PRN. Leukocytosis downtrending, continue zosyn and azithro. Congestion improving, still wheezing on exam. Pt urinated twice overnight with IVF, denies burning/dysuria/suprapubic pain, bladder scan negative for retention. Reports no SOB, chest pain, nausea, vomiting, or palpitations, chills/fevers.         Objective:      Last 24 Hour Vital Signs:  BP  Min: 112/49  Max: 149/63  Temp  Av.4 °F (36.3 °C)  Min: 96.2 °F (35.7 °C)  Max: 98.4 °F (36.9 °C)  Pulse  Av  Min: 57  Max: 67  Resp  Av  Min: 14  Max: 42  SpO2  Av.5 %  Min: 92 %  Max: 96 %  Height  Av' 8" (172.7 cm)  Min: 5' 8" (172.7 cm)  Max: 5' 8" (172.7 cm)  Weight  Av.8 kg (186 lb 15.2 oz)  Min: 73.4 kg (161 lb 13.1 oz)  Max: 96.2 kg (212 lb 1.3 oz)  I/O last 3 completed shifts:  In: 1760 [P.O.:810; IV Piggyback:950]  Out: 2540 [Urine:2540]     Physical Examination:  Gen: AAOx3, NAD, wearing NC, R arm elevated holding onto ortho grab bar  HEENT: head normocephalic, atraumatic  Cardiac: regular rate and rhythm; no murmurs  Resp: on NC; diffuse expiratory wheezes throughout, normal work of breathing, rhonchi improved from yesterday  GI: abdomen soft, non-tender, non-distended; umbilical hernia present (reducible); normoactive bowel sounds  Extrem: no clubbing or swelling noted; right leg AKA, L UE edematous (R normal)  Skin: no rashes; sacral decubitus ulcer with mepilex in place, extensive bruising on extremities and abdomen  Neuro: AAOx3, moving all extremities without difficulty        Laboratory:  Laboratory Data Reviewed: yes  Pertinent Findings:    WBC 17.22  Cr 2.3  BUN 77     Microbiology Data Reviewed: " yes  Pertinent Findings:  Previous cultures grew E. coli  Blood Cx 1/16: NGTD     Other Results:  EKG (my interpretation): No new studies     Radiology Data Reviewed: yes  Pertinent Findings:  CXR 1/16:  Mild atelectasis at the left lung base or subsegmental airspace disease.     CT Chest, A/P 1/16:  1. No evidence of PE.  2. Small bilateral pleural effusions resulting in volume loss and compressive atelectasis within the lower lobes, left greater than right with additional atelectasis versus consolidation seen within the lingula of the left upper lobe.  3. Extensive atherosclerosis with postsurgical changes of aorto bi-iliac graft.  Extensive chronic vascular findings and occlusions as detailed above.  These findings do not appear significantly changed when compared to previous CTA abdomen and pelvis from March 2018.  4. Otherwise no acute intra-abdominal abnormalities identified.  5. Cholelithiasis.  6. Multiple additional findings as detailed above.     Current Medications:     Infusions:     Scheduled:   albuterol-ipratropium  3 mL Nebulization Q4H    ascorbic acid (vitamin C)  500 mg Oral BID    aspirin  81 mg Oral Daily    atorvastatin  40 mg Oral Daily    azithromycin  500 mg Intravenous Q24H    ceFEPime (MAXIPIME) IVPB  2 g Intravenous Q12H    heparin (porcine)  5,000 Units Subcutaneous Q8H    predniSONE  40 mg Oral Daily    vancomycin (VANCOCIN) IVPB  1,250 mg Intravenous Q24H         PRN:  dextrose 50%, dextrose 50%, glucagon (human recombinant), glucose, glucose, HYDROcodone-acetaminophen, insulin aspart U-100, sodium chloride 0.9%     Antibiotics and Day Number of Therapy:    Levofloxacin    Cefepime 1/17-discontinued   Vanc 1/17-discontinued   Azithromycin 1/17-present   Zosyn - present     Lines and Day Number of Therapy:  PIVs (R hand, L AC) placed 1/16     Assessment:      Asad Perez is a 78 y.o.male with       Patient Active Problem List     Diagnosis Date Noted    Hyperkalemia  01/17/2019    Respiratory failure with hypoxia and hypercapnia 01/16/2019    COPD with acute exacerbation 01/09/2019    Abdominal pain 03/19/2018    COPD exacerbation 03/16/2018    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    Abdominal rigidity, generalized      Complicated open wound of right hip      Acute osteomyelitis 04/11/2017    Decubitus ulcer of buttock, stage 2 04/07/2017    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    Urinary tract infection associated with indwelling urethral catheter 12/01/2016    Centrilobular emphysema 11/29/2016    Symptomatic anemia 11/27/2016    Deep vein thrombosis (DVT) of brachial vein      Non-healing ulcer of lower leg 10/28/2016    Fever      Decubitus ulcer of sacral region, stage 4 10/05/2016    Deep vein thrombosis (DVT) of upper extremity 10/05/2016    Dehiscence of perineal wound 10/05/2016    Hyponatremia 10/05/2016    VRE (vancomycin-resistant Enterococci) infection 09/20/2016    Elevated liver enzymes      Chronic diastolic congestive heart failure      Critical lower limb ischemia 09/10/2016    Stenosis of left internal carotid artery 09/08/2016    Abnormal finding on imaging 08/31/2016    Abnormal CT of the abdomen      Chronic kidney disease, stage V 08/12/2016    Anemia 08/12/2016    Anemia of chronic disease 08/08/2016    Altered mental status 08/07/2016    MRSA (methicillin resistant Staphylococcus aureus) infection 08/07/2016    Confusion      Weak      Pneumonia 07/29/2016    History of atrial fibrillation      Infected prosthetic knee joint 07/12/2016    Right knee pain 06/15/2016    GERD (gastroesophageal reflux disease) 02/28/2015    Esophageal web 02/28/2015    Acute on chronic diastolic heart failure 11/08/2014    Transaminitis 11/08/2014    Chronic obstructive pulmonary disease 11/07/2014    Dyspnea 11/06/2014    Normocytic anemia 11/06/2014    Dysphagia 11/03/2014    Osteoarthritis of left  knee 2014    Essential hypertension 2013    CKD (chronic kidney disease) 2013    PAD (peripheral artery disease) 2013    CAD (coronary artery disease) 2013    History of stroke 2013    Physical deconditioning 2013    Alcohol abuse 2013         Plan:      #AoC Hypercapnic Respiratory Failure Likely 2/2 COPD Exacerbation:  -Recently admitted for this; Blood cultures grew gram negative rods; Discharged on  with 14 days total of Levofloxacin, 5 days of Prednisone and Oseltamavir, continuing his home 2L oxygen  - SpO2 70's on no supplemental oxygen upon arrival to ED   - ABG with respiratory acidosis, 7.227, pCO2 78 and pO2 292   - IV steroids & 1hr nebulizer with improved sats, 100% on non-rebreather 100% FiO2  - Procal 0.28, Lactate 0.9  - CXR w/ left lung atelectasis, relatively unchanged from previous admission  - CT Chest & Abd w/ small bilateral pleural effusion, compressive atelectasis, left lingula with consolidation vs atelectasis, cholelithiasis  - Zosyn started in ED, Lasix 80 IV   - Admitted to ICU for continuous BiPAP  - Stepped down , continue nightly BiPAP  - Started on Vanc and Zosyn then de-escalated to azithro and cefipime, broadened back to azithro and zosyn yesterday for leukocytosis  - leukocytosis improving (15.05) today, no systemic signs of infection (afebrile, HR and RR normal)  - currently saturating well on 2L NC, weaned from 4L yesterday  - pt complaining of cough/congestion: guaifenison, acapella, mucinex, CPT to mobilize secretions     #ELAYNE on CKD 3B:  -Cr 1.7 on admit; baseline Cr ~1.2  - Lasix 80mg IV in ED for concern for volume overload, good urinary response  - Avoid nephrotoxic agents, renally dose medications  - Cr increasin.3 today   - last echo 2016 with EF 65%, gave light IVF yesterday     #Recent GNR Bacteremia 2/2 Stage 3 Sacral Decubitus Ulcer:  - Course of antibiotics during admission earlier this month  -  Blood Cx 1/11 NGTD, repeat Blood Cx 1/16 NGTD  - Consult to Wound Care. Rec mepilex a/g every third day  - Home Health wound care on discharge  - Dr Coffman following as per pt  - wound clean, dry, nonerythemetous, no signs of infection     #Transaminitis:  - , AST 202, Alk Phos 161   - RUQ U/S earlier this month showing cholelithiasis without acute cholecystitis   - Neuroendocrine Surg was consulted at that time, no concern for acute cholecystitis and no indication for surgical intervention (LFTs more indicative of hepatic origin vs cholestatic)  - Intermittent complaints of abdominal pain, denied to other team members   - CT Abd 1/16 with cholelithiasis  - LFTs stable     #HFpEF:  - TTE (11/28/16) w/ mild LA enlargement, concentric hypertrophy, normal LVSF, left ventricular diastolic dysfunction, pulmonary HTN (PA sys pressure ~47)  - Home Meds: Amiodarone 200mg daily, Carvedilol 25mg BID  - , Troponin WNL   - Lasix 80mg IV x1 in ED with good UOP  - Resume home meds     #CAD, PVD, RLE AKA:  - Home Meds: ASA 81mg daily, Atorvastatin 40mg daily  - Resume home meds     #History of CVA:  - CVA in 2007, no focal deficits on exam  - Home Meds: ASA 81mg daily, Atorvastatin 40mg daily  - Resume home meds     #Constipation:  - Polyethylene glycol     #History of Alcohol Abuse:  -No alcohol since 2007     #Healthcare Maintenance:  - UTD on flu, tetanus, pneumovax         Diet: Diabetic  PPX: SQH  Dispo: stepped down 1/17 from ICU, continuing IV abx for leukocytosis     Stacey Sarkar MD  [facility] Internal Medicine HO-1     [facility] Medicine Hospitalist Pager numbers:   [facility] Hospitalist Medicine Team A (Chay/Shawanda):          [phone]  [facility] Hospitalist Medicine Team B (Gloria/Alex):        [phone]      Patient seen and examined, case discussed with resident care team and agree.

## 2019-01-20 NOTE — PLAN OF CARE
Problem: Adult Inpatient Plan of Care  Goal: Plan of Care Review  Outcome: Ongoing (interventions implemented as appropriate)  Plan of care reviewed w/pt and pt's spouse.  Pt and spouse verbalized understanding.  IV antibiotics given per orders.  Blood glucose monitored and maintained per orders.  Pain management ongoing.  Oxygen therapy maintained.  Breathing txs per orders.  PRN cough meds given.  Fall/safety precautions maintained.  Bed in low position and locked.  Call light within reach.  Slip resistant socks on.  Bed alarm on.  Nurse instructed pt to notify staff for assistance.  Pt verbalized understanding.  Pt stable.  PIV clean, dry and intact.  No signs of distress noted.  Pt on tele.  NSR.

## 2019-01-20 NOTE — MEDICAL/APP STUDENT
Subjective:       Patient ID: Asad Perez is a 78 y.o. male.    Chief Complaint: Weakness (78 year old male presents to ed cc of generalized weakness/ abdominal pain x 3 days patient states recent discharge from hospital on sunday. )    Patient endorses feeling well this morning and had decreased SOB and cough. He still requires 2L of O2 which is his baseline. He continues to complain of right leg pain and asked for tylenol. His left arm has become more swollen compared to his right although he has been holding his right arm above his head on the trapeze in his room. Patient began to urinate last night following removal of his [place] yesterday. Bladder scan for retention was negative and he denies dysuria and suprapubic tenderness. He had some oozing at the site of Heparin injection sites, so heparin was spaced to q12h. Denies f/c, chest pain, palpitations, n/v/d, and abd pain      Review of Systems   Constitutional: Negative for chills and fever.   HENT: Negative for congestion.    Respiratory: Positive for cough and shortness of breath.         Symptoms are improving   Cardiovascular: Negative for chest pain and palpitations.   Gastrointestinal: Negative for abdominal pain, blood in stool, constipation, diarrhea, nausea and vomiting.   Genitourinary: Negative for dysuria, flank pain and urgency.   Musculoskeletal: Positive for back pain.        Hx of sacral decubitus ulcers       Objective:       Vitals:    01/20/19 1115   BP: 133/63   Pulse: 76   Resp: 18   Temp: 96.5       Intake/Output Summary (Last 24 hours) at 1/20/2019 1312  Last data filed at 1/20/2019 0906  Gross per 24 hour   Intake 2085 ml   Output 910 ml   Net 1175 ml       Physical Exam   Constitutional: He is oriented to person, place, and time. No distress.   HENT:   Head: Normocephalic and atraumatic.   Eyes: EOM are normal. Pupils are equal, round, and reactive to light.   Neck: Normal range of motion. Neck supple.   Cardiovascular: Normal  rate and regular rhythm. Exam reveals no gallop and no friction rub.   No murmur heard.  Pulmonary/Chest: He has wheezes. He has rales.   Increased work of breathing, pursed lip breathing, crackles in the RLL.    Abdominal: Soft. Bowel sounds are normal. He exhibits distension. He exhibits no mass. There is no tenderness. There is no rebound and no guarding. A hernia is present.   Moderate distension,midline periumbilical hernia   Neurological: He is alert and oriented to person, place, and time.   Skin: Capillary refill takes 2 to 3 seconds. Bruising noted. No rash noted. He is not diaphoretic.   Increased bruising around the sites of heparin injection sites and IV sites on right arm that are not resolving.        Scheduled Meds:   albuterol-ipratropium  3 mL Nebulization Q4H    ascorbic acid (vitamin C)  500 mg Oral BID    aspirin  81 mg Oral Daily    atorvastatin  40 mg Oral Daily    azithromycin  500 mg Intravenous Q24H    heparin (porcine)  5,000 Units Subcutaneous Q12H    insulin detemir U-100  10 Units Subcutaneous Daily    piperacillin-tazobactam (ZOSYN) IVPB  4.5 g Intravenous Q12H    predniSONE  40 mg Oral Daily     Continuous Infusions:  PRN Meds:.acetaminophen, benzonatate, dextrose 50%, dextrose 50%, glucagon (human recombinant), glucose, glucose, guaifenesin 100 mg/5 ml, HYDROcodone-acetaminophen, insulin aspart U-100, ramelteon, sodium chloride 0.9%     CMP  Na-130  K-4.1  Cl-95  CO2-27  BUN-79  Cr-2.2  Glucose-241  Ca-7.7  Total Protein-4.7  Albumin-1.8  T. Bili-.0.6  AST-42  ALT-217  ALP-123    CBC  WBC-15.08  RBC-3.61  Hemoglobin-10.5  Hematocrit-31.3  MCV-87  MCH-29.1  MCHC-33.5  RDW-14.3  Platelets-232  MPV-9.8  ANC-12.7  Lymph #-0.9  Mono #-1.4  Eos #-0.0  Baso #-0.01  Gran %-84.0  Lymph %-5.6  Mono %-9.1  Eos %-0.0  Baso %-0.1    POCT Glu-231    BNP-180    APTT-38.0    Imaging Results          CTA Chest Non-Coronary (PE Study) (Final result)  Result time 01/16/19 19:14:17    Final  result by Yodit Naylor MD (01/16/19 19:14:17)                 Impression:      1. No evidence of PE.  2. Small bilateral pleural effusions resulting in volume loss and compressive atelectasis within the lower lobes, left greater than right with additional atelectasis versus consolidation seen within the lingula of the left upper lobe.  3. Extensive atherosclerosis with postsurgical changes of aorto bi-iliac graft.  Extensive chronic vascular findings and occlusions as detailed above.  These findings do not appear significantly changed when compared to previous CTA abdomen and pelvis from March 2018.  4. Otherwise no acute intra-abdominal abnormalities identified.  5. Cholelithiasis.  6. Multiple additional findings as detailed above.      Electronically signed by: Yodit Naylor MD  Date:    01/16/2019  Time:    19:14             Narrative:    EXAMINATION:  CT ABDOMEN PELVIS WITH CONTRAST; CTA CHEST NON CORONARY    CLINICAL HISTORY:  abdominal pain;; respiratory distress;    TECHNIQUE:  CTA chest was performed following administration of 100 cc Omnipaque 350 IV contrast.  Additional CT images were obtained of the abdomen and pelvis.    COMPARISON:  CTA abdomen and pelvis from March 2018.    FINDINGS:  Heart is normal in size without pericardial effusion.  Esophagus is unremarkable along its course.  Good opacification is seen of the pulmonary arterial tree with no evidence PE.    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  Small bilateral pleural effusions are seen resulting in volume loss and compressive atelectasis within the bilateral lower lobes, left greater than right.  Additional atelectasis versus consolidation is seen posteriorly within the lingula of the left upper lobe.    No acute osseous abnormality identified.  Extensive multilevel degenerative changes are seen throughout the spine.    Aorta is non aneurysmal.  Moderate atherosclerosis is seen throughout the  aorta and its branch vessels.  Celiac artery is patent.  SMA is occluded at its origin with reconstitution of flow presumably via collaterals seen distally.  This is unchanged from previous exam. There    is aorto bi-iliac bypass graft.  There is chronic occlusion of the right iliac portion of the graft.  Left iliac portion of the graft is patent.  There is chronic occlusion of the distal native aorta and native common iliacs.  Reconstitution of flow is seen within the right common femoral artery seen.  Extensive atherosclerosis is seen within the iliacs bilaterally.  There is chronic occlusion of the left native internal and external iliac arteries.  Reconstitution of flow is seen involving the distal left external iliac with chronic occlusion seen at the level of left common femoral artery stent.    No significant focal hepatic abnormalities are seen.  Portal vein and splenic vein are patent.  Calcified stones are seen within the gallbladder.  Stomach, pancreas, spleen, and adrenal glands show no significant abnormalities.  Multiple small bilateral renal hypodensities, probable cysts are noted.  Ureters, urinary bladder, and prostate show no significant abnormalities.  No evidence of bowel obstruction.  Appendix is not definitely visualized.  No evidence of free air or free fluid.                               CT Abdomen Pelvis With Contrast (Final result)  Result time 01/16/19 19:14:17    Final result by Yodit Nyalor MD (01/16/19 19:14:17)                 Impression:      1. No evidence of PE.  2. Small bilateral pleural effusions resulting in volume loss and compressive atelectasis within the lower lobes, left greater than right with additional atelectasis versus consolidation seen within the lingula of the left upper lobe.  3. Extensive atherosclerosis with postsurgical changes of aorto bi-iliac graft.  Extensive chronic vascular findings and occlusions as detailed above.  These findings do not appear  significantly changed when compared to previous CTA abdomen and pelvis from March 2018.  4. Otherwise no acute intra-abdominal abnormalities identified.  5. Cholelithiasis.  6. Multiple additional findings as detailed above.      Electronically signed by: Yodit Naylor MD  Date:    01/16/2019  Time:    19:14             Narrative:    EXAMINATION:  CT ABDOMEN PELVIS WITH CONTRAST; CTA CHEST NON CORONARY    CLINICAL HISTORY:  abdominal pain;; respiratory distress;    TECHNIQUE:  CTA chest was performed following administration of 100 cc Omnipaque 350 IV contrast.  Additional CT images were obtained of the abdomen and pelvis.    COMPARISON:  CTA abdomen and pelvis from March 2018.    FINDINGS:  Heart is normal in size without pericardial effusion.  Esophagus is unremarkable along its course.  Good opacification is seen of the pulmonary arterial tree with no evidence PE.    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  Small bilateral pleural effusions are seen resulting in volume loss and compressive atelectasis within the bilateral lower lobes, left greater than right.  Additional atelectasis versus consolidation is seen posteriorly within the lingula of the left upper lobe.    No acute osseous abnormality identified.  Extensive multilevel degenerative changes are seen throughout the spine.    Aorta is non aneurysmal.  Moderate atherosclerosis is seen throughout the aorta and its branch vessels.  Celiac artery is patent.  SMA is occluded at its origin with reconstitution of flow presumably via collaterals seen distally.  This is unchanged from previous exam. There    is aorto bi-iliac bypass graft.  There is chronic occlusion of the right iliac portion of the graft.  Left iliac portion of the graft is patent.  There is chronic occlusion of the distal native aorta and native common iliacs.  Reconstitution of flow is seen within the right common femoral artery seen.  Extensive  atherosclerosis is seen within the iliacs bilaterally.  There is chronic occlusion of the left native internal and external iliac arteries.  Reconstitution of flow is seen involving the distal left external iliac with chronic occlusion seen at the level of left common femoral artery stent.    No significant focal hepatic abnormalities are seen.  Portal vein and splenic vein are patent.  Calcified stones are seen within the gallbladder.  Stomach, pancreas, spleen, and adrenal glands show no significant abnormalities.  Multiple small bilateral renal hypodensities, probable cysts are noted.  Ureters, urinary bladder, and prostate show no significant abnormalities.  No evidence of bowel obstruction.  Appendix is not definitely visualized.  No evidence of free air or free fluid.                               X-Ray Chest AP Portable (Final result)  Result time 01/16/19 16:33:34    Final result by Veronica Hernandez MD (01/16/19 16:33:34)                 Impression:      Mild atelectasis at the left lung base or subsegmental airspace disease.      Electronically signed by: Veronica Hernandez MD  Date:    01/16/2019  Time:    16:33             Narrative:    EXAMINATION:  XR CHEST AP PORTABLE    CLINICAL HISTORY:  Shortness of breath    TECHNIQUE:  Single frontal view of the chest was performed.    COMPARISON:  01/09/2019    FINDINGS:  The cardiac silhouette is upper normal limit in size.  There is mild increased interstitial lung markings throughout both lung fields, nonspecific.  Mild atelectasis at the left lung base.  No large pleural effusion, no pneumothorax.  The osseous structures appear within normal limits for age.                              Assessment:       1. Acute respiratory failure with hypoxia and hypercapnia    2. Shortness of breath    3. COPD with acute exacerbation    4. COPD exacerbation    5. Respiratory failure with hypoxia and hypercapnia    6. Hyperkalemia    7. Acute on chronic respiratory  failure with hypoxia and hypercapnia    8. Hyperglycemia    9. Stage 3 chronic kidney disease    10. Dyspnea, unspecified type        Plan:         1. AoC Hypercapnic Respiratory Failure likely 2/2 COPD exacerbation  -Recently admitted for this, Blood Cx grew gram neg rods, d/c'd on 1/13 with 14 days of levaquin, 5 days of prednisone and tamiflu, and continuing his home 2L O2  -SpO2 in 70s on RA upon arrival to ED  -IV steroids and 1hr nebulizer improved SpO2  -CXR with left lung atelectasis, relatively unchanged from previous admission  -Ct Chest & Abd w/ small bilateral pleural effusion, compressive atelectasis, left lingula with consolidation vs atelectasis, cholelithiasis  -Zosyn started in ED  -admitted to ICU for continuous BiPAP and stepped down on 1/17 for nightly BiPAP  -Started Vanc and Zosyn -> de-escalated to azithro and cefepime -> broadened to azithro and zosyn yesterday for leukocytosis  -leukocytosis improving today, no systemic signs of infection (afebrile, HR and RR normal).  -currently saturating well on 2L NC, weaned from 4L yesterday  -complaining of cough, given guaifenison, acapella, mucinex, and CPT    2. ELAYNE on CKD3  - Cr to 2.2 from 1.7 on admission  - BUN 70->77->79  - given 750 ml IVF    3. HFpEF  - last echo 11/2016 with EF 65% and diastolic dysfunction  - given 750 ml IVF yesterday  - BNP down to 180 from 433 on admission    4. Bleeding/Bruising  - oozing from injection site and bruising at previous injection site  - heparin spaced to q12h from q8h    5. Recent GNR bacteremia 2/2 stage 3 sacral ulcer  -  Course of antibiotics earlier this month during admission  - Blood Cx 1/11 NGTD and 1/16 NGTD  - seen by surgery and wound care  - turn q2h    6. CAD, PVD, Right leg pain  - given tylenol for pain  - home meds of ASA, lipitor 40  - resumed home meds

## 2019-01-20 NOTE — PLAN OF CARE
Problem: Adult Inpatient Plan of Care  Goal: Plan of Care Review  Safety maintained. Bed alarm on and call bell in reach. Turn Q 2 hrs and repositioned.Bi-Pap in use, no distress noted, No true red alarm noted on telemetry, NSR. Will continue to monitor.

## 2019-01-20 NOTE — NURSING
VN cued into patients room for rounding. VN informed pt that VN will be working alongside bedside nurse and PCT throughout the night shift. Pt verbalized understanding that VN is available for any questions and education. VN will continue to be available to patient and intervene prn.

## 2019-01-21 NOTE — PLAN OF CARE
"TN met with pt's wife to discuss discharge plan and follow up appointments, Dr. Torres to call pt regarding scheduling follow up appointment due to pt needing a week in advance for transportation to appointment, TN informed Dr. Torres that pt would be discharged without Bipap home due to holiday, pt's wife to call Dr. Whitney's office to schedule Nephrologist appointments due needing a week in advance for transport, appointment scheduled for Dr. Irizarry next Weds in wound care center, pt's wife is aware, while speaking to wife, pt wife concerned with pt still being short of breath and being discharged, pt short of breath with pursed lip breathing on observation, pt wife stated" I will bring him back tonight if he is still short of breath", pt's wife also concerned that pt will not receive Bipap prior to discharge, TN notified Dr. Natarajan to possible cancel discharge to due to pt's not medically ready for discharge yet and to prevent readmission into the hospital, Case management director notified regarding above.    TN also spoke with Dr.R. Johnson regarding pt's wife concerns about pt being discharged and that pt will be a readmit if he would be discharged.   "

## 2019-01-21 NOTE — PLAN OF CARE
Pt accepted back to [facility], Bipap order sent to N. Per Dr. Butler pt can be discharge today, TN informed MD that [facility] closed today due to holiday and pt will not receive BiPap today, MD verbalized understanding of this, per MD pt is aware that he will receive BiPap in a few days, TN to follow up on BiPap order.       01/21/19 1029   Post-Acute Status   Post-Acute Authorization Home Health/Hospice;HME   HME Status Referrals Sent   Home Health/Hospice Status Authorization Obtained

## 2019-01-21 NOTE — PROGRESS NOTES
01/21/19 1711   Type of Frequent Check   Type Patient Rounds;Other (see comments)  (VN Rounds)   Safety/Activity   Patient Rounds bed in low position;visualized patient   Activity Management activity adjusted per tolerance*   Positioning   Body Position supine   Head of Bed (HOB) HOB at 60 degrees   Assessments (Pre/Post)   Level of Consciousness (AVPU) alert

## 2019-01-21 NOTE — PROGRESS NOTES
"[facility] Hospital Medicine Progress Note     Primary Team: [facility] Hospitalist Team B  Attending Physician: Bernice  Resident: Jose Natarajan  Intern: Mello Butler     Subjective:      Patient reports his breathing, leg pain, and arm swelling are doing better.  He has been using the grab bar throughout the day.  Still having stable sacral pain that required tylenol x2 and norco 5 over the past 24 hours.  States he wants to go home for his birthday tomorrow.  On 3L NC but decreased to home 2L for today.     Objective:      Last 24 Hour Vital Signs:  BP  Min: 112/49  Max: 149/63  Temp  Av.4 °F (36.3 °C)  Min: 96.2 °F (35.7 °C)  Max: 98.4 °F (36.9 °C)  Pulse  Av  Min: 57  Max: 67  Resp  Av  Min: 14  Max: 42  SpO2  Av.5 %  Min: 92 %  Max: 96 %  Height  Av' 8" (172.7 cm)  Min: 5' 8" (172.7 cm)  Max: 5' 8" (172.7 cm)  Weight  Av.8 kg (186 lb 15.2 oz)  Min: 73.4 kg (161 lb 13.1 oz)  Max: 96.2 kg (212 lb 1.3 oz)  I/O last 3 completed shifts:  In: 1760 [P.O.:810; IV Piggyback:950]  Out: 2540 [Urine:2540]     Physical Examination:  Gen: AAOx3, NAD, wearing NC, resting comfortably in bed  HEENT: head normocephalic, atraumatic  Cardiac: regular rate and rhythm; no murmurs  Resp: on NC; diffuse expiratory wheezes throughout, normal work of breathing, no rhonchi  GI: abdomen soft, non-tender, non-distended; umbilical hernia present (reducible); normoactive bowel sounds  Extrem: no clubbing or swelling noted; right leg AKA, L UE edematous (R normal)  Skin: no rashes; sacral decubitus ulcer with mepilex in place, extensive bruising on extremities and abdomen  Neuro: AAOx3, moving all extremities without difficulty        Laboratory:  Laboratory Data Reviewed: yes  Pertinent Findings:    WBC 12.16  Cr 1.9  BUN 66     Microbiology Data Reviewed: yes  Pertinent Findings:  Previous cultures grew E. coli  Blood Cx : NGTD     Other Results:  EKG (my interpretation): No new studies     Radiology Data Reviewed: " yes  Pertinent Findings:  CXR 1/16:  Mild atelectasis at the left lung base or subsegmental airspace disease.     CT Chest, A/P 1/16:  1. No evidence of PE.  2. Small bilateral pleural effusions resulting in volume loss and compressive atelectasis within the lower lobes, left greater than right with additional atelectasis versus consolidation seen within the lingula of the left upper lobe.  3. Extensive atherosclerosis with postsurgical changes of aorto bi-iliac graft.  Extensive chronic vascular findings and occlusions as detailed above.  These findings do not appear significantly changed when compared to previous CTA abdomen and pelvis from March 2018.  4. Otherwise no acute intra-abdominal abnormalities identified.  5. Cholelithiasis.  6. Multiple additional findings as detailed above.     Current Medications:     Infusions:     Scheduled:   albuterol-ipratropium  3 mL Nebulization Q4H    ascorbic acid (vitamin C)  500 mg Oral BID    aspirin  81 mg Oral Daily    atorvastatin  40 mg Oral Daily    azithromycin  500 mg Intravenous Q24H    ceFEPime (MAXIPIME) IVPB  2 g Intravenous Q12H    heparin (porcine)  5,000 Units Subcutaneous Q8H    predniSONE  40 mg Oral Daily    vancomycin (VANCOCIN) IVPB  1,250 mg Intravenous Q24H         PRN:  dextrose 50%, dextrose 50%, glucagon (human recombinant), glucose, glucose, HYDROcodone-acetaminophen, insulin aspart U-100, sodium chloride 0.9%     Antibiotics and Day Number of Therapy:    Levofloxacin 1/9 - 1/17   Vancomycin 1/17 - 1/18  Cefepime 1/17 - 1/19   Azithromycin 1/17 - present   Zosyn 1/16 - present     Lines and Day Number of Therapy:  PIVs (R hand, L AC) placed 1/16     Assessment:      Asad Perez is a 78 y.o.male with       Patient Active Problem List     Diagnosis Date Noted    Hyperkalemia 01/17/2019    Respiratory failure with hypoxia and hypercapnia 01/16/2019    COPD with acute exacerbation 01/09/2019    Abdominal pain 03/19/2018    COPD  exacerbation 03/16/2018    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    Abdominal rigidity, generalized      Complicated open wound of right hip      Acute osteomyelitis 04/11/2017    Decubitus ulcer of buttock, stage 2 04/07/2017    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    Urinary tract infection associated with indwelling urethral catheter 12/01/2016    Centrilobular emphysema 11/29/2016    Symptomatic anemia 11/27/2016    Deep vein thrombosis (DVT) of brachial vein      Non-healing ulcer of lower leg 10/28/2016    Fever      Decubitus ulcer of sacral region, stage 4 10/05/2016    Deep vein thrombosis (DVT) of upper extremity 10/05/2016    Dehiscence of perineal wound 10/05/2016    Hyponatremia 10/05/2016    VRE (vancomycin-resistant Enterococci) infection 09/20/2016    Elevated liver enzymes      Chronic diastolic congestive heart failure      Critical lower limb ischemia 09/10/2016    Stenosis of left internal carotid artery 09/08/2016    Abnormal finding on imaging 08/31/2016    Abnormal CT of the abdomen      Chronic kidney disease, stage V 08/12/2016    Anemia 08/12/2016    Anemia of chronic disease 08/08/2016    Altered mental status 08/07/2016    MRSA (methicillin resistant Staphylococcus aureus) infection 08/07/2016    Confusion      Weak      Pneumonia 07/29/2016    History of atrial fibrillation      Infected prosthetic knee joint 07/12/2016    Right knee pain 06/15/2016    GERD (gastroesophageal reflux disease) 02/28/2015    Esophageal web 02/28/2015    Acute on chronic diastolic heart failure 11/08/2014    Transaminitis 11/08/2014    Chronic obstructive pulmonary disease 11/07/2014    Dyspnea 11/06/2014    Normocytic anemia 11/06/2014    Dysphagia 11/03/2014    Osteoarthritis of left knee 07/14/2014    Essential hypertension 07/16/2013    CKD (chronic kidney disease) 07/16/2013    PAD (peripheral artery disease) 07/16/2013    CAD  (coronary artery disease) 2013    History of stroke 2013    Physical deconditioning 2013    Alcohol abuse 2013         Plan:      #AoC Hypercapnic Respiratory Failure Likely 2/2 COPD Exacerbation:  -Recently admitted for this; Blood cultures grew gram negative rods; Discharged on  with 14 days total of Levofloxacin, 5 days of Prednisone and Oseltamavir, continuing his home 2L oxygen  - SpO2 70's on no supplemental oxygen upon arrival to ED   - ABG with respiratory acidosis, 7.227, pCO2 78 and pO2 292   - IV steroids & 1hr nebulizer with improved sats, 100% on non-rebreather 100% FiO2  - Procal 0.28, Lactate 0.9  - CXR w/ left lung atelectasis, relatively unchanged from previous admission  - CT Chest & Abd w/ small bilateral pleural effusion, compressive atelectasis, left lingula with consolidation vs atelectasis, cholelithiasis  - Zosyn started in ED, Lasix 80 IV   - Admitted to ICU for continuous BiPAP  - Stepped down , continue nightly BiPAP  - Started on Vanc and Zosyn then de-escalated to azithro and cefipime, broadened back to azithro and zosynfor leukocytosis  - leukocytosis improving (12.16) today, no systemic signs of infection (afebrile, HR and RR normal)  - currently saturating well on 2L NC, weaned from 4L yesterday  - pt complaining of cough/congestion: guaifenison, acapella, mucinex, CPT to mobilize secretions     #ELAYNE on CKD 3B:  -Cr 1.7 on admit; baseline Cr ~1.2  - Lasix 80mg IV in ED for concern for volume overload, good urinary response  - Avoid nephrotoxic agents, renally dose medications  - Cr decreasin.9 today   - last echo 2016 with EF 65%, gave light IVF yesterday     #Recent GNR Bacteremia 2/2 Stage 3 Sacral Decubitus Ulcer:  - Course of antibiotics during admission earlier this month  - Blood Cx  NGTD, repeat Blood Cx  NGTD  - Consult to Wound Care. Rec mepilex a/g every third day  - Home Health wound care on discharge  - Dr Coffman following  as per pt  - wound clean, dry, nonerythemetous, no signs of infection     #Transaminitis:  - , AST 202, Alk Phos 161   - RUQ U/S earlier this month showing cholelithiasis without acute cholecystitis   - Neuroendocrine Surg was consulted at that time, no concern for acute cholecystitis and no indication for surgical intervention (LFTs more indicative of hepatic origin vs cholestatic)  - Intermittent complaints of abdominal pain, denied to other team members   - CT Abd 1/16 with cholelithiasis  - LFTs improving     #HFpEF:  - TTE (11/28/16) w/ mild LA enlargement, concentric hypertrophy, normal LVSF, left ventricular diastolic dysfunction, pulmonary HTN (PA sys pressure ~47)  - Home Meds: Amiodarone 200mg daily, Carvedilol 25mg BID, Lasix 40 PO daily  - , Troponin WNL   - Lasix 80mg IV x1 in ED with good UOP  - Resume home meds, hold lasix     #CAD, PVD, RLE AKA:  - Home Meds: ASA 81mg daily, Atorvastatin 40mg daily  - Resume home meds     #History of CVA:  - CVA in 2007, no focal deficits on exam  - Home Meds: ASA 81mg daily, Atorvastatin 40mg daily  - Resume home meds     #Constipation:  - Polyethylene glycol     #History of Alcohol Abuse:  -No alcohol since 2007     #Healthcare Maintenance:  - UTD on flu, tetanus, pneumovax         Diet: Diabetic  PPX: SQH  Dispo: stepped down 1/17 from ICU, continuing IV abx for leukocytosis, possible discharge today     Coleman Butler MD  [facility] HO-[facility]     hospitals Medicine Hospitalist Pager numbers:   hospitals Hospitalist Medicine Team A (Chay/Shawanda):          [phone]  hospitals Hospitalist Medicine Team B (Gloria/Alex):        [phone]      Patient seen and examined, case discussed with resident care team and agree.

## 2019-01-21 NOTE — PLAN OF CARE
Problem: Adult Inpatient Plan of Care  Goal: Plan of Care Review  Outcome: Ongoing (interventions implemented as appropriate)  Patient found on 3 LPM NC, O2 sats notated. Will continue to monitor.

## 2019-01-21 NOTE — PROGRESS NOTES
01/21/19 1332   Type of Frequent Check   Type Patient Rounds;Other (see comments)  (VN Rounds)   Safety/Activity   Patient Rounds bed in low position;visualized patient   Safety Promotion/Fall Prevention instructed to call staff for mobility;side rails raised x 2;family to remain at bedside   Activity Management activity adjusted per tolerance*   Positioning   Body Position supine   Head of Bed (HOB) HOB at 60 degrees   Assessments (Pre/Post)   Level of Consciousness (AVPU) alert

## 2019-01-21 NOTE — PROGRESS NOTES
The Sw called the after hours -[id number] spoke to the answering service and he will inform the on call person to return the call. The Sw is checking on the status  of the bipap. The Sw informed Generra of the above mentioned info.

## 2019-01-21 NOTE — PLAN OF CARE
Problem: Adult Inpatient Plan of Care  Goal: Plan of Care Review  Outcome: Ongoing (interventions implemented as appropriate)  1930H Patient is awake and oriented except to situation. Care plan explained and verbalized understanding. VIP care model explained. On oxygen 3lpm/nasal cannula, O2 saturation 93%. On heart monitor running Sinus Rhythm at 60s. IV site to right upper arm 22G and right hand 18g, flushed and saline locked. Maintained on  Diabetic diet. Blood sugar monitored and covered per insulin sliding scale. Due medications given. Incontinence care provided, turned q2 and as tolerated. Maintained on fall precaution. Bed in lowest position, bed alarms on, call light within reach and instructed to call for help when needed. Will continue  to monitor.

## 2019-01-21 NOTE — PLAN OF CARE
Nurse called in room by PCT stating that pt SOB on 2L NC.  Pt was pursed lip breathing and SpO2 85%.  Pt SpO2 came up to 88%- 90% on 3L.  Dr. Natarajan notified.  MD stated to keep SpO2 between 88% and 92%.  Pt doing better now on 3L.  MD stated he would also hold d/c at this time.

## 2019-01-21 NOTE — DISCHARGE SUMMARY
hospitals Hospital Medicine Discharge Summary    Primary Team: hospitals Hospitalist Team B  Attending Physician: Bernice  Resident: Jose Natarajan  Intern: Mello Butler    Date of Admit: 1/16/2019  Date of Discharge: 1/21/2019    Discharge to: Home with home health  Condition: Stable    Discharge Diagnoses     Patient Active Problem List   Diagnosis    Essential hypertension    CKD (chronic kidney disease)    PAD (peripheral artery disease)    CAD (coronary artery disease)    History of stroke    Physical deconditioning    Alcohol abuse    Osteoarthritis of left knee    Dysphagia    Dyspnea    Normocytic anemia    Chronic obstructive pulmonary disease    Acute on chronic diastolic heart failure    Transaminitis    GERD (gastroesophageal reflux disease)    Esophageal web    Right knee pain    Infected prosthetic knee joint    History of atrial fibrillation    Pneumonia    Confusion    Weak    Altered mental status    MRSA (methicillin resistant Staphylococcus aureus) infection    Anemia of chronic disease    Chronic kidney disease, stage V    Anemia    Abnormal CT of the abdomen    Abnormal finding on imaging    Stenosis of left internal carotid artery    Critical lower limb ischemia    Chronic diastolic congestive heart failure    Decubitus ulcer of sacral region, stage 4    Elevated liver enzymes    Deep vein thrombosis (DVT) of upper extremity    VRE (vancomycin-resistant Enterococci) infection    Dehiscence of perineal wound    Hyponatremia    Fever    Non-healing ulcer of lower leg    Deep vein thrombosis (DVT) of brachial vein    Symptomatic anemia    Centrilobular emphysema    Non-healing wound of amputation stump    Urinary tract infection associated with indwelling urethral catheter    Decubitus ulcer of buttock, stage 2    Acute osteomyelitis    Complicated open wound of right hip    COPD exacerbation    Occlusion of superior mesenteric artery    Abdominal  rigidity, generalized    Abdominal pain    COPD with acute exacerbation    Respiratory failure with hypoxia and hypercapnia    Hyperkalemia       Consultants and Procedures     Consultants:  Wound care    Procedures:   None    Imaging:  CT abdomen/pelvis with contrast  CTA chest (PE study)  Xray abdomen  Xray chest x2    Brief History of Present Illness      Mr. Perez is a 78 year old male with a history of COPD, HFpEF, CVA, CKD 3, sacral decubitus ulcer, right knee AKA, peripheral vascular disease, CAD, and urinary incontinence who presented with increasing SOB after being discharged 1/13/19 for gram negative bacteremia from his sacral decubitus ulcer.  According to patient's wife, someone had told them to increase his home oxygen from 2L to 4L, and his initial ABG had pH 7.227, pCO2 78, and pO2 292.  Patient required bipap, which helped his breathing.  He had a mild elevation in his creatinine, which improved with gentle fluids, so referral was made to nephrology as an outpatient.  Patient's clinical status improved, and he will likely need home Bipap.  When told that [facility] was closed today and he could not get his bipap tonight, he requested to be discharged without bipap in order to be home for his birthday.  He was discharged with close follow up with his PCP on 1/24/19.  He will finish his antibiotic course for GNR bacteremia as an outpatient 1/23.      For the full HPI please refer to the History & Physical from this admission.    Hospital Course By Problem with Pertinent Findings     #AoC Hypercapnic Respiratory Failure 2/2 COPD Exacerbation, recently increased home O2:   - ABG with respiratory acidosis, 7.227, pCO2 78 and pO2 292   - Procal 0.28, Lactate 0.9  - CXR w/ left lung atelectasis, relatively unchanged from previous admission  - CT Chest & Abd w/ small bilateral pleural effusion, compressive atelectasis, left lingula with consolidation vs atelectasis, cholelithiasis  - Completed 5 day course of  steroids, azithromycin  - Discharged with request made for home Bipap, patient requested to be discharged rather than wait for bipap machine to be set up     #ELAYNE on CKD 3B:  -Cr 1.7 on admit; baseline Cr ~1.2  - Avoid nephrotoxic agents, renally dose medications  - last echo 11/2016 with EF 65%  -Creatinine improved to 1.9 with gentle fluids  -Some crackles on discharge exam- discharged on lasix 40 PO daily with nephrology follow up     #Recent GNR Bacteremia 2/2 Stage 3 Sacral Decubitus Ulcer:  - Course of antibiotics during admission earlier this month  - Blood Cx 1/11 NGTD, repeat Blood Cx 1/16 NGTD  - Consult to Wound Care. Rec mepilex a/g every third day  - Home Health wound care on discharge  - wound clean, dry, nonerythemetous, no signs of infection  -Will complete 14 day course of levofloxacin 1/23     #Transaminitis:  - , AST 202, Alk Phos 161   - RUQ U/S earlier this month showing cholelithiasis without acute cholecystitis   - Neuroendocrine Surg was consulted at that time, no concern for acute cholecystitis and no indication for surgical intervention (LFTs more indicative of hepatic origin vs cholestatic)  - Intermittent complaints of abdominal pain, denied to other team members   - CT Abd 1/16 with cholelithiasis  - Likely 2/2 mild shock liver from hypovolemia- improved with fluids  - Continue to monitor     #HFpEF:  - TTE (11/28/16) w/ mild LA enlargement, concentric hypertrophy, normal LVSF, left ventricular diastolic dysfunction, pulmonary HTN (PA sys pressure ~47)  - Home Meds: Amiodarone 200mg daily, Carvedilol 25mg BID, norvasc 10 daily, Lasix 40 PO daily  - , Troponin WNL   - Lasix 80mg IV x1 in ED with good UOP  - Resume home meds, hold lasix     #CAD, PVD, RLE AKA:  - Home Meds: ASA 81mg daily, Atorvastatin 40mg daily  - Resume home meds     #History of CVA:  - CVA in 2007, no focal deficits on exam  - Home Meds: ASA 81mg daily, Atorvastatin 40mg daily  - Resume home  meds     #Constipation:  - Polyethylene glycol     #History of Alcohol Abuse:  -No alcohol since 2007     #Healthcare Maintenance:  - UTD on flu, tetanus, pneumovax         Discharge Medications      Asad Perez   Home Medication Instructions SATHISH:[id number]    Printed on:01/21/19 1034   Medication Information                      albuterol-ipratropium 2.5mg-0.5mg/3mL (DUO-NEB) 0.5 mg-3 mg(2.5 mg base)/3 mL nebulizer solution  Take 3 mLs by nebulization every 6 (six) hours while awake.             amiodarone (PACERONE) 200 MG Tab  Take 1 tablet (200 mg total) by mouth once daily.             amLODIPine (NORVASC) 10 MG tablet  Take 10 mg by mouth once daily.             ascorbic acid, vitamin C, (VITAMIN C) 500 MG tablet  Take 500 mg by mouth 2 (two) times daily.             aspirin (ECOTRIN) 81 MG EC tablet  Take 81 mg by mouth once daily.             atorvastatin (LIPITOR) 40 MG tablet  Take 40 mg by mouth once daily.             carvedilol (COREG) 25 MG tablet  Take 1 tablet (25 mg total) by mouth 2 (two) times daily.             clotrimazole-betamethasone 1-0.05% (LOTRISONE) cream  Apply topically 2 (two) times daily.             furosemide (LASIX) 40 MG tablet               levoFLOXacin (LEVAQUIN) 250 MG tablet  Take 1 tablet (250 mg total) by mouth once daily. for 11 days             multivitamin (THERAGRAN) per tablet  Take 1 tablet by mouth once daily.             oxycodone-acetaminophen (PERCOCET) 5-325 mg per tablet  Take 1 tablet by mouth every 4 (four) hours as needed for Pain.             polyethylene glycol (GLYCOLAX) 17 gram/dose powder  Take 17 g by mouth once daily.             traMADol (ULTRAM) 50 mg tablet  Take 50 mg by mouth every 6 (six) hours as needed for Pain.                 Discharge Information:   Diet:  Cardiac    Physical Activity:  As tolerated             Instructions:  1. Take all medications as prescribed  2. Keep all follow-up appointments  3. Return to the hospital or call  your primary care physicians if any worsening symptoms such as fever, chest pain, shortness of breath, return of symptoms, or any other concerns.    Follow-Up Appointments:  PCP (Brian) 1/24/19  Nephrology    Coleman Butler MD  [facility] Internal Medicine, HO-1

## 2019-01-21 NOTE — PLAN OF CARE
Problem: Adult Inpatient Plan of Care  Goal: Plan of Care Review  Outcome: Ongoing (interventions implemented as appropriate)  The proper method of use, as well as anticipated side effects, of this aerosol treatment are discussed and demonstrated to the patient. O2 saturation 92% on nasal cannula 3 lpm. Patient performing PEP therapy via [employer]. Will continue to monitor.

## 2019-01-22 PROBLEM — L89.153 PRESSURE INJURY OF SACRAL REGION, STAGE 3: Status: ACTIVE | Noted: 2019-01-01

## 2019-01-22 NOTE — PLAN OF CARE
Problem: Adult Inpatient Plan of Care  Goal: Plan of Care Review  Outcome: Ongoing (interventions implemented as appropriate)  Patient on oxygen with documented flow. Will attempt to wean per protocol.  Patient given aerosol treatment with no adverse reactions noted. Patient instructed on proper use.  Bipap on sb for qhs. Will continue to monitor.

## 2019-01-22 NOTE — PLAN OF CARE
01/22/19 1117   Medicare Message   Important Message from Medicare regarding Discharge Appeal Rights Given to patient/caregiver;Explained to patient/caregiver;Signed/date by patient/caregiver   Date IMM was signed 01/22/19   Time IMM was signed 0901

## 2019-01-22 NOTE — PLAN OF CARE
TN called [facility] to follow up on Bipap order, order received, someone to call TN back once order is processed.

## 2019-01-22 NOTE — PHYSICIAN QUERY
"PT Name: Asad Perez  MR #: [id number]    Physician Query Form - Heart  Condition Clarification     CDS/: Lurdes Hawkins RN CDI              Contact information: [email or web address]  This form is a permanent document in the medical record.     Query Date: January 22, 2019    By submitting this query, we are merely seeking further clarification of documentation. Please utilize your independent clinical judgment when addressing the question(s) below.    The medical record contains the following   Indicators     Supporting Clinical Findings Location in Medical Record   X BNP      Internal medicine note  1/22 833 am    EF 65% Hospital medicine note  1/19/2019  9:47 AM   X Radiology findings The cardiac silhouette is upper normal limit in size.  There is mild increased interstitial lung markings throughout both lung fields, nonspecific.  Mild atelectasis at the left lung base.  No large pleural effusion, no pneumothorax.   Chest xray   1/16 424 pm   X Echo Results TTE (11/28/16) w/ mild LA enlargement, concentric hypertrophy, normal LVSF, left ventricular diastolic dysfunction, pulmonary HTN (PA sys pressure ~47)   Internal medicine note  1/22 833 am    "Ascites" documented     X "SOB" or "GROVES" documented presents to the Emergency Department with a cc of shortness of breath  x 3 days. The pt was discharged from the hospital 3 days ago with 14 days total of levofloxacin, 5 days of prednisone and oseltamavir, and continuing his home 2L oxygen. The SOB is worsening, constant, and without alleviating factors.     ER MD Note   X "Hypoxia" documented patient was satting 70's on no supplemental oxygen upon arrival to ED   -now s/p IV steroids and one-hour nebulizer with much improvement and satting 100% on 100% non-rebreather      [facility] medicine H&P  1/16 557 pm   X Heart Failure documented HFpEF: Internal medicine note  1/22 833 am   X "Edema" documented Right AKA. Trace pitting edema of the LLE with " hyperpigmentation.   ER MD Note   X Diuretics/Meds HFpEF:  Home Meds: Amiodarone 200mg daily, Carvedilol 25mg BID, Lasix 40 PO daily    Lasix 80mg IV x1 in ED with good UOP  Resume home meds  - will discharge on home lasix dose Internal medicine note  1/22 833 am   Heart failure (HF) can be acute, chronic or both. It is generally further specificed as systolic, diastolic, or combined. Lastly, it is important to identify an underlying etiology if known or suspected.     Common clues to acute exacerbation:  Rapidly progressive symptoms (w/in 2 weeks of presentation), using IV diuretics to treat, using supplemental O2, pulmonary edema on Xray, MI w/in 4 weeks, and/or BNP >500    Systolic Heart Failure: is defined as chart documentation of a left ventricular ejection fraction (LVEF) less than 40%     Diastolic Heart Failure: is defined as a left ventricular ejection fraction (LVEF) greater than 40%   +      Evidence of diastolic dysfunction on echocardiography OR    Right heart catheterization wedge pressure above 12 mm Hg OR    Left heart catheterization left ventricular end diastolic pressure 18 mm Hg or above.    References: *American Heart Association    The clinical guidelines noted below are only system guidelines, and do not replace the providers clinical judgment.     Provider, please specify the diagnosis associated with above clinical findings    [   ] Acute on Chronic Diastolic Heart Failure -    Pre-existing diastoic HF diagnosis.  EF > 40%  and acute HF symptoms documented                                  [ X  ] Chronic Diastolic Heart Failure - Pre-existing diastolic HF diagnosis.  EF > 40%  without  acute HF symptoms documented    [   ] Other (please specify): ___________________________________    [  ] Clinically Undetermined                          Please document in your progress notes daily for the duration of treatment until resolved and include in your discharge summary.

## 2019-01-22 NOTE — PLAN OF CARE
TN confirmed pt Bipap at bedside, TN informed Dr. Sarkar of primary of this to write discharge order.

## 2019-01-22 NOTE — PLAN OF CARE
Problem: Adult Inpatient Plan of Care  Goal: Patient-Specific Goal (Individualization)  Patient plan of care reviewed at beginning of shift  with patient, patient verbalized understanding. Patient in bed, supine and appears to be asleep. No noted distress during shift. Safety measures in place, side rails up x2, bed in lowest position, call light within reach, prn meds admin to help relieve pain. Will continue to monitor

## 2019-01-22 NOTE — PROGRESS NOTES
VN note: VN cued into pt's room for introduction. VN informed pt that VN would be working closely along side bedside nurse, PCT, and the rest of care team and making rounds throughout the shift. Pt verbalized understanding. Allowed time for questions. Patient denies any pain or discomfort at this time.   Patient educated on the use of the call light.  Patient is bed bound.  VN will continue to be available to patient and intervene prn.        01/22/19 1123   Type of Frequent Check   Type Patient Rounds  (VN rounding)   Safety/Activity   Patient Rounds visualized patient;ID band on;call light in reach   Safety Promotion/Fall Prevention side rails raised x 2;family to remain at bedside   Safety Precautions emergency equipment at bedside   Positioning   Body Position supine   Head of Bed (HOB) HOB at 45 degrees   Pain/Comfort/Sleep   Preferred Pain Scale number (Numeric Rating Pain Scale)   Comfort/Acceptable Pain Level 3   Pain Rating (0-10): Rest 0   Pain Rating (0-10): Activity 0       Cardiac/Telemetry Details / Alarms   Cardiac/Telemetry Monitor On Yes   Cardiac/Telemetry Audible Yes   Cardiac/Telemetry Alarms Set Yes   Assessments (Pre/Post)   Level of Consciousness (AVPU) alert

## 2019-01-22 NOTE — PROGRESS NOTES
.Pharmacy New Medication Education    Patient accepted medication education.    Pharmacy educated patient on name and purpose of medications and possible side effects, using the teach-back method.     furosemide tablet 40 mg    Learners of pharmacy medication education included:  Patient    Patient +/- learner response:  Verbalized Understanding, Teachback

## 2019-01-22 NOTE — PHYSICIAN QUERY
PT Name: Asad Perez  MR #: [id number]    Physician Query Form - Respiratory Condition Clarification      CDS/: Lurdes Hawkins RN CDI             Contact information:  [email or web address]    This form is a permanent document in the medical record.    Query Date: January 22, 2019    By submitting this query, we are merely seeking further clarification of documentation. Please utilize your independent clinical judgment when addressing the question(s) below.    The Medical record contains the following   Indicators   Supporting Clinical Findings Location in Medical Record   X   SOB, GROVES, Wheezing, Productive Cough, Use of Accessory Muscles, etc. presents to the Emergency Department with a cc of shortness of breath  x 3 days. The pt was discharged from the hospital 3 days ago with 14 days total of levofloxacin, 5 days of prednisone and oseltamavir, and continuing his home 2L oxygen. The SOB is worsening, constant, and without alleviating factors.   ER MD Note 1/16 407 pm   X   Acute/Chronic Illness Acute on chronic hypercapnic respiratory failure 2/2 likely COPD exacerbation    ELAYNE on CKD 3B    Recent gram negative faye bacteremia 2/2 stage 3 sacral decubitus ulcer , presumably cleared prior to last discharge  HFpEF  Right knee AKA,   PVD,  CAD       [facility] medicine H&P  1/16 557 pm   X   Radiology Findings The cardiac silhouette is upper normal limit in size.  There is mild increased interstitial lung markings throughout both lung fields, nonspecific.  Mild atelectasis at the left lung base.  No large pleural effusion, no pneumothorax.   Chest xray 1/16 424 pm      Respiratory Distress or Failure     X   Hypoxia or Hypercapnia patient was satting 70's on no supplemental oxygen upon arrival to ED   -now s/p IV steroids and one-hour nebulizer with much improvement and satting 100% on 100% non-rebreather    [facility] medicine H&P  1/16 557 pm   X   RR         ABGs         O2 sat 1/16 1553  RR = 21  O2 Sat = 76%  room air    1/16 1803  ABGs   01/16/19 1803   POC PH - 7.227 Critically low    POC PCO2 -  78.8 Critically high    POC PO2 - 292 Abnormally high    POC HCO3 - 32.7 Abnormally high    POC BE - 5   POC SATURATED O2 - 100   POC TCO2 - 35 Abnormally high    Sample - ARTERIAL    Vitals        Point of care/ABGs lab results      BiPAP/Intubation ER MD Note = 18:30  Oxygen source switched to 4 liters N/C with no hypoxia though patient with increasing respiratory distress. BIPAP initiated with improvement.      ER MD Note 1/16 407 pm      Supplemental O2 100% non-rebreather    4 L NC  Bipap Hospital medicine H&P  1/16 557 pm    ER MD Note 1/16 407 pm   X   Home O2, Oxygen Dependence home 2L oxygen. Hospital medicine H&P  1/16 557 pm     Respiratory failure can be acute, chronic or both. It is generally further specified as hypoxic, hypercapnic or both. Lastly, it is important to identify an etiology, if known or suspected.   References:: https://www.acphospitalist.org/archives/2013/10/coding; htm; http://[employer]/acute-respiratory-failure-know    The clinical guidelines noted below are only system guidelines, and do not replace the providers clinical judgment.    Provider, please specify diagnosis or diagnoses associated with above clinical findings.   [   ] Acute and (on) Chronic Respiratory Failure with Hypercapnia - pCO2 >10 mmHg over baseline and pH < 7.35     [  X  ] Acute and (on) Chronic Respiratory Failure with Hypoxia and Hypercapnia - Hypoxic: pO2 >10 mmHg below baseline OR SpO2 < 91% on usual home O2 OR O2 ? 2L/min over baseline home O2 and Hypercapnic:  pCO2 >10 mmHg over baseline and pH < 7.35    [   ] Other Respiratory Diagnosis (please specify): _________________________________     [   ]  Clinically Undetermined       Please document in your progress notes daily for the duration of treatment until resolved and include in your discharge summary.

## 2019-01-22 NOTE — CONSULTS
Wound consult for Stage 3 sacral wound.    Dr. Stacey Navarro notified of assessment, orders given.   Clean wound with wound cleanser, Xeroform to wound bed, skin barrier film to kyrie wound, every other day and as needed.    Pt noted to with small Stage 3 wound to sacral spine. Wound appears to be in healing stages, no slough, no odor, no visible drainage, wound bed, pink/red.  *Measures 1 cm x 1 cm x 0.3 cm, undermines 0.2 cm from 6-6 o'clock.     Recommend monitoring of this wound after discharge to ensure continued healing.

## 2019-01-22 NOTE — NURSING
Home Oxygen Evaluation    Date Performed: 1/22/2019    1) Patient's Home O2 Sat on room air, while at rest: 88%        If O2 sats on room air at rest are 88% or below, patient qualifies. No additional testing needed. Document N/A in steps 2 and 3. If 89% or above, complete steps 2.      2) Patient's O2 Sat on room air while exercising:         If O2 sats on room air while exercising remain 89% or above patient does not qualify, no further testing needed Document N/A in step 3. If O2 sats on room air while exercising are 88% or below, continue to step 3.      3) Patient's O2 Sat while exercising on O2:  at   LPM         (Must show improvement from #2 for patients to qualify)    If O2 sats improve on oxygen, patient qualifies for portable oxygen. If not, the patient does not qualify.

## 2019-01-22 NOTE — PROGRESS NOTES
"[facility] Medicine Progress Note, HO-I     Primary Team: [facility] Hospitalist Team B  Attending Physician: Bernice  Resident: Jose Natarajan  Intern: Mello Butler     Subjective:      Patient reports feeling well this morning, abdominal pain and leg pain resolved since yesterday. As per nursing documentation, had one episode of desaturation to 85% on 2L NC overnight, was increased to 3L, currently stable on 3L. Kidney function continues to improve, slight increase in leukocytosis today. Pt was slated for discharge yesterday but pt's wife uncomfortable taking pt home prior receiving home bipap machine - SW on board. Discharge pending stable saturation and receipt of bipap.     Objective:      Last 24 Hour Vital Signs:  BP  Min: 112/49  Max: 149/63  Temp  Av.4 °F (36.3 °C)  Min: 96.2 °F (35.7 °C)  Max: 98.4 °F (36.9 °C)  Pulse  Av  Min: 57  Max: 67  Resp  Av  Min: 14  Max: 42  SpO2  Av.5 %  Min: 92 %  Max: 96 %  Height  Av' 8" (172.7 cm)  Min: 5' 8" (172.7 cm)  Max: 5' 8" (172.7 cm)  Weight  Av.8 kg (186 lb 15.2 oz)  Min: 73.4 kg (161 lb 13.1 oz)  Max: 96.2 kg (212 lb 1.3 oz)  I/O last 3 completed shifts:  In: 1760 [P.O.:810; IV Piggyback:950]  Out: 2540 [Urine:2540]     Physical Examination:  Gen: AAOx3, NAD, wearing NC, resting comfortably in bed using arm bar  HEENT: head normocephalic, atraumatic  Cardiac: regular rate and rhythm; no murmurs  Resp: on 3L NC; diffuse expiratory wheezes throughout, normal work of breathing, no rhonchi  GI: abdomen soft, non-tender, non-distended; umbilical hernia present (reducible); normoactive bowel sounds  Extrem: no clubbing or swelling noted; right leg AKA, L UE edematous (R normal)  Skin: no rashes; sacral decubitus ulcer with mepilex in place, extensive bruising on extremities and abdomen  Neuro: AAOx3, moving all extremities without difficulty        Laboratory:  Laboratory Data Reviewed: yes  Pertinent Findings:    WBC 13.65  Cr 1.6  BUN " 59     Microbiology Data Reviewed: yes  Pertinent Findings:  Previous cultures drawn 1/9/19 grew E. coli  Blood Cx 1/16: NGTD     Other Results:  EKG (my interpretation): No new studies     Radiology Data Reviewed: yes  Pertinent Findings:  CXR 1/16:  Mild atelectasis at the left lung base or subsegmental airspace disease.     CT Chest, A/P 1/16:  1. No evidence of PE.  2. Small bilateral pleural effusions resulting in volume loss and compressive atelectasis within the lower lobes, left greater than right with additional atelectasis versus consolidation seen within the lingula of the left upper lobe.  3. Extensive atherosclerosis with postsurgical changes of aorto bi-iliac graft.  Extensive chronic vascular findings and occlusions as detailed above.  These findings do not appear significantly changed when compared to previous CTA abdomen and pelvis from March 2018.  4. Otherwise no acute intra-abdominal abnormalities identified.  5. Cholelithiasis.  6. Multiple additional findings as detailed above.     Current Medications:     Infusions:     Scheduled:   albuterol-ipratropium  3 mL Nebulization Q4H    ascorbic acid (vitamin C)  500 mg Oral BID    aspirin  81 mg Oral Daily    atorvastatin  40 mg Oral Daily    azithromycin  500 mg Intravenous Q24H    ceFEPime (MAXIPIME) IVPB  2 g Intravenous Q12H    heparin (porcine)  5,000 Units Subcutaneous Q8H    predniSONE  40 mg Oral Daily    vancomycin (VANCOCIN) IVPB  1,250 mg Intravenous Q24H         PRN:  dextrose 50%, dextrose 50%, glucagon (human recombinant), glucose, glucose, HYDROcodone-acetaminophen, insulin aspart U-100, sodium chloride 0.9%     Antibiotics and Day Number of Therapy:    Levofloxacin 1/9 - 1/17   Vancomycin 1/17 - 1/18  Cefepime 1/17 - 1/19   Azithromycin 1/17 - present   Zosyn 1/16 - present     Lines and Day Number of Therapy:  PIVs (R hand, L AC) placed 1/16     Assessment:      Asad Perez is a 78 y.o.male with       Patient Active  Problem List     Diagnosis Date Noted    Hyperkalemia 01/17/2019    Respiratory failure with hypoxia and hypercapnia 01/16/2019    COPD with acute exacerbation 01/09/2019    Abdominal pain 03/19/2018    COPD exacerbation 03/16/2018    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    Abdominal rigidity, generalized      Complicated open wound of right hip      Acute osteomyelitis 04/11/2017    Decubitus ulcer of buttock, stage 2 04/07/2017    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    Urinary tract infection associated with indwelling urethral catheter 12/01/2016    Centrilobular emphysema 11/29/2016    Symptomatic anemia 11/27/2016    Deep vein thrombosis (DVT) of brachial vein      Non-healing ulcer of lower leg 10/28/2016    Fever      Decubitus ulcer of sacral region, stage 4 10/05/2016    Deep vein thrombosis (DVT) of upper extremity 10/05/2016    Dehiscence of perineal wound 10/05/2016    Hyponatremia 10/05/2016    VRE (vancomycin-resistant Enterococci) infection 09/20/2016    Elevated liver enzymes      Chronic diastolic congestive heart failure      Critical lower limb ischemia 09/10/2016    Stenosis of left internal carotid artery 09/08/2016    Abnormal finding on imaging 08/31/2016    Abnormal CT of the abdomen      Chronic kidney disease, stage V 08/12/2016    Anemia 08/12/2016    Anemia of chronic disease 08/08/2016    Altered mental status 08/07/2016    MRSA (methicillin resistant Staphylococcus aureus) infection 08/07/2016    Confusion      Weak      Pneumonia 07/29/2016    History of atrial fibrillation      Infected prosthetic knee joint 07/12/2016    Right knee pain 06/15/2016    GERD (gastroesophageal reflux disease) 02/28/2015    Esophageal web 02/28/2015    Acute on chronic diastolic heart failure 11/08/2014    Transaminitis 11/08/2014    Chronic obstructive pulmonary disease 11/07/2014    Dyspnea 11/06/2014    Normocytic anemia  2014    Dysphagia 2014    Osteoarthritis of left knee 2014    Essential hypertension 2013    CKD (chronic kidney disease) 2013    PAD (peripheral artery disease) 2013    CAD (coronary artery disease) 2013    History of stroke 2013    Physical deconditioning 2013    Alcohol abuse 2013         Plan:      #AoC Hypercapnic Respiratory Failure Likely 2/2 COPD Exacerbation:  -Recently admitted for this; Blood cultures grew gram negative rods; Discharged on  with 14 days total of Levofloxacin, 5 days of Prednisone and Oseltamavir, continuing his home 2L oxygen  - SpO2 70's on no supplemental oxygen upon arrival to ED   - ABG with respiratory acidosis, 7.227, pCO2 78 and pO2 292   - IV steroids & 1hr nebulizer with improved sats, 100% on non-rebreather 100% FiO2  - Procal 0.28, Lactate 0.9  - CXR w/ left lung atelectasis, relatively unchanged from previous admission  - CT Chest & Abd w/ small bilateral pleural effusion, compressive atelectasis, left lingula with consolidation vs atelectasis, cholelithiasis  - Zosyn started in ED, Lasix 80 IV   - Admitted to ICU for continuous BiPAP  - Stepped down , continue nightly BiPAP  - Started on Vanc and Zosyn then de-escalated to azithro and cefipime, broadened back to azithro and zosyn for leukocytosis  - leukocytosis worsening (13.65) today, no systemic signs of infection (afebrile, HR and RR normal)  - currently saturating well on 3L NC, weaned from 4L yesterday  - pt complaining of cough/congestion: guaifenison, acapella, mucinex, CPT to mobilize secretions  - will be discharged with home bipap, SW on board     #ELAYNE on CKD 3B:  -Cr 1.7 on admit; baseline Cr ~1.2  - Lasix 80mg IV in ED for concern for volume overload, good urinary response  - Avoid nephrotoxic agents, renally dose medications  - Cr decreasin.6 today   - last echo 2016 with EF 65%, improving with light IVF     #Recent GNR  Bacteremia 2/2 Stage 3 Sacral Decubitus Ulcer:  - Course of antibiotics during admission earlier this month  - Blood Cx 1/11 NGTD, repeat Blood Cx 1/16 NGTD  - Consult to Wound Care. Rec mepilex a/g every third day  - Home Health wound care on discharge  - Dr Coffman following as per pt, outpt follow up established  - wound clean, dry, nonerythemetous, no signs of infection     #Transaminitis:  - , AST 202, Alk Phos 161   - RUQ U/S earlier this month showing cholelithiasis without acute cholecystitis   - Neuroendocrine Surg was consulted at that time, no concern for acute cholecystitis and no indication for surgical intervention (LFTs more indicative of hepatic origin vs cholestatic)  - Intermittent complaints of abdominal pain, denied to other team members   - CT Abd 1/16 with cholelithiasis  - LFTs improving     #HFpEF:  - TTE (11/28/16) w/ mild LA enlargement, concentric hypertrophy, normal LVSF, left ventricular diastolic dysfunction, pulmonary HTN (PA sys pressure ~47)  - Home Meds: Amiodarone 200mg daily, Carvedilol 25mg BID, Lasix 40 PO daily  - , Troponin WNL   - Lasix 80mg IV x1 in ED with good UOP  - Resume home meds  - will discharge on home lasix dose     #CAD, PVD, RLE AKA:  - Home Meds: ASA 81mg daily, Atorvastatin 40mg daily  - Resume home meds     #History of CVA:  - CVA in 2007, no focal deficits on exam  - Home Meds: ASA 81mg daily, Atorvastatin 40mg daily  - Resume home meds     #Constipation:  - Polyethylene glycol     #History of Alcohol Abuse:  -No alcohol since 2007     #Healthcare Maintenance:  - UTD on flu, tetanus, pneumovax         Diet: Diabetic  PPX: SQH  Dispo: stepped down 1/17 from ICU, continuing IV abx for leukocytosis, possible discharge today pending receipt of home bipap     Stacey Sarkar MD  [facility] Internal Medicine HO-1     [facility] Medicine Hospitalist Pager numbers:   [facility] Hospitalist Medicine Team A (Chay/Shawanda):          [phone]  [facility] Hospitalist Medicine Team  B (Gloria/Alex):        [phone]      Patient seen and examined, case discussed with resident care team and agree.

## 2019-01-23 PROBLEM — R07.9 CHEST PAIN: Status: ACTIVE | Noted: 2019-01-01

## 2019-01-23 NOTE — PROGRESS NOTES
"[facility]ospitals Hospital Medicine Progress Note, HO-I     Primary Team: hospitals Hospitalist Team B  Attending Physician: Bernice  Resident: Jose Natarajan  Intern: Mello Butler     Subjective:      Patient was discharged last night following delivery of home bipap, however it was too late to arrange ambulance transfer so pt remained in the hospital overnight. Overnight, pt removed bipap and nurse was assisting him with sitting up. While on 3L NC he desaturated to 76% and was immediately returned to bipap and settings increased from 10/5 40% to 12/6 50% with minimal improvement, FiO2 increased to 70% and improved to sats of 92%. Settings then weaned back to original settings with good saturations. This morning, pt complaining of SOB on bipap with white sputum on face and shirt, endorsed feeling tired. CXR and ABG ordered. 10 min later, his nurse called a MET as pt complained of 8/10 CP. Pain resolved within 2 minutes without intervention, pt endorsed improvement in breathing, EKG normal, slight elevation in trops. ABG normal, saturating at 92% during MET. Leukocytosis increased today, restarted Zosyn. Will continue to monitor saturations, likely dispo tomorrow.     Objective:      Last 24 Hour Vital Signs:  BP  Min: 112/49  Max: 149/63  Temp  Av.4 °F (36.3 °C)  Min: 96.2 °F (35.7 °C)  Max: 98.4 °F (36.9 °C)  Pulse  Av  Min: 57  Max: 67  Resp  Av  Min: 14  Max: 42  SpO2  Av.5 %  Min: 92 %  Max: 96 %  Height  Av' 8" (172.7 cm)  Min: 5' 8" (172.7 cm)  Max: 5' 8" (172.7 cm)  Weight  Av.8 kg (186 lb 15.2 oz)  Min: 73.4 kg (161 lb 13.1 oz)  Max: 96.2 kg (212 lb 1.3 oz)  I/O last 3 completed shifts:  In: 1760 [P.O.:810; IV Piggyback:950]  Out: 2540 [Urine:2540]     Physical Examination:  Gen: AAOx3, wearing bipap, appears uncomfortable and nervous  HEENT: head normocephalic, atraumatic  Cardiac: regular rate and rhythm; no murmurs  Resp: on bipap; diffuse expiratory wheezes throughout, normal work of " breathing, no rhonchi  GI: abdomen soft, non-tender, non-distended; umbilical hernia present (reducible); normoactive bowel sounds  Extrem: no clubbing or swelling noted; right leg AKA with pain to palpation, L UE edematous (R normal)  Skin: no rashes; sacral decubitus ulcer with mepilex in place, extensive bruising on extremities and abdomen with skin breakdown around heparin injection sites  Neuro: AAOx3, moving all extremities without difficulty        Laboratory:  Laboratory Data Reviewed: yes  Pertinent Findings:    WBC 15.04  Cr 1.6  BUN 49     Microbiology Data Reviewed: yes  Pertinent Findings:  Previous cultures drawn 1/9/19 grew E. coli  Blood Cx 1/16: NGTD     Other Results:  EKG (my interpretation): No new studies     Radiology Data Reviewed: yes  Pertinent Findings:  CXR 1/16:  Mild atelectasis at the left lung base or subsegmental airspace disease.    CXR 1/23:  There is moderate perihilar and bibasilar lung opacities, increased.  Probable small effusions.  No gross pneumothorax.  Heart size unchanged     CT Chest, A/P 1/16:  1. No evidence of PE.  2. Small bilateral pleural effusions resulting in volume loss and compressive atelectasis within the lower lobes, left greater than right with additional atelectasis versus consolidation seen within the lingula of the left upper lobe.  3. Extensive atherosclerosis with postsurgical changes of aorto bi-iliac graft.  Extensive chronic vascular findings and occlusions as detailed above.  These findings do not appear significantly changed when compared to previous CTA abdomen and pelvis from March 2018.  4. Otherwise no acute intra-abdominal abnormalities identified.  5. Cholelithiasis.  6. Multiple additional findings as detailed above.     Current Medications:     Infusions:     Scheduled:   albuterol-ipratropium  3 mL Nebulization Q4H    ascorbic acid (vitamin C)  500 mg Oral BID    aspirin  81 mg Oral Daily    atorvastatin  40 mg Oral Daily     azithromycin  500 mg Intravenous Q24H    ceFEPime (MAXIPIME) IVPB  2 g Intravenous Q12H    heparin (porcine)  5,000 Units Subcutaneous Q8H    predniSONE  40 mg Oral Daily    vancomycin (VANCOCIN) IVPB  1,250 mg Intravenous Q24H         PRN:  dextrose 50%, dextrose 50%, glucagon (human recombinant), glucose, glucose, HYDROcodone-acetaminophen, insulin aspart U-100, sodium chloride 0.9%     Antibiotics and Day Number of Therapy:    Levofloxacin 1/9 - 1/17   Vancomycin 1/17 - 1/18  Cefepime 1/17 - 1/19   Azithromycin 1/17 - present   Zosyn 1/16 - present     Lines and Day Number of Therapy:  PIV placed 1/23     Assessment:      Asad Perez is a 78 y.o.male with       Patient Active Problem List     Diagnosis Date Noted    Hyperkalemia 01/17/2019    Respiratory failure with hypoxia and hypercapnia 01/16/2019    COPD with acute exacerbation 01/09/2019    Abdominal pain 03/19/2018    COPD exacerbation 03/16/2018    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    Abdominal rigidity, generalized      Complicated open wound of right hip      Acute osteomyelitis 04/11/2017    Decubitus ulcer of buttock, stage 2 04/07/2017    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    Urinary tract infection associated with indwelling urethral catheter 12/01/2016    Centrilobular emphysema 11/29/2016    Symptomatic anemia 11/27/2016    Deep vein thrombosis (DVT) of brachial vein      Non-healing ulcer of lower leg 10/28/2016    Fever      Decubitus ulcer of sacral region, stage 4 10/05/2016    Deep vein thrombosis (DVT) of upper extremity 10/05/2016    Dehiscence of perineal wound 10/05/2016    Hyponatremia 10/05/2016    VRE (vancomycin-resistant Enterococci) infection 09/20/2016    Elevated liver enzymes      Chronic diastolic congestive heart failure      Critical lower limb ischemia 09/10/2016    Stenosis of left internal carotid artery 09/08/2016    Abnormal finding on imaging 08/31/2016     Abnormal CT of the abdomen      Chronic kidney disease, stage V 08/12/2016    Anemia 08/12/2016    Anemia of chronic disease 08/08/2016    Altered mental status 08/07/2016    MRSA (methicillin resistant Staphylococcus aureus) infection 08/07/2016    Confusion      Weak      Pneumonia 07/29/2016    History of atrial fibrillation      Infected prosthetic knee joint 07/12/2016    Right knee pain 06/15/2016    GERD (gastroesophageal reflux disease) 02/28/2015    Esophageal web 02/28/2015    Acute on chronic diastolic heart failure 11/08/2014    Transaminitis 11/08/2014    Chronic obstructive pulmonary disease 11/07/2014    Dyspnea 11/06/2014    Normocytic anemia 11/06/2014    Dysphagia 11/03/2014    Osteoarthritis of left knee 07/14/2014    Essential hypertension 07/16/2013    CKD (chronic kidney disease) 07/16/2013    PAD (peripheral artery disease) 07/16/2013    CAD (coronary artery disease) 07/16/2013    History of stroke 07/16/2013    Physical deconditioning 07/16/2013    Alcohol abuse 07/16/2013         Plan:      #AoC Hypercapnic Respiratory Failure Likely 2/2 COPD Exacerbation:  -Recently admitted for this; Blood cultures grew gram negative rods; Discharged on 1/13 with 14 days total of Levofloxacin, 5 days of Prednisone and Oseltamavir, continuing his home 2L oxygen  - SpO2 70's on no supplemental oxygen upon arrival to ED   - ABG with respiratory acidosis, 7.227, pCO2 78 and pO2 292   - IV steroids & 1hr nebulizer with improved sats, 100% on non-rebreather 100% FiO2  - Procal 0.28, Lactate 0.9  - CXR w/ left lung atelectasis, relatively unchanged from previous admission  - CT Chest & Abd w/ small bilateral pleural effusion, compressive atelectasis, left lingula with consolidation vs atelectasis, cholelithiasis  - Zosyn started in ED, Lasix 80 IV   - Admitted to ICU for continuous BiPAP  - Stepped down 1/17, continue nightly BiPAP  - Started on Vanc and Zosyn then de-escalated to  azithro and cefipime, broadened back to azithro and zosyn for leukocytosis  - currently on bipap, will continue to monitor  - ABG without acidosis, hypoxia, or hypercapnia  - received home bipap  - discontinued discharge, will continue inpt treatment   - CXR  with increased perihilar and bibasilar opacities, given 40mg IV lasix  - wean off bipap as tolerated     #ELAYNE on CKD 3B:  -Cr 1.7 on admit; baseline Cr ~1.2  - Lasix 80mg IV in ED for concern for volume overload, good urinary response  - Avoid nephrotoxic agents, renally dose medications  - Cr decreasin.6 today   - last echo 2016 with EF 65%, improving with light IVF  - plan to discharge on home dose lasix 40 PO   - given lasix 40 IV this morning, will monitor Cr     #Recent GNR Bacteremia 2/2 Stage 3 Sacral Decubitus Ulcer:  - Course of antibiotics during admission earlier this month  - Blood Cx  NGTD, repeat Blood Cx  NGTD  - Consult to Wound Care. Rec mepilex a/g every other day  - Home Health wound care on discharge  - Dr Coffman following as per pt, outpt follow up established  - wound clean, dry, nonerythemetous, no signs of infection     #Transaminitis:  - , AST 202, Alk Phos 161   - RUQ U/S earlier this month showing cholelithiasis without acute cholecystitis   - Neuroendocrine Surg was consulted at that time, no concern for acute cholecystitis and no indication for surgical intervention (LFTs more indicative of hepatic origin vs cholestatic)  - Intermittent complaints of abdominal pain, denied to other team members   - CT Abd  with cholelithiasis  - LFTs improving     #HFpEF:  - TTE (16) w/ mild LA enlargement, concentric hypertrophy, normal LVSF, left ventricular diastolic dysfunction, pulmonary HTN (PA sys pressure ~47)  - Home Meds: Amiodarone 200mg daily, Carvedilol 25mg BID, Lasix 40 PO daily  - , Troponin WNL   - Lasix 80mg IV x1 in ED with good UOP  - Resume home meds  - will discharge on home lasix  dose   - given IV dose this morning with worsening SOB     #CAD, PVD, RLE AKA:  - Home Meds: ASA 81mg daily, Atorvastatin 40mg daily  - Resume home meds     #History of CVA:  - CVA in 2007, no focal deficits on exam  - Home Meds: ASA 81mg daily, Atorvastatin 40mg daily  - Resume home meds     #Constipation:  - Polyethylene glycol     #History of Alcohol Abuse:  -No alcohol since 2007     #Healthcare Maintenance:  - UTD on flu, tetanus, pneumovax         Diet: Diabetic  PPX: [facility]  Dispo: pending improvement in SOB and tolerating NC     Stacey Sarkar MD  [facility] Internal Medicine HO-1     [facility] Medicine Hospitalist Pager numbers:   [facility] Hospitalist Medicine Team A (Chay/Shawanda):          [phone]  [facility] Hospitalist Medicine Team B (Gloria/Alex):        [phone]      Patient seen and examined, case discussed with resident care team and agree.

## 2019-01-23 NOTE — PLAN OF CARE
Problem: Adult Inpatient Plan of Care  Goal: Plan of Care Review  Outcome: Ongoing (interventions implemented as appropriate)  Pt on documented O2, no respiratory distress noted. Will continue to monitor.  The proper method of use, as well as anticipated side effects, of this aerosol treatment are discussed and demonstrated to the patient.

## 2019-01-23 NOTE — NURSING
0810 Pt found by respiratory therapist. O2 sat on bipap 80%, nurse notified, respiratory increased bipap settings. Upon assessment,  Pt breathing labored, posterior lung sounds coarse bilaterally and inspiratory wheezing,pt c/o generalized weakness and shortness of breath. Delgado SINGH notified, came to bedside ordered stat CXR and ABGs.    0840 Pt complains of SOB,labored breathing, midsternal chest pain 8/10.     0841 MET called. Vitals charted per flowsheet, bipap on continuously. Peripheral IV started, lasix 40mg IV push.     Vital signs stable, pt on continuous pulse oximetry, Bipap settings at 60% O2, O2 sat at 97%. Pt in no acute distress at this time. Will continue to monitor, possible transfer to ICU.

## 2019-01-23 NOTE — PLAN OF CARE
Problem: Adult Inpatient Plan of Care  Goal: Plan of Care Review  Outcome: Ongoing (interventions implemented as appropriate)  Plan of care reviewed with patient. Patient verbalized understanding. Blood glucose monitoring continued. Fall precautions maintained. Bed in lowest position, call light within reach, 2x bed rails, pt wearing slip resistant socks. Patient notified to ask staff for assistance and pt verbalized complete understanding. Pt on telemetry with no ectopy noted. Will continue to monitor. Pt has home O2, 3L NC. Pt anticipates discharge.

## 2019-01-23 NOTE — PLAN OF CARE
Problem: SLP Goal  Goal: SLP Goal  Outcome: Ongoing (interventions implemented as appropriate)  Swallow eval completed, initiate full liquid for today. See note for details.

## 2019-01-23 NOTE — PLAN OF CARE
Problem: Adult Inpatient Plan of Care  Goal: Plan of Care Review  Outcome: Ongoing (interventions implemented as appropriate)  Plan of care reviewed with patient. Patient verbalized understanding. Pt on high flow NC 15L O2. Fall precautions maintained. Bed in lowest position, call light within reach, 2x bed rails, pt wearing slip resistant socks. Patient notified to ask staff for assistance and pt verbalized complete understanding. Pt on telemetry with no ectopy noted. Will continue to monitor.

## 2019-01-23 NOTE — NURSING
VN note: MET called on patient at 08:41 am.VN cued into patient's room. Refer to rapid response documentation flowsheet. MET ended at 08:57 am. VN will continue to follow.

## 2019-01-23 NOTE — PLAN OF CARE
TN spoke with Anna regarding pt needing a sleep study or recent ABGs on home O2 requirement or RA to qualify for home Bipap.    TN informed team of this,Dr. Natarajan and team spoke will [facility], MD to call [facility] manager Vikash regarding requirement for pt to receive Bipap.    TN informed team to place ambulatory referral for sleep disorders for pt to get scheduled for sleep lab.    TN spoke to Anna regarding Bipap order, TN informed her that pt would have to see sleep MD first and then  It would be up to him to schedule sleep study, Anna stated an appointment to see sleep MD would be suffcient enough for pt to receive home bipap.    TN scheduled appointment to see sleep MD.    Future Appointments   Date Time Provider Department Center   1/30/2019 10:30 AM Michael Irizarry MD [facility] WOUND [facility]   2/18/2019 10:00 AM Santi Abreu MD [facility] SLEEP [facility]     TN informed Anna that pt is scheduled to sleep MD next month, Anna to send RT to pt's room to deliver Bipap and provide teaching.

## 2019-01-23 NOTE — PROGRESS NOTES
Resp called to patient's room bc patient removed Bipap and wished to come off. Upon arrival, patient was being moved by nursing staff. Patient returned to 3 lpm NC but stated that he felt SOB. Purse lipped breathing is noted. Patient's breath sounds exhibit coarse crackles. SpO2 on 3 lpm NC is sustained at 76%. Head of bed is elevated. Patient immediately returned to bipap with the previous settings which were 10/5 40%. SpO2 did not improve. Bipap settings adjusted to 12/6 50% with minimal improvement. FiO2 increased to 70% with SpO2 improving to 92%. RT notes that patient has history of COPD. RN aware of all. RT remains with the patient until he states that he is feeling better. Bipap settings are slowly weaned to previous settings of 10/5 40% with SpO2 being sustained at 89%, which is within oxygen goal range of 88%-92% for this patient. He appears to be breathing more comfortably. Patient states that he feels better; however, his head hurts and he would like medicine for that. RN aware. Will continue to monitor.    HR: 68  RR: 28  SpO2%: 89%  Current Bipap: 10/5 40%

## 2019-01-23 NOTE — NURSING
Pt SOB when sitting on the side of the bed. Pt O2 sat 87% on 3L. Marito SINGH notified that patient unable to transfer into wheelchair without SOB or oxygen desaturation. MD states that pt will stay overnight for observation and to arrange transport home. Pt on 3L O2 NC at rest is 93%. Pt in no acute distress.

## 2019-01-23 NOTE — NURSING
Patient d/c home d/c instructions given to the patient and his family.  Patient advised to stop taking the prednisone and to continue to take all of his regular scheduled medications.  Patient will keep all follow up appointments.  Patient verbalized understanding of the D/C instructions. Education given, refer to clinical reference for education.   Floor nurse at the bedside.  She will help the family dress the patient.  When she is done she will put in for transport.

## 2019-01-23 NOTE — PT/OT/SLP EVAL
Speech Language Pathology Evaluation  Bedside Swallow    Patient Name:  Asad Perez   MRN:  [id number]  Admitting Diagnosis: Acute on chronic respiratory failure with hypoxia and hypercapnia    Recommendations:                 General Recommendations:  follow up and advance diet as tolerated  Diet recommendations:   , Full liquids   Aspiration Precautions: 1 bite/sip at a time, Alternating bites/sips, Assistance with meals, Eliminate distractions, Feed only when awake/alert, HOB to 90 degrees, Meds crushed in puree, Remain upright 30 minutes post meal, Small bites/sips and Wear oxygen during intake   General Precautions: Standard, fall, respiratory  Communication strategies:  expresses self and follows commands    History:     Chief Complaint      Diffuse aches and SOB since Sunday (3 days)     Subjective:      History of Present Illness:  Asad Perez is a 78 y.o.  male who  has a past medical history of Alcohol abuse, CHF (congestive heart failure), CKD (chronic kidney disease) stage 3, GFR 30-59 ml/min, Coronary artery disease, Decubitus skin ulcer, Heart failure, diastolic, High cholesterol, Hypertension, Immobility, LBP (low back pain), Prediabetes, PVD (peripheral vascular disease), Stroke, and Urine incontinence.. The patient presented to [facility] on 1/16/2019 with a primary complaint of Weakness (78 year old male presents to ed cc of generalized weakness/ abdominal pain x 3 days patient states recent discharge from hospital on sunday. )     Patient was recently discharged from this facility on 1/9/19 after being treated for acute on chronic hypercapnic respiratory failure 2/2 COPD exacerbation possibly 2/2 flu, as well as gram negative faye bacteremia 2/2 sacral decubitus ulcer.      History is limited due to patient's respiratory status (on BiPap) as well as wife being very poor historian. Video  was used. She states that patient never seemed to be back at baseline when  "he was discharged on Sunday. She notes that he was moving around a lot at night, moving side to side not able to get comfortable. She says his known sacral wound has decreased in size since his last admission. She states that he has been on his home 4L oxygen via [place] (increased from 2L since last hospital stay)  and that he has been compliant with ALL of his home medications prescribed at discharge and listed below. She states he has been in a state of constant SOB and aches throughout his body, particularly in his abdomen and back. She states she has been treating him for this with tramadol and ibuprofen regularly, without improvement. She states she was finally able to convince him to come to the hospital today when his SOB worsened and he wasn't sleeping well due to the pain. She states she is not able to control his pain at home, and that his face seemed enlarged just prior to bringing him to the hospital.  She denies fever, chills, nausea, vomiting, diarrhea, constipation, BRBPR, melena, cough on his behalf. He has been eating and drinking less since he has been home. According to the patient, he has had no appetite at all today.      Today he endorses "butt cheek" pain consistent with his norm and he mentioned testicular pain to the ED physician but denies it during our interview. He states he has not been able to get any sleep lately due to SOB and body aches. He intermittently endorses and then denies abdominal pain. He states his breathing is uncomfortable "all the time" and not just with exertion. He states his breathing never got better while he was in the hospital last time. He states the last time he was comfortable breathing was a few weeks ago prior to the last admission.    Past Medical History:   Diagnosis Date    Alcohol abuse     CHF (congestive heart failure)     CKD (chronic kidney disease) stage 3, GFR 30-59 ml/min     Coronary artery disease     Decubitus skin ulcer     Heart failure, " diastolic     High cholesterol     Hypertension     Immobility     LBP (low back pain)     Prediabetes     PVD (peripheral vascular disease)     Stroke     2006, no deficits    Urine incontinence        Past Surgical History:   Procedure Laterality Date    AMPUTATION-ABOVE KNEE Right 9/12/2016    [facility]    aortic bifemoral bypass  2006    ARTHROPLASTY-KNEE Right 6/27/2016    [facility]    ARTHROPLASTY-KNEE Left 7/14/2014    [facility]    bilateral carotid stenois  2006    carotid stents      DEBRIDEMENT-SACRAL WOUND N/A 10/10/2016    [facility] at [facility]    DEBRIDEMENT-SACRAL WOUND Bilateral 9/29/2016    [facility]ardo [facility] at [facility]    DEBRIDEMENT-SACRAL WOUND N/A 9/16/2016    [facility]    DEBRIDEMENT-WOUND Right 4/8/2017    [facility]    ESOPHAGOGASTRODUODENOSCOPY (EGD) N/A 11/4/2014    Performed by David Ramos [facility] at [facility] END[facility]    ESOPHAGOGASTRODUODENOSCOPY (EGD) with PEG N/A 9/23/2016    Performed by Emerson Childers Jr., MD at [facility]    EXCHANGE-POLYETHYLENE RIGHT KNEE Right 7/12/2016    Performed [facility]    IOVERA Right 6/15/2016    [facility]    IRRIGATION AND DEBRIDEMENT LOWER EXTREMITY Right 9/9/2016    [facility]    IRRIGATION AND DEBRIDEMENT RIGHT KNEE Right 7/12/2016    [facility]    JOINT REPLACEMENT      knee    left common endarterectomy  2006    REVISION-AMPUTATION STUMP Right 12/1/2016    [facility]    RT AKA  NOV 2017    SYNOVECTOMY-KNEE Left 9/9/2016    [facility]    WOUND EXPLORATION Right 9/9/2016    [facility]       Social History: Patient lives with wife at  "home.     Prior Intubation HX:  N/a     Modified Barium Swallow: none on this admission    Chest X-Rays:   There is moderate perihilar and bibasilar lung opacities, increased.  Probable small effusions.  No gross pneumothorax.  Heart size unchanged     Prior diet: Wife reported soft diet and thin liquids at home.     Subjective     Consult received for clinical swallow eval/speech/lang eval this date, SLP did communicate with RN prior to eval/treat.    Patient goals: "Oh I am so hungry."    Pain/Comfort:  · Pain Rating 1: 5/10  · Location 1: (back)  · Pain Addressed 1: Reposition, Nurse notified  · Pain Rating Post-Intervention 1: 5/10    Objective:   Pt seen in room for swallow eval. Pt had MET this am to dcr in respiratory status. Pt now on HFNC.   Wife reports he has been asking for something to eat.   He is alert and oriented. He initially protested sitting up in bed. Explained that upright position was needed to complete swallow study. He verbalized understanding.     Oral Musculature Evaluation  · Oral Musculature: general weakness  · Dentition: edentulous(has dentures but not in place)  · Mucosal Quality: cracked, dry  · Mandibular Strength and Mobility: (fair)  · Oral Labial Strength and Mobility: (fair seal)  · Lingual Strength and Mobility: impaired strength  · Velar Elevation: (unable to view)  · Buccal Strength and Mobility: decreased tone  · Volitional Cough: elicited  · Volitional Swallow: multiple swallows palpated  · Voice Prior to PO Intake: clear voice  · Oral Musculature Comments: general weakness noted    Bedside Swallow Eval:   Consistencies Assessed:  · Thin liquids water by cup/straw  · Puree applesauce by tsp  · Soft solids diced peaches by spoon     Oral Phase:   · Slow oral transit time    Pharyngeal Phase:   · delayed swallow initation  · no overt clinical signs/symptoms of aspiration  · multiple spontaneous swallows   · No wet voice s/p trials of water    Compensatory " Strategies  · Alternate sips and bites  · Liquid rinse after solids     Treatment: advance diet to mech soft as tolerated.     Assessment:     Asad Perez is a 79 y.o. male admitted with Acute on chronic respiratory failure with hypoxia and hypercapnia. Per bedside observations, pt is appropriate for initiation of full liquid diet for now.     Goals:   Multidisciplinary Problems     SLP Goals        Problem: SLP Goal    Goal Priority Disciplines Outcome   SLP Goal     SLP Ongoing (interventions implemented as appropriate)   Description:  Short Term Goals:  1. Pt will participate in swallow eval to determine safest diet level.   2. Pt will tolerate full liquid diet with no audible s/s of dysphagia.  3. Pt will tolerate mech soft diet and thin liquids with no audible s/s of dysphagia.                     Plan:     · Patient to be seen:  2 x/week   · Plan of Care expires:  02/21/19  · Plan of Care reviewed with:  patient, spouse   · SLP Follow-Up:  Yes       Discharge recommendations:  (home with wife, needs assist with ADLs)       Time Tracking:     SLP Treatment Date:   01/23/19  Speech Start Time:  1503  Speech Stop Time:  1522     Speech Total Time (min):  19 min    Billable Minutes: Eval Swallow and Oral Function 19        AIDA Wilson, CCC-SLP  01/23/2019

## 2019-01-23 NOTE — PROGRESS NOTES
Pharmacist Renal Dose Adjustment Note    Asad Perez is a 79 y.o. male being treated with the medication Zosyn      Patient Data:    Vital Signs (Most Recent):  Temp: 97.1 °F (36.2 °C) (01/23/19 1202)  Pulse: 72 (01/23/19 1202)  Resp: (!) 24 (01/23/19 1202)  BP: 129/63 (01/23/19 1202)  SpO2: (!) 92 % (01/23/19 1202)   Vital Signs (72h Range):  Temp:  [96.3 °F (35.7 °C)-99 °F (37.2 °C)]   Pulse:  [62-89]   Resp:  [16-26]   BP: (122-161)/(58-68)   SpO2:  [86 %-97 %]      Recent Labs   Lab 01/21/19  0555 01/22/19  0540 01/23/19  0852   CREATININE 1.9* 1.6* 1.6*     Serum creatinine: 1.6 mg/dL (H) 01/23/19 0852  Estimated creatinine clearance: 42.2 mL/min (A)    Medication:Zosyn dose: 4.5 gm frequency q12h will be changed to medication:Zosyn dose:4.5gm frequency:q8h.    Pharmacist's Name: Reva He  Pharmacist's Extension: [phone]

## 2019-01-24 NOTE — PLAN OF CARE
Problem: SLP Goal  Goal: SLP Goal  Short Term Goals:  1. Pt will participate in swallow eval to determine safest diet level.   2. Pt will tolerate full liquid diet with no audible s/s of dysphagia.  3. Pt will tolerate mech soft diet and thin liquids with no audible s/s of dysphagia.    Outcome: Outcome(s) achieved Date Met: 01/24/19  Pt advanced to mech soft diet and thin liquids this am. Wife present during diet follow up.

## 2019-01-24 NOTE — PLAN OF CARE
Problem: Adult Inpatient Plan of Care  Goal: Plan of Care Review   01/24/19 0411   Plan of Care Review   Plan of Care Reviewed With patient   Pt has pain to R stump.  Gave hydrocodone 5/325 at 2137 and 0152. 0412 pt still has 8/10 to leg.  Calling resident for something extra.     Tele: NSR, reg HR 70,  No alarms.     Bed in lowest position, wheels locked, non skid socks, ID band worn, personal items and call bell with in reach, bed alarm set.

## 2019-01-24 NOTE — PROGRESS NOTES
VN morning rounds: Pt resting comfortably in bed receiving breathing treatment. Will cont to be available and intervene as needed.        01/24/19 1144   Type of Frequent Check   Type Patient Rounds;Other (see comments)  (VN rounds)   Safety/Activity   Patient Rounds visualized patient   Positioning   Body Position position maintained   Assessments (Pre/Post)   Level of Consciousness (AVPU) alert

## 2019-01-24 NOTE — PLAN OF CARE
Problem: Oral Intake Inadequate  Goal: Improved Oral Intake    Intervention: Promote and Optimize Oral Intake  Recommendation:   1. Continue with Dysphagia Mechanical Soft Diet but add Cardiac Restriction to diet order.    2. ADD Boost oral supplement if po < 50% of meals      Goals: Patient to consume >50% of meals  Nutrition Goal Status: progressing towards goal  Communication of RD Recs: reviewed with RN     Nutrition Discharge Planning: Home on Cardiac Dysphagia (Mechanical Soft) Diet

## 2019-01-24 NOTE — PLAN OF CARE
Problem: Adult Inpatient Plan of Care  Goal: Plan of Care Review  Outcome: Ongoing (interventions implemented as appropriate)  Patient on oxygen with documented flow. Will attempt to wean as tolerated. No respiratory distress noted.

## 2019-01-24 NOTE — PT/OT/SLP PROGRESS
"Speech Language Pathology Treatment    Patient Name:  Asad Perez   MRN:  [id number]  Admitting Diagnosis: Acute on chronic respiratory failure with hypoxia and hypercapnia    Recommendations:                 General Recommendations:  continue to follow to ensure safety with meal   Diet recommendations:  Mechanical soft, Liquid Diet Level: Thin   Aspiration Precautions: upright for meals, monitor O2 sats, slow rate of intake, small bites and sips, alternate solids and bites   General Precautions: Standard, fall, respiratory  Communication strategies:  [place], needs repetition    Subjective     Pt seen in room. Pt asking for "real food."  They just give me liquids.   Patient goals: "I need to eat something."     Pain/Comfort:  · Pain Rating 1: 0/10  · Pain Rating Post-Intervention 1: 0/10    Objective:     Has the patient been evaluated by SLP for swallowing?   Yes  Keep patient NPO? No   Current Respiratory Status: nasal cannula      Pt seen in room, he was alert and awake. He continues on O2. He consumed entire full liquid tray with no issues per wife. RN reported he consumed meds with no issues.  Pt agree to PO trials with SLP: juice by cup and sandwich. Pt did require dentures to adequate masticate soft bread and turkey. Pt with good labial seal, slow mastication, and fair a/p transfer. Swallow trigger is deemed to be delayed per neck palpation, no change in O2 and no wet voice after solid and liquid trials. Rec: diet advancement to [facility] soft and thin liquids with adherence to swallow precautions.       Assessment:     Asad Perez is a 79 y.o. male admitted with resp failure who is able to advance to [facility] soft diet and thin liquids.       Goals:   Multidisciplinary Problems     SLP Goals     Not on file          Multidisciplinary Problems (Resolved)        Problem: SLP Goal    Goal Priority Disciplines Outcome   SLP Goal   (Resolved)     SLP Outcome(s) achieved   Description:  Short Term Goals:  1. Pt will " participate in swallow eval to determine safest diet level.   2. Pt will tolerate full liquid diet with no audible s/s of dysphagia.  3. [facility] and [facility] with no audible s/s of dysphagia.                     Plan:     · Patient to be seen:  2 x/week   · Plan of Care expires:  02/21/19  · Plan of Care reviewed with:  patient, spouse   · SLP Follow-Up:  Yes       Discharge recommendations:  LTACH (long-term acute care hospital)         Time Tracking:     SLP Treatment Date:   01/24/19  Speech Start Time:  0945  Speech Stop Time:  1000     Speech Total Time (min):  15 min    Billable Minutes: Treatment Swallowing Dysfunction 15    AIDA Wilson, CCC-SLP  01/24/2019

## 2019-01-24 NOTE — PLAN OF CARE
01/24/19 1139   Post-Acute Status   Post-Acute Authorization (Ltac)   HME Status Referrals Sent  (The jennifer faxed the pt's info to [facility])

## 2019-01-24 NOTE — PROGRESS NOTES
"[facility] Medicine Progress Note, HO-I     Primary Team: [facility] Hospitalist Team B  Attending Physician: Bernice  Resident: Jose Natarajan  Intern: Mello Butler     Subjective:      Yesterday, patient had a rapid response called on him after desats to 76% and 8/10 chest pain.  He required bipap overnight and has since been on 15L high flow NC.  This morning, he denies any chest pain, SOB, nausea, or vomiting.  He was restarted on zosyn yesterday.     Objective:      Last 24 Hour Vital Signs:  BP  Min: 112/49  Max: 149/63  Temp  Av.4 °F (36.3 °C)  Min: 96.2 °F (35.7 °C)  Max: 98.4 °F (36.9 °C)  Pulse  Av  Min: 57  Max: 67  Resp  Av  Min: 14  Max: 42  SpO2  Av.5 %  Min: 92 %  Max: 96 %  Height  Av' 8" (172.7 cm)  Min: 5' 8" (172.7 cm)  Max: 5' 8" (172.7 cm)  Weight  Av.8 kg (186 lb 15.2 oz)  Min: 73.4 kg (161 lb 13.1 oz)  Max: 96.2 kg (212 lb 1.3 oz)  I/O last 3 completed shifts:  In: 1760 [P.O.:810; IV Piggyback:950]  Out: 2540 [Urine:2540]     Physical Examination:  Gen: AAOx3, wearing 15 L NC; pursed lip breathing  HEENT: head normocephalic, atraumatic  Cardiac: regular rate and rhythm; no murmurs  Resp: on NC; crackles in the bilateral bases; diffuse expiratory wheezes throughout, normal work of breathing, no rhonchi  GI: abdomen soft, non-tender, non-distended; umbilical hernia present (reducible); normoactive bowel sounds  Extrem: 1+ pitting edema to the bilateral lower extremities; no clubbing noted; right leg AKA with pain to palpation, L UE edematous (R normal)  Skin: no rashes; sacral decubitus ulcer with mepilex in place, extensive bruising on extremities and abdomen with skin breakdown around heparin injection sites  Neuro: AAOx3, moving all extremities without difficulty        Laboratory:  Laboratory Data Reviewed: yes  Pertinent Findings:    WBC 12.65  Cr 1.4  BUN 41     Microbiology Data Reviewed: yes  Pertinent Findings:  Previous cultures drawn 19 grew E. " coli  Blood Cx 1/16: NGTD     Other Results:  EKG (my interpretation): No new studies     Radiology Data Reviewed: yes  Pertinent Findings:  CXR 1/16:  Mild atelectasis at the left lung base or subsegmental airspace disease.    CXR 1/23:  There is moderate perihilar and bibasilar lung opacities, increased.  Probable small effusions.  No gross pneumothorax.  Heart size unchanged     CT Chest, A/P 1/16:  1. No evidence of PE.  2. Small bilateral pleural effusions resulting in volume loss and compressive atelectasis within the lower lobes, left greater than right with additional atelectasis versus consolidation seen within the lingula of the left upper lobe.  3. Extensive atherosclerosis with postsurgical changes of aorto bi-iliac graft.  Extensive chronic vascular findings and occlusions as detailed above.  These findings do not appear significantly changed when compared to previous CTA abdomen and pelvis from March 2018.  4. Otherwise no acute intra-abdominal abnormalities identified.  5. Cholelithiasis.  6. Multiple additional findings as detailed above.     Current Medications:     Infusions:     Scheduled:   albuterol-ipratropium  3 mL Nebulization Q4H    ascorbic acid (vitamin C)  500 mg Oral BID    aspirin  81 mg Oral Daily    atorvastatin  40 mg Oral Daily    azithromycin  500 mg Intravenous Q24H    ceFEPime (MAXIPIME) IVPB  2 g Intravenous Q12H    heparin (porcine)  5,000 Units Subcutaneous Q8H    predniSONE  40 mg Oral Daily    vancomycin (VANCOCIN) IVPB  1,250 mg Intravenous Q24H         PRN:  dextrose 50%, dextrose 50%, glucagon (human recombinant), glucose, glucose, HYDROcodone-acetaminophen, insulin aspart U-100, sodium chloride 0.9%     Antibiotics and Day Number of Therapy:    Levofloxacin 1/9 - 1/17   Vancomycin 1/17 - 1/18  Cefepime 1/17 - 1/19   Azithromycin 1/17 - present   Zosyn 1/16 - present     Lines and Day Number of Therapy:  PIV placed 1/23     Assessment:      Asad lewis a  78 y.o.male with       Patient Active Problem List     Diagnosis Date Noted    Hyperkalemia 01/17/2019    Respiratory failure with hypoxia and hypercapnia 01/16/2019    COPD with acute exacerbation 01/09/2019    Abdominal pain 03/19/2018    COPD exacerbation 03/16/2018    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    Abdominal rigidity, generalized      Complicated open wound of right hip      Acute osteomyelitis 04/11/2017    Decubitus ulcer of buttock, stage 2 04/07/2017    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    Urinary tract infection associated with indwelling urethral catheter 12/01/2016    Centrilobular emphysema 11/29/2016    Symptomatic anemia 11/27/2016    Deep vein thrombosis (DVT) of brachial vein      Non-healing ulcer of lower leg 10/28/2016    Fever      Decubitus ulcer of sacral region, stage 4 10/05/2016    Deep vein thrombosis (DVT) of upper extremity 10/05/2016    Dehiscence of perineal wound 10/05/2016    Hyponatremia 10/05/2016    VRE (vancomycin-resistant Enterococci) infection 09/20/2016    Elevated liver enzymes      Chronic diastolic congestive heart failure      Critical lower limb ischemia 09/10/2016    Stenosis of left internal carotid artery 09/08/2016    Abnormal finding on imaging 08/31/2016    Abnormal CT of the abdomen      Chronic kidney disease, stage V 08/12/2016    Anemia 08/12/2016    Anemia of chronic disease 08/08/2016    Altered mental status 08/07/2016    MRSA (methicillin resistant Staphylococcus aureus) infection 08/07/2016    Confusion      Weak      Pneumonia 07/29/2016    History of atrial fibrillation      Infected prosthetic knee joint 07/12/2016    Right knee pain 06/15/2016    GERD (gastroesophageal reflux disease) 02/28/2015    Esophageal web 02/28/2015    Acute on chronic diastolic heart failure 11/08/2014    Transaminitis 11/08/2014    Chronic obstructive pulmonary disease 11/07/2014    Dyspnea  2014    Normocytic anemia 2014    Dysphagia 2014    Osteoarthritis of left knee 2014    Essential hypertension 2013    CKD (chronic kidney disease) 2013    PAD (peripheral artery disease) 2013    CAD (coronary artery disease) 2013    History of stroke 2013    Physical deconditioning 2013    Alcohol abuse 2013         Plan:      #AoC Hypercapnic Respiratory Failure Likely 2/2 COPD Exacerbation:  -Recently admitted for this; Blood cultures grew gram negative rods; Discharged on  with 14 days total of Levofloxacin, 5 days of Prednisone and Oseltamavir, continuing his home 2L oxygen  - SpO2 70's on no supplemental oxygen upon arrival to ED   - ABG with respiratory acidosis, 7.227, pCO2 78 and pO2 292   - IV steroids & 1hr nebulizer with improved sats, 100% on non-rebreather 100% FiO2  - Procal 0.28, Lactate 0.9  - CXR w/ left lung atelectasis, relatively unchanged from previous admission  - CT Chest & Abd w/ small bilateral pleural effusion, compressive atelectasis, left lingula with consolidation vs atelectasis, cholelithiasis  - Zosyn started in ED, Lasix 80 IV   - Admitted to ICU for continuous BiPAP  - Stepped down , continue nightly BiPAP  - Started on Vanc and Zosyn then de-escalated to azithro and cefipime, broadened back to azithro and zosyn for leukocytosis  - currently on bipap, will continue to monitor  - ABG without acidosis, hypoxia, or hypercapnia  - received home bipap   - CXR  with increased perihilar and bibasilar opacities, given 40mg IV lasix  - Will give lasix 80 IV x1 then start lasix 40 IV BID     #ELAYNE on CKD 3B:  -Cr 1.7 on admit; baseline Cr ~1.2  - Lasix 80mg IV in ED for concern for volume overload, good urinary response  - Avoid nephrotoxic agents, renally dose medications  - Cr decreasin.6 today   - last echo 2016 with EF 65%, improving with light IVF  - plan to discharge on home dose lasix 40 PO    - giving lasix 80 IV this morning, then will start 40 IV BID, will monitor Cr     #Recent GNR Bacteremia 2/2 Stage 3 Sacral Decubitus Ulcer:  - Course of antibiotics during admission earlier this month  - Blood Cx 1/11 NGTD, repeat Blood Cx 1/16 NGTD  - Consult to Wound Care. Rec mepilex a/g every other day  - Home Health wound care on discharge  - Dr Coffman following as per pt, outpt follow up established  - wound clean, dry, nonerythemetous, no signs of infection     #Transaminitis:  - , AST 202, Alk Phos 161   - RUQ U/S earlier this month showing cholelithiasis without acute cholecystitis   - Neuroendocrine Surg was consulted at that time, no concern for acute cholecystitis and no indication for surgical intervention (LFTs more indicative of hepatic origin vs cholestatic)  - Intermittent complaints of abdominal pain, denied to other team members   - CT Abd 1/16 with cholelithiasis  - LFTs improving     #HFpEF:  - TTE (11/28/16) w/ mild LA enlargement, concentric hypertrophy, normal LVSF, left ventricular diastolic dysfunction, pulmonary HTN (PA sys pressure ~47)  - Home Meds: Amiodarone 200mg daily, Carvedilol 25mg BID, Lasix 40 PO daily  - , Troponin WNL   - Lasix 80mg IV x1 in ED with good UOP  - Resume home meds  - will discharge on home lasix dose   - see above lasix dose     #CAD, PVD, RLE AKA:  - Home Meds: ASA 81mg daily, Atorvastatin 40mg daily  - Resume home meds     #History of CVA:  - CVA in 2007, no focal deficits on exam  - Home Meds: ASA 81mg daily, Atorvastatin 40mg daily  - Resume home meds     #Constipation:  - Polyethylene glycol     #History of Alcohol Abuse:  -No alcohol since 2007     #Healthcare Maintenance:  - UTD on flu, tetanus, pneumovax         Diet: Diabetic  PPX: SQH  Dispo: pending improvement in SOB and tolerating NC, pending diuresis     Stacey Sarkar MD  [facility] Internal Medicine HO-1     [facility] Medicine Hospitalist Pager numbers:   [facility] Hospitalist Medicine Team A  (Chay/Shawanda):          [phone]  [facility] Hospitalist Medicine Team B (Gloria/lAex):        [phone]      Patient seen and examined, case discussed with resident care team and agree.

## 2019-01-24 NOTE — CONSULTS
"  [facility]  Adult Nutrition  Consult Note    SUMMARY     Recommendations    Recommendation:   1. Continue with Dysphagia Mechanical Soft Diet but add Cardiac Restriction to diet order.    2. ADD Boost oral supplement if po < 50% of meals     Goals: Patient to consume >50% of meals  Nutrition Goal Status: progressing towards goal  Communication of RD Recs: reviewed with RN    Nutrition Discharge Planning: Home on Cardiac Dysphagia (Mechanical Soft) Diet       Reason for Assessment    Reason For Assessment: RD follow-up  Diagnosis: pulmonary disease(Acute respiratory failure with hypoxia and hypercapnia )    Relevant Medical History:   Past Medical History:   Diagnosis Date    Alcohol abuse     CHF (congestive heart failure)     CKD (chronic kidney disease) stage 3, GFR 30-59 ml/min     Coronary artery disease     Decubitus skin ulcer     Heart failure, diastolic     High cholesterol     Hypertension     Immobility     LBP (low back pain)     Prediabetes     PVD (peripheral vascular disease)     Stroke     2006, no deficits    Urine incontinence        General Information Comments: NFPE not warranted. Patient asleep at time of visit. Per wife, no recent weight loss prior to admit. Patient remains confussed. Diet advanced today to [facility] Soft. Patient has Stage 3 to Sacral Spine. Healed AKA.     Nutrition Risk Screen    Nutrition Risk Screen: dysphagia or difficulty swallowing    Nutrition/Diet History    Patient Reported Diet/Restrictions/Preferences: general  Typical Food/Fluid Intake: Per wife, pt was eating well prior to admit.   Food Preferences: no  Spiritual, Cultural Beliefs, Yazidi Practices, Values that Affect Care: no  Vitamin/Mineral/Herbal Supplements: VIT C  Food Allergies: NKFA  Factors Affecting Nutritional Intake: impaired cognitive status/motor control    Anthropometrics    Temp: 96.5 °F (35.8 °C)  Height Method: Stated  Height: 5' 8" (172.7 cm)  Height (inches): 68 " in  Weight Method: Bed Scale  Weight: 92.9 kg (204 lb 12.9 oz)  Weight (lb): 204.81 lb  Ideal Body Weight (IBW), Male: 154 lb  % Ideal Body Weight, Male (lb): 132.99 lb  BMI (Calculated): 31.2  BMI Grade: 30 - 34.9- obesity - grade I  Amputation %: 5.9  Total Amputation %: 5.9  Amputation Ideal Body Weight (IBW), Male (lb): 148.1 lb  Amputee BMI (kg/m2): 33.18 kg/m2       Lab/Procedures/Meds    Pertinent Labs Reviewed: reviewed  Recent Labs   Lab 01/24/19 0427      K 4.3      CO2 30*   BUN 41*   CREATININE 1.4     Recent Labs   Lab 01/24/19 0427   WBC 12.65   RBC 3.65*   HGB 10.6*   HCT 32.5*      MCV 89   MCH 29.0   MCHC 32.6     Recent Labs   Lab 01/24/19 0427   *   AST 43*   ALKPHOS 89   BILITOT 0.8   PROT 4.9*   ALBUMIN 2.1*       Pertinent Medications Reviewed: reviewed  Scheduled Meds:   albuterol-ipratropium  3 mL Nebulization Q4H    amLODIPine  10 mg Oral Daily    ascorbic acid (vitamin C)  500 mg Oral BID    aspirin  81 mg Oral Daily    atorvastatin  40 mg Oral Daily    azithromycin  500 mg Intravenous Q24H    furosemide  80 mg Intravenous BID    heparin (porcine)  5,000 Units Subcutaneous Q12H    insulin detemir U-100  10 Units Subcutaneous Daily    piperacillin-tazobactam (ZOSYN) IVPB  4.5 g Intravenous Q8H     Continuous Infusions:  PRN Meds:.acetaminophen, benzonatate, dextrose 50%, dextrose 50%, glucagon (human recombinant), glucose, glucose, guaifenesin 100 mg/5 ml, HYDROcodone-acetaminophen, insulin aspart U-100, ramelteon, sodium chloride 0.9%      Estimated/Assessed Needs    Weight Used For Calorie Calculations: 96.2 kg (212 lb 1.3 oz)  Energy Calorie Requirements (kcal): 1660  Energy Need Method: [place]     Protein Requirements: 77(.8 gm Pro/kg)  Weight Used For Protein Calculations: 96.2 kg (212 lb 1.3 oz)     Estimated Fluid Requirement Method: RDA Method  RDA Method (mL): 1660  CHO Requirement: 50%      Nutrition Prescription Ordered    Current  Diet Order: Diet Dysphagia Mechanical Soft (IDDSI Level 5)  Nutrition Order Comments: PO intake has been poor due to being on liquid diet    Evaluation of Received Nutrient/Fluid Intake    I/O: reviewed  Comments: LBCAREY 1/22/19  Tolerance: tolerating     % Intake of Estimated Energy Needs: 25 - 50 %  % Meal Intake: 25 - 50 %    Nutrition Risk    Level of Risk/Frequency of Follow-up: (1xweek)     Assessment and Plan    Decubitus ulcer of buttock, stage 2     Nutrition Problem  Increased Nutrient Needs     Related to (etiology):   Wound healing     Signs and Symptoms (as evidenced by):   Decubitus ulcer of buttock     Interventions:  Collaboration with other providers     Nutrition Diagnosis Status:   Continues          Monitor and Evaluation    Food and Nutrient Intake: food and beverage intake  Food and Nutrient Adminstration: diet order  Anthropometric Measurements: weight, weight change, body mass index  Biochemical Data, Medical Tests and Procedures: glucose/endocrine profile  Nutrition-Focused Physical Findings: overall appearance     Malnutrition Assessment  See General Comments.        Nutrition Follow-Up    RD Follow-up?: Yes

## 2019-01-24 NOTE — NURSING
Pt on cont pulse ox - sats between 74-83% on high flow as documented per flowsheet. Pt continues with pursed lips breathing. Respiratory at bedside. Placed on non--rebreather mask - sats continues to remain 83% at highest. Pt placed on bipap. Primary team notified and requested for PRN Bipap orders. Primary team notified and stated they want to see how pt does with bipap on floor and will reassess to see if pt meets criteria for cont bipap and transfer. Awaiting PRN bipap orders.     @ 1625: Pt sats 93% on bipap.     @1655: Pt sats 88% on bipap.     @1725: Per MD, continue bipap for one more hour (2 hours total) and wean off to see if patient tolerates sats >88%.

## 2019-01-25 NOTE — PROGRESS NOTES
The Sw faxed the pt's Ltac c/s to [facility] via [facility]. The Sw left a voice message for Susanne and Molly at [facility] notifying them of this info.

## 2019-01-25 NOTE — PLAN OF CARE
01/25/19 1713   Medicare Message   Important Message from Medicare regarding Discharge Appeal Rights Given to patient/caregiver;Explained to patient/caregiver;Signed/date by patient/caregiver   Date IMM was signed 01/25/19   Time IMM was signed [id number]

## 2019-01-25 NOTE — PROGRESS NOTES
"[facility] Medicine Progress Note, HO-I     Primary Team: [facility] Hospitalist Team B  Attending Physician: Bernice  Resident: Jose Natarajan  Intern: Mello Butler     Subjective:      Patient required bipap around 4 PM yesterday after O2 sats 74-83%.  Still on bipap during exam.  He denies any SOB, chest pain, nausea, or vomiting.     Objective:      Last 24 Hour Vital Signs:  BP  Min: 112/49  Max: 149/63  Temp  Av.4 °F (36.3 °C)  Min: 96.2 °F (35.7 °C)  Max: 98.4 °F (36.9 °C)  Pulse  Av  Min: 57  Max: 67  Resp  Av  Min: 14  Max: 42  SpO2  Av.5 %  Min: 92 %  Max: 96 %  Height  Av' 8" (172.7 cm)  Min: 5' 8" (172.7 cm)  Max: 5' 8" (172.7 cm)  Weight  Av.8 kg (186 lb 15.2 oz)  Min: 73.4 kg (161 lb 13.1 oz)  Max: 96.2 kg (212 lb 1.3 oz)  I/O last 3 completed shifts:  In: 1760 [P.O.:810; IV Piggyback:950]  Out: 2540 [Urine:2540]     Physical Examination:  Gen: AAOx3, wearing bipap  HEENT: head normocephalic, atraumatic  Cardiac: regular rate and rhythm; no murmurs  Resp: on bipap; crackles in the bilateral bases; diffuse expiratory wheezes throughout, normal work of breathing, no rhonchi  GI: abdomen soft, non-tender, non-distended; umbilical hernia present (reducible); normoactive bowel sounds  Extrem: 1+ pitting edema to the bilateral lower extremities; no clubbing noted; right leg AKA with pain to palpation, L UE edematous (R normal)  Skin: no rashes; sacral decubitus ulcer with mepilex in place, extensive bruising on extremities and abdomen with skin breakdown around heparin injection sites  Neuro: AAOx3, moving all extremities without difficulty        Laboratory:  Laboratory Data Reviewed: yes  Pertinent Findings:    WBC 18.39  Cr 1.5  BUN 33     Microbiology Data Reviewed: yes  Pertinent Findings:  Previous cultures drawn 19 grew E. coli  Blood Cx : NGTD     Other Results:  EKG (my interpretation): No new studies     Radiology Data Reviewed: yes  Pertinent Findings:  CXR " 1/16:  Mild atelectasis at the left lung base or subsegmental airspace disease.    CXR 1/23:  There is moderate perihilar and bibasilar lung opacities, increased.  Probable small effusions.  No gross pneumothorax.  Heart size unchanged     CT Chest, A/P 1/16:  1. No evidence of PE.  2. Small bilateral pleural effusions resulting in volume loss and compressive atelectasis within the lower lobes, left greater than right with additional atelectasis versus consolidation seen within the lingula of the left upper lobe.  3. Extensive atherosclerosis with postsurgical changes of aorto bi-iliac graft.  Extensive chronic vascular findings and occlusions as detailed above.  These findings do not appear significantly changed when compared to previous CTA abdomen and pelvis from March 2018.  4. Otherwise no acute intra-abdominal abnormalities identified.  5. Cholelithiasis.  6. Multiple additional findings as detailed above.     Current Medications:     Infusions:     Scheduled:   albuterol-ipratropium  3 mL Nebulization Q4H    ascorbic acid (vitamin C)  500 mg Oral BID    aspirin  81 mg Oral Daily    atorvastatin  40 mg Oral Daily    azithromycin  500 mg Intravenous Q24H    ceFEPime (MAXIPIME) IVPB  2 g Intravenous Q12H    heparin (porcine)  5,000 Units Subcutaneous Q8H    predniSONE  40 mg Oral Daily    vancomycin (VANCOCIN) IVPB  1,250 mg Intravenous Q24H         PRN:  dextrose 50%, dextrose 50%, glucagon (human recombinant), glucose, glucose, HYDROcodone-acetaminophen, insulin aspart U-100, sodium chloride 0.9%     Antibiotics and Day Number of Therapy:    Levofloxacin 1/9 - 1/17   Vancomycin 1/17 - 1/18  Cefepime 1/17 - 1/19   Azithromycin 1/17 - present   Zosyn 1/16 - present     Lines and Day Number of Therapy:  PIV placed 1/23     Assessment:      Asad Perez is a 78 y.o.male with       Patient Active Problem List     Diagnosis Date Noted    Hyperkalemia 01/17/2019    Respiratory failure with hypoxia and  hypercapnia 01/16/2019    COPD with acute exacerbation 01/09/2019    Abdominal pain 03/19/2018    COPD exacerbation 03/16/2018    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    Abdominal rigidity, generalized      Complicated open wound of right hip      Acute osteomyelitis 04/11/2017    Decubitus ulcer of buttock, stage 2 04/07/2017    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    Urinary tract infection associated with indwelling urethral catheter 12/01/2016    Centrilobular emphysema 11/29/2016    Symptomatic anemia 11/27/2016    Deep vein thrombosis (DVT) of brachial vein      Non-healing ulcer of lower leg 10/28/2016    Fever      Decubitus ulcer of sacral region, stage 4 10/05/2016    Deep vein thrombosis (DVT) of upper extremity 10/05/2016    Dehiscence of perineal wound 10/05/2016    Hyponatremia 10/05/2016    VRE (vancomycin-resistant Enterococci) infection 09/20/2016    Elevated liver enzymes      Chronic diastolic congestive heart failure      Critical lower limb ischemia 09/10/2016    Stenosis of left internal carotid artery 09/08/2016    Abnormal finding on imaging 08/31/2016    Abnormal CT of the abdomen      Chronic kidney disease, stage V 08/12/2016    Anemia 08/12/2016    Anemia of chronic disease 08/08/2016    Altered mental status 08/07/2016    MRSA (methicillin resistant Staphylococcus aureus) infection 08/07/2016    Confusion      Weak      Pneumonia 07/29/2016    History of atrial fibrillation      Infected prosthetic knee joint 07/12/2016    Right knee pain 06/15/2016    GERD (gastroesophageal reflux disease) 02/28/2015    Esophageal web 02/28/2015    Acute on chronic diastolic heart failure 11/08/2014    Transaminitis 11/08/2014    Chronic obstructive pulmonary disease 11/07/2014    Dyspnea 11/06/2014    Normocytic anemia 11/06/2014    Dysphagia 11/03/2014    Osteoarthritis of left knee 07/14/2014    Essential hypertension 07/16/2013     CKD (chronic kidney disease) 2013    PAD (peripheral artery disease) 2013    CAD (coronary artery disease) 2013    History of stroke 2013    Physical deconditioning 2013    Alcohol abuse 2013         Plan:      #AoC Hypercapnic Respiratory Failure Likely 2/2 COPD Exacerbation:  -Recently admitted for this; Blood cultures grew gram negative rods; Discharged on  with 14 days total of Levofloxacin, 5 days of Prednisone and Oseltamavir, continuing his home 2L oxygen  - SpO2 70's on no supplemental oxygen upon arrival to ED   - ABG with respiratory acidosis, 7.227, pCO2 78 and pO2 292   - IV steroids & 1hr nebulizer with improved sats, 100% on non-rebreather 100% FiO2  - Procal 0.28, Lactate 0.9  - CXR w/ left lung atelectasis, relatively unchanged from previous admission  - CT Chest & Abd w/ small bilateral pleural effusion, compressive atelectasis, left lingula with consolidation vs atelectasis, cholelithiasis  - Zosyn started in ED, Lasix 80 IV   - Admitted to ICU for continuous BiPAP  - Stepped down , continue nightly BiPAP  - Started on Vanc and Zosyn then de-escalated to azithro and cefipime, broadened back to azithro and zosyn for leukocytosis  - currently on bipap, will continue to monitor  - ABG without acidosis, hypoxia, or hypercapnia  - received home bipap   - CXR  with increased perihilar and bibasilar opacities, given 40mg IV lasix  - Lasix 80 IV BID  - Considering consulting pulmonology for failure to improve respiratory status     #ELAYNE on CKD 3B:  -Cr 1.7 on admit; baseline Cr ~1.2  - Lasix 80mg IV in ED for concern for volume overload, good urinary response  - Avoid nephrotoxic agents, renally dose medications  - Cr decreasin.6 today   - last echo 2016 with EF 65%, improving with light IVF  - plan to discharge on home dose lasix 40 PO   - Lasix 80 IV BID- monitor creatinine     #Recent GNR Bacteremia 2/2 Stage 3 Sacral Decubitus Ulcer:  -  Course of antibiotics during admission earlier this month  - Blood Cx 1/11 NGTD, repeat Blood Cx 1/16 NGTD  - Consult to Wound Care. Rec mepilex a/g every other day  - Home Health wound care on discharge  - Dr Coffman following as per pt, outpt follow up established  - wound clean, dry, nonerythemetous, no signs of infection     #Transaminitis:  - , AST 202, Alk Phos 161   - RUQ U/S earlier this month showing cholelithiasis without acute cholecystitis   - Neuroendocrine Surg was consulted at that time, no concern for acute cholecystitis and no indication for surgical intervention (LFTs more indicative of hepatic origin vs cholestatic)  - Intermittent complaints of abdominal pain, denied to other team members   - CT Abd 1/16 with cholelithiasis  - LFTs improving     #HFpEF:  - TTE (11/28/16) w/ mild LA enlargement, concentric hypertrophy, normal LVSF, left ventricular diastolic dysfunction, pulmonary HTN (PA sys pressure ~47)  - Home Meds: Amiodarone 200mg daily, Carvedilol 25mg BID, Lasix 40 PO daily  - , Troponin WNL   - Lasix 80mg IV x1 in ED with good UOP  - Resume home meds  - will discharge on home lasix dose   - see above lasix dose     #CAD, PVD, RLE AKA:  - Home Meds: ASA 81mg daily, Atorvastatin 40mg daily  - Resume home meds     #History of CVA:  - CVA in 2007, no focal deficits on exam  - Home Meds: ASA 81mg daily, Atorvastatin 40mg daily  - Resume home meds     #Constipation:  - Polyethylene glycol     #History of Alcohol Abuse:  -No alcohol since 2007     #Healthcare Maintenance:  - UTD on flu, tetanus, pneumovax         Diet: Diabetic  PPX: SQH  Dispo: pending improvement in SOB and tolerating NC, pending diuresis, possibly consulting pulmonology, consulted SW for LTAC placement     Coleman Butler MD  [facility] Internal Medicine HO-1     [facility] Medicine Hospitalist Pager numbers:   [facility] Hospitalist Medicine Team A (Chay/Shawanda):          [phone]  [facility] Hospitalist Medicine Team B  (Gloria/Alex):        [phone]      Patient seen and examined, case discussed with resident care team and agree.

## 2019-01-25 NOTE — PROGRESS NOTES
The Sw received a call from Iva with [facility] stating she's in the hospital currently and she will assess the pt for their ltac facility.

## 2019-01-25 NOTE — PT/OT/SLP PROGRESS
"Speech Language Pathology Treatment    Patient Name:  Asad Perez   MRN:  [id number]  Admitting Diagnosis: Acute on chronic respiratory failure with hypoxia and hypercapnia    Recommendations:     Diet recommendations:  Mechanical soft, Liquid Diet Level: Thin   Aspiration Precautions: Upright for meals, monitor O2 level, small bites/sips, alternate sips and bites, monitor amount if consumes liquids by straw, feed slowly, encourage use of dentures when masticating solids.    General Precautions: Standard, fall, respiratory  Communication strategies:  [place]    Subjective     Pt seen in room, taken off bipap and placed on HFNC. Pt with immediate desat when O2 taken off.   Patient goals: "I feel winded."     Pain/Comfort:  · Pain Rating 1: 0/10    Objective:     Has the patient been evaluated by SLP for swallowing?   Yes  Keep patient NPO? No   Current Respiratory Status: nasal cannula      Pt seen for direct dysphagia tx. Pt was assisted with meal by PCT. He is alert and awake, able to voice some SOB to SLP.  He consumed less than 40% of tray due to fatigue. He tolerated small amts of liquids with no change in SPO2 levels. He tolerated scrambled eggs with increased mastication time and needed liquid rinse in between bites to fully clear oral cavity.   No choking or coughing noted with PO trials. Respiratory status continues to impact overall status.     Assessment:     Asad Perez is a 79 y.o. male admitted with resp failure who is tolerating modified diet at this time with careful adherence to swallow precautions.     Goals:   Multidisciplinary Problems     SLP Goals     Not on file          Multidisciplinary Problems (Resolved)        Problem: SLP Goal    Goal Priority Disciplines Outcome   SLP Goal   (Resolved)     SLP Outcome(s) achieved   Description:  Short Term Goals:  1. Pt will participate in swallow eval to determine safest diet level.   2. Pt will tolerate full liquid diet with no audible s/s of " dysphagia.  3. Pt will tolerate [facility] soft diet and thin liquids with no audible s/s of dysphagia.                     Plan:     · Patient to be seen:  2 x/week   · Plan of Care expires:  02/21/19  · Plan of Care reviewed with:  patient, spouse   · SLP Follow-Up:  No       Discharge recommendations:  LTACH (long-term acute care hospital)     Time Tracking:     SLP Treatment Date:   01/25/19  Speech Start Time:  0930  Speech Stop Time:  0942     Speech Total Time (min):  12 min    Billable Minutes: Treatment Swallowing Dysfunction 12    AIDA Wilson, CCC-SLP  01/25/2019

## 2019-01-25 NOTE — PROGRESS NOTES
The Sw spoke to Iva at [facility] and the pt has been medically accepted. She met the pt's wife in the room and spoke to his dtr via phone while in the room. The Sw spoke to Molly at [facility] and she states she will inform her medical director of the acceptance from the ltac. The Sw spoke to the team and the pt will be ready for d/c tomorrow. The team states they will call the pt's wife and informed her today the pt will be ready for d/c tomorrow to [facility]. The Sw informed Generra of the above mentioned info.

## 2019-01-25 NOTE — PROGRESS NOTES
01/25/19 1043   Type of Frequent Check   Type Other (see comments)  (VN rounds)   Safety/Activity   Patient Rounds visualized patient   Safety Promotion/Fall Prevention side rails raised x 2   Safety Precautions emergency equipment at bedside   Positioning   Body Position neutral body alignment   Head of Bed (HOB) HOB elevated   Positioning/Transfer Devices pillows   Pain/Comfort/Sleep   Preferred Pain Scale FACES ([place] FACES Pain Rating Scale)   Pain Rating (0-10): Rest 0   Assessments (Pre/Post)   Level of Consciousness (AVPU) responds to voice     During VN rounds, pt resting in bed comfortably. Will continue to monitor and intervene PRN.

## 2019-01-25 NOTE — PROGRESS NOTES
The Sw received a call from Leta at [facility] stating the pt doesn't meet criteria for ltac and states her medical director is denying the pt at this time.

## 2019-01-25 NOTE — PLAN OF CARE
TN spoke to Dr. Sarkar regarding pt not having BM in 3 days and pt possibly discharging to [facility] tomorrow, MD to place order for for enema.

## 2019-01-25 NOTE — PLAN OF CARE
Problem: Adult Inpatient Plan of Care  Goal: Patient-Specific Goal (Individualization)  Patient plan of care reviewed at beginning of shift  with patient, patient verbalized understanding. Patient in bed, supine and appears to be asleep.patient sated to 68%, resp called to give prn breathing treatment, o2 went up to 90% no additonal disress noted . Prn pain med , sleeping aid, and guaifenesin given as per md orders  . Safety measures in place, side rails up x2, bed in lowest position, call light within reach, prn meds admin to help relieve pain. Will continue to monitor

## 2019-01-25 NOTE — PLAN OF CARE
Problem: Adult Inpatient Plan of Care  Goal: Plan of Care Review  Outcome: Ongoing (interventions implemented as appropriate)  Plan of care reviewed with patient. Voices understanding. NSR on monitor with no red alarms noted. Medications administered to assist with bm. 15L highflow today. Patient still desats into 60's and 70's when laying flat to be changed. Eating dinner. No acute distress noted at this time. Side rails x3, bed low, call bell within reach. Maintain bed alarm for patient safety. Patient will be monitored overnight.

## 2019-01-25 NOTE — PLAN OF CARE
TN spoke with pt's wife Kassy to confirm discharge plan for pt, pt's wife in agreement with discharge plan to [facility] tomorrow, awaiting pt to have BM to be cleared for transfer to [facility] tomorrow, pt's wife encouraged to call TN with any questions, pt wife has TN's contact info.

## 2019-01-25 NOTE — PROGRESS NOTES
The Sw spoke to Iva at [facility] and she received the auth from Molly at [facility] with an admit date for tomorrow. The Sw did notify her the pt hasn't had a bm in 3 days and she stated that was fine but the Sw informed her it is being addressed. Iva gave the Sw the contact info for Dr. William([phone])so Dr. Coleman Butler can call and give him a report today. Dr. Cee stated he will call and dive a report and he will speak with the pt's wife also and inform her of the d/c tomorrow. The Sw spoke to Lluvia(TN)and informed her of above mentioned info. The team will write the d/c orders and Lluvia will fax them to Iva. The Sw will give Lluvia the copied chart,the only thing Lluvia will have to add is the pt's d/c orders b/c they will be written in the morning. Iva was given Lluvia's contact info and Lluvia was given Iva's contact info to speak in the morning. The pt choice form has been completed and placed in the pt's chart.

## 2019-01-25 NOTE — PROGRESS NOTES
The Sw spoke to the pt's wife Kassy and she's in agreement with the pt discharging to [facility] tomorrow if approved by N. The Sw informed her the pt must approved to go to ltac by N. She acknowledged understanding and states she agrees and states she met Iva from [facility] in the pt's room.

## 2019-01-25 NOTE — PLAN OF CARE
01/25/19 0955   Post-Acute Status   Post-Acute Authorization Placement  (LTAC)   Post-Acute Placement Status Referrals Sent  (The pt was denied by [facility])

## 2019-01-26 NOTE — PLAN OF CARE
Problem: Adult Inpatient Plan of Care  Goal: Plan of Care Review  Called to pt's room by nurse. Upon arrival pt was labored and pursed lip breathing; sats 85% on 15L HFNC. Pt was then placed on Bipap 12/6 70% fio2 and aerosol tx given in-line w/ bipap. Breath sounds course/ fine crackles bilaterally. Pt stable; no shortness of breath observed on current bipap settings, sats 93%. Nurse Diana notified of pt's respiratory status. Will cont to monitor.

## 2019-01-26 NOTE — NURSING
Patient transferred to ICU on nonrebreather mask. Cardiac monitoring in place. SPO2 91% upon departure of 4B unit.

## 2019-01-26 NOTE — PROGRESS NOTES
TN contacted [facility] Team B resident, Dr Coleman Butler regarding status of pt's discharge. Pulmonology to see pt. Pt not able to discharge to day and tomorrow is unlikely as well. [phone] to inform her that pt will not likley D/C over weekend.

## 2019-01-26 NOTE — PROGRESS NOTES
"[facility] Medicine Progress Note, HO-I     Primary Team: \[facility] [place] [facility]\"" Hospitalist Team B  Attending Physician: Gloria  Resident: Jose Natarajan  Intern: Mello Butler     Subjective:      Patient still requiring bipap overnight.  He has not had a BM in 4 days despite senna and colace, supposed to get enema yesterday but reports he did not.  He denies any chest pain, nausea, or vomiting.  Reports his breathing is doing fine.  He has been medically accepted to [facility].     Objective:      Last 24 Hour Vital Signs:  BP  Min: 112/49  Max: 149/63  Temp  Av.4 °F (36.3 °C)  Min: 96.2 °F (35.7 °C)  Max: 98.4 °F (36.9 °C)  Pulse  Av  Min: 57  Max: 67  Resp  Av  Min: 14  Max: 42  SpO2  Av.5 %  Min: 92 %  Max: 96 %  Height  Av' 8" (172.7 cm)  Min: 5' 8" (172.7 cm)  Max: 5' 8" (172.7 cm)  Weight  Av.8 kg (186 lb 15.2 oz)  Min: 73.4 kg (161 lb 13.1 oz)  Max: 96.2 kg (212 lb 1.3 oz)  I/O last 3 completed shifts:  In: 1760 [P.O.:810; IV Piggyback:950]  Out: 2540 [Urine:2540]     Physical Examination:  Gen: AAOx3, wearing bipap  HEENT: head normocephalic, atraumatic  Cardiac: regular rate and rhythm; no murmurs  Resp: on bipap; crackles in the bilateral bases; diffuse expiratory wheezes throughout, normal work of breathing, no rhonchi  GI: abdomen soft, non-tender, non-distended; umbilical hernia present (reducible); normoactive bowel sounds  Extrem: 1+ pitting edema to the bilateral lower extremities; no clubbing noted; right leg AKA with pain to palpation, L UE edematous (R normal)  Skin: no rashes; sacral decubitus ulcer with mepilex in place, extensive bruising on extremities and abdomen with skin breakdown around heparin injection sites  Neuro: AAOx3, moving all extremities without difficulty        Laboratory:  Laboratory Data Reviewed: yes  Pertinent Findings:    WBC 21.82  Cr 1.5  BUN 31     Microbiology Data Reviewed: yes  Pertinent Findings:  Previous cultures drawn 19 grew E. " coli  Blood Cx 1/16: NGTD     Other Results:  EKG (my interpretation): No new studies     Radiology Data Reviewed: yes  Pertinent Findings:  CXR 1/16:  Mild atelectasis at the left lung base or subsegmental airspace disease.    CXR 1/23:  There is moderate perihilar and bibasilar lung opacities, increased.  Probable small effusions.  No gross pneumothorax.  Heart size unchanged     CT Chest, A/P 1/16:  1. No evidence of PE.  2. Small bilateral pleural effusions resulting in volume loss and compressive atelectasis within the lower lobes, left greater than right with additional atelectasis versus consolidation seen within the lingula of the left upper lobe.  3. Extensive atherosclerosis with postsurgical changes of aorto bi-iliac graft.  Extensive chronic vascular findings and occlusions as detailed above.  These findings do not appear significantly changed when compared to previous CTA abdomen and pelvis from March 2018.  4. Otherwise no acute intra-abdominal abnormalities identified.  5. Cholelithiasis.  6. Multiple additional findings as detailed above.     Current Medications:     Infusions:     Scheduled:   albuterol-ipratropium  3 mL Nebulization Q4H    ascorbic acid (vitamin C)  500 mg Oral BID    aspirin  81 mg Oral Daily    atorvastatin  40 mg Oral Daily    azithromycin  500 mg Intravenous Q24H    ceFEPime (MAXIPIME) IVPB  2 g Intravenous Q12H    heparin (porcine)  5,000 Units Subcutaneous Q8H    predniSONE  40 mg Oral Daily    vancomycin (VANCOCIN) IVPB  1,250 mg Intravenous Q24H         PRN:  dextrose 50%, dextrose 50%, glucagon (human recombinant), glucose, glucose, HYDROcodone-acetaminophen, insulin aspart U-100, sodium chloride 0.9%     Antibiotics and Day Number of Therapy:    Levofloxacin 1/9 - 1/17   Vancomycin 1/17 - 1/18  Cefepime 1/17 - 1/19   Azithromycin 1/17 - present   Zosyn 1/16 - present     Lines and Day Number of Therapy:  PIV placed 1/23     Assessment:      Asad lewis a  78 y.o.male with       Patient Active Problem List     Diagnosis Date Noted    Hyperkalemia 01/17/2019    Respiratory failure with hypoxia and hypercapnia 01/16/2019    COPD with acute exacerbation 01/09/2019    Abdominal pain 03/19/2018    COPD exacerbation 03/16/2018    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    Abdominal rigidity, generalized      Complicated open wound of right hip      Acute osteomyelitis 04/11/2017    Decubitus ulcer of buttock, stage 2 04/07/2017    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    Urinary tract infection associated with indwelling urethral catheter 12/01/2016    Centrilobular emphysema 11/29/2016    Symptomatic anemia 11/27/2016    Deep vein thrombosis (DVT) of brachial vein      Non-healing ulcer of lower leg 10/28/2016    Fever      Decubitus ulcer of sacral region, stage 4 10/05/2016    Deep vein thrombosis (DVT) of upper extremity 10/05/2016    Dehiscence of perineal wound 10/05/2016    Hyponatremia 10/05/2016    VRE (vancomycin-resistant Enterococci) infection 09/20/2016    Elevated liver enzymes      Chronic diastolic congestive heart failure      Critical lower limb ischemia 09/10/2016    Stenosis of left internal carotid artery 09/08/2016    Abnormal finding on imaging 08/31/2016    Abnormal CT of the abdomen      Chronic kidney disease, stage V 08/12/2016    Anemia 08/12/2016    Anemia of chronic disease 08/08/2016    Altered mental status 08/07/2016    MRSA (methicillin resistant Staphylococcus aureus) infection 08/07/2016    Confusion      Weak      Pneumonia 07/29/2016    History of atrial fibrillation      Infected prosthetic knee joint 07/12/2016    Right knee pain 06/15/2016    GERD (gastroesophageal reflux disease) 02/28/2015    Esophageal web 02/28/2015    Acute on chronic diastolic heart failure 11/08/2014    Transaminitis 11/08/2014    Chronic obstructive pulmonary disease 11/07/2014    Dyspnea  11/06/2014    Normocytic anemia 11/06/2014    Dysphagia 11/03/2014    Osteoarthritis of left knee 07/14/2014    Essential hypertension 07/16/2013    CKD (chronic kidney disease) 07/16/2013    PAD (peripheral artery disease) 07/16/2013    CAD (coronary artery disease) 07/16/2013    History of stroke 07/16/2013    Physical deconditioning 07/16/2013    Alcohol abuse 07/16/2013         Plan:      #AoC Hypercapnic Respiratory Failure Likely 2/2 COPD Exacerbation:  -Recently admitted for this; Blood cultures grew gram negative rods; Discharged on 1/13 with 14 days total of Levofloxacin, 5 days of Prednisone and Oseltamavir, continuing his home 2L oxygen  - SpO2 70's on no supplemental oxygen upon arrival to ED   - ABG with respiratory acidosis, 7.227, pCO2 78 and pO2 292   - IV steroids & 1hr nebulizer with improved sats, 100% on non-rebreather 100% FiO2  - Procal 0.28, Lactate 0.9  - CXR w/ left lung atelectasis, relatively unchanged from previous admission  - CT Chest & Abd w/ small bilateral pleural effusion, compressive atelectasis, left lingula with consolidation vs atelectasis, cholelithiasis  - Zosyn started in ED, Lasix 80 IV   - Admitted to ICU for continuous BiPAP  - Stepped down 1/17, continue nightly BiPAP  - Started on Vanc and Zosyn then de-escalated to azithro and cefipime, broadened back to azithro and zosyn for leukocytosis  - currently on bipap, will continue to monitor  - ABG without acidosis, hypoxia, or hypercapnia  - received home bipap   - CXR 1/23 with increased perihilar and bibasilar opacities, given 40mg IV lasix  - Lasix 80 IV BID switched to 80 PO BID, now switching to 80 PO daily due to rising bicarb  - Will repeat echocardiogram and consult pulmonology for inadequate improvement of respiratory status    Constipation  -No BM x4 days  -Started senna, colace 1/25; ordered soap suds enema, but patient reports it did not happen- will confirm today     #ELAYNE on CKD 3B:  -Cr 1.7 on admit;  baseline Cr ~1.2  - Lasix 80mg IV in ED for concern for volume overload, good urinary response  - Avoid nephrotoxic agents, renally dose medications  - Cr decreasin.6 today   - last echo 2016 with EF 65%, improving with light IVF  - plan to discharge on home dose lasix 40 PO   - Lasix as above, monitor creatinine     #Recent GNR Bacteremia 2/2 Stage 3 Sacral Decubitus Ulcer:  - Course of antibiotics during admission earlier this month  - Blood Cx  NGTD, repeat Blood Cx  NGTD  - Consult to Wound Care. Rec mepilex a/g every other day  - Home Health wound care on discharge  - Dr Coffman following as per pt, outpt follow up established  - wound clean, dry, nonerythemetous, no signs of infection     #Transaminitis:  - , AST 202, Alk Phos 161   - RUQ U/S earlier this month showing cholelithiasis without acute cholecystitis   - Neuroendocrine Surg was consulted at that time, no concern for acute cholecystitis and no indication for surgical intervention (LFTs more indicative of hepatic origin vs cholestatic)  - Intermittent complaints of abdominal pain, denied to other team members   - CT Abd  with cholelithiasis  - LFTs improving     #HFpEF:  - TTE (16) w/ mild LA enlargement, concentric hypertrophy, normal LVSF, left ventricular diastolic dysfunction, pulmonary HTN (PA sys pressure ~47)  - Home Meds: Amiodarone 200mg daily, Carvedilol 25mg BID, Lasix 40 PO daily  - , Troponin WNL   - Lasix 80mg IV x1 in ED with good UOP  - Resume home meds  - Will repeat echo, fluid restrict to 1 liter     #CAD, PVD, RLE AKA:  - Home Meds: ASA 81mg daily, Atorvastatin 40mg daily  - Resume home meds     #History of CVA:  - CVA in , no focal deficits on exam  - Home Meds: ASA 81mg daily, Atorvastatin 40mg daily  - Resume home meds     #Constipation:  - Polyethylene glycol     #History of Alcohol Abuse:  -No alcohol since      #Healthcare Maintenance:  - UTD on flu, tetanus, pneumovax          Diet: Diabetic  PPX: SQH  Dispo: pending improvement in SOB and tolerating NC, pending diuresis, will consult pulmonology, repeat echo; medically accepted at [facility]     Coleman Butler MD  hospitals Internal Medicine HO-1     hospitals Medicine Hospitalist Pager numbers:   hospitals Hospitalist Medicine Team A (Chay/Shawanda):          [phone]  hospitals Hospitalist Medicine Team B (Gloria/Alex):        [phone]      Patient seen and examined, case discussed with resident care team and agree.

## 2019-01-26 NOTE — NURSING
Pulmonology and primary team at bedside. Patient placed back on bipap due to increased oxygen needs. ABG complete. Pulmonology recommend ICU transfer for closer monitoring/continuous bipap. Charge nurse notified.

## 2019-01-26 NOTE — PROGRESS NOTES
TN contacted [facility] Team B regarding when pt's LTAC transfer orders would be entered.     Per team MD, pt's White count is elevated. The team will be rounding and will determine if the pt is to discharge today or not.     TN contacted admit coordinator, Iva, at [facility] [phone], fax [phone], to inform her.     TN to follow up.    [facility] Auth #[id number]

## 2019-01-26 NOTE — NURSING
Offered to bathe patient and complete sacral woundcare. Wife states she would prefer us not to because she doesn't want him to have trouble breathing again.

## 2019-01-26 NOTE — PLAN OF CARE
Problem: Adult Inpatient Plan of Care  Goal: Patient-Specific Goal (Individualization)  Patient plan of care reviewed at beginning of shift  with patient and patient's wife , patient and wife  verbalized understanding. Patient in bed, supine and appears to be asleep.patient sated to 80%, resp called to give prn breathing treatment, o2 went up to 90% no additonal disress noted . Prn pain med , sleeping aid, and guaifenesin given as per md orders  . Safety measures in place, side rails up x2, bed in lowest position, call light within reach, prn meds admin to help relieve pain. Will continue to monitor

## 2019-01-26 NOTE — NURSING
Dr. Butler notified of patients increased oxygen needs. SPO2 82-88% on 15lnc today. MD states he is aware of patients increase in oxygen needs and will consult pulmonology.

## 2019-01-26 NOTE — PLAN OF CARE
U Internal Medicine Plan of Care Note    Patient is bed and wheelchair bound and requires ambulance for transportation.    Coleman Butler MD  [facility] Internal Medicine HO-1

## 2019-01-27 NOTE — SIGNIFICANT EVENT
GoC Discussion    Had a conversation with the patient's wife and niece re: GoC.  Explained to the patient's wife that he had requested to be made DNR/DNI and the if he were to clinically worsen despite our best efforts that it was a suggestion that his body was failing.  She said she understood and wanted to do as he wished.  Have maintained his DNR status in the chart.    Onesimo Kellogg MD  [facility] Pulmonary/Critical Care Fellow [facility]

## 2019-01-27 NOTE — PLAN OF CARE
Problem: Adult Inpatient Plan of Care  Goal: Plan of Care Review  Outcome: Ongoing (interventions implemented as appropriate)  Pt on BiPAP with documented settings, no respiratory distress noted. Alarms are set and functioning with adequate volumes. Will continue to monitor.

## 2019-01-27 NOTE — SIGNIFICANT EVENT
Discussed intubation and chest compressions with the patient in the event he were to get worse.  He clearly refused both interventions.  He is oriented and has capacity.  Will change his code status in the chart. His wife went home, plan to discuss with her when she returns.    Onesimo Kellogg MD  [facility] Pulmonary/Critical Care Fellow [facility]

## 2019-01-27 NOTE — PROGRESS NOTES
"[facility] Pulmonary/Critical Care Fellow Progress Note    Subjective:      Moved to the ICU yesterday on NIV overnight and now on HFNC.     Objective:   24-hour Vitals:  Temp:  [98 °F (36.7 °C)-100 °F (37.8 °C)] 98 °F (36.7 °C)  Pulse:  [] 108  Resp:  [16-42] 31  SpO2:  [86 %-100 %] 90 %  BP: ()/(50-74) 102/67    Physical Examination:  Vitals: /67   Pulse 108   Temp 98 °F (36.7 °C) (Oral)   Resp (!) 31   Ht 5' 8" (1.727 m)   Wt 92.9 kg (204 lb 12.9 oz)   SpO2 (!) 90%   BMI 31.14 kg/m²     General: Asleep, easily aroused, follows commands  HEENT: On HFNC  Cardiovascular: RRR, S1/S2, no MRG  Chest: Crackles throughout all fields bilaterally  Abdomen: Soft, ND  Extremities: 2+ edema of LLE, R AKA  Neurologic: Follows commands    Laboratory:  Trended Lab Data:  Recent Labs   Lab 01/25/19  0503 01/26/19  0436 01/27/19 0317   WBC 18.39* 21.82* 16.56*   HGB 10.5* 10.8* 10.1*   HCT 32.2* 33.6* 31.0*    224 211       Recent Labs   Lab 01/25/19  0503 01/26/19  0437 01/27/19 0317   * 136 135*   K 4.0 3.6 2.9*   CL 97 93* 91*   CO2 30* 33* 33*   BUN 33* 31* 36*   CREATININE 1.5* 1.5* 1.7*   * 83 122*   CALCIUM 8.0* 8.6* 8.5*       Recent Labs   Lab 01/25/19  0503 01/26/19  0437 01/27/19 0317   PROT 5.3* 5.8* 5.6*   ALBUMIN 2.0* 2.0* 1.6*   BILITOT 0.9 1.1* 1.0   AST 32 28 24   * 103* 70*   ALKPHOS 93 91 86       No results for input(s): PROTIME, PTT, INR in the last 168 hours.    Cardiac:   Recent Labs   Lab 01/23/19  0852 01/23/19  1104 01/23/19  1643   TROPONINI 0.045* 0.031* 0.033*       FLP:   Lab Results   Component Value Date    CHOL 139 08/03/2016    HDL 29 (L) 08/03/2016    LDLCALC 93.4 08/03/2016    TRIG 83 08/03/2016    CHOLHDL 20.9 08/03/2016     DM:   Lab Results   Component Value Date    HGBA1C 7.1 (H) 01/19/2019    HGBA1C 5.7 (H) 03/16/2018    HGBA1C 7.9 (H) 11/27/2016    LDLCALC 93.4 08/03/2016    CREATININE 1.7 (H) 01/27/2019     Thyroid:   Lab Results   Component " Value Date    TSH 0.735 01/17/2019     Anemia:   Lab Results   Component Value Date    IRON 20 (L) 11/27/2016    TIBC 145 (L) 11/27/2016    FERRITIN 2,805 (H) 11/29/2016    BUUFYICN14 1152 (H) 11/27/2016    FOLATE 14.6 11/27/2016     Urinalysis:   Lab Results   Component Value Date    LABURIN  04/27/2018     ENTEROCOCCUS FAECALIS  >100,000 cfu/ml  No other significant isolate      COLORU Yellow 01/16/2019    SPECGRAV <=1.005 (A) 01/16/2019    NITRITE Negative 01/16/2019    KETONESU Negative 01/16/2019    UROBILINOGEN 1.0 01/16/2019         Microbiology Data:  Microbiology Results (last 7 days)     Procedure Component Value Units Date/Time    Culture, Respiratory with Gram Stain [[id number]]     Order Status:  No result Specimen:  Respiratory     Blood Culture #2 **CANNOT BE ORDERED STAT** [[id number]] Collected:  01/16/19 1626    Order Status:  Completed Specimen:  Blood from Peripheral, Antecubital, Left Updated:  01/21/19 2312     Blood Culture, Routine No growth after 5 days.    Blood Culture #1 **CANNOT BE ORDERED STAT** [[id number]] Collected:  01/16/19 1615    Order Status:  Completed Specimen:  Blood from Peripheral, Hand, Right Updated:  01/21/19 2312     Blood Culture, Routine No growth after 5 days.          Current Medications:     Infusions:       Scheduled:   albuterol-ipratropium  3 mL Nebulization Q4H    amiodarone  200 mg Oral Daily    amLODIPine  10 mg Oral Daily    ascorbic acid (vitamin C)  500 mg Oral BID    aspirin  81 mg Oral Daily    atorvastatin  40 mg Oral Daily    azithromycin  500 mg Intravenous Q24H    docusate sodium  100 mg Oral Daily    [START ON 1/28/2019] furosemide  80 mg Intravenous Daily    heparin (porcine)  5,000 Units Subcutaneous Q12H    insulin detemir U-100  10 Units Subcutaneous Daily    piperacillin-tazobactam (ZOSYN) IVPB  4.5 g Intravenous Q8H    potassium chloride  20 mEq Oral Q2H    sodium phosphates  1 enema Rectal Once        PRN:  acetaminophen,  benzonatate, dextrose 50%, dextrose 50%, glucagon (human recombinant), glucose, glucose, guaifenesin 100 mg/5 ml, HYDROcodone-acetaminophen, insulin aspart U-100, ramelteon, senna, sodium chloride 0.9%     Assessment:     Asad Perez is a 79 y.o.male with  Patient Active Problem List    Diagnosis Date Noted    Chest pain 01/23/2019    Pressure injury of sacral region, stage 3 01/22/2019    Hyperkalemia 01/17/2019    Respiratory failure with hypoxia and hypercapnia 01/16/2019    COPD with acute exacerbation 01/09/2019    Abdominal pain 03/19/2018    COPD exacerbation 03/16/2018    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    Abdominal rigidity, generalized     Complicated open wound of right hip     Acute osteomyelitis 04/11/2017    Decubitus ulcer of buttock, stage 2 04/07/2017    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    Urinary tract infection associated with indwelling urethral catheter 12/01/2016    Centrilobular emphysema 11/29/2016    Symptomatic anemia 11/27/2016    Deep vein thrombosis (DVT) of brachial vein     Non-healing ulcer of lower leg 10/28/2016    Fever     Decubitus ulcer of sacral region, stage 4 10/05/2016    Deep vein thrombosis (DVT) of upper extremity 10/05/2016    Dehiscence of perineal wound 10/05/2016    Hyponatremia 10/05/2016    VRE (vancomycin-resistant Enterococci) infection 09/20/2016    Elevated liver enzymes     Chronic diastolic congestive heart failure     Critical lower limb ischemia 09/10/2016    Stenosis of left internal carotid artery 09/08/2016    Abnormal finding on imaging 08/31/2016    Abnormal CT of the abdomen     Chronic kidney disease, stage V 08/12/2016    Anemia 08/12/2016    Anemia of chronic disease 08/08/2016    Altered mental status 08/07/2016    MRSA (methicillin resistant Staphylococcus aureus) infection 08/07/2016    Confusion     Weak     Pneumonia 07/29/2016    History of atrial fibrillation      Infected prosthetic knee joint 07/12/2016    Right knee pain 06/15/2016    GERD (gastroesophageal reflux disease) 02/28/2015    Esophageal web 02/28/2015    Acute on chronic respiratory failure with hypoxia and hypercapnia 11/08/2014    Acute on chronic diastolic heart failure 11/08/2014    Transaminitis 11/08/2014    Chronic obstructive pulmonary disease 11/07/2014    Dyspnea 11/06/2014    Normocytic anemia 11/06/2014    Dysphagia 11/03/2014    Osteoarthritis of left knee 07/14/2014    Essential hypertension 07/16/2013    CKD (chronic kidney disease) 07/16/2013    PAD (peripheral artery disease) 07/16/2013    CAD (coronary artery disease) 07/16/2013    History of stroke 07/16/2013    Physical deconditioning 07/16/2013    Alcohol abuse 07/16/2013        Plan:     - Worsening RLL infiltrate, concerning for aspiration but he does appear to have central congestion and very prominent pulmonary vasculature   - Continue Vanc - leukocytosis improved today - low threshold to change to carbapenem if he clinically deteriorates  - Change to IV diuretics and monitor renal function - strong suggestion of pulmonary edema by CXR and US (b-lines, small bilateral effusions, too small to sample)  - Continue NIV for possible pulmonary edema, wean for sats 88-92%  - Continue nebs as needed, no longer needs steroids  - CPT, mobilize  - Had an extensive GoC conversation with him and his family - he is DNR/DNI    Onesimo Kellogg  [facility] Pulmonary/Critical Care Fellow

## 2019-01-27 NOTE — CONSULTS
[facility] Consult Note    Attending Physician: Dr. Castro  Consulting Physician: Dr. Sapp  Fellow: Dr. Kellogg    Date of Admit: 1/16/2019    Reason for Consultation     Acute hypoxic respiratory failure    Subjective:      History of Present Illness:  Asad Perez is a 79 y.o.  male with a PMH significant for HFpEF, COPD on home O2, CKD III, CAD, PVD, CVA who presented to [facility] on 1/16 w/ weakness and abdominal pain.  Patient was recently admitted to [facility] for hypercapnic respiratory failure 2/2 COPD and GNR bacteremia 2/2 sacral decubitus.  He was discharged on 1/9.  Wife reports that he never returned back to baseline and that his dyspnea persistent and he had constant abdominal and back pain.  He was admitted and treated again for COPD exacerbation.  He was also initially diuresed but this was de-escalated yesterday.  On the 23rd he had worsening hypoxia requiring escalating FiO2.  He was put on HFNC (bubble-wall, not comfort flow) and intermittent BiPAP.  His CXR from the 23rd appears to have more patchy infiltrates mostly central but with a more focal area in the RLL.  Today he is on 100% on comfort flow and satting 92%.      Past Medical History:  Past Medical History:   Diagnosis Date    Alcohol abuse     CHF (congestive heart failure)     CKD (chronic kidney disease) stage 3, GFR 30-59 ml/min     Coronary artery disease     Decubitus skin ulcer     Heart failure, diastolic     High cholesterol     Hypertension     Immobility     LBP (low back pain)     Prediabetes     PVD (peripheral vascular disease)     Stroke     2006, no deficits    Urine incontinence        Past Surgical History:  Past Surgical History:   Procedure Laterality Date    AMPUTATION-ABOVE KNEE Right 9/12/2016    [facility]formed by [facility]    aortic bifemoral bypass  2006    ARTHROPLASTY-KNEE Right 6/27/2016    [facility] at [facility] OR    ARTHROPLASTY-KNEE Left  7/14/2014    [facility]    bilateral carotid stenois  2006    carotid stents      DEBRIDEMENT-SACRAL WOUND N/A 10/10/2016    Performed by Michael [facility]rizarry [facility] at [facility] OR    DEBRIDEMENT-SACRAL WOUND Bilateral 9/29/2016    Performed by Maria Alejandra Pichardo DO at [facility] OR    DEBRIDEMENT-SACRAL WOUND N/A 9/16/2016    Performed by [facility]ardo [facility] at [facility] OR    DEBRIDEMENT-WOUND Right 4/8/2017    [facility] by [facility]ry [facility] at [facility] OR    ESOPHAGOGASTRODUODENOSCOPY (EGD) N/A 11/4/2014    Performed by David Ramos MD at [facility] ENDO    ESOPHAGOGASTRODUODENOSCOPY (EGD) with PEG N/A 9/23/2016    Performed by Emerson Childers Jr., MD at [facility] ENDO    EXCHANGE-POLYETHYLENE RIGHT KNEE Right 7/12/2016    Performed by [facility] at [facility] OR    IOVERA Right 6/15/2016    Per[facility] by [facility] at [facility] OR    IRRIGATION AND DEBRIDEMENT LOWER EXTREMITY Right 9/9/2016    Performed by [facility] at [facility] OR    IRRIGATION AND DEBRIDEMENT RIGHT KNEE Right 7/12/2016    Performed by [facility]acho [facility] at [facility] OR    JOINT REPLACEMENT      knee    left common endarterectomy  2006    REVISION-AMPUTATION STUMP Right 12/1/2016    Performed by [facility]ry [facility] at [facility] OR    RT AKA  NOV 2017    SYNOVECTOMY-KNEE Left 9/9/2016    Performed by [facility]acho [facility] at [facility] OR    WOUND EXPLORATION Right 9/9/2016    Performed by [facility] at [facility] OR       Allergies:  Review of patient's allergies indicates:  No Known Allergies    Home Medications:  Prior to Admission medications    Medication Sig Start Date End Date Taking? Authorizing Provider   albuterol-ipratropium 2.5mg-0.5mg/3mL (DUO-NEB) 0.5 mg-3 mg(2.5 mg base)/3 mL nebulizer solution Take 3 mLs by nebulization every 6 (six) hours while awake.  Patient taking differently: Take 3 mLs by nebulization 4 (four) times daily.  9/29/16 3/16/18  Nnamdi Venegas MD   amiodarone (PACERONE) 200 MG Tab Take 1 tablet (200 mg  total) by mouth once daily. 16   Nnamdi Venegas MD   amLODIPine (NORVASC) 10 MG tablet Take 10 mg by mouth once daily.    Historical Provider, MD   ascorbic acid, vitamin C, (VITAMIN C) 500 MG tablet Take 500 mg by mouth 2 (two) times daily.    Historical Provider, MD   aspirin (ECOTRIN) 81 MG EC tablet Take 81 mg by mouth once daily.    Historical Provider, MD   atorvastatin (LIPITOR) 40 MG tablet Take 40 mg by mouth once daily.    Historical Provider, MD   carvedilol (COREG) 25 MG tablet Take 1 tablet (25 mg total) by mouth 2 (two) times daily. 9/29/16 3/16/18  Nnamdi Venegas MD   clotrimazole-betamethasone 1-0.05% (LOTRISONE) cream Apply topically 2 (two) times daily. 18   Michael Irizarry MD   furosemide (LASIX) 40 MG tablet  17   Historical Provider, MD   multivitamin (THERAGRAN) per tablet Take 1 tablet by mouth once daily.    Historical Provider, MD   oxycodone-acetaminophen (PERCOCET) 5-325 mg per tablet Take 1 tablet by mouth every 4 (four) hours as needed for Pain. 17   Emerson Mckay MD   polyethylene glycol (GLYCOLAX) 17 gram/dose powder Take 17 g by mouth once daily. 18   David Ramos MD   traMADol (ULTRAM) 50 mg tablet Take 50 mg by mouth every 6 (six) hours as needed for Pain.    Historical Provider, MD       Family History:  Family History   Problem Relation Age of Onset    Heart disease Mother        Social History:  Social History     Tobacco Use    Smoking status: Former Smoker     Packs/day: 2.00     Years: 45.00     Pack years: 90.00     Last attempt to quit: 2006     Years since quittin.5    Smokeless tobacco: Never Used   Substance Use Topics    Alcohol use: No     Comment: NO ALCOHOL FOR 18 MONTHS, PAST ETOH ABUSE    Drug use: No       Review of Systems:  As above, otherwise negative.     Objective:   Last 24 Hour Vital Signs:  Temp:  [96.6 °F (35.9 °C)-99.3 °F (37.4 °C)] 99.1 °F (37.3 °C)  Pulse:  [] 82  Resp:  [18-27]  "24  SpO2:  [89 %-97 %] 95 %  BP: (102-130)/(54-61) 125/60    Physical Examination:  Vitals: /60   Pulse 82   Temp 99.1 °F (37.3 °C) (Axillary)   Resp (!) 24   Ht 5' 8" (1.727 m)   Wt 92.9 kg (204 lb 12.9 oz)   SpO2 95%   BMI 31.14 kg/m²      General: Asleep, easily aroused, follows commands  HEENT: BiPAP in place, EOMI  Cardiovascular: RRR, S1/S2, no MRG  Chest: Crackles throughout all fields bilaterally  Abdomen: Soft, ND  Extremities: 2+ edema of LLE, R AKA  Neurologic: Follows commands    Laboratory:  Trended Lab Data:  Recent Labs   Lab 01/24/19 0427 01/25/19  0503 01/26/19  0436   WBC 12.65 18.39* 21.82*   HGB 10.6* 10.5* 10.8*   HCT 32.5* 32.2* 33.6*    224 224       Recent Labs   Lab 01/24/19 0427 01/25/19  0503 01/26/19  0437    135* 136   K 4.3 4.0 3.6    97 93*   CO2 30* 30* 33*   BUN 41* 33* 31*   CREATININE 1.4 1.5* 1.5*   * 125* 83   CALCIUM 7.9* 8.0* 8.6*       Recent Labs   Lab 01/24/19 0427 01/25/19  0503 01/26/19  0437   PROT 4.9* 5.3* 5.8*   ALBUMIN 2.1* 2.0* 2.0*   BILITOT 0.8 0.9 1.1*   AST 43* 32 28   * 131* 103*   ALKPHOS 89 93 91       No results for input(s): PROTIME, PTT, INR in the last 168 hours.    Cardiac:   Recent Labs   Lab 01/23/19  0852 01/23/19  1104 01/23/19  1643   TROPONINI 0.045* 0.031* 0.033*       FLP:   Lab Results   Component Value Date    CHOL 139 08/03/2016    HDL 29 (L) 08/03/2016    LDLCALC 93.4 08/03/2016    TRIG 83 08/03/2016    CHOLHDL 20.9 08/03/2016     DM:   Lab Results   Component Value Date    HGBA1C 7.1 (H) 01/19/2019    HGBA1C 5.7 (H) 03/16/2018    HGBA1C 7.9 (H) 11/27/2016    LDLCALC 93.4 08/03/2016    CREATININE 1.5 (H) 01/26/2019     Thyroid:   Lab Results   Component Value Date    TSH 0.735 01/17/2019     Anemia:   Lab Results   Component Value Date    IRON 20 (L) 11/27/2016    TIBC 145 (L) 11/27/2016    FERRITIN 2,805 (H) 11/29/2016    [id number] 1152 (H) 11/27/2016    FOLATE 14.6 11/27/2016     Urinalysis: "   Lab Results   Component Value Date    LABURIN  04/27/2018     ENTEROCOCCUS FAECALIS  >100,000 cfu/ml  No other significant isolate      COLORU Yellow 01/16/2019    SPECGRAV <=1.005 (A) 01/16/2019    NITRITE Negative 01/16/2019    KETONESU Negative 01/16/2019    UROBILINOGEN 1.0 01/16/2019         Microbiology Data:  Microbiology Results (last 7 days)     Procedure Component Value Units Date/Time    Culture, Respiratory with Gram Stain [[id number]]     Order Status:  No result Specimen:  Respiratory     Blood Culture #2 **CANNOT BE ORDERED STAT** [[id number]] Collected:  01/16/19 1626    Order Status:  Completed Specimen:  Blood from Peripheral, Antecubital, Left Updated:  01/21/19 2312     Blood Culture, Routine No growth after 5 days.    Blood Culture #1 **CANNOT BE ORDERED STAT** [[id number]] Collected:  01/16/19 1615    Order Status:  Completed Specimen:  Blood from Peripheral, Hand, Right Updated:  01/21/19 2312     Blood Culture, Routine No growth after 5 days.          Current Medications:     Infusions:       Scheduled:   albuterol-ipratropium  3 mL Nebulization Q4H    amiodarone  200 mg Oral Daily    amLODIPine  10 mg Oral Daily    ascorbic acid (vitamin C)  500 mg Oral BID    aspirin  81 mg Oral Daily    atorvastatin  40 mg Oral Daily    azithromycin  500 mg Intravenous Q24H    docusate sodium  100 mg Oral Daily    [START ON 1/27/2019] furosemide  80 mg Oral Daily    heparin (porcine)  5,000 Units Subcutaneous Q12H    insulin detemir U-100  10 Units Subcutaneous Daily    piperacillin-tazobactam (ZOSYN) IVPB  4.5 g Intravenous Q8H        PRN:  acetaminophen, benzonatate, dextrose 50%, dextrose 50%, glucagon (human recombinant), glucose, glucose, guaifenesin 100 mg/5 ml, HYDROcodone-acetaminophen, insulin aspart U-100, ramelteon, senna, sodium chloride 0.9%     Assessment:     Asad Perez is a 79 y.o. male with:  Patient Active Problem List    Diagnosis Date Noted    Chest pain 01/23/2019     Pressure injury of sacral region, stage 3 01/22/2019    Hyperkalemia 01/17/2019    Respiratory failure with hypoxia and hypercapnia 01/16/2019    COPD with acute exacerbation 01/09/2019    Abdominal pain 03/19/2018    COPD exacerbation 03/16/2018    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    Abdominal rigidity, generalized     Complicated open wound of right hip     Acute osteomyelitis 04/11/2017    Decubitus ulcer of buttock, stage 2 04/07/2017    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    Urinary tract infection associated with indwelling urethral catheter 12/01/2016    Centrilobular emphysema 11/29/2016    Symptomatic anemia 11/27/2016    Deep vein thrombosis (DVT) of brachial vein     Non-healing ulcer of lower leg 10/28/2016    Fever     Decubitus ulcer of sacral region, stage 4 10/05/2016    Deep vein thrombosis (DVT) of upper extremity 10/05/2016    Dehiscence of perineal wound 10/05/2016    Hyponatremia 10/05/2016    VRE (vancomycin-resistant Enterococci) infection 09/20/2016    Elevated liver enzymes     Chronic diastolic congestive heart failure     Critical lower limb ischemia 09/10/2016    Stenosis of left internal carotid artery 09/08/2016    Abnormal finding on imaging 08/31/2016    Abnormal CT of the abdomen     Chronic kidney disease, stage V 08/12/2016    Anemia 08/12/2016    Anemia of chronic disease 08/08/2016    Altered mental status 08/07/2016    MRSA (methicillin resistant Staphylococcus aureus) infection 08/07/2016    Confusion     Weak     Pneumonia 07/29/2016    History of atrial fibrillation     Infected prosthetic knee joint 07/12/2016    Right knee pain 06/15/2016    GERD (gastroesophageal reflux disease) 02/28/2015    Esophageal web 02/28/2015    Acute on chronic respiratory failure with hypoxia and hypercapnia 11/08/2014    Acute on chronic diastolic heart failure 11/08/2014    Transaminitis 11/08/2014    Chronic  obstructive pulmonary disease 11/07/2014    Dyspnea 11/06/2014    Normocytic anemia 11/06/2014    Dysphagia 11/03/2014    Osteoarthritis of left knee 07/14/2014    Essential hypertension 07/16/2013    CKD (chronic kidney disease) 07/16/2013    PAD (peripheral artery disease) 07/16/2013    CAD (coronary artery disease) 07/16/2013    History of stroke 07/16/2013    Physical deconditioning 07/16/2013    Alcohol abuse 07/16/2013        Plan:     - Worsening RLL infiltrate, concerning for aspiration but he does appear to have central congestion and very prominent pulmonary vasculature  - Would add Vancomycin and if he continues to clinically worsen consider broadening to a carbapenem given that his leukocytosis is progressing off of steroids  - Would continue IV diuretics, he appears to still be volume overloaded and there isn't yet significant evidence that he has achieved euvolemia  - Continue NIV for possible pulmonary edema, wean for sats 88-92%  - Continue nebs as needed, no longer needs steroids  - Move to ICU - discussed w/ primary service - need clarification on GoC  - CPT, mobilize    Onesimo Kellogg  [employer] Fellow

## 2019-01-27 NOTE — PROGRESS NOTES
"[facility] Medicine Progress Note, HO-I     Primary Team: [facility] Hospitalist Team B  Attending Physician: Gloria  Resident: Jose Natarajan  Intern: Mello Butler     Subjective:      Pt was moved to the ICU for declining respiratory status with marginal pO2 despite bipap on 100%. CXR shows worsening RLL opacity. Continuing diuresis. Patient reports feeling better this morning and says his breathing has improved.  He could not recall if he had a bowel movement but his nurse reports that he did. Still feels constipated, ordered enema.  He denies any chest pain, nausea, or vomiting. He has been medically accepted to [facility].     Objective:      Last 24 Hour Vital Signs:  BP  Min: 112/49  Max: 149/63  Temp  Av.4 °F (36.3 °C)  Min: 96.2 °F (35.7 °C)  Max: 98.4 °F (36.9 °C)  Pulse  Av  Min: 57  Max: 67  Resp  Av  Min: 14  Max: 42  SpO2  Av.5 %  Min: 92 %  Max: 96 %  Height  Av' 8" (172.7 cm)  Min: 5' 8" (172.7 cm)  Max: 5' 8" (172.7 cm)  Weight  Av.8 kg (186 lb 15.2 oz)  Min: 73.4 kg (161 lb 13.1 oz)  Max: 96.2 kg (212 lb 1.3 oz)  I/O last 3 completed shifts:  In: 1760 [P.O.:810; IV Piggyback:950]  Out: 2540 [Urine:2540]     Physical Examination:  Gen: AAOx3, wearing high-flow NC  HEENT: head normocephalic, atraumatic  Cardiac: regular rate and rhythm; no murmurs  Resp: on bipap; crackles in the bilateral bases; diffuse expiratory wheezes throughout, normal work of breathing, no rhonchi  GI: abdomen soft, non-tender, non-distended; umbilical hernia present (reducible); normoactive bowel sounds, lesions, ecchymosis and skin breakdown at injection sites  Extrem: 1+ pitting edema to the bilateral lower extremities; no clubbing noted; right leg AKA with pain to palpation, L UE edematous (R normal)  Skin: no rashes; sacral decubitus ulcer with mepilex in place, extensive bruising on extremities and abdomen with skin breakdown   Neuro: AAOx3, moving all extremities without difficulty   "      Laboratory:  Laboratory Data Reviewed: yes  Pertinent Findings:    WBC 16.56  Cr 1.7  BUN 33     Microbiology Data Reviewed: yes  Pertinent Findings:  Previous cultures drawn 1/9/19 grew E. coli  Blood Cx 1/16: NGTD     Other Results:  EKG (my interpretation): No new studies     Radiology Data Reviewed: yes  Pertinent Findings:  CXR 1/16:  Mild atelectasis at the left lung base or subsegmental airspace disease.    CXR 1/23:  There is moderate perihilar and bibasilar lung opacities, increased.  Probable small effusions.  No gross pneumothorax.  Heart size unchanged     CT Chest, A/P 1/16:  1. No evidence of PE.  2. Small bilateral pleural effusions resulting in volume loss and compressive atelectasis within the lower lobes, left greater than right with additional atelectasis versus consolidation seen within the lingula of the left upper lobe.  3. Extensive atherosclerosis with postsurgical changes of aorto bi-iliac graft.  Extensive chronic vascular findings and occlusions as detailed above.  These findings do not appear significantly changed when compared to previous CTA abdomen and pelvis from March 2018.  4. Otherwise no acute intra-abdominal abnormalities identified.  5. Cholelithiasis.  6. Multiple additional findings as detailed above.     Current Medications:     Infusions:     Scheduled:   albuterol-ipratropium  3 mL Nebulization Q4H    ascorbic acid (vitamin C)  500 mg Oral BID    aspirin  81 mg Oral Daily    atorvastatin  40 mg Oral Daily    azithromycin  500 mg Intravenous Q24H    ceFEPime (MAXIPIME) IVPB  2 g Intravenous Q12H    heparin (porcine)  5,000 Units Subcutaneous Q8H    predniSONE  40 mg Oral Daily    vancomycin (VANCOCIN) IVPB  1,250 mg Intravenous Q24H         PRN:  dextrose 50%, dextrose 50%, glucagon (human recombinant), glucose, glucose, HYDROcodone-acetaminophen, insulin aspart U-100, sodium chloride 0.9%     Antibiotics and Day Number of Therapy:    Levofloxacin 1/9 - 1/17    Vancomycin 1/17 - 1/18  Cefepime 1/17 - 1/19   Azithromycin 1/17 - present   Zosyn 1/16 - present     Lines and Day Number of Therapy:  PIV placed 1/23     Assessment:      Asad Perez is a 78 y.o.male with       Patient Active Problem List     Diagnosis Date Noted    Hyperkalemia 01/17/2019    Respiratory failure with hypoxia and hypercapnia 01/16/2019    COPD with acute exacerbation 01/09/2019    Abdominal pain 03/19/2018    COPD exacerbation 03/16/2018    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    Abdominal rigidity, generalized      Complicated open wound of right hip      Acute osteomyelitis 04/11/2017    Decubitus ulcer of buttock, stage 2 04/07/2017    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    Urinary tract infection associated with indwelling urethral catheter 12/01/2016    Centrilobular emphysema 11/29/2016    Symptomatic anemia 11/27/2016    Deep vein thrombosis (DVT) of brachial vein      Non-healing ulcer of lower leg 10/28/2016    Fever      Decubitus ulcer of sacral region, stage 4 10/05/2016    Deep vein thrombosis (DVT) of upper extremity 10/05/2016    Dehiscence of perineal wound 10/05/2016    Hyponatremia 10/05/2016    VRE (vancomycin-resistant Enterococci) infection 09/20/2016    Elevated liver enzymes      Chronic diastolic congestive heart failure      Critical lower limb ischemia 09/10/2016    Stenosis of left internal carotid artery 09/08/2016    Abnormal finding on imaging 08/31/2016    Abnormal CT of the abdomen      Chronic kidney disease, stage V 08/12/2016    Anemia 08/12/2016    Anemia of chronic disease 08/08/2016    Altered mental status 08/07/2016    MRSA (methicillin resistant Staphylococcus aureus) infection 08/07/2016    Confusion      Weak      Pneumonia 07/29/2016    History of atrial fibrillation      Infected prosthetic knee joint 07/12/2016    Right knee pain 06/15/2016    GERD (gastroesophageal reflux  disease) 02/28/2015    Esophageal web 02/28/2015    Acute on chronic diastolic heart failure 11/08/2014    Transaminitis 11/08/2014    Chronic obstructive pulmonary disease 11/07/2014    Dyspnea 11/06/2014    Normocytic anemia 11/06/2014    Dysphagia 11/03/2014    Osteoarthritis of left knee 07/14/2014    Essential hypertension 07/16/2013    CKD (chronic kidney disease) 07/16/2013    PAD (peripheral artery disease) 07/16/2013    CAD (coronary artery disease) 07/16/2013    History of stroke 07/16/2013    Physical deconditioning 07/16/2013    Alcohol abuse 07/16/2013         Plan:      #AoC Hypercapnic Respiratory Failure Likely 2/2 COPD Exacerbation:  -Recently admitted for this; Blood cultures grew gram negative rods; Discharged on 1/13 with 14 days total of Levofloxacin, 5 days of Prednisone and Oseltamavir, continuing his home 2L oxygen  - SpO2 70's on no supplemental oxygen upon arrival to ED   - ABG with respiratory acidosis, 7.227, pCO2 78 and pO2 292   - IV steroids & 1hr nebulizer with improved sats, 100% on non-rebreather 100% FiO2  - Procal 0.28, Lactate 0.9  - CXR w/ left lung atelectasis, relatively unchanged from previous admission  - CT Chest & Abd w/ small bilateral pleural effusion, compressive atelectasis, left lingula with consolidation vs atelectasis, cholelithiasis  - Zosyn started in ED, Lasix 80 IV   - Admitted to ICU for continuous BiPAP  - Stepped down 1/17, continue nightly BiPAP  - Started on Vanc and Zosyn then de-escalated to azithro and cefipime, broadened back to azithro and zosyn for leukocytosis  - currently on bipap qHS, will continue to monitor  - ABG without acidosis, hypoxia, or hypercapnia  - received home bipap   - CXR 1/23 with increased perihilar and bibasilar opacities, given 40mg IV lasix  - Lasix 80 IV BID switched to 80 PO BID, now switching to 80 IV daily as per Pulm  - Will repeat echocardiogram and consult pulmonology for inadequate improvement of  respiratory status  - TTE with moderate pulmonary hypertension, normal CVP, EF 55%, PA sys pressure 47 (unchanged from prior study)  - moved to ICU yesterday for increasing oxygen needs  - Pulm recs continuing IV diuresis, escalating antibiotics if WBC and respiratory status continues to worsen, continue NIV  - continue to try to wean down oxygen     Constipation  -No BM x4 days  -Started senna, colace   -given soap suds enema  -as per nurse, pt had bowel movement this morning however still feels constipated     #ELAYNE on CKD 3B:  -Cr 1.7 on admit; baseline Cr ~1.2  - Lasix 80mg IV in ED for concern for volume overload, good urinary response  - Avoid nephrotoxic agents, renally dose medications  - Cr decreasin.6 today   - last echo 2016 with EF 65%, improving with light IVF  - plan to discharge on home dose lasix 40 PO   - Lasix as above, monitor creatinine     #Recent GNR Bacteremia 2/2 Stage 3 Sacral Decubitus Ulcer:  - Course of antibiotics during admission earlier this month  - Blood Cx  NGTD, repeat Blood Cx  NGTD  - Consult to Wound Care. Rec mepilex a/g every other day  - Will need wound care on discharge  - Dr Coffman following as per pt, outpt follow up established  - wound clean, dry, nonerythemetous, no signs of infection     #Transaminitis:  - , AST 202, Alk Phos 161   - RUQ U/S earlier this month showing cholelithiasis without acute cholecystitis   - Neuroendocrine Surg was consulted at that time, no concern for acute cholecystitis and no indication for surgical intervention (LFTs more indicative of hepatic origin vs cholestatic)  - Intermittent complaints of abdominal pain, denied to other team members   - CT Abd  with cholelithiasis  - LFTs improving     #HFpEF:  - TTE (16) w/ mild LA enlargement, concentric hypertrophy, normal LVSF, left ventricular diastolic dysfunction, pulmonary HTN (PA sys pressure ~47)  - Home Meds: Amiodarone 200mg daily, Carvedilol 25mg BID,  Lasix 40 PO daily  - , Troponin WNL   - Lasix 80mg IV x1 in ED with good UOP  - Resume home meds  -  fluid restrict to 1 liter  - repeat echo unchanged     #CAD, PVD, RLE AKA:  - Home Meds: ASA 81mg daily, Atorvastatin 40mg daily  - Resume home meds     #History of CVA:  - CVA in 2007, no focal deficits on exam  - Home Meds: ASA 81mg daily, Atorvastatin 40mg daily  - Resume home meds     #Constipation:  - Polyethylene glycol  - soap suds enema  - has had multiple bowel movements today     #History of Alcohol Abuse:  -No alcohol since 2007     #Healthcare Maintenance:  - UTD on flu, tetanus, pneumovax         Diet: Diabetic  PPX: [facility]  Dispo: pending improvement in SOB and weaning of HF, pending diuresis; medically accepted at [facility]     Stacey Sarkar MD  [facility] Internal Medicine HO-1     [facility] Medicine Hospitalist Pager numbers:   [facility] Hospitalist Medicine Team A (Chay/Shawanda):          [phone]  [facility] Hospitalist Medicine Team B (Gloria/Alex):        [phone]

## 2019-01-27 NOTE — PLAN OF CARE
Problem: Pain Acute  Goal: Optimal Pain Control  Outcome: Ongoing (interventions implemented as appropriate)  Pt c/o pain, gen'ed, abd, right aka, getting pain meds as ordered.

## 2019-01-27 NOTE — PLAN OF CARE
Problem: Adult Inpatient Plan of Care  Goal: Plan of Care Review  Outcome: Ongoing (interventions implemented as appropriate)  Pt is now on continuous bipap. Sats ar 94-96%. HR 88 /57 RR @ 24-28. Safety maintained. Report given to DAYSI Hennessy

## 2019-01-28 PROBLEM — R33.9 URINARY RETENTION: Status: ACTIVE | Noted: 2019-01-01

## 2019-01-28 PROBLEM — N13.39 OTHER HYDRONEPHROSIS: Status: ACTIVE | Noted: 2019-01-01

## 2019-01-28 NOTE — PLAN OF CARE
Problem: Adult Inpatient Plan of Care  Goal: Plan of Care Review  Outcome: Ongoing (interventions implemented as appropriate)  Patient on Bipap with documented settings and parameters receiving adequate volumes. Ambu bag and mask at the bedside. The proper method of use, as well as anticipated side effects, of this aerosol treatment are discussed and demonstrated to the patient. Will continue to monitor.

## 2019-01-28 NOTE — PROGRESS NOTES
The Sw spoke to Iva at [facility] and faxed her the updated info on the pt via [facility]. The Sw notified her the pt's currently in ICU. The Sw will f/u with her PRN. The Sw left a message for Molly at [facility] to return the call in reference to this pt.

## 2019-01-28 NOTE — SUBJECTIVE & OBJECTIVE
Past Medical History:   Diagnosis Date    Alcohol abuse     CHF (congestive heart failure)     CKD (chronic kidney disease) stage 3, GFR 30-59 ml/min     Coronary artery disease     Decubitus skin ulcer     Heart failure, diastolic     High cholesterol     Hypertension     Immobility     LBP (low back pain)     Prediabetes     PVD (peripheral vascular disease)     Stroke     2006, no deficits    Urine incontinence        Past Surgical History:   Procedure Laterality Date    AMPUTATION-ABOVE KNEE Right 9/12/2016    [facility]    aortic bifemoral bypass  2006    ARTHROPLASTY-KNEE Right 6/27/2016    Performed by [facility] at [facility]    ARTHROPLASTY-KNEE Left 7/14/2014    Performed by Critsino Camacho MD at [facility] [place]    bilateral carotid stenois  2006    carotid stents      DEBRIDEMENT-SACRAL WOUND N/A 10/10/2016    Performed by Michael Irizarry MD at [facility]    DEBRIDEMENT-SACRAL WOUND Bilateral 9/29/2016    Performed by Maria Alejandra Pichardo DO at [facility] [place]    DEBRIDEMENT-SACRAL WOUND N/A 9/16/2016    Performed by [facility]ardo DO at [facility] [place]    DEBRIDEMENT-WOUND Right 4/8/2017    Performed by [facility]ry MD at [facility] [place]    ESOPHAGOGASTRODUODENOSCOPY (EGD) N/A 11/4/2014    Performed by David Ramos MD at [facility] ENDO    ESOPHAGOGASTRODUODENOSCOPY (EGD) with PEG N/A 9/23/2016    Performed by Emerson Childers Jr., MD at [facility]O    EXCHANGE-POLYETHYLENE RIGHT KNEE Right 7/12/2016    Performed by [facility]ino [facility]acho MD at [facility] [place]    IOVERA Right 6/15/2016    Performed by [facility]ino [facility]acho MD at [facility] [place]    IRRIGATION AND DEBRIDEMENT LOWER EXTREMITY Right 9/9/2016    Performed by [facility]ino [facility]acho MD at [facility] [place]    IRRIGATION AND DEBRIDEMENT RIGHT KNEE Right 7/12/2016    Performed by [facility]ino [facility]acho MD at [facility] [place]    JOINT REPLACEMENT      knee    left common endarterectomy  2006    REVISION-AMPUTATION STUMP Right 12/1/2016    Performed by Michael [facility]ry MD at [facility]  OR    RT AKA  2017    SYNOVECTOMY-KNEE Left 2016    Performed by [facility] at [facility] OR    WOUND EXPLORATION Right 2016    Performed by [facility]ino [facility]acho MD at [facility] OR       Review of patient's allergies indicates:  No Known Allergies    Family History     Problem Relation (Age of Onset)    Heart disease Mother          Tobacco Use    Smoking status: Former Smoker     Packs/day: 2.00     Years: 45.00     Pack years: 90.00     Last attempt to quit: 2006     Years since quittin.5    Smokeless tobacco: Never Used   Substance and Sexual Activity    Alcohol use: No     Comment: NO ALCOHOL FOR 18 MONTHS, PAST ETOH ABUSE    Drug use: No    Sexual activity: Not on file       Review of Systems    Objective:     Temp:  [97.8 °F (36.6 °C)-98.6 °F (37 °C)] 98.6 °F (37 °C)  Pulse:  [103-123] 117  Resp:  [20-31] 20  SpO2:  [74 %-98 %] 91 %  BP: ()/(50-67) 107/56     Body mass index is 31.14 kg/m².    Date 19 0700 - 19 0659   Shift 6613-1151 5276-0865 4127-4575 24 Hour Total   INTAKE   IV Piggyback 100   100   Shift Total(mL/kg) 100(1.1)   100(1.1)   OUTPUT   Shift Total(mL/kg)       Weight (kg) 92.9 92.9 92.9 92.9     Bladder Scan Volume (mL): 850 mL(Notified DR. Dhaliwal and Primary team) (19 1200)    Drains     Drain                 Urethral Catheter 19 1245 Double-lumen 16 Fr. less than 1 day                Physical Exam    Significant Labs:    BMP:  Recent Labs   Lab 19   * 133* 134*   K 2.9* 4.4 4.6   CL 91* 89* 91*   CO2 33* 32* 31*   BUN 36* 42* 45*   CREATININE 1.7* 2.2* 2.4*   CALCIUM 8.5* 8.5* 8.6*       CBC:  Recent Labs   Lab 19  0436 01/27/19  0317 01/28/19  0347   WBC 21.82* 16.56* 16.43*   HGB 10.8* 10.1* 10.4*   HCT 33.6* 31.0* 31.9*    211 221       All pertinent labs results from the past 24 hours have been reviewed.    Significant Imaging:  All pertinent imaging results/findings from the past 24  hours have been reviewed.

## 2019-01-28 NOTE — SUBJECTIVE & OBJECTIVE
Past Medical History:   Diagnosis Date    Alcohol abuse     CHF (congestive heart failure)     CKD (chronic kidney disease) stage 3, GFR 30-59 ml/min     Coronary artery disease     Decubitus skin ulcer     Heart failure, diastolic     High cholesterol     Hypertension     Immobility     LBP (low back pain)     Prediabetes     PVD (peripheral vascular disease)     Stroke     2006, no deficits    Urine incontinence        Past Surgical History:   Procedure Laterality Date    AMPUTATION-ABOVE KNEE Right 9/12/2016    Performed by [facility] at [facility] OR    aortic bifemoral bypass  2006    ARTHROPLASTY-KNEE Right 6/27/2016    [facility]formed by [facility] at [facility]    ARTHROPLASTY-KNEE Left 7/14/2014    Performed by [facility]acho MD at [facility]    bilateral carotid stenois  2006    carotid stents      DEBRIDEMENT-SACRAL WOUND N/A 10/10/2016    Performed by [facility]rizarry MD at [facility] OR    DEBRIDEMENT-SACRAL WOUND Bilateral 9/29/2016    Performed by [facility] Pichardo DO at [facility]    DEBRIDEMENT-SACRAL WOUND N/A 9/16/2016    Performed by [facility] P[facility]ardo DO at [facility]    DEBRIDEMENT-WOUND Right 4/8/2017    Performed by [facility]arry MD at [facility]    ESOPHAGOGASTRODUODENOSCOPY (EGD) N/A 11/4/2014    Performed by David Ramos MD at [facility] ENDO    ESOPHAGOGASTRODUODENOSCOPY (EGD) with PEG N/A 9/23/2016    Performed by Emerson Childers Jr., MD at [facility] ENDO    EXCHANGE-POLYETHYLENE RIGHT KNEE Right 7/12/2016    Performed by [facility] MD at [facility]    IOVERA Right 6/15/2016    Performed by [facility]acho MD at [facility]    IRRIGATION AND DEBRIDEMENT LOWER EXTREMITY Right 9/9/2016    Performed by [facility] at [facility]    IRRIGATION AND DEBRIDEMENT RIGHT KNEE Right 7/12/2016    Performed by [facility]acho MD at [facility]    JOINT REPLACEMENT      knee    left common endarterectomy  2006    REVISION-AMPUTATION STUMP Right 12/1/2016    Performed by [facility]arry MD at [facility]  OR    RT AKA  2017    SYNOVECTOMY-KNEE Left 2016    [facility]formed by [facility] at [facility]    WOUND EXPLORATION Right 2016    Performed by [facility] at [facility] OR       Review of patient's allergies indicates:  No Known Allergies    Family History     Problem Relation (Age of Onset)    Heart disease Mother          Tobacco Use    Smoking status: Former Smoker     Packs/day: 2.00     Years: 45.00     Pack years: 90.00     Last attempt to quit: 2006     Years since quittin.5    Smokeless tobacco: Never Used   Substance and Sexual Activity    Alcohol use: No     Comment: NO ALCOHOL FOR 18 MONTHS, PAST ETOH ABUSE    Drug use: No    Sexual activity: Not on file       Review of Systems   Constitutional: Negative for fever.   HENT: Negative.    Eyes: Negative.    Respiratory: Positive for shortness of breath.    Cardiovascular: Negative.    Gastrointestinal: Negative.    Genitourinary:        PER HISTORY OF PRESENT ILLNESS   Musculoskeletal: Positive for gait problem.        STATUS POST RIGHT ABOVE KNEE AMPUTATION   Skin: Positive for wound.   Neurological: Positive for weakness.   Psychiatric/Behavioral: Negative.        Objective:     Temp:  [97.8 °F (36.6 °C)-98.6 °F (37 °C)] 98.6 °F (37 °C)  Pulse:  [103-123] 117  Resp:  [20-31] 20  SpO2:  [74 %-98 %] 91 %  BP: ()/(50-67) 107/56     Body mass index is 31.14 kg/m².    Date 19 0700 - 19 0659   Shift 3723-9750 2822-6759 8454-6730 24 Hour Total   INTAKE   IV Piggyback 100   100   Shift Total(mL/kg) 100(1.1)   100(1.1)   OUTPUT   Shift Total(mL/kg)       Weight (kg) 92.9 92.9 92.9 92.9     Bladder Scan Volume (mL): 850 mL(Notified DR. Dhaliwal and Primary team) (19 1200)    Drains     Drain                 Urethral Catheter 19 1245 Double-lumen 16 Fr. less than 1 day                Physical Exam   Nursing note and vitals reviewed.  Constitutional: He is oriented to person, place, and time.   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   Eyes: Conjunctivae are normal. Pupils are equal, round, and reactive to light.   Neck: Normal range of motion. Neck supple.   Cardiovascular: Normal rate and regular rhythm.    Pulmonary/Chest:   NASAL CANNULA IN PLACE   Abdominal: Soft. There is no tenderness.   Genitourinary: Testes normal. Right testis shows no mass. Left testis shows no mass.         Musculoskeletal:   STATUS POST RIGHT ABOVE-THE-KNEE AMPUTATION   Neurological: He is alert and oriented to person, place, and time.   Skin: Skin is warm and dry.     Psychiatric: He has a normal mood and affect. His behavior is normal.       Significant Labs:    BMP:  Recent Labs   Lab 01/27/19 0317 01/27/19 2104 01/28/19 0347   * 133* 134*   K 2.9* 4.4 4.6   CL 91* 89* 91*   CO2 33* 32* 31*   BUN 36* 42* 45*   CREATININE 1.7* 2.2* 2.4*   CALCIUM 8.5* 8.5* 8.6*       CBC:  Recent Labs   Lab 01/26/19  0436 01/27/19 0317 01/28/19 0347   WBC 21.82* 16.56* 16.43*   HGB 10.8* 10.1* 10.4*   HCT 33.6* 31.0* 31.9*    211 221       All pertinent labs results from the past 24 hours have been reviewed.    Significant Imaging:  All pertinent imaging results/findings from the past 24 hours have been reviewed.  CT SCAN FROM YESTERDAY REVIEWED BY ME.  I AGREE WITH MILD-TO-MODERATE BILATERAL HYDRONEPHROSIS AND DISTENDED BLADDER

## 2019-01-28 NOTE — CONSULTS
[facility]  Urology  Consult Note    Patient Name: Asad Perez  MRN: [id number]  Admission Date: 1/16/2019  Hospital Length of Stay: 12   Code Status: DNR   Attending Provider: Carl Castro MD   Consulting Provider: Chris Dhaliwal MD  Primary Care Physician: Tomas Torres MD  Principal Problem:Acute on chronic respiratory failure with hypoxia and hypercapnia    Inpatient consult to Urology  Consult performed by: Chris Dhaliwal MD  Consult ordered by: Coleman Butler MD  Assessment/Recommendations: Other hydronephrosis   IF CAUSED BY URINARY TENSION, THEN THEY SHOULD RESOLVE WITH VILLEGAS CATHETER DRAINAGE    Urinary retention   UNDER STERILE TECHNIQUE A 16 Brazilian VILLEGAS CATHETER IS PASSED PER URETHRA.  PROMPT RETURN OF CLEAR URINE OBTAINED.  10 CC PLACED INTO THE BALLOON AND VILLEGAS CATHETER PLACED TO GRAVITY DRAINAGE.    WILL START PATIENT ON FLOMAX.    GIVEN PRIOR HISTORY OF STROKE AND ALCOHOL ABUSE, THE POSSIBILITY OF ATONIC BLADDER NEEDS TO BE ENTERTAINED..    PATIENT SHOULD FOLLOW UP WITH ME 1 MONTH AFTER DISCHARGE FOR VILLEGAS CATHETER CHANGE AND MANAGEMENT OF HIS CATHETER.    ELAYNE (acute kidney injury)   IF CAUSE BY DISTENDED BLADDER, THIS SHOULD IMPROVE WITH VILLEGAS CATHETER DRAINAGE            Subjective:     HPI:  UROLOGY CONSULT FOR URINARY RETENTION AND A KI    PATIENT ADMITTED 01/16/2019 WITH WEAKNESS COPD AK I AND SACRAL ULCER.  HE HAS A HISTORY OF ALCOHOL ABUSE CHF CHRONIC KIDNEY DISEASE CORONARY DISEASE AND PRIOR HISTORY OF STROKE    HISTORY OBTAINED FROM PATIENT AND WIFE BOTH OF WHOM ARE OF QUESTIONABLE RELIABILITY.  HISTORY ALSO FROM CHART.    PATIENT STATES HE HAS A BASELINE OKAY STRENGTH OF STREAM.  HIS WIFE STATES THAT HE IS MANAGED WITH DIAPERS AND IS INCONTINENT DAY AND NIGHT.  PATIENT DOES NOT KNOW WHEN HE VOIDS.  PATIENT DENIES DYSURIA    WIFE STATES THE PATIENT HAD AN INDWELLING CATHETER PREVIOUSLY FOR THAT WAS INDWELLING FOR MANY MONTHS AND WAS DISCONTINUED FOR  UNCLEAR REASONS.  PATIENT AND WIFE DENY THAT HE FOLLOWS WITH A UROLOGIST    PATIENT HAS NEVER HAD UROLOGIC SURGERY AND NO FAMILY HISTORY OF KIDNEY BLADDER PROSTATE CANCER.    Past Medical History:   Diagnosis Date    Alcohol abuse     CHF (congestive heart failure)     CKD (chronic kidney disease) stage 3, GFR 30-59 ml/min     Coronary artery disease     Decubitus skin ulcer     Heart failure, diastolic     High cholesterol     Hypertension     Immobility     LBP (low back pain)     Prediabetes     PVD (peripheral vascular disease)     Stroke     2006, no deficits    Urine incontinence        Past Surgical History:   Procedure Laterality Date    AMPUTATION-ABOVE KNEE Right 9/12/2016    Performed by [facility] at [facility] OR    aortic bifemoral bypass  2006    ARTHROPLASTY-KNEE Right 6/27/2016    [facility]formed by [facility] at [facility] OR    ARTHROPLASTY-KNEE Left 7/14/2014    [facility]formed by [facility] at [facility]    bilateral carotid stenois  2006    carotid stents      DEBRIDEMENT-SACRAL WOUND N/A 10/10/2016    Performed by Michael [facility]rizarry MD at [facility] OR    DEBRIDEMENT-SACRAL WOUND Bilateral 9/29/2016    Performed by [facility] P[facility]ardo DO at [facility] OR    DEBRIDEMENT-SACRAL WOUND N/A 9/16/2016    Performed by [facility]ardo DO at [facility] OR    DEBRIDEMENT-WOUND Right 4/8/2017    Performed by [facility]ry [facility] at [facility]    ESOPHAGOGASTRODUODENOSCOPY (EGD) N/A 11/4/2014    Performed by David Ramos MD at [facility] ENDO    ESOPHAGOGASTRODUODENOSCOPY (EGD) with PEG N/A 9/23/2016    Performed by Emerson Childers Jr., MD at [facility] ENDO    EXCHANGE-POLYETHYLENE RIGHT KNEE Right 7/12/2016    Performed by [facility] MD at [facility] OR    IOVERA Right 6/15/2016    Performed by [facility] MD at [facility] OR    IRRIGATION AND DEBRIDEMENT LOWER EXTREMITY Right 9/9/2016    Performed by [facility] MD at [facility] OR    IRRIGATION AND DEBRIDEMENT RIGHT KNEE Right 7/12/2016    Performed by [facility]  at [facility] OR    JOINT REPLACEMENT      knee    left common endarterectomy      REVISION-AMPUTATION STUMP Right 2016    Performed by [facility] at [facility] OR    RT AKA  2017    SYNOVECTOMY-KNEE Left 2016    Performed by [facility] at [facility] OR    WOUND EXPLORATION Right 2016    Performed by [facility]acho MD at [facility] OR       Review of patient's allergies indicates:  No Known Allergies    Family History     Problem Relation (Age of Onset)    Heart disease Mother          Tobacco Use    Smoking status: Former Smoker     Packs/day: 2.00     Years: 45.00     Pack years: 90.00     Last attempt to quit: 2006     Years since quittin.5    Smokeless tobacco: Never Used   Substance and Sexual Activity    Alcohol use: No     Comment: NO ALCOHOL FOR 18 MONTHS, PAST ETOH ABUSE    Drug use: No    Sexual activity: Not on file       Review of Systems    Objective:     Temp:  [97.8 °F (36.6 °C)-98.6 °F (37 °C)] 98.6 °F (37 °C)  Pulse:  [103-123] 117  Resp:  [20-31] 20  SpO2:  [74 %-98 %] 91 %  BP: ()/(50-67) 107/56     Body mass index is 31.14 kg/m².    Date 19 0700 - 19 0659   Shift 5972-1994 1783-7270 1200-3855 24 Hour Total   INTAKE   IV Piggyback 100   100   Shift Total(mL/kg) 100(1.1)   100(1.1)   OUTPUT   Shift Total(mL/kg)       Weight (kg) 92.9 92.9 92.9 92.9     Bladder Scan Volume (mL): 850 mL(Notified DR. Dhaliwal and Primary team) (19 1200)    Drains     Drain                 Urethral Catheter 19 1245 Double-lumen 16 Fr. less than 1 day                Physical Exam    Significant Labs:    BMP:  Recent Labs   Lab 19  0317 19  0347   * 133* 134*   K 2.9* 4.4 4.6   CL 91* 89* 91*   CO2 33* 32* 31*   BUN 36* 42* 45*   CREATININE 1.7* 2.2* 2.4*   CALCIUM 8.5* 8.5* 8.6*       CBC:  Recent Labs   Lab 19  0436 19  0317 19  0347   WBC 21.82* 16.56* 16.43*   HGB 10.8* 10.1* 10.4*   HCT 33.6* 31.0* 31.9*     211 221       All pertinent labs results from the past 24 hours have been reviewed.    Significant Imaging:  All pertinent imaging results/findings from the past 24 hours have been reviewed.                    Assessment and Plan:     Other hydronephrosis    IF CAUSED BY URINARY TENSION, THEN THEY SHOULD RESOLVE WITH VILLEGAS CATHETER DRAINAGE     Urinary retention    UNDER STERILE TECHNIQUE A 16 Singaporean VILLEGAS CATHETER IS PASSED PER URETHRA.  PROMPT RETURN OF CLEAR URINE OBTAINED.  10 CC PLACED INTO THE BALLOON AND VILLEGAS CATHETER PLACED TO GRAVITY DRAINAGE.    WILL START PATIENT ON FLOMAX.    GIVEN PRIOR HISTORY OF STROKE AND ALCOHOL ABUSE, THE POSSIBILITY OF ATONIC BLADDER NEEDS TO BE ENTERTAINED..    PATIENT SHOULD FOLLOW UP WITH ME 1 MONTH AFTER DISCHARGE FOR VILLEGAS CATHETER CHANGE AND MANAGEMENT OF HIS CATHETER.     ELAYNE (acute kidney injury)    IF CAUSE BY DISTENDED BLADDER, THIS SHOULD IMPROVE WITH VILLEGAS CATHETER DRAINAGE         VTE Risk Mitigation (From admission, onward)        Ordered     heparin (porcine) injection 5,000 Units  Every 12 hours      01/19/19 1749     IP VTE HIGH RISK PATIENT  Once      01/16/19 1926          Thank you for your consult. I will follow-up with patient. Please contact us if you have any additional questions.    Chris Dhaliwal MD  Urology  [facility]

## 2019-01-28 NOTE — PLAN OF CARE
Per progress notes:  Pt was moved to the ICU (1/27)  for declining respiratory status with marginal pO2 despite bipap on 100%. CXR shows worsening RLL opacity.     Per FARHANA Richardson-Previous D/c plan was [facility];  The Sw spoke to Iva at [facility] and faxed her the updated info on the pt via [facility]. The Sw notified her the pt's currently in ICU. The Sw will f/u with her PRN. The Sw left a message for Molly at [facility] to return the call in reference to this pt.     Tn visited with pt and wife and updated on plan of care; pt remains on [facility]NC.    Tn to continue to follow.       01/28/19 7577   Discharge Reassessment   Assessment Type Discharge Planning Reassessment   Provided patient/caregiver education on the expected discharge date and the discharge plan Yes   Do you have any problems affording any of your prescribed medications? No   Discharge Plan A Long-term acute care facility (LTAC)   Discharge Plan B Other   DME Needed Upon Discharge  (tbd)   Patient choice form signed by patient/caregiver N/A   Anticipated Discharge Disposition Long Term   Can the patient answer the patient profile reliably? Yes, cognitively intact   How does the patient rate their overall health at the present time? Fair   Describe the patient's ability to walk at the present time. Major restrictions/daily assistance from another person   How often would a person be available to care for the patient? Often   Number of comorbid conditions (as recorded on the chart) Five or more   During the past month, has the patient often been bothered by feeling down, depressed or hopeless? No   During the past month, has the patient often been bothered by little interest or pleasure in doing things? No   Post-Acute Status   Post-Acute Authorization Placement   Post-Acute Placement Status Additional Clinical Requested

## 2019-01-28 NOTE — PLAN OF CARE
01/28/19 0927   Post-Acute Status   Post-Acute Placement Status Additional Clinical Requested  (The jennifer faxed updated info to [facility])

## 2019-01-28 NOTE — PROGRESS NOTES
"[facility] Medicine Progress Note, HO-I     Primary Team: [facility] Hospitalist Team B  Attending Physician: Gloria  Resident: Jose Natarajan  Intern: Mello Butler     Subjective:      Patient had urinary retention yesterday with bladder scan showing >1L urine and CT scan showing hydroureteronephrosis and bladder distension.  Patient had 3 BMs yesterday.  He denies any SOB, chest pain, nausea, or vomiting.  He has been medically accepted to [facility].     Objective:      Last 24 Hour Vital Signs:  BP  Min: 112/49  Max: 149/63  Temp  Av.4 °F (36.3 °C)  Min: 96.2 °F (35.7 °C)  Max: 98.4 °F (36.9 °C)  Pulse  Av  Min: 57  Max: 67  Resp  Av  Min: 14  Max: 42  SpO2  Av.5 %  Min: 92 %  Max: 96 %  Height  Av' 8" (172.7 cm)  Min: 5' 8" (172.7 cm)  Max: 5' 8" (172.7 cm)  Weight  Av.8 kg (186 lb 15.2 oz)  Min: 73.4 kg (161 lb 13.1 oz)  Max: 96.2 kg (212 lb 1.3 oz)  I/O last 3 completed shifts:  In: 1760 [P.O.:810; IV Piggyback:950]  Out: 2540 [Urine:2540]     Physical Examination:  Gen: AAOx3, wearing bipap  HEENT: head normocephalic, atraumatic  Cardiac: regular rate and rhythm; no murmurs  Resp: on bipap; crackles in the bilateral bases; diffuse expiratory wheezes throughout, normal work of breathing, no rhonchi  GI: abdomen soft, non-tender, non-distended; umbilical hernia present (reducible); normoactive bowel sounds, lesions, ecchymosis and skin breakdown at injection sites  Extrem: 1+ pitting edema to the bilateral lower extremities; no clubbing noted; right leg AKA with pain to palpation, L UE edematous (R normal)  Skin: no rashes; sacral decubitus ulcer with mepilex in place, extensive bruising on extremities and abdomen with skin breakdown   Neuro: AAOx3, moving all extremities without difficulty        Laboratory:  Laboratory Data Reviewed: yes  Pertinent Findings:    WBC 16.43  Cr 2.4  BUN 45     Microbiology Data Reviewed: yes  Pertinent Findings:  Previous cultures drawn 19 " grew E. coli  Blood Cx 1/16: NGTD     Other Results:  EKG (my interpretation): No new studies     Radiology Data Reviewed: yes  Pertinent Findings:  CXR 1/16:  Mild atelectasis at the left lung base or subsegmental airspace disease.    CXR 1/23:  There is moderate perihilar and bibasilar lung opacities, increased.  Probable small effusions.  No gross pneumothorax.  Heart size unchanged     CT Chest, A/P 1/16:  1. No evidence of PE.  2. Small bilateral pleural effusions resulting in volume loss and compressive atelectasis within the lower lobes, left greater than right with additional atelectasis versus consolidation seen within the lingula of the left upper lobe.  3. Extensive atherosclerosis with postsurgical changes of aorto bi-iliac graft.  Extensive chronic vascular findings and occlusions as detailed above.  These findings do not appear significantly changed when compared to previous CTA abdomen and pelvis from March 2018.  4. Otherwise no acute intra-abdominal abnormalities identified.  5. Cholelithiasis.  6. Multiple additional findings as detailed above.     Current Medications:     Infusions:     Scheduled:   albuterol-ipratropium  3 mL Nebulization Q4H    ascorbic acid (vitamin C)  500 mg Oral BID    aspirin  81 mg Oral Daily    atorvastatin  40 mg Oral Daily    azithromycin  500 mg Intravenous Q24H    ceFEPime (MAXIPIME) IVPB  2 g Intravenous Q12H    heparin (porcine)  5,000 Units Subcutaneous Q8H    predniSONE  40 mg Oral Daily    vancomycin (VANCOCIN) IVPB  1,250 mg Intravenous Q24H         PRN:  dextrose 50%, dextrose 50%, glucagon (human recombinant), glucose, glucose, HYDROcodone-acetaminophen, insulin aspart U-100, sodium chloride 0.9%     Antibiotics and Day Number of Therapy:    Levofloxacin 1/9 - 1/17   Vancomycin 1/17 - 1/18  Cefepime 1/17 - 1/19   Azithromycin 1/17 - present   Zosyn 1/16 - present     Lines and Day Number of Therapy:  PIV placed 1/23     Assessment:      Asad LINDQUIST  Chris is a 78 y.o.male with       Patient Active Problem List     Diagnosis Date Noted    Hyperkalemia 01/17/2019    Respiratory failure with hypoxia and hypercapnia 01/16/2019    COPD with acute exacerbation 01/09/2019    Abdominal pain 03/19/2018    COPD exacerbation 03/16/2018    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    Abdominal rigidity, generalized      Complicated open wound of right hip      Acute osteomyelitis 04/11/2017    Decubitus ulcer of buttock, stage 2 04/07/2017    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    Urinary tract infection associated with indwelling urethral catheter 12/01/2016    Centrilobular emphysema 11/29/2016    Symptomatic anemia 11/27/2016    Deep vein thrombosis (DVT) of brachial vein      Non-healing ulcer of lower leg 10/28/2016    Fever      Decubitus ulcer of sacral region, stage 4 10/05/2016    Deep vein thrombosis (DVT) of upper extremity 10/05/2016    Dehiscence of perineal wound 10/05/2016    Hyponatremia 10/05/2016    VRE (vancomycin-resistant Enterococci) infection 09/20/2016    Elevated liver enzymes      Chronic diastolic congestive heart failure      Critical lower limb ischemia 09/10/2016    Stenosis of left internal carotid artery 09/08/2016    Abnormal finding on imaging 08/31/2016    Abnormal CT of the abdomen      Chronic kidney disease, stage V 08/12/2016    Anemia 08/12/2016    Anemia of chronic disease 08/08/2016    Altered mental status 08/07/2016    MRSA (methicillin resistant Staphylococcus aureus) infection 08/07/2016    Confusion      Weak      Pneumonia 07/29/2016    History of atrial fibrillation      Infected prosthetic knee joint 07/12/2016    Right knee pain 06/15/2016    GERD (gastroesophageal reflux disease) 02/28/2015    Esophageal web 02/28/2015    Acute on chronic diastolic heart failure 11/08/2014    Transaminitis 11/08/2014    Chronic obstructive pulmonary disease 11/07/2014     Dyspnea 11/06/2014    Normocytic anemia 11/06/2014    Dysphagia 11/03/2014    Osteoarthritis of left knee 07/14/2014    Essential hypertension 07/16/2013    CKD (chronic kidney disease) 07/16/2013    PAD (peripheral artery disease) 07/16/2013    CAD (coronary artery disease) 07/16/2013    History of stroke 07/16/2013    Physical deconditioning 07/16/2013    Alcohol abuse 07/16/2013         Plan:      #AoC Hypercapnic Respiratory Failure Likely 2/2 COPD Exacerbation:  -Recently admitted for this; Blood cultures grew gram negative rods; Discharged on 1/13 with 14 days total of Levofloxacin, 5 days of Prednisone and Oseltamavir, continuing his home 2L oxygen  - SpO2 70's on no supplemental oxygen upon arrival to ED   - ABG with respiratory acidosis, 7.227, pCO2 78 and pO2 292   - IV steroids & 1hr nebulizer with improved sats, 100% on non-rebreather 100% FiO2  - Procal 0.28, Lactate 0.9  - CXR w/ left lung atelectasis, relatively unchanged from previous admission  - CT Chest & Abd w/ small bilateral pleural effusion, compressive atelectasis, left lingula with consolidation vs atelectasis, cholelithiasis  - Zosyn started in ED, Lasix 80 IV   - Admitted to ICU for continuous BiPAP  - Stepped down 1/17, continue nightly BiPAP  - Started on Vanc and Zosyn then de-escalated to azithro and cefipime, broadened back to azithro and zosyn for leukocytosis  - currently on bipap qHS, will continue to monitor  - ABG without acidosis, hypoxia, or hypercapnia  - received home bipap   - CXR 1/23 with increased perihilar and bibasilar opacities, given 40mg IV lasix  - Lasix 80 IV BID switched to 80 PO BID, now switching to 80 IV daily as per Pulm  - Will repeat echocardiogram and consult pulmonology for inadequate improvement of respiratory status  - TTE with moderate pulmonary hypertension, normal CVP, EF 55%, PA sys pressure 47 (unchanged from prior study)  - moved to ICU 1/26 for increasing oxygen needs  - Pulm recs  continuing IV diuresis, escalating antibiotics if WBC and respiratory status continues to worsen, continue NIV  - continue to try to wean down oxygen     Constipation  -No BM x4 days  -Started senna, colace   -3 BM yesterday- continue to monitor     #ELAYNE on CKD 3B:  -Cr 1.7 on admit; baseline Cr ~1.2  - Lasix 80mg IV in ED for concern for volume overload, good urinary response  - Avoid nephrotoxic agents, renally dose medications  - Cr decreasin.6 today   - last echo 2016 with EF 65%, improving with light IVF  - plan to discharge on home dose lasix 40 PO   - Lasix as above, monitor creatinine     #Recent GNR Bacteremia 2/2 Stage 3 Sacral Decubitus Ulcer:  - Course of antibiotics during admission earlier this month  - Blood Cx  NGTD, repeat Blood Cx  NGTD  - Consult to Wound Care. Rec mepilex a/g every other day  - Will need wound care on discharge  - Dr Coffman following as per pt, outpt follow up established  - wound clean, dry, nonerythemetous, no signs of infection     #Transaminitis:  - , AST 202, Alk Phos 161   - RUQ U/S earlier this month showing cholelithiasis without acute cholecystitis   - Neuroendocrine Surg was consulted at that time, no concern for acute cholecystitis and no indication for surgical intervention (LFTs more indicative of hepatic origin vs cholestatic)  - Intermittent complaints of abdominal pain, denied to other team members   - CT Abd  with cholelithiasis  - LFTs improving     #HFpEF:  - TTE (16) w/ mild LA enlargement, concentric hypertrophy, normal LVSF, left ventricular diastolic dysfunction, pulmonary HTN (PA sys pressure ~47)  - Home Meds: Amiodarone 200mg daily, Carvedilol 25mg BID, Lasix 40 PO daily  - , Troponin WNL   - Lasix 80mg IV x1 in ED with good UOP  - Resume home meds  -  fluid restrict to 1 liter  - repeat echo unchanged     #CAD, PVD, RLE AKA:  - Home Meds: ASA 81mg daily, Atorvastatin 40mg daily  - Resume home meds     #History  of CVA:  - CVA in 2007, no focal deficits on exam  - Home Meds: ASA 81mg daily, Atorvastatin 40mg daily  - Resume home meds     #Constipation:  - Polyethylene glycol  - soap suds enema  - has had multiple bowel movements today     #History of Alcohol Abuse:  -No alcohol since 2007     #Healthcare Maintenance:  - UTD on flu, tetanus, pneumovax         Diet: Diabetic  PPX: SQ  Dispo: pending improvement in SOB and weaning of HF, pending diuresis, continue antibiotics; medically accepted at [facility]     Coleman Butler MD  [facility] Internal Medicine HO-1     [facility] Medicine Hospitalist Pager numbers:   [facility] Hospitalist Medicine Team A (Chay/Shawanda):          [phone]  [facility] Hospitalist Medicine Team B (Gloria/Alex):        [phone]

## 2019-01-28 NOTE — PLAN OF CARE
Problem: Adult Inpatient Plan of Care  Goal: Plan of Care Review  Outcome: Ongoing (interventions implemented as appropriate)  Pt on oxygen via comfort flow [place] in no apparent distress.  Breathing tx. Given with ok pt. Effort.  Will cont. To monitor.

## 2019-01-28 NOTE — PROGRESS NOTES
Pulmonary & Critical Care Medicine Progress Note    Subjective:   Mr. Perez has no complaints this morning but he does continue to require high amounts of oxygen. No fevers or chills, no nausea or vomiting.     Past medical, surgical, family, and social history from initial consult was reviewed and verified during today's visit.     Vital Signs:   Temp:  [97.8 °F (36.6 °C)-98.6 °F (37 °C)] 98.6 °F (37 °C)  Pulse:  [103-123] 113  Resp:  [20-31] 30  SpO2:  [74 %-98 %] 92 %  BP: ()/(50-67) 107/56      Fluid Balance:     Intake/Output Summary (Last 24 hours) at 1/28/2019 1550  Last data filed at 1/28/2019 1400  Gross per 24 hour   Intake 720 ml   Output 2250 ml   Net -1530 ml       Review of Systems:   A comprehensive 12-point review of systems was performed, and is negative except for those items mentioned above in the HPI section of this note.     Physical Exam:   General: ill appearing WM, NAD, cooperative & interactive.  HEENT: AT/NC, PERRL, EOMI, oral and nasal mucosa moist.   Cardiac: tachycardic, normal rhythm with no MRG with brisk cap refill and symmetric pulses in distal extremities.  Respiratory: Normal inspection. Symmetric chest rise.course breath sounds bilaterally, crackles bibasilarly, no wheezes. Mildly increased work of breathing.    Abdomen: Soft, NT/ND. +BS.   Extremities: minimal edema   Neuro: Grossly intact to brief exam. Oriented x3 with appropriate mood/affect to situation.     Personal Review and Summary of Interval Diagnostics    Laboratory Studies:   No results for input(s): PH, PCO2, PO2, HCO3, POCSATURATED, BE in the last 24 hours.  Recent Labs   Lab 01/28/19  0347   WBC 16.43*   RBC 3.62*   HGB 10.4*   HCT 31.9*      MCV 88   MCH 28.7   MCHC 32.6     Recent Labs   Lab 01/27/19  2104 01/28/19  0347   * 134*   K 4.4 4.6   CL 89* 91*   CO2 32* 31*   BUN 42* 45*   CREATININE 2.2* 2.4*   MG 2.6  --        Microbiology Data:   Microbiology Results (last 7 days)     Procedure  Component Value Units Date/Time    Urine Culture High Risk [[id number]] Collected:  01/28/19 1348    Order Status:  Sent Specimen:  Urine, Catheterized Updated:  01/28/19 1348    Culture, Respiratory with Gram Stain [[id number]]     Order Status:  No result Specimen:  Respiratory     Blood Culture #2 **CANNOT BE ORDERED STAT** [[id number]] Collected:  01/16/19 1626    Order Status:  Completed Specimen:  Blood from Peripheral, Antecubital, Left Updated:  01/21/19 2312     Blood Culture, Routine No growth after 5 days.    Blood Culture #1 **CANNOT BE ORDERED STAT** [[id number]] Collected:  01/16/19 1615    Order Status:  Completed Specimen:  Blood from Peripheral, Hand, Right Updated:  01/21/19 2312     Blood Culture, Routine No growth after 5 days.        Chest Imaging:     Infusions:        Scheduled Medications:    albuterol-ipratropium  3 mL Nebulization Q4H    amiodarone  200 mg Oral Daily    amLODIPine  10 mg Oral Daily    ascorbic acid (vitamin C)  500 mg Oral BID    aspirin  81 mg Oral Daily    atorvastatin  40 mg Oral Daily    azithromycin  500 mg Intravenous Q24H    docusate sodium  100 mg Oral Daily    furosemide  80 mg Intravenous Daily    heparin (porcine)  5,000 Units Subcutaneous Q12H    insulin detemir U-100  10 Units Subcutaneous Daily    piperacillin-tazobactam (ZOSYN) IVPB  4.5 g Intravenous Q12H    sodium phosphates  1 enema Rectal Once    tamsulosin  0.4 mg Oral Daily       PRN Medications:   acetaminophen, benzonatate, dextrose 50%, dextrose 50%, glucagon (human recombinant), glucose, glucose, guaifenesin 100 mg/5 ml, HYDROcodone-acetaminophen, insulin aspart U-100, ramelteon, senna, sodium chloride 0.9%    Impression & Recommendations    Acute Hypoxic Respiratory Failure  Worsening RLL infiltrate, concerning for aspiration but he does appear to have central congestion and very prominent pulmonary vasculature   - Continue Vanc -low threshold to change to carbapenem if he clinically  deteriorates  - would increase IV diuretics to BID and monitor renal function - strong suggestion of pulmonary edema by CXR and US (b-lines, small bilateral effusions, too small to sample)  - Continue NIV at night and PRN during the day for  pulmonary edema, wean for sats 88-92%  - HFNC when off of NIV  - Continue nebs as needed, no longer needs steroids  - CPT, mobilize    We will continue to follow. Please call with questions.     Dulce Maria Monae M.D.  [employer] Pulmonary/Critical Care Fellow

## 2019-01-28 NOTE — HPI
UROLOGY CONSULT FOR URINARY RETENTION AND A KI    PATIENT ADMITTED 01/16/2019 WITH WEAKNESS COPD AK I AND SACRAL ULCER.  HE HAS A HISTORY OF ALCOHOL ABUSE CHF CHRONIC KIDNEY DISEASE CORONARY DISEASE AND PRIOR HISTORY OF STROKE    HISTORY OBTAINED FROM PATIENT AND WIFE BOTH OF WHOM ARE OF QUESTIONABLE RELIABILITY.  HISTORY ALSO FROM CHART.    PATIENT STATES HE HAS A BASELINE OKAY STRENGTH OF STREAM.  HIS WIFE STATES THAT HE IS MANAGED WITH DIAPERS AND IS INCONTINENT DAY AND NIGHT.  PATIENT DOES NOT KNOW WHEN HE VOIDS.  PATIENT DENIES DYSURIA    WIFE STATES THE PATIENT HAD AN INDWELLING CATHETER PREVIOUSLY FOR THAT WAS INDWELLING FOR MANY MONTHS AND WAS DISCONTINUED FOR UNCLEAR REASONS.  PATIENT AND WIFE DENY THAT HE FOLLOWS WITH A UROLOGIST    PATIENT HAS NEVER HAD UROLOGIC SURGERY AND NO FAMILY HISTORY OF KIDNEY BLADDER PROSTATE CANCER.

## 2019-01-28 NOTE — ASSESSMENT & PLAN NOTE
UNDER STERILE TECHNIQUE A 16 Frisian VILLEGAS CATHETER IS PASSED PER URETHRA.  PROMPT RETURN OF CLEAR URINE OBTAINED.  10 CC PLACED INTO THE BALLOON AND VILLEGAS CATHETER PLACED TO GRAVITY DRAINAGE.    WILL START PATIENT ON FLOMAX.    GIVEN PRIOR HISTORY OF STROKE AND ALCOHOL ABUSE, THE POSSIBILITY OF ATONIC BLADDER NEEDS TO BE ENTERTAINED..    PATIENT SHOULD FOLLOW UP WITH ME 1 MONTH AFTER DISCHARGE FOR VILLEGAS CATHETER CHANGE AND MANAGEMENT OF HIS CATHETER.

## 2019-01-28 NOTE — NURSING
Bladder scan at bedside shows greater than 1000cc    03:04 in and out cath done, 1400cc clear yellow urine noted. Pt states relief of abd pain.

## 2019-01-28 NOTE — CONSULTS
[facility]  Urology  Consult Note    Patient Name: Asad Perez  MRN: [id number]  Admission Date: 1/16/2019  Hospital Length of Stay: 12   Code Status: DNR   Attending Provider: Carl Castro MD   Consulting Provider: Chris Dhaliwal MD  Primary Care Physician: Tomas Torres MD  Principal Problem:Acute on chronic respiratory failure with hypoxia and hypercapnia    Consults    Subjective:     HPI:  UROLOGY CONSULT FOR URINARY RETENTION AND A KI    PATIENT ADMITTED 01/16/2019 WITH WEAKNESS COPD AK I AND SACRAL ULCER.  HE HAS A HISTORY OF ALCOHOL ABUSE CHF CHRONIC KIDNEY DISEASE CORONARY DISEASE AND PRIOR HISTORY OF STROKE    HISTORY OBTAINED FROM PATIENT AND WIFE BOTH OF WHOM ARE OF QUESTIONABLE RELIABILITY.  HISTORY ALSO FROM CHART.    PATIENT STATES HE HAS A BASELINE OKAY STRENGTH OF STREAM.  HIS WIFE STATES THAT HE IS MANAGED WITH DIAPERS AND IS INCONTINENT DAY AND NIGHT.  PATIENT DOES NOT KNOW WHEN HE VOIDS.  PATIENT DENIES DYSURIA    WIFE STATES THE PATIENT HAD AN INDWELLING CATHETER PREVIOUSLY FOR THAT WAS INDWELLING FOR MANY MONTHS AND WAS DISCONTINUED FOR UNCLEAR REASONS.  PATIENT AND WIFE DENY THAT HE FOLLOWS WITH A UROLOGIST    PATIENT HAS NEVER HAD UROLOGIC SURGERY AND NO FAMILY HISTORY OF KIDNEY BLADDER PROSTATE CANCER.    Past Medical History:   Diagnosis Date    Alcohol abuse     CHF (congestive heart failure)     CKD (chronic kidney disease) stage 3, GFR 30-59 ml/min     Coronary artery disease     Decubitus skin ulcer     Heart failure, diastolic     High cholesterol     Hypertension     Immobility     LBP (low back pain)     Prediabetes     PVD (peripheral vascular disease)     Stroke     2006, no deficits    Urine incontinence        Past Surgical History:   Procedure Laterality Date    AMPUTATION-ABOVE KNEE Right 9/12/2016    [facility]formed by [facility] OR    aortic bifemoral bypass  2006    ARTHROPLASTY-KNEE Right 6/27/2016    [facility] Cristino  [facility] Doug [facility]    ARTHROPLASTY-KNEE Left 2014    [facility] at [facility]    bilateral carotid stenois  2006    carotid stents      DEBRIDEMENT-SACRAL WOUND N/A 10/10/2016    [facility] by [facility]ry [facility] at [facility]    DEBRIDEMENT-SACRAL WOUND Bilateral 2016    Performed by Maria [facility]jandra Pichardo DO at [facility] OR    DEBRIDEMENT-SACRAL WOUND N/A 2016    [facility] at [facility] OR    DEBRIDEMENT-WOUND Right 2017    [facility] at [facility]    ESOPHAGOGASTRODUODENOSCOPY (EGD) N/A 2014    Performed by David Ramos MD at [facility] ENDO    ESOPHAGOGASTRODUODENOSCOPY (EGD) with PEG N/A 2016    Performed by Emerson Childers Jr., MD at [facility] END[facility]    EXCHANGE-POLYETHYLENE RIGHT KNEE Right 2016    Performed by [facility]acho [facility]    IOVERA Right 6/15/2016    [facility]    IRRIGATION AND DEBRIDEMENT LOWER EXTREMITY Right 2016    [facility]    IRRIGATION AND DEBRIDEMENT RIGHT KNEE Right 2016    [facility]    JOINT REPLACEMENT      knee    left common endarterectomy  2006    REVISION-AMPUTATION STUMP Right 2016    [facility] at [facility]    RT AKA  2017    SYNOVECTOMY-KNEE Left 2016    [facility] at [facility]    WOUND EXPLORATION Right 2016    Per[facility] by [facility] at [facility] OR       Review of patient's allergies indicates:  No Known Allergies    Family History     Problem Relation (Age of Onset)    Heart disease Mother          Tobacco Use    Smoking status: Former Smoker     Packs/day: 2.00     Years: 45.00     Pack years: 90.00     Last attempt to quit: 2006     Years since quittin.5    Smokeless tobacco: Never Used   Substance and Sexual Activity    Alcohol use: No     Comment: NO ALCOHOL FOR 18 MONTHS, PAST ETOH ABUSE    Drug use: No     Sexual activity: Not on file       Review of Systems   Constitutional: Negative for fever.   HENT: Negative.    Eyes: Negative.    Respiratory: Positive for shortness of breath.    Cardiovascular: Negative.    Gastrointestinal: Negative.    Genitourinary:        PER HISTORY OF PRESENT ILLNESS   Musculoskeletal: Positive for gait problem.        STATUS POST RIGHT ABOVE KNEE AMPUTATION   Skin: Positive for wound.   Neurological: Positive for weakness.   Psychiatric/Behavioral: Negative.        Objective:     Temp:  [97.8 °F (36.6 °C)-98.6 °F (37 °C)] 98.6 °F (37 °C)  Pulse:  [103-123] 117  Resp:  [20-31] 20  SpO2:  [74 %-98 %] 91 %  BP: ()/(50-67) 107/56     Body mass index is 31.14 kg/m².    Date 01/28/19 0700 - 01/29/19 0659   Shift 2566-9671 2032-9786 9333-2410 24 Hour Total   INTAKE   IV Piggyback 100   100   Shift Total(mL/kg) 100(1.1)   100(1.1)   OUTPUT   Shift Total(mL/kg)       Weight (kg) 92.9 92.9 92.9 92.9     Bladder Scan Volume (mL): 850 mL(Notified DR. Dhaliwal and Primary team) (01/28/19 1200)    Drains     Drain                 Urethral Catheter 01/28/19 1245 Double-lumen 16 Fr. less than 1 day                Physical Exam   Nursing note and vitals reviewed.  Constitutional: He is oriented to person, place, and time.   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   Eyes: Conjunctivae are normal. Pupils are equal, round, and reactive to light.   Neck: Normal range of motion. Neck supple.   Cardiovascular: Normal rate and regular rhythm.    Pulmonary/Chest:   NASAL CANNULA IN PLACE   Abdominal: Soft. There is no tenderness.   Genitourinary: Testes normal. Right testis shows no mass. Left testis shows no mass.         Musculoskeletal:   STATUS POST RIGHT ABOVE-THE-KNEE AMPUTATION   Neurological: He is alert and oriented to person, place, and time.   Skin: Skin is warm and dry.     Psychiatric: He has a normal mood and affect. His behavior is normal.        Significant Labs:    BMP:  Recent Labs   Lab 01/27/19  0317 01/27/19  2104 01/28/19 0347   * 133* 134*   K 2.9* 4.4 4.6   CL 91* 89* 91*   CO2 33* 32* 31*   BUN 36* 42* 45*   CREATININE 1.7* 2.2* 2.4*   CALCIUM 8.5* 8.5* 8.6*       CBC:  Recent Labs   Lab 01/26/19  0436 01/27/19 0317 01/28/19  0347   WBC 21.82* 16.56* 16.43*   HGB 10.8* 10.1* 10.4*   HCT 33.6* 31.0* 31.9*    211 221       All pertinent labs results from the past 24 hours have been reviewed.    Significant Imaging:  All pertinent imaging results/findings from the past 24 hours have been reviewed.  CT SCAN FROM YESTERDAY REVIEWED BY ME.  I AGREE WITH MILD-TO-MODERATE BILATERAL HYDRONEPHROSIS AND DISTENDED BLADDER                    Assessment and Plan:     Other hydronephrosis    IF CAUSED BY URINARY TENSION, THEN THEY SHOULD RESOLVE WITH VILLEGAS CATHETER DRAINAGE     Urinary retention    UNDER STERILE TECHNIQUE A 16 [place] VILLEGAS CATHETER IS PASSED PER URETHRA.  PROMPT RETURN OF CLEAR URINE OBTAINED.  10 CC PLACED INTO THE BALLOON AND VILLEGAS CATHETER PLACED TO GRAVITY DRAINAGE.    WILL START PATIENT ON FLOMAX.    GIVEN PRIOR HISTORY OF STROKE AND ALCOHOL ABUSE, THE POSSIBILITY OF ATONIC BLADDER NEEDS TO BE ENTERTAINED..    PATIENT SHOULD FOLLOW UP WITH ME 1 MONTH AFTER DISCHARGE FOR VILLEGAS CATHETER CHANGE AND MANAGEMENT OF HIS CATHETER.     ELAYNE (acute kidney injury)    IF CAUSE BY DISTENDED BLADDER, THIS SHOULD IMPROVE WITH VILLEGAS CATHETER DRAINAGE         VTE Risk Mitigation (From admission, onward)        Ordered     heparin (porcine) injection 5,000 Units  Every 12 hours      01/19/19 1749     IP VTE HIGH RISK PATIENT  Once      01/16/19 1926          Thank you for your consult. I will follow-up with patient. Please contact us if you have any additional questions.    Chris Dhaliwal MD  Urology  [facility]

## 2019-01-29 NOTE — PROGRESS NOTES
The Sw spoke to Iva at [facility] and she states they can handle the high flow and continuous bipap but states she will check to see if they have an ICU bed. The Sw will speak with the team in regards to this matter. The Sw informed her she's unsure if the pt's stable for transport.

## 2019-01-29 NOTE — CONSULTS
[facility]  Cardiology  Consult Note    Patient Name: Asad Perez  MRN: [id number]  Admission Date: 1/16/2019  Hospital Length of Stay: 13 days  Code Status: DNR   Attending Provider: Carl Castro MD   Consulting Provider: CYNDIE Stone ANP  Primary Care Physician: Tomas Torres MD  Principal Problem:Acute on chronic respiratory failure with hypoxia and hypercapnia    Patient information was obtained from patient and past medical records.     [facility]patient consult [facility]  Consult performed by: CYNDIE Bañuelos ANP  Consult ordered by: Garry Natarajan MD  Reason for consult: afib        Subjective:     Chief Complaint:  Weakness; atrial fibrillation      HPI:   78yo male with history of PAD s/p left CFA endarectomy with aortobifem bypass complicated by CVA; carotid disease, HTN, CKD, CVA, COPD, atrial fibrillation who presented to the ER with complaints of weakness after recent discharge on 1/9/2019 with treatment of COPD exacerbation likely related to influenza and bacteremia. He was admitted to [facility] and placed in the ICU. In addition to weakness, he complained of SOB. He reports SOB with minimal exertion but no SOB when at rest. He was discharged home on O2 2LPM via NC but increased it to 4LPM due to his SOB. He reported compliance with his medication regimen. He also complained of pain to his sacral decubitus site that was not relieved with oral pain medications. He is a poor historian and his wife was not available at the bedside therefore most of his HPI was obtained from admit H&P. Apparently his wife convinced him to come to the ER due to the pain not relieved by pain meds and due to his worsening SOB.    Hospital Course:    Labs upon admission:  troponin .014-.045. Lactic acid 1.2 and procal .28. Creatinine 1.7  Baseline .6-1.30 6200-1503 with steady trend up over past 72 hours 1.7-2.2-2.4-2.5      CXR 1/26/2019 Patchy  areas of airspace consolidation bilateral mid and lower lung zones concerning for edema or patchy pneumonia or aspiration    CT abdomen 1/27 Small bilateral pleural effusions with associated lower lobe consolidation and/or atelectasis.  Small pericardial effusion.  Interstitial infiltrates are pneumonitis in the aerated lung fields versus interstitial edema.  Question on pulmonary fibrosis history.  Cholelithiasis.  Mild-to-moderate bilateral hydroureteronephrosis.  Marked urinary bladder distention.  Question bladder outlet obstruction or other etiology.  Umbilical hernia containing a knuckle of non incarcerated bowel.  Mildly patulous rectum distended with stool.  Question any impaction.    EKG initial NRS HR 83 normal axis     · Echocardiogram Concentric left ventricular remodeling.  · Normal left ventricular systolic function. The estimated ejection fraction is 55%  · Grade I (mild) left ventricular diastolic dysfunction consistent with impaired relaxation.  · Normal right ventricular systolic function.  · Mild left atrial enlargement.  · Mild tricuspid regurgitation.  · Moderate pulmonary hypertension present.  · Local segmental wall motion abnormalities with inferior akinesis  · Normal central venous pressure (3 mm Hg).  · The estimated PA systolic pressure is 47 mm Hg  · Normal left atrial pressure              Past Medical History:   Diagnosis Date    Alcohol abuse     CHF (congestive heart failure)     CKD (chronic kidney disease) stage 3, GFR 30-59 ml/min     Coronary artery disease     Decubitus skin ulcer     Heart failure, diastolic     High cholesterol     Hypertension     Immobility     LBP (low back pain)     Prediabetes     PVD (peripheral vascular disease)     Stroke     2006, no deficits    Urine incontinence        Past Surgical History:   Procedure Laterality Date    AMPUTATION-ABOVE KNEE Right 9/12/2016    [facility]    aortic bifemoral bypass  2006     ARTHROPLASTY-KNEE Right 6/27/2016    [facility]    ARTHROPLASTY-KNEE Left 7/14/2014    [facility]    bilateral carotid stenois  2006    carotid stents      DEBRIDEMENT-SACRAL WOUND N/A 10/10/2016    [facility]formed by [facility] at [facility] OR    DEBRIDEMENT-SACRAL WOUND Bilateral 9/29/2016    [facility]formed by [facility] at [facility] OR    DEBRIDEMENT-SACRAL WOUND N/A 9/16/2016    Performed by [facility] at [facility] OR    DEBRIDEMENT-WOUND Right 4/8/2017    [facility]formed by [facility] at [facility] OR    ESOPHAGOGASTRODUODENOSCOPY (EGD) N/A 11/4/2014    Performed by David Ramos MD at [facility] ENDO    ESOPHAGOGASTRODUODENOSCOPY (EGD) with PEG N/A 9/23/2016    Performed by Emerson Childers Jr., MD at [facility] ENDO    EXCHANGE-POLYETHYLENE RIGHT KNEE Right 7/12/2016    [facility]formed by [facility] at [facility] OR    IOVERA Right 6/15/2016    [facility] by [facility]    IRRIGATION AND DEBRIDEMENT LOWER EXTREMITY Right 9/9/2016    [facility]formed [facility]    IRRIGATION AND DEBRIDEMENT RIGHT KNEE Right 7/12/2016    [facility]formed by [facility] OR    JOINT REPLACEMENT      knee    left common endarterectomy  2006    REVISION-AMPUTATION STUMP Right 12/1/2016    [facility] at [facility]    RT AKA  NOV 2017    SYNOVECTOMY-KNEE Left 9/9/2016    Performed by [facility] OR    WOUND EXPLORATION Right 9/9/2016    Performed by [facility] at [facility] OR       Review of patient's allergies indicates:  No Known Allergies    No current facility-administered medications on file prior to encounter.      Current Outpatient Medications on File Prior to Encounter   Medication Sig    albuterol-ipratropium 2.5mg-0.5mg/3mL (DUO-NEB) 0.5 mg-3 mg(2.5 mg base)/3 mL nebulizer solution Take 3 mLs by nebulization every 6 (six) hours while awake. (Patient taking differently: Take 3 mLs by  nebulization 4 (four) times daily. )    amiodarone (PACERONE) 200 MG Tab Take 1 tablet (200 mg total) by mouth once daily.    amLODIPine (NORVASC) 10 MG tablet Take 10 mg by mouth once daily.    ascorbic acid, vitamin C, (VITAMIN C) 500 MG tablet Take 500 mg by mouth 2 (two) times daily.    aspirin (ECOTRIN) 81 MG EC tablet Take 81 mg by mouth once daily.    atorvastatin (LIPITOR) 40 MG tablet Take 40 mg by mouth once daily.    carvedilol (COREG) 25 MG tablet Take 1 tablet (25 mg total) by mouth 2 (two) times daily.    clotrimazole-betamethasone 1-0.05% (LOTRISONE) cream Apply topically 2 (two) times daily.    furosemide (LASIX) 40 MG tablet     multivitamin (THERAGRAN) per tablet Take 1 tablet by mouth once daily.    oxycodone-acetaminophen (PERCOCET) 5-325 mg per tablet Take 1 tablet by mouth every 4 (four) hours as needed for Pain.    polyethylene glycol (GLYCOLAX) 17 gram/dose powder Take 17 g by mouth once daily.    traMADol (ULTRAM) 50 mg tablet Take 50 mg by mouth every 6 (six) hours as needed for Pain.     Family History     Problem Relation (Age of Onset)    Heart disease Mother        Tobacco Use    Smoking status: Former Smoker     Packs/day: 2.00     Years: 45.00     Pack years: 90.00     Last attempt to quit: 2006     Years since quittin.5    Smokeless tobacco: Never Used   Substance and Sexual Activity    Alcohol use: No     Comment: NO ALCOHOL FOR 18 MONTHS, PAST ETOH ABUSE    Drug use: No    Sexual activity: Not on file     Review of Systems   Constitution: Positive for weakness.   Cardiovascular: Positive for dyspnea on exertion.   Respiratory: Positive for shortness of breath.      Objective:     Vital Signs (Most Recent):  Temp: 98 °F (36.7 °C) (19 1237)  Pulse: (!) 123 (19 1110)  Resp: (!) 22 (19 1110)  BP: (!) 71/48 (19 1000)  SpO2: 96 % (19 1110) Vital Signs (24h Range):  Temp:  [98 °F (36.7 °C)-98.4 °F (36.9 °C)] 98 °F (36.7 °C)  Pulse:   [110-128] 123  Resp:  [18-37] 22  SpO2:  [84 %-97 %] 96 %  BP: ()/(48-73) 71/48     Weight: 92.9 kg (204 lb 12.9 oz)  Body mass index is 31.14 kg/m².    SpO2: 96 %  O2 Device (Oxygen Therapy): Comfort Flow      Intake/Output Summary (Last 24 hours) at 1/29/2019 1437  Last data filed at 1/28/2019 1800  Gross per 24 hour   Intake 200 ml   Output 525 ml   Net -325 ml       Lines/Drains/Airways     Drain                 Urethral Catheter 01/28/19 1245 Double-lumen 16 Fr. 1 day          Pressure Ulcer                 Pressure Injury 01/09/19 2327 Sacral spine Stage 3 19 days          Peripheral Intravenous Line                 Peripheral IV - Single Lumen 01/23/19 0854 Right Antecubital 6 days         Peripheral IV - Single Lumen 01/28/19 1800 Posterior;Right Hand less than 1 day                Physical Exam   Constitutional: He is oriented to person, place, and time. He appears well-developed and well-nourished. No distress.   Cardiovascular: An irregularly irregular rhythm present. Tachycardia present. Exam reveals no gallop.   No murmur heard.  Pulmonary/Chest: Effort normal and breath sounds normal. Tachypnea noted. No respiratory distress. He has no wheezes.   Abdominal: Soft. Bowel sounds are normal. He exhibits no distension. There is no tenderness.   Neurological: He is alert and oriented to person, place, and time.   Skin: Skin is warm and dry.       Significant Labs:     Recent Labs   Lab 01/29/19  0346   *   K 3.6   CL 88*   CO2 32*   BUN 50*   CREATININE 2.5*     Recent Labs   Lab 01/29/19  0346   WBC 10.35   RBC 3.44*   HGB 9.9*   HCT 30.0*      MCV 87   MCH 28.8   MCHC 33.0       Significant Imaging:     TTE 1/26/2019    · Concentric left ventricular remodeling.  · Normal left ventricular systolic function. The estimated ejection fraction is 55%  · Grade I (mild) left ventricular diastolic dysfunction consistent with impaired relaxation.  · Normal right ventricular systolic  function.  · Mild left atrial enlargement.  · Mild tricuspid regurgitation.  · Moderate pulmonary hypertension present.  · Local segmental wall motion abnormalities with inferior akinesis  · Normal central venous pressure (3 mm Hg).  · The estimated PA systolic pressure is 47 mm Hg  · Normal left atrial pressure    Assessment and Plan:     History of atrial fibrillation    -paroxysmal  -admit EKG with NSR; currently afib with HR 120s  -on chronic Amiodarone therapy; do not necessarily feel Amiodarone contributing to current pulmonary issues and feel current issues more volume related  -not opposed to discontinuation of Amiodarone however unable to supplement with other rate lower agent ie BB or Digoxin due to marginal BP and ELAYNE   -even though Amiodarone is not the best rate lowering agent at this point it is our only option; overall goal is for rate control rather than rhythm control     ELAYNE (acute kidney injury)    -creatinine 2.5 with steady trend up from 1.7-2.2-2.4  -multifactoral; concerning for volume overload          VTE Risk Mitigation (From admission, onward)        Ordered     heparin (porcine) injection 5,000 Units  Every 12 hours      01/19/19 1749     IP VTE HIGH RISK PATIENT  Once      01/16/19 1926          Thank you for your consult. I will sign off. Please contact us if you have any additional questions.    CYNDIE Stone, ANP  Cardiology   [facility]

## 2019-01-29 NOTE — ASSESSMENT & PLAN NOTE
-creatinine 2.5 with steady trend up from 1.7-2.2-2.4  -multifactoral; concerning for volume overload

## 2019-01-29 NOTE — PLAN OF CARE
No further stool or bleeding, bath and linen change, continues sob with exertion along with inc heart rate, denies sob or chest pain

## 2019-01-29 NOTE — EICU
Called unit to inform nurse of pt's sats running from 87-89% while on NC. However, pt is mouth breathing.  Asked if they would check pt  And or place pt on bipap.   01:45 Resp therapist is placing pt on bipap.

## 2019-01-29 NOTE — PLAN OF CARE
Denies sob, but does become sob with exertion, sat's 88-92 will wean 02  As hernandez, lungs with fine crackles left bases, abd very distended, firm, hyperactive bowel sounds, co of pain throughout, told probably gas, has cramping, respositioned on side, states does pass gas , small amt, no nausea, also has generalized co of discomfort all over , alaina with movement

## 2019-01-29 NOTE — PROGRESS NOTES
"[facility] Medicine Progress Note, HO-I     Primary Team: [facility] Hospitalist Team B  Attending Physician: Gloria  Resident: Jose Natarajan  Intern: Mello Butler     Subjective:      Patient cannot remember when he last had a BM.  He is still passing gas but reports his abdominal pain is better.  He denies SOB or leg pain.  Has switched between high flow NC and bipap because bipap mask is uncomfortable.  He has been medically accepted to [facility].     Objective:      Last 24 Hour Vital Signs:  BP  Min: 112/49  Max: 149/63  Temp  Av.4 °F (36.3 °C)  Min: 96.2 °F (35.7 °C)  Max: 98.4 °F (36.9 °C)  Pulse  Av  Min: 57  Max: 67  Resp  Av  Min: 14  Max: 42  SpO2  Av.5 %  Min: 92 %  Max: 96 %  Height  Av' 8" (172.7 cm)  Min: 5' 8" (172.7 cm)  Max: 5' 8" (172.7 cm)  Weight  Av.8 kg (186 lb 15.2 oz)  Min: 73.4 kg (161 lb 13.1 oz)  Max: 96.2 kg (212 lb 1.3 oz)  I/O last 3 completed shifts:  In: 1760 [P.O.:810; IV Piggyback:950]  Out: 2540 [Urine:2540]     Physical Examination:  Gen: AAOx3, wearing high flow NC  HEENT: head normocephalic, atraumatic  Cardiac: regular rate and rhythm; no murmurs  Resp: crackles in the bilateral bases; diffuse expiratory wheezes throughout, normal work of breathing, no rhonchi  GI: abdomen soft, non-tender, non-distended; umbilical hernia present (reducible); normoactive bowel sounds, lesions, ecchymosis and skin breakdown at injection sites  Extrem: 1+ pitting edema to the bilateral lower extremities; no clubbing noted; right leg AKA with pain to palpation, L UE edematous (R normal)  Skin: no rashes; sacral decubitus ulcer with mepilex in place, extensive bruising on extremities and abdomen with skin breakdown   Neuro: AAOx3, moving all extremities without difficulty        Laboratory:  Laboratory Data Reviewed: yes  Pertinent Findings:    WBC 10.35  Cr 2.5  BUN 50     Microbiology Data Reviewed: yes  Pertinent Findings:  Previous cultures drawn 19 grew " E. coli  Blood Cx 1/16: NGTD     Other Results:  EKG (my interpretation): No new studies     Radiology Data Reviewed: yes  Pertinent Findings:  CXR 1/16:  Mild atelectasis at the left lung base or subsegmental airspace disease.    CXR 1/23:  There is moderate perihilar and bibasilar lung opacities, increased.  Probable small effusions.  No gross pneumothorax.  Heart size unchanged     CT Chest, A/P 1/16:  1. No evidence of PE.  2. Small bilateral pleural effusions resulting in volume loss and compressive atelectasis within the lower lobes, left greater than right with additional atelectasis versus consolidation seen within the lingula of the left upper lobe.  3. Extensive atherosclerosis with postsurgical changes of aorto bi-iliac graft.  Extensive chronic vascular findings and occlusions as detailed above.  These findings do not appear significantly changed when compared to previous CTA abdomen and pelvis from March 2018.  4. Otherwise no acute intra-abdominal abnormalities identified.  5. Cholelithiasis.  6. Multiple additional findings as detailed above.     Current Medications:     Infusions:     Scheduled:   albuterol-ipratropium  3 mL Nebulization Q4H    ascorbic acid (vitamin C)  500 mg Oral BID    aspirin  81 mg Oral Daily    atorvastatin  40 mg Oral Daily    azithromycin  500 mg Intravenous Q24H    ceFEPime (MAXIPIME) IVPB  2 g Intravenous Q12H    heparin (porcine)  5,000 Units Subcutaneous Q8H    predniSONE  40 mg Oral Daily    vancomycin (VANCOCIN) IVPB  1,250 mg Intravenous Q24H         PRN:  dextrose 50%, dextrose 50%, glucagon (human recombinant), glucose, glucose, HYDROcodone-acetaminophen, insulin aspart U-100, sodium chloride 0.9%     Antibiotics and Day Number of Therapy:    Levofloxacin 1/9 - 1/17   Vancomycin 1/17 - 1/18  Cefepime 1/17 - 1/19   Azithromycin 1/17 - present   Zosyn 1/16 - present     Lines and Day Number of Therapy:  PIV placed 1/23     Assessment:      Asad Perez  is a 78 y.o.male with       Patient Active Problem List     Diagnosis Date Noted    Hyperkalemia 01/17/2019    Respiratory failure with hypoxia and hypercapnia 01/16/2019    COPD with acute exacerbation 01/09/2019    Abdominal pain 03/19/2018    COPD exacerbation 03/16/2018    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    Abdominal rigidity, generalized      Complicated open wound of right hip      Acute osteomyelitis 04/11/2017    Decubitus ulcer of buttock, stage 2 04/07/2017    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    Urinary tract infection associated with indwelling urethral catheter 12/01/2016    Centrilobular emphysema 11/29/2016    Symptomatic anemia 11/27/2016    Deep vein thrombosis (DVT) of brachial vein      Non-healing ulcer of lower leg 10/28/2016    Fever      Decubitus ulcer of sacral region, stage 4 10/05/2016    Deep vein thrombosis (DVT) of upper extremity 10/05/2016    Dehiscence of perineal wound 10/05/2016    Hyponatremia 10/05/2016    VRE (vancomycin-resistant Enterococci) infection 09/20/2016    Elevated liver enzymes      Chronic diastolic congestive heart failure      Critical lower limb ischemia 09/10/2016    Stenosis of left internal carotid artery 09/08/2016    Abnormal finding on imaging 08/31/2016    Abnormal CT of the abdomen      Chronic kidney disease, stage V 08/12/2016    Anemia 08/12/2016    Anemia of chronic disease 08/08/2016    Altered mental status 08/07/2016    MRSA (methicillin resistant Staphylococcus aureus) infection 08/07/2016    Confusion      Weak      Pneumonia 07/29/2016    History of atrial fibrillation      Infected prosthetic knee joint 07/12/2016    Right knee pain 06/15/2016    GERD (gastroesophageal reflux disease) 02/28/2015    Esophageal web 02/28/2015    Acute on chronic diastolic heart failure 11/08/2014    Transaminitis 11/08/2014    Chronic obstructive pulmonary disease 11/07/2014    Dyspnea  11/06/2014    Normocytic anemia 11/06/2014    Dysphagia 11/03/2014    Osteoarthritis of left knee 07/14/2014    Essential hypertension 07/16/2013    CKD (chronic kidney disease) 07/16/2013    PAD (peripheral artery disease) 07/16/2013    CAD (coronary artery disease) 07/16/2013    History of stroke 07/16/2013    Physical deconditioning 07/16/2013    Alcohol abuse 07/16/2013         Plan:      #AoC Hypercapnic Respiratory Failure Likely 2/2 COPD Exacerbation:  -Recently admitted for this; Blood cultures grew gram negative rods; Discharged on 1/13 with 14 days total of Levofloxacin, 5 days of Prednisone and Oseltamavir, continuing his home 2L oxygen  - SpO2 70's on no supplemental oxygen upon arrival to ED   - ABG with respiratory acidosis, 7.227, pCO2 78 and pO2 292   - IV steroids & 1hr nebulizer with improved sats, 100% on non-rebreather 100% FiO2  - Procal 0.28, Lactate 0.9  - CXR w/ left lung atelectasis, relatively unchanged from previous admission  - CT Chest & Abd w/ small bilateral pleural effusion, compressive atelectasis, left lingula with consolidation vs atelectasis, cholelithiasis  - Zosyn started in ED, Lasix 80 IV   - Admitted to ICU for continuous BiPAP  - Stepped down 1/17, continue nightly BiPAP  - Started on Vanc and Zosyn then de-escalated to azithro and cefipime, broadened back to azithro and zosyn for leukocytosis  - currently on bipap qHS, will continue to monitor  - ABG without acidosis, hypoxia, or hypercapnia  - received home bipap   - CXR 1/23 with increased perihilar and bibasilar opacities, given 40mg IV lasix  - Lasix 80 IV BID switched to 80 PO BID, now switching to 80 IV daily as per Pulm  - Will repeat echocardiogram and consult pulmonology for inadequate improvement of respiratory status  - TTE with moderate pulmonary hypertension, normal CVP, EF 55%, PA sys pressure 47 (unchanged from prior study)  - moved to ICU 1/26 for increasing oxygen needs  - Pulm recs continuing IV  diuresis, escalating antibiotics if WBC and respiratory status continues to worsen, continue NIV  - continue to try to wean down oxygen   -Unsure if amiodarone has to do with worsening pulmonary status - will talk with cardiology about possibly switching medication to control a fib    Urinary retention  -CT abdomen/pelvis showing hydroureteronephrosis, markedly distended bladder   -Urology consulted- recommended inserting gonzalez, start tamsulosin 0.4 mg daily  -Will monitor creatinine to see if obstructive process has anything to do with kidney function  -Schedule for follow up with Dr. Dhaliwal 1 month after discharge    Constipation  -No BM x4 days  -Started senna, colace   -Will give soap suds enema      #ELAYNE on CKD 3B:  -Cr 1.7 on admit; baseline Cr ~1.2  - Lasix 80mg IV in ED for concern for volume overload, good urinary response  - Avoid nephrotoxic agents, renally dose medications  - Cr decreasin.6 today   - last echo 2016 with EF 65%, improving with light IVF  - plan to discharge on home dose lasix 40 PO   - Lasix as above, monitor creatinine     #Recent GNR Bacteremia 2/2 Stage 3 Sacral Decubitus Ulcer:  - Course of antibiotics during admission earlier this month  - Blood Cx  NGTD, repeat Blood Cx  NGTD  - Consult to Wound Care. Rec mepilex a/g every other day  - Will need wound care on discharge  - Dr Coffman following as per pt, outpt follow up established  - wound clean, dry, nonerythemetous, no signs of infection     #Transaminitis:  - , AST 202, Alk Phos 161   - RUQ U/S earlier this month showing cholelithiasis without acute cholecystitis   - Neuroendocrine Surg was consulted at that time, no concern for acute cholecystitis and no indication for surgical intervention (LFTs more indicative of hepatic origin vs cholestatic)  - Intermittent complaints of abdominal pain, denied to other team members   - CT Abd  with cholelithiasis  - LFTs improving     #HFpEF:  - TTE  (11/28/16) w/ mild LA enlargement, concentric hypertrophy, normal LVSF, left ventricular diastolic dysfunction, pulmonary HTN (PA sys pressure ~47)  - Home Meds: Amiodarone 200mg daily, Carvedilol 25mg BID, Lasix 40 PO daily  - , Troponin WNL   - Lasix 80mg IV x1 in ED with good UOP  - Resume home meds  -  fluid restrict to 1 liter  - repeat echo unchanged     #CAD, PVD, RLE AKA:  - Home Meds: ASA 81mg daily, Atorvastatin 40mg daily  - Resume home meds     #History of CVA:  - CVA in 2007, no focal deficits on exam  - Home Meds: ASA 81mg daily, Atorvastatin 40mg daily  - Resume home meds     #Constipation:  - Polyethylene glycol  - soap suds enema  - has had multiple bowel movements today     #History of Alcohol Abuse:  -No alcohol since 2007     #Healthcare Maintenance:  - UTD on flu, tetanus, pneumovax   - Follow up appointments: Dr. Dhaliwal (Urology) 1 month after discharge        Diet: Diabetic  PPX: [facility]  Dispo: pending improvement in SOB and weaning of HF, talking with cardiology, pending diuresis, continue antibiotics; medically accepted at [facility]     Coleman Butler MD  [facility] Internal Medicine HO-1     [facility] Medicine Hospitalist Pager numbers:   [facility] Hospitalist Medicine Team A (Chay/Shawanda):          [phone]  [facility] Hospitalist Medicine Team B (Gloria/Alex):        [phone]

## 2019-01-29 NOTE — PROGRESS NOTES
Pulmonary & Critical Care Medicine Progress Note    Subjective:   Mr. Perez is complaining of some gum and abdominal pain this am. No complaints of SOB or chest pain. No fevers, nausea, or vomiting.     Past medical, surgical, family, and social history from initial consult was reviewed and verified during today's visit.     Vital Signs:   Temp:  [98 °F (36.7 °C)-98.4 °F (36.9 °C)] 98 °F (36.7 °C)  Pulse:  [110-128] 123  Resp:  [18-37] 22  SpO2:  [84 %-97 %] 96 %  BP: ()/(48-73) 71/48      Fluid Balance:     Intake/Output Summary (Last 24 hours) at 1/29/2019 1401  Last data filed at 1/28/2019 1800  Gross per 24 hour   Intake 200 ml   Output 525 ml   Net -325 ml       Review of Systems:   A comprehensive 12-point review of systems was performed, and is negative except for those items mentioned above in the HPI section of this note.     Physical Exam:   General: ill appearing WM, NAD, cooperative & interactive.  HEENT: AT/NC, PERRL, EOMI, oral and nasal mucosa moist.   Cardiac: tachycardic, normal rhythm with no MRG with brisk cap refill and symmetric pulses in distal extremities.  Respiratory: Normal inspection. Symmetric chest rise.course breath sounds bilaterally, crackles bibasilarly, no wheezes. Mildly increased work of breathing.    Abdomen: Soft, NT/ND. +BS.   Extremities: minimal edema   Neuro: Grossly intact to brief exam. Oriented x3 with appropriate mood/affect to situation.     Personal Review and Summary of Interval Diagnostics    Laboratory Studies:   No results for input(s): PH, PCO2, PO2, HCO3, POCSATURATED, BE in the last 24 hours.  Recent Labs   Lab 01/29/19  0346   WBC 10.35   RBC 3.44*   HGB 9.9*   HCT 30.0*      MCV 87   MCH 28.8   MCHC 33.0     Recent Labs   Lab 01/29/19  0346   *   K 3.6   CL 88*   CO2 32*   BUN 50*   CREATININE 2.5*       Microbiology Data:   Microbiology Results (last 7 days)     Procedure Component Value Units Date/Time    Urine Culture High Risk  [[id number]] Collected:  01/28/19 1348    Order Status:  Sent Specimen:  Urine, Catheterized Updated:  01/28/19 1635    Culture, Respiratory with Gram Stain [[id number]]     Order Status:  No result Specimen:  Respiratory         Chest Imaging:     Infusions:        Scheduled Medications:    albuterol-ipratropium  3 mL Nebulization Q4H    amiodarone  200 mg Oral Daily    amLODIPine  10 mg Oral Daily    ascorbic acid (vitamin C)  500 mg Oral BID    aspirin  81 mg Oral Daily    atorvastatin  40 mg Oral Daily    azithromycin  500 mg Intravenous Q24H    docusate sodium  100 mg Oral Daily    furosemide  80 mg Intravenous Daily    heparin (porcine)  5,000 Units Subcutaneous Q12H    insulin detemir U-100  10 Units Subcutaneous Daily    piperacillin-tazobactam (ZOSYN) IVPB  4.5 g Intravenous Q12H    sodium phosphates  1 enema Rectal Once    tamsulosin  0.4 mg Oral Daily       PRN Medications:   acetaminophen, benzonatate, dextrose 50%, dextrose 50%, glucagon (human recombinant), glucose, glucose, guaifenesin 100 mg/5 ml, HYDROcodone-acetaminophen, insulin aspart U-100, ramelteon, senna, sodium chloride 0.9%    Impression & Recommendations    Acute Hypoxic Respiratory Failure  Worsening RLL infiltrate, concerning for aspiration but he does appear to have central congestion and very prominent pulmonary vasculature   - would try to get sputum culture if posssible  - he does need continued diuresis, will have to be cautious with his low BP, however that is going to ultimately be what helps his respiratory status.   - Continue NIV at night and PRN during the day for  pulmonary edema, wean for sats 88-92%  - HFNC when off of NIV  - Continue nebs as needed, no longer needs steroids  - CPT, mobilize, PT/OT    We will continue to follow. Please call with questions.     Dulce Maria Monae M.D.  [employer] Pulmonary/Critical Care Fellow

## 2019-01-29 NOTE — SUBJECTIVE & OBJECTIVE
Past Medical History:   Diagnosis Date    Alcohol abuse     CHF (congestive heart failure)     CKD (chronic kidney disease) stage 3, GFR 30-59 ml/min     Coronary artery disease     Decubitus skin ulcer     Heart failure, diastolic     High cholesterol     Hypertension     Immobility     LBP (low back pain)     Prediabetes     PVD (peripheral vascular disease)     Stroke     2006, no deficits    Urine incontinence        Past Surgical History:   Procedure Laterality Date    AMPUTATION-ABOVE KNEE Right 9/12/2016    Performed by [facility] at [facility]    aortic bifemoral bypass  2006    ARTHROPLASTY-KNEE Right 6/27/2016    Performed by [facility] at [facility]    ARTHROPLASTY-KNEE Left 7/14/2014    Performed by [facility] at [facility]    bilateral carotid stenois  2006    carotid stents      DEBRIDEMENT-SACRAL WOUND N/A 10/10/2016    Performed by Michale Irizarry MD at [facility]    DEBRIDEMENT-SACRAL WOUND Bilateral 9/29/2016    Performed by Maria Alejandra Pichardo DO at [facility]    DEBRIDEMENT-SACRAL WOUND N/A 9/16/2016    Performed by [facility]ardo [facility] at [facility]    DEBRIDEMENT-WOUND Right 4/8/2017    Performed by Michael [facility]arry MD at [facility]    ESOPHAGOGASTRODUODENOSCOPY (EGD) N/A 11/4/2014    Performed by David Ramos MD at [facility]    ESOPHAGOGASTRODUODENOSCOPY (EGD) with PEG N/A 9/23/2016    Performed by Emerson Childers Jr., MD at [facility]    EXCHANGE-POLYETHYLENE RIGHT KNEE Right 7/12/2016    Performed by [facility] Camacho [facility] at [facility]    IOVERA Right 6/15/2016    Performed by [facility]ino [facility]acho [facility] at [facility]    IRRIGATION AND DEBRIDEMENT LOWER EXTREMITY Right 9/9/2016    Performed by [facility] at [facility]    IRRIGATION AND DEBRIDEMENT RIGHT KNEE Right 7/12/2016    Performed by [facility] Camacho [facility] at [facility]    JOINT REPLACEMENT      knee    left common endarterectomy  2006    REVISION-AMPUTATION STUMP Right 12/1/2016    Performed by Michael [facility]ry MD at [facility]  OR    RT AKA  2017    SYNOVECTOMY-KNEE Left 2016    Performed by [facility] at [facility]    WOUND EXPLORATION Right 2016    Performed by [facility] MD at [facility] OR       Review of patient's allergies indicates:  No Known Allergies    No current facility-administered medications on file prior to encounter.      Current Outpatient Medications on File Prior to Encounter   Medication Sig    albuterol-ipratropium 2.5mg-0.5mg/3mL (DUO-NEB) 0.5 mg-3 mg(2.5 mg base)/3 mL nebulizer solution Take 3 mLs by nebulization every 6 (six) hours while awake. (Patient taking differently: Take 3 mLs by nebulization 4 (four) times daily. )    amiodarone (PACERONE) 200 MG Tab Take 1 tablet (200 mg total) by mouth once daily.    amLODIPine (NORVASC) 10 MG tablet Take 10 mg by mouth once daily.    ascorbic acid, vitamin C, (VITAMIN C) 500 MG tablet Take 500 mg by mouth 2 (two) times daily.    aspirin (ECOTRIN) 81 MG EC tablet Take 81 mg by mouth once daily.    atorvastatin (LIPITOR) 40 MG tablet Take 40 mg by mouth once daily.    carvedilol (COREG) 25 MG tablet Take 1 tablet (25 mg total) by mouth 2 (two) times daily.    clotrimazole-betamethasone 1-0.05% (LOTRISONE) cream Apply topically 2 (two) times daily.    furosemide (LASIX) 40 MG tablet     multivitamin (THERAGRAN) per tablet Take 1 tablet by mouth once daily.    oxycodone-acetaminophen (PERCOCET) 5-325 mg per tablet Take 1 tablet by mouth every 4 (four) hours as needed for Pain.    polyethylene glycol (GLYCOLAX) 17 gram/dose powder Take 17 g by mouth once daily.    traMADol (ULTRAM) 50 mg tablet Take 50 mg by mouth every 6 (six) hours as needed for Pain.     Family History     Problem Relation (Age of Onset)    Heart disease Mother        Tobacco Use    Smoking status: Former Smoker     Packs/day: 2.00     Years: 45.00     Pack years: 90.00     Last attempt to quit: 2006     Years since quittin.5    Smokeless tobacco: Never Used   Substance  and Sexual Activity    Alcohol use: No     Comment: NO ALCOHOL FOR 18 MONTHS, PAST ETOH ABUSE    Drug use: No    Sexual activity: Not on file     Review of Systems   Constitution: Positive for weakness.   Cardiovascular: Positive for dyspnea on exertion.   Respiratory: Positive for shortness of breath.      Objective:     Vital Signs (Most Recent):  Temp: 98 °F (36.7 °C) (01/29/19 1237)  Pulse: (!) 123 (01/29/19 1110)  Resp: (!) 22 (01/29/19 1110)  BP: (!) 71/48 (01/29/19 1000)  SpO2: 96 % (01/29/19 1110) Vital Signs (24h Range):  Temp:  [98 °F (36.7 °C)-98.4 °F (36.9 °C)] 98 °F (36.7 °C)  Pulse:  [110-128] 123  Resp:  [18-37] 22  SpO2:  [84 %-97 %] 96 %  BP: ()/(48-73) 71/48     Weight: 92.9 kg (204 lb 12.9 oz)  Body mass index is 31.14 kg/m².    SpO2: 96 %  O2 Device (Oxygen Therapy): Comfort Flow      Intake/Output Summary (Last 24 hours) at 1/29/2019 1437  Last data filed at 1/28/2019 1800  Gross per 24 hour   Intake 200 ml   Output 525 ml   Net -325 ml       Lines/Drains/Airways     Drain                 Urethral Catheter 01/28/19 1245 Double-lumen 16 Fr. 1 day          Pressure Ulcer                 Pressure Injury 01/09/19 2327 Sacral spine Stage 3 19 days          Peripheral Intravenous Line                 Peripheral IV - Single Lumen 01/23/19 0854 Right Antecubital 6 days         Peripheral IV - Single Lumen 01/28/19 1800 Posterior;Right Hand less than 1 day                Physical Exam   Constitutional: He is oriented to person, place, and time. He appears well-developed and well-nourished. No distress.   Cardiovascular: An irregularly irregular rhythm present. Tachycardia present. Exam reveals no gallop.   No murmur heard.  Pulmonary/Chest: Effort normal and breath sounds normal. Tachypnea noted. No respiratory distress. He has no wheezes.   Abdominal: Soft. Bowel sounds are normal. He exhibits no distension. There is no tenderness.   Neurological: He is alert and oriented to person, place, and  time.   Skin: Skin is warm and dry.       Significant Labs:     Recent Labs   Lab 01/29/19  0346   *   K 3.6   CL 88*   CO2 32*   BUN 50*   CREATININE 2.5*     Recent Labs   Lab 01/29/19  0346   WBC 10.35   RBC 3.44*   HGB 9.9*   HCT 30.0*      MCV 87   MCH 28.8   MCHC 33.0       Significant Imaging:     TTE 1/26/2019    · Concentric left ventricular remodeling.  · Normal left ventricular systolic function. The estimated ejection fraction is 55%  · Grade I (mild) left ventricular diastolic dysfunction consistent with impaired relaxation.  · Normal right ventricular systolic function.  · Mild left atrial enlargement.  · Mild tricuspid regurgitation.  · Moderate pulmonary hypertension present.  · Local segmental wall motion abnormalities with inferior akinesis  · Normal central venous pressure (3 mm Hg).  · The estimated PA systolic pressure is 47 mm Hg  · Normal left atrial pressure

## 2019-01-29 NOTE — HOSPITAL COURSE
Labs upon admission:  troponin .014-.045. Lactic acid 1.2 and procal .28. Creatinine 1.7  Baseline .6-1.30 [id number] with steady trend up over past 72 hours 1.7-2.2-2.4-2.5      CXR 1/26/2019 Patchy areas of airspace consolidation bilateral mid and lower lung zones concerning for edema or patchy pneumonia or aspiration    CT abdomen 1/27 Small bilateral pleural effusions with associated lower lobe consolidation and/or atelectasis.  Small pericardial effusion.  Interstitial infiltrates are pneumonitis in the aerated lung fields versus interstitial edema.  Question on pulmonary fibrosis history.  Cholelithiasis.  Mild-to-moderate bilateral hydroureteronephrosis.  Marked urinary bladder distention.  Question bladder outlet obstruction or other etiology.  Umbilical hernia containing a knuckle of non incarcerated bowel.  Mildly patulous rectum distended with stool.  Question any impaction.    EKG initial NRS HR 83 normal axis     · Echocardiogram Concentric left ventricular remodeling.  · Normal left ventricular systolic function. The estimated ejection fraction is 55%  · Grade I (mild) left ventricular diastolic dysfunction consistent with impaired relaxation.  · Normal right ventricular systolic function.  · Mild left atrial enlargement.  · Mild tricuspid regurgitation.  · Moderate pulmonary hypertension present.  · Local segmental wall motion abnormalities with inferior akinesis  · Normal central venous pressure (3 mm Hg).  · The estimated PA systolic pressure is 47 mm Hg  · Normal left atrial pressure

## 2019-01-29 NOTE — PLAN OF CARE
bp now 73 ys mean 58 was just 102 sys with mean 72, noted bp running 90's -teens, pulse at present 126-130, holding norvasc, amiodarone and lasix for now,  0920 bp 109 sys mean 72, will monitor

## 2019-01-29 NOTE — PLAN OF CARE
Problem: Adult Inpatient Plan of Care  Goal: Plan of Care Review  Outcome: Ongoing (interventions implemented as appropriate)  Patient on oxygen with documented flow.  Will attempt to wean per O2 order protocol. Patient performs Aerobika therapy. Family at the bedside. Ambu bag and mask at the bedside. Will continue to monitor.

## 2019-01-29 NOTE — HPI
78yo male with history of PAD s/p left CFA endarectomy with aortobifem bypass complicated by CVA; carotid disease, HTN, CKD, CVA, COPD, atrial fibrillation who presented to the ER with complaints of weakness after recent discharge on 1/9/2019 with treatment of COPD exacerbation likely related to influenza and bacteremia. He was admitted to [facility] and placed in the ICU. In addition to weakness, he complained of SOB. He reports SOB with minimal exertion but no SOB when at rest. He was discharged home on O2 2LPM via NC but increased it to 4LPM due to his SOB. He reported compliance with his medication regimen. He also complained of pain to his sacral decubitus site that was not relieved with oral pain medications. He is a poor historian and his wife was not available at the bedside therefore most of his HPI was obtained from admit H&P. Apparently his wife convinced him to come to the ER due to the pain not relieved by pain meds and due to his worsening SOB.

## 2019-01-29 NOTE — ASSESSMENT & PLAN NOTE
-paroxysmal  -admit EKG with NSR; currently afib with HR 120s  -on chronic Amiodarone therapy; do not necessarily feel Amiodarone contributing to current pulmonary issues and feel current issues more volume related  -not opposed to discontinuation of Amiodarone however unable to supplement with other rate lower agent ie BB or Digoxin due to marginal BP and ELAYNE   -even though Amiodarone is not the best rate lowering agent at this point it is our only option; overall goal is for rate control rather than rhythm control

## 2019-01-29 NOTE — PLAN OF CARE
Pt positioned on left side for enema, inserted the tip but pt clamping his buttocks, resistance met, had soft stool, small amt prior to giving the enema, and earlier today had mod amt soft mushy stool, when inserting the tip felt soft stool , 300 cc in but then pt started passing the fluid with blood bright red, probably irritated the area, although I can not see, no blood in stool, continued to ooze bld, told him to squeeze the buttocks, notified Dr neely of above, told to hold rest of enema,     1220 no further bleeding noted, passed small soft stool, no blood, still has stool there, rest of enema given without difficulty, passed hard stool and liquid, no blood

## 2019-01-29 NOTE — PLAN OF CARE
bp 69 sys mean 56, repositioned cuff, retaken, 78 sys, held the norvasc, amiodarone, and lasix, for now, will recheck, pt is co of rt stump pain, states hurts all over, will monitor bp for now,skin cool,dry, no chest pain or sob

## 2019-01-29 NOTE — PLAN OF CARE
Dr neely repaged,        1112 Dr neely phoned, notified of bp' 100/50 manual, and automatic 105 sys, rt arn  110/50, stated to given lasix and amiodarone hold the norvasc

## 2019-01-29 NOTE — PLAN OF CARE
Dr neely notified of low bp, had come up from 69-72 to 80's -109, had held the lasix, norvasc, and amiodarone earlier, wants manual cuff check, also told of abd pain, and rt leg pain

## 2019-01-29 NOTE — NURSING
EICU called about pt sats, MD wants pt sats 88-92%. o2 sats 87-88%. Respiratory therapy called to place pt on Bipap. Pt has hx of respiratory dx.    0310 pt continuing to  c/o mask and demanding it be taken off. Mask Positioned changes done several times. Mepilex placed on nose for compfort. Respiratory called to switch pt back to high flow. States she will come soon.

## 2019-01-30 NOTE — PLAN OF CARE
Problem: Adult Inpatient Plan of Care  Goal: Plan of Care Review  Outcome: Ongoing (interventions implemented as appropriate)  Cont to monitor and meet resp needs

## 2019-01-30 NOTE — PROGRESS NOTES
Pulmonary & Critical Care Medicine Progress Note    Subjective:   Patient doing slightly better this morning, reports breathing is a little easier. No acute events overnight. REports his abdomen feels better after receiving an enema yesterday.    Past medical, surgical, family, and social history from initial consult was reviewed and verified during today's visit.     Vital Signs:   Temp:  [98 °F (36.7 °C)-99.1 °F (37.3 °C)] 98.6 °F (37 °C)  Pulse:  [112-134] 112  Resp:  [14-46] 34  SpO2:  [87 %-100 %] 94 %  BP: ()/(48-82) 110/57      Fluid Balance:     Intake/Output Summary (Last 24 hours) at 1/30/2019 0806  Last data filed at 1/30/2019 0600  Gross per 24 hour   Intake 850 ml   Output 1845 ml   Net -995 ml       Review of Systems:   A comprehensive 12-point review of systems was performed, and is negative except for those items mentioned above in the HPI section of this note.     Physical Exam:   General: ill appearing WM, NAD, cooperative & interactive.  HEENT: AT/NC, PERRL, EOMI, oral and nasal mucosa moist.   Cardiac: tachycardic, normal rhythm with no MRG with brisk cap refill and symmetric pulses in distal extremities.  Respiratory: Normal inspection. Symmetric chest rise.course breath sounds bilaterally, crackles heard L>R; satting low 90s on HFNC    Abdomen: Soft, NT/ND. +BS.   Extremities: minimal edema   Neuro: Grossly intact to brief exam. Oriented x3 with appropriate mood/affect to situation.     Personal Review and Summary of Interval Diagnostics    Laboratory Studies:   No results for input(s): PH, PCO2, PO2, HCO3, POCSATURATED, BE in the last 24 hours.  Recent Labs   Lab 01/30/19  0403   WBC 7.44   RBC 3.27*   HGB 9.2*   HCT 28.7*      MCV 88   MCH 28.1   MCHC 32.1     Recent Labs   Lab 01/30/19  0403   *   K 3.1*   CL 88*   CO2 35*   BUN 50*   CREATININE 2.6*       Microbiology Data:   Microbiology Results (last 7 days)     Procedure Component Value Units Date/Time    Urine Culture  High Risk [[id number]] Collected:  01/28/19 1348    Order Status:  Completed Specimen:  Urine, Catheterized Updated:  01/29/19 1745     Urine Culture, Routine No growth    Narrative:       Indicated criteria for high risk culture:->Other  Other (specify):->RULE OUT URINARY TRACT INFECTION    Culture, Respiratory with Gram Stain [[id number]]     Order Status:  No result Specimen:  Respiratory         Chest Imaging:     Infusions:        Scheduled Medications:    albuterol-ipratropium  3 mL Nebulization Q4H    amiodarone  200 mg Oral Daily    ascorbic acid (vitamin C)  500 mg Oral BID    aspirin  81 mg Oral Daily    atorvastatin  40 mg Oral Daily    azithromycin  500 mg Intravenous Q24H    docusate sodium  100 mg Oral Daily    furosemide  80 mg Intravenous Daily    heparin (porcine)  5,000 Units Subcutaneous Q12H    insulin detemir U-100  10 Units Subcutaneous Daily    piperacillin-tazobactam (ZOSYN) IVPB  4.5 g Intravenous Q12H    potassium chloride  20 mEq Oral Q2H    sodium phosphates  1 enema Rectal Once    tamsulosin  0.4 mg Oral Daily       PRN Medications:   acetaminophen, benzonatate, dextrose 50%, dextrose 50%, glucagon (human recombinant), glucose, glucose, guaifenesin 100 mg/5 ml, HYDROcodone-acetaminophen, insulin aspart U-100, ramelteon, senna, sodium chloride 0.9%    Impression & Recommendations    Acute Hypoxic Respiratory Failure  - Worsening RLL infiltrate, concerning for aspiration but he does appear to have central congestion and very prominent pulmonary vasculature   - would try to get sputum culture if posssible  - would continue to diurese as tolerated as this seems to be helping  - ContinNIV at night and PRN during the day for  pulmonary edema, wean for sats 88-92%  - HFNC when off of NIV, titrate down O2 as tolerated  - Continue nebs as needed, no longer needs steroids  - CPT, mobilize, PT/OT    We will continue to follow. Please call with questions.     Dilcia Bray MD  [employer]  Emergency Medicine/Internal Medicine PGY-4  [employer] Pulmonary/Critical Care

## 2019-01-30 NOTE — PLAN OF CARE
Pt remains in ICU; pt with c/o of chest pain this am  Pulm and Cards following    D/C plan remains [facility] when medically stable.TN sent updated clinicals via [facility].    Tn to continue to follow.       01/30/19 1118   Discharge Reassessment   Assessment Type Discharge Planning Reassessment   Provided patient/caregiver education on the expected discharge date and the discharge plan Yes   Do you have any problems affording any of your prescribed medications? No   Discharge Plan A Long-term acute care facility (LTAC)   DME Needed Upon Discharge  (tbd)   Anticipated Discharge Disposition LTAC   Can the patient answer the patient profile reliably? Yes, cognitively intact   How does the patient rate their overall health at the present time? Good   Describe the patient's ability to walk at the present time. Major restrictions/daily assistance from another person   How often would a person be available to care for the patient? Whenever needed   Number of comorbid conditions (as recorded on the chart) Five or more   During the past month, has the patient often been bothered by feeling down, depressed or hopeless? Yes   During the past month, has the patient often been bothered by little interest or pleasure in doing things? Yes   Post-Acute Status   Post-Acute Authorization Placement   Post-Acute Placement Status (pt not medically stable at this time)

## 2019-01-30 NOTE — NURSING
"(1930) Bedside report received from off going nurse, questions/concerns addressed.  (2000) HOB @ a 30 degree angle in lowest position with wheels locked, side rails up times 3; continuous cardiac monitoring in progress with alarms set, Atrial Fib, HR  120's; 25L, 80% Comfort-flow in place, O2 Sat 94%, (L) AC 20G HL has dressing intact with a minimal amount of dried bloody drainage @ the insertion site, flushes easily, no S/S of infection or infiltration noted; (L) wrist 22G HL has dressing CDI, flushes easily, no S/S of infection or infiltration noted; multiple areas of ecchymosis observed on the abdomen with 2 skin tears observed in the lower abdominal quadrants, Mepelex dressing to the sacral area is CDI; patient presently complaining of inability to sleep requesting sleep medication and (R) leg pain and buttock pain rated 6/10; see initial shift physical assessment; spouse @ the bedside.  (2045) Scheduled meds administered, no swallowing problems identified; 5/325mg Hydrocodone/acetominophen administered for (L) leg and buttock pain rated 6/10, 8mg Ramelteon administered for complaint of inability to sleep.  (2145) (R) leg and buttock pain presently rated 4/10, patient is alert and awake @ this time, states, "I still can't sleep".    (2200) Repositioned for comfort.  (2400) Resting quietly with eyes closed, easily arouses with verbal stimuli, no changes in initial shift physical assessment.  (0130) Resting quietly with eyes closed, observed even unlabored respirations, no signs of distress noted.  (0330) No changes in previous physical assessment, remains in Atrial Fib, BIPAP placed once again with 70% FIO2.  (0530) Resting quietly with eyes closed, ventilations via BIPAP, no signs of distress noted.  (0630) Tolerated partial bed bath with linen change well, no new areas of skin disruption noted.  (0700) Report given to oncoming nurse, time allowed for questions.  "

## 2019-01-30 NOTE — PROGRESS NOTES
"[facility] Medicine Progress Note, HO-I     Primary Team: [facility] Hospitalist Team B  Attending Physician: Gloria  Resident: Jose Natarajan  Intern: Mello Butler     Subjective:      Patient had enema yesterday but only 1 BM after.  Reports he still has some abdominal cramping but no nausea or vomiting.  He takes miralax daily at home for constipation.  Reports his breathing and leg pain are the same as yesterday.  On comfort flow NC.  He has been medically accepted to [facility].     Objective:      Last 24 Hour Vital Signs:  BP  Min: 112/49  Max: 149/63  Temp  Av.4 °F (36.3 °C)  Min: 96.2 °F (35.7 °C)  Max: 98.4 °F (36.9 °C)  Pulse  Av  Min: 57  Max: 67  Resp  Av  Min: 14  Max: 42  SpO2  Av.5 %  Min: 92 %  Max: 96 %  Height  Av' 8" (172.7 cm)  Min: 5' 8" (172.7 cm)  Max: 5' 8" (172.7 cm)  Weight  Av.8 kg (186 lb 15.2 oz)  Min: 73.4 kg (161 lb 13.1 oz)  Max: 96.2 kg (212 lb 1.3 oz)  I/O last 3 completed shifts:  In: 1760 [P.O.:810; IV Piggyback:950]  Out: 2540 [Urine:2540]     Physical Examination:  Gen: AAOx3, wearing high flow NC  HEENT: head normocephalic, atraumatic  Cardiac: regular rate and rhythm; no murmurs  Resp: crackles in the bilateral bases; diffuse expiratory wheezes throughout, normal work of breathing, no rhonchi  GI: abdomen soft, non-tender, non-distended; umbilical hernia present (reducible); normoactive bowel sounds, lesions, ecchymosis and skin breakdown at injection sites  Extrem: 1+ pitting edema to the bilateral lower extremities; no clubbing noted; right leg AKA with pain to palpation, L UE edematous (R normal)  Skin: no rashes; sacral decubitus ulcer with mepilex in place, extensive bruising on extremities and abdomen with skin breakdown   Neuro: AAOx3, moving all extremities without difficulty        Laboratory:  Laboratory Data Reviewed: yes  Pertinent Findings:    WBC 7.44  Cr 2.6  BUN 50     Microbiology Data Reviewed: yes  Pertinent " Findings:  Previous cultures drawn 1/9/19 grew E. coli  Blood Cx 1/16: NGTD     Other Results:  EKG (my interpretation): No new studies     Radiology Data Reviewed: yes  Pertinent Findings:  CXR 1/16:  Mild atelectasis at the left lung base or subsegmental airspace disease.    CXR 1/23:  There is moderate perihilar and bibasilar lung opacities, increased.  Probable small effusions.  No gross pneumothorax.  Heart size unchanged     CT Chest, A/P 1/16:  1. No evidence of PE.  2. Small bilateral pleural effusions resulting in volume loss and compressive atelectasis within the lower lobes, left greater than right with additional atelectasis versus consolidation seen within the lingula of the left upper lobe.  3. Extensive atherosclerosis with postsurgical changes of aorto bi-iliac graft.  Extensive chronic vascular findings and occlusions as detailed above.  These findings do not appear significantly changed when compared to previous CTA abdomen and pelvis from March 2018.  4. Otherwise no acute intra-abdominal abnormalities identified.  5. Cholelithiasis.  6. Multiple additional findings as detailed above.     Current Medications:     Infusions:     Scheduled:   albuterol-ipratropium  3 mL Nebulization Q4H    ascorbic acid (vitamin C)  500 mg Oral BID    aspirin  81 mg Oral Daily    atorvastatin  40 mg Oral Daily    azithromycin  500 mg Intravenous Q24H    ceFEPime (MAXIPIME) IVPB  2 g Intravenous Q12H    heparin (porcine)  5,000 Units Subcutaneous Q8H    predniSONE  40 mg Oral Daily    vancomycin (VANCOCIN) IVPB  1,250 mg Intravenous Q24H         PRN:  dextrose 50%, dextrose 50%, glucagon (human recombinant), glucose, glucose, HYDROcodone-acetaminophen, insulin aspart U-100, sodium chloride 0.9%     Antibiotics and Day Number of Therapy:    Levofloxacin 1/9 - 1/17   Vancomycin 1/17 - 1/18  Cefepime 1/17 - 1/19   Azithromycin 1/17 - present   Zosyn 1/16 - present     Lines and Day Number of Therapy:  PIV  placed 1/23     Assessment:      Asad Perez is a 78 y.o.male with       Patient Active Problem List     Diagnosis Date Noted    Hyperkalemia 01/17/2019    Respiratory failure with hypoxia and hypercapnia 01/16/2019    COPD with acute exacerbation 01/09/2019    Abdominal pain 03/19/2018    COPD exacerbation 03/16/2018    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    Abdominal rigidity, generalized      Complicated open wound of right hip      Acute osteomyelitis 04/11/2017    Decubitus ulcer of buttock, stage 2 04/07/2017    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    Urinary tract infection associated with indwelling urethral catheter 12/01/2016    Centrilobular emphysema 11/29/2016    Symptomatic anemia 11/27/2016    Deep vein thrombosis (DVT) of brachial vein      Non-healing ulcer of lower leg 10/28/2016    Fever      Decubitus ulcer of sacral region, stage 4 10/05/2016    Deep vein thrombosis (DVT) of upper extremity 10/05/2016    Dehiscence of perineal wound 10/05/2016    Hyponatremia 10/05/2016    VRE (vancomycin-resistant Enterococci) infection 09/20/2016    Elevated liver enzymes      Chronic diastolic congestive heart failure      Critical lower limb ischemia 09/10/2016    Stenosis of left internal carotid artery 09/08/2016    Abnormal finding on imaging 08/31/2016    Abnormal CT of the abdomen      Chronic kidney disease, stage V 08/12/2016    Anemia 08/12/2016    Anemia of chronic disease 08/08/2016    Altered mental status 08/07/2016    MRSA (methicillin resistant Staphylococcus aureus) infection 08/07/2016    Confusion      Weak      Pneumonia 07/29/2016    History of atrial fibrillation      Infected prosthetic knee joint 07/12/2016    Right knee pain 06/15/2016    GERD (gastroesophageal reflux disease) 02/28/2015    Esophageal web 02/28/2015    Acute on chronic diastolic heart failure 11/08/2014    Transaminitis 11/08/2014    Chronic  obstructive pulmonary disease 11/07/2014    Dyspnea 11/06/2014    Normocytic anemia 11/06/2014    Dysphagia 11/03/2014    Osteoarthritis of left knee 07/14/2014    Essential hypertension 07/16/2013    CKD (chronic kidney disease) 07/16/2013    PAD (peripheral artery disease) 07/16/2013    CAD (coronary artery disease) 07/16/2013    History of stroke 07/16/2013    Physical deconditioning 07/16/2013    Alcohol abuse 07/16/2013         Plan:      #AoC Hypercapnic Respiratory Failure Likely 2/2 COPD Exacerbation:  -Recently admitted for this; Blood cultures grew gram negative rods; Discharged on 1/13 with 14 days total of Levofloxacin, 5 days of Prednisone and Oseltamavir, continuing his home 2L oxygen  - SpO2 70's on no supplemental oxygen upon arrival to ED   - ABG with respiratory acidosis, 7.227, pCO2 78 and pO2 292   - IV steroids & 1hr nebulizer with improved sats, 100% on non-rebreather 100% FiO2  - Procal 0.28, Lactate 0.9  - CXR w/ left lung atelectasis, relatively unchanged from previous admission  - CT Chest & Abd w/ small bilateral pleural effusion, compressive atelectasis, left lingula with consolidation vs atelectasis, cholelithiasis  - Zosyn started in ED, Lasix 80 IV   - Admitted to ICU for continuous BiPAP  - Stepped down 1/17, continue nightly BiPAP  - Started on Vanc and Zosyn then de-escalated to azithro and cefipime, broadened back to azithro and zosyn for leukocytosis  - currently on bipap qHS, will continue to monitor  - ABG without acidosis, hypoxia, or hypercapnia  - received home bipap   - CXR 1/23 with increased perihilar and bibasilar opacities, given 40mg IV lasix  - Lasix 80 IV BID switched to 80 PO BID, now switching to 80 IV daily as per Pulm  - TTE with moderate pulmonary hypertension, normal CVP, EF 55%, PA sys pressure 47 (unchanged from prior study)  - moved to ICU 1/26 for increasing oxygen needs  - Pulm recs continuing IV diuresis, escalating antibiotics if WBC and  respiratory status continues to worsen, continue NIV  - continue to try to wean down oxygen   -Cardiology do not think amiodarone is the cause of worsening respiratory status- believes this is the best medication to control his heart rate given his hypotension    Urinary retention  -CT abdomen/pelvis showing hydroureteronephrosis, markedly distended bladder   -Urology consulted- recommended inserting gonzalez, start tamsulosin 0.4 mg daily  -Will monitor creatinine to see if obstructive process has anything to do with kidney function  -Schedule for follow up with Dr. Dhaliwal 1 month after discharge    Constipation  -No BM x4 days  -Started senna, colace , soap suds enema x1   -Start miralax      #ELAYNE on CKD 3B:  -Cr 1.7 on admit; baseline Cr ~1.2  - Lasix 80mg IV in ED for concern for volume overload, good urinary response  - Avoid nephrotoxic agents, renally dose medications  - Cr decreasin.6 today   - last echo 2016 with EF 65%, improving with light IVF  - plan to discharge on home dose lasix 40 PO   - Lasix as above, monitor creatinine     #Recent GNR Bacteremia 2/2 Stage 3 Sacral Decubitus Ulcer:  - Course of antibiotics during admission earlier this month  - Blood Cx  NGTD, repeat Blood Cx  NGTD  - Consult to Wound Care. Rec mepilex a/g every other day  - Will need wound care on discharge  - Dr Coffman following as per pt, outpt follow up established  - wound clean, dry, nonerythemetous, no signs of infection     #Transaminitis:  - , AST 202, Alk Phos 161   - RUQ U/S earlier this month showing cholelithiasis without acute cholecystitis   - Neuroendocrine Surg was consulted at that time, no concern for acute cholecystitis and no indication for surgical intervention (LFTs more indicative of hepatic origin vs cholestatic)  - Intermittent complaints of abdominal pain, denied to other team members   - CT Abd  with cholelithiasis  - LFTs improving     #HFpEF:  - TTE (16) w/  mild LA enlargement, concentric hypertrophy, normal LVSF, left ventricular diastolic dysfunction, pulmonary HTN (PA sys pressure ~47)  - Home Meds: Amiodarone 200mg daily, Carvedilol 25mg BID, Lasix 40 PO daily  - , Troponin WNL   - Lasix 80mg IV x1 in ED with good UOP  - Resume home meds  -  fluid restrict to 1 liter  - repeat echo unchanged     #CAD, PVD, RLE AKA:  - Home Meds: ASA 81mg daily, Atorvastatin 40mg daily  - Resume home meds     #History of CVA:  - CVA in 2007, no focal deficits on exam  - Home Meds: ASA 81mg daily, Atorvastatin 40mg daily  - Resume home meds     #Constipation:  - Polyethylene glycol  - soap suds enema  - has had multiple bowel movements today     #History of Alcohol Abuse:  -No alcohol since 2007     #Healthcare Maintenance:  - UTD on flu, tetanus, pneumovax   - Follow up appointments: Dr. Dhaliwal (Urology) 1 month after discharge        Diet: Diabetic  PPX: SQ  Dispo: pending improvement in SOB and weaning of HF, talking with cardiology, pulmonology, pending diuresis, continue antibiotics; medically accepted at [facility]     Coleman Butler MD  [facility] Internal Medicine HO-1     [facility] Medicine Hospitalist Pager numbers:   [facility] Hospitalist Medicine Team A (Chay/Shawanda):          [phone]  [facility] Hospitalist Medicine Team B (Gloria/Alex):        [phone]

## 2019-01-30 NOTE — PLAN OF CARE
Does not want to eat, at this time, abd still not right, has hyperactive bowel sounds, states does pass gas, not hurting at this time

## 2019-01-30 NOTE — PHYSICIAN QUERY
PT Name: Asad Perez  MR #: [id number]    Physician Query Form - CardioPulmonary Clarification      CDS/: Lurdes Hawkins RN CDI             Contact information:  [email or web address]    This form is a permanent document in the medical record.    Query Date: January 30, 2019    By submitting this query, we are merely seeking further clarification of documentation. Please utilize your independent clinical judgment when addressing the question(s) below.    The Medical record contains the following:   Indicators   Supporting Clinical Findings Location in Medical Record   X Pulmonary Hypertension documented Pulmonary HTN Internal medicine note  1/22 833 am   X Acute/Chronic Illness -Chronic diastolic heart failure  -Acute on chronic hypercapnic respiratory failure 2/2 likely COPD exacerbation  -ELAYNE on CKD 3  -Recent gram negative faye bacteremia 2/2 stage 3 sacral decubitus ulcer , presumably cleared prior to last discharge  Rt knee AKA   PVD, CAD Query answer 1/22 123 pm  Hospital medicine H&P  1/16 557 pm   X Echo and/or Heart Cath Findings TTE (11/28/16) w/ mild LA enlargement, concentric hypertrophy, normal LVSF, left ventricular diastolic dysfunction, pulmonary HTN (PA sys pressure ~47) Internal medicine note  1/22 833 am   X BiPAP/Intubation/Supplemental O2 Oxygen source switched to 4 liters N/C with no hypoxia though patient with increasing respiratory distress. BIPAP initiated with improvement.     100% non-rebreather    ER MD Note        [facility] medicine H&P  1/16 557 pm   X SOB, GROVES, Fatigue, Dizziness, LE Edema, Cyanosis, Chest Pain, Respiratory Distress, Hypoxia, etc. patient was satting 70's on no supplemental oxygen upon arrival to ED    presents to the Emergency Department with a cc of shortness of breath  x 3 days. The pt was discharged from the hospital 3 days ago with 14 days total of levofloxacin, 5 days of prednisone and oseltamavir, and continuing his home 2L oxygen. The SOB is worsening,  constant, and without alleviating factors. Hospital medicine H&P  1/16 557 pm    ER MD Note   X Treatment         Medication HFpEF:  Home Meds: Amiodarone 200mg daily, Carvedilol 25mg BID, Lasix 40 PO daily     Lasix 80mg IV x1 in ED with good UOP  Resume home meds  - will discharge on home lasix dose Internal medicine note  1/22 833 am    Other       Provider, please specify the type of pulmonary hypertension:    [   ] Group 2:  Pulmonary Hypertension due to Left Heart Disease, including left heart failure and/or left heart valve disease     [   ] Group 3:  Pulmonary Hypertension due to Lung Disease     [   ] Group 5:  Pulmonary Hypertension due to other, multifactorial, or unclear mechanisms     [ X  ] Pulmonary Hypertension, unspecified     [   ] Other Cardiopulmonary Condition (please specify):     [  ] Clinically Undetermined         Please document in your progress notes daily for the duration of treatment, until resolved, and include in your discharge summary.

## 2019-01-30 NOTE — PLAN OF CARE
Pain remains but now states it is in rt lower ribs, states he may have a crackled rib, states had before, do not think he has a cracked rib, able to move about without pain, states just not feeling good since his breakfast, stomach not right, body hurts, denies chest pain or sob, sat's holding on 30l  80% 02  When asked he denies sob, but does get exertional sob,

## 2019-01-30 NOTE — PROGRESS NOTES
The Sw spoke to Iva at [facility] and she gave away her last bed yesterday but may possibly have one later but the pt's not medically stable for d/c yet and she will continue to follow the pt.

## 2019-01-30 NOTE — PLAN OF CARE
Sat's down to 80% resp rate 28 unlabored, denies sob, still with diffuse crackles left lobes, diminished  Repositioned in bed, hob up 60 deg, , inc fi02 to 70% from 60% sat's up 85% inc 02 to 80%, sat's up 90%,  Still with loose non prod cough

## 2019-01-30 NOTE — PLAN OF CARE
Pt co of chest discomfort rt mid chest, nipple area, states present about 10min, states pain, is a dull, pressure, denies sob, resp rate 28, unlabored, sat's 90% 30l 80%, ap 122 at fib, pulse up to 127 at fib, bp  was just 79//48 mean 59,  For 1000 retaken 105/56 , asked if could be gas pains, does not know, pt 's bowel sounds active more so than earlier, md on page

## 2019-01-30 NOTE — PLAN OF CARE
Problem: Adult Inpatient Plan of Care  Goal: Plan of Care Review  titrating  Comfort flow to keep sat's 88-92%, still with crackles, rec lasix 80 mgm day ivp, bp needs monitoring, had drop in bp earlier, but came back up to the low 100's

## 2019-01-30 NOTE — PLAN OF CARE
fi02 remains 25l 80% fi02, sat's just 88%, will need inc in sat's before further weaning, pt co of rt leg pain, no abd pain, still with diffuse crackles left lobes, denies sob,

## 2019-01-31 NOTE — PHYSICIAN QUERY
"PT Name: Asad Perez  MR #: [id number]    Physician Query Form - Heart  Condition Clarification     CDS/: Lurdes Hawkins RN CDI            Contact information: [email or web address]  This form is a permanent document in the medical record.     Query Date: January 31, 2019    By submitting this query, we are merely seeking further clarification of documentation. Please utilize your independent clinical judgment when addressing the question(s) below.    The medical record contains the following   Indicators     Supporting Clinical Findings Location in Medical Record   X  = 1/19 Labs   X EF 65% Echo results 1/26   X Radiology findings Chest x rays  1/16 Mild atelectasis at the left lung base or subsegmental airspace disease.    1/19 Pulmonary edema versus pneumonia.    1/23 Moderate lung opacification, increased from the prior exam, possible cardiogenic/noncardiogenic pulmonary edema, pneumonia, or aspiration.    1/30 Continued diffuse interstitial opacities throughout the lungs superimposed patchy basilar lung opacities overall reduced.  There is continued poor definition left lung base concerning for evolving effusion.    Radiology results   X Echo Results · Concentric left ventricular remodeling.  · Normal left ventricular systolic function. The estimated ejection fraction is 55%  · Grade I (mild) left ventricular diastolic dysfunction consistent with impaired relaxation.  · Normal right ventricular systolic function.  · Mild left atrial enlargement.  · Mild tricuspid regurgitation.  · Moderate pulmonary hypertension present.  · Local segmental wall motion abnormalities with inferior akinesis  · Normal central venous pressure (3 mm Hg).  · The estimated PA systolic pressure is 47 mm Hg  · Normal left atrial pressure. Echo results 1/26    "Ascites" documented     X "SOB" or "GROVES" documented presents to the Emergency Department with a cc of shortness of breath  x 3 days. . The SOB is worsening, " "constant, and without alleviating factors.   ER MD Note   X "Hypoxia" documented patient was satting 70's on no supplemental oxygen upon arrival to ED   -----now, s/p IV steroids and one-hour nebulizer with much improvement and satting 100% on 100% non-rebreather     X Heart Failure documented HFpEF    Chronic diastolic heart failure   Internal medicine note   1/22 833 am  Query response 1/22    X "Edema" documented Trace pitting edema of the LLE with hyperpigmentation.  Rt AKA ER MD Note   X Diuretics/Meds Furosemide IV 80 mg 1/16 2134  Furosemide IV 40 mg 1/23 0905  Furosemide IV 80 mg 1/24 0908  Furosemide IV 80 mg daily 1/28 0900  Furosemide IV 40 mg 2 times daily 1/24 0945  Furosemide IV 80 mg 2 times daily 1/24 1600  Furosemide PO 40 mg daily 1/21 1600  Furosemide PO 80 mg 2 times daily 1/25 1800  Furosemide PO 80 mg daily 1/27 0900    "Home meds: Lasix 40 mg po daily  Amiodarone 200mg daily, Carvedilol 25mg BID" Medication sheets                    Internal medicine note  1/22 833 am    Treatment:       X Other:  Lasix 80mg IV in ED for concern for volume overload, good urinary response   1/21 928 pm  Hospital medicine note   Heart failure (HF) can be acute, chronic or both. It is generally further specificed as systolic, diastolic, or combined. Lastly, it is important to identify an underlying etiology if known or suspected.     Common clues to acute exacerbation:  Rapidly progressive symptoms (w/in 2 weeks of presentation), using IV diuretics to treat, using supplemental O2, pulmonary edema on Xray, MI w/in 4 weeks, and/or BNP >500    Systolic Heart Failure: is defined as chart documentation of a left ventricular ejection fraction (LVEF) less than 40%     Diastolic Heart Failure: is defined as a left ventricular ejection fraction (LVEF) greater than 40%   +      Evidence of diastolic dysfunction on echocardiography OR    Right heart catheterization wedge pressure above 12 mm Hg OR    Left heart " "catheterization left ventricular end diastolic pressure 18 mm Hg or above.    References: *American Heart Association    The clinical guidelines noted below are only system guidelines, and do not replace the providers clinical judgment.     Provider, please specify the diagnosis associated with above clinical findings      Provider, please clarify the "Acuity" of Diastolic Heart Failure.    [   ] Acute on Chronic Diastolic Heart Failure -    Pre-existing diastoic HF diagnosis.  EF > 40%  and acute HF symptoms documented      [ X  ] Other (please specify): ____Newly-diagnosed_______________________________                                [   ] Chronic Diastolic Heart Failure - Pre-existing diastolic HF diagnosis.  EF > 40%  without  acute HF symptoms documented    [  ] Clinically Undetermined                          Please document in your progress notes daily for the duration of treatment until resolved and include in your discharge summary.  "

## 2019-01-31 NOTE — PLAN OF CARE
Presently on bipap, denies sob, chest pain, resp 28 , working harder, night nurse just placed on bipap per his request. Sat's 90-92% still course , and crackles left lobes, very , very decreased throughout rt lobes, loose non productive cough, still at fib, rates up 130, no chest pain, no co of abd pain at present, abd still distended and taut, active bowel sounds, no pain with palpitation, will talk with md's re bowels

## 2019-01-31 NOTE — PROGRESS NOTES
"[facility] Medicine Progress Note, HO-I     Primary Team: [facility] Hospitalist Team B  Attending Physician: Gloria  Resident: Jose Natarajan  Intern: Mello Butler     Subjective:      Patient still has not had a BM since enema.  Given simethicone overnight for abdominal cramping, but source is likely constipation.  Will try suppository today.  This morning, patient denies abdominal pain, nausea, or vomiting.  Reports his breathing is doing better.  Was being transitioned from bipap to comfort flow NC during interview.  He has been medically accepted to [facility].     Objective:      Last 24 Hour Vital Signs:  BP  Min: 112/49  Max: 149/63  Temp  Av.4 °F (36.3 °C)  Min: 96.2 °F (35.7 °C)  Max: 98.4 °F (36.9 °C)  Pulse  Av  Min: 57  Max: 67  Resp  Av  Min: 14  Max: 42  SpO2  Av.5 %  Min: 92 %  Max: 96 %  Height  Av' 8" (172.7 cm)  Min: 5' 8" (172.7 cm)  Max: 5' 8" (172.7 cm)  Weight  Av.8 kg (186 lb 15.2 oz)  Min: 73.4 kg (161 lb 13.1 oz)  Max: 96.2 kg (212 lb 1.3 oz)  I/O last 3 completed shifts:  In: 1760 [P.O.:810; IV Piggyback:950]  Out: 2540 [Urine:2540]     Physical Examination:  Gen: AAOx3, wearing high flow NC  HEENT: head normocephalic, atraumatic  Cardiac: irregular rate; no murmurs  Resp: crackles in the bilateral bases; diffuse expiratory wheezes throughout, normal work of breathing, no rhonchi  GI: abdomen soft, non-tender, non-distended; umbilical hernia present (reducible); normoactive bowel sounds, lesions, ecchymosis and skin breakdown at injection sites  Extrem: 1+ pitting edema to the bilateral lower extremities; no clubbing noted; right leg AKA with pain to palpation, L UE edematous (R normal)  Skin: no rashes; sacral decubitus ulcer with mepilex in place, extensive bruising on extremities and abdomen with skin breakdown   Neuro: AAOx3, moving all extremities without difficulty        Laboratory:  Laboratory Data Reviewed: yes  Pertinent Findings:    WBC 6.92  Cr " 2.5  BUN 52     Microbiology Data Reviewed: yes  Pertinent Findings:  Previous cultures drawn 1/9/19 grew E. coli  Blood Cx 1/16: NGTD     Other Results:  EKG (my interpretation): No new studies     Radiology Data Reviewed: yes  Pertinent Findings:  CXR 1/16:  Mild atelectasis at the left lung base or subsegmental airspace disease.    CXR 1/23:  There is moderate perihilar and bibasilar lung opacities, increased.  Probable small effusions.  No gross pneumothorax.  Heart size unchanged     CT Chest, A/P 1/16:  1. No evidence of PE.  2. Small bilateral pleural effusions resulting in volume loss and compressive atelectasis within the lower lobes, left greater than right with additional atelectasis versus consolidation seen within the lingula of the left upper lobe.  3. Extensive atherosclerosis with postsurgical changes of aorto bi-iliac graft.  Extensive chronic vascular findings and occlusions as detailed above.  These findings do not appear significantly changed when compared to previous CTA abdomen and pelvis from March 2018.  4. Otherwise no acute intra-abdominal abnormalities identified.  5. Cholelithiasis.  6. Multiple additional findings as detailed above.     Current Medications:     Infusions:     Scheduled:   albuterol-ipratropium  3 mL Nebulization Q4H    ascorbic acid (vitamin C)  500 mg Oral BID    aspirin  81 mg Oral Daily    atorvastatin  40 mg Oral Daily    azithromycin  500 mg Intravenous Q24H    ceFEPime (MAXIPIME) IVPB  2 g Intravenous Q12H    heparin (porcine)  5,000 Units Subcutaneous Q8H    predniSONE  40 mg Oral Daily    vancomycin (VANCOCIN) IVPB  1,250 mg Intravenous Q24H         PRN:  dextrose 50%, dextrose 50%, glucagon (human recombinant), glucose, glucose, HYDROcodone-acetaminophen, insulin aspart U-100, sodium chloride 0.9%     Antibiotics and Day Number of Therapy:    Levofloxacin 1/9 - 1/17   Vancomycin 1/17 - 1/18  Cefepime 1/17 - 1/19   Azithromycin 1/17 - present   Zosyn  1/16 - present     Lines and Day Number of Therapy:  PIV placed 1/23     Assessment:      Asad Perez is a 78 y.o.male with       Patient Active Problem List     Diagnosis Date Noted    Hyperkalemia 01/17/2019    Respiratory failure with hypoxia and hypercapnia 01/16/2019    COPD with acute exacerbation 01/09/2019    Abdominal pain 03/19/2018    COPD exacerbation 03/16/2018    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    Abdominal rigidity, generalized      Complicated open wound of right hip      Acute osteomyelitis 04/11/2017    Decubitus ulcer of buttock, stage 2 04/07/2017    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    Urinary tract infection associated with indwelling urethral catheter 12/01/2016    Centrilobular emphysema 11/29/2016    Symptomatic anemia 11/27/2016    Deep vein thrombosis (DVT) of brachial vein      Non-healing ulcer of lower leg 10/28/2016    Fever      Decubitus ulcer of sacral region, stage 4 10/05/2016    Deep vein thrombosis (DVT) of upper extremity 10/05/2016    Dehiscence of perineal wound 10/05/2016    Hyponatremia 10/05/2016    VRE (vancomycin-resistant Enterococci) infection 09/20/2016    Elevated liver enzymes      Chronic diastolic congestive heart failure      Critical lower limb ischemia 09/10/2016    Stenosis of left internal carotid artery 09/08/2016    Abnormal finding on imaging 08/31/2016    Abnormal CT of the abdomen      Chronic kidney disease, stage V 08/12/2016    Anemia 08/12/2016    Anemia of chronic disease 08/08/2016    Altered mental status 08/07/2016    MRSA (methicillin resistant Staphylococcus aureus) infection 08/07/2016    Confusion      Weak      Pneumonia 07/29/2016    History of atrial fibrillation      Infected prosthetic knee joint 07/12/2016    Right knee pain 06/15/2016    GERD (gastroesophageal reflux disease) 02/28/2015    Esophageal web 02/28/2015    Acute on chronic diastolic heart  failure 11/08/2014    Transaminitis 11/08/2014    Chronic obstructive pulmonary disease 11/07/2014    Dyspnea 11/06/2014    Normocytic anemia 11/06/2014    Dysphagia 11/03/2014    Osteoarthritis of left knee 07/14/2014    Essential hypertension 07/16/2013    CKD (chronic kidney disease) 07/16/2013    PAD (peripheral artery disease) 07/16/2013    CAD (coronary artery disease) 07/16/2013    History of stroke 07/16/2013    Physical deconditioning 07/16/2013    Alcohol abuse 07/16/2013         Plan:      #AoC Hypercapnic Respiratory Failure Likely 2/2 COPD Exacerbation:  -Recently admitted for this; Blood cultures grew gram negative rods; Discharged on 1/13 with 14 days total of Levofloxacin, 5 days of Prednisone and Oseltamavir, continuing his home 2L oxygen  - SpO2 70's on no supplemental oxygen upon arrival to ED   - ABG with respiratory acidosis, 7.227, pCO2 78 and pO2 292   - IV steroids & 1hr nebulizer with improved sats, 100% on non-rebreather 100% FiO2  - Procal 0.28, Lactate 0.9  - CXR w/ left lung atelectasis, relatively unchanged from previous admission  - CT Chest & Abd w/ small bilateral pleural effusion, compressive atelectasis, left lingula with consolidation vs atelectasis, cholelithiasis  - Zosyn started in ED, Lasix 80 IV   - Admitted to ICU for continuous BiPAP  - Stepped down 1/17, continue nightly BiPAP  - Started on Vanc and Zosyn then de-escalated to azithro and cefipime, broadened back to azithro and zosyn for leukocytosis  - currently on bipap qHS, will continue to monitor  - ABG without acidosis, hypoxia, or hypercapnia  - received home bipap   - CXR 1/23 with increased perihilar and bibasilar opacities, given 40mg IV lasix  - Lasix 80 IV BID switched to 80 PO BID, now switching to 80 IV daily as per Pulm  - TTE with moderate pulmonary hypertension, normal CVP, EF 55%, PA sys pressure 47 (unchanged from prior study)  - moved to ICU 1/26 for increasing oxygen needs  - Pulm recs  continuing IV diuresis, escalating antibiotics if WBC and respiratory status continues to worsen, continue NIV  - continue to try to wean down oxygen     Paroxysmal atrial fibrillation with RVR  -Patient has been on amiodarone for years  -Cardiology do not think amiodarone is the cause of worsening respiratory status- believes this is the best medication to control his heart rate given his hypotension  -Patient currently in A-fib with RVR    Urinary retention  -CT abdomen/pelvis showing hydroureteronephrosis, markedly distended bladder   -Urology consulted- recommended inserting gonzalez, start tamsulosin 0.4 mg daily  -Will monitor creatinine to see if obstructive process has anything to do with kidney function  -Schedule for follow up with Dr. Dhaliwal 1 month after discharge    Constipation  -No BM x4 days  -Started senna, colace , soap suds enema x1 , miralax added   -Will give dulcolax suppository , lactulose BID x 2 days     #ELAYNE on CKD 3B:  -Cr 1.7 on admit; baseline Cr ~1.2  - Lasix 80mg IV in ED for concern for volume overload, good urinary response  - Avoid nephrotoxic agents, renally dose medications  - Cr decreasin.6 today   - last echo 2016 with EF 65%, improving with light IVF  - plan to discharge on home dose lasix 40 PO   - Lasix as above, monitor creatinine     #Recent GNR Bacteremia 2/2 Stage 3 Sacral Decubitus Ulcer:  - Course of antibiotics during admission earlier this month  - Blood Cx  NGTD, repeat Blood Cx  NGTD  - Consult to Wound Care. Rec mepilex a/g every other day  - Will need wound care on discharge  - Dr Coffman following as per pt, outpt follow up established  - wound clean, dry, nonerythemetous, no signs of infection     #Transaminitis:  - , AST 202, Alk Phos 161   - RUQ U/S earlier this month showing cholelithiasis without acute cholecystitis   - Neuroendocrine Surg was consulted at that time, no concern for acute cholecystitis and no indication for  surgical intervention (LFTs more indicative of hepatic origin vs cholestatic)  - Intermittent complaints of abdominal pain, denied to other team members   - CT Abd 1/16 with cholelithiasis  - LFTs improving     #HFpEF:  - TTE (11/28/16) w/ mild LA enlargement, concentric hypertrophy, normal LVSF, left ventricular diastolic dysfunction, pulmonary HTN (PA sys pressure ~47)  - Home Meds: Amiodarone 200mg daily, Carvedilol 25mg BID, Lasix 40 PO daily  - , Troponin WNL   - Lasix 80mg IV x1 in ED with good UOP  - Resume home meds  -  fluid restrict to 1 liter  - repeat echo unchanged     #CAD, PVD, RLE AKA:  - Home Meds: ASA 81mg daily, Atorvastatin 40mg daily  - Resume home meds     #History of CVA:  - CVA in 2007, no focal deficits on exam  - Home Meds: ASA 81mg daily, Atorvastatin 40mg daily  - Resume home meds     #Constipation:  - Polyethylene glycol  - soap suds enema  - has had multiple bowel movements today     #History of Alcohol Abuse:  -No alcohol since 2007     #Healthcare Maintenance:  - UTD on flu, tetanus, pneumovax   - Follow up appointments: Dr. Dhaliwal (Urology) 1 month after discharge        Diet: Diabetic  PPX: SQH  Dispo: pending improvement in SOB and weaning of HF, continue diuresis, continue antibiotics; medically accepted at [facility]     Coleman Butler MD  [facility] Internal Medicine HO-1     [facility] Medicine Hospitalist Pager numbers:   [facility] Hospitalist Medicine Team A (Chay/Shawanda):          [phone]  [facility] Hospitalist Medicine Team B (Gloria/Alex):        [phone]

## 2019-01-31 NOTE — PLAN OF CARE
Talked with Dr Butler about abd pain, no stools since enema on Tuesday, co since then of abd pain, and generalized pain all over      supp given, some soft stool felt, but higher up

## 2019-01-31 NOTE — PROGRESS NOTES
The Sw spoke to Iva at [facility] and she can admit the pt today. The Sw spoke to Molly at [facility] and informed her the pt will be ready for d/c tomorrow. Iva will update her evaluation and fax to [facility] for the auth. The Sw informed Mere of the above mentioned info. Mere will find out from the team if d/c will be today or tomorrow b/c [facility] has to date the auth.

## 2019-01-31 NOTE — PLAN OF CARE
Pt continues to co of severe abd cramping, hyperactive bowel sounds, passing gas, very uncomfortable, will try to call md  again

## 2019-01-31 NOTE — PLAN OF CARE
Problem: Adult Inpatient Plan of Care  Goal: Plan of Care Review  Outcome: Ongoing (interventions implemented as appropriate)  Cont to monitor and wean 02 as hernandez   And monitor resp needs

## 2019-01-31 NOTE — PLAN OF CARE
Problem: Adult Inpatient Plan of Care  Goal: Plan of Care Review  Outcome: Ongoing (interventions implemented as appropriate)  Continues with abd pain, kub done , no stool noted, nothing acute, started on bentyl, lactulose, bipap as needed, comfort flow  In progress, weaning flow and fi02 as hernandez to keep sat's88-92%, pt to go to [facility]  am

## 2019-01-31 NOTE — PROGRESS NOTES
Pulmonary & Critical Care Medicine Progress Note    Subjective:   Patient is about the same today. No fevers or chills. He does not report any SOB but does have some abdominal pain.     Past medical, surgical, family, and social history from initial consult was reviewed and verified during today's visit.     Vital Signs:   Temp:  [97.7 °F (36.5 °C)-98.8 °F (37.1 °C)] 97.7 °F (36.5 °C)  Pulse:  [101-129] 125  Resp:  [19-38] 28  SpO2:  [89 %-98 %] 90 %  BP: ()/(48-62) 118/60      Fluid Balance:     Intake/Output Summary (Last 24 hours) at 1/31/2019 1357  Last data filed at 1/31/2019 1255  Gross per 24 hour   Intake 590 ml   Output 1320 ml   Net -730 ml       Review of Systems:   A comprehensive 12-point review of systems was performed, and is negative except for those items mentioned above in the HPI section of this note.     Physical Exam:   General: ill appearing WM, NAD, cooperative & interactive.  HEENT: AT/NC, PERRL, EOMI, oral and nasal mucosa moist.   Cardiac: tachycardic, normal rhythm with no MRG with brisk cap refill and symmetric pulses in distal extremities.  Respiratory: Normal inspection. Symmetric chest rise.course breath sounds bilaterally, crackles heard L>R; satting low 90s on HFNC    Abdomen: Soft, NT/ND. +BS.   Extremities: minimal edema   Neuro: Grossly intact to brief exam. Oriented x3 with appropriate mood/affect to situation.     Personal Review and Summary of Interval Diagnostics    Laboratory Studies:   No results for input(s): PH, PCO2, PO2, HCO3, POCSATURATED, BE in the last 24 hours.  Recent Labs   Lab 01/31/19  0326   WBC 6.92   RBC 3.14*   HGB 9.0*   HCT 28.0*      MCV 89   MCH 28.7   MCHC 32.1     Recent Labs   Lab 01/31/19  0326   *   K 3.7   CL 90*   CO2 35*   BUN 52*   CREATININE 2.5*       Microbiology Data:   Microbiology Results (last 7 days)     Procedure Component Value Units Date/Time    Urine Culture High Risk [[id number]] Collected:  01/28/19 1348    Order  Status:  Completed Specimen:  Urine, Catheterized Updated:  01/29/19 7630     Urine Culture, Routine No growth    Narrative:       Indicated criteria for high risk culture:->Other  Other (specify):->RULE OUT URINARY TRACT INFECTION    Culture, Respiratory with Gram Stain [[id number]]     Order Status:  No result Specimen:  Respiratory         Chest Imaging:     Infusions:        Scheduled Medications:    albuterol-ipratropium  3 mL Nebulization Q4H    amiodarone  200 mg Oral Daily    ascorbic acid (vitamin C)  500 mg Oral BID    aspirin  81 mg Oral Daily    atorvastatin  40 mg Oral Daily    dicyclomine  10 mg Oral QID    docusate sodium  100 mg Oral Daily    furosemide  80 mg Intravenous BID    heparin (porcine)  5,000 Units Subcutaneous Q12H    insulin detemir U-100  10 Units Subcutaneous Daily    lactulose  15 g Oral BID    polyethylene glycol  17 g Oral Daily    sodium phosphates  1 enema Rectal Once    tamsulosin  0.4 mg Oral Daily       PRN Medications:   acetaminophen, benzonatate, dextrose 50%, dextrose 50%, glucagon (human recombinant), glucose, glucose, guaifenesin 100 mg/5 ml, HYDROcodone-acetaminophen, insulin aspart U-100, ramelteon, senna, simethicone, sodium chloride 0.9%    Impression & Recommendations    Acute Hypoxic Respiratory Failure  - Agressive diuresis is needed.   - pulmonary edema seems to be the major issue  - Contine NIV at night and PRN during the day for  pulmonary edema, wean for sats 88-92%  - HFNC when off of NIV, titrate down O2 as tolerated  - Continue nebs as needed, no longer needs steroids  - CPT, mobilize, PT/OT    Please call with questions.     Dulce Maria Monae M.D.  [facility] Pulmonary/Critical Care Fellow

## 2019-01-31 NOTE — PLAN OF CARE
Per team; anticipate d/c tomorrow to [facility] ; Tn sent updated clinicals via [facility]      01/31/19 3135   Post-Acute Status   Post-Acute Authorization Placement

## 2019-01-31 NOTE — NURSING
Pt c/o 7/10 abd pain. Abdomen distended and bowel sounds hyperactive on auscultation. Paged and notified Dr. Devi. TORBV for 80mg of simethicone Q6H as needed.

## 2019-01-31 NOTE — PLAN OF CARE
Co of severe abd pain,cramping, no results yet from suppository, repositioned, given lactulose and  Simethicone

## 2019-01-31 NOTE — PLAN OF CARE
Small amt of soft stool noted, checked rectum, soft stool present, removed what I could get to, left on side to see if he could pass more

## 2019-01-31 NOTE — CONSULTS
"  [facility]  Adult Nutrition  Consult Note    SUMMARY     Recommendations    Recommendation:   1. Continue with Mechanical Soft Diet -libralized per patient preferences    2. Add mirilax if patient continues with conplaints of constipation    3. Boost oral supplement with meals.     Goals: Patient to consume >50% of meals  Nutrition Goal Status: progressing towards goal  Communication of RD Recs: reviewed with RN    Nutrition Discharge Planning: Home on Cardiac Mechincal Soft diet    Reason for Assessment    Reason For Assessment: RD follow-up  Diagnosis: pulmonary disease(Acute respiratory failure with hypoxia and hypercapnia )    Relevant Medical History:  Past Medical History:   Diagnosis Date    Alcohol abuse     CHF (congestive heart failure)     CKD (chronic kidney disease) stage 3, GFR 30-59 ml/min     Coronary artery disease     Decubitus skin ulcer     Heart failure, diastolic     High cholesterol     Hypertension     Immobility     LBP (low back pain)     Prediabetes     PVD (peripheral vascular disease)     Stroke     2006, no deficits    Urine incontinence      General Information Comments: NFPE not warranted. Patient reports dec po intake due to constipation. He reports takes mirilax at home.       Nutrition Risk Screen    Nutrition Risk Screen: dysphagia or difficulty swallowing    Nutrition/Diet History    Patient Reported Diet/Restrictions/Preferences: general  Typical Food/Fluid Intake: Per wife, pt was eating well prior to admit.   Food Preferences: no  Spiritual, Cultural Beliefs, Worship Practices, Values that Affect Care: no  Vitamin/Mineral/Herbal Supplements: VIT C  Food Allergies: NKFA  Factors Affecting Nutritional Intake: constipation    Anthropometrics    Temp: 97.7 °F (36.5 °C)  Height Method: Stated  Height: 5' 8" (172.7 cm)  Height (inches): 68 in  Weight Method: Bed Scale  Weight: 92.9 kg (204 lb 12.9 oz)  Weight (lb): 204.81 lb  Ideal Body Weight " (IBW), Male: 154 lb  % Ideal Body Weight, Male (lb): 132.99 lb  BMI (Calculated): 31.2  BMI Grade: 30 - 34.9- obesity - grade I  Amputation %: 5.9  Total Amputation %: 5.9  Amputation Ideal Body Weight (IBW), Male (lb): 148.1 lb  Amputee BMI (kg/m2): 33.18 kg/m2       Lab/Procedures/Meds    Pertinent Labs Reviewed: reviewed  Recent Labs   Lab 01/31/19  0326   ALT 43   AST 23   ALKPHOS 133   BILITOT 0.7   PROT 5.7*   ALBUMIN 1.6*     Recent Labs   Lab 01/31/19  0326   WBC 6.92   RBC 3.14*   HGB 9.0*   HCT 28.0*      MCV 89   MCH 28.7   MCHC 32.1       Pertinent Medications Reviewed: reviewed  Scheduled Meds:   albuterol-ipratropium  3 mL Nebulization Q4H    amiodarone  200 mg Oral Daily    ascorbic acid (vitamin C)  500 mg Oral BID    aspirin  81 mg Oral Daily    atorvastatin  40 mg Oral Daily    dicyclomine  10 mg Oral QID    docusate sodium  100 mg Oral Daily    furosemide  80 mg Intravenous BID    heparin (porcine)  5,000 Units Subcutaneous Q12H    insulin detemir U-100  10 Units Subcutaneous Daily    lactulose  15 g Oral BID    polyethylene glycol  17 g Oral Daily    sodium phosphates  1 enema Rectal Once    tamsulosin  0.4 mg Oral Daily     Continuous Infusions:  PRN Meds:.acetaminophen, benzonatate, dextrose 50%, dextrose 50%, glucagon (human recombinant), glucose, glucose, guaifenesin 100 mg/5 ml, HYDROcodone-acetaminophen, insulin aspart U-100, ramelteon, senna, simethicone, sodium chloride 0.9%    Estimated/Assessed Needs    Weight Used For Calorie Calculations: 96.2 kg (212 lb 1.3 oz)  Energy Calorie Requirements (kcal): 1660  Energy Need Method: [place]     Protein Requirements: 77-96(.8 to 1.0)  Weight Used For Protein Calculations: 96.2 kg (212 lb 1.3 oz)     Estimated Fluid Requirement Method: RDA Method  RDA Method (mL): 1660     CHO Requirement: 50%      Nutrition Prescription Ordered    Current Diet Order: Diet Dysphagia Mechanical Soft (IDDSI Level 5)  Nutrition Order  Comments: Poor po intake due to constipation    Evaluation of Received Nutrient/Fluid Intake    I/O: reviewed  Comments: LBM 1/29/19  Tolerance: tolerating     % Intake of Estimated Energy Needs: 25 - 50 %  % Meal Intake: 25 - 50 %    Nutrition Risk    Level of Risk/Frequency of Follow-up: (1xweek)     Assessment and Plan    Decubitus ulcer of buttock, stage 2     Nutrition Problem  Increased Nutrient Needs     Related to (etiology):   Wound healing     Signs and Symptoms (as evidenced by):   Decubitus ulcer of buttock     Interventions:  Collaboration with other providers     Nutrition Diagnosis Status:   continue               Monitor and Evaluation    Food and Nutrient Intake: food and beverage intake  Food and Nutrient Adminstration: diet order  Anthropometric Measurements: weight, weight change, body mass index  Biochemical Data, Medical Tests and Procedures: glucose/endocrine profile  Nutrition-Focused Physical Findings: overall appearance     Malnutrition Assessment  Malnutrition Type: acute illness or injury  Energy Intake: moderate energy intake  Orbital Region (Subcutaneous Fat Loss): well nourished  Upper Arm Region (Subcutaneous Fat Loss): well nourished   [place] (Muscle Loss): well nourished  Clavicle Bone Region (Muscle Loss): well nourished  Clavicle and Acromion Bone Region (Muscle Loss): well nourished   Edema (Fluid Accumulation): 1-->trace             Nutrition Follow-Up    RD Follow-up?: Yes

## 2019-01-31 NOTE — PLAN OF CARE
Problem: Adult Inpatient Plan of Care  Goal: Plan of Care Review  Pt. on BiPAP with documented settings. Alarms on and functioning properly. Pt appears to be in no distress and will continue to monitor

## 2019-01-31 NOTE — PLAN OF CARE
Assisted pt in removing small amt of soft stool,cleaned and repositioned,started on bentyl, cleaned and repositioned,awaiting xray kub,medicated for pain

## 2019-01-31 NOTE — PLAN OF CARE
Talked with Dr Devi, re co of stomach pain, told that he has a very high pitched , tingling bowel sounds, no bm since enema yesterday, asked for mylicon, mydaysita, something for the gas, which I think is the problem, will review

## 2019-01-31 NOTE — PLAN OF CARE
[facility]    FACILITY TRANSFER ORDERS      Patient Name: Asad Perez  YOB: 1940    PCP: Tomas Torres MD   PCP Address: [address] / [place] [address]  PCP Phone Number: [phone]  PCP Fax: [phone]    Encounter Date: 02/1/2019    Admit to: [facility]    Vital Signs:  Routine, [id number]. Continuous pulse ox.     Diagnoses:   Active Hospital Problems    Diagnosis  POA    *Acute on chronic respiratory failure with hypoxia and hypercapnia [J96.21, J96.22]  Yes    Urinary retention [R33.9]  Yes    Other hydronephrosis [N13.39]  Yes    Chest pain [R07.9]  Yes    Pressure injury of sacral region, stage 3 [L89.153]  Yes    Hyperkalemia [E87.5]  Yes    Respiratory failure with hypoxia and hypercapnia [J96.91, J96.92]  Yes    History of atrial fibrillation [Z86.79]  Not Applicable     Chronic    ELAYNE (acute kidney injury) [N17.9]  Unknown    Dyspnea [R06.00]  Yes      Resolved Hospital Problems   No resolved problems to display.       Allergies:Review of patient's allergies indicates:  No Known Allergies    Diet: soft diet    Activities: Activity as tolerated    Nursing:     Bipap at night. Settings 16/10, rate 22, FiO2 50%    High flow oxygen during daytime 30L, 50-70%. Titrate to keep O2 88-92%    Duonebs 4 times daily while awake    Mendoza catheter, routine care    Labs: CBC and BMP q48 hours; Accuchecks 4 times daily before meals and at bedtime    CONSULTS:    Physical Therapy to evaluate and treat. , Occupational Therapy to evaluate and treat. and Speech Therapy to evaluate and treat for Swallowing.    MISCELLANEOUS CARE:  Routine Skin for Bedridden Patients: Apply moisture barrier cream to all skin folds and wet areas in perineal area daily and after baths and all bowel movements.    WOUND CARE ORDERS  Yes: Pressure Ulcer(s) Stage III:  Location: sacral region    Clean stage 3 sacral wound with wound cleanser. Apply skin barrier film to kyrie wound.  Apply Xeroform to wound bed, cover with dry clean dressing - every other day and as needed.      Medications: Review discharge medications with patient and family and provide education.        PRN: soap suds enema daily prn for constipation.   Dulcolax suppository daily prn constipation.        Medication List      START taking these medications    dicyclomine 10 MG capsule  Commonly known as:  BENTYL  Take 1 capsule (10 mg total) by mouth 4 (four) times daily.     furosemide 10 mg/mL injection  Commonly known as:  LASIX  Inject 8 mLs (80 mg total) into the vein 2 (two) times daily.  Replaces:  furosemide 40 MG tablet     senna 8.6 mg tablet  Commonly known as:  SENOKOT  Take 1 tablet by mouth daily as needed for Constipation.     tamsulosin 0.4 mg Cap  Commonly known as:  FLOMAX  Take 1 capsule (0.4 mg total) by mouth once daily.  Start taking on:  2/2/2019        CHANGE how you take these medications    albuterol-ipratropium 2.5 mg-0.5 mg/3 mL nebulizer solution  Commonly known as:  DUO-NEB  Take 3 mLs by nebulization every 6 (six) hours while awake.  What changed:  when to take this        CONTINUE taking these medications    amiodarone 200 MG Tab  Commonly known as:  PACERONE  Take 1 tablet (200 mg total) by mouth once daily.     amLODIPine 10 MG tablet  Commonly known as:  NORVASC     aspirin 81 MG EC tablet  Commonly known as:  ECOTRIN     atorvastatin 40 MG tablet  Commonly known as:  LIPITOR     carvedilol 25 MG tablet  Commonly known as:  COREG  Take 1 tablet (25 mg total) by mouth 2 (two) times daily.     clotrimazole-betamethasone 1-0.05% cream  Commonly known as:  LOTRISONE  Apply topically 2 (two) times daily.     multivitamin per tablet  Commonly known as:  THERAGRAN     oxyCODONE-acetaminophen 5-325 mg per tablet  Commonly known as:  PERCOCET  Take 1 tablet by mouth every 4 (four) hours as needed for Pain.     polyethylene glycol 17 gram/dose powder  Commonly known as:  GLYCOLAX  Take 17 g by mouth  once daily.     traMADol 50 mg tablet  Commonly known as:  ULTRAM     VITAMIN C 500 MG tablet  Generic drug:  ascorbic acid (vitamin C)        STOP taking these medications    furosemide 40 MG tablet  Commonly known as:  LASIX  Replaced by:  furosemide 10 mg/mL injection     predniSONE 20 MG tablet  Commonly known as:  DELTASONE        ASK your doctor about these medications    levoFLOXacin 250 MG tablet  Commonly known as:  LEVAQUIN  Take 1 tablet (250 mg total) by mouth once daily. for 11 days  Ask about: Should I take this medication?           Where to Get Your Medications      Information about where to get these medications is not yet available    Ask your nurse or doctor about these medications  · dicyclomine 10 MG capsule  · furosemide 10 mg/mL injection  · senna 8.6 mg tablet  · tamsulosin 0.4 mg Cap           _________________________________  Nabila Mayberry DO  [facility]  02/01/2019  9:37 AM

## 2019-01-31 NOTE — PLAN OF CARE
Problem: Adult Inpatient Plan of Care  Goal: Plan of Care Review  Outcome: Ongoing (interventions implemented as appropriate)  Continues to rec lasix 80 mgm daily but still with crackles, xray with infiltrates worse on left, continues on high flow, bipap as needed

## 2019-02-01 NOTE — PLAN OF CARE
No co of pain, slept this afternoon, on and off, most he slept with me in last 3 days, woke up ate some supper, no co of pain

## 2019-02-01 NOTE — PLAN OF CARE
TN forwarded updated orders to [facility], Iva [phone]    Transfer packet given to ICU nurse, call report to charge nurse at [facility] [phone]    TN contacted pt's wife, Kassy Perez [phone] to inform her of pt's transfer to [facility].    [facility] to arrange ambulance transportation, [phone], TN sent note from MD regarding Medical necessity for ambulance transportation to [facility], [phone]. Auth # [id number]. TN contacted , [facility], [phone] with auth number.       02/01/19 0448   Final Note   Assessment Type Final Discharge Note   Anticipated Discharge Disposition Long Term  ([facility]/On license of [facility])   What phone number can be called within the next 1-3 days to see how you are doing after discharge? [phone]   Hospital Follow Up  Appt(s) scheduled? Yes     Lluvia Segura, RN Transitional Navigator  [phone]

## 2019-02-01 NOTE — PROGRESS NOTES
TN forwarded updated orders to [facility], Iva [phone], per Iva, they are working on obtaining some sort of monitor for the pt's rm and will delay the discharge for the time being, it should be all set up by this afternoon hopefully.    TN contacted pt's wife, Kassy Moctezuma [phone] to inform her, TN will follow up

## 2019-02-01 NOTE — PLAN OF CARE
Patient is bed-bound with hi-flow O2. Patient with hypercapnic resp failure, HFpEF and multiple other problems. Patient will require transportation based on his current state

## 2019-02-01 NOTE — PLAN OF CARE
Problem: Adult Inpatient Plan of Care  Goal: Plan of Care Review  Outcome: Ongoing (interventions implemented as appropriate)  Pt remains AAOx4, slept with BiPAP all night with no issues. O2 sat maintained mid to upper 90's on 50% O2. Lungs still have slight fine crackles. Urine output good, ~700 mL. Had one small BM. Plan for D/C to LTAC today.

## 2019-02-01 NOTE — PLAN OF CARE
Problem: Adult Inpatient Plan of Care  Goal: Plan of Care Review  The proper method of use, as well as anticipated side effects, of this aerosol treatment are discussed and demonstrated to the patient. aerobika performed.Bipap on standby. Patient on oxygen. No Distress Noted. Will continue to monitor.

## 2019-02-01 NOTE — PHYSICIAN QUERY
PT Name: Asad Perez  MR #: [id number]    Physician Query Form - Heart  Condition Clarification     CDS/: Lurdes Hawkins RN CDI            Contact information: [email or web address]  This form is a permanent document in the medical record.     Query Date: February 1, 2019    By submitting this query, we are merely seeking further clarification of documentation. Please utilize your independent clinical judgment when addressing the question(s) below.    The medical record contains the following   Indicators     Supporting Clinical Findings Location in Medical Record   X  = 1/19 Labs   X EF 65% Echo results 1/26   X Radiology findings Chest x rays  1/16 Mild atelectasis at the left lung base or subsegmental airspace disease.    1/19 Pulmonary edema versus pneumonia.    1/23 Moderate lung opacification, increased from the prior exam, possible cardiogenic/noncardiogenic pulmonary edema, pneumonia, or aspiration.    1/30 Continued diffuse interstitial opacities throughout the lungs superimposed patchy basilar lung opacities overall reduced.  There is continued poor definition left lung base concerning for evolving effusion.    Radiology results   X Echo Results TTE (11/28/16) w/ mild LA enlargement, concentric hypertrophy, normal LVSF, left ventricular diastolic dysfunction, pulmonary HTN (PA sys pressure ~47)    Concentric left ventricular remodeling.  · Normal left ventricular systolic function. The estimated ejection fraction is 55%  · Grade I (mild) left ventricular diastolic dysfunction consistent with impaired relaxation.  · Normal right ventricular systolic function.  · Mild left atrial enlargement.  · Mild tricuspid regurgitation.  · Moderate pulmonary hypertension present.  · Local segmental wall motion abnormalities with inferior akinesis  · Normal central venous pressure (3 mm Hg).  · The estimated PA systolic pressure is 47 mm Hg  · Normal left atrial pressure. ECHO results 11/16   Internal  "medicine note  1/22 833 am    Echo results 1/26    "Ascites" documented     X "SOB" or "GROVES" documented presents to the Emergency Department with a cc of shortness of breath  x 3 days. . The SOB is worsening, constant, and without alleviating factors.   ER MD Note   X "Hypoxia" documented patient was satting 70's on no supplemental oxygen upon arrival to ED   -----now, s/p IV steroids and one-hour nebulizer with much improvement and satting 100% on 100% non-rebreather     X Heart Failure documented HFpEF    Chronic diastolic heart failure   Internal medicine note   1/22 833 am  Query response 1/22    X "Edema" documented Trace pitting edema of the LLE with hyperpigmentation.  Rt AKA ER MD Note   X Diuretics/Meds Furosemide IV 80 mg 1/16 2134  Furosemide IV 40 mg 1/23 0905  Furosemide IV 80 mg 1/24 0908  Furosemide IV 80 mg daily 1/28 0900  Furosemide IV 40 mg 2 times daily 1/24 0945  Furosemide IV 80 mg 2 times daily 1/24 1600  Furosemide PO 40 mg daily 1/21 1600  Furosemide PO 80 mg 2 times daily 1/25 1800  Furosemide PO 80 mg daily 1/27 0900    "Home meds: Lasix 40 mg po daily  Amiodarone 200mg daily, Carvedilol 25mg BID" Medication sheets                    Internal medicine note  1/22 833 am    Treatment:       X Other:  Lasix 80mg IV in ED for concern for volume overload, good urinary response   1/21 928 pm  Hospital medicine note   Heart failure (HF) can be acute, chronic or both. It is generally further specificed as systolic, diastolic, or combined. Lastly, it is important to identify an underlying etiology if known or suspected.     Common clues to acute exacerbation:  Rapidly progressive symptoms (w/in 2 weeks of presentation), using IV diuretics to treat, using supplemental O2, pulmonary edema on Xray, MI w/in 4 weeks, and/or BNP >500    Systolic Heart Failure: is defined as chart documentation of a left ventricular ejection fraction (LVEF) less than 40%     Diastolic Heart Failure: is defined as a left " "ventricular ejection fraction (LVEF) greater than 40%   +      Evidence of diastolic dysfunction on echocardiography OR    Right heart catheterization wedge pressure above 12 mm Hg OR    Left heart catheterization left ventricular end diastolic pressure 18 mm Hg or above.    References: *American Heart Association    The clinical guidelines noted below are only system guidelines, and do not replace the providers clinical judgment.     Provider, please specify the diagnosis associated with above clinical findings      Provider, please clarify the "Acuity" of Diastolic Heart Failure.    [ x  ] Acute on Chronic Diastolic Heart Failure -    Pre-existing diastoic HF diagnosis.  EF > 40%  and acute HF symptoms documented      [   ] Other (please specify): __________________________________                                [   ] Chronic Diastolic Heart Failure - Pre-existing diastolic HF diagnosis.  EF > 40%  without  acute HF symptoms documented    [  ] Clinically Undetermined                          Please document in your progress notes daily for the duration of treatment until resolved and include in your discharge summary.  "

## 2019-02-01 NOTE — PROGRESS NOTES
The Sw faxed the pt's d/c orders to Iva at [facility] via [facility]. The Sw spoke to Iva and informed her of the above mentioned info. She states she will be working on getting a room. The Sw informed Lluvia of the above mentioned info.

## 2019-02-02 NOTE — PLAN OF CARE
Jenny arrived to transport pt to [facility]. Paperwork and pt's belongings were given. DAYSI Cummins from [facility] was called and updated on pt's discharge.

## 2019-02-02 NOTE — PLAN OF CARE
Problem: Adult Inpatient Plan of Care  Goal: Plan of Care Review  Outcome: Ongoing (interventions implemented as appropriate)  A&Ox4. Vital signs stable throughout shift. Weaned from BiPap to Comfort Flow; tolerating well. Pain managed with PRN medications. Urine output adequate. Repositioned frequently. Safety precautions in place. Pt remains free from fall/injury. Plan of care reviewed with patient and family. Preparing to transfer to LTAC later this evening.

## 2019-03-26 PROBLEM — B96.29 UTI DUE TO EXTENDED-SPECTRUM BETA LACTAMASE (ESBL) PRODUCING ESCHERICHIA COLI: Status: ACTIVE | Noted: 2019-01-01

## 2019-03-26 PROBLEM — Z16.12 UTI DUE TO EXTENDED-SPECTRUM BETA LACTAMASE (ESBL) PRODUCING ESCHERICHIA COLI: Status: ACTIVE | Noted: 2019-01-01

## 2019-03-26 PROBLEM — N39.0 UTI DUE TO EXTENDED-SPECTRUM BETA LACTAMASE (ESBL) PRODUCING ESCHERICHIA COLI: Status: ACTIVE | Noted: 2019-01-01

## 2019-03-26 PROBLEM — E87.20 NORMAL ANION GAP METABOLIC ACIDOSIS: Status: ACTIVE | Noted: 2019-01-01

## 2019-03-26 NOTE — H&P
[facility] Medicine H&P Note     Admitting Team: \[facility]\"" Hospitalist Team B  Attending Physician: MD Alex  Resident: aP  Intern: Nilda     Date of Admit: 3/26/2019    Chief Complaint     Shortness of breath X2 days    Subjective:      History of Present Illness:  Mr. Perez is a 79 year old male with HFpEF (last EF 55% Jan 2019), paroxysmal A fib not on anticoagulation, COPD (on home 2 L NC), CKD3B, CAD, PVD, prediabetes, RLE AKA, and with recent admission for urinary retention who presents with complaint of shortness of breath.    Patient was in their usual state of health (bed bound, required on others to complete ADLs, on home O2) until 7 days ago. Note, patient and wife are poor historians. Patient about 7 days ago began complaining of some nausea, abdominal pain, loose stools, urinary symptoms. He was seen at [facility] and found to have a UTI, and sent home on p.o. ciprofloxacin. Urine cultures have resulted an now growing ESBL E coli. Patient did not receive any other antibiotics. Patient now complaining shortness of breath for 2 days prior to admission. It has gotten much worse of the course of day of admission.  Patient also admitting to centralized chest pain that is sharp, unable to appropriately describe if intermittent vs constant and what alleviates or aggravates the pain. Patient reports having orthopnea, paroxysmal noctural dyspnea for past couple days. Patient has also had a decreased appetite for past 4 days and denies excess fluids or salt intake.    Past Medical History:  Past Medical History:   Diagnosis Date    Alcohol abuse     CHF (congestive heart failure)     CKD (chronic kidney disease) stage 3, GFR 30-59 ml/min     Coronary artery disease     Decubitus skin ulcer     Heart failure, diastolic     High cholesterol     Hypertension     Immobility     LBP (low back pain)     Prediabetes     PVD (peripheral vascular disease)     Stroke     2006, no deficits     Urine incontinence        Past Surgical History:  Past Surgical History:   Procedure Laterality Date    AMPUTATION-ABOVE KNEE Right 9/12/2016    Performed by [facility] at [facility]    aortic bifemoral bypass  2006    ARTHROPLASTY-KNEE Right 6/27/2016    [facility] at [facility]    ARTHROPLASTY-KNEE Left 7/14/2014    [facility] at [facility]    bilateral carotid stenois  2006    carotid stents      DEBRIDEMENT-SACRAL WOUND N/A 10/10/2016    Performed by Michael [facility]arry [facility] at [facility] OR    DEBRIDEMENT-SACRAL WOUND Bilateral 9/29/2016    Performed by Maria Alejandra Pichardo DO at [facility] OR    DEBRIDEMENT-SACRAL WOUND N/A 9/16/2016    [facility] at [facility] OR    DEBRIDEMENT-WOUND Right 4/8/2017    [facility] at [facility]    ESOPHAGOGASTRODUODENOSCOPY (EGD) N/A 11/4/2014    Performed by David Ramos MD at [facility] ENDO    ESOPHAGOGASTRODUODENOSCOPY (EGD) with PEG N/A 9/23/2016    Performed by Emerson Childers Jr., MD at [facility] ENDO    EXCHANGE-POLYETHYLENE RIGHT KNEE Right 7/12/2016    Performed by [facility] at [facility] OR    IOVERA Right 6/15/2016    [facility]formed [facility] at [facility] OR    IRRIGATION AND DEBRIDEMENT LOWER EXTREMITY Right 9/9/2016    [facility] at [facility]    IRRIGATION AND DEBRIDEMENT RIGHT KNEE Right 7/12/2016    Performed by [facility] at [facility]    JOINT REPLACEMENT      knee    left common endarterectomy  2006    REVISION-AMPUTATION STUMP Right 12/1/2016    [facility] at [facility]    RT AKA  NOV 2017    SYNOVECTOMY-KNEE Left 9/9/2016    Performed [facility] at [facility] OR    WOUND EXPLORATION Right 9/9/2016    Performed by [facility] at [facility] OR       Allergies:  Review of patient's allergies indicates:  No Known Allergies    Home Medications:  Prior to Admission medications    Medication Sig Start Date End Date Taking? Authorizing Provider    albuterol-ipratropium 2.5mg-0.5mg/3mL (DUO-NEB) 0.5 mg-3 mg(2.5 mg base)/3 mL nebulizer solution Take 3 mLs by nebulization every 6 (six) hours while awake.  Patient taking differently: Take 3 mLs by nebulization 4 (four) times daily.  9/29/16 3/16/18  Nnamdi Venegas MD   amiodarone (PACERONE) 200 MG Tab Take 1 tablet (200 mg total) by mouth once daily. 9/29/16   Nnamdi Venegas MD   amLODIPine (NORVASC) 10 MG tablet Take 10 mg by mouth once daily.    Historical Provider, MD   ascorbic acid, vitamin C, (VITAMIN C) 500 MG tablet Take 500 mg by mouth 2 (two) times daily.    Historical Provider, MD   aspirin (ECOTRIN) 81 MG EC tablet Take 81 mg by mouth once daily.    Historical Provider, MD   atorvastatin (LIPITOR) 40 MG tablet Take 40 mg by mouth once daily.    Historical Provider, MD   carvedilol (COREG) 25 MG tablet Take 1 tablet (25 mg total) by mouth 2 (two) times daily. 9/29/16 3/16/18  Nnamdi Venegas MD   ciprofloxacin HCl (CIPRO) 500 MG tablet Take 1 tablet (500 mg total) by mouth 2 (two) times daily. for 7 days 3/23/19 3/30/19  Rashid Rehman MD   clotrimazole-betamethasone 1-0.05% (LOTRISONE) cream Apply topically 2 (two) times daily. 8/23/18   Michael Irizarry MD   furosemide (LASIX) 10 mg/mL injection Inject 8 mLs (80 mg total) into the vein 2 (two) times daily. 2/1/19   Nabila Mayberry DO   multivitamin (THERAGRAN) per tablet Take 1 tablet by mouth once daily.    Historical Provider, MD   oxycodone-acetaminophen (PERCOCET) 5-325 mg per tablet Take 1 tablet by mouth every 4 (four) hours as needed for Pain. 1/9/17   Emerson Mckay MD   polyethylene glycol (GLYCOLAX) 17 gram/dose powder Take 17 g by mouth once daily. 12/12/18   David Ramos MD   promethazine (PHENERGAN) 25 MG tablet Take 1 tablet (25 mg total) by mouth every 6 (six) hours as needed for Nausea. 3/23/19   Rashid Rehman MD   senna (SENOKOT) 8.6 mg tablet Take 1 tablet by mouth daily as needed for  "Constipation. 19   Nabila Andrade Shawanda, DO   tamsulosin (FLOMAX) 0.4 mg Cap Take 1 capsule (0.4 mg total) by mouth once daily. 19  Nabila Andrade Shawanda, DO   traMADol (ULTRAM) 50 mg tablet Take 50 mg by mouth every 6 (six) hours as needed for Pain.    Historical Provider, MD       Family History:  Family History   Problem Relation Age of Onset    Heart disease Mother        Social History:  Former smoker (Smoked for more than 30 years, quit in  after stroke)  Denies any current alcohol use (quit in  after stroke)  Denies recreational drugs  Lives at home with wife, and wife's brother who help take care of him    Review of Systems:  All other systems are reviewed and are negative.    Health Maintaince :   Primary Care Physician: Brian    Immunizations:   TDap UTD  Flu UTD  Pna UTD    Cancer Screening:  Colonoscopy:      Objective:   Last 24 Hour Vital Signs:  BP  Min: 83/51  Max: 113/60  Temp  Av.8 °F (36.6 °C)  Min: 97.7 °F (36.5 °C)  Max: 97.8 °F (36.6 °C)  Pulse  Av.5  Min: 98  Max: 111  Resp  Av.7  Min: 20  Max: 37  SpO2  Av.8 %  Min: 77 %  Max: 98 %  Height  Av' 7" (170.2 cm)  Min: 5' 7" (170.2 cm)  Max: 5' 7" (170.2 cm)  Weight  Av kg (167 lb 8 oz)  Min: 74.8 kg (165 lb)  Max: 77.1 kg (170 lb)  Body mass index is 25.84 kg/m².  No intake/output data recorded.    Physical Examination:  General: NAD. Non-diaphoretic. On Bipap  Head: NC/AT  Eyes: PERRL. EOMI. No scleral icterus  Neck: Supple. Unable to assess JVP adequately. No cervical LAD  Heart: Tachycardic. Irregular. No murmur, gallops, or rubs  Lungs: Minimal bibasilar crackles.  No wheezes. On Bipap  Abd: Soft, NT, ND. No RRG. Reducible umbilical hernia  Extremities: No peripheral edema present. +1 LLE pulse. Right AKA  : Circumsized male. No penile discharge  Skin: Stage 1 sacral ulcer, no erythema. Chronic statis dermatitis or LLE. Senile purpura of UE bilaterally  Neuro: AAOX3. UE and LE " sensation intact. +4/5 muscle strength bilaterally  Bedside Ultrasound: Non-collapsable IVC. Good heart squeeze    Laboratory:  Most Recent Data:  CBC:   Lab Results   Component Value Date    WBC 8.07 03/26/2019    HGB 11.5 (L) 03/26/2019    HCT 36.8 (L) 03/26/2019     03/26/2019    MCV 95 03/26/2019    RDW 17.4 (H) 03/26/2019     WBC Differential: 54 % N, 0 % Bands, 30 % L, 10 % M, 0.7 % Eo, 0.4 % Baso  BMP:   Lab Results   Component Value Date     03/26/2019    K 5.1 03/26/2019     03/26/2019    CO2 22 (L) 03/26/2019    BUN 42 (H) 03/26/2019    CREATININE 1.8 (H) 03/26/2019    GLU 69 (L) 03/26/2019    CALCIUM 8.3 (L) 03/26/2019    MG 2.6 01/27/2019    PHOS 3.9 01/27/2019     LFTs:   Lab Results   Component Value Date    PROT 5.1 (L) 03/26/2019    ALBUMIN 2.5 (L) 03/26/2019    BILITOT 0.4 03/26/2019    AST 21 03/26/2019    ALKPHOS 81 03/26/2019    ALT 32 03/26/2019     Coags:   Lab Results   Component Value Date    INR 1.3 (H) 03/26/2019     FLP:   Lab Results   Component Value Date    CHOL 139 08/03/2016    HDL 29 (L) 08/03/2016    LDLCALC 93.4 08/03/2016    TRIG 83 08/03/2016    CHOLHDL 20.9 08/03/2016     DM:   Lab Results   Component Value Date    HGBA1C 7.1 (H) 01/19/2019    HGBA1C 5.7 (H) 03/16/2018    HGBA1C 7.9 (H) 11/27/2016    LDLCALC 93.4 08/03/2016    CREATININE 1.8 (H) 03/26/2019     Thyroid:   Lab Results   Component Value Date    TSH 3.938 03/23/2019     Anemia:   Lab Results   Component Value Date    IRON 20 (L) 11/27/2016    TIBC 145 (L) 11/27/2016    FERRITIN 2,805 (H) 11/29/2016    PSWGFAPR48 1152 (H) 11/27/2016    FOLATE 14.6 11/27/2016     Cardiac:   Lab Results   Component Value Date    TROPONINI 0.036 (H) 03/26/2019    BNP 1,122 (H) 03/26/2019     Urinalysis:   Lab Results   Component Value Date    LABURIN ESCHERICHIA COLI ESBL  >100,000 cfu/ml   03/23/2019    COLORU Yellow 03/23/2019    SPECGRAV 1.010 03/23/2019    NITRITE Negative 03/23/2019    KETONESU Negative  03/23/2019    UROBILINOGEN Negative 03/23/2019    WBCUA 50 (H) 03/23/2019       Trended Lab Data:  Recent Labs   Lab 03/23/19  1352 03/26/19  1745   WBC 7.51 8.07   HGB 12.3* 11.5*   HCT 39.4* 36.8*   * 171   MCV 95 95   RDW 17.3* 17.4*    138   K 4.5 5.1    106   CO2 25 22*   BUN 38* 42*   CREATININE 1.1 1.8*   * 69*   PROT 5.2* 5.1*   ALBUMIN 2.5* 2.5*   BILITOT 0.5 0.4   AST 24 21   ALKPHOS 86 81   ALT 36 32       Trended Cardiac Data:  Recent Labs   Lab 03/23/19  1352 03/26/19  1745   TROPONINI 0.039* 0.036*   BNP  --  1,122*       Microbiology Data:  Microbiology Results (last 7 days)     Procedure Component Value Units Date/Time    Influenza A & B by Molecular [[id number]] Collected:  03/26/19 1801    Order Status:  Sent Specimen:  Nasopharyngeal Swab Updated:  03/26/19 1832    Blood culture x two cultures. Draw prior to antibiotics. [[id number]] Collected:  03/26/19 1743    Order Status:  Sent Specimen:  Blood Updated:  03/26/19 1743    Blood culture x two cultures. Draw prior to antibiotics. [[id number]] Collected:  03/26/19 1742    Order Status:  Sent Specimen:  Blood Updated:  03/26/19 1743        Urine Cx 3/23: ESBL E coli  Blood Cx 3/23: NGTD      Other Results:  EKG (my interpretation):   Atrial fibrillation. Rate in 90s    Radiology:  Imaging Results          X-Ray Chest AP Portable (Final result)  Result time 03/26/19 18:15:56    Final result by Mati Gallardo MD (03/26/19 18:15:56)                 Impression:      1. Findings suggesting edema noting superimposed left pleural effusion with likely compressive atelectasis of the left lower lung zone, developing consolidation however is not excluded.  Overall, findings appear similar to CT 03/23/2019.      Electronically signed by: Mati Galladro MD  Date:    03/26/2019  Time:    18:15             Narrative:    EXAMINATION:  XR CHEST AP PORTABLE    CLINICAL HISTORY:  Sepsis;    TECHNIQUE:  Single frontal view of the chest was  performed.    COMPARISON:  01/30/2019, CT 03/23/2019    FINDINGS:  The cardiomediastinal silhouette is prominent, noting prominent epicardial fat..  There is obscuration of the left costophrenic angle suggesting effusion.  There may be trace layering right pleural fluid..  The trachea is midline.  The lungs are symmetrically expanded bilaterally with patchy increased interstitial and parenchymal attenuation bilaterally suggesting edema.  There is bandlike atelectasis projected over the right midlung zone and left midlung zone.  There is increased parenchymal attenuation projected over the left lower lung zone, may reflect compressive atelectasis or consolidation..  There is no pneumothorax.  The osseous structures are remarkable for degenerative changes noting osteopenia.  There is vascular calcification of the abdomen..                                 Assessment:     Asad Perez is a 79 y.o. 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    Abdominal rigidity, generalized     Complicated open wound of right hip     Acute osteomyelitis 04/11/2017    Decubitus ulcer of buttock, stage 2 04/07/2017    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    Urinary tract infection associated with indwelling urethral catheter 12/01/2016    Centrilobular emphysema 11/29/2016    Symptomatic anemia 11/27/2016    Deep vein thrombosis (DVT) of brachial vein     Non-healing ulcer of lower leg 10/28/2016    Fever     Decubitus ulcer of sacral region, stage 4 10/05/2016    Deep vein thrombosis (DVT)  of upper extremity 10/05/2016    Dehiscence of perineal wound 10/05/2016    Hyponatremia 10/05/2016    VRE (vancomycin-resistant Enterococci) infection 09/20/2016    Elevated liver enzymes     Chronic diastolic congestive heart failure     Critical lower limb ischemia 09/10/2016    Stenosis of left internal carotid artery 09/08/2016    Abnormal finding on imaging 08/31/2016    Abnormal CT of the abdomen     Chronic kidney disease, stage V 08/12/2016    Anemia 08/12/2016    Anemia of chronic disease 08/08/2016    Altered mental status 08/07/2016    MRSA (methicillin resistant Staphylococcus aureus) infection 08/07/2016    Confusion     Weak     Pneumonia 07/29/2016    History of atrial fibrillation     Infected prosthetic knee joint 07/12/2016    Right knee pain 06/15/2016    ELAYNE (acute kidney injury) 02/28/2015    GERD (gastroesophageal reflux disease) 02/28/2015    Esophageal web 02/28/2015    Acute on chronic respiratory failure with hypoxia and hypercapnia 11/08/2014    Acute on chronic diastolic heart failure 11/08/2014    Transaminitis 11/08/2014    Chronic obstructive pulmonary disease 11/07/2014    Dyspnea 11/06/2014    Normocytic anemia 11/06/2014    Dysphagia 11/03/2014    Osteoarthritis of left knee 07/14/2014    Essential hypertension 07/16/2013    CKD (chronic kidney disease) 07/16/2013    PAD (peripheral artery disease) 07/16/2013    CAD (coronary artery disease) 07/16/2013    History of stroke 07/16/2013    Physical deconditioning 07/16/2013    Alcohol abuse 07/16/2013        Plan:     Acute on Chronic Hypoxic and Hypercapnic Respiratory Failure Likely    2/2 Volume Overload  -O2 85% on 5 L nasal cannula on admission  ABG: ph 7.21/CO2 45/O2 117/HCO3 20.4  A-a gradient 254  -Given IV steroids and breathing treatment in ED  -Non-compressible IVC on bedside US in ED  CXR on admission showed bilateral pulmonary edema  BNP 1,122 (baseline in 300s)  -Will  diurese    Sepsis secondary to ESBL E coli Urinary Tract Infection  -Patient with multiple complaints for past 7 days, on 3/23 in ED found to    have ESBL E coli UTI that was treated with ciprofloxacin  -Admission: , RR 28, WBC 8  Procal 0.12  Lactic acid 1.6  CTAP on 3/26 with bladder wall thickening  -Given vancomycin/zosyn in ED  -Will start on Vancomycin, meropenum, and azithromycin, deescalate as repeat urine and blood cultures result    Non-Anion Gap Metabolic Acidosis  -Bicarb of 22  ABG pH of 7.2  -Likely secondary to GI vs RTA     Acute on Chronic HFpEF  -TTE (January 2019) w/ EF 55%, grade I left ventricular diastolic    dysfunction, PA pressure of 47  - Home Meds: Amiodarone 200mg daily, Carvedilol 25mg BID, Lasix 40 PO    -Will diurese    Paroxysmal atrial fibrillation with RVR  -CHADSVASC >2, not any anticoagulation  -Patient has been on amiodarone for years  -In A-fib on admission, rate in 90s  -Continue amiodorone and Coreg     Hx of Urinary retention  -Found to have urinary retention back in Jan 2019  -Will in and out cath, monitor need for gonzalez     ELAYNE on CKD 3B  -Admission Cr of 1.8  Baseline 1.2  -Check FeNa, FeUrea, renal US  -Avoid nephrotoxins, renally dose all medications      Stage 2 Sacral Decubitus Ulcer, present on admission  -Noted on exam, no erythema  -Patient with sacral decub ulcer infections in past, last dating back to    early January 2019  -Consult wound care    Elevated Troponin  -On admission troponin of 0.036  -Likely elevated secondary to demand in setting of sepsis  -Will trend    Normocytic Anemia  -3/26: Hb 11.5/Hct 36.8/MCV 95  -Worked up in 2016, showing anemia of chronic disease  -Will defer workup in setting of sepsis  -Last colonoscopy 2012     CAD, PVD, RLE AKA:  -Home Meds: ASA 81mg daily, Atorvastatin 40mg daily  -Resume home meds    Type II Diabetes Mellitus  -A1c of 7.1 in Jan 2019  -Not on any current outpatient medications  -If sugars remain  high will start on SSI with accuchecks     History of CVA  -CVA in 2007, with no residual deficits  -Home Meds: ASA 81mg daily, Atorvastatin 40mg daily  -Resume home meds     History of Alcohol Abuse  -No alcohol since 2007    Fluids: S/p 50 ml bolus in ED  Electrolytes: Replace and shift prn  Nutrition: NPO  DVT PPx: SQH  Code: DNR    Dispo: Admit to ICU for sepsis        Abel Munguia, DO  [facility] Internal Medicine [id number]    [facility] Medicine Hospitalist Pager numbers:   [facility] Hospitalist Medicine Team A (Chay/Shawanda): [phone]  [facility] Hospitalist Medicine Team B (Gloria/Alex):  [phone]

## 2019-03-26 NOTE — ED PROVIDER NOTES
Encounter Date: 3/26/2019    SCRIBE #1 NOTE: I, Hemalatha Perez, am scribing for, and in the presence of,  Dr. Segundo. I have scribed the entire note.       History     Chief Complaint   Patient presents with    Shortness of Breath     79 year old male presents to ed cc of sob x 3 days. patient reports home o2 byron of 2l with no relief. patient tachypneic using accessory muscles at triage. family reports patient has not been eating/ drinking well due to poor appetitte.      Asad Perez is a 79 y.o. male who  has a past medical history of Alcohol abuse, CHF (congestive heart failure), CKD (chronic kidney disease) stage 3, GFR 30-59 ml/min, Coronary artery disease, Decubitus skin ulcer, Heart failure, diastolic, High cholesterol, Hypertension, Immobility, LBP (low back pain), Prediabetes, PVD (peripheral vascular disease), Stroke, and Urine incontinence.    The patient presents to the ED due to shortness of breath. He reports onset of symptoms was this morning. The patient notes the symptoms have progressively worsened. He is on 2 liters of oxygen with no improvement of symptoms. The patient does not admit to any chest pain, palpitations, leg swelling, cough, congestion, fever or chills.   He also reports having abdominal pain for the last 5-6 days. The patient states his last bowel movement was this morning. As per spouse the patient has not been eating or drinking. She states the patient was seen 3 days ago for abdominal pain. Per record at that time the patient was diagnosed with UTI and nausea. He was started on Cipro. Per spouse the patient has continued to have abdominal pain the same as previous evaluation, described as cramping. There have been no episodes of vomiting or diarrhea.    The history is provided by the patient, the EMS personnel and the spouse.     Review of patient's allergies indicates:  No Known Allergies  Past Medical History:   Diagnosis Date    Alcohol abuse     CHF (congestive heart  failure)     CKD (chronic kidney disease) stage 3, GFR 30-59 ml/min     Coronary artery disease     Decubitus skin ulcer     Heart failure, diastolic     High cholesterol     Hypertension     Immobility     LBP (low back pain)     Prediabetes     PVD (peripheral vascular disease)     Stroke     2006, no deficits    Urine incontinence      Past Surgical History:   Procedure Laterality Date    AMPUTATION-ABOVE KNEE Right 9/12/2016    [facility]aho [facility]    aortic bifemoral bypass  2006    ARTHROPLASTY-KNEE Right 6/27/2016    [facility]    ARTHROPLASTY-KNEE Left 7/14/2014    [facility]    bilateral carotid stenois  2006    carotid stents      DEBRIDEMENT-SACRAL WOUND N/A 10/10/2016    Performed by Michael [facility]arry [facility] at [facility]    DEBRIDEMENT-SACRAL WOUND Bilateral 9/29/2016    Performed by [facility] Pichardo DO at [facility]    DEBRIDEMENT-SACRAL WOUND N/A 9/16/2016    [facility]    DEBRIDEMENT-WOUND Right 4/8/2017    [facility]    ESOPHAGOGASTRODUODENOSCOPY (EGD) N/A 11/4/2014    Performed by David Ramos MD at [facility] END[facility]    ESOPHAGOGASTRODUODENOSCOPY (EGD) with PEG N/A 9/23/2016    Performed by Emerson Childers Jr., MD at [facility]    EXCHANGE-POLYETHYLENE RIGHT KNEE Right 7/12/2016    [facility]    IOVERA Right 6/15/2016    [facility]    IRRIGATION AND DEBRIDEMENT LOWER EXTREMITY Right 9/9/2016    [facility]    IRRIGATION AND DEBRIDEMENT RIGHT KNEE Right 7/12/2016    [facility]    JOINT REPLACEMENT      knee    left common endarterectomy  2006    REVISION-AMPUTATION STUMP Right 12/1/2016    [facility]    RT AKA  NOV 2017    SYNOVECTOMY-KNEE Left 9/9/2016    Performed by [facility] at [facility] OR    WOUND EXPLORATION Right 2016    Performed by [facility] at [facility] OR     Family History   Problem Relation Age of Onset    Heart disease Mother      Social History     Tobacco Use    Smoking status: Former Smoker     Packs/day: 2.00     Years: 45.00     Pack years: 90.00     Last attempt to quit: 2006     Years since quittin.7    Smokeless tobacco: Never Used   Substance Use Topics    Alcohol use: No     Comment: NO ALCOHOL FOR 18 MONTHS, PAST ETOH ABUSE    Drug use: No     Review of Systems   Constitutional: Negative for chills and fever.   HENT: Negative for congestion, ear pain, rhinorrhea and sore throat.    Respiratory: Positive for shortness of breath. Negative for cough and wheezing.    Cardiovascular: Negative for chest pain and palpitations.   Gastrointestinal: Positive for abdominal pain. Negative for diarrhea, nausea and vomiting.   Genitourinary: Negative for dysuria and hematuria.   Musculoskeletal: Negative for back pain, myalgias and neck pain.   Skin: Negative for rash.   Neurological: Negative for dizziness, weakness, light-headedness and headaches.   Psychiatric/Behavioral: Negative for confusion.       Physical Exam     Initial Vitals [19 1719]   BP Pulse Resp Temp SpO2   98/61 (!) 111 (!) 28 97.7 °F (36.5 °C) (!) 85 %      MAP       --         Physical Exam    Nursing note and vitals reviewed.  Constitutional: He appears well-developed and well-nourished. He is not diaphoretic. No distress.   HENT:   Head: Normocephalic and atraumatic.   Mouth/Throat: Oropharynx is clear and moist.   Eyes: Conjunctivae and EOM are normal.   Neck: Normal range of motion. Neck supple.   Cardiovascular: Normal rate, regular rhythm and normal heart sounds. Exam reveals no gallop and no friction rub.    No murmur heard.  Pulmonary/Chest: Accessory muscle usage present. Tachypnea noted. He has wheezes (bilaterally). He has no rhonchi. He has no rales.   Abdominal: Soft.  There is no tenderness. There is no rebound and no guarding.   Reducible umbilical hernia   Musculoskeletal: Normal range of motion. He exhibits no edema or tenderness.   Faint distal pulses in LLE. Right AKA   Lymphadenopathy:     He has no cervical adenopathy.   Neurological: He is alert and oriented to person, place, and time. He has normal strength.   Skin: Skin is warm and dry. No rash noted.   Skin tears on bilateral arms         ED Course   Critical Care  Date/Time: 3/26/2019 7:26 PM  Performed by: Nj Segundo Jr., MD  Authorized by: Nj Segundo Jr., MD   Direct patient critical care time: 10 minutes  Additional history critical care time: 5 minutes  Ordering / reviewing critical care time: 5 minutes  Documentation critical care time: 5 minutes  Consulting other physicians critical care time: 5 minutes  Consult with family critical care time: 5 minutes  Total critical care time (exclusive of procedural time) : 35 minutes  Critical care time was exclusive of separately billable procedures and treating other patients and teaching time.  Critical care was necessary to treat or prevent imminent or life-threatening deterioration of the following conditions: respiratory failure and metabolic crisis.  Critical care was time spent personally by me on the following activities: development of treatment plan with patient or surrogate, discussions with consultants, interpretation of cardiac output measurements, evaluation of patient's response to treatment, examination of patient, obtaining history from patient or surrogate, ordering and performing treatments and interventions, ordering and review of laboratory studies, ordering and review of radiographic studies, pulse oximetry, re-evaluation of patient's condition and review of old charts.        Labs Reviewed   CBC W/ AUTO DIFFERENTIAL - Abnormal; Notable for the following components:       Result Value    RBC 3.86 (*)     Hemoglobin 11.5 (*)     Hematocrit  36.8 (*)     MCHC 31.3 (*)     RDW 17.4 (*)     All other components within normal limits   COMPREHENSIVE METABOLIC PANEL - Abnormal; Notable for the following components:    CO2 22 (*)     Glucose 69 (*)     BUN, Bld 42 (*)     Creatinine 1.8 (*)     Calcium 8.3 (*)     Total Protein 5.1 (*)     Albumin 2.5 (*)     eGFR if  41 (*)     eGFR if non  36 (*)     All other components within normal limits   TROPONIN I - Abnormal; Notable for the following components:    Troponin I 0.036 (*)     All other components within normal limits   B-TYPE NATRIURETIC PEPTIDE - Abnormal; Notable for the following components:    BNP 1,122 (*)     All other components within normal limits   URINALYSIS, REFLEX TO URINE CULTURE - Abnormal; Notable for the following components:    Protein, UA Trace (*)     Occult Blood UA 1+ (*)     Nitrite, UA Positive (*)     Leukocytes, UA 1+ (*)     All other components within normal limits    Narrative:     Preferred Collection Type->Urine, Clean Catch   PROTIME-INR - Abnormal; Notable for the following components:    Prothrombin Time 13.8 (*)     INR 1.3 (*)     All other components within normal limits   APTT - Abnormal; Notable for the following components:    aPTT 33.2 (*)     All other components within normal limits   URINALYSIS MICROSCOPIC - Abnormal; Notable for the following components:    Bacteria, UA Few (*)     All other components within normal limits    Narrative:     Preferred Collection Type->Urine, Clean Catch   ISTAT PROCEDURE - Abnormal; Notable for the following components:    POC PH 7.261 (*)     POC PCO2 45.3 (*)     POC PO2 117 (*)     POC HCO3 20.4 (*)     POC TCO2 22 (*)     All other components within normal limits   INFLUENZA A & B BY MOLECULAR   CULTURE, RESPIRATORY   LACTIC ACID, PLASMA   PROCALCITONIN   OCCULT BLOOD X 1, STOOL   LACTIC ACID, PLASMA   SODIUM, URINE, RANDOM   CREATININE, URINE, RANDOM   PORRAS'S STAIN, URINE RANDOM   UREA  NITROGEN, URINE, RANDOM   CREATININE, URINE, RANDOM   SODIUM, URINE, RANDOM   UREA NITROGEN, URINE, RANDOM   PORRAS'S STAIN, URINE RANDOM   CREATININE, URINE, RANDOM    Narrative:     Preferred Collection Type->Urine, Clean Catch   SODIUM, URINE, RANDOM    Narrative:     Preferred Collection Type->Urine, Clean Catch   PORRAS'S STAIN, URINE RANDOM    Narrative:     Preferred Collection Type->Urine, Clean Catch   UREA NITROGEN, URINE, RANDOM    Narrative:     Preferred Collection Type->Urine, Clean Catch          Imaging Results          X-Ray Chest AP Portable (Final result)  Result time 03/26/19 18:15:56    Final result by Mati Gallardo MD (03/26/19 18:15:56)                 Impression:      1. Findings suggesting edema noting superimposed left pleural effusion with likely compressive atelectasis of the left lower lung zone, developing consolidation however is not excluded.  Overall, findings appear similar to CT 03/23/2019.      Electronically signed by: Mati Gallardo MD  Date:    03/26/2019  Time:    18:15             Narrative:    EXAMINATION:  XR CHEST AP PORTABLE    CLINICAL HISTORY:  Sepsis;    TECHNIQUE:  Single frontal view of the chest was performed.    COMPARISON:  01/30/2019, CT 03/23/2019    FINDINGS:  The cardiomediastinal silhouette is prominent, noting prominent epicardial fat..  There is obscuration of the left costophrenic angle suggesting effusion.  There may be trace layering right pleural fluid..  The trachea is midline.  The lungs are symmetrically expanded bilaterally with patchy increased interstitial and parenchymal attenuation bilaterally suggesting edema.  There is bandlike atelectasis projected over the right midlung zone and left midlung zone.  There is increased parenchymal attenuation projected over the left lower lung zone, may reflect compressive atelectasis or consolidation..  There is no pneumothorax.  The osseous structures are remarkable for degenerative changes noting  osteopenia.  There is vascular calcification of the abdomen..                                 Medical Decision Making:   Initial Assessment:   79 y.o male presents with respiratory distress. Will place on BiPAP, give breathing treatment and reassess.   Differential Diagnosis:   Differential Diagnosis includes, but is not limited to:  PE, MI/ACS, pneumothorax, pericardial effusion/tamonade, pneumonia, lung abscess, pericarditis/myocarditis, pleural effusion, lung mass, CHF exacerbation, asthma exacerbation, COPD exacerbation, aspirated/ingested foreign body, airway obstruction, CO poisoning, anemia, metabolic derangement, allergy/atopy, influenza, viral URI, viral syndrome.    Clinical Tests:   Lab Tests: Ordered and Reviewed  Radiological Study: Ordered and Reviewed  Medical Tests: Ordered and Reviewed  ED Management:  6:47 PM Case discussed with [facility], will accept the patient for admission    79-year-old male presents with acute respiratory failure.  Patient was placed on BiPAP given breathing treatment and steroids for possible COPD PD component.  Patient also has history of CHF.  Chest x-ray reveals large pleural effusion and cardiomegaly with pulmonary edema type pattern.  Labs consistent with respiratory acidosis and CHF exacerbation.  Patient was also noted to have acute kidney injury.  Patient was given IV fluids cultured and empiric vancomycin and Zosyn given recent urinary tract infection.  Patient was noted to be hypotensive.  Patient was given trial bolus of 500 cc NS without significant improvement of blood pressure.  Given his suspected volume overloaded status aggressive fluid resuscitation was deferred as his hypotension was thought to be cardiogenic in nature and not reflective of sepsis. I do not believe this patient is septic at this time. Patient's blood pressure was closely monitored his MAP was found to be greater than 60mmhg, patient felt better and remained alert on bipap.                     ED Course as of Mar 26 1926   Tue Mar 26, 2019   1910 ISTAT PROCEDURE(!!) [RN]   1911 Comprehensive metabolic panel(!) [RN]   1911 Brain natriuretic peptide(!) [RN]   1911 Troponin I(!) [RN]   1911 APTT(!) [RN]   1911 Protime-INR(!) [RN]   1911 CBC auto differential(!) [RN]      ED Course User Index  [RN] Nj Segundo Jr., MD     Clinical Impression:     1. CHF exacerbation   2. Dyspnea    3. Acute on chronic respiratory failure with hypoxia and hypercapnia    4. Chest pain    5. ELAYNE (acute kidney injury)    6. Coronary artery disease involving native coronary artery of native heart without angina pectoris    7.     Complicated UTI        Scribe attestation I, Nj Segundo,  personally performed the services described in this documentation. All medical record entries made by the scribe were at my direction and in my presence.  I have reviewed the chart and agree that the record reflects my personal performance and is accurate and complete. Nj Segundo M.D. 2:01 AM03/29/2019                      Nj Segundo Jr., MD  03/29/19 0201

## 2019-03-26 NOTE — PROGRESS NOTES
Vancomycin Dosing and Monitoring Pharmacy Protocol    Asad Perez is a 79 y.o. male    Height:     Wt Readings from Last 3 Encounters:   03/26/19 77.1 kg (170 lb)   03/23/19 77.1 kg (170 lb)   02/01/19 72.4 kg (159 lb 9.8 oz)       Temp Readings from Last 3 Encounters:   03/26/19 97.7 °F (36.5 °C) (Oral)   03/23/19 97.6 °F (36.4 °C) (Oral)   02/01/19 98.4 °F (36.9 °C) (Oral)      Lab Results   Component Value Date/Time    WBC 7.51 03/23/2019 01:52 PM    WBC 7.14 02/01/2019 03:59 AM    WBC 6.92 01/31/2019 03:26 AM      Lab Results   Component Value Date/Time    CREATININE 1.1 03/23/2019 01:52 PM    CREATININE 2.5 (H) 02/01/2019 03:59 AM    CREATININE 2.5 (H) 01/31/2019 03:26 AM      Lab Results   Component Value Date/Time    LACTATE 1.6 03/23/2019 02:22 PM    LACTATE 1.2 01/27/2019 09:04 PM    LACTATE 0.9 01/16/2019 05:45 PM       Creatinine clearance cannot be calculated (Unknown ideal weight.)    Antibiotics (From admission, onward)      Start     Stop Route Frequency Ordered    03/26/19 1745  piperacillin-tazobactam 4.5 g in dextrose 5 % 100 mL IVPB (ready to mix system)      03/27 0544 IV ED 1 Time 03/26/19 1738    03/26/19 1745  vancomycin (VANCOCIN) 2,250 mg in dextrose 5 % 500 mL IVPB      03/27 0544 IV ED 1 Time 03/26/19 1743          Antifungals (From admission, onward)      None            Microbiology Results (last 7 days)       Procedure Component Value Units Date/Time    Blood culture x two cultures. Draw prior to antibiotics. [[id number]] Collected:  03/26/19 1743    Order Status:  Sent Specimen:  Blood Updated:  03/26/19 1743    Blood culture x two cultures. Draw prior to antibiotics. [[id number]] Collected:  03/26/19 1742    Order Status:  Sent Specimen:  Blood Updated:  03/26/19 1743    Influenza A & B by Molecular [[id number]]     Order Status:  No result Specimen:  Nasopharyngeal Swab             Indication/Target trough:   Sepsis (Target trough: 15-20mcg/ml)    Hemodialysis:   N/A    Dosing  Weight:   Wt Readings from Last 1 Encounters:   19 77.1 kg (170 lb)       Last Vancomycin dose: N/A   Date/Time given: N/A         Vancomycin level:  No results for input(s): VANCOMYCIN-TROUGH in the last 72 hours.  No results for input(s): VANCOMYCIN, RANDOM in the last 72 hours.    Per Protocol Initial/Adjustments Dosin. Initial/Adjustment Dose: LOADING DOSE Vancomycin will be adjusted from 2000mg IVPB x 1 to Vancomycin 2250mg IVPB x 1.  2. Vancomycin N/A     Pharmacy will continue to follow.    Please contact if you have any further questions. Thank you.    Desiree Ovalle, PharmD  [phone]

## 2019-03-27 NOTE — PLAN OF CARE
Patient with worsening renal function and hyperkalemia on recent labs. Patient given lasix with minimal urine output. Patient initially thought to be volume overloaded on intial exam given CXR findings, BNP of 1100, and non-compressible IVC on bedside ultrasound. Patient TTE shows CVP of just 3 mmHg. FeUrea 34. Patient given lasix IV 80 mg this morning with noted worsening renal function and hyperkalemia.    Will give slow 500 ml LR fluid bolus over 5 hours.    Hyperkalemia:  -Will shift with insulin/D50 and kayexalate, and then stabilize membranes with calcium gluconate. Suspect worsening renal function to be causing hyperkalemia    Abel Munguia, DO  [facility] HO-1  03/27/2019

## 2019-03-27 NOTE — PLAN OF CARE
Notified Dr. Barrett that patient did not urinate after the administration of 40 mg of lasix. Dr. Barrett ordered to do In and out cath if the bladder scan volume is greater than 350.

## 2019-03-27 NOTE — PROGRESS NOTES
"Vancomycin Dosing and Monitoring Pharmacy Protocol    Asad Perez is a 79 y.o. male    Height: 5' 7" (1.702 m)   Wt Readings from Last 3 Encounters:   03/26/19 74.8 kg (165 lb)   03/23/19 77.1 kg (170 lb)   02/01/19 72.4 kg (159 lb 9.8 oz)       Temp Readings from Last 3 Encounters:   03/26/19 97.8 °F (36.6 °C) (Rectal)   03/23/19 97.6 °F (36.4 °C) (Oral)   02/01/19 98.4 °F (36.9 °C) (Oral)      Lab Results   Component Value Date/Time    WBC 8.07 03/26/2019 05:45 PM    WBC 7.51 03/23/2019 01:52 PM    WBC 7.14 02/01/2019 03:59 AM      Lab Results   Component Value Date/Time    CREATININE 1.8 (H) 03/26/2019 05:45 PM    CREATININE 1.1 03/23/2019 01:52 PM    CREATININE 2.5 (H) 02/01/2019 03:59 AM      Lab Results   Component Value Date/Time    LACTATE 1.6 03/26/2019 05:42 PM    LACTATE 1.6 03/23/2019 02:22 PM    LACTATE 1.2 01/27/2019 09:04 PM       Serum creatinine: 1.8 mg/dL (H) 03/26/19 1745  Estimated creatinine clearance: 32.1 mL/min (A)    Antibiotics (From admission, onward)      Start     Stop Route Frequency Ordered    03/27/19 1900  vancomycin in dextrose 5 % 1 gram/250 mL IVPB 1,000 mg  (Vancomycin IVPB with levels panel)      -- IV Every 24 hours (non-standard times) 03/26/19 2023 03/26/19 2100  meropenem 1 g in sodium chloride 0.9 % 100 mL IVPB (ready to mix system)      -- IV Every 12 hours (non-standard times) 03/26/19 1951 03/26/19 2100  azithromycin 500 mg in dextrose 5 % 250 mL IVPB (ready to mix system)      -- IV Every 24 hours (non-standard times) 03/26/19 1951 03/26/19 1745  vancomycin (VANCOCIN) 2,250 mg in dextrose 5 % 500 mL IVPB      03/27 0544 IV ED 1 Time 03/26/19 1743          Antifungals (From admission, onward)      None            Microbiology Results (last 7 days)       Procedure Component Value Units Date/Time    Culture, Respiratory with Gram Stain [[id number]]     Order Status:  No result Specimen:  Respiratory from Sputum, Expectorated     Blood culture x two cultures. " Draw prior to antibiotics. [[id number]] Collected:  19    Order Status:  Sent Specimen:  Blood Updated:  19    Blood culture x two cultures. Draw prior to antibiotics. [[id number]] Collected:  19    Order Status:  Sent Specimen:  Blood Updated:  19    Influenza A & B by Molecular [[id number]] Collected:  19    Order Status:  Completed Specimen:  Nasopharyngeal Swab Updated:  19     Influenza A, Molecular Negative     Influenza B, Molecular Negative     Flu A & B Source Nasal swab            Indication/Target trough:   Sepsis (Target trough: 15-20mcg/ml)    Hemodialysis:   N/A    Dosing Weight:   Wt Readings from Last 1 Encounters:   19 74.8 kg (165 lb)       Last Vancomycin dose: 2250 mg   Date/Time given: 3/26/19 1855           Vancomycin level:  No results for input(s): VANCOMYCIN-TROUGH in the last 72 hours.  No results for input(s): VANCOMYCIN, RANDOM in the last 72 hours.    Per Protocol Initial/Adjustments Dosin. Initial/Adjustment Dose: Loading dose = 2250mg x1, followed by Maintenance dose of 1000 mg q24hr to be given at 3/27/19 1900  date/time  2. Vancomycin Trough Level will be drawn on 3/28/19 1800 date/time     Pharmacy will continue to follow.    Please contact if you have any further questions. Thank you.    Desiree Ovalle, PharmD  [phone]

## 2019-03-27 NOTE — EICU
Brief new admit note:    79 year old male bed ridden, immobile post CVA, sacral decubes, on cipro for UTI for last 3 to 5 days now admitted for AHHRF from CHF/UTI on BiPAP.    Camera Eval:  Resting.  MAP > 65, sats 100%. On 10/5/16/60% Ve 11.  .     Data:  CxR film ,labs reviewed.    A/P:  1. AHHRF from CHF/ left effusion vs PNA vs atelectasis.   - Change BIPAP setting to 14/5/18/55%. ABG at mid night  - asp precautions    2. ELAYNE on CKD/Urinary continance issues/UTI on cipro as Out patient. Urine cx NGTD.  Sacral decubital ulcer.  - LA and wbc normal.  On Vanc/xiomara/azithro pending cultures.  De escalate if no growth  - wound care eval, nutrition eval in AM    3. Metabolic and Resp acidosis from above    4. P afib, in afib .   - not on AC  - on amiodarone, coreg    4. On sq heparin as VTE prophylaxis    5. DNR status.

## 2019-03-27 NOTE — PLAN OF CARE
Problem: Infection  Goal: Infection Symptom Resolution  Outcome: Ongoing (interventions implemented as appropriate)  Intervention: Prevent or Manage Infection     03/27/19 0544   Manage Diarrhea   Isolation Precautions contact precautions maintained   Prevent or Manage Infection   Infection Management aseptic technique maintained         Problem: Skin Injury Risk Increased  Goal: Skin Health and Integrity  Outcome: Ongoing (interventions implemented as appropriate)  Intervention: Optimize Skin Protection     03/27/19 0544   Prevent Additional Skin Injury   Pressure Reduction Devices pressure-redistributing mattress utilized   Pressure Reduction Techniques heels elevated off bed;pressure points protected   Monitor and Manage Hypervolemia   Skin Protection transparent dressing maintained;tubing/devices free from skin contact     Upper and lower extremities elevated on pillows, turned as per orders, mepilex applied at healed previous sacral wound

## 2019-03-27 NOTE — PROGRESS NOTES
"[facility] Resident HO-II Progress Note    Subjective:     Comfortable this AM and without significant complaint. Off Bipap, comfortable on 4L nasal cannula.       Objective:   Last 24 Hour Vital Signs:  BP  Min: 83/51  Max: 113/60  Temp  Av.2 °F (36.2 °C)  Min: 96 °F (35.6 °C)  Max: 98.1 °F (36.7 °C)  Pulse  Av.4  Min: 93  Max: 111  Resp  Av.5  Min: 16  Max: 37  SpO2  Av.1 %  Min: 77 %  Max: 100 %  Height  Av' 7.5" (171.5 cm)  Min: 5' 7" (170.2 cm)  Max: 5' 8" (172.7 cm)  Weight  Av.4 kg (159 lb 10.5 oz)  Min: 65.3 kg (143 lb 15.4 oz)  Max: 77.1 kg (170 lb)  I/O last 3 completed shifts:  In: 350 [IV Piggyback:350]  Out: -     Physical Examination:    Gen: awake, alert, on BIPAP  HEENT: PERRL, EOMi, oropharynx clear, moist and without exudate, JVP 7 cm, no cervical lymphadenopathy   CVS: RRR, normal S1/S2, without S3/S4 or murmurs  Resp: no use of accessory muscles, no wheezes, no rhonchi, no rales  Abdo: soft, non-tender, non-distended, bowel sounds +, without guarding or rebound  Neuro: moves all extremities, no abnormal tone  Ext: no edema, no cyanosis  Skin: no rashes or skin breakdown noted      Laboratory:  Laboratory Data Reviewed:  Trended Lab Data:  Recent Labs   Lab 19  1352 19  1745 19  0425   WBC 7.51 8.07 4.85   HGB 12.3* 11.5* 11.6*   HCT 39.4* 36.8* 36.9*   * 171 141*   MCV 95 95 94   RDW 17.3* 17.4* 17.2*    138 134*   K 4.5 5.1 5.4*    106 104   CO2 25 22* 18*   BUN 38* 42* 43*   CREATININE 1.1 1.8* 2.0*   * 69* 162*   PROT 5.2* 5.1* 4.6*   ALBUMIN 2.5* 2.5* 2.2*   BILITOT 0.5 0.4 0.4   AST 24 21 19   ALKPHOS 86 81 73   ALT 36 32 29       Trended Cardiac Data:  Recent Labs   Lab 19  1745 19  2323 19  0425   TROPONINI 0.036* 0.046* 0.037*   BNP 1,122*  --   --        Microbiology Data   Microbiology Results (last 7 days)     Procedure Component Value Units Date/Time    Blood culture x two cultures. " Draw prior to antibiotics. [[id number]] Collected:  03/26/19 1742    Order Status:  Completed Specimen:  Blood Updated:  03/27/19 0145     Blood Culture, Routine No Growth to date    Narrative:       Aerobic and anaerobic    Blood culture x two cultures. Draw prior to antibiotics. [[id number]] Collected:  03/26/19 1743    Order Status:  Completed Specimen:  Blood Updated:  03/27/19 0145     Blood Culture, Routine No Growth to date    Narrative:       Aerobic and anaerobic    Culture, Respiratory with Gram Stain [[id number]]     Order Status:  No result Specimen:  Respiratory from Sputum, Expectorated     Influenza A & B by Molecular [[id number]] Collected:  03/26/19 1801    Order Status:  Completed Specimen:  Nasopharyngeal Swab Updated:  03/26/19 1853     Influenza A, Molecular Negative     Influenza B, Molecular Negative     Flu A & B Source Nasal swab          Radiology:  Imaging Results          X-Ray Chest AP Portable (Final result)  Result time 03/26/19 18:15:56    Final result by Mati Gallardo MD (03/26/19 18:15:56)                 Impression:      1. Findings suggesting edema noting superimposed left pleural effusion with likely compressive atelectasis of the left lower lung zone, developing consolidation however is not excluded.  Overall, findings appear similar to CT 03/23/2019.      Electronically signed by: Mati Gallardo MD  Date:    03/26/2019  Time:    18:15             Narrative:    EXAMINATION:  XR CHEST AP PORTABLE    CLINICAL HISTORY:  Sepsis;    TECHNIQUE:  Single frontal view of the chest was performed.    COMPARISON:  01/30/2019, CT 03/23/2019    FINDINGS:  The cardiomediastinal silhouette is prominent, noting prominent epicardial fat..  There is obscuration of the left costophrenic angle suggesting effusion.  There may be trace layering right pleural fluid..  The trachea is midline.  The lungs are symmetrically expanded bilaterally with patchy increased interstitial and parenchymal  attenuation bilaterally suggesting edema.  There is bandlike atelectasis projected over the right midlung zone and left midlung zone.  There is increased parenchymal attenuation projected over the left lower lung zone, may reflect compressive atelectasis or consolidation..  There is no pneumothorax.  The osseous structures are remarkable for degenerative changes noting osteopenia.  There is vascular calcification of the abdomen..                                  Current Medications:     Infusions:       Scheduled:   albuterol-ipratropium  3 mL Nebulization Q6H    amiodarone  200 mg Oral Daily    aspirin  81 mg Oral Daily    atorvastatin  40 mg Oral Daily    azithromycin  500 mg Intravenous Q24H    heparin (porcine)  5,000 Units Subcutaneous Q8H    meropenem (MERREM) IVPB  1 g Intravenous Q12H    vancomycin (VANCOCIN) IVPB  1,000 mg Intravenous Q24H        PRN:  dextrose 50 % in water (D50W), dextrose 50 % in water (D50W), glucagon (human recombinant), glucose, glucose, sodium chloride 0.9%    Antibiotics and Day Number of Therapy:  Vanc/meropenem/azithromycin    Lines and Day Number of Therapy:  PIV    Assessment and Plan     Acute on Chronic Hypoxic and Hypercapnic Respiratory Failure Likely    2/2 Volume Overload  -O2 85% on 5 L nasal cannula on admission  ABG: ph 7.21/CO2 45/O2 117/HCO3 20.4  A-a gradient 254  -Given IV steroids and breathing treatment in ED  -Non-compressible IVC on bedside US in ED  CXR on admission showed bilateral pulmonary edema  BNP 1,122 (baseline in 300s)  -Will diurese, minimal output  - volume status difficult to determine as story suggests patient is volume down but labs/CXR and exam suggest volume overload   - oxygenation improving this AM, respiratory acidosis     Sepsis secondary to ESBL E coli Urinary Tract Infection  -Patient with multiple complaints for past 7 days, on 3/23 in ED found to    have ESBL E coli UTI that was treated with ciprofloxacin  -Admission: HR  111, RR 28, WBC 8  Procal 0.12  Lactic acid 1.6  CTAP on 3/26 with bladder wall thickening  -Given vancomycin/zosyn in ED  -Will start on Vancomycin, meropenum, and azithromycin, deescalate as repeat urine and blood cultures result  - denying urinary complaints in previous weeks but CT imaging as above; will continue treatment for now, potentially de-escalate today     Non-Anion Gap Metabolic Acidosis, worsening  -Bicarb of 22  ABG pH of 7.2  - bicarb 18 this AM, unclear etiology  - denies diarrhea, checking urine anion gap     Acute on Chronic HFpEF  -TTE (January 2019) w/ EF 55%, grade I left ventricular diastolic    dysfunction, PA pressure of 47  - Home Meds: Amiodarone 200mg daily, Carvedilol 25mg BID, Lasix 40 PO    -Will diurese, minimal urine output     Paroxysmal atrial fibrillation with RVR  -CHADSVASC >2, not any anticoagulation  -Patient has been on amiodarone for years  -In A-fib on admission, rate in 90s  -Continue amiodorone, holding coreg 2/2 hypotension     Hx of Urinary retention  -Found to have urinary retention back in Jan 2019  -Will in and out cath, monitor need for gonzalez     ELAYNE on CKD 3B  -Admission Cr of 1.8  Baseline 1.2  - FeUrea borderline pre-renal, volume status still difficult to determine     Stage 2 Sacral Decubitus Ulcer, present on admission  -Noted on exam, no erythema  -Patient with sacral decub ulcer infections in past, last dating back to    early January 2019  -Consult wound care     Elevated Troponin  -On admission troponin of 0.036, up to 0.047 and now downtrending  -Likely elevated secondary to demand     Normocytic Anemia  -3/26: Hb 11.5/Hct 36.8/MCV 95  -Worked up in 2016, showing anemia of chronic disease  -Will defer workup   -Last colonoscopy 2012     CAD, PVD, RLE AKA:  -Home Meds: ASA 81mg daily, Atorvastatin 40mg daily  -Resume home meds     Type II Diabetes Mellitus  -A1c of 7.1 in Jan 2019, 6.6 this admission  -Not on any current outpatient  medications  -If sugars remain high will start on SSI with accuchecks     History of CVA  -CVA in 2007, with no residual deficits  -Home Meds: ASA 81mg daily, Atorvastatin 40mg daily  -Resume home meds     History of Alcohol Abuse  -No alcohol since 2007     Diet: cardiac  Ppx: heparin  Dispo: pending clinical improvement, possible step down today          Jeffery Winn  [facility] HO-II  [facility] Hospitalitis Service Team B    [facility] Medicine Hospitalist Pager numbers:   [facility] Hospitalist Medicine Team A (Chay/Shawanda): [phone]  [facility] Hospitalist Medicine Team B (Gloria/Alex):  [phone]

## 2019-03-28 NOTE — PLAN OF CARE
Results for CHUCK OVALLE (MRN [id number]) as of 3/28/2019 05:54   Ref. Range 3/27/2019 16:03 3/27/2019 18:58 3/28/2019 03:18   BUN, Bld Latest Ref Range: 8 - 23 mg/dL 51 (H)  53 (H)   Creatinine Latest Ref Range: 0.5 - 1.4 mg/dL 2.4 (H)  2.8 (H)       Notified Dr. Casillas about the worsening kidney function and no UOP after the administration of 500 ml LR at the rate of 100 ml/hr. Bladder scan volume 116.  Patient resting well on BIPAP.

## 2019-03-28 NOTE — PLAN OF CARE
Problem: Adult Inpatient Plan of Care  Goal: Plan of Care Review  Outcome: Ongoing (interventions implemented as appropriate)  Patient noted to be intermittently confused and labored breathing at times while on [place] O2. Pt placed back on bipap and noted to be more comfortable and unlabored breathing after about 10 mins of placement. Mentation also improved. Renal function noted to worsen as well as an increase in BUN & Cret, K+. Spoke with attending team and Potassium shifting protocol was performed. Per MD CVP was 4, but stated IVC uncollapseable. He is also noted to be interstitially wet, no JVD noted, lung sounds clear but diminished. Subsequently a half of liter bolus of LR given per MD team.

## 2019-03-28 NOTE — PROGRESS NOTES
"Vancomycin Dosing and Monitoring Pharmacy Protocol    Asad Perez is a 79 y.o. male    Height: 5' 8" (1.727 m)   Wt Readings from Last 3 Encounters:   03/26/19 65.3 kg (143 lb 15.4 oz)   03/23/19 77.1 kg (170 lb)   02/01/19 72.4 kg (159 lb 9.8 oz)       Temp Readings from Last 3 Encounters:   03/27/19 97.8 °F (36.6 °C) (Oral)   03/23/19 97.6 °F (36.4 °C) (Oral)   02/01/19 98.4 °F (36.9 °C) (Oral)      Lab Results   Component Value Date/Time    WBC 4.85 03/27/2019 04:25 AM    WBC 8.07 03/26/2019 05:45 PM    WBC 7.51 03/23/2019 01:52 PM      Lab Results   Component Value Date/Time    CREATININE 2.4 (H) 03/27/2019 04:03 PM    CREATININE 2.0 (H) 03/27/2019 04:25 AM    CREATININE 1.8 (H) 03/26/2019 05:45 PM      Lab Results   Component Value Date/Time    LACTATE 1.1 03/27/2019 06:25 AM    LACTATE 1.1 03/26/2019 09:08 PM    LACTATE 1.6 03/26/2019 05:42 PM       Serum creatinine: 2.4 mg/dL (H) 03/27/19 1603  Estimated creatinine clearance: 23.8 mL/min (A)    Antibiotics (From admission, onward)      Start     Stop Route Frequency Ordered    03/26/19 2100  meropenem 1 g in sodium chloride 0.9 % 100 mL IVPB (ready to mix system)      -- IV Every 12 hours (non-standard times) 03/26/19 1951 03/26/19 2100  azithromycin 500 mg in dextrose 5 % 250 mL IVPB (ready to mix system)      -- IV Every 24 hours (non-standard times) 03/26/19 1951          Antifungals (From admission, onward)      None            Microbiology Results (last 7 days)       Procedure Component Value Units Date/Time    Blood culture x two cultures. Draw prior to antibiotics. [[id number]] Collected:  03/26/19 8767    Order Status:  Completed Specimen:  Blood Updated:  03/27/19 2012     Blood Culture, Routine No Growth to date     Blood Culture, Routine No Growth to date    Narrative:       Aerobic and anaerobic    Blood culture x two cultures. Draw prior to antibiotics. [[id number]] Collected:  03/26/19 6380    Order Status:  Completed Specimen:  Blood " Updated:  19     Blood Culture, Routine No Growth to date     Blood Culture, Routine No Growth to date    Narrative:       Aerobic and anaerobic    Urine culture [[id number]] Collected:  19 204    Order Status:  No result Specimen:  Urine from Clean Catch Updated:  19 1102    Urine Culture High Risk [[id number]]     Order Status:  Completed Specimen:  Urine     Culture, Respiratory with Gram Stain [[id number]]     Order Status:  No result Specimen:  Respiratory from Sputum, Expectorated     Influenza A & B by Molecular [[id number]] Collected:  19 180    Order Status:  Completed Specimen:  Nasopharyngeal Swab Updated:  19 1853     Influenza A, Molecular Negative     Influenza B, Molecular Negative     Flu A & B Source Nasal swab            Indication/Target trough:   Sepsis (Target trough: 15-20mcg/ml)    Hemodialysis:   N/A    Dosing Weight:   Wt Readings from Last 1 Encounters:   19 65.3 kg (143 lb 15.4 oz)       Last Vancomycin dose: 1000 mg   Date/Time given: 3/27/19 at 1903          Vancomycin level:  Recent Labs   Lab Result Units 19  1858   Vancomycin-Trough ug/mL 23.9*     Recent Labs   Lab Result Units 19  1858   Vancomycin-Trough ug/mL 23.9*       Per Protocol Initial/Adjustments Dosin. Initial/Adjustment Dose: vancomycin dosing changed from 1000 mg q24h to pulse dosing based on results of random vancomycin levels.   2. Vancomycin Random Level will be drawn on 3/28/19 at 1900 date/time     Pharmacy will continue to follow.    Please contact if you have any further questions. Thank you.    Sachin Simpson, PharmD  [phone]

## 2019-03-28 NOTE — CONSULTS
[employer] renal fellow DANTE MILLS    Consult note    Reason for Consult:     ELAYNE    Subjective:      History of Present Illness:  Asad Perez is a 79 y.o. C male who  has a past medical history of Alcohol abuse, CHF (congestive heart failure), CKD (chronic kidney disease) stage 3, GFR 30-59 ml/min, Coronary artery disease, Decubitus skin ulcer, Heart failure, diastolic, High cholesterol, Hypertension, Immobility, LBP (low back pain), Prediabetes, PVD (peripheral vascular disease), Stroke, and Urine incontinence.. The patient presented to the [facility] on 3/26/2019 with a primary complaint of Shortness of Breath (79 year old male presents to ed cc of sob x 3 days. patient reports home o2 byron of 2l with no relief. patient tachypneic using accessory muscles at triage. family reports patient has not been eating/ drinking well due to poor appetitte. )      Pt admitted 3/26/19 for sob; was recently in [facility] for hypoxic/hypercapneic resp failure - during this hospitalization, he was found to have urinary retention and was discharged to LTAC with gonzalez; on3/23/19, pt was evaluated in ED and found to have UTI - placed on sipro and also got a abd/pelvis CT with contrast;  UTI ended up being ESBL; pt then came back to ED on 3/26/19 for sob/n/v/abd pain - evidence of hypervolemia.    creatinine on 3/23/19 1.1; when pt admitted 3/26/19 up to 1.8      Renal Hx  -episode of ELAYNE back in 8/2016 which required short course of iHD; this was thought to be due to ATN from pre-renal hypovolemia/infection of prosthetic joint    This AM, pt states his sob is no worse than what it normally is; he appears to not understand the complexity and severity of his multiple co-morbid conditions; he confirmed that if RRT is needed he would want to proceed.  Pt states his abd pain has improved since being admitted to hospital.    Past Medical History:  Past Medical History:   Diagnosis Date    Alcohol abuse     CHF (congestive heart failure)     CKD  (chronic kidney disease) stage 3, GFR 30-59 ml/min     Coronary artery disease     Decubitus skin ulcer     Heart failure, diastolic     High cholesterol     Hypertension     Immobility     LBP (low back pain)     Prediabetes     PVD (peripheral vascular disease)     Stroke     2006, no deficits    Urine incontinence        Past Surgical History:  Past Surgical History:   Procedure Laterality Date    AMPUTATION-ABOVE KNEE Right 9/12/2016    [facility]    aortic bifemoral bypass  2006    ARTHROPLASTY-KNEE Right 6/27/2016    [facility]    ARTHROPLASTY-KNEE Left 7/14/2014    [facility]    bilateral carotid stenois  2006    carotid stents      DEBRIDEMENT-SACRAL WOUND N/A 10/10/2016    [facility]ry [facility]    DEBRIDEMENT-SACRAL WOUND Bilateral 9/29/2016    [facility]ardo [facility]    DEBRIDEMENT-SACRAL WOUND N/A 9/16/2016    [facility]    DEBRIDEMENT-WOUND Right 4/8/2017    [facility]    ESOPHAGOGASTRODUODENOSCOPY (EGD) N/A 11/4/2014    Performed by David Ramos MD at [facility] ENDO    ESOPHAGOGASTRODUODENOSCOPY (EGD) with PEG N/A 9/23/2016    [facility] by Emerson Childers Jr., MD at [facility]    EXCHANGE-POLYETHYLENE RIGHT KNEE Right 7/12/2016    [facility]    IOVERA Right 6/15/2016    [facility]    IRRIGATION AND DEBRIDEMENT LOWER EXTREMITY Right 9/9/2016    [facility]formed [facility]    IRRIGATION AND DEBRIDEMENT RIGHT KNEE Right 7/12/2016    [facility]formed by [facility]    JOINT REPLACEMENT      knee    left common endarterectomy  2006    REVISION-AMPUTATION STUMP Right 12/1/2016    [facility]    RT AKA  NOV 2017    SYNOVECTOMY-KNEE Left 9/9/2016    Per[facility]    WOUND EXPLORATION Right 9/9/2016    [facility]       Allergies:  Review of patient's allergies indicates:  No Known Allergies    Medications:   In-Hospital Scheduled Medications:   albuterol-ipratropium  3 mL Nebulization Q6H    amiodarone  200 mg Oral Daily    aspirin  81 mg Oral Daily    atorvastatin  40 mg Oral Daily    azithromycin  500 mg Intravenous Q24H    furosemide  120 mg Intravenous Once    heparin (porcine)  5,000 Units Subcutaneous Q8H    meropenem (MERREM) IVPB  1 g Intravenous Q12H    metOLazone  5 mg Oral Once    sodium bicarbonate  650 mg Oral BID      In-Hospital PRN Medications:  dextrose 50 % in water (D50W), dextrose 50 % in water (D50W), glucagon (human recombinant), glucose, glucose, insulin aspart U-100, sodium chloride 0.9%   In-Hospital IV Infusion Medications:     Home Medications:  Prior to Admission medications    Medication Sig Start Date End Date Taking? Authorizing Provider   amiodarone (PACERONE) 200 MG Tab Take 1 tablet (200 mg total) by mouth once daily. 9/29/16  Yes Nnamdi Venegas MD   amLODIPine (NORVASC) 10 MG tablet Take 10 mg by mouth once daily.   Yes Historical Provider, MD   ascorbic acid, vitamin C, (VITAMIN C) 500 MG tablet Take 500 mg by mouth 2 (two) times daily.   Yes Historical Provider, MD   aspirin (ECOTRIN) 81 MG EC tablet Take 81 mg by mouth once daily.   Yes Historical Provider, MD   atorvastatin (LIPITOR) 40 MG tablet Take 40 mg by mouth once daily.   Yes Historical Provider, MD   ciprofloxacin HCl (CIPRO) 500 MG tablet Take 1 tablet (500 mg total) by mouth 2 (two) times daily. for 7 days 3/23/19 3/30/19 Yes Rashid Rehman MD   clotrimazole-betamethasone 1-0.05% (LOTRISONE) cream Apply topically 2 (two) times daily. 8/23/18  Yes Michael Irizarry MD   furosemide (LASIX) 10 mg/mL injection Inject 8 mLs (80 mg total) into the vein 2 (two) times daily. 2/1/19  Yes Nabila Mayberry,  DO   multivitamin (THERAGRAN) per tablet Take 1 tablet by mouth once daily.   Yes Historical Provider, MD   oxycodone-acetaminophen (PERCOCET) 5-325 mg per tablet Take 1 tablet by mouth every 4 (four) hours as needed for Pain. 17  Yes Emerson Mckay MD   polyethylene glycol (GLYCOLAX) 17 gram/dose powder Take 17 g by mouth once daily. 18  Yes David Ramos MD   promethazine (PHENERGAN) 25 MG tablet Take 1 tablet (25 mg total) by mouth every 6 (six) hours as needed for Nausea. 3/23/19  Yes Rashid Rehman MD   senna (SENOKOT) 8.6 mg tablet Take 1 tablet by mouth daily as needed for Constipation. 19  Yes Nabila Mayberry, DO   tamsulosin (FLOMAX) 0.4 mg Cap Take 1 capsule (0.4 mg total) by mouth once daily. 19 Yes Nabila Mayberry,    traMADol (ULTRAM) 50 mg tablet Take 50 mg by mouth every 6 (six) hours as needed for Pain.   Yes Historical Provider, MD   albuterol-ipratropium 2.5mg-0.5mg/3mL (DUO-NEB) 0.5 mg-3 mg(2.5 mg base)/3 mL nebulizer solution Take 3 mLs by nebulization every 6 (six) hours while awake.  Patient taking differently: Take 3 mLs by nebulization 4 (four) times daily.  9/29/16 3/16/18  Nnamdi Venegas MD   carvedilol (COREG) 25 MG tablet Take 1 tablet (25 mg total) by mouth 2 (two) times daily. 9/29/16 3/16/18  Nnamdi Venegas MD       Family History:  Family History   Problem Relation Age of Onset    Heart disease Mother        Social History:  Social History     Tobacco Use    Smoking status: Former Smoker     Packs/day: 2.00     Years: 45.00     Pack years: 90.00     Last attempt to quit: 2006     Years since quittin.7    Smokeless tobacco: Never Used   Substance Use Topics    Alcohol use: No     Comment: NO ALCOHOL FOR 18 MONTHS, PAST ETOH ABUSE    Drug use: No       Review of Systems:  All other systems are reviewed and are negative.    Health Maintenance:     Immunizations:   Currently on File:   Most Recent Immunizations    Administered Date(s) Administered    PPD Test 2017    Pneumococcal Conjugate - 13 Valent 2019        Objective:   Last 24 Hour Vital Signs:  BP  Min: 88/52  Max: 128/62  Temp  Av.9 °F (36.6 °C)  Min: 97.4 °F (36.3 °C)  Max: 98.5 °F (36.9 °C)  Pulse  Av.3  Min: 97  Max: 114  Resp  Av.7  Min: 18  Max: 30  SpO2  Av.6 %  Min: 90 %  Max: 100 %  I/O last 3 completed shifts:  In: 1200 [IV Piggyback:1200]  Out: 177 [Urine:177]    Physical Examination:  GEN - AA; NAD  CHEST - diffuse wheezing B; some rales at bases  HEART - irreg ireg; no rub  ABD - obese; nonttp  EXTR  - R ELAYNE; L leg with chronic venous stasis changes; pitting edema to dependent portions of B thighs; unable to palpate L DP/PT/post patellar pulses but leg is warm  NEURO - prox/distal muscle weakness    Laboratory Results:    Trended Lab Data:  Recent Labs   Lab 19  [id number]  0425 19  1603 19  0318   WBC 8.07 4.85  --  8.65   HGB 11.5* 11.6*  --  9.8*   HCT 36.8* 36.9*  --  30.6*    141*  --  175   MCV 95 94  --  93   RDW 17.4* 17.2*  --  17.1*    134* 135* 133*   K 5.1 5.4* 5.6* 4.6    104 103 103   CO2 22* 18* 19* 22*   BUN 42* 43* 51* 53*   GLU 69* 162* 203* 221*   PROT 5.1* 4.6*  --  4.3*   ALBUMIN 2.5* 2.2*  --  2.1*   BILITOT 0.4 0.4  --  0.3   AST 21 19  --  12   ALKPHOS 81 73  --  75   ALT 32 29  --  26       Trended Cardiac Data:  Recent Labs   Lab 19  [id number]  2323 19  0425   TROPONINI 0.036* 0.046* 0.037*   BNP 1,122*  --   --            Radiology:  Ct Abdomen Pelvis With Contrast    Result Date: 3/23/2019  EXAMINATION: CT ABDOMEN PELVIS WITH CONTRAST CLINICAL HISTORY: Nausea, vomiting, diarrhea; TECHNIQUE: Low dose axial images, sagittal and coronal reformations were obtained from the lung bases to the pubic symphysis following the IV administration of 75 mL of Omnipaque 350 .  Oral contrast was not given. COMPARISON: 2019 FINDINGS: Images of the  lower thorax are remarkable for bilateral mild to moderate pleural effusions with associated compressive atelectasis of the lower lobes.  There is a focus of probable atelectasis or fluid within the left upper lobe.  There is mild interlobular septal thickening although the appearance has improved since the previous exam, may reflect interval improvement of interstitial edema.  There is calcification in the distribution of the coronary arteries.  There is a small pericardial effusion. The liver, spleen, pancreas and adrenal glands are grossly unremarkable.  There is atrophic change of the pancreas.  There is cholelithiasis without wall thickening or pericholecystic fluid.  There is intrahepatic biliary dilation, the common duct is not dilated.  There may be a component of periportal edema.  The appearance is somewhat similar to the previous examination.  The stomach is decompressed, limiting evaluation.  No significant abdominal lymphadenopathy.  The portal vein, splenic vein, SMV, celiac axis and SMA all are patent.  The pancreatic duct is not dilated. There is multifocal renal vascular calcification, noting nephrolithiasis not entirely excluded.  The kidneys enhance symmetrically.  No hydronephrosis.  Several low attenuating lesions arise from the kidneys bilaterally, subcentimeter, and too small for characterization.  There is nonspecific bilateral perinephric fat stranding.  The bilateral ureters are unremarkable without calculi seen.  The urinary bladder is decompressed however there is wall thickening, correlation with urinalysis advised.  The prostate contains calcification. There is a moderate amount of stool in the rectosigmoid colon without wall thickening.  The terminal ileum and appendix are unremarkable.  The small bowel is grossly unremarkable.  No focal organized pelvic fluid collection.  There is marked atherosclerotic calcification of the aorta and its branches.  There is stenosis at the origin of  the SMA noting decreased flow within its proximal portion however distally, there is restored flow.  There is aortic bypass grafting noting the native bilateral common iliac arteries and distal extent are thrombosed.  The left aspect of the graft is patent, there is no significant flow within the right iliac graft.  There are partially visualized common femoral/femoral stents, the stent appears patent on the right however may be thrombosed on the left.  These findings are suboptimally evaluated given exam technique. There is osteopenia.  Degenerative changes are noted of the spine no significant inguinal lymphadenopathy.  Postsurgical changes are noted left groin.  There is an anterior abdominal wall bowel containing hernia without inflammation.     1. Bilateral moderate pleural effusions, suspected periportal edema, and small pleural effusion, correlation with volume status recommended. 2. Please see above for vascular findings noting chronic right bypass graft occlusion and left common iliac/femoral vein stent occlusion.  Please note, these findings are stable as compared to examination 01/16/2019. 3. Urinary bladder wall thickening, nonspecific, correlation with urinalysis recommended. 4. Cholelithiasis without findings to suggest acute cholecystitis. 5. Bilateral prominent renal vascular calcification, nephrolithiasis not excluded although no secondary findings to suggest obstructive uropathy. 6. Moderate stool in the colon. 7. Several additional findings above. Electronically signed by: Mati Gallardo MD Date:    03/23/2019 Time:    15:29    X-ray Chest Ap Portable    Result Date: 3/26/2019  EXAMINATION: XR CHEST AP PORTABLE CLINICAL HISTORY: Sepsis; TECHNIQUE: Single frontal view of the chest was performed. COMPARISON: 01/30/2019, CT 03/23/2019 FINDINGS: The cardiomediastinal silhouette is prominent, noting prominent epicardial fat..  There is obscuration of the left costophrenic angle suggesting effusion.   There may be trace layering right pleural fluid..  The trachea is midline.  The lungs are symmetrically expanded bilaterally with patchy increased interstitial and parenchymal attenuation bilaterally suggesting edema.  There is bandlike atelectasis projected over the right midlung zone and left midlung zone.  There is increased parenchymal attenuation projected over the left lower lung zone, may reflect compressive atelectasis or consolidation..  There is no pneumothorax.  The osseous structures are remarkable for degenerative changes noting osteopenia.  There is vascular calcification of the abdomen..     1. Findings suggesting edema noting superimposed left pleural effusion with likely compressive atelectasis of the left lower lung zone, developing consolidation however is not excluded.  Overall, findings appear similar to CT 03/23/2019. Electronically signed by: Mati Gallardo MD Date:    03/26/2019 Time:    18:15    Us Lower Extremity Veins Bilateral    Result Date: 3/27/2019  EXAMINATION: US LOWER EXTREMITY VEINS BILATERAL CLINICAL HISTORY: concern for DVT/PE; TECHNIQUE: Duplex and color flow Doppler and dynamic compression was performed of the bilateral lower extremity veins was performed. COMPARISON: None FINDINGS: Right thigh veins: The common femoral, femoral, popliteal, upper greater saphenous, and deep femoral veins are patent and free of thrombus. The veins are normally compressible and have normal phasic flow and augmentation response. 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 Left thigh veins: The common femoral, femoral, popliteal, upper greater saphenous, and deep femoral veins are patent and free of thrombus. The veins are normally compressible and have normal phasic flow and augmentation response. Left calf veins: The visualized calf veins are patent. Miscellaneous: None     No evidence of deep venous thrombosis in either lower extremity. Electronically signed by: Henrique Fitzgerald MD  Date:    03/27/2019 Time:    15:58         Assessment/Plan       ELAYNE stage II on ckd III  -etiol include ATN from [facility]/hypotension/UTI/cardio-renal; FeU consistent with pre-renal; urine eos neg  -Cont treatment for UTI; will repeat UA now  -pt oliguric; rec renal US; rec lasix 160mg BID-TID depending on response - if not effective can try metolazone 10-20mg oral 15-30 minutes prior to first lasix dose in am; no acute indication for RRT presently - pt is willing to undergo RRT if needed  -rec nepro TID with meals  -prelim urine/serum studies pending    ESBL UTI  -Abx per primary; on meropenem and azithro - please ensure meropenem is renally dosed ? 500mg q 12hr    HFpEF  -diuretics as above    COPD  -rec more frequent breathing treatments; ? Maintenance inhalers    Hx of urinary retention  -renal US pending  -bladder scan with 42cc from admit    Afib  -rate controlled  -? anticoag        Rick Spangler II  [employer] renal [facility]  [phone]

## 2019-03-28 NOTE — PLAN OF CARE
Patient in the ICU, Family not present. Patient on BIPAP not able to give information. Patient here before and some information taken from medical records.     03/28/19 1408   Discharge Assessment   Assessment Type Discharge Planning Assessment   Confirmed/corrected address and phone number on facesheet? No   Assessment information obtained from? Medical Record   Communicated expected length of stay with patient/caregiver no   Prior to hospitilization cognitive status: Coma/Sedated/Intubated   Prior to hospitalization functional status: Completely Dependent   Current cognitive status: Coma/Sedated/Intubated   Current Functional Status: Completely Dependent   Lives With spouse   Is patient able to care for self after discharge? Unable to determine at this time (comments)   Patient's perception of discharge disposition admitted as an inpatient   Patient currently being followed by outpatient case management? No   Patient currently receives any other outside agency services? No   Do you have any problems affording any of your prescribed medications? No   Is the patient taking medications as prescribed? no   Does the patient have transportation home? Yes   Transportation Anticipated other (see comments)   Does the patient receive services at the [facility]? No   Discharge Plan A Other

## 2019-03-29 NOTE — PLAN OF CARE
Problem: Skin Injury Risk Increased  Goal: Skin Health and Integrity  Outcome: Ongoing (interventions implemented as appropriate)  req's asst. With turning/repositioning, skin intact, dry, mepilex on sacral area for protection, old healed wound noted.

## 2019-03-29 NOTE — PLAN OF CARE
Problem: Adult Inpatient Plan of Care  Goal: Plan of Care Review  Outcome: Ongoing (interventions implemented as appropriate)  Pt. Cont. Bipap in  Use working ok,  Alarms ok and are audible.

## 2019-03-29 NOTE — PLAN OF CARE
Problem: Muscle Strength Impairment  Goal: Improved Muscle Strength  Outcome: Ongoing (interventions implemented as appropriate)  Bed rest, left aka, gen'ed weakness, PT/OT working with pt.

## 2019-03-29 NOTE — PROGRESS NOTES
[facility] Resident Progress Note    Subjective:     Comfortable this AM and without significant complaint. Only 490 ml urine output despite aggressive diuresis.     Objective:   Last 24 Hour Vital Signs:  BP  Min: 88/54  Max: 132/65  Temp  Av.4 °F (36.3 °C)  Min: 96.9 °F (36.1 °C)  Max: 98 °F (36.7 °C)  Pulse  Av.7  Min: 91  Max: 114  Resp  Av  Min: 15  Max: 27  SpO2  Av %  Min: 79 %  Max: 99 %  Weight  Av.8 kg (160 lb 7.9 oz)  Min: 72.8 kg (160 lb 7.9 oz)  Max: 72.8 kg (160 lb 7.9 oz)  I/O last 3 completed shifts:  In: 850 [IV Piggyback:850]  Out: 505 [Urine:505]    Physical Examination:    Gen: awake, alert, on Bipap 40% FiO2  HEENT: PERRL, EOMi, oropharynx clear, moist and without exudate, JVP 7 cm, no cervical lymphadenopathy   CVS: RRR, normal S1/S2, without S3/S4 or murmurs  Resp: no use of accessory muscles, no wheezes, no rhonchi, no rales  Abdo: soft, non-tender, non-distended, bowel sounds +, without guarding or rebound  Neuro: moves all extremities, no abnormal tone  Ext: no edema, no cyanosis  Skin: no rashes or skin breakdown noted      Laboratory:  Laboratory Data Reviewed:  Trended Lab Data:  Recent Labs   Lab 19  0425 19  1603 19  0318 19  0344   WBC 4.85  --  8.65 7.01   HGB 11.6*  --  9.8* 10.7*   HCT 36.9*  --  30.6* 33.5*   *  --  175 166   MCV 94  --  93 92   RDW 17.2*  --  17.1* 17.4*   * 135* 133* 134*   K 5.4* 5.6* 4.6 5.0    103 103 101   CO2 18* 19* 22* 22*   BUN 43* 51* 53* 59*   CREATININE 2.0* 2.4* 2.8* 3.2*   * 203* 221* 143*   PROT 4.6*  --  4.3* 4.4*   ALBUMIN 2.2*  --  2.1* 2.1*   BILITOT 0.4  --  0.3 0.3   AST 19  --  12 14   ALKPHOS 73  --  75 75   ALT 29  --  26 23       Trended Cardiac Data:  Recent Labs   Lab 19  1745 19  2323 19  0425   TROPONINI 0.036* 0.046* 0.037*   BNP 1,122*  --   --        Microbiology Data   Microbiology Results (last 7 days)     Procedure Component  Value Units Date/Time    Blood culture x two cultures. Draw prior to antibiotics. [[id number]] Collected:  03/26/19 1743    Order Status:  Completed Specimen:  Blood Updated:  03/28/19 2012     Blood Culture, Routine No Growth to date     Blood Culture, Routine No Growth to date     Blood Culture, Routine No Growth to date    Narrative:       Aerobic and anaerobic    Blood culture x two cultures. Draw prior to antibiotics. [[id number]] Collected:  03/26/19 1742    Order Status:  Completed Specimen:  Blood Updated:  03/28/19 2012     Blood Culture, Routine No Growth to date     Blood Culture, Routine No Growth to date     Blood Culture, Routine No Growth to date    Narrative:       Aerobic and anaerobic    Urine culture [[id number]] Collected:  03/26/19 2045    Order Status:  Completed Specimen:  Urine from Clean Catch Updated:  03/28/19 1429     Urine Culture, Routine --     ESCHERICHIA COLI  >100,000 cfu/ml  Identification and susceptibility pending       Urine Culture, Routine --     ESCHERICHIA COLI  50,000 - 99,999 cfu/ml  Identification and susceptibility pending      Urine Culture High Risk [[id number]]     Order Status:  Completed Specimen:  Urine     Culture, Respiratory with Gram Stain [[id number]]     Order Status:  No result Specimen:  Respiratory from Sputum, Expectorated     Influenza A & B by Molecular [[id number]] Collected:  03/26/19 1801    Order Status:  Completed Specimen:  Nasopharyngeal Swab Updated:  03/26/19 1853     Influenza A, Molecular Negative     Influenza B, Molecular Negative     Flu A & B Source Nasal swab          Radiology:  Imaging Results          X-Ray Chest AP Portable (Final result)  Result time 03/26/19 18:15:56    Final result by Mati Gallardo MD (03/26/19 18:15:56)                 Impression:      1. Findings suggesting edema noting superimposed left pleural effusion with likely compressive atelectasis of the left lower lung zone, developing consolidation however is not  excluded.  Overall, findings appear similar to CT 03/23/2019.      Electronically signed by: Mati Gallardo MD  Date:    03/26/2019  Time:    18:15             Narrative:    EXAMINATION:  XR CHEST AP PORTABLE    CLINICAL HISTORY:  Sepsis;    TECHNIQUE:  Single frontal view of the chest was performed.    COMPARISON:  01/30/2019, CT 03/23/2019    FINDINGS:  The cardiomediastinal silhouette is prominent, noting prominent epicardial fat..  There is obscuration of the left costophrenic angle suggesting effusion.  There may be trace layering right pleural fluid..  The trachea is midline.  The lungs are symmetrically expanded bilaterally with patchy increased interstitial and parenchymal attenuation bilaterally suggesting edema.  There is bandlike atelectasis projected over the right midlung zone and left midlung zone.  There is increased parenchymal attenuation projected over the left lower lung zone, may reflect compressive atelectasis or consolidation..  There is no pneumothorax.  The osseous structures are remarkable for degenerative changes noting osteopenia.  There is vascular calcification of the abdomen..                                  Current Medications:     Infusions:       Scheduled:   albuterol-ipratropium  3 mL Nebulization Q6H    allopurinol  50 mg Oral Daily    amiodarone  200 mg Oral Daily    aspirin  81 mg Oral Daily    atorvastatin  40 mg Oral Daily    furosemide  160 mg Intravenous BID    heparin (porcine)  5,000 Units Subcutaneous Q8H    meropenem (MERREM) IVPB  500 mg Intravenous Q12H    sodium bicarbonate  650 mg Oral BID        PRN:  dextrose 50 % in water (D50W), dextrose 50 % in water (D50W), glucagon (human recombinant), glucose, glucose, insulin aspart U-100, sodium chloride 0.9%    Antibiotics and Day Number of Therapy:  Vanc/azithromycin 3/26-3/28  Meropenum 3/26-present    Lines and Day Number of Therapy:  PIV    Assessment and Plan     Acute on Chronic Hypoxic and  Hypercapnic Respiratory Failure Likely    2/2 Volume Overload  -O2 85% on 5 L nasal cannula on admission  ABG: ph 7.21/CO2 45/O2 117/HCO3 20.4  A-a gradient 254  -Given IV steroids and breathing treatment in ED  -Non-compressible IVC on bedside US in ED  CXR on admission showed bilateral pulmonary edema  BNP 1,122 (baseline in 300s)  -Will diurese with IV lasix and metolazone, minimal output  - volume status difficult to determine as story suggests patient is volume down but labs/CXR and exam suggest volume overload   - oxygenation improving this AM, respiratory acidosis resolved     Sepsis secondary to ESBL E coli Urinary Tract Infection  -Patient with multiple complaints for past 7 days, on 3/23 in ED found to    have ESBL E coli UTI that was treated with ciprofloxacin  -Admission: , RR 28, WBC 8  Procal 0.12  Lactic acid 1.6  CTAP on 3/26 with bladder wall thickening  -Given vancomycin/zosyn in ED  -Meropenum, deescalate as cultures and susceptibilities result    ELAYNE on CKD 3B, oliguric, worsening  -Admission Cr of 1.8  Baseline 1.2  - FeUrea borderline pre-renal, volume status still difficult to determine   - oligurigic, worsening Cr/BUN and hyperkalemia overnight  - nephrology consulted, recommending more aggressive diuresis with IV lasix and metolazone     Non-Anion Gap Metabolic Acidosis, worsening  -Bicarb of 22  ABG pH of 7.2  - bicarb 18 this AM, unclear etiology  - denies diarrhea, checking urine anion gap     Acute on Chronic HFpEF  -TTE (January 2019) w/ EF 55%, grade I left ventricular diastolic    dysfunction, PA pressure of 47  - Home Meds: Amiodarone 200mg daily, Carvedilol 25mg BID, Lasix 40 PO    -Will diurese, minimal urine output     Paroxysmal atrial fibrillation with RVR  -CHADSVASC >2, not any anticoagulation  -Patient has been on amiodarone for years  -In A-fib on admission, rate in 90s  -Continue amiodorone, holding coreg 2/2 hypotension     Urinary retention with  hypospadias  -Found to have urinary retention back in Jan 2019  -Renal US showing bladder distension, consult Urology for Mendoza placement as patient with hypospadias    Stage 2 Sacral Decubitus Ulcer, present on admission  -Noted on exam, no erythema  -Patient with sacral decub ulcer infections in past, last dating back to    early January 2019  -Consult wound care     Elevated Troponin  -On admission troponin of 0.036, up to 0.047 and now downtrending  -Likely elevated secondary to demand     Normocytic Anemia  -3/26: Hb 11.5/Hct 36.8/MCV 95  -Worked up in 2016, showing anemia of chronic disease  -Will defer workup   -Last colonoscopy 2012     CAD, PVD, RLE AKA:  -Home Meds: ASA 81mg daily, Atorvastatin 40mg daily  -Resume home meds     Type II Diabetes Mellitus  -A1c of 7.1 in Jan 2019, 6.6 this admission  -Not on any current outpatient medications  -If sugars remain high will start on SSI with accuchecks     History of CVA  -CVA in 2007, with no residual deficits  -Home Meds: ASA 81mg daily, Atorvastatin 40mg daily  -Resume home meds     History of Alcohol Abuse  -No alcohol since 2007     Diet: cardiac  Ppx: heparin  Dispo: pending clinical improvement, possible step down today, nephrology consult          Abel Munguia  [facility] HO-I  [facility] Hospitalitis Service Team B    [facility] Hospitalist Pager numbers:   [facility] Hospitalist Medicine Team A (Chay/Shawanda): [phone]  [facility] Medicine Team B (Gloria/Alex):  [phone]

## 2019-03-29 NOTE — PLAN OF CARE
Problem: Adult Inpatient Plan of Care  Goal: Plan of Care Review  Patient noted to be lethargic with no urine output. Attending is aware. Patient is now more alert at this time with wife at bedside feeding him dinner. 02 4L NC and sats 98% with NAD noted at time. Patient was able to urinate clear yellow urine in condom cath; 150ml. He remains aaox4, afebrile, and no leukocytosis. Renal U/S done at bedside; results pending. Urine sample to be collected and sent to lab. See orders. Will report off to oncoming nurse.

## 2019-03-29 NOTE — PROGRESS NOTES
[facility] renal fellow HO V      Subjective:      Interval Hx:    -490mL UO  -pt hernandez po intake; states he is not sob       Objective:   Last 24 Hour Vital Signs:  BP  Min: 88/54  Max: 132/65  Temp  Av.4 °F (36.3 °C)  Min: 96.9 °F (36.1 °C)  Max: 98 °F (36.7 °C)  Pulse  Av.9  Min: 91  Max: 114  Resp  Av.1  Min: 15  Max: 27  SpO2  Av.1 %  Min: 79 %  Max: 99 %  Weight  Av.8 kg (160 lb 7.9 oz)  Min: 72.8 kg (160 lb 7.9 oz)  Max: 72.8 kg (160 lb 7.9 oz)  I/O last 3 completed shifts:  In: 850 [IV Piggyback:850]  Out: 505 [Urine:505]    Physical Examination:    GEN - AA; NAD  CHEST - diffuse wheezing B; some rales at bases  HEART - irreg ireg; no rub  ABD - obese; nonttp  EXTR  - R AKA; L leg with chronic venous stasis changes; pitting edema to dependent portions of B thighs; unable to palpate L DP/PT/post patellar pulses but leg is warm  NEURO - prox/distal muscle weakness        Laboratory:  Laboratory   Pertinent Findings:  Recent Labs   Lab 19  0425 19  1603 19  0318 19  0344   WBC 4.85  --  8.65 7.01   HGB 11.6*  --  9.8* 10.7*   HCT 36.9*  --  30.6* 33.5*   *  --  175 166   MCV 94  --  93 92   RDW 17.2*  --  17.1* 17.4*   * 135* 133* 134*   K 5.4* 5.6* 4.6 5.0    103 103 101   CO2 18* 19* 22* 22*   BUN 43* 51* 53* 59*   * 203* 221* 143*   PROT 4.6*  --  4.3* 4.4*   ALBUMIN 2.2*  --  2.1* 2.1*   BILITOT 0.4  --  0.3 0.3   AST 19  --  12 14   ALKPHOS 73  --  75 75   ALT 29  --  26 23             Current Medications:     Infusions:       Scheduled:   albuterol-ipratropium  3 mL Nebulization Q6H    allopurinol  50 mg Oral Daily    amiodarone  200 mg Oral Daily    aspirin  81 mg Oral Daily    atorvastatin  40 mg Oral Daily    furosemide  160 mg Intravenous BID    heparin (porcine)  5,000 Units Subcutaneous Q8H    meropenem (MERREM) IVPB  500 mg Intravenous Q12H    sodium bicarbonate  650 mg Oral BID        PRN:  dextrose 50 % in water (D50W),  dextrose 50 % in water (D50W), glucagon (human recombinant), glucose, glucose, insulin aspart U-100, sodium chloride 0.9%        Assessment/Plan     ELAYNE stage II on ckd III  -etiol include ATN from VERÓNICA/hypotension/UTI/cardio-renal/supra therapeutic Vanc; FeU consistent with pre-renal; urine eos neg; renal US without reversible causes but does have a a full bladder  -Cont treatment for UTI; will repeat UA now  -pt oliguric; rec gonzalez cath placement now - if this does not improve urine output we may be looking at RRT in next day or so; no acute indication for RRT presently - pt is willing to undergo RRT if needed  -rec nepro TID with meals  -cont allopurinol 50mg for hyperuricemia; if vanc needed, rec random levels before dosing     ESBL UTI  -Abx per primary; on meropenem and azithro     HFpEF  -diuretics as above     COPD  -? Maintenance inhalers; breathing treatments q 6     Hx of urinary retention  -rec gonzalez placement     Afib  -rate controlled  -no anticoag as high bleeding risk           Rick Spangler II  [facility] renal HO V  [phone]

## 2019-03-29 NOTE — CARE UPDATE
I communicated with nursing staff who was able to place a 16 [place] urethral gonzalez catheter without any issue. Only 125cc drained, patient was voiding prior. Likely not in complete urinary retention. Can discontinue gonzalez catheter after primary team deems they do not need the catheter anymore.   Can followup with me in 1 month in clinic to check bladder scan to monitor his postvoid residuals in the outpatient setting. If low or stable, will try to avoid catheter.   Urology consult discontinued as nurse was perfectly capable of passing gonzalez catheter. Appreciate nursing assistance.

## 2019-03-29 NOTE — NURSING
Report called to rn on 4,pt transferring to room 460, spouse already called and informed. Italo Bedolla. Transport req'ed.

## 2019-03-30 NOTE — PLAN OF CARE
Problem: Adult Inpatient Plan of Care  Goal: Plan of Care Review  Outcome: Ongoing (interventions implemented as appropriate)  Pt on 4 LPM NC, O2 sats notated. Will continue to monitor.

## 2019-03-30 NOTE — PT/OT/SLP PROGRESS
Physical Therapy Screen      Patient Name:  Asad Perez   MRN:  [id number]    Patient not seen today secondary to pt functioning at prior level.  Pt known to rehab department.  Pt requires Radhika lift t/f from bed to W/C at home and does not sit up at EOB.  Wife and MONAE provide 24hr care for pt including mobility and ADL's with the exception of feeding.  Recommend Hospital bed if not already in place and restorative care to be provided by nursing staff while admitted.  PT to sign off.    Megan Walden, PT

## 2019-03-30 NOTE — PT/OT/SLP EVAL
Occupational Therapy   Evaluation/treatment    Name: Asad Perez  MRN: [id number]  Admitting Diagnosis:  Acute on chronic respiratory failure with hypoxia and hypercapnia      Recommendations:     Discharge Recommendations: home(vs NH 24/7 care and assist)  Discharge Equipment Recommendations:  (HB with air mattress overlay if does not have one already)  Barriers to discharge:  None    Assessment:     Asad Perez is a 79 y.o. male with a medical diagnosis of Acute on chronic respiratory failure with hypoxia and hypercapnia.  He presents with severe R forearm/hand edema with BUE limited AROM and risk for contractures.  Pt would benefit from OT while in acute care.  Recommendation post acute:  home vs NH with 24/7 care and assist.  Pt. Is DEP at home with ADLS except feeding for which wife assisted him at times per his report. Family uses camryn lift for wc transfers and he gets in wc few times a week otherwise bed bound.  He has a HB with air mattress and wc at home per his report. Per old chart review has oxygen. Continue with OT POC.    . Performance deficits affecting function: edema, decreased ROM, impaired joint extensibility.      Rehab Prognosis: Fair; patient would benefit from acute skilled OT services to address these deficits and reach maximum level of function.       Plan:     Patient to be seen 5 x/week to address the above listed problems via self-care/home management, therapeutic activities, therapeutic exercises  · Plan of Care Expires: 04/30/19  · Plan of Care Reviewed with:      Subjective     Chief Complaint: none  Patient/Family Comments/goals: none    Occupational Profile:  Living Environment: lives with wife and brother n law   Previous level of function: Dep ADLS except feeding but wife helps at times per pt repot; hoer lifted to WC few times a week.   Roles and Routines: sedentary  Equipment Used at Home:  oxygen, walker, rolling, hospital bed(per pt he has air mattress and HB but  unreliable historian)  Assistance upon Discharge: wife and brother n law    Pain/Comfort:  · Pain Rating 1: 0/10    Patients cultural, spiritual, Tenriism conflicts given the current situation: no    Objective:     Communicated with: nurse prior to session.  Patient found right sidelying with bed alarm, telemetry, oxygen upon OT entry to room.    General Precautions: Standard, fall   Orthopedic Precautions:N/A   Braces: N/A     Occupational Performance:    Bed Mobility:    · Patient completed Rolling/Turning to Right with total assistance  · Patient completed Scooting/Bridging with total assistance    Activities of Daily Living:  · Feeding:  stand by assistance and simulated with  utensil (spoon ) using RUE in bed with HOB elevated .  · Grooming: stand by assistance wiped face HOB elevated (simulated)  · Lower Body Dressing: dependence .  · Toileting: dependence incontinent     Cognitive/Visual Perceptual:  Cognitive/Psychosocial Skills:     -       Oriented to: Person, Place, Time and only to year not month and no situation   -       Follows Commands/attention:Follows one-step commands  -       Communication: dysarthria  -       Memory: Impaired STM, Impaired LTM and Poor immediate recall  -       Safety awareness/insight to disability: impaired   -       Mood/Affect/Coping skills/emotional control: Cooperative  Visual/Perceptual:      -Intact grossly    Physical Exam:  Balance:    -       NT  Skin integrity: Bruising of BUE  Edema:  Severe R forearm and hand/fingers  Dominant hand:    -       R but uses L hand 2/2 weakness/developing contracture and limited AROM  Upper Extremity Range of Motion:     -       Right Upper Extremity: WFL except no shld AROM noted; PROM 90 with discomfort EOR ~ 90; elbow AROM wfl; wrist adn fingers 50% limiteedd; severe edema an stiffness with developing flexion contractures  -       Left Upper Extremity: WFL except shld AROM ~ 25 degrees flexion  Upper Extremity Strength:    -        Right Upper Extremity: Deficits: shld 1/5; elbow 3+/5; hand /wrist 3-/5;   -       Left Upper Extremity: Deficits: shld 2+/5; distally 4-/5   Strength:    -       Right Upper Extremity: Deficits: 3-/5  -       Left Upper Extremity: Deficits: 3+/5    AMPAC 6 Click ADL:  [facility] Total Score: 8    Treatment & Education:  Role fo OT and POC  Edema reduction techniques to R wrist and fingers with coband wrap and elevation RUE on pillow while in bed; nurse education done re:  coband to leave on until edam decreases and remove if edema or circulation compromised and check finger tips for circulation. Pt performed  R hand active fist pumping with small towel roll in hand while elevated on pillow in bed. AAROM B shld, elbows, wrist and fingers flex/ext.    Education:    Patient left left sidelying with all lines intact, call button in reach, bed alarm on and nusre notified    GOALS:   Multidisciplinary Problems     Occupational Therapy Goals        Problem: Occupational Therapy Goal    Goal Priority Disciplines Outcome Interventions   Occupational Therapy Goal     OT, PT/OT Ongoing (interventions implemented as appropriate)    Description:  Goals to be met by: 4/10/2019    Patient will increase functional independence with ADLs by performing:    Upper extremity exercise program x10 reps per handout, with assistance as needed.  Reduce edema R hand to minimal from severe for increased AROM and reduce contracture.                       History:     Past Medical History:   Diagnosis Date    Alcohol abuse     CHF (congestive heart failure)     CKD (chronic kidney disease) stage 3, GFR 30-59 ml/min     Coronary artery disease     Decubitus skin ulcer     Heart failure, diastolic     High cholesterol     Hypertension     Immobility     LBP (low back pain)     Prediabetes     PVD (peripheral vascular disease)     Stroke     2006, no deficits    Urine incontinence        Past Surgical History:   Procedure Laterality  Date    AMPUTATION-ABOVE KNEE Right 9/12/2016    [facility]    aortic bifemoral bypass  2006    ARTHROPLASTY-KNEE Right 6/27/2016    [facility]    ARTHROPLASTY-KNEE Left 7/14/2014    [facility]    bilateral carotid stenois  2006    carotid stents      DEBRIDEMENT-SACRAL WOUND N/A 10/10/2016    [facility]    DEBRIDEMENT-SACRAL WOUND Bilateral 9/29/2016    [facility]    DEBRIDEMENT-SACRAL WOUND N/A 9/16/2016    [facility]    DEBRIDEMENT-WOUND Right 4/8/2017    [facility]    ESOPHAGOGASTRODUODENOSCOPY (EGD) N/A 11/4/2014    [facility]formed by [facility] Ramos [facility] at [facility] ENDO    ESOPHAGOGASTRODUODENOSCOPY (EGD) with PEG N/A 9/23/2016    Performed by Emerson Childers Jr., MD at [facility] END[facility]    EXCHANGE-POLYETHYLENE RIGHT KNEE Right 7/12/2016    [facility]formed [facility]    IOVERA Right 6/15/2016    [facility]formed [facility]    IRRIGATION AND DEBRIDEMENT LOWER EXTREMITY Right 9/9/2016    [facility]    IRRIGATION AND DEBRIDEMENT RIGHT KNEE Right 7/12/2016    [facility]    JOINT REPLACEMENT      knee    left common endarterectomy  2006    REVISION-AMPUTATION STUMP Right 12/1/2016    [facility]    RT AKA  NOV 2017    SYNOVECTOMY-KNEE Left 9/9/2016    [facility]    WOUND EXPLORATION Right 9/9/2016    [facility] at [facility]       Time Tracking:     OT Date of Treatment: 03/30/19  OT Start Time: 1425  OT Stop Time: 1449  OT Total Time (min): 24 min    Billable Minutes:Evaluation 10  Therapeutic Exercise 14  Total Time 24    Charlene Varner OT  3/30/2019

## 2019-03-30 NOTE — ED NOTES
APPEARANCE: Alert, oriented.  CARDIAC: Normal rate and rhythm, no murmur heard.   PERIPHERAL VASCULAR: peripheral pulses present X3 extremities. Normal cap refill. 1+ edema noted to L ankle/foot. R AKA. Warm to touch.    RESPIRATORY:Patient reports SOB, audible exp wheezing noted throughout lung fields. Patient use home O2 and neb txs.  GASTRO: soft, bowel sounds normal, no tenderness, no abdominal distention.  MUSC: Full ROM. No bony tenderness or soft tissue tenderness. No obvious deformity. R AKA   SKIN: Skin is warm and dry, normal skin turgor, mucous membranes moist. Circulatory, brown discoloration to distal LLE. Stage 2 pressure injury noted to sacral region, skin tear to L anterior shin, redness/dermatitis noted to scrotum and bilat groins.  NEURO: 5/5 strength major flexors/extensors bilaterally. Sensory intact to light touch bilaterally. Edis coma scale: eyes open spontaneously-4, oriented & converses-5, obeys commands-6. No neurological abnormalities.   MENTAL STATUS: awake, alert and aware of environment.  EYE: PERRL, both eyes: pupils brisk and reactive to light. Normal size.  ENT: EARS: no obvious drainage. NOSE: no active bleeding.   GENITALIA: Normal external genitalia.     
Bed rails are up and call light is within patient reach.  
Family at bedside.  
Patient placed on continuous cardiac monitor, automatic blood pressure cuff and continuous pulse oximeter.  
no

## 2019-03-30 NOTE — PLAN OF CARE
Problem: Adult Inpatient Plan of Care  Goal: Plan of Care Review     03/30/19 0342   Plan of Care Review   Plan of Care Reviewed With patient   Pt reproted no pain.  0300 wanted bi pap removed.  Asked respiotory to put 02 back on.  Pt happy with that.  02 Sat 94% w/ 4 lts nc.      Bed in lowest position, wheels locked, non skid socks, ID band worn, personal items and call bell with in reach, bed alarm set.

## 2019-03-30 NOTE — PLAN OF CARE
Problem: Occupational Therapy Goal  Goal: Occupational Therapy Goal  Goals to be met by: 4/10/2019    Patient will increase functional independence with ADLs by performing:    Upper extremity exercise program x10 reps per handout, with assistance as needed.  Reduce edema R hand to minimal from severe for increased AROM and reduce contracture.     Outcome: Ongoing (interventions implemented as appropriate)  OT initial completed and treatment initiated.  Pt. presents with severe R forearm/hand edema with BUE limited AROM and risk for contractures.  Pt would benefit from OT while in acute care.  Recommendation post acute:  home vs NH with 24/7 care and assist.  Pt. Is DEP at home with ADLS except feeding for which wife assisted him at times per his report. Family uses [facility] for wc transfers and he gets in wc few tjmes a week otherwise bed bound.  He has a HB with air mattress and wc at home per his report.  Continue with OT POC.

## 2019-03-30 NOTE — PROGRESS NOTES
[employer] renal fellow HO V      Subjective:      Interval Hx:    -650mL UO  -pt hernandez po intake; states he is not sob       Objective:   Last 24 Hour Vital Signs:  BP  Min: 91/63  Max: 128/60  Temp  Av.1 °F (36.2 °C)  Min: 96.2 °F (35.7 °C)  Max: 97.7 °F (36.5 °C)  Pulse  Av.7  Min: 80  Max: 126  Resp  Av.9  Min: 16  Max: 24  SpO2  Av.7 %  Min: 92 %  Max: 97 %  I/O last 3 completed shifts:  In: 730 [P.O.:460; I.V.:20; IV Piggyback:250]  Out: 975 [Urine:975]    Physical Examination:    GEN - AA; NAD  CHEST - diffuse wheezing B; some rales at bases  HEART - irreg ireg; no rub  ABD - obese; nonttp  EXTR  - R AKA; L leg with chronic venous stasis changes; pitting edema to dependent portions of B thighs; unable to palpate L DP/PT/post patellar pulses but leg is warm  NEURO - prox/distal muscle weakness        Laboratory:  Laboratory   Pertinent Findings:  Recent Labs   Lab 19  0318 19  0344 19  0446   WBC 8.65 7.01 6.58   HGB 9.8* 10.7* 10.5*   HCT 30.6* 33.5* 32.4*    166 140*   MCV 93 92 91   RDW 17.1* 17.4* 17.6*   * 134* 137   K 4.6 5.0 4.6    101 102   CO2 22* 22* 25   BUN 53* 59* 67*   * 143* 101   PROT 4.3* 4.4* 4.5*   ALBUMIN 2.1* 2.1* 2.2*   BILITOT 0.3 0.3 0.3   AST 12 14 27   ALKPHOS 75 75 99   ALT 26 23 32             Current Medications:     Infusions:       Scheduled:   albuterol-ipratropium  3 mL Nebulization Q6H    allopurinol  50 mg Oral Daily    amiodarone  200 mg Oral Daily    aspirin  81 mg Oral Daily    atorvastatin  40 mg Oral Daily    ertapenem (INVANZ) IVPB  0.5 g Intravenous Q24H    furosemide  160 mg Intravenous BID    heparin (porcine)  5,000 Units Subcutaneous Q8H    sodium bicarbonate  650 mg Oral BID        PRN:  dextrose 50 % in water (D50W), dextrose 50 % in water (D50W), glucagon (human recombinant), glucose, glucose, insulin aspart U-100, sodium chloride 0.9%        Assessment/Plan     ELAYNE stage II on ckd III  -etiol  include ATN from VERÓNICA/hypotension/UTI/cardio-renal/supra therapeutic Vanc; FeU consistent with pre-renal; urine eos neg; renal US without reversible causes but does have a a full bladder  -Cont treatment for UTI; will repeat UA now  -pt oliguric; rec gonzalez cath placement now - if this does not improve urine output we may be looking at RRT in next day or so; no acute indication for RRT presently - pt is willing to undergo RRT if needed  -rec nepro TID with meals  -cont allopurinol 50mg for hyperuricemia; if vanc needed, rec random levels before dosing     ESBL UTI  -Abx per primary; on meropenem and azithro     HFpEF  -diuretics as above     COPD  -? Maintenance inhalers; breathing treatments q 6     Hx of urinary retention  -rec gonzalez placement     Afib  -rate controlled  -no anticoag as high bleeding risk        David Wilks MD, 3/30/2019 1:02 PM  [facility] PGY-V  Pager: [phone]  Cell: [phone]

## 2019-03-30 NOTE — PLAN OF CARE
VN cued into pt's room with permission. Informed pt that VN would be working closely with floor RN, PCT and MD. Pt denies pain and nausea. Staff nurse in room requests for VN to inform MD that sched am Lasix was held due to bp 91/63 and that pt has increased bleeding to injection site after Heparin Admin. Visible blood soilage on pt's gown. Pt denies pain to abdomen. Informed Dr Otero. MD ok with holding Lasix this am and will assess patient's injections sites on abdomen. Informed nurse of this information. VN will cont to be avail and intervene prn.      03/30/19 7370   Type of Frequent Check   Type Other (see comments)  (VN round)   Safety/Activity   Patient Rounds visualized patient  (via telecomm)   Pain/Comfort/Sleep   Preferred Pain Scale number (Numeric Rating Pain Scale)   Pain Rating (0-10): Rest 0   Nausea/Vomiting   Nausea/Vomiting No Nausea and Vomiting   Assessments (Pre/Post)   Level of Consciousness (AVPU) alert

## 2019-03-30 NOTE — PROGRESS NOTES
[facility] Resident Progress Note    Subjective:     No complaints this morning. Mendoza in place, continues to have low UOP.     Objective:   Last 24 Hour Vital Signs:  BP  Min: 91/63  Max: 133/69  Temp  Av.9 °F (36.1 °C)  Min: 96.2 °F (35.7 °C)  Max: 97.7 °F (36.5 °C)  Pulse  Av.3  Min: 80  Max: 126  Resp  Av.4  Min: 16  Max: 25  SpO2  Av.7 %  Min: 83 %  Max: 97 %  I/O last 3 completed shifts:  In: 730 [P.O.:460; I.V.:20; IV Piggyback:250]  Out: 975 [Urine:975]    Physical Examination:  Gen: awake, alert, no distress  HEENT: PERRL, EOMi, oropharynx clear, moist and without exudate  CVS: RRR, normal S1/S2, without S3/S4 or murmurs  Resp: no use of accessory muscles, no wheezes, no rhonchi, no rales  Abdo: soft, non-tender, non-distended, bowel sounds +, without guarding or rebound  Neuro: moves all extremities, no abnormal tone  Ext: no edema, no cyanosis  Skin: no rashes or skin breakdown noted      Laboratory:  Laboratory Data Reviewed:  Trended Lab Data:  Recent Labs   Lab 19  0318 19  0344 19  0446   WBC 8.65 7.01 6.58   HGB 9.8* 10.7* 10.5*   HCT 30.6* 33.5* 32.4*    166 140*   MCV 93 92 91   RDW 17.1* 17.4* 17.6*   * 134* 137   K 4.6 5.0 4.6    101 102   CO2 22* 22* 25   BUN 53* 59* 67*   CREATININE 2.8* 3.2* 3.5*   * 143* 101   PROT 4.3* 4.4* 4.5*   ALBUMIN 2.1* 2.1* 2.2*   BILITOT 0.3 0.3 0.3   AST 12 14 27   ALKPHOS 75 75 99   ALT 26 23 32       Trended Cardiac Data:  Recent Labs   Lab 19  1745 19  2323 19  0425   TROPONINI 0.036* 0.046* 0.037*   BNP 1,122*  --   --        Microbiology Data   Microbiology Results (last 7 days)     Procedure Component Value Units Date/Time    Blood culture x two cultures. Draw prior to antibiotics. [[id number]] Collected:  19    Order Status:  Completed Specimen:  Blood Updated:  19     Blood Culture, Routine No Growth to date     Blood Culture, Routine No Growth  to date     Blood Culture, Routine No Growth to date     Blood Culture, Routine No Growth to date    Narrative:       Aerobic and anaerobic    Blood culture x two cultures. Draw prior to antibiotics. [[id number]] Collected:  03/26/19 1742    Order Status:  Completed Specimen:  Blood Updated:  03/29/19 2012     Blood Culture, Routine No Growth to date     Blood Culture, Routine No Growth to date     Blood Culture, Routine No Growth to date     Blood Culture, Routine No Growth to date    Narrative:       Aerobic and anaerobic    Urine culture [[id number]]  (Susceptibility) Collected:  03/26/19 2045    Order Status:  Completed Specimen:  Urine from Clean Catch Updated:  03/29/19 1228     Urine Culture, Routine --     ESCHERICHIA COLI ESBL  >100,000 cfu/ml      Urine Culture High Risk [[id number]]     Order Status:  Completed Specimen:  Urine     Culture, Respiratory with Gram Stain [[id number]]     Order Status:  No result Specimen:  Respiratory from Sputum, Expectorated     Influenza A & B by Molecular [[id number]] Collected:  03/26/19 1801    Order Status:  Completed Specimen:  Nasopharyngeal Swab Updated:  03/26/19 1853     Influenza A, Molecular Negative     Influenza B, Molecular Negative     Flu A & B Source Nasal swab          Radiology:  Imaging Results          X-Ray Chest AP Portable (Final result)  Result time 03/26/19 18:15:56    Final result by Mati Gallardo MD (03/26/19 18:15:56)                 Impression:      1. Findings suggesting edema noting superimposed left pleural effusion with likely compressive atelectasis of the left lower lung zone, developing consolidation however is not excluded.  Overall, findings appear similar to CT 03/23/2019.      Electronically signed by: Mati Gallardo MD  Date:    03/26/2019  Time:    18:15             Narrative:    EXAMINATION:  XR CHEST AP PORTABLE    CLINICAL HISTORY:  Sepsis;    TECHNIQUE:  Single frontal view of the chest was  performed.    COMPARISON:  01/30/2019, CT 03/23/2019    FINDINGS:  The cardiomediastinal silhouette is prominent, noting prominent epicardial fat..  There is obscuration of the left costophrenic angle suggesting effusion.  There may be trace layering right pleural fluid..  The trachea is midline.  The lungs are symmetrically expanded bilaterally with patchy increased interstitial and parenchymal attenuation bilaterally suggesting edema.  There is bandlike atelectasis projected over the right midlung zone and left midlung zone.  There is increased parenchymal attenuation projected over the left lower lung zone, may reflect compressive atelectasis or consolidation..  There is no pneumothorax.  The osseous structures are remarkable for degenerative changes noting osteopenia.  There is vascular calcification of the abdomen..                                  Current Medications:     Infusions:       Scheduled:   albuterol-ipratropium  3 mL Nebulization Q6H    allopurinol  50 mg Oral Daily    amiodarone  200 mg Oral Daily    aspirin  81 mg Oral Daily    atorvastatin  40 mg Oral Daily    ertapenem (INVANZ) IVPB  0.5 g Intravenous Q24H    furosemide  160 mg Intravenous BID    heparin (porcine)  5,000 Units Subcutaneous Q8H    sodium bicarbonate  650 mg Oral BID        PRN:  dextrose 50 % in water (D50W), dextrose 50 % in water (D50W), glucagon (human recombinant), glucose, glucose, insulin aspart U-100, sodium chloride 0.9%    Antibiotics and Day Number of Therapy:  Vanc/azithromycin 3/26-3/28  Meropenem 3/26-3/29  Ertapenem 3/29-present    Lines and Day Number of Therapy:  PIV    Assessment and Plan     Acute on Chronic Hypoxic and Hypercapnic Respiratory Failure Likely    2/2 Volume Overload  -O2 85% on 5 L nasal cannula on admission  ABG: ph 7.21/CO2 45/O2 117/HCO3 20.4  A-a gradient 254  -Given IV steroids and breathing treatment in ED  -Non-compressible IVC on bedside US in ED  CXR on admission showed  bilateral pulmonary edema  BNP 1,122 (baseline in 300s)  -Will diurese with IV lasix and metolazone, minimal output  -volume status difficult to determine as story suggests patient is volume down but labs/CXR and exam suggest volume overload   -oxygenation improving, respiratory acidosis resolved     Sepsis secondary to ESBL E coli Urinary Tract Infection  -Patient with multiple complaints for past 7 days, on 3/23 in ED found to have ESBL E coli UTI that was treated with ciprofloxacin  -Admission: , RR 28, WBC 8  Procal 0.12  Lactic acid 1.6  CTAP on 3/26 with bladder wall thickening  -Given vancomycin/zosyn in ED  -on ertapenem currently    ELAYNE on CKD 3B, oliguric, worsening  -Admission Cr of 1.8  Baseline 1.2  -FeUrea borderline pre-renal, volume status still difficult to determine  -oligurigic, worsening BUN/Cr  -nephrology consulted, recommending more aggressive diuresis with IV lasix and metolazone     Non-Anion Gap Metabolic Acidosis, resolved  -Bicarb of 22  ABG pH of 7.2  -bicarb trended down to 18 this AM, unclear etiology  -improved to normal     Acute on Chronic HFpEF  -TTE (January 2019) w/ EF 55%, grade I left ventricular diastolic    dysfunction, PA pressure of 47  -Home Meds: Amiodarone 200mg daily, Carvedilol 25mg BID, Lasix 40 PO    -Will continue to diurese     Paroxysmal atrial fibrillation with RVR  -CHADSVASC >2, not any anticoagulation  -Patient has been on amiodarone for years  -In A-fib on admission, rate in 90s  -Continue amiodorone, holding coreg 2/2 hypotension     Urinary retention with hypospadias  -Found to have urinary retention back in Jan 2019  -Renal US showing bladder distension, Mendoza placed    Stage 2 Sacral Decubitus Ulcer, present on admission  -Noted on exam, no erythema  -Patient with sacral decub ulcer infections in past, last dating back to early January 2019  -Consult wound care     Elevated Troponin  -On admission troponin of 0.036, up to 0.047 and now  downtrending  -Likely elevated secondary to demand     Normocytic Anemia  -3/26: Hb 11.5/Hct 36.8/MCV 95  -Worked up in 2016, showing anemia of chronic disease  -Will defer workup   -Last colonoscopy 2012     CAD, PVD, RLE AKA:  -Home Meds: ASA 81mg daily, Atorvastatin 40mg daily  -Resume home meds     Type II Diabetes Mellitus  -A1c of 7.1 in Jan 2019, 6.6 this admission  -Not on any current outpatient medications  -SSI with accuchecks     History of CVA  -CVA in 2007, with no residual deficits  -Home Meds: ASA 81mg daily, Atorvastatin 40mg daily  -Resume home meds     History of Alcohol Abuse  -No alcohol since 2007     Diet: cardiac  Ppx: heparin  Dispo: pending clinical improvement, PT/OT and nephrology recs      Abel Otero  [facility] HO-I  [facility] Hospitalitis Service Team B    [facility] Medicine Hospitalist Pager numbers:   [facility] Hospitalist Medicine Team A (Chay/Shawanda): [phone]  [facility] Hospitalist Medicine Team B (Gloria/Alex):  [phone]

## 2019-03-31 NOTE — PROGRESS NOTES
[employer] renal fellow HO V      Subjective:      Interval Hx:    -1600mL UO  -pt hernandez po intake; states he is not sob       Objective:   Last 24 Hour Vital Signs:  BP  Min: 97/57  Max: 118/66  Temp  Av.6 °F (35.9 °C)  Min: 96.4 °F (35.8 °C)  Max: 96.9 °F (36.1 °C)  Pulse  Av.7  Min: 77  Max: 118  Resp  Av.9  Min: 18  Max: 21  SpO2  Av %  Min: 90 %  Max: 95 %  Weight  Av.8 kg (160 lb 7.9 oz)  Min: 72.8 kg (160 lb 7.9 oz)  Max: 72.8 kg (160 lb 7.9 oz)  I/O last 3 completed shifts:  In: 716 [P.O.:616; IV Piggyback:100]  Out: 1600 [Urine:1600]    Physical Examination:    GEN - AA; NAD  CHEST - diffuse wheezing B; some rales at bases  HEART - irreg ireg; no rub  ABD - obese; nonttp  EXTR  - R AKA; L leg with chronic venous stasis changes; pitting edema to dependent portions of B thighs; unable to palpate L DP/PT/post patellar pulses but leg is warm  NEURO - prox/distal muscle weakness        Laboratory:  Laboratory   Pertinent Findings:  Recent Labs   Lab 19  0344 19  0446 19  0608 19  0609   WBC 7.01 6.58  --  6.65   HGB 10.7* 10.5*  --  10.0*   HCT 33.5* 32.4*  --  31.0*    140*  --  103*   MCV 92 91  --  91   RDW 17.4* 17.6*  --  17.4*   * 137 136  --    K 5.0 4.6 4.9  --     102 101  --    CO2 22* 25 24  --    BUN 59* 67* 71*  --    * 101 105  --    PROT 4.4* 4.5* 4.7*  --    ALBUMIN 2.1* 2.2* 2.1*  --    BILITOT 0.3 0.3 0.3  --    AST 14 27 29  --    ALKPHOS 75 99 104  --    ALT 23 32 27  --              Current Medications:     Infusions:       Scheduled:   albuterol-ipratropium  3 mL Nebulization Q6H    allopurinol  50 mg Oral Daily    amiodarone  200 mg Oral Daily    aspirin  81 mg Oral Daily    atorvastatin  40 mg Oral Daily    ertapenem (INVANZ) IVPB  0.5 g Intravenous Q24H    furosemide  160 mg Intravenous BID    heparin (porcine)  5,000 Units Subcutaneous Q8H    sodium bicarbonate  650 mg Oral BID        PRN:  dextrose 50 % in  water (D50W), dextrose 50 % in water (D50W), glucagon (human recombinant), glucose, glucose, insulin aspart U-100, sodium chloride 0.9%        Assessment/Plan     ELAYNE stage II on ckd III  -etiol include ATN from VERÓNICA/hypotension/UTI/cardio-renal/supra therapeutic Vanc; FeU consistent with pre-renal; urine eos neg; renal US without reversible causes but does have a a full bladder  -Cont treatment for UTI; will repeat UA now  -rec nepro TID with meals  -cont allopurinol 50mg for hyperuricemia; if vanc needed, rec random levels before dosing  -Good urine output yesterday     ESBL UTI  -Abx per primary; on meropenem and azithro     HFpEF  -lasix 160 mg IV BID  -consider getting cxr vs BMP in the AM     COPD  - Maintenance inhalers; breathing treatments q 6     Hx of urinary retention  -rec gonzalez placement     Afib  -rate controlled  -no anticoag as high bleeding risk        David Wilks MD, 3/31/2019 1:02 PM  [facility] PGY-V  Pager: [phone]  Cell: [phone]

## 2019-03-31 NOTE — NURSING
Received patient upon rounds at 1905H, patient seen in bed on semi-byrnes's position. conscious , coherent to time, date, place, person and situation. GCS 15.Left lower leg blackish discoloration. No subjective complaint of any pain. With AKA, around 2130H, patient complaint of urge to void but unable to urinate, Bladder scanning rendered, 350cc noted, DR. Martinez informed, ordered to place new gonzalez catheter, clarified if new urine culture is needed, as per Dr. Martinez no indication for now, [place] 14 gonzalez catheter inserted, tolerated, no active signs of bleeding from the urine, drained 350cc clear yellow urine output. Heparin dose held, MD aware, abdomen post heparin site still profusely bleeding, new pressure dressing applied. On O2 4 lpm via NC, saturation of 95%, CPAP at HS on. Advised patient to call for any assistance. CAll bell within reach. Bed alarm ON.Telemetry atrial fibrillation, controlled 80's. Safety fall precaution maintained.  Will continue to monitor patient

## 2019-03-31 NOTE — PLAN OF CARE
Patient sleeping when VN qued into room.  Respirations even and unlabored.  Bipap machine in use. All safety measures maintained including bed locked, in lowest position with alarm on.  Call light within reach.

## 2019-03-31 NOTE — PROGRESS NOTES
[facility] Resident Progress Note    Subjective:     Patient had episode of mild bleeding with Mendoza yesterday, catheter replaced without difficulty. Good urine output last 24 hours. He denies any complaints today.     Objective:   Last 24 Hour Vital Signs:  BP  Min: 102/58  Max: 123/63  Temp  Av.6 °F (35.9 °C)  Min: 96.4 °F (35.8 °C)  Max: 96.9 °F (36.1 °C)  Pulse  Av.9  Min: 77  Max: 118  Resp  Av.9  Min: 18  Max: 21  SpO2  Av.2 %  Min: 90 %  Max: 95 %  Weight  Av.8 kg (160 lb 7.9 oz)  Min: 72.8 kg (160 lb 7.9 oz)  Max: 72.8 kg (160 lb 7.9 oz)  I/O last 3 completed shifts:  In: 716 [P.O.:616; IV Piggyback:100]  Out: 1600 [Urine:1600]    Physical Examination:  Gen: awake, alert, no distress  HEENT: PERRL, EOMi, oropharynx clear, moist and without exudate  CVS: RRR, normal S1/S2, without S3/S4 or murmurs  Resp: no use of accessory muscles, no wheezes, no rhonchi, no rales  Abdo: soft, non-tender, non-distended, bowel sounds +, without guarding or rebound  Neuro: moves all extremities, no abnormal tone  Ext: no edema, no cyanosis  Skin: no rashes or skin breakdown noted      Laboratory:  Laboratory Data Reviewed:  Trended Lab Data:  Recent Labs   Lab 19  0344 19  0446 19  0608 19  0609   WBC 7.01 6.58  --  6.65   HGB 10.7* 10.5*  --  10.0*   HCT 33.5* 32.4*  --  31.0*    140*  --  103*   MCV 92 91  --  91   RDW 17.4* 17.6*  --  17.4*   * 137 136  --    K 5.0 4.6 4.9  --     102 101  --    CO2 22* 25 24  --    BUN 59* 67* 71*  --    CREATININE 3.2* 3.5* 3.9*  --    * 101 105  --    PROT 4.4* 4.5* 4.7*  --    ALBUMIN 2.1* 2.2* 2.1*  --    BILITOT 0.3 0.3 0.3  --    AST 14 27 29  --    ALKPHOS 75 99 104  --    ALT 23 32 27  --        Trended Cardiac Data:  Recent Labs   Lab 19  1745 19  2323 19  0425   TROPONINI 0.036* 0.046* 0.037*   BNP 1,122*  --   --        Microbiology Data   Microbiology Results (last 7 days)      Procedure Component Value Units Date/Time    Culture, Respiratory with Gram Stain [[id number]] Collected:  03/31/19 0923    Order Status:  Sent Specimen:  Respiratory from Sputum, Expectorated Updated:  03/31/19 0926    Blood culture x two cultures. Draw prior to antibiotics. [[id number]] Collected:  03/26/19 1743    Order Status:  Completed Specimen:  Blood Updated:  03/30/19 2012     Blood Culture, Routine No Growth to date     Blood Culture, Routine No Growth to date     Blood Culture, Routine No Growth to date     Blood Culture, Routine No Growth to date     Blood Culture, Routine No Growth to date    Narrative:       Aerobic and anaerobic    Blood culture x two cultures. Draw prior to antibiotics. [[id number]] Collected:  03/26/19 1742    Order Status:  Completed Specimen:  Blood Updated:  03/30/19 2012     Blood Culture, Routine No Growth to date     Blood Culture, Routine No Growth to date     Blood Culture, Routine No Growth to date     Blood Culture, Routine No Growth to date     Blood Culture, Routine No Growth to date    Narrative:       Aerobic and anaerobic    Urine culture [[id number]]  (Susceptibility) Collected:  03/26/19 2045    Order Status:  Completed Specimen:  Urine from Clean Catch Updated:  03/29/19 1228     Urine Culture, Routine --     ESCHERICHIA COLI ESBL  >100,000 cfu/ml      Urine Culture High Risk [[id number]]     Order Status:  Completed Specimen:  Urine     Influenza A & B by Molecular [[id number]] Collected:  03/26/19 1801    Order Status:  Completed Specimen:  Nasopharyngeal Swab Updated:  03/26/19 1853     Influenza A, Molecular Negative     Influenza B, Molecular Negative     Flu A & B Source Nasal swab          Radiology:  Imaging Results          X-Ray Chest AP Portable (Final result)  Result time 03/26/19 18:15:56    Final result by Mati Gallardo MD (03/26/19 18:15:56)                 Impression:      1. Findings suggesting edema noting superimposed left pleural effusion with  likely compressive atelectasis of the left lower lung zone, developing consolidation however is not excluded.  Overall, findings appear similar to CT 03/23/2019.      Electronically signed by: Mati Gallardo MD  Date:    03/26/2019  Time:    18:15             Narrative:    EXAMINATION:  XR CHEST AP PORTABLE    CLINICAL HISTORY:  Sepsis;    TECHNIQUE:  Single frontal view of the chest was performed.    COMPARISON:  01/30/2019, CT 03/23/2019    FINDINGS:  The cardiomediastinal silhouette is prominent, noting prominent epicardial fat..  There is obscuration of the left costophrenic angle suggesting effusion.  There may be trace layering right pleural fluid..  The trachea is midline.  The lungs are symmetrically expanded bilaterally with patchy increased interstitial and parenchymal attenuation bilaterally suggesting edema.  There is bandlike atelectasis projected over the right midlung zone and left midlung zone.  There is increased parenchymal attenuation projected over the left lower lung zone, may reflect compressive atelectasis or consolidation..  There is no pneumothorax.  The osseous structures are remarkable for degenerative changes noting osteopenia.  There is vascular calcification of the abdomen..                                  Current Medications:     Infusions:       Scheduled:   albuterol-ipratropium  3 mL Nebulization Q6H    allopurinol  50 mg Oral Daily    amiodarone  200 mg Oral Daily    aspirin  81 mg Oral Daily    atorvastatin  40 mg Oral Daily    ertapenem (INVANZ) IVPB  0.5 g Intravenous Q24H    furosemide  160 mg Intravenous BID    heparin (porcine)  5,000 Units Subcutaneous Q8H    sodium bicarbonate  650 mg Oral BID        PRN:  dextrose 50 % in water (D50W), dextrose 50 % in water (D50W), glucagon (human recombinant), glucose, glucose, insulin aspart U-100, sodium chloride 0.9%    Antibiotics and Day Number of Therapy:  Vanc/azithromycin 3/26-3/28  Meropenem 3/26-3/29  Ertapenem  3/29-present    Lines and Day Number of Therapy:  PIV    Assessment and Plan     Acute on Chronic Hypoxic and Hypercapnic Respiratory Failure Likely    2/2 Volume Overload  -O2 85% on 5 L nasal cannula on admission  ABG: ph 7.21/CO2 45/O2 117/HCO3 20.4  A-a gradient 254  -Given IV steroids and breathing treatment in ED  -Non-compressible IVC on bedside US in ED  CXR on admission showed bilateral pulmonary edema  BNP 1,122 (baseline in 300s)  -Will diurese with IV lasix and metolazone, minimal output  -volume status difficult to determine as story suggests patient is volume down but labs/CXR and exam suggest volume overload   -oxygenation improving, respiratory acidosis resolved     Sepsis secondary to ESBL E coli Urinary Tract Infection  -Patient with multiple complaints for past 7 days, on 3/23 in ED found to have ESBL E coli UTI that was treated with ciprofloxacin  -Admission: , RR 28, WBC 8  Procal 0.12  Lactic acid 1.6  CTAP on 3/26 with bladder wall thickening  -Given vancomycin/zosyn in ED  -on ertapenem currently    ELAYNE on CKD 3B, oliguric, worsening  -Admission Cr of 1.8  Baseline 1.2  -FeUrea borderline pre-renal, volume status still difficult to determine  -oligurigic, worsening BUN/Cr  -nephrology consulted, recommending more aggressive diuresis with IV lasix and metolazone     Non-Anion Gap Metabolic Acidosis, resolved  -Bicarb of 22  ABG pH of 7.2  -bicarb trended down to 18 this AM, unclear etiology  -improved to normal     Acute on Chronic HFpEF  -TTE (January 2019) w/ EF 55%, grade I left ventricular diastolic    dysfunction, PA pressure of 47  -Home Meds: Amiodarone 200mg daily, Carvedilol 25mg BID, Lasix 40 PO    -Will continue to diurese     Paroxysmal atrial fibrillation with RVR  -CHADSVASC >2, not any anticoagulation  -Patient has been on amiodarone for years  -In A-fib on admission, rate in 90s  -Continue amiodorone, holding coreg 2/2 hypotension     Urinary retention  with hypospadias  -Found to have urinary retention back in Jan 2019  -Renal US showing bladder distension, Mendoza placed    Stage 2 Sacral Decubitus Ulcer, present on admission  -Noted on exam, no erythema  -Patient with sacral decub ulcer infections in past, last dating back to early January 2019  -Consult wound care     Elevated Troponin  -On admission troponin of 0.036, up to 0.047 and now downtrending  -Likely elevated secondary to demand     Normocytic Anemia  -3/26: Hb 11.5/Hct 36.8/MCV 95  -Worked up in 2016, showing anemia of chronic disease  -Will defer workup   -Last colonoscopy 2012     CAD, PVD, RLE AKA:  -Home Meds: ASA 81mg daily, Atorvastatin 40mg daily  -Resume home meds     Type II Diabetes Mellitus  -A1c of 7.1 in Jan 2019, 6.6 this admission  -Not on any current outpatient medications  -SSI with accuchecks     History of CVA  -CVA in 2007, with no residual deficits  -Home Meds: ASA 81mg daily, Atorvastatin 40mg daily  -Resume home meds     History of Alcohol Abuse  -No alcohol since 2007     Diet: cardiac  Ppx: heparin  Dispo: pending clinical improvement, nephrology recs      Abel Otero  [facility] HO-I  [facility] Hospitalitis Service Team B    [facility] Hospitalist Pager numbers:   [facility] Hospitalist Medicine Team A (Chay/Shawanda): [phone]  [facility] Hospitalist Medicine Team B (Gloria/Alex):  [phone]

## 2019-03-31 NOTE — PLAN OF CARE
Problem: Adult Inpatient Plan of Care  Goal: Plan of Care Review  Outcome: Ongoing (interventions implemented as appropriate)  Patient stable VS over the night, afebrile. No episodes of nausea and vomiting.No Complaints of any pain.No chest discomfort no difficulty of breathing. Sacral stage  2 clean with mepilex. Free from fall. IV line reinserted patent. Mendoza 's catheter draining well clear urine output. Will endorse patient to day shift Nurse.

## 2019-03-31 NOTE — PLAN OF CARE
03/31/19 1449   Type of Frequent Check   Type Patient Rounds;Other (see comments)  (VN Rounds, Patient asleep)   Safety/Activity   Patient Rounds bed in low position;visualized patient   Safety Promotion/Fall Prevention side rails raised x 2   Activity Management activity adjusted per tolerance   Positioning   Body Position supine   Head of Bed (HOB) HOB at 30-45 degrees   Assessments (Pre/Post)   Level of Consciousness (AVPU)   (Patient asleep)

## 2019-04-01 PROBLEM — Z71.89 COUNSELING REGARDING ADVANCED CARE PLANNING AND GOALS OF CARE: Status: ACTIVE | Noted: 2019-01-01

## 2019-04-01 PROBLEM — Z51.5 PALLIATIVE CARE ENCOUNTER: Status: ACTIVE | Noted: 2019-01-01

## 2019-04-01 PROBLEM — Z71.89 GOALS OF CARE, COUNSELING/DISCUSSION: Status: ACTIVE | Noted: 2019-01-01

## 2019-04-01 NOTE — PROGRESS NOTES
[facility] renal fellow HO V      Subjective:      Interval Hx:    - 2000 mL UO  - net negative 1900 ml   - not indications of hd   - continue diuresis   - can add metolazone if need more diuresis        Objective:   Last 24 Hour Vital Signs:  BP  Min: 94/63  Max: 110/56  Temp  Av.9 °F (36.1 °C)  Min: 96 °F (35.6 °C)  Max: 97.6 °F (36.4 °C)  Pulse  Av.9  Min: 98  Max: 136  Resp  Av.5  Min: 18  Max: 30  SpO2  Av.7 %  Min: 73 %  Max: 98 %  I/O last 3 completed shifts:  In: 580 [P.O.:580]  Out: 2750 [Urine:2750]    Physical Examination:    GEN - AA; NAD  CHEST - diffuse wheezing B; some rales at bases  HEART - irreg ireg; no rub  ABD - obese; nonttp  EXTR  - R AKA; L leg with chronic venous stasis changes; pitting edema to dependent portions of B thighs; unable to palpate L DP/PT/post patellar pulses but leg is warm  NEURO - prox/distal muscle weakness        Laboratory:  Laboratory   Pertinent Findings:  Recent Labs   Lab 19  0446 19  0608 19  0609 19  1645 19  0510   WBC 6.58  --  6.65  --  9.97   HGB 10.5*  --  10.0*  --  10.5*   HCT 32.4*  --  31.0*  --  32.9*   *  --  103*  --  101*   MCV 91  --  91  --  90   RDW 17.6*  --  17.4*  --  17.6*    136  --  134* 138   K 4.6 4.9  --  4.3 4.1    101  --  101 99   CO2 25 24  --  22* 28   BUN 67* 71*  --  70* 74*    105  --  106 109   PROT 4.5* 4.7*  --  4.5* 4.8*   ALBUMIN 2.2* 2.1*  --  2.1* 2.2*   BILITOT 0.3 0.3  --  0.4 0.4   AST 27 29  --  21 25   ALKPHOS 99 104  --  102 117   ALT 32 27  --  23 23             Current Medications:     Infusions:       Scheduled:   albuterol-ipratropium  3 mL Nebulization Q6H    allopurinol  50 mg Oral Daily    amiodarone  200 mg Oral Daily    aspirin  81 mg Oral Daily    atorvastatin  40 mg Oral Daily    ertapenem (INVANZ) IVPB  0.5 g Intravenous Q24H    furosemide  160 mg Intravenous BID    heparin (porcine)  5,000 Units Subcutaneous Q8H    sodium  bicarbonate  650 mg Oral BID        PRN:  acetaminophen, dextrose 50 % in water (D50W), dextrose 50 % in water (D50W), glucagon (human recombinant), glucose, glucose, insulin aspart U-100, sodium chloride 0.9%        Assessment/Plan     ELAYNE stage II on ckd III  -etiol include ATN from VERÓNICA/hypotension/UTI/cardio-renal/supra therapeutic Vanc; FeU consistent with pre-renal; urine eos neg; renal US without reversible causes but does have a a full bladder  - not indication of hd today   - continue diuresis   - add metolazone if need la diuresis     ESBL UTI  - invanz 500 mg iv daily      HFpEF  -diuretics as above     COPD  - by primary team      Hx of urinary retention  -rec gonzalez placement     Afib  -rate controlled  -no anticoag as high bleeding risk        Yadiel Issa MD, 4/1/2019 1:02 PM  [facility] PGY-V

## 2019-04-01 NOTE — PLAN OF CARE
VN and patient discussed todays events and future plan of care. Patient reported to be sometimes confused during this hospital stay but patient able to answer all questions correctly about where he was and what time it is. He reported that his wife visited today and went home for the night.  Education given on blood clot prevention with his ordered heparin shot . Safety measures maintained including bed locked and in lowest position with alarm on.  Call light within reach and patient verbalizes an understanding on how and why to use it.

## 2019-04-01 NOTE — PROGRESS NOTES
Found pt with O2 SATS of 73% on RA. Pt was off bipap and on nasal cannula, but nasal cannula was not plugged into flowmeter. I placed the pt cannula back onto flowmeter and set it to 4lpm. Pt O2 SATS on 4lpm NC are now 92%. Pt in NAD. Will continue to monitor.

## 2019-04-01 NOTE — PROGRESS NOTES
Called to room by RN. Pt SATS were mid to high 80's on finger and ear. Once pt repositioned and POX probe placed on forehead pt SATS went up to 95%. Pt hands were cold and a good waveform could not be obtained on fingers. Waveform improved on forehead. Pt stated he is not feeling as SOB. RN and virtual nurse notified.

## 2019-04-01 NOTE — PLAN OF CARE
VN note: VN cued into pt's room for introduction. VN informed pt that VN would be working closely along side bedside nurse, PCT, and the rest of care team and making rounds throughout the shift. No family at bedside. Allowed time for questions. VN will continue to be available to patient and intervene prn.       04/01/19 1047   Type of Frequent Check   Type Patient Rounds;Other (see comments)  (VN Rounds)   Safety/Activity   Patient Rounds bed in low position;visualized patient   Safety Promotion/Fall Prevention side rails raised x 2   Activity Management activity adjusted per tolerance   Positioning   Body Position supine   Head of Bed (HOB) HOB at 60-90 degrees   Assessments (Pre/Post)   Level of Consciousness (AVPU) alert

## 2019-04-01 NOTE — PROGRESS NOTES
[facility] Resident Progress Note    Subjective:     Good urine output last 24 hours. He denies any complaints today. Pt educated that he will be taken for TTE today. Pt refused bipap last night. Desatted. Was place on 4L NC and began satting well.      Objective:   Last 24 Hour Vital Signs:  BP  Min: 97/57  Max: 118/66  Temp  Av.7 °F (35.9 °C)  Min: 96 °F (35.6 °C)  Max: 97.5 °F (36.4 °C)  Pulse  Av.2  Min: 97  Max: 129  Resp  Av.4  Min: 18  Max: 30  SpO2  Av.2 %  Min: 73 %  Max: 95 %  I/O last 3 completed shifts:  In: 580 [P.O.:580]  Out: 2750 [Urine:2750]    Physical Examination:  Gen: awake, alert, no distress  HEENT: PERRL, EOMi, oropharynx clear, moist and without exudate  CVS: RRR, normal S1/S2, without S3/S4 or murmurs  Resp: no use of accessory muscles, no wheezes, no rhonchi, no rales  Abdo: soft, non-tender, non-distended, bowel sounds +, without guarding or rebound  Neuro: moves all extremities, no abnormal tone  Ext: no edema, no cyanosis  Skin: no rashes or skin breakdown noted    Laboratory:  Laboratory Data Reviewed:  Trended Lab Data:  Recent Labs   Lab 19  0446 19  0608 19  0609 19  1645 19  0510   WBC 6.58  --  6.65  --  9.97   HGB 10.5*  --  10.0*  --  10.5*   HCT 32.4*  --  31.0*  --  32.9*   *  --  103*  --  101*   MCV 91  --  91  --  90   RDW 17.6*  --  17.4*  --  17.6*    136  --  134* 138   K 4.6 4.9  --  4.3 4.1    101  --  101 99   CO2 25 24  --  22* 28   BUN 67* 71*  --  70* 74*   CREATININE 3.5* 3.9*  --  3.8* 4.0*    105  --  106 109   PROT 4.5* 4.7*  --  4.5* 4.8*   ALBUMIN 2.2* 2.1*  --  2.1* 2.2*   BILITOT 0.3 0.3  --  0.4 0.4   AST 27 29  --  21 25   ALKPHOS 99 104  --  102 117   ALT 32 27  --  23 23       Trended Cardiac Data:  Recent Labs   Lab 19  1745 19  2323 19  0425   TROPONINI 0.036* 0.046* 0.037*   BNP 1,122*  --   --        Microbiology Data   Microbiology Results  (last 7 days)     Procedure Component Value Units Date/Time    Blood culture x two cultures. Draw prior to antibiotics. [[id number]] Collected:  03/26/19 1743    Order Status:  Completed Specimen:  Blood Updated:  03/31/19 2012     Blood Culture, Routine No growth after 5 days.    Narrative:       Aerobic and anaerobic    Blood culture x two cultures. Draw prior to antibiotics. [[id number]] Collected:  03/26/19 1742    Order Status:  Completed Specimen:  Blood Updated:  03/31/19 2012     Blood Culture, Routine No growth after 5 days.    Narrative:       Aerobic and anaerobic    Culture, Respiratory with Gram Stain [[id number]] Collected:  03/31/19 0923    Order Status:  Completed Specimen:  Respiratory from Sputum, Expectorated Updated:  03/31/19 1749     Gram Stain (Respiratory) >10 epithelial cells per low power field     Gram Stain (Respiratory) Rare WBC's     Gram Stain (Respiratory) Few Gram positive cocci in clusters     Gram Stain (Respiratory) Few budding yeast    Urine culture [[id number]]  (Susceptibility) Collected:  03/26/19 2045    Order Status:  Completed Specimen:  Urine from Clean Catch Updated:  03/29/19 1228     Urine Culture, Routine --     ESCHERICHIA COLI ESBL  >100,000 cfu/ml      Urine Culture High Risk [[id number]]     Order Status:  Completed Specimen:  Urine     Influenza A & B by Molecular [[id number]] Collected:  03/26/19 1801    Order Status:  Completed Specimen:  Nasopharyngeal Swab Updated:  03/26/19 1853     Influenza A, Molecular Negative     Influenza B, Molecular Negative     Flu A & B Source Nasal swab          Radiology:  Imaging Results          X-Ray Chest AP Portable (Final result)  Result time 03/26/19 18:15:56    Final result by Mati Gallardo MD (03/26/19 18:15:56)                 Impression:      1. Findings suggesting edema noting superimposed left pleural effusion with likely compressive atelectasis of the left lower lung zone, developing consolidation however is not  excluded.  Overall, findings appear similar to CT 03/23/2019.      Electronically signed by: Mati Gallardo MD  Date:    03/26/2019  Time:    18:15             Narrative:    EXAMINATION:  XR CHEST AP PORTABLE    CLINICAL HISTORY:  Sepsis;    TECHNIQUE:  Single frontal view of the chest was performed.    COMPARISON:  01/30/2019, CT 03/23/2019    FINDINGS:  The cardiomediastinal silhouette is prominent, noting prominent epicardial fat..  There is obscuration of the left costophrenic angle suggesting effusion.  There may be trace layering right pleural fluid..  The trachea is midline.  The lungs are symmetrically expanded bilaterally with patchy increased interstitial and parenchymal attenuation bilaterally suggesting edema.  There is bandlike atelectasis projected over the right midlung zone and left midlung zone.  There is increased parenchymal attenuation projected over the left lower lung zone, may reflect compressive atelectasis or consolidation..  There is no pneumothorax.  The osseous structures are remarkable for degenerative changes noting osteopenia.  There is vascular calcification of the abdomen..                                  Current Medications:     Infusions:       Scheduled:   albuterol-ipratropium  3 mL Nebulization Q6H    allopurinol  50 mg Oral Daily    amiodarone  200 mg Oral Daily    aspirin  81 mg Oral Daily    atorvastatin  40 mg Oral Daily    ertapenem (INVANZ) IVPB  0.5 g Intravenous Q24H    furosemide  160 mg Intravenous BID    heparin (porcine)  5,000 Units Subcutaneous Q8H    sodium bicarbonate  650 mg Oral BID        PRN:  acetaminophen, dextrose 50 % in water (D50W), dextrose 50 % in water (D50W), glucagon (human recombinant), glucose, glucose, insulin aspart U-100, sodium chloride 0.9%    Antibiotics and Day Number of Therapy:  Vanc/azithromycin 3/26-3/28  Meropenem 3/26-3/29  Ertapenem 3/29-present    Lines and Day Number of Therapy:  PIV    Assessment and Plan     Acute on  Chronic Hypoxic and Hypercapnic Respiratory Failure Likely    2/2 Volume Overload  -O2 85% on 5 L nasal cannula on admission  ABG: ph 7.21/CO2 45/O2 117/HCO3 20.4  A-a gradient 254  -Given IV steroids and breathing treatment in ED  -Non-compressible IVC on bedside US in ED  CXR on admission showed bilateral pulmonary edema  BNP 1,122 (baseline in 300s)  -Will diurese with IV lasix and metolazone, minimal output  -volume status difficult to determine as story suggests patient is volume down but labs/CXR and exam suggest volume overload   - Now on 4L NC  - TTE today.     Sepsis secondary to ESBL E coli Urinary Tract Infection  -Patient with multiple complaints for past 7 days, on 3/23 in ED found to have ESBL E coli UTI that was treated with ciprofloxacin  -Admission: , RR 28, WBC 8  Procal 0.12  Lactic acid 1.6  CTAP on 3/26 with bladder wall thickening  -Given vancomycin/zosyn in ED  -on ertapenem currently    ELAYNE on CKD 3B, oliguric, worsening  -Admission Cr of 1.8  Baseline 1.2  -FeUrea borderline pre-renal, volume status still difficult to determine  -oligurigic, worsening BUN/Cr  -nephrology consulted, recommending more aggressive diuresis with IV lasix and metolazone     Non-Anion Gap Metabolic Acidosis, resolved  -Bicarb of 22  ABG pH of 7.2  -bicarb trended down to 18 this AM, unclear etiology  -improved to normal     Acute on Chronic HFpEF  -TTE (January 2019) w/ EF 55%, grade I left ventricular diastolic    dysfunction, PA pressure of 47  -Home Meds: Amiodarone 200mg daily, Carvedilol 25mg BID, Lasix 40 PO    -Will continue to diurese     Paroxysmal atrial fibrillation with RVR  -CHADSVASC >2, not any anticoagulation  -Patient has been on amiodarone for years  -In A-fib on admission, rate in 90s  -Continue amiodorone, holding coreg 2/2 hypotension     Urinary retention with hypospadias  -Found to have urinary retention back in Jan 2019  -Renal US showing bladder distension, Mendoza  placed    Stage 2 Sacral Decubitus Ulcer, present on admission  -Noted on exam, no erythema  -Patient with sacral decub ulcer infections in past, last dating back to early January 2019  -Consult wound care     Elevated Troponin  -On admission troponin of 0.036, up to 0.047 and now downtrending  -Likely elevated secondary to demand     Normocytic Anemia  -3/26: Hb 11.5/Hct 36.8/MCV 95  -Worked up in 2016, showing anemia of chronic disease  -Will defer workup   -Last colonoscopy 2012     CAD, PVD, RLE AKA:  -Home Meds: ASA 81mg daily, Atorvastatin 40mg daily  -Resume home meds     Type II Diabetes Mellitus  -A1c of 7.1 in Jan 2019, 6.6 this admission  -Not on any current outpatient medications  -SSI with accuchecks     History of CVA  -CVA in 2007, with no residual deficits  -Home Meds: ASA 81mg daily, Atorvastatin 40mg daily  -Resume home meds     History of Alcohol Abuse  -No alcohol since 2007     Diet: cardiac  Ppx: heparin  Dispo: pending clinical improvement    Emerson Marinelli  [facility] HO-I  [facility] Hospitalitis Service Team B    [facility] Medicine Hospitalist Pager numbers:   [facility] Hospitalist Medicine Team A (Chay/Shawanda): [phone]  [facility] Hospitalist Medicine Team B (Gloria/Alex):  [phone]

## 2019-04-01 NOTE — PLAN OF CARE
Anticipated discharge plan discussed with pt and pt's wife, pt's wife previously wanted NH with hospice, preference was [facility] but no beds available, TN informed wife, then pt's wife now wants Home Hospice, pt's wife did not have hospice company preference, TN informed Rebecca with [facility] regarding hospice referral, she will come speak with wife tomorrow at 10am, TN to notify hospital  of pt's wife request to come to meeting with hospice company. TN informed Dr. Torres of primary team.

## 2019-04-01 NOTE — CONSULTS
"Consult Note  Palliative Care      Consult Requested By: Ernesto Johnson MD  Reason for Consult: Goals of care discussion/advance care planning    SUBJECTIVE:     History of Present Illness:  Disease Process: Acute on Chronic Hypoxic and Hypercapnic Respiratory Failure Likely    2/2 Volume Overload    Mr. Perez is a 79 year old male with HFpEF (last EF 55% Jan 2019), paroxysmal A fib not on anticoagulation, COPD (on home 2 L NC), CKD3B, CAD, PVD, prediabetes, RLE AKA, and with recent admission for urinary retention who presents with complaint of shortness of breath.     Patient was in their usual state of health (bed bound, required on others to complete ADLs, on home O2) until 7 days ago. Note, patient and wife are poor historians. Patient about 7 days ago began complaining of some nausea, abdominal pain, loose stools, urinary symptoms. He was seen at [facility] and found to have a UTI, and sent home on p.o. ciprofloxacin. Urine cultures have resulted an now growing ESBL E coli. Patient did not receive any other antibiotics. Patient now complaining shortness of breath for 2 days prior to admission. It has gotten much worse of the course of day of admission.  Patient also admitting to centralized chest pain that is sharp, unable to appropriately describe if intermittent vs constant and what alleviates or aggravates the pain. Patient reports having orthopnea, paroxysmal noctural dyspnea for past couple days. Patient has also had a decreased appetite for past 4 days and denies excess fluids or salt intake.            Worsening renal fxn. Near end stage disease CKD/HFpEF    Palliative Medicine met patient. Patient states, "I am felling better today." Wife at bedside. She verbalized understanding that patient kidney's were not doing well. "I need help taking care of him at home. He does not walk. I have diabetes myself. Its a lot." Palliative Medicine discussed comfort care and hospice services with patient " "and spouse. Patient in agreement. He stated, "I am tired of coming to hospital." Patient began to cry. Emotional support provided. Spouse would like Palliative to speak with niece who helps them. Contact information given. Awaiting call from niece. Spouse stated, "He went to [facility] and failed." Again, Palliative medicine educated spouse on health care brought to home via hospice services. Emotional support given. Will continue GOC. Discussion.       220 pm- Spouse wanted to speak with Palliative medicine again this evening.  present at this meeting. Discussed again about patient condition. Spouse is asking for a facility to place patient. "I am old and sickly myself. I can't take care of him. I have a lot of anxiety because the constant changing of his condition." Discussed nursing home options with hospice. "My step son and I don't want invasive treatment for him. We want to make him comfortable. " Hospice services explained. Spouse would like patient in a facility not at home.   Past Medical History:   Diagnosis Date    Alcohol abuse     CHF (congestive heart failure)     CKD (chronic kidney disease) stage 3, GFR 30-59 ml/min     Coronary artery disease     Decubitus skin ulcer     Heart failure, diastolic     High cholesterol     Hypertension     Immobility     LBP (low back pain)     Prediabetes     PVD (peripheral vascular disease)     Stroke     2006, no deficits    Urine incontinence      Past Surgical History:   Procedure Laterality Date    AMPUTATION-ABOVE KNEE Right 9/12/2016    Performed by [facility] at [facility] OR    aortic bifemoral bypass  2006    ARTHROPLASTY-KNEE Right 6/27/2016    Performed by [facility] at [facility] OR    ARTHROPLASTY-KNEE Left 7/14/2014    Performed by [facility] at [facility] OR    bilateral carotid stenois  2006    carotid stents      DEBRIDEMENT-SACRAL WOUND N/A 10/10/2016    Performed by Michael Irizarry MD at [facility] OR    " DEBRIDEMENT-SACRAL WOUND Bilateral 2016    Performed by Maria Alejandra Pichardo DO at [facility]    DEBRIDEMENT-SACRAL WOUND N/A 2016    Performed by [facility]ardo [facility] at [facility]    DEBRIDEMENT-WOUND Right 2017    Performed by iMchael Irizarry MD at [facility] OR    ESOPHAGOGASTRODUODENOSCOPY (EGD) N/A 2014    Performed by David Ramos MD at [facility] ENDO    ESOPHAGOGASTRODUODENOSCOPY (EGD) with PEG N/A 2016    Performed by Emerson Childers Jr., MD at [facility] ENDO    EXCHANGE-POLYETHYLENE RIGHT KNEE Right 2016    Performed by Cristino Camacho MD at [facility]    IOVERA Right 6/15/2016    Performed by Cristino Camacho MD [facility]    IRRIGATION AND DEBRIDEMENT LOWER EXTREMITY Right 2016    Performed by Cristino Camacho [facility]    IRRIGATION AND DEBRIDEMENT RIGHT KNEE Right 2016    Performed by Cristino Camacho [facility] at [facility]    JOINT REPLACEMENT      knee    left common endarterectomy  2006    REVISION-AMPUTATION STUMP Right 2016    Performed by Michael Irizarry MD at [facility]    RT AKA  2017    SYNOVECTOMY-KNEE Left 2016    Performed by [facility] at [facility]    WOUND EXPLORATION Right 2016    Performed by [facility] at [facility] OR     Family History   Problem Relation Age of Onset    Heart disease Mother      Social History     Tobacco Use    Smoking status: Former Smoker     Packs/day: 2.00     Years: 45.00     Pack years: 90.00     Last attempt to quit: 2006     Years since quittin.7    Smokeless tobacco: Never Used   Substance Use Topics    Alcohol use: No     Comment: NO ALCOHOL FOR 18 MONTHS, PAST ETOH ABUSE    Drug use: No       OBJECTIVE:     Pain Assessment: No pain reported at this time    Decision-Making Capacity: Patient answered questions, Family answered questions    Advanced Directives:  Living Will: No  Do Not Resuscitate Status: Yes  Medical Power of : No  Registered Organ Donor: No    Living Arrangements: Lives with  family    Psychosocial, Spiritual, Cultural:  Patient's most important priorities:  none    Patient's biggest concerns/fears:  none  Previous death/end of life care history:  none    Patient's goals/hopes:  none    ASSESSMENT/PLAN:     Recommendations:  Continue medical treatment    transition to comfort care.   Code status: DNR  Family discharge option nursing home with hospice or inpatient hospice facility.     Palliative care will continue to assist patient and family with the goals of care.     Thank you for the consult and the opportunity to participate in Mr. Chris poe.       Teresa Espinosa, MSN, APRN, NP-C   Palliative Medicine   [facility]  [phone] or [phone]        >50% of  120 min visit spent in chart review, face to face discussion of goals of care with patient, family, symptom assessment, coordination of care and emotional support.

## 2019-04-01 NOTE — NURSING
Patient SOB, SPO2 88% 4L NC. Patient repositioned in bed.  Respiratory notified. Respiratory at the bedside. Patient refused BIPAP throughout the night and when offered by respiratory. SPO2 went up to 92% on 4L NC. No SOB or respiratory distress. Patient in bed resting. MD Marinelli notified.

## 2019-04-02 PROBLEM — Z51.5 COMFORT MEASURES ONLY STATUS: Status: ACTIVE | Noted: 2019-01-01

## 2019-04-02 PROBLEM — J96.00 RESPIRATORY FAILURE, ACUTE: Status: ACTIVE | Noted: 2019-01-01

## 2019-04-02 NOTE — DISCHARGE SUMMARY
[facility] Hospital Medicine Discharge Summary    Primary Team: [facility] Hospitalist Team B  Attending Physician: Ernesto Johnson MD  Resident: Merrill    Date of Admit: 3/26/2019  Date of Discharge: 4/2/2019    Discharge to: Inpatient Hospice  Condition: Critical    Discharge Diagnoses     Patient Active Problem List   Diagnosis    Essential hypertension    CKD (chronic kidney disease)    PAD (peripheral artery disease)    CAD (coronary artery disease)    History of stroke    Physical deconditioning    Alcohol abuse    Osteoarthritis of left knee    Dysphagia    Dyspnea    Normocytic anemia    Chronic obstructive pulmonary disease    Acute on chronic respiratory failure with hypoxia and hypercapnia    Acute on chronic diastolic heart failure    Transaminitis    ELAYNE (acute kidney injury)    GERD (gastroesophageal reflux disease)    Esophageal web    Right knee pain    Infected prosthetic knee joint    History of atrial fibrillation    Pneumonia    Confusion    Weak    Altered mental status    MRSA (methicillin resistant Staphylococcus aureus) infection    Anemia of chronic disease    Chronic kidney disease, stage V    Anemia    Abnormal CT of the abdomen    Abnormal finding on imaging    Stenosis of left internal carotid artery    Critical lower limb ischemia    Chronic diastolic congestive heart failure    Decubitus ulcer of sacral region, stage 4    Elevated liver enzymes    Deep vein thrombosis (DVT) of upper extremity    VRE (vancomycin-resistant Enterococci) infection    Dehiscence of perineal wound    Hyponatremia    Fever    Non-healing ulcer of lower leg    Deep vein thrombosis (DVT) of brachial vein    Symptomatic anemia    Centrilobular emphysema    Non-healing wound of amputation stump    Urinary tract infection associated with indwelling urethral catheter    Decubitus ulcer of buttock, stage 2    Acute osteomyelitis    Complicated open wound of right hip    COPD  "exacerbation    Occlusion of superior mesenteric artery    Abdominal rigidity, generalized    Abdominal pain    COPD with acute exacerbation    Respiratory failure with hypoxia and hypercapnia    Hyperkalemia    Pressure injury of sacral region, stage 3    Chest pain    Urinary retention    Other hydronephrosis    UTI due to extended-spectrum beta lactamase (ESBL) producing Escherichia coli    Normal anion gap metabolic acidosis    Palliative care encounter    Goals of care, counseling/discussion    Counseling regarding advanced care planning and goals of care       Brief History of Present Illness      Mr. Perez is a 79 year old male with HFpEF (last EF 55% Jan 2019), paroxysmal A fib not on anticoagulation, COPD (on home 2 L NC), CKD3B, CAD, PVD, prediabetes, RLE AKA, and with recent admission for urinary retention who presents with complaint of shortness of breath.     "Patient was in their usual state of health (bed bound, required on others to complete ADLs, on home O2) until 7 days ago. Note, patient and wife are poor historians. Patient about 7 days ago began complaining of some nausea, abdominal pain, loose stools, urinary symptoms. He was seen at [facility] and found to have a UTI, and sent home on p.o. ciprofloxacin. Urine cultures have resulted an now growing ESBL E coli. Patient did not receive any other antibiotics. Patient now complaining shortness of breath for 2 days prior to admission. It has gotten much worse of the course of day of admission.  Patient also admitting to centralized chest pain that is sharp, unable to appropriately describe if intermittent vs constant and what alleviates or aggravates the pain. Patient reports having orthopnea, paroxysmal noctural dyspnea for past couple days. Patient has also had a decreased appetite for past 4 days and denies excess fluids or salt intake."    Admitted to [facility] Hospitalist team for further management. Hospital course " complicated by below problems with continued progression without significant improvement.    Palliative care consulted on 4/1/19. Decision made to pursue inpatient hospice on 4/2.    For the full HPI please refer to the History & Physical from this admission.    Hospital Course By Problem with Pertinent Findings     1. Acute on Chronic Combined Resp Failure 2/2 volume overload - worsening this AM with pleural effusion noted on XR  -O2 85% on 5 L nasal cannula on admission  ABG: ph 7.21/CO2 45/O2 117/HCO3 20.4  A-a gradient 254  -Given IV steroids and breathing treatment in ED  -Non-compressible IVC on bedside US in ED  CXR on admission showed bilateral pulmonary edema  BNP 1,122 (baseline in 300s)  - TTE: Normal EF55%, hypokinetic inferior wall as noted on past ECHO, Grade 1 LV Diastolic Dysfunction, pHTN  - Given events overnight, will hold diuresis until further discussions held  - Placed on BIPAP at night and unable to transition off  - Family met with hospice today; transitioning to inpatient Hospice     2. Sepsis 2/2 ESBL UTI  -Patient with multiple complaints for past 7 days, on 3/23 in ED found to have ESBL E coli UTI that was treated with ciprofloxacin  -Admission: , RR 28, WBC 8  Procal 0.12  Lactic acid 1.6  CTAP on 3/26 with bladder wall thickening  -Given vancomycin/zosyn in ED; ID following, identified ESBL UTI on urine; narrowed to Ertapenem  -Continue ertapenem  -Continued decline today; Family met with hospice today; transitioning to inpatient Hospice     3. ELAYNE on CKD4 in setting of urinary retention; now worsening  -Admission Cr of 1.8  Baseline 1.2  -FeUrea borderline pre-renal, volume status still difficult to determine  -oligurigic, worsening BUN/Cr  -Found to have urinary retention back in Jan 2019; Renal US showing bladder distension, Mendoza placed  -nephrology consulted, recommending more aggressive diuresis with IV lasix and metolazone  -Given events overnight, held  diuresis  -Family met with hospice today; transitioning to inpatient Hospice     4. Acute on Chronic HFpEF  - TTE (January 2019) w/ EF 55%, grade I left ventricular diastolic    dysfunction, PA pressure of 47  - Repeat TTE March 2019: Normal EF55%, hypokinetic inferior wall as noted on past ECHO, Grade 1 LV Diastolic Dysfunction, pHTN  - Home Meds: Amiodarone 200mg daily, Carvedilol 25mg BID, Lasix 40 PO    -Given events overnight, held diuresis  -Family met with hospice today; transitioning to inpatient Hospice     5. Paroxysmal atrial fibrillation with RVR  -CHADSVASC >2, not any anticoagulation  -Patient has been on amiodarone for years  -In A-fib on admission, rate in 90s  -Continue amiodorone, holding coreg 2/2 hypotension  -Unable to tolerate PO meds this AM given required BiPAP; transitioning to inpatient hospice today     6. Stage 2 Sacral Decubitus Ulcer, present on admission  -Noted on exam, no erythema  -Patient with sacral decub ulcer infections in past, last dating back to early January 2019  -Consult wound care  -Unable to tolerate PO meds this AM given required BiPAP; transitioning to inpatient hospice today     7. CAD, PVD, RLE AKA:  -Home Meds: ASA 81mg daily, Atorvastatin 40mg daily  -Unable to tolerate PO meds this AM given required BiPAP; transitioning to inpatient hospice today     8. Type II Diabetes Mellitus  -A1c of 7.1 in Jan 2019, 6.6 this admission  -Not on any current outpatient medications  -SSI with accuchecks     9. Social / Goals of Care  - Patient poor prognosis and is a candidate for inpatient hospice. Palliative care consulted on 4/1. Appreciate time/guidance. Family to meet with hospice agency today.  - PPD placed on 4/1 prior to palliative care discussions in anticipation for possible placement.  -Transitioning to inpatient hospice today      Discharge Medications        Medication List      ASK your doctor about these medications    albuterol-ipratropium 2.5 mg-0.5 mg/3 mL  nebulizer solution  Commonly known as:  DUO-NEB  Take 3 mLs by nebulization every 6 (six) hours while awake.     amiodarone 200 MG Tab  Commonly known as:  PACERONE  Take 1 tablet (200 mg total) by mouth once daily.     amLODIPine 10 MG tablet  Commonly known as:  NORVASC     aspirin 81 MG EC tablet  Commonly known as:  ECOTRIN     atorvastatin 40 MG tablet  Commonly known as:  LIPITOR     carvedilol 25 MG tablet  Commonly known as:  COREG  Take 1 tablet (25 mg total) by mouth 2 (two) times daily.     ciprofloxacin HCl 500 MG tablet  Commonly known as:  CIPRO  Take 1 tablet (500 mg total) by mouth 2 (two) times daily. for 7 days  Ask about: Should I take this medication?     clotrimazole-betamethasone 1-0.05% cream  Commonly known as:  LOTRISONE  Apply topically 2 (two) times daily.     furosemide 10 mg/mL injection  Commonly known as:  LASIX  Inject 8 mLs (80 mg total) into the vein 2 (two) times daily.     multivitamin per tablet  Commonly known as:  THERAGRAN     oxyCODONE-acetaminophen 5-325 mg per tablet  Commonly known as:  PERCOCET  Take 1 tablet by mouth every 4 (four) hours as needed for Pain.     polyethylene glycol 17 gram/dose powder  Commonly known as:  GLYCOLAX  Take 17 g by mouth once daily.     promethazine 25 MG tablet  Commonly known as:  PHENERGAN  Take 1 tablet (25 mg total) by mouth every 6 (six) hours as needed for Nausea.     senna 8.6 mg tablet  Commonly known as:  SENOKOT  Take 1 tablet by mouth daily as needed for Constipation.     tamsulosin 0.4 mg Cap  Commonly known as:  FLOMAX  Take 1 capsule (0.4 mg total) by mouth once daily.     traMADol 50 mg tablet  Commonly known as:  ULTRAM     VITAMIN C 500 MG tablet  Generic drug:  ascorbic acid (vitamin C)          Transitioning to Inpatient Hospice today.  Further orders and care per Hospice team.    Thank you for allowing us to care for Asad Perez.   Please call with any questions.    Brian Momin, DO  [facility]  Medicine-Pediatrics PGY-IV  [facility] Hospitalist Service Team B    [facility] Medicine Hospitalist Pager numbers:   [facility] Hospitalist Medicine Team A (Chay/Shawanda): [phone]  [facility] Hospitalist Medicine Team B (Gloria/Alex):  [phone]

## 2019-04-02 NOTE — PROGRESS NOTES
[facility]  Palliative Medicine  History and Physical     Patient Name: Asad Perez  MRN: [id number]  Admission Date: 4/2/2019  Hospital Length of Stay: 0 days  Code Status: Prior   Attending Provider: Nalini Ramos*  Consulting Provider: Teresa Espinosa NP  Primary Care Physician: Tomas Torres MD  Principal Problem:<principal problem not specified>    Patient information was obtained from spouse/SO, relative(s) and ER records.      Assessment/Plan:     Respiratory failure, acute  -Continous oxygen High flow nasal canula  -Morphine for dyspnea    Comfort measures only status  -Consult [facility] for inpatient Hospice  -Morphine for pain and dyspnea  -Ativan for seizures and agitation  -Mendoza cath  -Continous oxygen  -Spiritual consult  -Bereavement Tray  -        I will follow-up with patient. Please contact us if you have any additional questions.    Subjective:     Chief Complaint: No chief complaint on file.      HPI:   Mr. Perez is a 79 year old male with HFpEF (last EF 55% Jan 2019), paroxysmal A fib not on anticoagulation, COPD (on home 2 L NC), CKD3B, CAD, PVD, prediabetes, RLE AKA, and with recent admission for urinary retention who presents with complaint of shortness of breath.     Patient was in their usual state of health (bed bound, required on others to complete ADLs, on home O2) until 7 days ago. Note, patient and wife are poor historians. Patient about 7 days ago began complaining of some nausea, abdominal pain, loose stools, urinary symptoms. He was seen at [facility] ER and found to have a UTI, and sent home on p.o. ciprofloxacin. Urine cultures have resulted an now growing ESBL E coli. Patient did not receive any other antibiotics. Patient now complaining shortness of breath for 2 days prior to admission. It has gotten much worse of the course of day of admission.  Patient also admitting to centralized chest pain that is sharp, unable to appropriately  "describe if intermittent vs constant and what alleviates or aggravates the pain. Patient reports having orthopnea, paroxysmal noctural dyspnea for past couple days.    Palliative Medicine met with family this am with  present.  Patient on BIPAP and having respiratory distress. Discussed with spouse patient current clinical status and comfort care. Spouse in agreement with comfort care for patient and having inpatient hospice for symptom management. "I don't want him to suffer. I want him to breathe better." Emotional support given. Patient unstable for transfer will consult [facility] for inpatient hospice at [facility].  Primary team and case management notified. House supervisor notified. Will admit under Palliative Services. Spiritual care Consulted.            Hospital Course:  No notes on file    Interval History: Patient placed on BIPAP for respiratory distress. Patient on comfort care. DNR in place. [facility] consulted for inpatient hospice    Medications:  Continuous Infusions:   morphine       Scheduled Meds:   scopolamine  1 patch Transdermal Q3 Days     PRN Meds:acetaminophen, lorazepam, ondansetron    Objective:     Vital Signs (Most Recent):  Pulse: (!) 121 (04/02/19 1155) Vital Signs (24h Range):  Temp:  [96.8 °F (36 °C)-98.8 °F (37.1 °C)] 98.8 °F (37.1 °C)  Pulse:  [] 121  Resp:  [18-28] 28  SpO2:  [80 %-96 %] 90 %  BP: (70-95)/(50-63) 82/52        There is no height or weight on file to calculate BMI.    Review of Symptoms  Symptom Assessment (ESAS 0-10 scale)  ESAS 0 1 2 3 4 5 6 7 8 9 10   Pain              Dyspnea              Anxiety              Nausea              Depression               Anorexia              Fatigue              Insomnia              Restlessness               Agitation              CAM / Delirium __ --  ___+   Constipation     __ --  ___+   Diarrhea           __ --  ___+      ECOG Performance Status Grade: 4 - Completely " disabled    Physical Exam   Constitutional: He appears lethargic. He has a sickly appearance. He appears distressed.   Pulmonary/Chest: He is in respiratory distress.   Patient currently on BIPAP   Neurological: He appears lethargic. He is disoriented.   Skin: Ecchymosis noted. There is erythema.       Significant Labs: None  CBC:   Recent Labs   Lab 04/02/19  0559   WBC 11.69   HGB 10.4*   HCT 31.8*   MCV 90   *     BMP:  Recent Labs   Lab 04/02/19  0559         K 3.8   CL 97   CO2 26   BUN 80*   CREATININE 3.9*   CALCIUM 8.5*     LFT:  Lab Results   Component Value Date    AST 23 04/02/2019    ALKPHOS 113 04/02/2019    BILITOT 0.5 04/02/2019     Albumin:   Albumin   Date Value Ref Range Status   04/02/2019 2.2 (L) 3.5 - 5.2 g/dL Final     Protein:   Total Protein   Date Value Ref Range Status   04/02/2019 4.9 (L) 6.0 - 8.4 g/dL Final     Lactic acid:   Lab Results   Component Value Date    LACTATE 1.1 03/27/2019    LACTATE 1.1 03/26/2019       Significant Imaging: None    Advanced Directives::  Living Will: No  LaPOST: Yes  Do Not Resuscitate Status: Yes  Medical Power of : No    Decision-Making Capacity: Family answered questions, Patient unable to communicate due to disease severity/cognitive impairment    Living Arrangements: Lives with spouse    Psychosocial/Cultural:  Patient's most important priorities:  none    Patient's biggest concerns/fears:  none    Previous death/end of life care history:  none    Patient's goals/hopes:  none    Spiritual:     F- Hilda and Belief: Pentecostalism    I - Importance:Yes   .  C - Community: Yes    A - Address in Care:Yes    Teresa Espinosa, MSN, APRN, NP-C   Palliative Medicine   [facility]  [phone] or [phone]        >50% of 70 min visit spent in chart review, face to face discussion of goals of care with patient, family, symptom assessment, coordination of care and emotional support.

## 2019-04-02 NOTE — PROGRESS NOTES
"                                   Palliative Medicine Progress Note    Palliative Medicine met with family this am with  present.  Patient on BIPAP and having respiratory distress. Discussed with spouse patient current clinical status and comfort care. Spouse in agreement with comfort care for patient and having inpatient hospice for symptom management. "I don't want him to suffer. I want him to breathe better." Emotional support given. Patient unstable for transfer will consult [facility] for inpatient hospice at [facility].  Primary team and case management notified. House supervisor notified. Will admit under Palliative Services.               Recommendations:  Continue medical treatment transition to comfort care  Code status: DNR  Consult [facility]  Consult spiritual care  Bereavement Robin          Thank you for the consult and the opportunity to participate in Mr. Perez's  care.       Teresa Espinosa, MSN, APRN, NP-C   Palliative Medicine   [facility]  [phone] or [phone]        >50% of 120  min visit spent in chart review, face to face discussion of goals of care with patient, family, symptom assessment, coordination of care and emotional support.  "

## 2019-04-02 NOTE — NURSING
Plan of care reviewed with patient and wife. Voiced understanding. AFIB on monitor with no red alarms noted. No acute distress noted. 3 L nc with no SOB at this time. Side rails x3, bed low, call bell within reach. Maintain bed alarm for patient safety. Patient will be monitored overnight.

## 2019-04-02 NOTE — ASSESSMENT & PLAN NOTE
-Consult [facility] for inpatient Hospice  -Morphine for pain and dyspnea  -Ativan for seizures and agitation  -Mendoza cath  -Continous oxygen  -Spiritual consult  -Bereavement Tray  -

## 2019-04-02 NOTE — SUBJECTIVE & OBJECTIVE
Interval History: Patient placed on BIPAP for respiratory distress. Patient on comfort care. DNR in place. [facility] consulted for inpatient hospice    Medications:  Continuous Infusions:   morphine       Scheduled Meds:   scopolamine  1 patch Transdermal Q3 Days     PRN Meds:acetaminophen, lorazepam, ondansetron    Objective:     Vital Signs (Most Recent):  Pulse: (!) 121 (04/02/19 1155) Vital Signs (24h Range):  Temp:  [96.8 °F (36 °C)-98.8 °F (37.1 °C)] 98.8 °F (37.1 °C)  Pulse:  [] 121  Resp:  [18-28] 28  SpO2:  [80 %-96 %] 90 %  BP: (70-95)/(50-63) 82/52        There is no height or weight on file to calculate BMI.    Review of Symptoms  Symptom Assessment (ESAS 0-10 scale)  ESAS 0 1 2 3 4 5 6 7 8 9 10   Pain              Dyspnea              Anxiety              Nausea              Depression               Anorexia              Fatigue              Insomnia              Restlessness               Agitation              CAM / Delirium __ --  ___+   Constipation     __ --  ___+   Diarrhea           __ --  ___+      ECOG Performance Status Grade: 4 - Completely disabled    Physical Exam   Constitutional: He appears lethargic. He has a sickly appearance. He appears distressed.   Pulmonary/Chest: He is in respiratory distress.   Patient currently on BIPAP   Neurological: He appears lethargic. He is disoriented.   Skin: Ecchymosis noted. There is erythema.       Significant Labs: None  CBC:   Recent Labs   Lab 04/02/19  0559   WBC 11.69   HGB 10.4*   HCT 31.8*   MCV 90   *     BMP:  Recent Labs   Lab 04/02/19  0559         K 3.8   CL 97   CO2 26   BUN 80*   CREATININE 3.9*   CALCIUM 8.5*     LFT:  Lab Results   Component Value Date    AST 23 04/02/2019    ALKPHOS 113 04/02/2019    BILITOT 0.5 04/02/2019     Albumin:   Albumin   Date Value Ref Range Status   04/02/2019 2.2 (L) 3.5 - 5.2 g/dL Final     Protein:   Total Protein   Date Value Ref Range Status   04/02/2019 4.9 (L) 6.0 -  8.4 g/dL Final     Lactic acid:   Lab Results   Component Value Date    LACTATE 1.1 03/27/2019    LACTATE 1.1 03/26/2019       Significant Imaging: None    Advanced Directives::  Living Will: No  LaPOST: Yes  Do Not Resuscitate Status: Yes  Medical Power of : No    Decision-Making Capacity: Family answered questions, Patient unable to communicate due to disease severity/cognitive impairment    Living Arrangements: Lives with spouse    Psychosocial/Cultural:  Patient's most important priorities:  none    Patient's biggest concerns/fears:  none    Previous death/end of life care history:  none    Patient's goals/hopes:  none    Spiritual:     F- Hilda and Belief: Religion    I - Importance:Yes   .  C - Community: Yes    A - Address in Care:Yes    Teresa Espinosa, MSN, APRN, NP-C   Palliative Medicine   [facility]  [phone] or [phone]        >50% of 70 min visit spent in chart review, face to face discussion of goals of care with patient, family, symptom assessment, coordination of care and emotional support.

## 2019-04-02 NOTE — NURSING
Patient with c/o SOB. SPO2 fluctuating between 83-92%. Patient placed on bipap.Respiratory therapist called to come to bedside and MD paged.

## 2019-04-02 NOTE — PLAN OF CARE
Problem: Adult Inpatient Plan of Care  Goal: Plan of Care Review  Outcome: Ongoing (interventions implemented as appropriate)  Plan of care reviewed with patient and wife. Voiced understanding. AFIB on monitor with -130's  Patient placed on hospice today. Morphine drip started and patient weaned to high flow. Side rails x3, bed low, call bell within reach. Wife at bedside. Patient will be monitored overnight.

## 2019-04-02 NOTE — NURSING
Received patient upon rounds at 1905H, patient seen in bed on semi-byrnes's position. conscious , coherent to time, date, place, person and situation.On O2 4 lpm via NC, saturation of 92%, GCS 15. Left lower leg blackish discoloration. With glass skin noted both upper and lower extremities, bruised all over the body and post heparin site in the abdomen. No subjective complaint of any pain upon rounds, comfortable watching TV, wife at bedside crying very much concern about the kidney status of the patient.  Around 1930H, patient complaints  difficulty of breathing, oxygen saturation went down to 78-80% on 4 lpm via NC, BIPAP machine applied, DR. Coronado informed,BIPAP setting revised, oxygen saturation improved went up to 94-95%. portable chest xray done, ABG done. Blood pressure went down to 70/50 mmHg, just got received 160 mg of furosemide around 1830H, with metolazone newly started at 1830H, adequate urine output noted during the day, and post lasix dose. BP peaked up to 94/56mmHg. MD aware.  Blood sugar monitored.With stage 1 sacral sore, mepilex intact.Telemetry 100-110's atrial fibrillation. Advised patient to call for any assistance. CAll bell within reach. Bed alarm ON.Telemetry atrial

## 2019-04-02 NOTE — HPI
"Mr. Perez is a 79 year old male with HFpEF (last EF 55% Jan 2019), paroxysmal A fib not on anticoagulation, COPD (on home 2 L NC), CKD3B, CAD, PVD, prediabetes, RLE AKA, and with recent admission for urinary retention who presents with complaint of shortness of breath.     Patient was in their usual state of health (bed bound, required on others to complete ADLs, on home O2) until 7 days ago. Note, patient and wife are poor historians. Patient about 7 days ago began complaining of some nausea, abdominal pain, loose stools, urinary symptoms. He was seen at [facility] ER and found to have a UTI, and sent home on p.o. ciprofloxacin. Urine cultures have resulted an now growing ESBL E coli. Patient did not receive any other antibiotics. Patient now complaining shortness of breath for 2 days prior to admission. It has gotten much worse of the course of day of admission.  Patient also admitting to centralized chest pain that is sharp, unable to appropriately describe if intermittent vs constant and what alleviates or aggravates the pain. Patient reports having orthopnea, paroxysmal noctural dyspnea for past couple days.    Palliative Medicine met with family this am with  present.  Patient on BIPAP and having respiratory distress. Discussed with spouse patient current clinical status and comfort care. Spouse in agreement with comfort care for patient and having inpatient hospice for symptom management. "I don't want him to suffer. I want him to breathe better." Emotional support given. Patient unstable for transfer will consult [facility] for inpatient hospice at [facility].  Primary team and case management notified. House supervisor notified. Will admit under Palliative Services. Spiritual care Consulted.        Reported patient with Heart rate 30bpm on telemetry monitor.   Reported respiration ceased, no apical pulse auscultated and pupils fixed and dilated. Patient pronounced 4/4/2019 @ 0305. " Wife at bedside, emotional comfort provided.

## 2019-04-02 NOTE — NURSING
Notified Dr. Coronado about patients increased respirations and c/o SOB. SPO2 fluctuating between 82-92%. Bipap on patient and still SOB reported. Order to increase bipap settings and will be by to see patient.

## 2019-04-02 NOTE — PLAN OF CARE
TN met with patient, pt's wife, [facility] and THERESA Espinosa NP regarding pt transitioning to hospice care, pt will transition to full palliative care in hospital and will transferred to  hospice service in hospital.

## 2019-04-02 NOTE — PROGRESS NOTES
"[facility] Hospitalist Medicine Team B - Resident HO-4 Progress Note  Patient: Asad Perez   MRN: [id number]      Primary Team: [facility] Hospitalist Team B  Attending Physician: Dr. Enresto Johnson MD  Date of Admit: 3/26/2019      Subjective:      Afebrile. Episode of acute respiratory distress with hypotension following diuresis overnight and placed on BiPAP. Family plans to meet with hospice this morning.    Objective:     Last 24 Hour Vital Signs:  BP  Min: 70/50  Max: 95/56  Temp  Av.3 °F (36.3 °C)  Min: 96.8 °F (36 °C)  Max: 97.6 °F (36.4 °C)  Pulse  Av.8  Min: 69  Max: 136  Resp  Av.4  Min: 18  Max: 23  SpO2  Av.8 %  Min: 80 %  Max: 98 %  Weight  Av kg (160 lb 15 oz)  Min: 73 kg (160 lb 15 oz)  Max: 73 kg (160 lb 15 oz)    24 HR Intake & Output    0701 -  0700  In: 535 [P.O.:435]  Out: 1350 [Urine:1350]   Body mass index is 24.47 kg/m².    Physical Examination:  BP 90/60 (BP Location: Left arm, Patient Position: Lying)   Pulse 102   Temp 96.8 °F (36 °C) (Axillary)   Resp (!) 24   Ht 5' 8" (1.727 m)   Wt 73 kg (160 lb 15 oz)   SpO2 (!) 94%   BMI 24.47 kg/m²     General appearance: Mild distress on BiPAP, alert  HEENT: NCAT, EOMI, PERRL, limited oral assessment given on BiPAP  Neck: supple, trachea midline, no TTP  Lungs: Decreased breath sounds to bases, mild rhonchi noted; on BiPAP; no crackles, no wheezing  Heart: Tachycardic, regular rhythm, cap refill < 2 sec, 1+ pulses LE, 2+ pulses UE  Abdomen: Soft, ND, BS+, no TTP  Extremities: minimal LE edema, no cyanosis;   Skin: Dry skin; multiple ecchymotic lesions; no skin breakdown noted on extremities  Neurologic: AAO x (person, place, time), 4/5 muscle strength throughout, sensation intact    Laboratory:  Laboratory Data Reviewed: yes    Most Recent Data/Trended Lab Data:  Trended Lab Data:  Recent Labs   Lab 19  0446 19  0608 19  0609 19  1645 19  0510 19  0559   WBC 6.58  --  6.65  --  " 9.97 11.69   HGB 10.5*  --  10.0*  --  10.5* 10.4*   HCT 32.4*  --  31.0*  --  32.9* 31.8*   *  --  103*  --  101* 114*   MCV 91  --  91  --  90 90   RDW 17.6*  --  17.4*  --  17.6* 17.5*    136  --  134* 138 138   K 4.6 4.9  --  4.3 4.1 3.8    101  --  101 99 97   CO2 25 24  --  22* 28 26   BUN 67* 71*  --  70* 74* 80*   CREATININE 3.5* 3.9*  --  3.8* 4.0* 3.9*    105  --  106 109 106   CALCIUM 8.4* 8.4*  --  8.2* 8.4* 8.5*   PROT 4.5* 4.7*  --  4.5* 4.8* 4.9*   ALBUMIN 2.2* 2.1*  --  2.1* 2.2* 2.2*   BILITOT 0.3 0.3  --  0.4 0.4 0.5   AST 27 29  --  21 25 23   ALKPHOS 99 104  --  102 117 113   ALT 32 27  --  23 23 19     Microbiology Data:  Microbiology Data Reviewed: yes       Radiology - Reviewed    X-Ray Chest AP Portable   Final Result      Interval increased volume large left pleural effusion.  New opacification and/or small effusion seen at the right lung base.         Electronically signed by: Yodit Naylor MD   Date:    04/01/2019   Time:    20:56       Current Medications:     Infusions:       Scheduled:   albuterol-ipratropium  3 mL Nebulization Q6H    allopurinol  50 mg Oral Daily    amiodarone  200 mg Oral Daily    aspirin  81 mg Oral Daily    atorvastatin  40 mg Oral Daily    ertapenem (INVANZ) IVPB  0.5 g Intravenous Q24H    furosemide  160 mg Intravenous BID    heparin (porcine)  5,000 Units Subcutaneous Q8H    metOLazone  10 mg Oral Daily    sodium bicarbonate  650 mg Oral BID        PRN:  acetaminophen, dextrose 50 % in water (D50W), dextrose 50 % in water (D50W), glucagon (human recombinant), glucose, glucose, insulin aspart U-100, sodium chloride 0.9%    Antibiotics and Day Number of Therapy:  Vanc/azithromycin 3/26-3/28  Meropenem 3/26-3/29  Ertapenem 3/29-present    Lines and Day Number of Therapy:  PIV     Assessment:     Asad ePrez is a 79 y.o.male with  Present on Admission:   Acute on chronic respiratory failure with hypoxia and hypercapnia    CAD (coronary artery disease)   Normocytic anemia   ELAYNE (acute kidney injury)   GERD (gastroesophageal reflux disease)   PAD (peripheral artery disease)   UTI due to extended-spectrum beta lactamase (ESBL) producing Escherichia coli   Normal anion gap metabolic acidosis   Dyspnea       Plan:     1. Acute on Chronic Combined Resp Failure 2/2 volume overload - worsening this AM with pleural effusion noted on XR  -O2 85% on 5 L nasal cannula on admission  ABG: ph 7.21/CO2 45/O2 117/HCO3 20.4  A-a gradient 254  -Given IV steroids and breathing treatment in ED  -Non-compressible IVC on bedside US in ED  CXR on admission showed bilateral pulmonary edema  BNP 1,122 (baseline in 300s)  - TTE: Normal EF55%, hypokinetic inferior wall as noted on past ECHO, Grade 1 LV Diastolic Dysfunction, pHTN  - Given events overnight, will hold diuresis until further discussions held  - On BIPAP at night; will attempt to transition to HFNC this morning    2. Sepsis 2/2 ESBL UTI  -Patient with multiple complaints for past 7 days, on 3/23 in ED found to have ESBL E coli UTI that was treated with ciprofloxacin  -Admission: , RR 28, WBC 8  Procal 0.12  Lactic acid 1.6  CTAP on 3/26 with bladder wall thickening  -Given vancomycin/zosyn in ED; ID following, identified ESBL UTI on urine; narrowed to Ertapenem  -Continue ertapenem    3. ELAYNE on CKD4 in setting of urinary retention; now worsening  -Admission Cr of 1.8  Baseline 1.2  -FeUrea borderline pre-renal, volume status still difficult to determine  -oligurigic, worsening BUN/Cr  -Found to have urinary retention back in Jan 2019; Renal US showing bladder distension, Mendoza placed  -nephrology consulted, recommending more aggressive diuresis with IV lasix and metolazone  -Given events overnight, will hold diuresis until further discussions held    4. Acute on Chronic HFpEF  - TTE (January 2019) w/ EF 55%, grade I left ventricular diastolic    dysfunction, PA pressure  of 47  - Repeat TTE March 2019: Normal EF55%, hypokinetic inferior wall as noted on past ECHO, Grade 1 LV Diastolic Dysfunction, pHTN  - Home Meds: Amiodarone 200mg daily, Carvedilol 25mg BID, Lasix 40 PO    - Given events overnight, will hold diuresis until further discussions held     5. Paroxysmal atrial fibrillation with RVR  -CHADSVASC >2, not any anticoagulation  -Patient has been on amiodarone for years  -In A-fib on admission, rate in 90s  -Continue amiodorone, holding coreg 2/2 hypotension    6. Stage 2 Sacral Decubitus Ulcer, present on admission  -Noted on exam, no erythema  -Patient with sacral decub ulcer infections in past, last dating back to early January 2019  -Consult wound care     7. Type 2 NSTEMI 2/2 demand  -On admission troponin of 0.036, up to 0.047 and now downtrending  -Likely elevated secondary to demand     8. CAD, PVD, RLE AKA:  -Home Meds: ASA 81mg daily, Atorvastatin 40mg daily  -Resume home meds     9. Type II Diabetes Mellitus  -A1c of 7.1 in Jan 2019, 6.6 this admission  -Not on any current outpatient medications  -SSI with accuchecks     10. History of CVA  -CVA in 2007, with no residual deficits  -Home Meds: ASA 81mg daily, Atorvastatin 40mg daily  -Resume home meds     11. Social / Goals of Care  - Patient poor prognosis and is a candidate for inpatient hospice. Palliative care consulted on 4/1. Appreciate time/guidance. Family to meet with hospice agency today.  - PPD placed on 4/1 prior to palliative care discussions in anticipation for possible placement.  - Immunizations:   Immunization History   Administered Date(s) Administered    PPD Test 07/12/2016, 09/21/2016, 04/13/2017, 04/01/2019    Pneumococcal Conjugate - 13 Valent 01/13/2019     F: None  E: Replete as needed  N: Diabetic diet with supplementation     Prophylaxis:   - DVT Ppx: Heparin    Code Status: DNR/DNI    Disposition: Pending goals of care discussions and possible Hospice today and/or transition to comfort  measures    Brian Momin DO  [facility] Internal Medicine-Pediatrics PGY4  [facility] Hospitalist Service Team B    [facility] Medicine Hospitalist Pager number:   [facility] Hospitalist Medicine Team B (Gloria/Alex):  [phone]

## 2019-04-02 NOTE — DISCHARGE INSTRUCTIONS
As Death Nears, Hospice (English) View Edit Remove   Caregivers, For: Coping Tips (English) View Edit Remove   What Is Hospice? (English) View Edit Remove

## 2019-04-02 NOTE — H&P
[facility]  Palliative Medicine  History and Physical      Patient Name: Asad Perez  MRN: [id number]  Admission Date: 4/2/2019  Hospital Length of Stay: 0 days  Code Status: Prior   Attending Provider: Nalini Ramos*  Consulting Provider: Teresa Espinosa NP  Primary Care Physician: Tomas Torres MD  Principal Problem:<principal problem not specified>     Patient information was obtained from spouse/SO, relative(s) and ER records.       Assessment/Plan:      Respiratory failure, acute  -Continous oxygen High flow nasal canula  -Morphine for dyspnea     Comfort measures only status  -Consult [facility] for inpatient Hospice  -Morphine for pain and dyspnea  -Ativan for seizures and agitation  -Mendoza cath  -Continous oxygen  -Spiritual consult  -Bereavement Tray  -           I will follow-up with patient. Please contact us if you have any additional questions.     Subjective:      Chief Complaint: No chief complaint on file.        HPI:   Mr. Perez is a 79 year old male with HFpEF (last EF 55% Jan 2019), paroxysmal A fib not on anticoagulation, COPD (on home 2 L NC), CKD3B, CAD, PVD, prediabetes, RLE AKA, and with recent admission for urinary retention who presents with complaint of shortness of breath.     Patient was in their usual state of health (bed bound, required on others to complete ADLs, on home O2) until 7 days ago. Note, patient and wife are poor historians. Patient about 7 days ago began complaining of some nausea, abdominal pain, loose stools, urinary symptoms. He was seen at [facility] ER and found to have a UTI, and sent home on p.o. ciprofloxacin. Urine cultures have resulted an now growing ESBL E coli. Patient did not receive any other antibiotics. Patient now complaining shortness of breath for 2 days prior to admission. It has gotten much worse of the course of day of admission.  Patient also admitting to centralized chest pain that is sharp, unable to  "appropriately describe if intermittent vs constant and what alleviates or aggravates the pain. Patient reports having orthopnea, paroxysmal noctural dyspnea for past couple days.     Palliative Medicine met with family this am with  present.  Patient on BIPAP and having respiratory distress. Discussed with spouse patient current clinical status and comfort care. Spouse in agreement with comfort care for patient and having inpatient hospice for symptom management. "I don't want him to suffer. I want him to breathe better." Emotional support given. Patient unstable for transfer will consult [facility] for inpatient hospice at [facility].  Primary team and case management notified. House supervisor notified. Will admit under Palliative Services. Spiritual care Consulted.               Hospital Course:  No notes on file     Interval History: Patient placed on BIPAP for respiratory distress. Patient on comfort care. DNR in place. [facility] for [facility]     Medications:  Continuous Infusions:   morphine        Scheduled Meds:   scopolamine  1 patch Transdermal Q3 Days      PRN Meds:acetaminophen, lorazepam, ondansetron     Objective:      Vital Signs (Most Recent):  Pulse: (!) 121 (04/02/19 1155) Vital Signs (24h Range):  Temp:  [96.8 °F (36 °C)-98.8 °F (37.1 °C)] 98.8 °F (37.1 °C)  Pulse:  [] 121  Resp:  [18-28] 28  SpO2:  [80 %-96 %] 90 %  BP: (70-95)/(50-63) 82/52         There is no height or weight on file to calculate BMI.     Review of Symptoms  Symptom Assessment (ESAS 0-10 scale)  ESAS 0 1 2 3 4 5 6 7 8 9 10   Pain                         Dyspnea                         Anxiety                         Nausea                         Depression                          Anorexia                         Fatigue                         Insomnia                         Restlessness                          Agitation                         CAM / Delirium __ --  " ___+   Constipation     __ --  ___+   Diarrhea           __ --  ___+        ECOG Performance Status Grade: 4 - Completely disabled     Physical Exam   Constitutional: He appears lethargic. He has a sickly appearance. He appears distressed.   Pulmonary/Chest: He is in respiratory distress.   Patient currently on BIPAP   Neurological: He appears lethargic. He is disoriented.   Skin: Ecchymosis noted. There is erythema.         Significant Labs: None  CBC:       Recent Labs   Lab 04/02/19  0559   WBC 11.69   HGB 10.4*   HCT 31.8*   MCV 90   *      BMP:      Recent Labs   Lab 04/02/19 0559         K 3.8   CL 97   CO2 26   BUN 80*   CREATININE 3.9*   CALCIUM 8.5*      LFT:        Lab Results   Component Value Date     AST 23 04/02/2019     ALKPHOS 113 04/02/2019     BILITOT 0.5 04/02/2019      Albumin:         Albumin   Date Value Ref Range Status   04/02/2019 2.2 (L) 3.5 - 5.2 g/dL Final      Protein:         Total Protein   Date Value Ref Range Status   04/02/2019 4.9 (L) 6.0 - 8.4 g/dL Final      Lactic acid:         Lab Results   Component Value Date     LACTATE 1.1 03/27/2019     LACTATE 1.1 03/26/2019         Significant Imaging: None     Advanced Directives::  Living Will: No  LaPOST: Yes  Do Not Resuscitate Status: Yes  Medical Power of : No     Decision-Making Capacity: Family answered questions, Patient unable to communicate due to disease severity/cognitive impairment     Living Arrangements: Lives with spouse     Psychosocial/Cultural:  Patient's most important priorities:  none     Patient's biggest concerns/fears:  none     Previous death/end of life care history:  none     Patient's goals/hopes:  none     Spiritual:      F- Hilda and Belief: Mandaeism     I - Importance:Yes   .  C - Community: Yes     A - Address in Care:Yes     Teresa Espinosa, MSN, APRN, NP-C   Palliative Medicine   [facility]  [phone] or [phone]          >50% of 70 min visit spent in chart  review, face to face discussion of goals of care with patient, family, symptom assessment, coordination of care and emotional support.

## 2019-04-02 NOTE — CHAPLAIN
Consult to visit patient and wife.  Patient will be transferred to Hospice.  Wife said they have not been active in [employer], used to attend [facility], but would like for spouse to have [employer]. Wife is tearful.  Couple does not have children but does have other family according to wife.I will contact a .

## 2019-04-03 NOTE — PLAN OF CARE
Problem: Adult Inpatient Plan of Care  Goal: Plan of Care Review  Outcome: Revised  Reviewed plan of care with pt's spouse,  understanding verbalized.  Safety measures in progress, bed in lowest position, bed alarm set and audible, wheels locked x4, siderails up x2, call light within reach.  Will monitor pt. Throughout shift.

## 2019-04-03 NOTE — PROGRESS NOTES
[facility]  Palliative Medicine  Progress Note    Patient Name: Asad Perez  MRN: [id number]  Admission Date: 4/2/2019  Hospital Length of Stay: 1 days  Code Status: DNR   Attending Provider: Nalini Ramos*  Consulting Provider: Nalini Ramos MD  Primary Care Physician: Tomas Torres MD  Principal Problem:Comfort measures only status    Patient information was obtained from spouse/SO, past medical records and ER records.      Assessment/Plan:     * Comfort measures only status  -Consult [facility] for inpatient Hospice  -Morphine for pain and dyspnea  -Ativan for seizures and agitation  -Mendoza cath  -Continous oxygen  -Spiritual consult  -Bereavement Tray  -    Respiratory failure, acute  -Continous oxygen High flow nasal canula  -Morphine for dyspnea        none    Subjective:     Chief Complaint: No chief complaint on file.      HPI:   Mr. Perez is a 79 year old male with HFpEF (last EF 55% Jan 2019), paroxysmal A fib not on anticoagulation, COPD (on home 2 L NC), CKD3B, CAD, PVD, prediabetes, RLE AKA, and with recent admission for urinary retention who presents with complaint of shortness of breath.     Patient was in their usual state of health (bed bound, required on others to complete ADLs, on home O2) until 7 days ago. Note, patient and wife are poor historians. Patient about 7 days ago began complaining of some nausea, abdominal pain, loose stools, urinary symptoms. He was seen at [facility] ER and found to have a UTI, and sent home on p.o. ciprofloxacin. Urine cultures have resulted an now growing ESBL E coli. Patient did not receive any other antibiotics. Patient now complaining shortness of breath for 2 days prior to admission. It has gotten much worse of the course of day of admission.  Patient also admitting to centralized chest pain that is sharp, unable to appropriately describe if intermittent vs constant and what alleviates or aggravates the  "pain. Patient reports having orthopnea, paroxysmal noctural dyspnea for past couple days.    Palliative Medicine met with family this am with  present.  Patient on BIPAP and having respiratory distress. Discussed with spouse patient current clinical status and comfort care. Spouse in agreement with comfort care for patient and having inpatient hospice for symptom management. "I don't want him to suffer. I want him to breathe better." Emotional support given. Patient unstable for transfer will consult [facility] for inpatient hospice at [facility].  Primary team and case management notified. House supervisor notified. Will admit under Palliative Services. Spiritual care Consulted.            Hospital Course:  No notes on file    Interval History: lying in bed. Wife by bedside. Her questions and concerned were addressed.     Medications:  Continuous Infusions:   morphine       Scheduled Meds:   scopolamine  1 patch Transdermal Q3 Days     PRN Meds:acetaminophen, lorazepam, ondansetron    Objective:     Vital Signs (Most Recent):  Pulse: 99 (04/03/19 1208)  SpO2: (!) 85 % (04/03/19 0743) Vital Signs (24h Range):  Pulse:  [] 99  SpO2:  [84 %-86 %] 85 %        There is no height or weight on file to calculate BMI.    Review of Symptoms  Symptom Assessment (ESAS 0-10 scale)  ESAS 0 1 2 3 4 5 6 7 8 9 10   Pain              Dyspnea              Anxiety              Nausea              Depression               Anorexia              Fatigue              Insomnia              Restlessness               Agitation              CAM / Delirium __ --  ___+   Constipation     __ --  ___+   Diarrhea           __ --  ___+  Bowel Management Plan (BMP): Yes        Performance Status: 10    ECOG Performance Status Grade: 4 - Completely disabled    Physical Exam    Significant Labs: None  CBC:   Recent Labs   Lab 04/02/19  0559   WBC 11.69   HGB 10.4*   HCT 31.8*   MCV 90   *     BMP:  No results for " input(s): GLU, NA, K, CL, CO2, BUN, CREATININE, CALCIUM, MG in the last 24 hours.  LFT:  Lab Results   Component Value Date    AST 23 04/02/2019    ALKPHOS 113 04/02/2019    BILITOT 0.5 04/02/2019     Albumin:   Albumin   Date Value Ref Range Status   04/02/2019 2.2 (L) 3.5 - 5.2 g/dL Final     Protein:   Total Protein   Date Value Ref Range Status   04/02/2019 4.9 (L) 6.0 - 8.4 g/dL Final     Lactic acid:   Lab Results   Component Value Date    LACTATE 1.1 03/27/2019    LACTATE 1.1 03/26/2019       Significant Imaging: None    Advanced Directives::  Living Will: Yes. Copy on chart: Yes  LaPOST: Yes  Do Not Resuscitate Status: Yes   Medical Power of : Yes. Agent's Name: Monica. Agent's Contact Number:     Decision-Making Capacity: Family answered questions, Patient unable to communicate due to disease severity/cognitive impairment    Living Arrangements: Lives with spouse    Psychosocial/Cultural:  Patient's most important priorities:  Unable to assess    Patient's biggest concerns/fears:  Unable to assess    Previous death/end of life care history:  Unable to assess    Patient's goals/hopes:    Unable to assess    Spiritual:     F- Hilda and Belief: esteves    I - Importance: yes  .  C - Community: family    A - Address in Care: yes      > 50% of 35 min visit spent in chart review, face to face discussion of goals of care,  symptom assessment, coordination of care and emotional support.    Nalini Ramos MD  Palliative Medicine  [facility]

## 2019-04-03 NOTE — SUBJECTIVE & OBJECTIVE
Interval History: lying in bed. Wife by bedside. Her questions and concerned were addressed.     Medications:  Continuous Infusions:   morphine       Scheduled Meds:   scopolamine  1 patch Transdermal Q3 Days     PRN Meds:acetaminophen, lorazepam, ondansetron    Objective:     Vital Signs (Most Recent):  Pulse: 99 (04/03/19 1208)  SpO2: (!) 85 % (04/03/19 0743) Vital Signs (24h Range):  Pulse:  [] 99  SpO2:  [84 %-86 %] 85 %        There is no height or weight on file to calculate BMI.    Review of Symptoms  Symptom Assessment (ESAS 0-10 scale)  ESAS 0 1 2 3 4 5 6 7 8 9 10   Pain              Dyspnea              Anxiety              Nausea              Depression               Anorexia              Fatigue              Insomnia              Restlessness               Agitation              CAM / Delirium __ --  ___+   Constipation     __ --  ___+   Diarrhea           __ --  ___+  Bowel Management Plan (BMP): Yes        Performance Status: 10    ECOG Performance Status Grade: 4 - Completely disabled    Physical Exam    Significant Labs: None  CBC:   Recent Labs   Lab 04/02/19  0559   WBC 11.69   HGB 10.4*   HCT 31.8*   MCV 90   *     BMP:  No results for input(s): GLU, NA, K, CL, CO2, BUN, CREATININE, CALCIUM, MG in the last 24 hours.  LFT:  Lab Results   Component Value Date    AST 23 04/02/2019    ALKPHOS 113 04/02/2019    BILITOT 0.5 04/02/2019     Albumin:   Albumin   Date Value Ref Range Status   04/02/2019 2.2 (L) 3.5 - 5.2 g/dL Final     Protein:   Total Protein   Date Value Ref Range Status   04/02/2019 4.9 (L) 6.0 - 8.4 g/dL Final     Lactic acid:   Lab Results   Component Value Date    LACTATE 1.1 03/27/2019    LACTATE 1.1 03/26/2019       Significant Imaging: None    Advanced Directives::  Living Will: Yes. Copy on chart: Yes  LaPOST: Yes  Do Not Resuscitate Status: Yes   Medical Power of : Yes. Agent's Name: Monica. Agent's Contact Number:     Decision-Making Capacity:  Family answered questions, Patient unable to communicate due to disease severity/cognitive impairment    Living Arrangements: Lives with spouse    Psychosocial/Cultural:  Patient's most important priorities:  Unable to assess    Patient's biggest concerns/fears:  Unable to assess    Previous death/end of life care history:  Unable to assess    Patient's goals/hopes:    Unable to assess    Spiritual:     F- Hilda and Belief: esteves    I - Importance: yes  .  C - Community: family    A - Address in Care: yes

## 2019-04-03 NOTE — PLAN OF CARE
VN informed by bedside nurse that family is requesting to speak with Teresa, NP w/hospice and an  before increasing morphine rate. VN attempted to call both numbers on file. Voicemail left for NP. Bedside nurse notified. Will cont to be available as needed.

## 2019-04-03 NOTE — NURSING
When entering room to increase morphine dose and give IV ativan patients spouse states she doesn't want to speed up process because family will not be coming until tomorrow. Patient seems to be in no distress at this time. Family's request honored.

## 2019-04-03 NOTE — PLAN OF CARE
Problem: Adult Inpatient Plan of Care  Goal: Plan of Care Review  Outcome: Ongoing (interventions implemented as appropriate)  Plan of care reviewed with patients spouse. Voiced understanding. AFIB on monitor with no red alarms noted. Morphine to be increased per orders. No acute distress noted. Side rails x3, bed low, call bell within reach. Maintain bed alarm for patient safety. Will continue to monitor.

## 2019-04-04 PROBLEM — J96.00 RESPIRATORY FAILURE, ACUTE: Status: RESOLVED | Noted: 2019-01-01 | Resolved: 2019-01-01

## 2019-04-04 PROBLEM — M86.10 ACUTE OSTEOMYELITIS: Status: RESOLVED | Noted: 2017-04-11 | Resolved: 2019-01-01

## 2019-04-04 PROBLEM — E87.5 HYPERKALEMIA: Status: RESOLVED | Noted: 2019-01-01 | Resolved: 2019-01-01

## 2019-04-04 PROBLEM — R33.9 URINARY RETENTION: Status: RESOLVED | Noted: 2019-01-01 | Resolved: 2019-01-01

## 2019-04-04 PROBLEM — L89.302 DECUBITUS ULCER OF BUTTOCK, STAGE 2: Status: RESOLVED | Noted: 2017-04-07 | Resolved: 2019-01-01

## 2019-04-04 PROBLEM — R07.9 CHEST PAIN: Status: RESOLVED | Noted: 2019-01-01 | Resolved: 2019-01-01

## 2019-04-04 PROBLEM — N39.0 UTI DUE TO EXTENDED-SPECTRUM BETA LACTAMASE (ESBL) PRODUCING ESCHERICHIA COLI: Status: RESOLVED | Noted: 2019-01-01 | Resolved: 2019-01-01

## 2019-04-04 PROBLEM — E87.20 NORMAL ANION GAP METABOLIC ACIDOSIS: Status: RESOLVED | Noted: 2019-01-01 | Resolved: 2019-01-01

## 2019-04-04 PROBLEM — Z16.12 UTI DUE TO EXTENDED-SPECTRUM BETA LACTAMASE (ESBL) PRODUCING ESCHERICHIA COLI: Status: RESOLVED | Noted: 2019-01-01 | Resolved: 2019-01-01

## 2019-04-04 PROBLEM — L89.153 PRESSURE INJURY OF SACRAL REGION, STAGE 3: Status: RESOLVED | Noted: 2019-01-01 | Resolved: 2019-01-01

## 2019-04-04 PROBLEM — N13.39 OTHER HYDRONEPHROSIS: Status: RESOLVED | Noted: 2019-01-01 | Resolved: 2019-01-01

## 2019-04-04 PROBLEM — Z51.5 COMFORT MEASURES ONLY STATUS: Status: RESOLVED | Noted: 2019-01-01 | Resolved: 2019-01-01

## 2019-04-04 PROBLEM — B96.29 UTI DUE TO EXTENDED-SPECTRUM BETA LACTAMASE (ESBL) PRODUCING ESCHERICHIA COLI: Status: RESOLVED | Noted: 2019-01-01 | Resolved: 2019-01-01

## 2019-04-04 PROBLEM — K55.069 OCCLUSION OF SUPERIOR MESENTERIC ARTERY: Status: RESOLVED | Noted: 2018-03-16 | Resolved: 2019-01-01

## 2019-04-04 PROBLEM — J44.1 COPD EXACERBATION: Status: RESOLVED | Noted: 2018-03-16 | Resolved: 2019-01-01

## 2019-04-04 PROBLEM — Z71.89 COUNSELING REGARDING ADVANCED CARE PLANNING AND GOALS OF CARE: Status: RESOLVED | Noted: 2019-01-01 | Resolved: 2019-01-01

## 2019-04-04 PROBLEM — J96.91 RESPIRATORY FAILURE WITH HYPOXIA AND HYPERCAPNIA: Status: RESOLVED | Noted: 2019-01-01 | Resolved: 2019-01-01

## 2019-04-04 PROBLEM — Z71.89 GOALS OF CARE, COUNSELING/DISCUSSION: Status: RESOLVED | Noted: 2019-01-01 | Resolved: 2019-01-01

## 2019-04-04 PROBLEM — J44.1 COPD WITH ACUTE EXACERBATION: Status: RESOLVED | Noted: 2019-01-01 | Resolved: 2019-01-01

## 2019-04-04 PROBLEM — Z51.5 PALLIATIVE CARE ENCOUNTER: Status: RESOLVED | Noted: 2019-01-01 | Resolved: 2019-01-01

## 2019-04-04 PROBLEM — R10.9 ABDOMINAL PAIN: Status: RESOLVED | Noted: 2018-03-19 | Resolved: 2019-01-01

## 2019-04-04 PROBLEM — J96.92 RESPIRATORY FAILURE WITH HYPOXIA AND HYPERCAPNIA: Status: RESOLVED | Noted: 2019-01-01 | Resolved: 2019-01-01

## 2019-04-04 NOTE — PLAN OF CARE
Heart rate 30bpm on telemetry monitor. Went to room, respiration ceased, no apical pulse auscultated.wife at bedside.

## 2019-04-04 NOTE — DISCHARGE SUMMARY
"[facility]  Discharge Summary      Admit Date: 4/2/2019    Discharge Date and Time: 4/4/2019  @ 0305.    Attending Physician: Nalini Ramos*     Reason for Admission: Comfort measures    Procedures Performed: * No surgery found *    Hospital Course (synopsis of major diagnoses, care, treatment, and services provided during the course of the hospital stay):           Asad Perez is a 80 y/o M with HFpEF (last EF 55% Jan 2019), paroxysmal A fib not on anticoagulation, COPD (on home 2 L NC), CKD3B, CAD, PVD, prediabetes, RLE AKA, and with recent admission for urinary retention admitted with SOB with sepsis 2/2 to ESBL UTI. CXR reports pleural effusion with worsening respiratory failure.       Palliative Medicine met with family this am with  present.  Patient on BIPAP and having respiratory distress. Discussed with spouse patient current clinical status and comfort care. Spouse in agreement with comfort care for patient and having inpatient hospice for symptom management. "I don't want him to suffer. I want him to breathe better." Emotional support given. Patient unstable for transfer will consult [facility] for inpatient hospice at [facility].  Primary team and case management notified. House supervisor notified. Will admit under Palliative Services. Spiritual care Consulted.        Reported patient with Heart rate 30bpm on telemetry monitor.   Reported respiration ceased, no apical pulse auscultated and pupils fixed and dilated. Patient pronounced 4/4/2019 @ 0305. Wife at bedside, emotional comfort provide      Consults: none    Significant Diagnostic Studies:    Final Diagnoses:    Principal Problem: Comfort measures only status   Secondary Diagnoses:   Active Hospital Problems   No active problems to display.      Resolved Hospital Problems    Diagnosis Date Resolved POA    *Comfort measures only status [Z51.5] 04/04/2019 Not Applicable    Respiratory " failure, acute [J96.00] 2019 Yes    Palliative care encounter [Z51.5] 2019 Not Applicable       Discharged Condition:     Disposition:     Follow Up/Patient Instructions:     Medications:  None (patient  at medical facility)  No discharge procedures on file.       > 50% of 20 min visit spent in chart review, symptom assessment, coordination of care and emotional support.     Nalini Ramos MD  Palliative Medicine  [facility]

## 2019-04-04 NOTE — PLAN OF CARE
Family at bedside. Plan of care reviewed with spouse. Pt unresponsive. Morphine drip noted @ 4mg/hr. A-fib noted on telemetry.bed alarm on and bed in lowest position.will continue to monitor.instructed spouse to call for any assistance, verbalized understanding. Will continue to monitor.

## 2019-04-04 NOTE — PLAN OF CARE
Mendoza catheter and morphine dip discontinued DAVI Paz RN. Post mortem care done and  Home notified by GLORIA Paz RN.

## 2020-05-12 NOTE — PLAN OF CARE
Problem: Adult Inpatient Plan of Care  Goal: Plan of Care Review  Patient on oxygen with documented flow.  Will attempt to wean per O2 order protocol.         Yes

## 2020-11-19 NOTE — PROGRESS NOTES
[facility] [employer] Progress Note    Subjective:     Comfortable this AM and without significant complaint. On 4L nasal cannula. Minimal urine outpuit     Objective:   Last 24 Hour Vital Signs:  BP  Min: 88/52  Max: 128/62  Temp  Av.9 °F (36.6 °C)  Min: 97.4 °F (36.3 °C)  Max: 98.5 °F (36.9 °C)  Pulse  Av.2  Min: 97  Max: 114  Resp  Av.8  Min: 18  Max: 30  SpO2  Av.6 %  Min: 90 %  Max: 100 %  I/O last 3 completed shifts:  In: 1200 [IV Piggyback:1200]  Out: 177 [Urine:177]    Physical Examination:    Gen: awake, alert, on 4L nasal cannula  HEENT: PERRL, EOMi, oropharynx clear, moist and without exudate, JVP 7 cm, no cervical lymphadenopathy   CVS: RRR, normal S1/S2, without S3/S4 or murmurs  Resp: no use of accessory muscles, no wheezes, no rhonchi, no rales  Abdo: soft, non-tender, non-distended, bowel sounds +, without guarding or rebound  Neuro: moves all extremities, no abnormal tone  Ext: no edema, no cyanosis  Skin: no rashes or skin breakdown noted      Laboratory:  Laboratory Data Reviewed:  Trended Lab Data:  Recent Labs   Lab 19  1745 19  0425 19  1603 19  0318   WBC 8.07 4.85  --  8.65   HGB 11.5* 11.6*  --  9.8*   HCT 36.8* 36.9*  --  30.6*    141*  --  175   MCV 95 94  --  93   RDW 17.4* 17.2*  --  17.1*    134* 135* 133*   K 5.1 5.4* 5.6* 4.6    104 103 103   CO2 22* 18* 19* 22*   BUN 42* 43* 51* 53*   CREATININE 1.8* 2.0* 2.4* 2.8*   GLU 69* 162* 203* 221*   PROT 5.1* 4.6*  --  4.3*   ALBUMIN 2.5* 2.2*  --  2.1*   BILITOT 0.4 0.4  --  0.3   AST 21 19  --  12   ALKPHOS 81 73  --  75   ALT 32 29  --  26       Trended Cardiac Data:  Recent Labs   Lab 19  1745 19  2323 19  0425   TROPONINI 0.036* 0.046* 0.037*   BNP 1,122*  --   --        Microbiology Data   Microbiology Results (last 7 days)     Procedure Component Value Units Date/Time    Blood culture x two cultures. Draw prior to antibiotics. [[id number]]  Collected:  03/26/19 1743    Order Status:  Completed Specimen:  Blood Updated:  03/27/19 2012     Blood Culture, Routine No Growth to date     Blood Culture, Routine No Growth to date    Narrative:       Aerobic and anaerobic    Blood culture x two cultures. Draw prior to antibiotics. [[id number]] Collected:  03/26/19 1742    Order Status:  Completed Specimen:  Blood Updated:  03/27/19 2012     Blood Culture, Routine No Growth to date     Blood Culture, Routine No Growth to date    Narrative:       Aerobic and anaerobic    Urine culture [[id number]] Collected:  03/26/19 2045    Order Status:  No result Specimen:  Urine from Clean Catch Updated:  03/27/19 1102    Urine Culture High Risk [[id number]]     Order Status:  Completed Specimen:  Urine     Culture, Respiratory with Gram Stain [[id number]]     Order Status:  No result Specimen:  Respiratory from Sputum, Expectorated     Influenza A & B by Molecular [[id number]] Collected:  03/26/19 1801    Order Status:  Completed Specimen:  Nasopharyngeal Swab Updated:  03/26/19 1853     Influenza A, Molecular Negative     Influenza B, Molecular Negative     Flu A & B Source Nasal swab          Radiology:  Imaging Results          X-Ray Chest AP Portable (Final result)  Result time 03/26/19 18:15:56    Final result by Mati Gallardo MD (03/26/19 18:15:56)                 Impression:      1. Findings suggesting edema noting superimposed left pleural effusion with likely compressive atelectasis of the left lower lung zone, developing consolidation however is not excluded.  Overall, findings appear similar to CT 03/23/2019.      Electronically signed by: Mati Gallardo MD  Date:    03/26/2019  Time:    18:15             Narrative:    EXAMINATION:  XR CHEST AP PORTABLE    CLINICAL HISTORY:  Sepsis;    TECHNIQUE:  Single frontal view of the chest was performed.    COMPARISON:  01/30/2019, CT 03/23/2019    FINDINGS:  The cardiomediastinal silhouette is prominent, noting prominent  epicardial fat..  There is obscuration of the left costophrenic angle suggesting effusion.  There may be trace layering right pleural fluid..  The trachea is midline.  The lungs are symmetrically expanded bilaterally with patchy increased interstitial and parenchymal attenuation bilaterally suggesting edema.  There is bandlike atelectasis projected over the right midlung zone and left midlung zone.  There is increased parenchymal attenuation projected over the left lower lung zone, may reflect compressive atelectasis or consolidation..  There is no pneumothorax.  The osseous structures are remarkable for degenerative changes noting osteopenia.  There is vascular calcification of the abdomen..                                  Current Medications:     Infusions:       Scheduled:   albuterol-ipratropium  3 mL Nebulization Q6H    amiodarone  200 mg Oral Daily    aspirin  81 mg Oral Daily    atorvastatin  40 mg Oral Daily    azithromycin  500 mg Intravenous Q24H    heparin (porcine)  5,000 Units Subcutaneous Q8H    meropenem (MERREM) IVPB  1 g Intravenous Q12H    sodium bicarbonate  650 mg Oral BID        PRN:  dextrose 50 % in water (D50W), dextrose 50 % in water (D50W), glucagon (human recombinant), glucose, glucose, insulin aspart U-100, sodium chloride 0.9%    Antibiotics and Day Number of Therapy:  Vanc/meropenem/azithromycin    Lines and Day Number of Therapy:  PIV    Assessment and Plan     Acute on Chronic Hypoxic and Hypercapnic Respiratory Failure Likely    2/2 Volume Overload  -O2 85% on 5 L nasal cannula on admission  ABG: ph 7.21/CO2 45/O2 117/HCO3 20.4  A-a gradient 254  -Given IV steroids and breathing treatment in ED  -Non-compressible IVC on bedside US in ED  CXR on admission showed bilateral pulmonary edema  BNP 1,122 (baseline in 300s)  -Will diurese, minimal output  - volume status difficult to determine as story suggests patient is volume down but labs/CXR and exam suggest volume  overload   - oxygenation improving this AM, respiratory acidosis resolved     Sepsis secondary to ESBL E coli Urinary Tract Infection  -Patient with multiple complaints for past 7 days, on 3/23 in ED found to    have ESBL E coli UTI that was treated with ciprofloxacin  -Admission: , RR 28, WBC 8  Procal 0.12  Lactic acid 1.6  CTAP on 3/26 with bladder wall thickening  -Given vancomycin/zosyn in ED  -Will start on Vancomycin, meropenum, and azithromycin, deescalate as repeat urine and blood cultures result  - denying urinary complaints in previous weeks but CT imaging as above; will continue treatment for now, potentially de-escalate today    ELAYNE on CKD 3B, oliguric, worsening  -Admission Cr of 1.8  Baseline 1.2  - FeUrea borderline pre-renal, volume status still difficult to determine   - oligurigic, worsening Cr/BUN and hyperkalemia overnight  - nephrology consult today     Non-Anion Gap Metabolic Acidosis, worsening  -Bicarb of 22  ABG pH of 7.2  - bicarb 18 this AM, unclear etiology  - denies diarrhea, checking urine anion gap     Acute on Chronic HFpEF  -TTE (January 2019) w/ EF 55%, grade I left ventricular diastolic    dysfunction, PA pressure of 47  - Home Meds: Amiodarone 200mg daily, Carvedilol 25mg BID, Lasix 40 PO    -Will diurese, minimal urine output     Paroxysmal atrial fibrillation with RVR  -CHADSVASC >2, not any anticoagulation  -Patient has been on amiodarone for years  -In A-fib on admission, rate in 90s  -Continue amiodorone, holding coreg 2/2 hypotension     Hx of Urinary retention  -Found to have urinary retention back in Jan 2019  -Will in and out cath, monitor need for gonzalez       Stage 2 Sacral Decubitus Ulcer, present on admission  -Noted on exam, no erythema  -Patient with sacral decub ulcer infections in past, last dating back to    early January 2019  -Consult wound care     Elevated Troponin  -On admission troponin of 0.036, up to 0.047 and now downtrending  -Likely  elevated secondary to demand     Normocytic Anemia  -3/26: Hb 11.5/Hct 36.8/MCV 95  -Worked up in 2016, showing anemia of chronic disease  -Will defer workup   -Last colonoscopy 2012     CAD, PVD, RLE AKA:  -Home Meds: ASA 81mg daily, Atorvastatin 40mg daily  -Resume home meds     Type II Diabetes Mellitus  -A1c of 7.1 in Jan 2019, 6.6 this admission  -Not on any current outpatient medications  -If sugars remain high will start on SSI with accuchecks     History of CVA  -CVA in 2007, with no residual deficits  -Home Meds: ASA 81mg daily, Atorvastatin 40mg daily  -Resume home meds     History of Alcohol Abuse  -No alcohol since 2007     Diet: cardiac  Ppx: heparin  Dispo: pending clinical improvement, possible step down today, nephrology consult          Jeffery Winn  [facility] Internal Medicine -II  [facility] Hospitalitis Service Team B    [facility] Medicine Hospitalist Pager numbers:   [facility] Hospitalist Medicine Team A (Chay/Shawanda): [phone]  [facility] Hospitalist Medicine Team B (Gloria/Alex):  [phone]

## 2021-10-20 NOTE — PLAN OF CARE
Problem: Oral Intake Inadequate  Goal: Improved Oral Intake    Intervention: Promote and Optimize Oral Intake  Recommendation:   1. Continue with Mechanical Soft Diet -libralized per patient preferences    2. Add mirilax if patient continues with conplaints of constipation    3. Boost oral supplement with meals.     Goals: Patient to consume >50% of meals  Nutrition Goal Status: progressing towards goal  Communication of RD Recs: reviewed with RN    Nutrition Discharge Planning: Home on Cardiac Mechincal Soft diet           PROVIDER:[TOKEN:[[id number]]] PROVIDER:[TOKEN:[[id number]]]

## 2023-08-11 NOTE — DISCHARGE SUMMARY
[facility] Discharge Summary    Primary Team: [facility] Team B  Attending Physician: Carl Castro MD  Resident: [place]anda   Intern: Valdemar    Date of Admit: 1/16/2019  Date of Discharge: 2/1/2019    Discharge to: [facility]  Condition: Stable    Discharge Diagnoses     Patient Active Problem List   Diagnosis    Essential hypertension    CKD (chronic kidney disease)    PAD (peripheral artery disease)    CAD (coronary artery disease)    History of stroke    Physical deconditioning    Alcohol abuse    Osteoarthritis of left knee    Dysphagia    Dyspnea    Normocytic anemia    Chronic obstructive pulmonary disease    Acute on chronic respiratory failure with hypoxia and hypercapnia    Acute on chronic diastolic heart failure    Transaminitis    ELAYNE (acute kidney injury)    GERD (gastroesophageal reflux disease)    Esophageal web    Right knee pain    Infected prosthetic knee joint    History of atrial fibrillation    Pneumonia    Confusion    Weak    Altered mental status    MRSA (methicillin resistant Staphylococcus aureus) infection    Anemia of chronic disease    Chronic kidney disease, stage V    Anemia    Abnormal CT of the abdomen    Abnormal finding on imaging    Stenosis of left internal carotid artery    Critical lower limb ischemia    Chronic diastolic congestive heart failure    Decubitus ulcer of sacral region, stage 4    Elevated liver enzymes    Deep vein thrombosis (DVT) of upper extremity    VRE (vancomycin-resistant Enterococci) infection    Dehiscence of perineal wound    Hyponatremia    Fever    Non-healing ulcer of lower leg    Deep vein thrombosis (DVT) of brachial vein    Symptomatic anemia    Centrilobular emphysema    Non-healing wound of amputation stump    Urinary tract infection associated with indwelling urethral catheter    Decubitus ulcer of buttock, stage 2    Acute osteomyelitis    Complicated open wound of right hip    COPD  exacerbation    Occlusion of superior mesenteric artery    Abdominal rigidity, generalized    Abdominal pain    COPD with acute exacerbation    Respiratory failure with hypoxia and hypercapnia    Hyperkalemia    Pressure injury of sacral region, stage 3    Chest pain    Urinary retention    Other hydronephrosis       Consultants and Procedures     Consultants:  Nutrition  Cardiology  Urology  Pulmonology  Wound care    Procedures:   Mendoza catheter  bipap     Brief History of Present Illness      Asad Perez is a 79 y.o.  male who  has a past medical history of Alcohol abuse, CHF (congestive heart failure), CKD (chronic kidney disease) stage 3, GFR 30-59 ml/min, Coronary artery disease, Decubitus skin ulcer, Heart failure, diastolic, High cholesterol, Hypertension, Immobility, LBP (low back pain), Prediabetes, PVD (peripheral vascular disease), Stroke, and Urine incontinence.  The patient presented to [facility] on 1/16/2019 with a primary complaint of Weakness (78 year old male presents to ed cc of generalized weakness/ abdominal pain x 3 days patient states recent discharge from hospital on sunday. )  .     Mr. Perez is a 79 year old man who was readmitted for respiratory failure after having being admitted earlier this month for the same reason and treated with tamiflu. He presented volume overloaded and having increased oxygen requirements from his usual home 2L daily and bipap nightly. He has been diuresed with 80mg lasix IV BID which can be titrated in the LTAC. He has diuresed 1.5 liters off. He is currently on 30L high flow oxygen and bipap at night. His course was complicated by intense abdominal pain from constipation which resolved after enemas and suppositories. He will be going to LTAC for further titration of bipap and high flow oxygen.     For the full HPI please refer to the History & Physical from this admission.      Day of Discharge Physical  Exam:    Vitals:    02/01/19 0845 02/01/19 0900 02/01/19 1000 02/01/19 1100   BP:  (!) 102/55 (!) 106/53 (!) 107/55   BP Location:  Right arm Right arm Right arm   Patient Position:  Lying Lying Lying   Pulse:  (!) 124 (!) 119 (!) 119   Resp:  (!) 26 (!) 24 (!) 24   Temp: 97.9 °F (36.6 °C)      TempSrc: Oral      SpO2:  96% 96% 97%   Weight:       Height:         Gen: awake, bipap this am and now on comfort flow  HEENT: normocephalic, atraumatic, EOMI, mmm  CV: irregularly irregular, rate in low 100s  Lungs: coarse breath sounds, no crackles  GI: soft, less tender today, non distended, having BMs  Extrem: edema improved, no clubbing or cyanosis  Skin: dry, warm, sacral ulcer  Neuro: AAOx3, moving all extremities     Hospital Course By Problem with Pertinent Findings     AoC Hypercapnic Respiratory Failure Likely 2/2 COPD Exacerbation and volume overload  -Recently admitted for this; Blood cultures grew gram negative rods; Discharged on 1/13 with 14 days total of Levofloxacin, 5 days of Prednisone and Oseltamavir, continuing his home 2L oxygen  - SpO2 70's on no supplemental oxygen upon arrival to ED   - ABG with respiratory acidosis, 7.227, pCO2 78 and pO2 292   - IV steroids & 1hr nebulizer with improved sats, 100% on non-rebreather 100% FiO2  - Procal 0.28, Lactate 0.9  - CXR w/ left lung atelectasis, relatively unchanged from previous admission  - CT Chest & Abd w/ small bilateral pleural effusion, compressive atelectasis, left lingula with consolidation vs atelectasis, cholelithiasis  - now no longer on antibiotics  - on lasix 80mg IV BID and put out 1.5 liters, titrate further in LTAC  - will need further titration for oxygen requirements. Currently on bipap at night and high flow oxygen at 30L.     Acute on Chronic HFpEF:  - TTE (11/28/16) w/ mild LA enlargement, concentric hypertrophy, normal LVSF, left ventricular diastolic dysfunction, pulmonary HTN (PA sys pressure ~47)  - Home Meds: Amiodarone 200mg  daily, Carvedilol 25mg BID, Lasix 40 PO daily  - requiring lasix 80mg IV BID and put out 1.5 liters  -  fluid restrict to 1 liter  - previous echo with EF 40% and now at 65%  - titrate lasix dose in LTAC      Paroxysmal atrial fibrillation with RVR  -Patient has been on amiodarone for years  -Cardiology do not think amiodarone is the cause of worsening respiratory status- believes this is the best medication to control his heart rate given his hypotension  -Patient currently in A-fib, rate in low 100s     Urinary retention  -CT abdomen/pelvis showing hydroureteronephrosis, markedly distended bladder   -Urology consulted- recommended inserting gonzalez, start tamsulosin 0.4 mg daily  - discharge to LTAC with gonzalez.   -Schedule for follow up with Dr. Dhaliwal 1 month after discharge     Constipation, resolved   -No BM x4 days  -Started senna, colace, miralax.   - resolved after enemas and dulcolax suppository     ELAYNE on CKD 3B  -Cr 1.7 on admit; baseline Cr ~1.2  - last echo 11/2016 with EF 65%, improving with light IVF  - plan to discharge on home dose lasix 40 PO   - Lasix not affecting creatinine currently, monitor daily  - avoid other nephrotoxins      Recent GNR Bacteremia 2/2 Stage 3 Sacral Decubitus Ulcer:  - Course of antibiotics during admission earlier this month  - Blood Cx 1/11 NGTD, repeat Blood Cx 1/16 NGTD  - Consult to Wound Care. Rec mepilex a/g every other day  - Dr Coffman following as per pt, outpt follow up established  - wound clean, dry, nonerythemetous, no signs of infection  - continue wound care in LTAC      Transaminitis:  - , AST 202, Alk Phos 161   - RUQ U/S earlier this month showing cholelithiasis without acute cholecystitis   - Neuroendocrine Surg was consulted at that time, no concern for acute cholecystitis and no indication for surgical intervention (LFTs more indicative of hepatic origin vs cholestatic)  - Intermittent complaints of abdominal pain, denied to other team  have also provided discharge instructions for him that include: educational information regarding their diagnosis and treatment, and list of reasons why they would want to return to the ED prior to their follow-up appointment, should his condition change. DISPOSITION Decision To Discharge 08/11/2023 05:10:15 PM      5:11 PM EDT  I have discussed with patient their diagnosis, treatment, and follow up plan. The patient agrees to follow up as outlined in discharge paperwork and also to return to the ED with any worsening. Herminio Chapa PA-C         PATIENT REFERRED TO:  [facility] EMERGENCY DEPT  [address] [facility] [address]  [phone]  Go to   As needed, If symptoms worsen       DISCHARGE MEDICATIONS:     Medication List        START taking these medications      doxycycline hyclate 100 MG capsule  Commonly known as: VIBRAMYCIN  Take 1 capsule by mouth 2 times daily for 7 days               Where to Get Your Medications        These medications were sent to [facility]/pharmacy #[id number]- [address] [phone] - F [phone]  [address] [place] [address]      Phone: [phone]   doxycycline hyclate 100 MG capsule           DISCONTINUED MEDICATIONS:  Current Discharge Medication List          I have seen and evaluated the patient autonomously. My supervision physician was on site and available for consultation if needed. I am the Primary Clinician of Record. Herminio Chapa PA-C (electronically signed)    (Please note that parts of this dictation were completed with voice recognition software. Quite often unanticipated grammatical, syntax, homophones, and other interpretive errors are inadvertently transcribed by the computer software. Please disregards these errors.  Please excuse any errors that have escaped final proofreading.)         Jay Busby PA-C  08/11/23 2104 members   - CT Abd 1/16 with cholelithiasis  - LFTs improving     CAD, PVD, RLE AKA:  - Home Meds: ASA 81mg daily, Atorvastatin 40mg daily  - Resume home meds     History of CVA:  - CVA in 2007, no focal deficits on exam  - Home Meds: ASA 81mg daily, Atorvastatin 40mg daily  - Resume home meds     History of Alcohol Abuse:  -No alcohol since 2007     Healthcare Maintenance:  - UTD on flu, tetanus, pneumovax   - Follow up appointments: Dr. Dhaliwal (Urology) 1 month after discharge        Discharge Medications        Medication List      START taking these medications    dicyclomine 10 MG capsule  Commonly known as:  BENTYL  Take 1 capsule (10 mg total) by mouth 4 (four) times daily.     furosemide 10 mg/mL injection  Commonly known as:  LASIX  Inject 8 mLs (80 mg total) into the vein 2 (two) times daily.  Replaces:  furosemide 40 MG tablet     senna 8.6 mg tablet  Commonly known as:  SENOKOT  Take 1 tablet by mouth daily as needed for Constipation.     tamsulosin 0.4 mg Cap  Commonly known as:  FLOMAX  Take 1 capsule (0.4 mg total) by mouth once daily.  Start taking on:  2/2/2019        CHANGE how you take these medications    albuterol-ipratropium 2.5 mg-0.5 mg/3 mL nebulizer solution  Commonly known as:  DUO-NEB  Take 3 mLs by nebulization every 6 (six) hours while awake.  What changed:  when to take this        CONTINUE taking these medications    amiodarone 200 MG Tab  Commonly known as:  PACERONE  Take 1 tablet (200 mg total) by mouth once daily.     amLODIPine 10 MG tablet  Commonly known as:  NORVASC     aspirin 81 MG EC tablet  Commonly known as:  ECOTRIN     atorvastatin 40 MG tablet  Commonly known as:  LIPITOR     carvedilol 25 MG tablet  Commonly known as:  COREG  Take 1 tablet (25 mg total) by mouth 2 (two) times daily.     clotrimazole-betamethasone 1-0.05% cream  Commonly known as:  LOTRISONE  Apply topically 2 (two) times daily.     multivitamin per tablet  Commonly known as:  THERAGRAN      oxyCODONE-acetaminophen 5-325 mg per tablet  Commonly known as:  PERCOCET  Take 1 tablet by mouth every 4 (four) hours as needed for Pain.     polyethylene glycol 17 gram/dose powder  Commonly known as:  GLYCOLAX  Take 17 g by mouth once daily.     traMADol 50 mg tablet  Commonly known as:  ULTRAM     VITAMIN C 500 MG tablet  Generic drug:  ascorbic acid (vitamin C)        STOP taking these medications    furosemide 40 MG tablet  Commonly known as:  LASIX  Replaced by:  furosemide 10 mg/mL injection     predniSONE 20 MG tablet  Commonly known as:  DELTASONE        ASK your doctor about these medications    levoFLOXacin 250 MG tablet  Commonly known as:  LEVAQUIN  Take 1 tablet (250 mg total) by mouth once daily. for 11 days  Ask about: Should I take this medication?           Where to Get Your Medications      Information about where to get these medications is not yet available    Ask your nurse or doctor about these medications  · dicyclomine 10 MG capsule  · furosemide 10 mg/mL injection  · senna 8.6 mg tablet  · tamsulosin 0.4 mg Cap         Discharge Information:   Diet:  Cardiac    Physical Activity:  As tolerated, PT and OT consulted    Instructions:  1. Take all medications as prescribed  2. Keep all follow-up appointments  3. Return to the hospital or call your primary care physicians if any worsening symptoms such as severe SOB or chest pain occur.      Follow-Up Appointments:  To go to [facility], follow up with PCP upon discharge from [facility]    Nabila Mayberry  [facility] Internal Medicine, HO-2

## (undated) DEVICE — SEE MEDLINE ITEM 154981

## (undated) DEVICE — SYR 10CC LUER LOCK

## (undated) DEVICE — COVER OVERHEAD SURG LT BLUE

## (undated) DEVICE — SEE MEDLINE ITEM 152622

## (undated) DEVICE — SEE MEDLINE ITEM 157131

## (undated) DEVICE — SEE MEDLINE ITEM 157116

## (undated) DEVICE — CONTAINER SPECIMEN STR 3 0Z

## (undated) DEVICE — NDL HYPO A BEVEL 22X1 1/2

## (undated) DEVICE — SYR B-D DISP CONTROL 10CC100/C

## (undated) DEVICE — PAD ABDOMINAL 5X9 STERILE

## (undated) DEVICE — GAUZE SPONGE 4X4 12PLY

## (undated) DEVICE — TAPE ADH MEDIPORE 4 X 10YDS

## (undated) DEVICE — Device

## (undated) DEVICE — GLOVE BIOGEL ECLIPSE SZ 7.5

## (undated) DEVICE — PACKING STRIP IDOFORM 1X5YD

## (undated) DEVICE — PAD PREP 50/CA

## (undated) DEVICE — SEE MEDLINE ITEM 157117